# Patient Record
Sex: MALE | Race: BLACK OR AFRICAN AMERICAN | Employment: OTHER | URBAN - METROPOLITAN AREA
[De-identification: names, ages, dates, MRNs, and addresses within clinical notes are randomized per-mention and may not be internally consistent; named-entity substitution may affect disease eponyms.]

---

## 2018-04-02 ENCOUNTER — HOSPITAL ENCOUNTER (OUTPATIENT)
Age: 68
Discharge: HOME | End: 2018-04-02
Payer: MEDICAID

## 2018-04-02 LAB
ANION GAP SERPL CALCULATED.3IONS-SCNC: 18 MMOL/L (ref 7–16)
BUN BLDV-MCNC: 11 MG/DL (ref 8–23)
CALCIUM SERPL-MCNC: 8.9 MG/DL (ref 8.6–10.2)
CHLORIDE BLD-SCNC: 102 MMOL/L (ref 98–107)
CO2: 20 MMOL/L (ref 22–29)
CREAT SERPL-MCNC: 1.5 MG/DL (ref 0.7–1.2)
GFR AFRICAN AMERICAN: 56
GFR NON-AFRICAN AMERICAN: 56 ML/MIN/1.73
GLUCOSE BLD-MCNC: 132 MG/DL (ref 74–109)
POTASSIUM SERPL-SCNC: 3.3 MMOL/L (ref 3.5–5)
SODIUM BLD-SCNC: 140 MMOL/L (ref 132–146)

## 2018-04-02 PROCEDURE — 80048 BASIC METABOLIC PNL TOTAL CA: CPT

## 2018-04-02 PROCEDURE — 36415 COLL VENOUS BLD VENIPUNCTURE: CPT

## 2018-05-11 ENCOUNTER — HOSPITAL ENCOUNTER (OUTPATIENT)
Age: 68
Discharge: HOME | End: 2018-05-11
Payer: MEDICAID

## 2018-05-11 LAB
ALBUMIN SERPL-MCNC: 4.3 G/DL (ref 3.5–5.2)
ALP BLD-CCNC: 131 U/L (ref 40–129)
ALT SERPL-CCNC: 20 U/L (ref 0–40)
ANION GAP SERPL CALCULATED.3IONS-SCNC: 15 MMOL/L (ref 7–16)
AST SERPL-CCNC: 29 U/L (ref 0–39)
BILIRUB SERPL-MCNC: 0.8 MG/DL (ref 0–1.2)
BUN BLDV-MCNC: 13 MG/DL (ref 8–23)
CALCIUM SERPL-MCNC: 8.7 MG/DL (ref 8.6–10.2)
CHLORIDE BLD-SCNC: 103 MMOL/L (ref 98–107)
CHOLESTEROL, TOTAL: 181 MG/DL (ref 0–199)
CO2: 23 MMOL/L (ref 22–29)
CREAT SERPL-MCNC: 1.4 MG/DL (ref 0.7–1.2)
GFR AFRICAN AMERICAN: >60
GFR NON-AFRICAN AMERICAN: >60 ML/MIN/1.73
GLUCOSE BLD-MCNC: 102 MG/DL (ref 74–109)
HBA1C MFR BLD: 7.5 % (ref 4.8–5.9)
HDLC SERPL-MCNC: 48 MG/DL
LDL CHOLESTEROL CALCULATED: 113 MG/DL (ref 0–99)
POTASSIUM SERPL-SCNC: 3 MMOL/L (ref 3.5–5)
SODIUM BLD-SCNC: 141 MMOL/L (ref 132–146)
T4 FREE: 1.45 NG/DL (ref 0.93–1.7)
TOTAL PROTEIN: 7.9 G/DL (ref 6.4–8.3)
TRIGL SERPL-MCNC: 99 MG/DL (ref 0–149)
TSH SERPL DL<=0.05 MIU/L-ACNC: 1.24 UIU/ML (ref 0.27–4.2)
VITAMIN D 25-HYDROXY: 83 NG/ML (ref 30–100)
VLDLC SERPL CALC-MCNC: 20 MG/DL

## 2018-05-11 PROCEDURE — 84443 ASSAY THYROID STIM HORMONE: CPT

## 2018-05-11 PROCEDURE — 83036 HEMOGLOBIN GLYCOSYLATED A1C: CPT

## 2018-05-11 PROCEDURE — 36415 COLL VENOUS BLD VENIPUNCTURE: CPT

## 2018-05-11 PROCEDURE — 84439 ASSAY OF FREE THYROXINE: CPT

## 2018-05-11 PROCEDURE — 80053 COMPREHEN METABOLIC PANEL: CPT

## 2018-05-11 PROCEDURE — 82607 VITAMIN B-12: CPT

## 2018-05-11 PROCEDURE — 82306 VITAMIN D 25 HYDROXY: CPT

## 2018-05-11 PROCEDURE — 80061 LIPID PANEL: CPT

## 2018-05-12 LAB — VITAMIN B-12: 232 PG/ML (ref 211–946)

## 2018-05-21 ENCOUNTER — HOSPITAL ENCOUNTER (OUTPATIENT)
Dept: GENERAL RADIOLOGY | Age: 68
Discharge: HOME | End: 2018-05-23
Payer: MEDICAID

## 2018-05-21 DIAGNOSIS — R13.12 DYSPHAGIA, OROPHARYNGEAL: ICD-10-CM

## 2018-05-21 PROCEDURE — 74220 X-RAY XM ESOPHAGUS 1CNTRST: CPT

## 2018-07-09 ENCOUNTER — HOSPITAL ENCOUNTER (OUTPATIENT)
Age: 68
Discharge: HOME | End: 2018-07-09
Payer: MEDICAID

## 2018-07-09 LAB
ALBUMIN SERPL-MCNC: 3.9 G/DL (ref 3.5–5.2)
ALP BLD-CCNC: 125 U/L (ref 40–129)
ALT SERPL-CCNC: 30 U/L (ref 0–40)
ANION GAP SERPL CALCULATED.3IONS-SCNC: 13 MMOL/L (ref 7–16)
AST SERPL-CCNC: 36 U/L (ref 0–39)
BASOPHILS ABSOLUTE: 0.03 E9/L (ref 0–0.2)
BASOPHILS RELATIVE PERCENT: 0.6 % (ref 0–2)
BILIRUB SERPL-MCNC: 0.4 MG/DL (ref 0–1.2)
BUN BLDV-MCNC: 14 MG/DL (ref 8–23)
CALCIUM SERPL-MCNC: 8.9 MG/DL (ref 8.6–10.2)
CHLORIDE BLD-SCNC: 105 MMOL/L (ref 98–107)
CO2: 23 MMOL/L (ref 22–29)
CREAT SERPL-MCNC: 1.4 MG/DL (ref 0.7–1.2)
CREATININE URINE: 229 MG/DL (ref 40–278)
EOSINOPHILS ABSOLUTE: 0.05 E9/L (ref 0.05–0.5)
EOSINOPHILS RELATIVE PERCENT: 0.9 % (ref 0–6)
GFR AFRICAN AMERICAN: >60
GFR NON-AFRICAN AMERICAN: >60 ML/MIN/1.73
GLUCOSE BLD-MCNC: 138 MG/DL (ref 74–109)
HCT VFR BLD CALC: 42.1 % (ref 37–54)
HEMOGLOBIN: 14.1 G/DL (ref 12.5–16.5)
IMMATURE GRANULOCYTES #: 0.01 E9/L
IMMATURE GRANULOCYTES %: 0.2 % (ref 0–5)
LYMPHOCYTES ABSOLUTE: 2.1 E9/L (ref 1.5–4)
LYMPHOCYTES RELATIVE PERCENT: 39.8 % (ref 20–42)
MAGNESIUM: 2 MG/DL (ref 1.6–2.6)
MCH RBC QN AUTO: 28.4 PG (ref 26–35)
MCHC RBC AUTO-ENTMCNC: 33.5 % (ref 32–34.5)
MCV RBC AUTO: 84.7 FL (ref 80–99.9)
MONOCYTES ABSOLUTE: 0.57 E9/L (ref 0.1–0.95)
MONOCYTES RELATIVE PERCENT: 10.8 % (ref 2–12)
NEUTROPHILS ABSOLUTE: 2.52 E9/L (ref 1.8–7.3)
NEUTROPHILS RELATIVE PERCENT: 47.7 % (ref 43–80)
PDW BLD-RTO: 13.3 FL (ref 11.5–15)
PHOSPHORUS: 3.2 MG/DL (ref 2.5–4.5)
PLATELET # BLD: 207 E9/L (ref 130–450)
PMV BLD AUTO: 11.4 FL (ref 7–12)
POTASSIUM SERPL-SCNC: 3.8 MMOL/L (ref 3.5–5)
PROTEIN PROTEIN: 164 MG/DL (ref 0–12)
PROTEIN/CREAT RATIO: 0.7
PROTEIN/CREAT RATIO: 0.7 (ref 0–0.2)
RBC # BLD: 4.97 E12/L (ref 3.8–5.8)
SODIUM BLD-SCNC: 141 MMOL/L (ref 132–146)
TOTAL PROTEIN: 7.7 G/DL (ref 6.4–8.3)
URIC ACID, SERUM: 8.1 MG/DL (ref 3.4–7)
WBC # BLD: 5.3 E9/L (ref 4.5–11.5)

## 2018-07-09 PROCEDURE — 84550 ASSAY OF BLOOD/URIC ACID: CPT

## 2018-07-09 PROCEDURE — 36415 COLL VENOUS BLD VENIPUNCTURE: CPT

## 2018-07-09 PROCEDURE — 85025 COMPLETE CBC W/AUTO DIFF WBC: CPT

## 2018-07-09 PROCEDURE — 84156 ASSAY OF PROTEIN URINE: CPT

## 2018-07-09 PROCEDURE — 84100 ASSAY OF PHOSPHORUS: CPT

## 2018-07-09 PROCEDURE — 83735 ASSAY OF MAGNESIUM: CPT

## 2018-07-09 PROCEDURE — 80053 COMPREHEN METABOLIC PANEL: CPT

## 2018-07-09 PROCEDURE — 82570 ASSAY OF URINE CREATININE: CPT

## 2018-07-16 ENCOUNTER — HOSPITAL ENCOUNTER (OUTPATIENT)
Dept: ULTRASOUND IMAGING | Age: 68
Discharge: HOME | End: 2018-07-18
Payer: MEDICAID

## 2018-07-16 DIAGNOSIS — R13.11 DYSPHAGIA, ORAL PHASE: ICD-10-CM

## 2018-07-16 DIAGNOSIS — N13.39 OTHER HYDRONEPHROSIS: ICD-10-CM

## 2018-07-16 PROCEDURE — 76770 US EXAM ABDO BACK WALL COMP: CPT

## 2018-07-16 PROCEDURE — 76536 US EXAM OF HEAD AND NECK: CPT

## 2018-08-06 ENCOUNTER — HOSPITAL ENCOUNTER (OUTPATIENT)
Age: 68
Discharge: HOME | End: 2018-08-06
Payer: MEDICAID

## 2018-08-06 LAB
ALBUMIN SERPL-MCNC: 3.9 G/DL (ref 3.5–5.2)
ALP BLD-CCNC: 133 U/L (ref 40–129)
ALT SERPL-CCNC: 31 U/L (ref 0–40)
ANION GAP SERPL CALCULATED.3IONS-SCNC: 14 MMOL/L (ref 7–16)
AST SERPL-CCNC: 40 U/L (ref 0–39)
BASOPHILS ABSOLUTE: 0.04 E9/L (ref 0–0.2)
BASOPHILS RELATIVE PERCENT: 0.8 % (ref 0–2)
BILIRUB SERPL-MCNC: 0.4 MG/DL (ref 0–1.2)
BUN BLDV-MCNC: 12 MG/DL (ref 8–23)
CALCIUM SERPL-MCNC: 8.9 MG/DL (ref 8.6–10.2)
CHLORIDE BLD-SCNC: 103 MMOL/L (ref 98–107)
CO2: 24 MMOL/L (ref 22–29)
CREAT SERPL-MCNC: 1.5 MG/DL (ref 0.7–1.2)
CREATININE URINE: 218 MG/DL (ref 40–278)
EOSINOPHILS ABSOLUTE: 0.09 E9/L (ref 0.05–0.5)
EOSINOPHILS RELATIVE PERCENT: 1.8 % (ref 0–6)
GFR AFRICAN AMERICAN: 56
GFR NON-AFRICAN AMERICAN: 56 ML/MIN/1.73
GLUCOSE BLD-MCNC: 147 MG/DL (ref 74–109)
HCT VFR BLD CALC: 41.8 % (ref 37–54)
HEMOGLOBIN: 14 G/DL (ref 12.5–16.5)
IMMATURE GRANULOCYTES #: 0.01 E9/L
IMMATURE GRANULOCYTES %: 0.2 % (ref 0–5)
LYMPHOCYTES ABSOLUTE: 2.36 E9/L (ref 1.5–4)
LYMPHOCYTES RELATIVE PERCENT: 47 % (ref 20–42)
MAGNESIUM: 2 MG/DL (ref 1.6–2.6)
MCH RBC QN AUTO: 28.1 PG (ref 26–35)
MCHC RBC AUTO-ENTMCNC: 33.5 % (ref 32–34.5)
MCV RBC AUTO: 83.8 FL (ref 80–99.9)
MONOCYTES ABSOLUTE: 0.61 E9/L (ref 0.1–0.95)
MONOCYTES RELATIVE PERCENT: 12.2 % (ref 2–12)
NEUTROPHILS ABSOLUTE: 1.91 E9/L (ref 1.8–7.3)
NEUTROPHILS RELATIVE PERCENT: 38 % (ref 43–80)
PDW BLD-RTO: 13.1 FL (ref 11.5–15)
PHOSPHORUS: 3.3 MG/DL (ref 2.5–4.5)
PLATELET # BLD: 209 E9/L (ref 130–450)
PMV BLD AUTO: 10.9 FL (ref 7–12)
POTASSIUM SERPL-SCNC: 3.6 MMOL/L (ref 3.5–5)
PROTEIN PROTEIN: 552 MG/DL (ref 0–12)
PROTEIN/CREAT RATIO: 2.5
PROTEIN/CREAT RATIO: 2.5 (ref 0–0.2)
RBC # BLD: 4.99 E12/L (ref 3.8–5.8)
SODIUM BLD-SCNC: 141 MMOL/L (ref 132–146)
TOTAL PROTEIN: 8 G/DL (ref 6.4–8.3)
URIC ACID, SERUM: 7.6 MG/DL (ref 3.4–7)
WBC # BLD: 5 E9/L (ref 4.5–11.5)

## 2018-08-06 PROCEDURE — 84100 ASSAY OF PHOSPHORUS: CPT

## 2018-08-06 PROCEDURE — 80053 COMPREHEN METABOLIC PANEL: CPT

## 2018-08-06 PROCEDURE — 84550 ASSAY OF BLOOD/URIC ACID: CPT

## 2018-08-06 PROCEDURE — 82570 ASSAY OF URINE CREATININE: CPT

## 2018-08-06 PROCEDURE — 36415 COLL VENOUS BLD VENIPUNCTURE: CPT

## 2018-08-06 PROCEDURE — 84156 ASSAY OF PROTEIN URINE: CPT

## 2018-08-06 PROCEDURE — 83735 ASSAY OF MAGNESIUM: CPT

## 2018-08-06 PROCEDURE — 85025 COMPLETE CBC W/AUTO DIFF WBC: CPT

## 2018-10-09 ENCOUNTER — HOSPITAL ENCOUNTER (OUTPATIENT)
Age: 68
Discharge: HOME | End: 2018-10-09
Payer: MEDICAID

## 2018-10-09 LAB
ALBUMIN SERPL-MCNC: 4.1 G/DL (ref 3.5–5.2)
ALP BLD-CCNC: 128 U/L (ref 40–129)
ALT SERPL-CCNC: 26 U/L (ref 0–40)
ANION GAP SERPL CALCULATED.3IONS-SCNC: 15 MMOL/L (ref 7–16)
AST SERPL-CCNC: 31 U/L (ref 0–39)
BASOPHILS ABSOLUTE: 0.03 E9/L (ref 0–0.2)
BASOPHILS RELATIVE PERCENT: 0.5 % (ref 0–2)
BILIRUB SERPL-MCNC: 0.4 MG/DL (ref 0–1.2)
BUN BLDV-MCNC: 13 MG/DL (ref 8–23)
CALCIUM SERPL-MCNC: 9.1 MG/DL (ref 8.6–10.2)
CHLORIDE BLD-SCNC: 105 MMOL/L (ref 98–107)
CO2: 22 MMOL/L (ref 22–29)
CREAT SERPL-MCNC: 1.4 MG/DL (ref 0.7–1.2)
CREATININE URINE: 291 MG/DL (ref 40–278)
EOSINOPHILS ABSOLUTE: 0.1 E9/L (ref 0.05–0.5)
EOSINOPHILS RELATIVE PERCENT: 1.7 % (ref 0–6)
GFR AFRICAN AMERICAN: >60
GFR NON-AFRICAN AMERICAN: >60 ML/MIN/1.73
GLUCOSE BLD-MCNC: 139 MG/DL (ref 74–109)
HCT VFR BLD CALC: 44.4 % (ref 37–54)
HEMOGLOBIN: 14.5 G/DL (ref 12.5–16.5)
IMMATURE GRANULOCYTES #: 0.02 E9/L
IMMATURE GRANULOCYTES %: 0.3 % (ref 0–5)
LYMPHOCYTES ABSOLUTE: 2.44 E9/L (ref 1.5–4)
LYMPHOCYTES RELATIVE PERCENT: 41.4 % (ref 20–42)
MAGNESIUM: 1.8 MG/DL (ref 1.6–2.6)
MCH RBC QN AUTO: 27.7 PG (ref 26–35)
MCHC RBC AUTO-ENTMCNC: 32.7 % (ref 32–34.5)
MCV RBC AUTO: 84.9 FL (ref 80–99.9)
MONOCYTES ABSOLUTE: 0.7 E9/L (ref 0.1–0.95)
MONOCYTES RELATIVE PERCENT: 11.9 % (ref 2–12)
NEUTROPHILS ABSOLUTE: 2.61 E9/L (ref 1.8–7.3)
NEUTROPHILS RELATIVE PERCENT: 44.2 % (ref 43–80)
PDW BLD-RTO: 13.1 FL (ref 11.5–15)
PHOSPHORUS: 3.5 MG/DL (ref 2.5–4.5)
PLATELET # BLD: 205 E9/L (ref 130–450)
PMV BLD AUTO: 11.2 FL (ref 7–12)
POTASSIUM SERPL-SCNC: 3.7 MMOL/L (ref 3.5–5)
PROTEIN PROTEIN: 189 MG/DL (ref 0–12)
PROTEIN/CREAT RATIO: 0.6
PROTEIN/CREAT RATIO: 0.6 (ref 0–0.2)
RBC # BLD: 5.23 E12/L (ref 3.8–5.8)
SODIUM BLD-SCNC: 142 MMOL/L (ref 132–146)
TOTAL PROTEIN: 8.1 G/DL (ref 6.4–8.3)
URIC ACID, SERUM: 7.6 MG/DL (ref 3.4–7)
WBC # BLD: 5.9 E9/L (ref 4.5–11.5)

## 2018-10-09 PROCEDURE — 80053 COMPREHEN METABOLIC PANEL: CPT

## 2018-10-09 PROCEDURE — 84100 ASSAY OF PHOSPHORUS: CPT

## 2018-10-09 PROCEDURE — 82570 ASSAY OF URINE CREATININE: CPT

## 2018-10-09 PROCEDURE — 84156 ASSAY OF PROTEIN URINE: CPT

## 2018-10-09 PROCEDURE — 84550 ASSAY OF BLOOD/URIC ACID: CPT

## 2018-10-09 PROCEDURE — 83735 ASSAY OF MAGNESIUM: CPT

## 2018-10-09 PROCEDURE — 36415 COLL VENOUS BLD VENIPUNCTURE: CPT

## 2018-10-09 PROCEDURE — 85025 COMPLETE CBC W/AUTO DIFF WBC: CPT

## 2018-10-23 ENCOUNTER — HOSPITAL ENCOUNTER (EMERGENCY)
Age: 68
Discharge: HOME | End: 2018-10-23
Attending: EMERGENCY MEDICINE
Payer: MEDICAID

## 2018-10-23 ENCOUNTER — APPOINTMENT (OUTPATIENT)
Dept: CT IMAGING | Age: 68
End: 2018-10-23
Payer: MEDICAID

## 2018-10-23 ENCOUNTER — APPOINTMENT (OUTPATIENT)
Dept: GENERAL RADIOLOGY | Age: 68
End: 2018-10-23
Payer: MEDICAID

## 2018-10-23 VITALS
RESPIRATION RATE: 18 BRPM | DIASTOLIC BLOOD PRESSURE: 90 MMHG | BODY MASS INDEX: 34.01 KG/M2 | TEMPERATURE: 96.3 F | HEART RATE: 65 BPM | SYSTOLIC BLOOD PRESSURE: 165 MMHG | HEIGHT: 74 IN | OXYGEN SATURATION: 98 % | WEIGHT: 265 LBS

## 2018-10-23 DIAGNOSIS — N13.30 HYDRONEPHROSIS OF LEFT KIDNEY: ICD-10-CM

## 2018-10-23 DIAGNOSIS — N30.01 ACUTE CYSTITIS WITH HEMATURIA: Primary | ICD-10-CM

## 2018-10-23 DIAGNOSIS — I10 ESSENTIAL HYPERTENSION: ICD-10-CM

## 2018-10-23 LAB
ANION GAP SERPL CALCULATED.3IONS-SCNC: 13 MMOL/L (ref 7–16)
BACTERIA: ABNORMAL /HPF
BASOPHILS ABSOLUTE: 0.04 E9/L (ref 0–0.2)
BASOPHILS RELATIVE PERCENT: 0.8 % (ref 0–2)
BILIRUBIN URINE: NEGATIVE
BLOOD, URINE: ABNORMAL
BUN BLDV-MCNC: 16 MG/DL (ref 8–23)
CALCIUM SERPL-MCNC: 9 MG/DL (ref 8.6–10.2)
CHLORIDE BLD-SCNC: 105 MMOL/L (ref 98–107)
CLARITY: ABNORMAL
CO2: 22 MMOL/L (ref 22–29)
COLOR: YELLOW
CREAT SERPL-MCNC: 1.4 MG/DL (ref 0.7–1.2)
EOSINOPHILS ABSOLUTE: 0.14 E9/L (ref 0.05–0.5)
EOSINOPHILS RELATIVE PERCENT: 2.6 % (ref 0–6)
EPITHELIAL CELLS, UA: ABNORMAL /HPF
GFR AFRICAN AMERICAN: >60
GFR NON-AFRICAN AMERICAN: >60 ML/MIN/1.73
GLUCOSE BLD-MCNC: 125 MG/DL (ref 74–109)
GLUCOSE URINE: NEGATIVE MG/DL
HCT VFR BLD CALC: 42.8 % (ref 37–54)
HEMOGLOBIN: 13.9 G/DL (ref 12.5–16.5)
IMMATURE GRANULOCYTES #: 0.01 E9/L
IMMATURE GRANULOCYTES %: 0.2 % (ref 0–5)
KETONES, URINE: NEGATIVE MG/DL
LEUKOCYTE ESTERASE, URINE: ABNORMAL
LYMPHOCYTES ABSOLUTE: 2.16 E9/L (ref 1.5–4)
LYMPHOCYTES RELATIVE PERCENT: 40.8 % (ref 20–42)
MCH RBC QN AUTO: 27.7 PG (ref 26–35)
MCHC RBC AUTO-ENTMCNC: 32.5 % (ref 32–34.5)
MCV RBC AUTO: 85.4 FL (ref 80–99.9)
MONOCYTES ABSOLUTE: 0.7 E9/L (ref 0.1–0.95)
MONOCYTES RELATIVE PERCENT: 13.2 % (ref 2–12)
NEUTROPHILS ABSOLUTE: 2.24 E9/L (ref 1.8–7.3)
NEUTROPHILS RELATIVE PERCENT: 42.4 % (ref 43–80)
NITRITE, URINE: NEGATIVE
PDW BLD-RTO: 13.4 FL (ref 11.5–15)
PH UA: 6 (ref 5–9)
PLATELET # BLD: 208 E9/L (ref 130–450)
PMV BLD AUTO: 12 FL (ref 7–12)
POTASSIUM SERPL-SCNC: 4.9 MMOL/L (ref 3.5–5)
PROTEIN UA: 100 MG/DL
RBC # BLD: 5.01 E12/L (ref 3.8–5.8)
RBC UA: ABNORMAL /HPF (ref 0–2)
SODIUM BLD-SCNC: 140 MMOL/L (ref 132–146)
SPECIFIC GRAVITY UA: >=1.03 (ref 1–1.03)
UROBILINOGEN, URINE: 0.2 E.U./DL
WBC # BLD: 5.3 E9/L (ref 4.5–11.5)
WBC UA: >20 /HPF (ref 0–5)

## 2018-10-23 PROCEDURE — 99284 EMERGENCY DEPT VISIT MOD MDM: CPT

## 2018-10-23 PROCEDURE — 87186 SC STD MICRODIL/AGAR DIL: CPT

## 2018-10-23 PROCEDURE — 93005 ELECTROCARDIOGRAM TRACING: CPT | Performed by: EMERGENCY MEDICINE

## 2018-10-23 PROCEDURE — 36415 COLL VENOUS BLD VENIPUNCTURE: CPT

## 2018-10-23 PROCEDURE — 80048 BASIC METABOLIC PNL TOTAL CA: CPT

## 2018-10-23 PROCEDURE — 6370000000 HC RX 637 (ALT 250 FOR IP): Performed by: EMERGENCY MEDICINE

## 2018-10-23 PROCEDURE — 87088 URINE BACTERIA CULTURE: CPT

## 2018-10-23 PROCEDURE — 74176 CT ABD & PELVIS W/O CONTRAST: CPT

## 2018-10-23 PROCEDURE — 85025 COMPLETE CBC W/AUTO DIFF WBC: CPT

## 2018-10-23 PROCEDURE — 81001 URINALYSIS AUTO W/SCOPE: CPT

## 2018-10-23 PROCEDURE — 87077 CULTURE AEROBIC IDENTIFY: CPT

## 2018-10-23 RX ORDER — CEFDINIR 300 MG/1
300 CAPSULE ORAL 2 TIMES DAILY
Qty: 20 CAPSULE | Refills: 0 | Status: SHIPPED | OUTPATIENT
Start: 2018-10-23 | End: 2018-11-02

## 2018-10-23 RX ORDER — ASPIRIN 81 MG/1
243 TABLET, CHEWABLE ORAL ONCE
Status: DISCONTINUED | OUTPATIENT
Start: 2018-10-23 | End: 2018-10-23

## 2018-10-23 RX ORDER — REPAGLINIDE 2 MG/1
TABLET ORAL
COMMUNITY
End: 2021-06-08

## 2018-10-23 RX ORDER — LISINOPRIL 40 MG/1
TABLET ORAL
COMMUNITY
Start: 2018-06-14 | End: 2021-06-08 | Stop reason: SDUPTHER

## 2018-10-23 RX ORDER — SIMETHICONE 80 MG
TABLET,CHEWABLE ORAL
COMMUNITY
Start: 2017-06-05 | End: 2021-06-08

## 2018-10-23 RX ORDER — METOPROLOL TARTRATE 75 MG/1
TABLET, FILM COATED ORAL
COMMUNITY
Start: 2018-08-03 | End: 2021-06-08

## 2018-10-23 RX ORDER — CEFDINIR 300 MG/1
300 CAPSULE ORAL ONCE
Status: COMPLETED | OUTPATIENT
Start: 2018-10-23 | End: 2018-10-23

## 2018-10-23 RX ORDER — ASPIRIN 81 MG/1
TABLET, CHEWABLE ORAL
COMMUNITY
Start: 2018-06-14 | End: 2018-10-23 | Stop reason: SDUPTHER

## 2018-10-23 RX ADMIN — CEFDINIR 300 MG: 300 CAPSULE ORAL at 13:14

## 2018-10-23 NOTE — ED PROVIDER NOTES
Department of Emergency Medicine   ED  Provider Note  Admit Date/RoomTime: 10/23/2018 10:24 AM  ED Room: 09/09      History of Present Illness:  10/23/18, Time: 10:38 AM         Kameron Kerns is a 80 y.o. male presenting to the ED for elevated blood pressure, beginning today. The complaint has been constant, moderate in severity, and worsened by nothing. The pt was sent over from the clinic after it was noted that his blood pressure was elevated. He states his pressure was over 200. The pt also reports he has been experiencing intermittent sharp pain to his left flank region, since last night. Pt states he has been taking his prescribed Percocet at home, with some relief of his pain. He denies fever, chills, headache, dizziness, light-headedness, sob, cough, chest pain, nausea, vomiting, dysuria, hematuria, testicular pain, or recent trauma. Pt states he is on medication for his hypertension, which he took this morning. He also reports hx of kidney stones and states he is due to see [facility] next week. Review of Systems:   Pertinent positives and negatives are stated within HPI, all other systems reviewed and are negative.    --------------------------------------------- PAST HISTORY ---------------------------------------------  Past Medical History:  has a past medical history of Cervical vertebra fusion; Chronic kidney disease (CKD), stage III (moderate) ([facility] [address] Foot injury; Hypertension; Stroke [facility]); Type II or unspecified type diabetes mellitus without mention of complication, not stated as uncontrolled; and Ulcers, corneal.    Past Surgical History:  has a past surgical history that includes fracture surgery; Rotator cuff repair (1999); Spine surgery; Kidney stone surgery (01/23/10); Colonoscopy; and Upper gastrointestinal endoscopy. Social History:  reports that he has never smoked. He has never used smokeless tobacco. He reports that he does not drink alcohol or use drugs.     Family Basophils % 0.8 0.0 - 2.0 %    Neutrophils # 2.24 1.80 - 7.30 E9/L    Immature Granulocytes # 0.01 E9/L    Lymphocytes # 2.16 1.50 - 4.00 E9/L    Monocytes # 0.70 0.10 - 0.95 E9/L    Eosinophils # 0.14 0.05 - 0.50 E9/L    Basophils # 0.04 0.00 - 0.20 E9/L   Urinalysis   Result Value Ref Range    Color, UA Yellow Straw/Yellow    Clarity, UA CLOUDY (A) Clear    Glucose, Ur Negative Negative mg/dL    Bilirubin Urine Negative Negative    Ketones, Urine Negative Negative mg/dL    Specific Gravity, UA >=1.030 1.005 - 1.030    Blood, Urine SMALL (A) Negative    pH, UA 6.0 5.0 - 9.0    Protein,  (A) Negative mg/dL    Urobilinogen, Urine 0.2 <2.0 E.U./dL    Nitrite, Urine Negative Negative    Leukocyte Esterase, Urine MODERATE (A) Negative   Basic metabolic panel   Result Value Ref Range    Sodium 140 132 - 146 mmol/L    Potassium 4.9 3.5 - 5.0 mmol/L    Chloride 105 98 - 107 mmol/L    CO2 22 22 - 29 mmol/L    Anion Gap 13 7 - 16 mmol/L    Glucose 125 (H) 74 - 109 mg/dL    BUN 16 8 - 23 mg/dL    CREATININE 1.4 (H) 0.7 - 1.2 mg/dL    GFR Non-African American >60 >=60 mL/min/1.73    GFR African American >60     Calcium 9.0 8.6 - 10.2 mg/dL   Microscopic Urinalysis   Result Value Ref Range    WBC, UA >20 0 - 5 /HPF    RBC, UA 2-5 0 - 2 /HPF    Epi Cells FEW /HPF    Bacteria, UA MANY (A) /HPF       RADIOLOGY:  Interpreted by Radiologist.  CT ABDOMEN PELVIS WO CONTRAST   Final Result   No normal appearing left kidney with  severe hydronephrosis with    significantly distended collecting system with appearance of the   cystic mass with  cortical thinning without significant renal   parenchyma. There is also hydroureter with  small stones in  the   distal left ureter which may be causing obstruction. This finding is   unchanged. Large bladder stones are also noted.  Nonobstructing right   kidney stones are also present.              ------------------------- NURSING NOTES AND VITALS REVIEWED ---------------------------   The nursing notes within the ED encounter and vital signs as below have been reviewed by myself. BP (!) 165/90   Pulse 65   Temp 96.3 °F (35.7 °C) (Temporal)   Resp 18   Ht 6' 2\" (1.88 m)   Wt 265 lb (120.2 kg)   SpO2 98%   BMI 34.02 kg/m²   Oxygen Saturation Interpretation: Normal    The patients available past medical records and past encounters were reviewed. ------------------------------ ED COURSE/MEDICAL DECISION MAKING----------------------  Medications   cefdinir (OMNICEF) capsule 300 mg (300 mg Oral Given 10/23/18 1314)       Medical Decision Making:    Pt presents to the ED for elevated BP and left flank pain. He reports hx of hypertension and kidney stones. States he took his hypertension medication this am. Denies dizziness, headache, light-headedness, or chest pain. States his prescribed Percocet has been improving his pain. CT abdomen and labs obtained and reviewed. Omnicef given for UTI. Urine culture sent. ED Course as of Oct 23 2015   Tue Oct 23, 2018   1406 I made multiple attempts to contact the patient's urologist, Dr. Edith Mixon. We did not receive a call back. Patient has an appointment scheduled with Dr. Edith Mixon in one week. CT AP shows severe hydronephrosis and cystic kidney mass with possible kidney stones, but these findings are not acute and seen on prior imaging. Creatinine elevated but baseline. UA appears infected. Urine culture sent. Patient will be treated with omnicef for UTI. Advised to follow up with urology as scheduled in one week. Advised to have PCP or urologist follow up final urine culture results. BP improved with rest, and he does not have any associated symptoms. I do not believe aggressive BP control is indicated at this time. Advised to continue current BP meds and follow up with his PCP for repeat BP check.   [JA]      ED Course User Index  [JA] Konrad Wylie MD         This patient's ED course included: a personal history and physicial examination and re-evaluation prior to disposition    This patient has remained hemodynamically stable during their ED course. Re-Evaluations:           Re-evaluation. Patients symptoms are improving    Counseling: The emergency provider has spoken with the patient and discussed todays results, in addition to providing specific details for the plan of care and counseling regarding the diagnosis and prognosis. Questions are answered at this time and they are agreeable with the plan.     --------------------------------- IMPRESSION AND DISPOSITION ---------------------------------    IMPRESSION  1. Acute cystitis with hematuria    2. Hydronephrosis of left kidney    3. Essential hypertension        DISPOSITION  Disposition: Discharge to home  Patient condition is stable    10/23/18, 10:38 AM.    This note is prepared by Fanny Ontiveros, acting as Scribe for Katie Gracia MD.    Katie Gracia MD:  The scribe's documentation has been prepared under my direction and personally reviewed by me in its entirety. I confirm that the note above accurately reflects all work, treatment, procedures, and medical decision making performed by me.          Katie Gracia MD  10/23/18 2015

## 2018-10-25 LAB
ORGANISM: ABNORMAL
URINE CULTURE, ROUTINE: ABNORMAL
URINE CULTURE, ROUTINE: ABNORMAL

## 2018-10-26 LAB
EKG ATRIAL RATE: 64 BPM
EKG P AXIS: 51 DEGREES
EKG P-R INTERVAL: 228 MS
EKG Q-T INTERVAL: 412 MS
EKG QRS DURATION: 72 MS
EKG QTC CALCULATION (BAZETT): 425 MS
EKG R AXIS: 22 DEGREES
EKG T AXIS: -13 DEGREES
EKG VENTRICULAR RATE: 64 BPM

## 2018-11-26 ENCOUNTER — HOSPITAL ENCOUNTER (OUTPATIENT)
Age: 68
Discharge: HOME | End: 2018-11-26
Payer: MEDICAID

## 2018-11-26 LAB
ALBUMIN SERPL-MCNC: 4.1 G/DL (ref 3.5–5.2)
ALP BLD-CCNC: 126 U/L (ref 40–129)
ALT SERPL-CCNC: 19 U/L (ref 0–40)
ANION GAP SERPL CALCULATED.3IONS-SCNC: 15 MMOL/L (ref 7–16)
AST SERPL-CCNC: 22 U/L (ref 0–39)
BACTERIA: ABNORMAL /HPF
BASOPHILS ABSOLUTE: 0.03 E9/L (ref 0–0.2)
BASOPHILS RELATIVE PERCENT: 0.6 % (ref 0–2)
BILIRUB SERPL-MCNC: 0.3 MG/DL (ref 0–1.2)
BILIRUBIN URINE: NEGATIVE
BLOOD, URINE: ABNORMAL
BUN BLDV-MCNC: 11 MG/DL (ref 8–23)
CALCIUM SERPL-MCNC: 9.3 MG/DL (ref 8.6–10.2)
CHLORIDE BLD-SCNC: 104 MMOL/L (ref 98–107)
CLARITY: ABNORMAL
CO2: 22 MMOL/L (ref 22–29)
COLOR: YELLOW
CREAT SERPL-MCNC: 1.5 MG/DL (ref 0.7–1.2)
CREATININE URINE: 242 MG/DL (ref 40–278)
EOSINOPHILS ABSOLUTE: 0.08 E9/L (ref 0.05–0.5)
EOSINOPHILS RELATIVE PERCENT: 1.5 % (ref 0–6)
EPITHELIAL CELLS, UA: ABNORMAL /HPF
GFR AFRICAN AMERICAN: 56
GFR NON-AFRICAN AMERICAN: 56 ML/MIN/1.73
GLUCOSE BLD-MCNC: 184 MG/DL (ref 74–99)
GLUCOSE URINE: NEGATIVE MG/DL
HCT VFR BLD CALC: 44.3 % (ref 37–54)
HEMOGLOBIN: 15.1 G/DL (ref 12.5–16.5)
IMMATURE GRANULOCYTES #: 0.01 E9/L
IMMATURE GRANULOCYTES %: 0.2 % (ref 0–5)
KETONES, URINE: NEGATIVE MG/DL
LEUKOCYTE ESTERASE, URINE: ABNORMAL
LYMPHOCYTES ABSOLUTE: 2.06 E9/L (ref 1.5–4)
LYMPHOCYTES RELATIVE PERCENT: 38.1 % (ref 20–42)
MAGNESIUM: 1.8 MG/DL (ref 1.6–2.6)
MCH RBC QN AUTO: 28.6 PG (ref 26–35)
MCHC RBC AUTO-ENTMCNC: 34.1 % (ref 32–34.5)
MCV RBC AUTO: 83.9 FL (ref 80–99.9)
MONOCYTES ABSOLUTE: 0.53 E9/L (ref 0.1–0.95)
MONOCYTES RELATIVE PERCENT: 9.8 % (ref 2–12)
NEUTROPHILS ABSOLUTE: 2.69 E9/L (ref 1.8–7.3)
NEUTROPHILS RELATIVE PERCENT: 49.8 % (ref 43–80)
NITRITE, URINE: NEGATIVE
PDW BLD-RTO: 13.2 FL (ref 11.5–15)
PH UA: 6 (ref 5–9)
PHOSPHORUS: 2.7 MG/DL (ref 2.5–4.5)
PLATELET # BLD: 213 E9/L (ref 130–450)
PMV BLD AUTO: 11 FL (ref 7–12)
POTASSIUM SERPL-SCNC: 3.4 MMOL/L (ref 3.5–5)
PROTEIN PROTEIN: 191 MG/DL (ref 0–12)
PROTEIN UA: 100 MG/DL
PROTEIN/CREAT RATIO: 0.8
PROTEIN/CREAT RATIO: 0.8 (ref 0–0.2)
RBC # BLD: 5.28 E12/L (ref 3.8–5.8)
RBC UA: ABNORMAL /HPF (ref 0–2)
SODIUM BLD-SCNC: 141 MMOL/L (ref 132–146)
SPECIFIC GRAVITY UA: 1.02 (ref 1–1.03)
TOTAL PROTEIN: 7.9 G/DL (ref 6.4–8.3)
URIC ACID, SERUM: 7.7 MG/DL (ref 3.4–7)
UROBILINOGEN, URINE: 0.2 E.U./DL
WBC # BLD: 5.4 E9/L (ref 4.5–11.5)
WBC UA: >20 /HPF (ref 0–5)

## 2018-11-26 PROCEDURE — 85025 COMPLETE CBC W/AUTO DIFF WBC: CPT

## 2018-11-26 PROCEDURE — 87077 CULTURE AEROBIC IDENTIFY: CPT

## 2018-11-26 PROCEDURE — 87088 URINE BACTERIA CULTURE: CPT

## 2018-11-26 PROCEDURE — 87186 SC STD MICRODIL/AGAR DIL: CPT

## 2018-11-26 PROCEDURE — 80053 COMPREHEN METABOLIC PANEL: CPT

## 2018-11-26 PROCEDURE — 84156 ASSAY OF PROTEIN URINE: CPT

## 2018-11-26 PROCEDURE — 84550 ASSAY OF BLOOD/URIC ACID: CPT

## 2018-11-26 PROCEDURE — 36415 COLL VENOUS BLD VENIPUNCTURE: CPT

## 2018-11-26 PROCEDURE — 81001 URINALYSIS AUTO W/SCOPE: CPT

## 2018-11-26 PROCEDURE — 82570 ASSAY OF URINE CREATININE: CPT

## 2018-11-26 PROCEDURE — 83735 ASSAY OF MAGNESIUM: CPT

## 2018-11-26 PROCEDURE — 84100 ASSAY OF PHOSPHORUS: CPT

## 2018-12-02 LAB
ORGANISM: ABNORMAL
URINE CULTURE, ROUTINE: ABNORMAL
URINE CULTURE, ROUTINE: ABNORMAL

## 2018-12-19 ENCOUNTER — HOSPITAL ENCOUNTER (OUTPATIENT)
Age: 68
Discharge: HOME | End: 2018-12-19
Payer: MEDICAID

## 2018-12-19 LAB
BACTERIA: ABNORMAL /HPF
BILIRUBIN URINE: NEGATIVE
BLOOD, URINE: ABNORMAL
CLARITY: ABNORMAL
COLOR: YELLOW
GLUCOSE URINE: NEGATIVE MG/DL
KETONES, URINE: NEGATIVE MG/DL
LEUKOCYTE ESTERASE, URINE: ABNORMAL
NITRITE, URINE: NEGATIVE
PH UA: 6 (ref 5–9)
PROTEIN UA: 100 MG/DL
RBC UA: ABNORMAL /HPF (ref 0–2)
SPECIFIC GRAVITY UA: 1.02 (ref 1–1.03)
UROBILINOGEN, URINE: 0.2 E.U./DL
WBC UA: >20 /HPF (ref 0–5)

## 2018-12-19 PROCEDURE — 87077 CULTURE AEROBIC IDENTIFY: CPT

## 2018-12-19 PROCEDURE — 87088 URINE BACTERIA CULTURE: CPT

## 2018-12-19 PROCEDURE — 81001 URINALYSIS AUTO W/SCOPE: CPT

## 2018-12-19 PROCEDURE — 87186 SC STD MICRODIL/AGAR DIL: CPT

## 2018-12-21 LAB
ORGANISM: ABNORMAL
URINE CULTURE, ROUTINE: ABNORMAL
URINE CULTURE, ROUTINE: ABNORMAL

## 2019-01-22 ENCOUNTER — HOSPITAL ENCOUNTER (OUTPATIENT)
Age: 69
Discharge: HOME | End: 2019-01-22
Payer: MEDICAID

## 2019-01-22 LAB
BACTERIA: ABNORMAL /HPF
BILIRUBIN URINE: NEGATIVE
BLOOD, URINE: ABNORMAL
CLARITY: ABNORMAL
COLOR: YELLOW
GLUCOSE URINE: NEGATIVE MG/DL
KETONES, URINE: ABNORMAL MG/DL
LEUKOCYTE ESTERASE, URINE: ABNORMAL
NITRITE, URINE: NEGATIVE
PH UA: 6 (ref 5–9)
PROTEIN UA: 100 MG/DL
RBC UA: ABNORMAL /HPF (ref 0–2)
SPECIFIC GRAVITY UA: >=1.03 (ref 1–1.03)
UROBILINOGEN, URINE: 1 E.U./DL
WBC UA: ABNORMAL /HPF (ref 0–5)

## 2019-01-22 PROCEDURE — 87077 CULTURE AEROBIC IDENTIFY: CPT

## 2019-01-22 PROCEDURE — 87186 SC STD MICRODIL/AGAR DIL: CPT

## 2019-01-22 PROCEDURE — 87088 URINE BACTERIA CULTURE: CPT

## 2019-01-22 PROCEDURE — 81001 URINALYSIS AUTO W/SCOPE: CPT

## 2019-01-24 LAB
ORGANISM: ABNORMAL
URINE CULTURE, ROUTINE: ABNORMAL
URINE CULTURE, ROUTINE: ABNORMAL

## 2019-02-22 ENCOUNTER — HOSPITAL ENCOUNTER (OUTPATIENT)
Dept: NUCLEAR MEDICINE | Age: 69
Discharge: HOME | End: 2019-02-24
Payer: MEDICAID

## 2019-02-22 DIAGNOSIS — N13.1 HYDRONEPHROSIS WITH URETERAL STRICTURE: ICD-10-CM

## 2019-02-22 PROCEDURE — 6360000002 HC RX W HCPCS

## 2019-02-22 PROCEDURE — A9562 TC99M MERTIATIDE: HCPCS | Performed by: RADIOLOGY

## 2019-02-22 PROCEDURE — 78708 K FLOW/FUNCT IMAGE W/DRUG: CPT

## 2019-02-22 PROCEDURE — 3430000000 HC RX DIAGNOSTIC RADIOPHARMACEUTICAL: Performed by: RADIOLOGY

## 2019-02-22 RX ORDER — FUROSEMIDE 10 MG/ML
10 INJECTION INTRAMUSCULAR; INTRAVENOUS ONCE
Status: COMPLETED | OUTPATIENT
Start: 2019-02-22 | End: 2019-02-22

## 2019-02-22 RX ADMIN — FUROSEMIDE 10 MG: 10 INJECTION, SOLUTION INTRAMUSCULAR; INTRAVENOUS at 10:42

## 2019-02-22 RX ADMIN — Medication 9 MILLICURIE: at 10:22

## 2019-05-20 ENCOUNTER — HOSPITAL ENCOUNTER (OUTPATIENT)
Age: 69
Discharge: HOME | End: 2019-05-20
Payer: MEDICAID

## 2019-05-20 LAB
ALBUMIN SERPL-MCNC: 3.7 G/DL (ref 3.5–5.2)
ALP BLD-CCNC: 134 U/L (ref 40–129)
ALT SERPL-CCNC: 17 U/L (ref 0–40)
ANION GAP SERPL CALCULATED.3IONS-SCNC: 12 MMOL/L (ref 7–16)
AST SERPL-CCNC: 23 U/L (ref 0–39)
BASOPHILS ABSOLUTE: 0.04 E9/L (ref 0–0.2)
BASOPHILS RELATIVE PERCENT: 0.8 % (ref 0–2)
BILIRUB SERPL-MCNC: 0.5 MG/DL (ref 0–1.2)
BUN BLDV-MCNC: 12 MG/DL (ref 8–23)
CALCIUM SERPL-MCNC: 9.2 MG/DL (ref 8.6–10.2)
CHLORIDE BLD-SCNC: 106 MMOL/L (ref 98–107)
CO2: 24 MMOL/L (ref 22–29)
CREAT SERPL-MCNC: 1.8 MG/DL (ref 0.7–1.2)
CREATININE URINE: 314 MG/DL (ref 40–278)
EOSINOPHILS ABSOLUTE: 0.06 E9/L (ref 0.05–0.5)
EOSINOPHILS RELATIVE PERCENT: 1.2 % (ref 0–6)
GFR AFRICAN AMERICAN: 46
GFR NON-AFRICAN AMERICAN: 46 ML/MIN/1.73
GLUCOSE BLD-MCNC: 135 MG/DL (ref 74–99)
HCT VFR BLD CALC: 39.1 % (ref 37–54)
HEMOGLOBIN: 13.1 G/DL (ref 12.5–16.5)
IMMATURE GRANULOCYTES #: 0.01 E9/L
IMMATURE GRANULOCYTES %: 0.2 % (ref 0–5)
LYMPHOCYTES ABSOLUTE: 1.79 E9/L (ref 1.5–4)
LYMPHOCYTES RELATIVE PERCENT: 36.7 % (ref 20–42)
MAGNESIUM: 1.9 MG/DL (ref 1.6–2.6)
MCH RBC QN AUTO: 28.9 PG (ref 26–35)
MCHC RBC AUTO-ENTMCNC: 33.5 % (ref 32–34.5)
MCV RBC AUTO: 86.1 FL (ref 80–99.9)
MONOCYTES ABSOLUTE: 0.54 E9/L (ref 0.1–0.95)
MONOCYTES RELATIVE PERCENT: 11.1 % (ref 2–12)
NEUTROPHILS ABSOLUTE: 2.44 E9/L (ref 1.8–7.3)
NEUTROPHILS RELATIVE PERCENT: 50 % (ref 43–80)
PDW BLD-RTO: 13.5 FL (ref 11.5–15)
PHOSPHORUS: 2.7 MG/DL (ref 2.5–4.5)
PLATELET # BLD: 214 E9/L (ref 130–450)
PMV BLD AUTO: 11.7 FL (ref 7–12)
POTASSIUM SERPL-SCNC: 3.7 MMOL/L (ref 3.5–5)
PROTEIN PROTEIN: 89 MG/DL (ref 0–12)
PROTEIN/CREAT RATIO: 0.3
PROTEIN/CREAT RATIO: 0.3 (ref 0–0.2)
RBC # BLD: 4.54 E12/L (ref 3.8–5.8)
SODIUM BLD-SCNC: 142 MMOL/L (ref 132–146)
TOTAL PROTEIN: 8 G/DL (ref 6.4–8.3)
URIC ACID, SERUM: 7.2 MG/DL (ref 3.4–7)
WBC # BLD: 4.9 E9/L (ref 4.5–11.5)

## 2019-05-20 PROCEDURE — 36415 COLL VENOUS BLD VENIPUNCTURE: CPT

## 2019-05-20 PROCEDURE — 84100 ASSAY OF PHOSPHORUS: CPT

## 2019-05-20 PROCEDURE — 84550 ASSAY OF BLOOD/URIC ACID: CPT

## 2019-05-20 PROCEDURE — 80053 COMPREHEN METABOLIC PANEL: CPT

## 2019-05-20 PROCEDURE — 82570 ASSAY OF URINE CREATININE: CPT

## 2019-05-20 PROCEDURE — 85025 COMPLETE CBC W/AUTO DIFF WBC: CPT

## 2019-05-20 PROCEDURE — 83735 ASSAY OF MAGNESIUM: CPT

## 2019-05-20 PROCEDURE — 84156 ASSAY OF PROTEIN URINE: CPT

## 2019-06-12 ENCOUNTER — HOSPITAL ENCOUNTER (OUTPATIENT)
Age: 69
Discharge: HOME | End: 2019-06-12
Payer: MEDICAID

## 2019-06-12 LAB
ALBUMIN SERPL-MCNC: 3.9 G/DL (ref 3.5–5.2)
ALP BLD-CCNC: 123 U/L (ref 40–129)
ALT SERPL-CCNC: 20 U/L (ref 0–40)
ANION GAP SERPL CALCULATED.3IONS-SCNC: 14 MMOL/L (ref 7–16)
AST SERPL-CCNC: 25 U/L (ref 0–39)
BASOPHILS ABSOLUTE: 0.02 E9/L (ref 0–0.2)
BASOPHILS RELATIVE PERCENT: 0.4 % (ref 0–2)
BILIRUB SERPL-MCNC: 0.4 MG/DL (ref 0–1.2)
BUN BLDV-MCNC: 14 MG/DL (ref 8–23)
CALCIUM SERPL-MCNC: 9.3 MG/DL (ref 8.6–10.2)
CHLORIDE BLD-SCNC: 102 MMOL/L (ref 98–107)
CO2: 22 MMOL/L (ref 22–29)
CREAT SERPL-MCNC: 1.9 MG/DL (ref 0.7–1.2)
CREATININE URINE: 310 MG/DL (ref 40–278)
EOSINOPHILS ABSOLUTE: 0.04 E9/L (ref 0.05–0.5)
EOSINOPHILS RELATIVE PERCENT: 0.8 % (ref 0–6)
GFR AFRICAN AMERICAN: 43
GFR NON-AFRICAN AMERICAN: 43 ML/MIN/1.73
GLUCOSE BLD-MCNC: 278 MG/DL (ref 74–99)
HCT VFR BLD CALC: 41 % (ref 37–54)
HEMOGLOBIN: 13.8 G/DL (ref 12.5–16.5)
IMMATURE GRANULOCYTES #: 0.01 E9/L
IMMATURE GRANULOCYTES %: 0.2 % (ref 0–5)
LYMPHOCYTES ABSOLUTE: 1.5 E9/L (ref 1.5–4)
LYMPHOCYTES RELATIVE PERCENT: 31.1 % (ref 20–42)
MAGNESIUM: 2.1 MG/DL (ref 1.6–2.6)
MCH RBC QN AUTO: 29 PG (ref 26–35)
MCHC RBC AUTO-ENTMCNC: 33.7 % (ref 32–34.5)
MCV RBC AUTO: 86.1 FL (ref 80–99.9)
MONOCYTES ABSOLUTE: 0.51 E9/L (ref 0.1–0.95)
MONOCYTES RELATIVE PERCENT: 10.6 % (ref 2–12)
NEUTROPHILS ABSOLUTE: 2.74 E9/L (ref 1.8–7.3)
NEUTROPHILS RELATIVE PERCENT: 56.9 % (ref 43–80)
PARATHYROID HORMONE INTACT: 53 PG/ML (ref 15–65)
PDW BLD-RTO: 13.2 FL (ref 11.5–15)
PHOSPHORUS: 2.4 MG/DL (ref 2.5–4.5)
PLATELET # BLD: 203 E9/L (ref 130–450)
PMV BLD AUTO: 11.4 FL (ref 7–12)
POTASSIUM SERPL-SCNC: 3.6 MMOL/L (ref 3.5–5)
PROTEIN PROTEIN: 67 MG/DL (ref 0–12)
PROTEIN/CREAT RATIO: 0.2
PROTEIN/CREAT RATIO: 0.2 (ref 0–0.2)
RBC # BLD: 4.76 E12/L (ref 3.8–5.8)
SODIUM BLD-SCNC: 138 MMOL/L (ref 132–146)
TOTAL PROTEIN: 7.6 G/DL (ref 6.4–8.3)
URIC ACID, SERUM: 8.4 MG/DL (ref 3.4–7)
WBC # BLD: 4.8 E9/L (ref 4.5–11.5)

## 2019-06-12 PROCEDURE — 83735 ASSAY OF MAGNESIUM: CPT

## 2019-06-12 PROCEDURE — 36415 COLL VENOUS BLD VENIPUNCTURE: CPT

## 2019-06-12 PROCEDURE — 80053 COMPREHEN METABOLIC PANEL: CPT

## 2019-06-12 PROCEDURE — 83970 ASSAY OF PARATHORMONE: CPT

## 2019-06-12 PROCEDURE — 84100 ASSAY OF PHOSPHORUS: CPT

## 2019-06-12 PROCEDURE — 82570 ASSAY OF URINE CREATININE: CPT

## 2019-06-12 PROCEDURE — 84550 ASSAY OF BLOOD/URIC ACID: CPT

## 2019-06-12 PROCEDURE — 85025 COMPLETE CBC W/AUTO DIFF WBC: CPT

## 2019-06-12 PROCEDURE — 84156 ASSAY OF PROTEIN URINE: CPT

## 2019-06-25 ENCOUNTER — HOSPITAL ENCOUNTER (OUTPATIENT)
Age: 69
Discharge: HOME | End: 2019-06-25
Payer: MEDICAID

## 2019-06-25 LAB — PROSTATE SPECIFIC ANTIGEN: 5.83 NG/ML (ref 0–4)

## 2019-06-25 PROCEDURE — 36415 COLL VENOUS BLD VENIPUNCTURE: CPT

## 2019-06-25 PROCEDURE — 84153 ASSAY OF PSA TOTAL: CPT

## 2019-09-20 ENCOUNTER — HOSPITAL ENCOUNTER (OUTPATIENT)
Age: 69
Discharge: HOME | End: 2019-09-20
Payer: MEDICAID

## 2019-09-20 LAB
ALBUMIN SERPL-MCNC: 3.8 G/DL (ref 3.5–5.2)
ALP BLD-CCNC: 131 U/L (ref 40–129)
ALT SERPL-CCNC: 15 U/L (ref 0–40)
ANION GAP SERPL CALCULATED.3IONS-SCNC: 18 MMOL/L (ref 7–16)
AST SERPL-CCNC: 21 U/L (ref 0–39)
BASOPHILS ABSOLUTE: 0.02 E9/L (ref 0–0.2)
BASOPHILS RELATIVE PERCENT: 0.5 % (ref 0–2)
BILIRUB SERPL-MCNC: 0.5 MG/DL (ref 0–1.2)
BUN BLDV-MCNC: 17 MG/DL (ref 8–23)
CALCIUM SERPL-MCNC: 9 MG/DL (ref 8.6–10.2)
CHLORIDE BLD-SCNC: 101 MMOL/L (ref 98–107)
CHOLESTEROL, TOTAL: 170 MG/DL (ref 0–199)
CO2: 19 MMOL/L (ref 22–29)
CREAT SERPL-MCNC: 1.6 MG/DL (ref 0.7–1.2)
EOSINOPHILS ABSOLUTE: 0.04 E9/L (ref 0.05–0.5)
EOSINOPHILS RELATIVE PERCENT: 0.9 % (ref 0–6)
GFR AFRICAN AMERICAN: 52
GFR NON-AFRICAN AMERICAN: 52 ML/MIN/1.73
GLUCOSE BLD-MCNC: 275 MG/DL (ref 74–99)
HBA1C MFR BLD: 8.5 % (ref 4–5.6)
HCT VFR BLD CALC: 40.3 % (ref 37–54)
HDLC SERPL-MCNC: 45 MG/DL
HEMOGLOBIN: 13.4 G/DL (ref 12.5–16.5)
IMMATURE GRANULOCYTES #: 0.02 E9/L
IMMATURE GRANULOCYTES %: 0.5 % (ref 0–5)
LDL CHOLESTEROL CALCULATED: 94 MG/DL (ref 0–99)
LYMPHOCYTES ABSOLUTE: 1.44 E9/L (ref 1.5–4)
LYMPHOCYTES RELATIVE PERCENT: 32.9 % (ref 20–42)
MCH RBC QN AUTO: 28.3 PG (ref 26–35)
MCHC RBC AUTO-ENTMCNC: 33.3 % (ref 32–34.5)
MCV RBC AUTO: 85.2 FL (ref 80–99.9)
MICROALBUMIN UR-MCNC: 192.3 MG/L
MONOCYTES ABSOLUTE: 0.53 E9/L (ref 0.1–0.95)
MONOCYTES RELATIVE PERCENT: 12.1 % (ref 2–12)
NEUTROPHILS ABSOLUTE: 2.33 E9/L (ref 1.8–7.3)
NEUTROPHILS RELATIVE PERCENT: 53.1 % (ref 43–80)
PDW BLD-RTO: 13.2 FL (ref 11.5–15)
PLATELET # BLD: 193 E9/L (ref 130–450)
PMV BLD AUTO: 11.3 FL (ref 7–12)
POTASSIUM SERPL-SCNC: 3.8 MMOL/L (ref 3.5–5)
RBC # BLD: 4.73 E12/L (ref 3.8–5.8)
SODIUM BLD-SCNC: 138 MMOL/L (ref 132–146)
T4 FREE: 1.46 NG/DL (ref 0.93–1.7)
TOTAL PROTEIN: 7.8 G/DL (ref 6.4–8.3)
TRIGL SERPL-MCNC: 154 MG/DL (ref 0–149)
TSH SERPL DL<=0.05 MIU/L-ACNC: 1.4 UIU/ML (ref 0.27–4.2)
VLDLC SERPL CALC-MCNC: 31 MG/DL
WBC # BLD: 4.4 E9/L (ref 4.5–11.5)

## 2019-09-20 PROCEDURE — 85025 COMPLETE CBC W/AUTO DIFF WBC: CPT

## 2019-09-20 PROCEDURE — 80053 COMPREHEN METABOLIC PANEL: CPT

## 2019-09-20 PROCEDURE — 36415 COLL VENOUS BLD VENIPUNCTURE: CPT

## 2019-09-20 PROCEDURE — 84439 ASSAY OF FREE THYROXINE: CPT

## 2019-09-20 PROCEDURE — 80061 LIPID PANEL: CPT

## 2019-09-20 PROCEDURE — 83036 HEMOGLOBIN GLYCOSYLATED A1C: CPT

## 2019-09-20 PROCEDURE — 84443 ASSAY THYROID STIM HORMONE: CPT

## 2019-09-20 PROCEDURE — 82044 UR ALBUMIN SEMIQUANTITATIVE: CPT

## 2019-10-08 ENCOUNTER — HOSPITAL ENCOUNTER (OUTPATIENT)
Age: 69
Discharge: HOME | End: 2019-10-08
Payer: MEDICAID

## 2019-10-08 LAB
ALBUMIN SERPL-MCNC: 3.7 G/DL (ref 3.5–5.2)
ALP BLD-CCNC: 134 U/L (ref 40–129)
ALT SERPL-CCNC: 22 U/L (ref 0–40)
ANION GAP SERPL CALCULATED.3IONS-SCNC: 16 MMOL/L (ref 7–16)
AST SERPL-CCNC: 26 U/L (ref 0–39)
BASOPHILS ABSOLUTE: 0.04 E9/L (ref 0–0.2)
BASOPHILS RELATIVE PERCENT: 0.9 % (ref 0–2)
BILIRUB SERPL-MCNC: 0.3 MG/DL (ref 0–1.2)
BUN BLDV-MCNC: 20 MG/DL (ref 8–23)
CALCIUM SERPL-MCNC: 9.2 MG/DL (ref 8.6–10.2)
CHLORIDE BLD-SCNC: 103 MMOL/L (ref 98–107)
CO2: 19 MMOL/L (ref 22–29)
CREAT SERPL-MCNC: 2 MG/DL (ref 0.7–1.2)
CREATININE URINE: 324 MG/DL (ref 40–278)
EOSINOPHILS ABSOLUTE: 0.07 E9/L (ref 0.05–0.5)
EOSINOPHILS RELATIVE PERCENT: 1.5 % (ref 0–6)
GFR AFRICAN AMERICAN: 40
GFR NON-AFRICAN AMERICAN: 40 ML/MIN/1.73
GLUCOSE BLD-MCNC: 283 MG/DL (ref 74–99)
HCT VFR BLD CALC: 40.1 % (ref 37–54)
HEMOGLOBIN: 13.2 G/DL (ref 12.5–16.5)
IMMATURE GRANULOCYTES #: 0 E9/L
IMMATURE GRANULOCYTES %: 0 % (ref 0–5)
LYMPHOCYTES ABSOLUTE: 1.44 E9/L (ref 1.5–4)
LYMPHOCYTES RELATIVE PERCENT: 30.9 % (ref 20–42)
MAGNESIUM: 1.8 MG/DL (ref 1.6–2.6)
MCH RBC QN AUTO: 28.5 PG (ref 26–35)
MCHC RBC AUTO-ENTMCNC: 32.9 % (ref 32–34.5)
MCV RBC AUTO: 86.6 FL (ref 80–99.9)
MONOCYTES ABSOLUTE: 0.5 E9/L (ref 0.1–0.95)
MONOCYTES RELATIVE PERCENT: 10.7 % (ref 2–12)
NEUTROPHILS ABSOLUTE: 2.61 E9/L (ref 1.8–7.3)
NEUTROPHILS RELATIVE PERCENT: 56 % (ref 43–80)
PDW BLD-RTO: 13.2 FL (ref 11.5–15)
PHOSPHORUS: 3.1 MG/DL (ref 2.5–4.5)
PLATELET # BLD: 194 E9/L (ref 130–450)
PMV BLD AUTO: 11.2 FL (ref 7–12)
POTASSIUM SERPL-SCNC: 3.6 MMOL/L (ref 3.5–5)
PROLACTIN: 37.55 NG/ML
PROTEIN PROTEIN: 42 MG/DL (ref 0–12)
PROTEIN/CREAT RATIO: 0.1
PROTEIN/CREAT RATIO: 0.1 (ref 0–0.2)
RBC # BLD: 4.63 E12/L (ref 3.8–5.8)
SODIUM BLD-SCNC: 138 MMOL/L (ref 132–146)
TOTAL PROTEIN: 7.8 G/DL (ref 6.4–8.3)
URIC ACID, SERUM: 8.5 MG/DL (ref 3.4–7)
WBC # BLD: 4.7 E9/L (ref 4.5–11.5)

## 2019-10-08 PROCEDURE — 84550 ASSAY OF BLOOD/URIC ACID: CPT

## 2019-10-08 PROCEDURE — 84270 ASSAY OF SEX HORMONE GLOBUL: CPT

## 2019-10-08 PROCEDURE — 84146 ASSAY OF PROLACTIN: CPT

## 2019-10-08 PROCEDURE — 84403 ASSAY OF TOTAL TESTOSTERONE: CPT

## 2019-10-08 PROCEDURE — 83735 ASSAY OF MAGNESIUM: CPT

## 2019-10-08 PROCEDURE — 36415 COLL VENOUS BLD VENIPUNCTURE: CPT

## 2019-10-08 PROCEDURE — 85025 COMPLETE CBC W/AUTO DIFF WBC: CPT

## 2019-10-08 PROCEDURE — 82570 ASSAY OF URINE CREATININE: CPT

## 2019-10-08 PROCEDURE — 84156 ASSAY OF PROTEIN URINE: CPT

## 2019-10-08 PROCEDURE — 84100 ASSAY OF PHOSPHORUS: CPT

## 2019-10-08 PROCEDURE — 80053 COMPREHEN METABOLIC PANEL: CPT

## 2019-10-10 LAB
SEX HORMONE BINDING GLOBULIN: 99 NMOL/L (ref 11–80)
TESTOSTERONE FREE-NONMALE: 37.9 PG/ML (ref 47–244)
TESTOSTERONE TOTAL: 420 NG/DL (ref 220–1000)

## 2020-02-15 ENCOUNTER — HOSPITAL ENCOUNTER (OUTPATIENT)
Age: 70
Discharge: HOME | End: 2020-02-15
Payer: MEDICAID

## 2020-02-15 LAB
ALBUMIN SERPL-MCNC: 3.8 G/DL (ref 3.5–5.2)
ALP BLD-CCNC: 133 U/L (ref 40–129)
ALT SERPL-CCNC: 15 U/L (ref 0–40)
ANION GAP SERPL CALCULATED.3IONS-SCNC: 17 MMOL/L (ref 7–16)
AST SERPL-CCNC: 20 U/L (ref 0–39)
BASOPHILS ABSOLUTE: 0.03 E9/L (ref 0–0.2)
BASOPHILS RELATIVE PERCENT: 0.5 % (ref 0–2)
BILIRUB SERPL-MCNC: 0.4 MG/DL (ref 0–1.2)
BUN BLDV-MCNC: 10 MG/DL (ref 8–23)
CALCIUM SERPL-MCNC: 9.2 MG/DL (ref 8.6–10.2)
CHLORIDE BLD-SCNC: 105 MMOL/L (ref 98–107)
CO2: 20 MMOL/L (ref 22–29)
CREAT SERPL-MCNC: 1.5 MG/DL (ref 0.7–1.2)
CREATININE URINE: 127 MG/DL (ref 40–278)
EOSINOPHILS ABSOLUTE: 0.08 E9/L (ref 0.05–0.5)
EOSINOPHILS RELATIVE PERCENT: 1.4 % (ref 0–6)
GFR AFRICAN AMERICAN: 56
GFR NON-AFRICAN AMERICAN: 56 ML/MIN/1.73
GLUCOSE BLD-MCNC: 196 MG/DL (ref 74–99)
HCT VFR BLD CALC: 41.4 % (ref 37–54)
HEMOGLOBIN: 13.5 G/DL (ref 12.5–16.5)
IMMATURE GRANULOCYTES #: 0.02 E9/L
IMMATURE GRANULOCYTES %: 0.4 % (ref 0–5)
LYMPHOCYTES ABSOLUTE: 1.99 E9/L (ref 1.5–4)
LYMPHOCYTES RELATIVE PERCENT: 36.1 % (ref 20–42)
MAGNESIUM: 1.7 MG/DL (ref 1.6–2.6)
MCH RBC QN AUTO: 27.9 PG (ref 26–35)
MCHC RBC AUTO-ENTMCNC: 32.6 % (ref 32–34.5)
MCV RBC AUTO: 85.5 FL (ref 80–99.9)
MONOCYTES ABSOLUTE: 0.7 E9/L (ref 0.1–0.95)
MONOCYTES RELATIVE PERCENT: 12.7 % (ref 2–12)
NEUTROPHILS ABSOLUTE: 2.7 E9/L (ref 1.8–7.3)
NEUTROPHILS RELATIVE PERCENT: 48.9 % (ref 43–80)
PDW BLD-RTO: 13.2 FL (ref 11.5–15)
PHOSPHORUS: 3.6 MG/DL (ref 2.5–4.5)
PLATELET # BLD: 230 E9/L (ref 130–450)
PMV BLD AUTO: 10.8 FL (ref 7–12)
POTASSIUM SERPL-SCNC: 3.7 MMOL/L (ref 3.5–5)
PROTEIN PROTEIN: 25 MG/DL (ref 0–12)
PROTEIN/CREAT RATIO: 0.2
PROTEIN/CREAT RATIO: 0.2 (ref 0–0.2)
RBC # BLD: 4.84 E12/L (ref 3.8–5.8)
SODIUM BLD-SCNC: 142 MMOL/L (ref 132–146)
TOTAL PROTEIN: 7.9 G/DL (ref 6.4–8.3)
URIC ACID, SERUM: 6 MG/DL (ref 3.4–7)
WBC # BLD: 5.5 E9/L (ref 4.5–11.5)

## 2020-02-15 PROCEDURE — 82570 ASSAY OF URINE CREATININE: CPT

## 2020-02-15 PROCEDURE — 83735 ASSAY OF MAGNESIUM: CPT

## 2020-02-15 PROCEDURE — 84550 ASSAY OF BLOOD/URIC ACID: CPT

## 2020-02-15 PROCEDURE — 84100 ASSAY OF PHOSPHORUS: CPT

## 2020-02-15 PROCEDURE — 36415 COLL VENOUS BLD VENIPUNCTURE: CPT

## 2020-02-15 PROCEDURE — 85025 COMPLETE CBC W/AUTO DIFF WBC: CPT

## 2020-02-15 PROCEDURE — 84156 ASSAY OF PROTEIN URINE: CPT

## 2020-02-15 PROCEDURE — 80053 COMPREHEN METABOLIC PANEL: CPT

## 2020-06-17 ENCOUNTER — HOSPITAL ENCOUNTER (OUTPATIENT)
Age: 70
Discharge: HOME | End: 2020-06-17
Payer: MEDICAID

## 2020-06-17 LAB — PROSTATE SPECIFIC ANTIGEN: 6.49 NG/ML (ref 0–4)

## 2020-06-17 PROCEDURE — 84153 ASSAY OF PSA TOTAL: CPT

## 2020-06-17 PROCEDURE — 36415 COLL VENOUS BLD VENIPUNCTURE: CPT

## 2020-07-16 ENCOUNTER — HOSPITAL ENCOUNTER (OUTPATIENT)
Age: 70
Discharge: HOME | End: 2020-07-18
Payer: MEDICAID

## 2020-07-16 ENCOUNTER — HOSPITAL ENCOUNTER (OUTPATIENT)
Dept: GENERAL RADIOLOGY | Age: 70
Discharge: HOME | End: 2020-07-18
Payer: MEDICAID

## 2020-07-16 PROCEDURE — 72040 X-RAY EXAM NECK SPINE 2-3 VW: CPT

## 2020-07-28 ENCOUNTER — HOSPITAL ENCOUNTER (OUTPATIENT)
Age: 70
Discharge: HOME | End: 2020-07-28
Payer: MEDICAID

## 2020-07-28 LAB
ALBUMIN SERPL-MCNC: 4.1 G/DL (ref 3.5–5.2)
ALP BLD-CCNC: 117 U/L (ref 40–129)
ALT SERPL-CCNC: 13 U/L (ref 0–40)
ANION GAP SERPL CALCULATED.3IONS-SCNC: 16 MMOL/L (ref 7–16)
AST SERPL-CCNC: 18 U/L (ref 0–39)
BASOPHILS ABSOLUTE: 0.05 E9/L (ref 0–0.2)
BASOPHILS RELATIVE PERCENT: 1.3 % (ref 0–2)
BILIRUB SERPL-MCNC: 0.3 MG/DL (ref 0–1.2)
BUN BLDV-MCNC: 15 MG/DL (ref 8–23)
CALCIUM SERPL-MCNC: 9.2 MG/DL (ref 8.6–10.2)
CHLORIDE BLD-SCNC: 103 MMOL/L (ref 98–107)
CHOLESTEROL, TOTAL: 163 MG/DL (ref 0–199)
CO2: 20 MMOL/L (ref 22–29)
CREAT SERPL-MCNC: 1.6 MG/DL (ref 0.7–1.2)
CREATININE URINE: 187 MG/DL (ref 40–278)
EOSINOPHILS ABSOLUTE: 0.09 E9/L (ref 0.05–0.5)
EOSINOPHILS RELATIVE PERCENT: 2.3 % (ref 0–6)
GFR AFRICAN AMERICAN: 52
GFR NON-AFRICAN AMERICAN: 52 ML/MIN/1.73
GLUCOSE BLD-MCNC: 263 MG/DL (ref 74–99)
HBA1C MFR BLD: 12.1 % (ref 4–5.6)
HCT VFR BLD CALC: 39.8 % (ref 37–54)
HDLC SERPL-MCNC: 50 MG/DL
HEMOGLOBIN: 13.1 G/DL (ref 12.5–16.5)
IMMATURE GRANULOCYTES #: 0.01 E9/L
IMMATURE GRANULOCYTES %: 0.3 % (ref 0–5)
LDL CHOLESTEROL CALCULATED: 80 MG/DL (ref 0–99)
LYMPHOCYTES ABSOLUTE: 1.54 E9/L (ref 1.5–4)
LYMPHOCYTES RELATIVE PERCENT: 39 % (ref 20–42)
MAGNESIUM: 2 MG/DL (ref 1.6–2.6)
MCH RBC QN AUTO: 28.1 PG (ref 26–35)
MCHC RBC AUTO-ENTMCNC: 32.9 % (ref 32–34.5)
MCV RBC AUTO: 85.2 FL (ref 80–99.9)
MICROALBUMIN UR-MCNC: 106.2 MG/L
MICROALBUMIN/CREAT UR-RTO: 56.8 (ref 0–30)
MONOCYTES ABSOLUTE: 0.49 E9/L (ref 0.1–0.95)
MONOCYTES RELATIVE PERCENT: 12.4 % (ref 2–12)
NEUTROPHILS ABSOLUTE: 1.77 E9/L (ref 1.8–7.3)
NEUTROPHILS RELATIVE PERCENT: 44.7 % (ref 43–80)
PDW BLD-RTO: 13 FL (ref 11.5–15)
PHOSPHORUS: 2.6 MG/DL (ref 2.5–4.5)
PLATELET # BLD: 219 E9/L (ref 130–450)
PMV BLD AUTO: 10.7 FL (ref 7–12)
POTASSIUM SERPL-SCNC: 4.2 MMOL/L (ref 3.5–5)
PROTEIN PROTEIN: 35 MG/DL (ref 0–12)
PROTEIN/CREAT RATIO: 0.2
PROTEIN/CREAT RATIO: 0.2 (ref 0–0.2)
RBC # BLD: 4.67 E12/L (ref 3.8–5.8)
SODIUM BLD-SCNC: 139 MMOL/L (ref 132–146)
T4 FREE: 1.19 NG/DL (ref 0.93–1.7)
TOTAL PROTEIN: 7.7 G/DL (ref 6.4–8.3)
TRIGL SERPL-MCNC: 165 MG/DL (ref 0–149)
TSH SERPL DL<=0.05 MIU/L-ACNC: 1.6 UIU/ML (ref 0.27–4.2)
URIC ACID, SERUM: 7.3 MG/DL (ref 3.4–7)
VLDLC SERPL CALC-MCNC: 33 MG/DL
WBC # BLD: 4 E9/L (ref 4.5–11.5)

## 2020-07-28 PROCEDURE — 85025 COMPLETE CBC W/AUTO DIFF WBC: CPT

## 2020-07-28 PROCEDURE — 83735 ASSAY OF MAGNESIUM: CPT

## 2020-07-28 PROCEDURE — 80053 COMPREHEN METABOLIC PANEL: CPT

## 2020-07-28 PROCEDURE — 84439 ASSAY OF FREE THYROXINE: CPT

## 2020-07-28 PROCEDURE — 83036 HEMOGLOBIN GLYCOSYLATED A1C: CPT

## 2020-07-28 PROCEDURE — 84156 ASSAY OF PROTEIN URINE: CPT

## 2020-07-28 PROCEDURE — 82044 UR ALBUMIN SEMIQUANTITATIVE: CPT

## 2020-07-28 PROCEDURE — 82570 ASSAY OF URINE CREATININE: CPT

## 2020-07-28 PROCEDURE — 36415 COLL VENOUS BLD VENIPUNCTURE: CPT

## 2020-07-28 PROCEDURE — 84100 ASSAY OF PHOSPHORUS: CPT

## 2020-07-28 PROCEDURE — 80061 LIPID PANEL: CPT

## 2020-07-28 PROCEDURE — 84550 ASSAY OF BLOOD/URIC ACID: CPT

## 2020-07-28 PROCEDURE — 84443 ASSAY THYROID STIM HORMONE: CPT

## 2020-09-10 ENCOUNTER — HOSPITAL ENCOUNTER (OUTPATIENT)
Age: 70
Discharge: HOME | End: 2020-09-10
Payer: MEDICAID

## 2020-09-10 LAB — PROSTATE SPECIFIC ANTIGEN: 7.24 NG/ML (ref 0–4)

## 2020-09-10 PROCEDURE — 36415 COLL VENOUS BLD VENIPUNCTURE: CPT

## 2020-09-10 PROCEDURE — 84153 ASSAY OF PSA TOTAL: CPT

## 2020-09-17 ENCOUNTER — INITIAL CONSULT (OUTPATIENT)
Dept: NEUROSURGERY | Age: 70
End: 2020-09-17
Payer: MEDICAID

## 2020-09-17 VITALS
DIASTOLIC BLOOD PRESSURE: 93 MMHG | HEART RATE: 86 BPM | SYSTOLIC BLOOD PRESSURE: 166 MMHG | TEMPERATURE: 97.2 F | HEIGHT: 74 IN | BODY MASS INDEX: 32.08 KG/M2 | WEIGHT: 250 LBS

## 2020-09-17 PROCEDURE — 3017F COLORECTAL CA SCREEN DOC REV: CPT | Performed by: PHYSICIAN ASSISTANT

## 2020-09-17 PROCEDURE — 1123F ACP DISCUSS/DSCN MKR DOCD: CPT | Performed by: PHYSICIAN ASSISTANT

## 2020-09-17 PROCEDURE — 4040F PNEUMOC VAC/ADMIN/RCVD: CPT | Performed by: PHYSICIAN ASSISTANT

## 2020-09-17 PROCEDURE — G8419 CALC BMI OUT NRM PARAM NOF/U: HCPCS | Performed by: PHYSICIAN ASSISTANT

## 2020-09-17 PROCEDURE — 99203 OFFICE O/P NEW LOW 30 MIN: CPT | Performed by: PHYSICIAN ASSISTANT

## 2020-09-17 PROCEDURE — G8428 CUR MEDS NOT DOCUMENT: HCPCS | Performed by: PHYSICIAN ASSISTANT

## 2020-09-17 PROCEDURE — 1036F TOBACCO NON-USER: CPT | Performed by: PHYSICIAN ASSISTANT

## 2020-09-17 ASSESSMENT — ENCOUNTER SYMPTOMS
ALLERGIC/IMMUNOLOGIC NEGATIVE: 1
EYES NEGATIVE: 1
RESPIRATORY NEGATIVE: 1
GASTROINTESTINAL NEGATIVE: 1

## 2020-09-17 NOTE — PROGRESS NOTES
hands.  He did have a prior cervical fusion done in 2000. Cervical x-rays show C4-7 ACDF without hardware failure. [facility] DDD C3-4. Plan: We will order cervical CT/myelogram and PT. He has not had any relief with gabapentin, NSAIDS, injections.         JEWELL Cisneros

## 2020-11-23 ENCOUNTER — HOSPITAL ENCOUNTER (OUTPATIENT)
Age: 70
Discharge: HOME | End: 2020-11-23
Payer: MEDICAID

## 2020-11-23 LAB
ALBUMIN SERPL-MCNC: 4 G/DL (ref 3.5–5.2)
ALP BLD-CCNC: 103 U/L (ref 40–129)
ALT SERPL-CCNC: 21 U/L (ref 0–40)
ANION GAP SERPL CALCULATED.3IONS-SCNC: 11 MMOL/L (ref 7–16)
AST SERPL-CCNC: 29 U/L (ref 0–39)
BASOPHILS ABSOLUTE: 0.05 E9/L (ref 0–0.2)
BASOPHILS RELATIVE PERCENT: 1 % (ref 0–2)
BILIRUB SERPL-MCNC: 0.3 MG/DL (ref 0–1.2)
BUN BLDV-MCNC: 17 MG/DL (ref 8–23)
CALCIUM SERPL-MCNC: 9.3 MG/DL (ref 8.6–10.2)
CHLORIDE BLD-SCNC: 104 MMOL/L (ref 98–107)
CHOLESTEROL, TOTAL: 169 MG/DL (ref 0–199)
CO2: 25 MMOL/L (ref 22–29)
CREAT SERPL-MCNC: 2 MG/DL (ref 0.7–1.2)
CREATININE URINE: 323 MG/DL (ref 40–278)
EOSINOPHILS ABSOLUTE: 0.3 E9/L (ref 0.05–0.5)
EOSINOPHILS RELATIVE PERCENT: 5.7 % (ref 0–6)
GFR AFRICAN AMERICAN: 40
GFR NON-AFRICAN AMERICAN: 40 ML/MIN/1.73
GLUCOSE BLD-MCNC: 98 MG/DL (ref 74–99)
HBA1C MFR BLD: 7 % (ref 4–5.6)
HCT VFR BLD CALC: 43.1 % (ref 37–54)
HDLC SERPL-MCNC: 49 MG/DL
HEMOGLOBIN: 13.8 G/DL (ref 12.5–16.5)
IMMATURE GRANULOCYTES #: 0.01 E9/L
IMMATURE GRANULOCYTES %: 0.2 % (ref 0–5)
LDL CHOLESTEROL CALCULATED: 102 MG/DL (ref 0–99)
LYMPHOCYTES ABSOLUTE: 1.64 E9/L (ref 1.5–4)
LYMPHOCYTES RELATIVE PERCENT: 31.4 % (ref 20–42)
MCH RBC QN AUTO: 27.4 PG (ref 26–35)
MCHC RBC AUTO-ENTMCNC: 32 % (ref 32–34.5)
MCV RBC AUTO: 85.5 FL (ref 80–99.9)
MICROALBUMIN UR-MCNC: 113.4 MG/L
MICROALBUMIN/CREAT UR-RTO: 35.1 (ref 0–30)
MONOCYTES ABSOLUTE: 0.52 E9/L (ref 0.1–0.95)
MONOCYTES RELATIVE PERCENT: 9.9 % (ref 2–12)
NEUTROPHILS ABSOLUTE: 2.71 E9/L (ref 1.8–7.3)
NEUTROPHILS RELATIVE PERCENT: 51.8 % (ref 43–80)
PDW BLD-RTO: 13.3 FL (ref 11.5–15)
PLATELET # BLD: 265 E9/L (ref 130–450)
PMV BLD AUTO: 11.1 FL (ref 7–12)
POTASSIUM SERPL-SCNC: 3.7 MMOL/L (ref 3.5–5)
RBC # BLD: 5.04 E12/L (ref 3.8–5.8)
SODIUM BLD-SCNC: 140 MMOL/L (ref 132–146)
T4 FREE: 1.12 NG/DL (ref 0.93–1.7)
TOTAL PROTEIN: 8 G/DL (ref 6.4–8.3)
TRIGL SERPL-MCNC: 89 MG/DL (ref 0–149)
TSH SERPL DL<=0.05 MIU/L-ACNC: 1.3 UIU/ML (ref 0.27–4.2)
VLDLC SERPL CALC-MCNC: 18 MG/DL
WBC # BLD: 5.2 E9/L (ref 4.5–11.5)

## 2020-11-23 PROCEDURE — 82044 UR ALBUMIN SEMIQUANTITATIVE: CPT

## 2020-11-23 PROCEDURE — 85025 COMPLETE CBC W/AUTO DIFF WBC: CPT

## 2020-11-23 PROCEDURE — 80053 COMPREHEN METABOLIC PANEL: CPT

## 2020-11-23 PROCEDURE — 36415 COLL VENOUS BLD VENIPUNCTURE: CPT

## 2020-11-23 PROCEDURE — 80061 LIPID PANEL: CPT

## 2020-11-23 PROCEDURE — 82570 ASSAY OF URINE CREATININE: CPT

## 2020-11-23 PROCEDURE — 84443 ASSAY THYROID STIM HORMONE: CPT

## 2020-11-23 PROCEDURE — 84439 ASSAY OF FREE THYROXINE: CPT

## 2020-11-23 PROCEDURE — 83036 HEMOGLOBIN GLYCOSYLATED A1C: CPT

## 2021-02-22 ENCOUNTER — HOSPITAL ENCOUNTER (OUTPATIENT)
Age: 71
Discharge: HOME | End: 2021-02-22
Payer: MEDICAID

## 2021-02-22 LAB
ALBUMIN SERPL-MCNC: 3.9 G/DL (ref 3.5–5.2)
ALP BLD-CCNC: 101 U/L (ref 40–129)
ALT SERPL-CCNC: 19 U/L (ref 0–40)
ANION GAP SERPL CALCULATED.3IONS-SCNC: 10 MMOL/L (ref 7–16)
AST SERPL-CCNC: 27 U/L (ref 0–39)
BASOPHILS ABSOLUTE: 0.04 E9/L (ref 0–0.2)
BASOPHILS RELATIVE PERCENT: 0.9 % (ref 0–2)
BILIRUB SERPL-MCNC: 0.3 MG/DL (ref 0–1.2)
BUN BLDV-MCNC: 14 MG/DL (ref 8–23)
CALCIUM SERPL-MCNC: 9.1 MG/DL (ref 8.6–10.2)
CHLORIDE BLD-SCNC: 102 MMOL/L (ref 98–107)
CHOLESTEROL, TOTAL: 166 MG/DL (ref 0–199)
CO2: 25 MMOL/L (ref 22–29)
CREAT SERPL-MCNC: 1.8 MG/DL (ref 0.7–1.2)
CREATININE URINE: 197 MG/DL (ref 40–278)
EOSINOPHILS ABSOLUTE: 0.1 E9/L (ref 0.05–0.5)
EOSINOPHILS RELATIVE PERCENT: 2.1 % (ref 0–6)
GFR AFRICAN AMERICAN: 45
GFR NON-AFRICAN AMERICAN: 45 ML/MIN/1.73
GLUCOSE BLD-MCNC: 96 MG/DL (ref 74–99)
HBA1C MFR BLD: 6 % (ref 4–5.6)
HCT VFR BLD CALC: 44.9 % (ref 37–54)
HDLC SERPL-MCNC: 48 MG/DL
HEMOGLOBIN: 13.9 G/DL (ref 12.5–16.5)
IMMATURE GRANULOCYTES #: 0.01 E9/L
IMMATURE GRANULOCYTES %: 0.2 % (ref 0–5)
LDL CHOLESTEROL CALCULATED: 101 MG/DL (ref 0–99)
LYMPHOCYTES ABSOLUTE: 1.63 E9/L (ref 1.5–4)
LYMPHOCYTES RELATIVE PERCENT: 34.7 % (ref 20–42)
MCH RBC QN AUTO: 26.8 PG (ref 26–35)
MCHC RBC AUTO-ENTMCNC: 31 % (ref 32–34.5)
MCV RBC AUTO: 86.5 FL (ref 80–99.9)
MICROALBUMIN UR-MCNC: 126.4 MG/L
MICROALBUMIN/CREAT UR-RTO: 64.2 (ref 0–30)
MONOCYTES ABSOLUTE: 0.58 E9/L (ref 0.1–0.95)
MONOCYTES RELATIVE PERCENT: 12.3 % (ref 2–12)
NEUTROPHILS ABSOLUTE: 2.34 E9/L (ref 1.8–7.3)
NEUTROPHILS RELATIVE PERCENT: 49.8 % (ref 43–80)
PDW BLD-RTO: 13.7 FL (ref 11.5–15)
PLATELET # BLD: 182 E9/L (ref 130–450)
PMV BLD AUTO: 11.4 FL (ref 7–12)
POTASSIUM SERPL-SCNC: 3.7 MMOL/L (ref 3.5–5)
RBC # BLD: 5.19 E12/L (ref 3.8–5.8)
SODIUM BLD-SCNC: 137 MMOL/L (ref 132–146)
T4 FREE: 1.16 NG/DL (ref 0.93–1.7)
TOTAL PROTEIN: 7.8 G/DL (ref 6.4–8.3)
TRIGL SERPL-MCNC: 85 MG/DL (ref 0–149)
TSH SERPL DL<=0.05 MIU/L-ACNC: 1.4 UIU/ML (ref 0.27–4.2)
VLDLC SERPL CALC-MCNC: 17 MG/DL
WBC # BLD: 4.7 E9/L (ref 4.5–11.5)

## 2021-02-22 PROCEDURE — 82044 UR ALBUMIN SEMIQUANTITATIVE: CPT

## 2021-02-22 PROCEDURE — 84443 ASSAY THYROID STIM HORMONE: CPT

## 2021-02-22 PROCEDURE — 85025 COMPLETE CBC W/AUTO DIFF WBC: CPT

## 2021-02-22 PROCEDURE — 80053 COMPREHEN METABOLIC PANEL: CPT

## 2021-02-22 PROCEDURE — 36415 COLL VENOUS BLD VENIPUNCTURE: CPT

## 2021-02-22 PROCEDURE — 80061 LIPID PANEL: CPT

## 2021-02-22 PROCEDURE — 84439 ASSAY OF FREE THYROXINE: CPT

## 2021-02-22 PROCEDURE — 83036 HEMOGLOBIN GLYCOSYLATED A1C: CPT

## 2021-02-22 PROCEDURE — 82570 ASSAY OF URINE CREATININE: CPT

## 2021-06-08 ENCOUNTER — OFFICE VISIT (OUTPATIENT)
Dept: FAMILY MEDICINE CLINIC | Age: 71
End: 2021-06-08
Payer: MEDICAID

## 2021-06-08 VITALS
TEMPERATURE: 98 F | HEIGHT: 74 IN | DIASTOLIC BLOOD PRESSURE: 82 MMHG | BODY MASS INDEX: 34.52 KG/M2 | WEIGHT: 269 LBS | SYSTOLIC BLOOD PRESSURE: 152 MMHG | HEART RATE: 79 BPM | RESPIRATION RATE: 20 BRPM | OXYGEN SATURATION: 97 %

## 2021-06-08 DIAGNOSIS — K21.9 GASTROESOPHAGEAL REFLUX DISEASE, UNSPECIFIED WHETHER ESOPHAGITIS PRESENT: ICD-10-CM

## 2021-06-08 DIAGNOSIS — E11.9 TYPE 2 DIABETES MELLITUS WITHOUT COMPLICATION, WITH LONG-TERM CURRENT USE OF INSULIN (HCC): ICD-10-CM

## 2021-06-08 DIAGNOSIS — M54.12 CERVICAL RADICULOPATHY: ICD-10-CM

## 2021-06-08 DIAGNOSIS — I10 ESSENTIAL HYPERTENSION: Primary | ICD-10-CM

## 2021-06-08 DIAGNOSIS — Z12.12 SCREENING FOR COLORECTAL CANCER: ICD-10-CM

## 2021-06-08 DIAGNOSIS — E55.9 VITAMIN D DEFICIENCY: ICD-10-CM

## 2021-06-08 DIAGNOSIS — Z79.4 TYPE 2 DIABETES MELLITUS WITHOUT COMPLICATION, WITH LONG-TERM CURRENT USE OF INSULIN (HCC): ICD-10-CM

## 2021-06-08 DIAGNOSIS — Z12.11 SCREENING FOR COLORECTAL CANCER: ICD-10-CM

## 2021-06-08 PROCEDURE — 99202 OFFICE O/P NEW SF 15 MIN: CPT | Performed by: FAMILY MEDICINE

## 2021-06-08 PROCEDURE — 99203 OFFICE O/P NEW LOW 30 MIN: CPT | Performed by: FAMILY MEDICINE

## 2021-06-08 PROCEDURE — G8417 CALC BMI ABV UP PARAM F/U: HCPCS | Performed by: FAMILY MEDICINE

## 2021-06-08 PROCEDURE — 1123F ACP DISCUSS/DSCN MKR DOCD: CPT | Performed by: FAMILY MEDICINE

## 2021-06-08 PROCEDURE — 1036F TOBACCO NON-USER: CPT | Performed by: FAMILY MEDICINE

## 2021-06-08 PROCEDURE — G8427 DOCREV CUR MEDS BY ELIG CLIN: HCPCS | Performed by: FAMILY MEDICINE

## 2021-06-08 PROCEDURE — 4040F PNEUMOC VAC/ADMIN/RCVD: CPT | Performed by: FAMILY MEDICINE

## 2021-06-08 PROCEDURE — 3017F COLORECTAL CA SCREEN DOC REV: CPT | Performed by: FAMILY MEDICINE

## 2021-06-08 PROCEDURE — 2022F DILAT RTA XM EVC RTNOPTHY: CPT | Performed by: FAMILY MEDICINE

## 2021-06-08 PROCEDURE — 3044F HG A1C LEVEL LT 7.0%: CPT | Performed by: FAMILY MEDICINE

## 2021-06-08 RX ORDER — INSULIN LISPRO 100 [IU]/ML
6 INJECTION, SOLUTION INTRAVENOUS; SUBCUTANEOUS 3 TIMES DAILY
COMMUNITY

## 2021-06-08 RX ORDER — HYDROCHLOROTHIAZIDE 12.5 MG/1
12.5 CAPSULE, GELATIN COATED ORAL DAILY
Qty: 90 CAPSULE | Refills: 1 | Status: SHIPPED
Start: 2021-06-08 | End: 2021-08-27

## 2021-06-08 RX ORDER — HYDRALAZINE HYDROCHLORIDE 10 MG/1
10 TABLET, FILM COATED ORAL 3 TIMES DAILY
Qty: 90 TABLET | Refills: 3 | Status: SHIPPED
Start: 2021-06-08 | End: 2021-10-08

## 2021-06-08 RX ORDER — GABAPENTIN 100 MG/1
3 CAPSULE ORAL 4 TIMES DAILY
COMMUNITY
Start: 2021-05-21 | End: 2021-10-08

## 2021-06-08 RX ORDER — PEN NEEDLE, DIABETIC 31 GX5/16"
1 NEEDLE, DISPOSABLE MISCELLANEOUS 4 TIMES DAILY
COMMUNITY
Start: 2021-03-15

## 2021-06-08 RX ORDER — HYDROCHLOROTHIAZIDE 12.5 MG/1
12.5 CAPSULE, GELATIN COATED ORAL DAILY
COMMUNITY
End: 2021-06-08 | Stop reason: SDUPTHER

## 2021-06-08 RX ORDER — ASPIRIN 81 MG/1
81 TABLET ORAL DAILY
Qty: 90 TABLET | Refills: 1 | Status: SHIPPED
Start: 2021-06-08 | End: 2021-11-23

## 2021-06-08 RX ORDER — DILTIAZEM HYDROCHLORIDE 180 MG/1
360 CAPSULE, EXTENDED RELEASE ORAL DAILY
Qty: 90 CAPSULE | Refills: 3 | Status: SHIPPED
Start: 2021-06-08 | End: 2021-10-18 | Stop reason: DRUGHIGH

## 2021-06-08 RX ORDER — FLUOXETINE HYDROCHLORIDE 20 MG/1
1 CAPSULE ORAL NIGHTLY
COMMUNITY
Start: 2021-06-07 | End: 2022-04-22

## 2021-06-08 RX ORDER — ASPIRIN 81 MG/1
81 TABLET, CHEWABLE ORAL DAILY
Status: DISCONTINUED | OUTPATIENT
Start: 2021-06-08 | End: 2021-06-08

## 2021-06-08 RX ORDER — PANTOPRAZOLE SODIUM 40 MG/1
40 TABLET, DELAYED RELEASE ORAL DAILY
Qty: 90 TABLET | Refills: 1 | Status: SHIPPED
Start: 2021-06-08 | End: 2021-11-23

## 2021-06-08 RX ORDER — TAMSULOSIN HYDROCHLORIDE 0.4 MG/1
0.4 CAPSULE ORAL NIGHTLY
COMMUNITY

## 2021-06-08 RX ORDER — PANTOPRAZOLE SODIUM 40 MG/1
1 TABLET, DELAYED RELEASE ORAL EVERY MORNING
COMMUNITY
Start: 2021-03-12 | End: 2021-06-08 | Stop reason: SDUPTHER

## 2021-06-08 RX ORDER — LISINOPRIL 40 MG/1
40 TABLET ORAL DAILY
Qty: 90 TABLET | Refills: 3 | Status: SHIPPED
Start: 2021-06-08 | End: 2021-10-18 | Stop reason: DRUGHIGH

## 2021-06-08 ASSESSMENT — PATIENT HEALTH QUESTIONNAIRE - PHQ9
SUM OF ALL RESPONSES TO PHQ QUESTIONS 1-9: 2
SUM OF ALL RESPONSES TO PHQ9 QUESTIONS 1 & 2: 2
SUM OF ALL RESPONSES TO PHQ QUESTIONS 1-9: 2
1. LITTLE INTEREST OR PLEASURE IN DOING THINGS: 1
SUM OF ALL RESPONSES TO PHQ QUESTIONS 1-9: 2
2. FEELING DOWN, DEPRESSED OR HOPELESS: 1

## 2021-06-08 ASSESSMENT — ENCOUNTER SYMPTOMS
NAUSEA: 0
SHORTNESS OF BREATH: 0
COUGH: 0
DIARRHEA: 0
CONSTIPATION: 0
ABDOMINAL PAIN: 0
WHEEZING: 0
SORE THROAT: 0
BACK PAIN: 1
VOMITING: 0
RHINORRHEA: 0

## 2021-06-08 NOTE — PROGRESS NOTES
[facility]  FAMILY MEDICINE RESIDENCY PROGRAM  DATE OF VISIT : 2021    Patient : Karri Beard   Age : 80 y.o.  : 1950   MRN : [id number]   ______________________________________________________________________    Chief Complaint :   Chief Complaint   Patient presents with    New Patient       HPI : Karri Beard is 80 y.o. male who presented to the clinic today for new patient encounter. HTN: HCTZ 12.5m daily, Lisinopril 30mg daily, Diltiazem 360mg daily. Took meds this morning. IDDM: Lispro 6-12U after meals (scale), 62U Lantus nightly. BG at home usually in the 100s per patient. Checks 3 times daily. Follows with Dr. Rosa Kumar. CKD: [facility]. Hasn't seen in over a year. Baseline Cr 1.7-2.0. History of cervical fusion: , was told he was due for another one but it never happened. Had seen Dr. Osvaldo Weinstein within the last year, he was referred to PT but was unable to go due to location, CT myelogram was ordered but will not be approve until he undergoes 6 weeks PT per insurance. GERD: controlled on pantoprazole. Depression: follows with Frederic Reyes. I reviewed the patient's past medications, allergies and past medical history during this visit. Past Medical History :    Health Maintenance-   Colonoscopy- >10yrs ago.         Past Medical History:   Diagnosis Date    Cervical vertebra fusion     Chronic kidney disease (CKD), stage III (moderate) ([facility] [address] 13    Foot injury     Hypertension     Stroke [facility])     patient had a stroke in the late     Type II or unspecified type diabetes mellitus without mention of complication, not stated as uncontrolled     Ulcers, corneal     ulcers in Patients stomach     Past Surgical History:   Procedure Laterality Date    COLONOSCOPY      FRACTURE SURGERY      KIDNEY STONE SURGERY  2010    2019    ROTATOR CUFF REPAIR      SPINE SURGERY      UPPER GASTROINTESTINAL ENDOSCOPY Social History :  Social History     Tobacco History     Smoking Status  Never Smoker    Smokeless Tobacco Use  Never Used          Alcohol History     Alcohol Use Status  No          Drug Use     Drug Use Status  No          Sexual Activity     Sexually Active  Not Asked                 Allergies :   No Known Allergies    Medication List :    Current Outpatient Medications   Medication Sig Dispense Refill    insulin lispro, 1 Unit Dial, 100 UNIT/ML SOPN Inject 6 Units into the skin 3 times daily Plus  2 units for each 50 above 150 Blood sugar level result      gabapentin (NEURONTIN) 100 MG capsule Take 3 capsules by mouth 4 times daily.  B-D UF III MINI PEN NEEDLES 31G X 5 MM MISC 1 each by Does not apply route 4 times daily      FLUoxetine (PROZAC) 20 MG capsule Take 1 capsule by mouth nightly      tamsulosin (FLOMAX) 0.4 MG capsule Take 0.4 mg by mouth nightly      lisinopril (PRINIVIL;ZESTRIL) 40 MG tablet Take 1 tablet by mouth daily 90 tablet 3    pantoprazole (PROTONIX) 40 MG tablet Take 1 tablet by mouth daily 90 tablet 1    vitamin D-3 (CHOLECALCIFEROL) 125 MCG (5000 UT) TABS Take 1 tablet by mouth daily 90 tablet 3    hydroCHLOROthiazide (MICROZIDE) 12.5 MG capsule Take 1 capsule by mouth daily 90 capsule 1    dilTIAZem (DILACOR XR) 180 MG extended release capsule Take 2 capsules by mouth daily 90 capsule 3    hydrALAZINE (APRESOLINE) 10 MG tablet Take 1 tablet by mouth 3 times daily 90 tablet 3    aspirin EC 81 MG EC tablet Take 1 tablet by mouth daily 90 tablet 1    insulin glargine (LANTUS) 100 UNIT/ML injection vial Inject 62 Units into the skin nightly       cyclobenzaprine (FLEXERIL) 5 MG tablet Take 1 tablet by mouth nightly as needed for Muscle spasms (Patient not taking: Reported on 6/8/2021) 30 tablet 0     No current facility-administered medications for this visit. Review of Systems :  Review of Systems   Constitutional: Negative for chills, fatigue and fever.    HENT: Negative for congestion, rhinorrhea and sore throat. Eyes: Negative for visual disturbance. Respiratory: Negative for cough, shortness of breath and wheezing. Cardiovascular: Negative for chest pain, palpitations and leg swelling. Gastrointestinal: Negative for abdominal pain, constipation, diarrhea, nausea and vomiting. Endocrine: Negative for polydipsia and polyuria. Genitourinary: Negative for difficulty urinating and urgency. Musculoskeletal: Positive for arthralgias, back pain, neck pain and neck stiffness. Negative for gait problem. Neurological: Negative for dizziness, weakness, light-headedness, numbness and headaches.     ______________________________________________________________________    Physical Exam :    Vitals: BP (!) 152/82   Pulse 79   Temp 98 °F (36.7 °C) (Temporal)   Resp 20   Ht 6' 2\" (1.88 m)   Wt 269 lb (122 kg)   SpO2 97%   BMI 34.54 kg/m²   Physical Exam  Constitutional:       General: He is not in acute distress. Appearance: Normal appearance. Cardiovascular:      Rate and Rhythm: Normal rate and regular rhythm. Pulses: Normal pulses. Heart sounds: Normal heart sounds. No murmur heard. Pulmonary:      Effort: Pulmonary effort is normal. No respiratory distress. Breath sounds: Normal breath sounds. No wheezing. Abdominal:      General: Bowel sounds are normal. There is no distension. Palpations: Abdomen is soft. Tenderness: There is no abdominal tenderness. Musculoskeletal:      Right lower leg: No edema. Left lower leg: No edema. 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    DM Exam: skin and sensation grossly intact, no ulcers noted   Neurological:      Mental Status: He is alert.         ___________________    Assessment & Plan :    1. Essential hypertension  - start Hydralazine 10mg TID  - check Bps at home   - lisinopril (PRINIVIL;ZESTRIL) 40 MG tablet; Take 1 tablet by mouth daily  Dispense: 90 tablet; Refill: 3  - hydroCHLOROthiazide (MICROZIDE) 12.5 MG capsule; Take 1 capsule by mouth daily  Dispense: 90 capsule; Refill: 1  - dilTIAZem (DILACOR XR) 180 MG extended release capsule; Take 2 capsules by mouth daily  Dispense: 90 capsule; Refill: 3  - aspirin chewable tablet 81 mg  - hydrALAZINE (APRESOLINE) 10 MG tablet; Take 1 tablet by mouth 3 times daily  Dispense: 90 tablet; Refill: 3    2. Type 2 diabetes mellitus without complication, with long-term current use of insulin (HCC)  - controlled  -continue f/u with endo  - aspirin chewable tablet 81 mg  -  DIABETES FOOT EXAM    3. Cervical radiculopathy  - seeing NSGY  - ordered CT myelogram, insurance did not approve until 6 weeks of PT completed  - start PT  - [address] [facility], [facility]    4. Gastroesophageal reflux disease, unspecified whether esophagitis present  - stable  - continue present mgmt  - pantoprazole (PROTONIX) 40 MG tablet; Take 1 tablet by mouth daily  Dispense: 90 tablet; Refill: 1    5. Vitamin D deficiency  - stabel  - vitamin D-3 (CHOLECALCIFEROL) 125 MCG (5000 UT) TABS; Take 1 tablet by mouth daily  Dispense: 90 tablet; Refill: 3    6. Screening for colorectal cancer  - last done >10yrs ago  - patient requesting different physician than Jake Michel MD, General Surgery, [facility]        Additional plan and future considerations:   RTO in 2 weeks for BP check    Return to Office: Return in about 2 weeks (around 6/22/2021) for Nurse BP check. Jae Cui MD   Case discussed with Dr. Karol Allen    On the basis of positive falls risk screening, assessment and plan is as follows: home safety tips provided.

## 2021-06-08 NOTE — PROGRESS NOTES
Attending Physician Statement    S:   Chief Complaint   Patient presents with    New Patient      DM (sees endocrinology), HTN, depression/anxiety (folows with psych), GERD, s/p cervical fusion (saw NS, but failed to f/u). Needs PT prior to any procedure  O: Blood pressure (!) 152/82, pulse 79, temperature 98 °F (36.7 °C), temperature source Temporal, resp. rate 20, height 6' 2\" (1.88 m), weight 269 lb (122 kg), SpO2 97 %. Exam:   Heart - RRR   Lungs - clear   Ext- no edema   Foot - onychogryphosis, bony enlargement of dorsum right foot  A: As above  P:  Continue f/u with specialists   Refer for colonoscopy, PT   Add hydralazine 10 TID   Follow-up as ordered    I have discussed the case, including pertinent history and exam findings with the resident. I agree with the documented assessment and plan.

## 2021-06-14 ENCOUNTER — TELEPHONE (OUTPATIENT)
Dept: SURGERY | Age: 71
End: 2021-06-14

## 2021-06-14 ENCOUNTER — HOSPITAL ENCOUNTER (OUTPATIENT)
Age: 71
Discharge: HOME | End: 2021-06-14
Payer: MEDICAID

## 2021-06-14 LAB
ALBUMIN SERPL-MCNC: 3.9 G/DL (ref 3.5–5.2)
ALP BLD-CCNC: 103 U/L (ref 40–129)
ALT SERPL-CCNC: 27 U/L (ref 0–40)
ANION GAP SERPL CALCULATED.3IONS-SCNC: 13 MMOL/L (ref 7–16)
AST SERPL-CCNC: 35 U/L (ref 0–39)
BASOPHILS ABSOLUTE: 0.04 E9/L (ref 0–0.2)
BASOPHILS RELATIVE PERCENT: 0.8 % (ref 0–2)
BILIRUB SERPL-MCNC: 0.3 MG/DL (ref 0–1.2)
BUN BLDV-MCNC: 17 MG/DL (ref 6–23)
CALCIUM SERPL-MCNC: 9.2 MG/DL (ref 8.6–10.2)
CHLORIDE BLD-SCNC: 103 MMOL/L (ref 98–107)
CHOLESTEROL, TOTAL: 178 MG/DL (ref 0–199)
CO2: 25 MMOL/L (ref 22–29)
CREAT SERPL-MCNC: 2 MG/DL (ref 0.7–1.2)
CREATININE URINE: 242 MG/DL (ref 40–278)
EOSINOPHILS ABSOLUTE: 0.13 E9/L (ref 0.05–0.5)
EOSINOPHILS RELATIVE PERCENT: 2.5 % (ref 0–6)
GFR AFRICAN AMERICAN: 40
GFR NON-AFRICAN AMERICAN: 40 ML/MIN/1.73
GLUCOSE BLD-MCNC: 120 MG/DL (ref 74–99)
HBA1C MFR BLD: 6.3 % (ref 4–5.6)
HCT VFR BLD CALC: 41.3 % (ref 37–54)
HDLC SERPL-MCNC: 48 MG/DL
HEMOGLOBIN: 13.5 G/DL (ref 12.5–16.5)
IMMATURE GRANULOCYTES #: 0.01 E9/L
IMMATURE GRANULOCYTES %: 0.2 % (ref 0–5)
LDL CHOLESTEROL CALCULATED: 88 MG/DL (ref 0–99)
LYMPHOCYTES ABSOLUTE: 2.09 E9/L (ref 1.5–4)
LYMPHOCYTES RELATIVE PERCENT: 40.7 % (ref 20–42)
MCH RBC QN AUTO: 27.7 PG (ref 26–35)
MCHC RBC AUTO-ENTMCNC: 32.7 % (ref 32–34.5)
MCV RBC AUTO: 84.8 FL (ref 80–99.9)
MICROALBUMIN UR-MCNC: 107.6 MG/L
MICROALBUMIN/CREAT UR-RTO: 44.5 (ref 0–30)
MONOCYTES ABSOLUTE: 0.54 E9/L (ref 0.1–0.95)
MONOCYTES RELATIVE PERCENT: 10.5 % (ref 2–12)
NEUTROPHILS ABSOLUTE: 2.33 E9/L (ref 1.8–7.3)
NEUTROPHILS RELATIVE PERCENT: 45.3 % (ref 43–80)
PDW BLD-RTO: 13.7 FL (ref 11.5–15)
PLATELET # BLD: 229 E9/L (ref 130–450)
PMV BLD AUTO: 11.2 FL (ref 7–12)
POTASSIUM SERPL-SCNC: 3.7 MMOL/L (ref 3.5–5)
RBC # BLD: 4.87 E12/L (ref 3.8–5.8)
SODIUM BLD-SCNC: 141 MMOL/L (ref 132–146)
T4 FREE: 1.09 NG/DL (ref 0.93–1.7)
TOTAL PROTEIN: 8.1 G/DL (ref 6.4–8.3)
TRIGL SERPL-MCNC: 210 MG/DL (ref 0–149)
TSH SERPL DL<=0.05 MIU/L-ACNC: 1.42 UIU/ML (ref 0.27–4.2)
VITAMIN D 25-HYDROXY: 78 NG/ML (ref 30–100)
VLDLC SERPL CALC-MCNC: 42 MG/DL
WBC # BLD: 5.1 E9/L (ref 4.5–11.5)

## 2021-06-14 PROCEDURE — 82044 UR ALBUMIN SEMIQUANTITATIVE: CPT

## 2021-06-14 PROCEDURE — 85025 COMPLETE CBC W/AUTO DIFF WBC: CPT

## 2021-06-14 PROCEDURE — 82306 VITAMIN D 25 HYDROXY: CPT

## 2021-06-14 PROCEDURE — 83036 HEMOGLOBIN GLYCOSYLATED A1C: CPT

## 2021-06-14 PROCEDURE — 82570 ASSAY OF URINE CREATININE: CPT

## 2021-06-14 PROCEDURE — 84439 ASSAY OF FREE THYROXINE: CPT

## 2021-06-14 PROCEDURE — 36415 COLL VENOUS BLD VENIPUNCTURE: CPT

## 2021-06-14 PROCEDURE — 80053 COMPREHEN METABOLIC PANEL: CPT

## 2021-06-14 PROCEDURE — 84443 ASSAY THYROID STIM HORMONE: CPT

## 2021-06-14 PROCEDURE — 80061 LIPID PANEL: CPT

## 2021-06-14 NOTE — TELEPHONE ENCOUNTER
MA contacted patient to schedule appointment based on referral for colonoscopy consult. Patient scheduled for virtual visit with Dr Carlos Eduardo Liriano on 7/13/2021 @ 9:15am. MA confirmed phone number for visit and mailed appointment letter as reminder.     Electronically signed by Clover Starkey on 6/14/21 at 3:48 PM EDT

## 2021-06-28 ENCOUNTER — HOSPITAL ENCOUNTER (EMERGENCY)
Age: 71
Discharge: HOME | End: 2021-06-28
Payer: MEDICAID

## 2021-06-28 VITALS
HEIGHT: 74 IN | OXYGEN SATURATION: 98 % | RESPIRATION RATE: 18 BRPM | BODY MASS INDEX: 34.65 KG/M2 | HEART RATE: 89 BPM | SYSTOLIC BLOOD PRESSURE: 150 MMHG | TEMPERATURE: 96.8 F | DIASTOLIC BLOOD PRESSURE: 83 MMHG | WEIGHT: 270 LBS

## 2021-06-28 DIAGNOSIS — S16.1XXA STRAIN OF NECK MUSCLE, INITIAL ENCOUNTER: ICD-10-CM

## 2021-06-28 DIAGNOSIS — M54.50 ACUTE EXACERBATION OF CHRONIC LOW BACK PAIN: Primary | ICD-10-CM

## 2021-06-28 DIAGNOSIS — M54.12 CERVICAL RADICULOPATHY: ICD-10-CM

## 2021-06-28 DIAGNOSIS — G89.29 ACUTE EXACERBATION OF CHRONIC LOW BACK PAIN: Primary | ICD-10-CM

## 2021-06-28 PROCEDURE — 99282 EMERGENCY DEPT VISIT SF MDM: CPT

## 2021-06-28 PROCEDURE — 6370000000 HC RX 637 (ALT 250 FOR IP): Performed by: NURSE PRACTITIONER

## 2021-06-28 RX ORDER — HYDROCODONE BITARTRATE AND ACETAMINOPHEN 5; 325 MG/1; MG/1
1 TABLET ORAL ONCE
Status: COMPLETED | OUTPATIENT
Start: 2021-06-28 | End: 2021-06-28

## 2021-06-28 RX ORDER — HYDROCODONE BITARTRATE AND ACETAMINOPHEN 5; 325 MG/1; MG/1
1 TABLET ORAL EVERY 6 HOURS PRN
Qty: 12 TABLET | Refills: 0 | Status: SHIPPED | OUTPATIENT
Start: 2021-06-28 | End: 2021-07-01

## 2021-06-28 RX ORDER — METHOCARBAMOL 500 MG/1
500 TABLET, FILM COATED ORAL 4 TIMES DAILY
Qty: 20 TABLET | Refills: 0 | Status: SHIPPED | OUTPATIENT
Start: 2021-06-28 | End: 2021-07-03

## 2021-06-28 RX ADMIN — HYDROCODONE BITARTRATE AND ACETAMINOPHEN 1 TABLET: 5; 325 TABLET ORAL at 09:32

## 2021-06-28 ASSESSMENT — PAIN DESCRIPTION - PAIN TYPE: TYPE: ACUTE PAIN

## 2021-06-28 ASSESSMENT — PAIN SCALES - GENERAL
PAINLEVEL_OUTOF10: 4
PAINLEVEL_OUTOF10: 4

## 2021-06-28 ASSESSMENT — PAIN DESCRIPTION - LOCATION: LOCATION: HAND;BACK

## 2021-06-28 NOTE — ED PROVIDER NOTES
[facility]    HPI: Brayden Banks 80 y.o.male with   Past Medical History:   Diagnosis Date    Cervical vertebra fusion 2000    Chronic kidney disease (CKD), stage III (moderate) ([facility] [address] 11/25/13    Foot injury     Hypertension     Stroke [facility])     patient had a stroke in the late 90's    Type II or unspecified type diabetes mellitus without mention of complication, not stated as uncontrolled     Ulcers, corneal     ulcers in Patients stomach    who presents with  right sided neck pain. The patient states that the back pain began chronic in nature however worse the last 2 months.   The history is obtained from the patient. The mechanism of the injury is apparent. The patient states that the pain is moderate. It is worsened by movement including flexion and extension of the back as well as rotation. Patient denies any distal neurovascular complaints including numbness tingling or weakness. There are no bowel or bladder symptoms reported. The patient denies saddle or groin anesthesia. The patient also denies fevers, chills, weight loss, night sweats, IV drug use, or recent illness. Complaining of right-sided neck pain which she states is chronic however worse the last 2 months. He has been seen by his primary care physician and was recommended to have physical therapy however they have not set up physical therapy which she has not started. He denies any recent fall or injury. Denies any chest pain or shortness of breath. States he is having a paresthesia sensation to the right upper extremity.   He has had prior cervical fusion.        ROS:   Pertinent positives and negatives are stated within HPI, all other systems reviewed and are negative.    --------------------------------------------- PAST HISTORY ---------------------------------------------  Past Medical History:  has a past medical history of Cervical vertebra fusion, Chronic kidney disease (CKD), stage III (moderate) ([facility] [address] Foot injury, Hypertension, Stroke ([facility] [address] Type II or unspecified type diabetes mellitus without mention of complication, not stated as uncontrolled, and Ulcers, corneal.    Past Surgical History:  has a past surgical history that includes fracture surgery; Rotator cuff repair (1999); Spine surgery; Kidney stone surgery (01/23/2010); Colonoscopy; and Upper gastrointestinal endoscopy. Social History:  reports that he has never smoked. He has never used smokeless tobacco. He reports that he does not drink alcohol and does not use drugs. Family History: family history includes Diabetes in his sister; Heart Disease in his mother. The patients home medications have been reviewed. Allergies: Patient has no known allergies. ------------------------- NURSING NOTES AND VITALS REVIEWED ---------------------------   The nursing notes within the ED encounter and vital signs as below have been reviewed by myself. BP (!) 150/83   Pulse 89   Temp 96.8 °F (36 °C)   Resp 18   Ht 6' 2\" (1.88 m)   Wt 270 lb (122.5 kg)   SpO2 98%   BMI 34.67 kg/m²   Oxygen Saturation Interpretation: Normal    The patients available past medical records and past encounters were reviewed. Physical exam:  Constitutional:  The patient is comfortable, well appearing, non toxic in NAD. Patient is alert and oriented x3. Head: Head is atraumatic and normocephalic. Eyes: There is no discharge from the eyes. Sclerae are normal.  ENT: The oropharynx is normal. The mouth is normal to inspection. Neck: Normal range of motion of the neck is present there is no JVD present no meningeal signs are present. Respiratory/chest: The chest is nontender breath sounds are normal  Cardiovascular: Regular rate and rhythm is noted. No murmurs. No rubs or gallops. Skin: Skin is warm and dry, Skin exam normal  Neurologic exam: The patient's Alonso Coma Scale is 15. No focal motor deficits. There are no focal sensory deficits.  Deep tendon reflexes are intact and present bilaterally in the lower extremities. Babinski is absent. Gait is normal. Symmetric strength and sensation in the lower extremities bilaterally. Back exam: The patient has reproducible tenderness to palpation in the paravertebral cervical region on the right. There is no evidence of localized erythema nor is there any focal warmth to the back. The patient has no evidence of single vertebral tenderness or any single area of interspace tenderness. There are no palpable deformities, lacerations, abrasions, or stepoffs. No midline cervical, thoracic, or lumbar spine tenderness. Tenderness on palpation to the paraspinous along the right side the cervical spine into the right trapezius area. Hand grasp are equal bilaterally. Radial pulses palpable 2+ capillary refill less than 3 seconds. He has full range of motion with flexion and extension of the bilateral upper extremities. He does report some paresthesia sensation to the right second and third phalanx.          -------------------------------------------------- RESULTS -------------------------------------------------  I have personally reviewed all laboratory and imaging results for this patient. Results are listed below. LABS:  No results found for this visit on 06/28/21. RADIOLOGY:  Interpreted by Radiologist.  No orders to display           Medical decision making:   Mechanical cervical strain with radiculopathy without evidence of fracture or neurologic compromise.     Plan:  Review of past medical records for appropriate pain control and outpatient referral.        --------------------------------- IMPRESSION AND DISPOSITION ---------------------------------    IMPRESSION  1. Acute exacerbation of chronic low back pain    2. Strain of neck muscle, initial encounter    3.  Cervical radiculopathy        DISPOSITION  Disposition: Discharge to home  Patient condition is good         JACQUES Colbert - CNP  06/28/21 0940

## 2021-07-12 ENCOUNTER — OFFICE VISIT (OUTPATIENT)
Dept: FAMILY MEDICINE CLINIC | Age: 71
End: 2021-07-12
Payer: MEDICAID

## 2021-07-12 VITALS
HEIGHT: 74 IN | TEMPERATURE: 97.2 F | WEIGHT: 267 LBS | RESPIRATION RATE: 18 BRPM | DIASTOLIC BLOOD PRESSURE: 66 MMHG | HEART RATE: 76 BPM | SYSTOLIC BLOOD PRESSURE: 128 MMHG | OXYGEN SATURATION: 96 % | BODY MASS INDEX: 34.27 KG/M2

## 2021-07-12 DIAGNOSIS — M54.12 CERVICAL RADICULOPATHY: Primary | ICD-10-CM

## 2021-07-12 DIAGNOSIS — M79.641 RIGHT HAND PAIN: ICD-10-CM

## 2021-07-12 PROCEDURE — G8427 DOCREV CUR MEDS BY ELIG CLIN: HCPCS | Performed by: FAMILY MEDICINE

## 2021-07-12 PROCEDURE — 3017F COLORECTAL CA SCREEN DOC REV: CPT | Performed by: FAMILY MEDICINE

## 2021-07-12 PROCEDURE — 99213 OFFICE O/P EST LOW 20 MIN: CPT | Performed by: STUDENT IN AN ORGANIZED HEALTH CARE EDUCATION/TRAINING PROGRAM

## 2021-07-12 PROCEDURE — 1123F ACP DISCUSS/DSCN MKR DOCD: CPT | Performed by: FAMILY MEDICINE

## 2021-07-12 PROCEDURE — 4040F PNEUMOC VAC/ADMIN/RCVD: CPT | Performed by: FAMILY MEDICINE

## 2021-07-12 PROCEDURE — 1036F TOBACCO NON-USER: CPT | Performed by: FAMILY MEDICINE

## 2021-07-12 PROCEDURE — G8417 CALC BMI ABV UP PARAM F/U: HCPCS | Performed by: FAMILY MEDICINE

## 2021-07-12 PROCEDURE — 99212 OFFICE O/P EST SF 10 MIN: CPT | Performed by: STUDENT IN AN ORGANIZED HEALTH CARE EDUCATION/TRAINING PROGRAM

## 2021-07-12 SDOH — ECONOMIC STABILITY: FOOD INSECURITY: WITHIN THE PAST 12 MONTHS, THE FOOD YOU BOUGHT JUST DIDN'T LAST AND YOU DIDN'T HAVE MONEY TO GET MORE.: NEVER TRUE

## 2021-07-12 SDOH — ECONOMIC STABILITY: FOOD INSECURITY: WITHIN THE PAST 12 MONTHS, YOU WORRIED THAT YOUR FOOD WOULD RUN OUT BEFORE YOU GOT MONEY TO BUY MORE.: NEVER TRUE

## 2021-07-12 ASSESSMENT — SOCIAL DETERMINANTS OF HEALTH (SDOH): HOW HARD IS IT FOR YOU TO PAY FOR THE VERY BASICS LIKE FOOD, HOUSING, MEDICAL CARE, AND HEATING?: NOT VERY HARD

## 2021-07-12 NOTE — PROGRESS NOTES
S: 80 y.o. male presents today for ED Follow-up (numbness, pain and weakness right hand )    ED f/u low back pain: Hx of spinal fusion, cervical; given norco and robaxin from ED with some relief; today symptoms resolved  R handed weakness - chronic but persistent and worsening; has not dropped any objections; no trauma or inciting even or repetitive hand movements    O: VS: /66   Pulse 76   Temp 97.2 °F (36.2 °C) (Temporal)   Resp 18   Ht 6' 2\" (1.88 m)   Wt 267 lb (121.1 kg)   SpO2 96%   BMI 34.28 kg/m²   AAO/NAD, appropriate affect for mood  CV:  RRR, no murmur  Resp: CTAB  Abdomen: SNTND  Ext: no edema; R hand ttp base of 4,5 digit and pain with flexion of 4th digit; neg tinels and phalens; normal sensation; motor strength intact b/l UE    Assessment/Plan:   1) ED f/u / Cervical spine pain f/u: resolved  2) R hand pain - EMG  RTO: 3-4 weeks     Attending Physician Statement  I have discussed the case, including pertinent history and exam findings with the resident. I agree with the documented assessment and plan.       Electronically signed by Lyndon Sanches MD on 7/13/2021 at 12:30 PM

## 2021-07-12 NOTE — PROGRESS NOTES
CC:  ER follow-up 6/28    HPI:  80 y.o. man presents for ER follow-up seen on 6/28 for back pain sent home on a course of robaxin and norco.  Patient is s/p cervical fusion 2000 was done by Dr. Mickey Del Toro.   Last imaging Cervical X-Ray 2020- degenerative disc disease and degenerative facet disease, stable anterior disc fusion C4-C7. Seen today complaining RUE numbness/tingling also pain. Pain is worse on 4th right ring finger the palmar aspect. No restriction in ROM, no dropping objects, no trauma prior to event. Completed home course of norco/robaxin no other meds at home    Patient Active Problem List    Diagnosis Date Noted    HTN (hypertension) 11/16/2012    Diabetes mellitus ([facility] [address] 02/18/2012       Past Medical History:   Diagnosis Date    Cervical vertebra fusion 2000    Chronic kidney disease (CKD), stage III (moderate) ([facility] [address] 11/25/13    Foot injury     Hypertension     Stroke [facility])     patient had a stroke in the late 90's    Type II or unspecified type diabetes mellitus without mention of complication, not stated as uncontrolled     Ulcers, corneal     ulcers in Patients stomach       Current Outpatient Medications on File Prior to Visit   Medication Sig Dispense Refill    insulin lispro, 1 Unit Dial, 100 UNIT/ML SOPN Inject 6 Units into the skin 3 times daily Plus  2 units for each 50 above 150 Blood sugar level result      gabapentin (NEURONTIN) 100 MG capsule Take 3 capsules by mouth 4 times daily.       B-D UF III MINI PEN NEEDLES 31G X 5 MM MISC 1 each by Does not apply route 4 times daily      FLUoxetine (PROZAC) 20 MG capsule Take 1 capsule by mouth nightly      tamsulosin (FLOMAX) 0.4 MG capsule Take 0.4 mg by mouth nightly      pantoprazole (PROTONIX) 40 MG tablet Take 1 tablet by mouth daily 90 tablet 1    vitamin D-3 (CHOLECALCIFEROL) 125 MCG (5000 UT) TABS Take 1 tablet by mouth daily 90 tablet 3    hydroCHLOROthiazide (MICROZIDE) 12.5 MG capsule Take 1 capsule by mouth daily 90 capsule 1    dilTIAZem (DILACOR XR) 180 MG extended release capsule Take 2 capsules by mouth daily 90 capsule 3    hydrALAZINE (APRESOLINE) 10 MG tablet Take 1 tablet by mouth 3 times daily 90 tablet 3    aspirin EC 81 MG EC tablet Take 1 tablet by mouth daily 90 tablet 1    cyclobenzaprine (FLEXERIL) 5 MG tablet Take 1 tablet by mouth nightly as needed for Muscle spasms 30 tablet 0    insulin glargine (LANTUS) 100 UNIT/ML injection vial Inject 62 Units into the skin nightly       lisinopril (PRINIVIL;ZESTRIL) 40 MG tablet Take 1 tablet by mouth daily 90 tablet 3     No current facility-administered medications on file prior to visit. No Known Allergies    Family History   Problem Relation Age of Onset    Heart Disease Mother     Diabetes Sister        Past Surgical History:   Procedure Laterality Date    COLONOSCOPY      FRACTURE SURGERY      KIDNEY STONE SURGERY  01/23/2010 2019    ROTATOR CUFF REPAIR  1999    SPINE SURGERY      UPPER GASTROINTESTINAL ENDOSCOPY         Social History     Tobacco Use    Smoking status: Never Smoker    Smokeless tobacco: Never Used   Substance Use Topics    Alcohol use: No    Drug use: No       ROS:    Review of Systems   Constitutional: Negative for activity change, appetite change, chills and fatigue. HENT: Negative for congestion and sore throat. Respiratory: Negative for choking and chest tightness. Cardiovascular: Negative for chest pain, palpitations and leg swelling. Gastrointestinal: Negative for abdominal distention and abdominal pain. Genitourinary: Negative for difficulty urinating and dysuria. Musculoskeletal: +pain in right hand, +numbness/tingling in right hand  Skin: Negative for color change and pallor. Neurological: Negative for facial asymmetry and headaches. Psychiatric/Behavioral: Negative for behavioral problems. The patient is not nervous/anxious.         Objective:    VS:  Blood pressure 128/66, pulse 76, temperature 97.2 °F (36.2 °C), temperature source Temporal, resp. rate 18, height 6' 2\" (1.88 m), weight 267 lb (121.1 kg), SpO2 96 %. Physical Exam   Constitutional: He is oriented to person, place, and time. He appears well-developed and well-nourished. HENT:   Head: Normocephalic and atraumatic. Eyes: EOM are normal.   Neck: Neck supple. Cardiovascular: Normal rate, regular rhythm and intact distal pulses. Exam reveals no gallop and no friction rub. No murmur heard. Pulmonary/Chest: Effort normal and breath sounds normal. He has no wheezes. He has no rales. Abdominal: Soft. Bowel sounds are normal. He exhibits no distension. There is no abdominal tenderness. Musculoskeletal: +TTP right palmar fascia 3rd, 4th finger. +TTP base of 3rd right hand. No loss of sensation/strength.  Strength intact. Negative tinel and phalen sign. Neurological: He is alert and oriented to person, place, and time. Skin: Skin is warm and dry. No rash noted. No erythema. Psychiatric: He has a normal mood and affect. His behavior is normal.   Nursing note and vitals reviewed. Assessment:     Diagnosis Orders   1. Cervical radiculopathy  EMG   2. Right hand pain pain could be 2/2 cervical radiculopathy EMG         Plans:  As above. RTO 1 month for follow-up of symptoms. Please see Patient Instructions for further counseling and information given. Advised to please be adherent to the treatment plans discussed today, and please call with any questions or concerns, letting the office know of any reasons that the plans may not be followed. The risks of untreated conditions include worsening illness, injury, disability, and possibly, death. Please call if symptoms change in any way, worsen, or fail to completely resolve, as this could necessitate a change to treatment plans. Patient and/or caregiver expressed understanding. Indications and proper use of medication(s) reviewed.   Potential side-effects and risks of medication(s) also explained. Patient and/or caregiver was instructed to call if any new symptoms develop prior to next visit. Health risk factors discussed and addressed. On the basis of positive falls risk screening, assessment and plan is as follows: home safety tips provided.

## 2021-07-20 ENCOUNTER — HOSPITAL ENCOUNTER (OUTPATIENT)
Dept: NEUROLOGY | Age: 71
Discharge: HOME | End: 2021-07-20
Payer: MEDICAID

## 2021-07-20 VITALS — HEIGHT: 74 IN | WEIGHT: 270 LBS | BODY MASS INDEX: 34.65 KG/M2

## 2021-07-20 DIAGNOSIS — M79.641 RIGHT HAND PAIN: ICD-10-CM

## 2021-07-20 DIAGNOSIS — M54.12 CERVICAL RADICULOPATHY: ICD-10-CM

## 2021-07-20 PROCEDURE — 95913 NRV CNDJ TEST 13/> STUDIES: CPT | Performed by: PHYSICAL MEDICINE & REHABILITATION

## 2021-07-20 PROCEDURE — 95886 MUSC TEST DONE W/N TEST COMP: CPT

## 2021-07-20 PROCEDURE — 95911 NRV CNDJ TEST 9-10 STUDIES: CPT

## 2021-07-20 PROCEDURE — 95886 MUSC TEST DONE W/N TEST COMP: CPT | Performed by: PHYSICAL MEDICINE & REHABILITATION

## 2021-07-20 PROCEDURE — 95885 MUSC TST DONE W/NERV TST LIM: CPT

## 2021-07-20 PROCEDURE — 95885 MUSC TST DONE W/NERV TST LIM: CPT | Performed by: PHYSICAL MEDICINE & REHABILITATION

## 2021-07-20 NOTE — PROCEDURES
[address], [address] [place] [address]  Phone: [phone]  Fax: [phone]      Referring Provider: Yoon Chowdhury MD  Primary Care Physician: Janina Arriaga MD  Patient Name: Ángel Ceron  Patient YOB: 1950  Gender: male  BMI: Body mass index is 34.67 kg/m². Height 6' 2\" (1.88 m), weight 270 lb (122.5 kg). 7/20/2021    Description of clinical problem:   Chronic neck pain, history of C4-C7 fusion in 2000. Patient reports increased neck pain with radiation down the back of the right upper extremity to the hand for the last few months. He endorses right hand pain. +Numbness/tingling in both hands. He also reports numbness/tingling in both feet, which he relates to history of neuropathy. Pain: Yes   ; Numbness/tingling:  Yes; Weakness:  No       Brief physical exam:   Sensory deficit: Yes, right hand 4th digit; Weakness: No; Atrophy:  No    Study Limitations:  Difficulty relaxing all muscles, particularly paraspinals, for evaluation of spontaneous activity-- thus evaluation of spontaneous activity was limited. Motor NCS      Nerve / Sites Lat. Lat Diff Amplitude Amp. 1-2 Distance Velocity Temp.    ms ms mV % cm m/s °C   R Median - APB      Wrist 5.21  7.2 100 8  32.5      Elbow 10.00 4.79 6.9 95.6 24 50 32.5   L Median - APB      Wrist 5.78  7.5 100 8  32.6      Elbow 10.73 4.95 6.8 91 24 49 32.6   R Ulnar - ADM      Wrist 3.33  9.0 100 8  32.6      B. Elbow 7.60 4.27 8.4 93.3 23 54 32.6      A. Elbow 9.74 2.14 7.8 86.7 10 47 32.6   L Ulnar - ADM      Wrist 3.39  8.6 100 8  32.9      B. Elbow 7.60 4.22 7.7 89.5 23 55 32.9      A. Elbow 9.74 2.14 7.6 88.4 10 47 32.9       Sensory NCS      Nerve / Sites Onset Lat Peak Lat PP Amp Distance Velocity Temp.    ms ms µV cm m/s °C   R Median - Digit II (Antidromic)      Mid Palm 1.67 2.24 23.1 7 42 33      Wrist 3.75 4.58 22.8 14 37 33.1   L Median - Digit II (Antidromic)      Mid Palm 1.77 2.35 15.0 7 40 33      Wrist 4.06 5.42 14.7 14 34 33   R Ulnar - Digit V (Antidromic)      Wrist 3.13 3.65 6.1 14 45 32.6   L Ulnar - Digit V (Antidromic)      Wrist 2.81 3.65 5.7 14 50 33.1   R Radial - Anatomical snuff box (Forearm)      Forearm 1.93 2.78 11.3 10 52 32.6   L Radial - Anatomical snuff box (Forearm)      Forearm 1.72 2.55 12.9 10 58 33       F  Wave      Nerve F Lat M Lat F-M Lat    ms ms ms   R Median - APB 31.1 5.4 25.7   R Ulnar - ADM 31.9 2.6 29.3   L Median - APB 30.0 5.9 24.1   L Ulnar - ADM 31.5 3.5 28.0       EMG         EMG Summary Table     Spontaneous MUAP Recruitment   Muscle IA Fib PSW Fasc H.F. Amp Dur. PPP Pattern   R. Deltoid N None None None None 1+ 1+ N N   R. Biceps brachii N None None None None N N Few N   R. Triceps brachii N None None None None 1+ 1+ N N   R. Pronator teres N None None None None N N 1+ N   R. First dorsal interosseous N None None None None 1+ N 1+ N   R. Abductor pollicis brevis N None None None None 1+ 1+ N N   R. Cervical paraspinals (mid) N None None None None    Unable To Relax   R. Cervical paraspinals (low) N None None None None    Unable To Relax   L. Abductor pollicis brevis N None None None None 1+ 1+ 1+ N           Summary of Findings:   Nerve conduction studies:   Sensory nerve conduction studies of the bilateral median nerves revealed delayed distal latencies and normal SNAP amplitudes. Sensory nerve conduction studies of the bilateral ulnar and radial nerves showed normal distal latencies and normal SNAP amplitudes. Motor nerve conduction studies of the bilateral median nerves revealed delayed distal latencies, normal CMAP amplitudes, and normal conduction velocities. Motor nerve conduction studies of the bilateral ulnar nerves showed normal distal latencies, normal CMAP amplitudes, and normal conduction velocities. F-wave studies of the bilateral median and ulnar nerves revealed normal latencies.      Needle EMG:   · Needle EMG was performed using a monopolar needle. · The following abnormalities were seen on needle EMG: Chronic neuropathic changes were noted in all tested upper extremity muscles, as listed in the table above. There was no clear evidence of abnormal spontaneous activity in the right cervical paraspinals, however paraspinal evaluation was limited by patient inability to relax muscles. Diagnostic Interpretation: This study was Abnormal.     1. There is electrodiagnostic evidence suggestive of diffuse multilevel chronic right cervical motor radiculopathy, involving at least C5-C8 levels. Right cervical radiculopathy was not proven by abnormal cervical paraspinal testing. Paraspinal evaluation was limited by patient inability to relax muscles. Clinical correlation is advised. 2. There is electrodiagnostic evidence of focal median mononeuropathies at or about the wrists bilaterally, moderately severe to severe in degree, affecting sensory and motor fibers, with demyelinating and axonal features. This is consistent with a clinical diagnosis of bilateral carpal tunnel syndrome. 3. There is no electrodiagnostic evidence of any other peripheral nerve mononeuropathy or plexopathy in the bilateral upper extremities. Cannot evaluate for left cervical radiculopathy without more extensive needle examination of the left upper extremity; patient reported radicular type symptoms only on the right side. Complete peripheral neuropathy evaluation, which would include lower extremity studies, was not completed during today's evaluation. Cannot evaluate for pure sensory radiculopathy or small fiber neuropathy by electrodiagnostic technique. Clinical correlation advised. Previous Study: No prior study available      Follow up EMG can be completed in the future if clinically indicated.      Technologist: Bailee Okeefe    Physician:  Holly Patino MD  Physical Medicine and Rehabilitation    Nerve conduction studies and electromyography were performed according to our laboratory policies and procedures which can be provided upon request. All abnormal values are identified in the table.  Laboratory normal values can also be provided upon request.       Cc: MD Angeline Davis MD

## 2021-07-23 ENCOUNTER — HOSPITAL ENCOUNTER (OUTPATIENT)
Age: 71
Discharge: HOME | End: 2021-07-23
Payer: MEDICAID

## 2021-07-23 LAB
ANION GAP SERPL CALCULATED.3IONS-SCNC: 12 MMOL/L (ref 7–16)
BUN BLDV-MCNC: 22 MG/DL (ref 6–23)
CALCIUM SERPL-MCNC: 8.9 MG/DL (ref 8.6–10.2)
CHLORIDE BLD-SCNC: 101 MMOL/L (ref 98–107)
CO2: 22 MMOL/L (ref 22–29)
CREAT SERPL-MCNC: 2.1 MG/DL (ref 0.7–1.2)
GFR AFRICAN AMERICAN: 38
GFR NON-AFRICAN AMERICAN: 38 ML/MIN/1.73
GLUCOSE BLD-MCNC: 95 MG/DL (ref 74–99)
PHOSPHORUS: 2.9 MG/DL (ref 2.5–4.5)
POTASSIUM SERPL-SCNC: 3.4 MMOL/L (ref 3.5–5)
SODIUM BLD-SCNC: 135 MMOL/L (ref 132–146)
URIC ACID, SERUM: 9.8 MG/DL (ref 3.4–7)

## 2021-07-23 PROCEDURE — 80048 BASIC METABOLIC PNL TOTAL CA: CPT

## 2021-07-23 PROCEDURE — 84550 ASSAY OF BLOOD/URIC ACID: CPT

## 2021-07-23 PROCEDURE — 84100 ASSAY OF PHOSPHORUS: CPT

## 2021-07-23 PROCEDURE — 36415 COLL VENOUS BLD VENIPUNCTURE: CPT

## 2021-08-02 ENCOUNTER — HOSPITAL ENCOUNTER (OUTPATIENT)
Dept: PHYSICAL THERAPY | Age: 71
Setting detail: THERAPIES SERIES
Discharge: HOME | End: 2021-08-02
Payer: MEDICAID

## 2021-08-02 PROCEDURE — 97161 PT EVAL LOW COMPLEX 20 MIN: CPT

## 2021-08-02 NOTE — PROGRESS NOTES
[address] [facility]                Phone: [phone]   Fax: [phone]    Physical Therapy Initial Evaluation  Date:  2021    Patient Name:  Sterling Gilmore    :  1950  MRN: [id number]    Referring Physician:  Stephanie Moran Information:  [employer]     Evaluation date:  21  Diagnosis:  Cervical Radiculopathy   PMH:  History of ACDF in  per pt   ICD-10 Codes:  M54.12   Evaluating Physical Therapist:  Shannan Arenas DPT      [facility] [place] [address] Cervical Spine Assessment    Work:  Mechanical stresses: none   Leisure:  Mechanical stresses: none   Functional disability from present episode: Pt reports that his pain and weakness in RUE affects his daily activities   VAS Score (0-10): 2-3/10 Cervical and RUE pain     HISTORY  Present symptoms: Pt reports that he has been having neck pain since fusion in . He also reports new onset numbness and tingling in 4-5 fingers on R hand starting 2 months ago.     Present since:         [] improving  [] unchanging  [x] worsening  Commenced as a result of:   Cervical fusion in      Symptoms at onset: [x] neck  [] arm  [] forearm [] headache   Constant symptoms: [] neck  [] arm  [] forearm [] headache   Intermittent symptoms: [] neck [x] arm  [x] forearm [] headache     Worse: none      Better: frequent repositioning      Disturbed sleep: no    Pillows:  4 pillows under head per pt     Sleeping posture: Sitting up, lying        Previous episodes: yes     Pt is R hand dominant     SPECIFIC QUESTIONS   /tinnitus//swallowing (chronic) /  Gait: mild antalgic limp on L due to recent knee pain per pt   General health: fair (HTN, DM, Kidney disease)  Imaging: none recently        Recent or major surgery: denies    Night pain:[] yes  [x] no  Accidents: pt reports falls      Unexplained weight loss:  [] yes [x] no  Other: NA      EXAMINATION    POSTURE  Sitting: fair   Standing: fair Protruded head: yes       Wry neck: no    Relevant: no   Correction of posture: no effect   Coordination:  Impaired to RUE with finger opposition (increased time and decreased accuracy)    NEUROLOGICAL  Motor deficit: grossly 4/5 RUE (shooulder, elbow, wrist, ), 5/5 LUE   Sensory deficit: tingling and numbness to RUE and fingers 4-5      MOVEMENT LOSS   Bunny Mod Min Nil Pain   Protrusion    x    Flexion    x    Retraction  x      Extension x    Grossly 30 °    Lateral flexion R        Lateral flexion L        Rotation R  x   Grossly 45 °    Rotation L  x   Grossly 40 °        TEST MOVEMENTS  (describe effects on present pain; During - produces, abolishes, increases, decreases, no effect, centralizing, peripheralizing;  After - better, worse, no better, no worse, no effect, centralized, peripheralized)      Symptoms during testing Symptoms after testing Increased ROM Decreased ROM No effect   Pretest symptoms in sitting         PRO  NE NE      Rep PRO  NE NE      RET  I NW      Rep RET  I NW x     RET EXT         Rep RET EXT         Pretest symptoms in lying         RET         Rep RET         RET EXT         Rep RET EXT         If required pretest pain sitting         LF - R         Rep LF - R         LF - L         Rep LF - L         ROT - R         Rep ROT - R         ROT - L         Rep ROT - L         FLEX         Rep FLEX             Comments:  Pt with improved R and L cervical AROM on retest following 3x10 repeated retractions in sitting     HEP:  Repeated cervical retractions   3x10 every 3-4 hours unless red flag symptoms occur then discontinue HEP   Pt verbalized and demonstrated understanding         PROVISIONAL CLASSIFICATION    Derangement - yes   Derangement: Pain location - cervical spine and RUE   Dysfunction - no   Posture - no   Other - TBD       PRINCIPLE OF MANAGEMENT    Education - on HEP and red flag symptoms (pt verbalized understanding)  Equipment provided - handout of HEP   Mechanical therapy - yes  Extension principle - to be determined   Lateral principle - no   Flexion principle - no   Other - to be determined     Pt will be seen for 1-2 visits per week for 4 weeks for a total of 4-8 visits to accomplish goals set below:        Short Term/ Long Term Goals: (4 weeks)      1. Pt will report at least 75% improvement in neck and RUE pain with daily activities     2. Pt will increase RUE strength to 5/5 throughout     3. Pt will improve cervical AROM to min movement loss or better in all directions     4. Pt will increase sitting posture from FAIR to GOOD    5. Pt will improve coordination on RUE to [facility] and symmetrical to [facility]     6. Pt will be independent with HEP          Pt's potential for reaching Physical Therapy goals: fair. Time In:  1000  Time Out:  Janiya [address] [place] [address]  [id number]    Yaneli [place]  T: [phone]   F: [phone]     If you have any questions or concerns, please don't hesitate to call. Thank you for your referral.    Physician Signature:________________________________Date:__________________  By signing above, therapists plan is approved by physician. All patients under [employer]   must be signed by physician.

## 2021-08-02 NOTE — PROGRESS NOTES
[address] [facility]                Phone: [phone]   Fax: [phone]    Physical Therapy Daily Treatment Note  Date:  2021    Patient Name:  Sterling Gilmore    :  1950  MRN: [id number]        Referring Physician:  Stephanie Moran Information:  [employer]      Evaluation date:  21  Diagnosis:  Cervical Radiculopathy   PMH:  History of ACDF in 2000 per pt   ICD-10 Codes:  M54.12   Evaluating Physical Therapist:  Shannan Arenas DPT    Visit:       [facility] RE-ASSESSMENT FORM      Check of Management Strategies:     Compliance / Commitment  [] Excellent   [] Good   [] Fair   [] Poor    Posture Correction: []Yes   [] No    Performing Exercises:  []Yes   [] No    Frequency: [] Appropriate [] Not appropriate                        Symptom Response during  exercises:  [] Increase   [] Decrease   [] No  effect     Technique: [] Good  [] Needs correcting    Comments:        Symptomatic Presentation:    Pain Location: [] Centralised     [] Same    [] Peripheralized               Description:      Frequency: []Better    []Same   []Worse    Severity: /10         Functional Status:  % improvement since initial assessment:  %    Exercises:     Exercise  During After  Comments                                                                                               HEP  Seated cervical retractions         Mechanical Presentation:    Sitting Posture: []Good   []Fair  []Poor                  Standing Posture:  []Good   []Fair  []Poor      Deformity: [] Yes    [] No   [] Not applicable                  Neurological Testing:  [] Better    [] Same   [] Worse    [] NA     Movement Loss: [] Better    [] Same   [] Worse      Repeated Movements:   [] Better    [] Same   [] Worse          SUMMARY: [] Better    [] Same   [] Worse                    Classification Confirmed   []  Yes     [] No    Comments:      Revised Classification (if appropriate):    [] Derangement   [] Dysfunction   [] Posture           [] OTHER (subgroup)    Management Today:   [] Posture      [] HEP instruction     [] Exercise       Plan for next session:          Barriers to Recovery:          CPT codes 8/2/2021 Units  Minutes   Low Complexity PT evaluation  50618     Moderate Complexity PT evaluation  56644     High Complexity PT evaluation [id number]     PT Re-evaluation  [id number]     Gait training [phone]     Manual therapy  [address] [facility]     Therapeutic activities  22597     Therapeutic exercises  07471     Neuromuscular reeducation  [phone]                                                                                                                                                                       Time In:    Time Out:      Sydney Jalloh, PT, DPT  [id number]

## 2021-08-03 ENCOUNTER — OFFICE VISIT (OUTPATIENT)
Dept: FAMILY MEDICINE CLINIC | Age: 71
End: 2021-08-03
Payer: MEDICAID

## 2021-08-03 VITALS
BODY MASS INDEX: 34.01 KG/M2 | WEIGHT: 265 LBS | TEMPERATURE: 97.7 F | OXYGEN SATURATION: 97 % | SYSTOLIC BLOOD PRESSURE: 149 MMHG | HEART RATE: 73 BPM | HEIGHT: 74 IN | DIASTOLIC BLOOD PRESSURE: 60 MMHG

## 2021-08-03 DIAGNOSIS — H53.9 VISUAL DISTURBANCES: ICD-10-CM

## 2021-08-03 DIAGNOSIS — I10 ESSENTIAL HYPERTENSION: ICD-10-CM

## 2021-08-03 DIAGNOSIS — Z91.81 AT HIGH RISK FOR FALLS: Primary | ICD-10-CM

## 2021-08-03 PROCEDURE — 99212 OFFICE O/P EST SF 10 MIN: CPT | Performed by: FAMILY MEDICINE

## 2021-08-03 PROCEDURE — 3017F COLORECTAL CA SCREEN DOC REV: CPT | Performed by: FAMILY MEDICINE

## 2021-08-03 PROCEDURE — G8427 DOCREV CUR MEDS BY ELIG CLIN: HCPCS | Performed by: FAMILY MEDICINE

## 2021-08-03 PROCEDURE — 99213 OFFICE O/P EST LOW 20 MIN: CPT | Performed by: FAMILY MEDICINE

## 2021-08-03 PROCEDURE — 1036F TOBACCO NON-USER: CPT | Performed by: FAMILY MEDICINE

## 2021-08-03 PROCEDURE — 1123F ACP DISCUSS/DSCN MKR DOCD: CPT | Performed by: FAMILY MEDICINE

## 2021-08-03 PROCEDURE — 4040F PNEUMOC VAC/ADMIN/RCVD: CPT | Performed by: FAMILY MEDICINE

## 2021-08-03 PROCEDURE — G8417 CALC BMI ABV UP PARAM F/U: HCPCS | Performed by: FAMILY MEDICINE

## 2021-08-03 ASSESSMENT — ENCOUNTER SYMPTOMS
PHOTOPHOBIA: 1
WHEEZING: 0
EYE DISCHARGE: 1
EYE ITCHING: 1
COUGH: 0
SHORTNESS OF BREATH: 0
EYE PAIN: 1

## 2021-08-03 NOTE — PROGRESS NOTES
[facility] MEDICINE RESIDENCY PROGRAM  DATE OF VISIT : 8/3/2021    Patient : Forrest Horvath   Age : 80 y.o.  : 1950   MRN : [id number]   ______________________________________________________________________    Chief Complaint :   Chief Complaint   Patient presents with    1 Month Follow-Up    Eye Problem     pt feels like he has something inside his eyes       HPI : Forrest Horvath is 80 y.o. male who presented to the clinic today for 1mos follow-up and eye discomfort. HTN: HCTZ 12.5m daily, Lisinopril 30mg daily, Diltiazem 360mg daily. Patient was started on hydralazine TID on  for uncontrolled HTN. Has not been checking BP at home. Eye discomfort: feels like there is \"something in the eye\" started about 1 week ago. He noticed taht he rubbed his eyes and made the pain worse, he stopped messing with it but notes that yesterday he had severe pain behind the left eye, he took tylenol extra strength and applied warm compresses which helped the pain. He continues to have blurry vision in both eyes. Denies any floaters, eye drainage. He does endorse photophobia on the left eye and intermittent pain with movement. No fever, chills, nausea, vomiting. Does endorse light headache but tolerable. CKD: Upcoming appt with nephrology on . Colorectal cancer screening: appointment with Dr. Adria Brittle on  for colonoscopy discussion. I reviewed the patient's past medications, allergies and past medical history during this visit.     Past Medical History :      Past Medical History:   Diagnosis Date    Cervical vertebra fusion     Chronic kidney disease (CKD), stage III (moderate) ([address] 13    Foot injury     Hypertension     Stroke [facility])     patient had a stroke in the late     Type II or unspecified type diabetes mellitus without mention of complication, not stated as uncontrolled     Ulcers, corneal     ulcers in Patients stomach     Past Surgical History:   Procedure Laterality Date    COLONOSCOPY      FRACTURE SURGERY      KIDNEY STONE SURGERY  01/23/2010 2019    ROTATOR CUFF REPAIR  1999    SPINE SURGERY      UPPER GASTROINTESTINAL ENDOSCOPY         Social History :  Social History     Tobacco History     Smoking Status  Never Smoker    Smokeless Tobacco Use  Never Used          Alcohol History     Alcohol Use Status  No          Drug Use     Drug Use Status  No          Sexual Activity     Sexually Active  Not Asked                 Allergies :   No Known Allergies    Medication List :    Current Outpatient Medications   Medication Sig Dispense Refill    insulin lispro, 1 Unit Dial, 100 UNIT/ML SOPN Inject 6 Units into the skin 3 times daily Plus  2 units for each 50 above 150 Blood sugar level result      gabapentin (NEURONTIN) 100 MG capsule Take 3 capsules by mouth 4 times daily.       B-D UF III MINI PEN NEEDLES 31G X 5 MM MISC 1 each by Does not apply route 4 times daily      FLUoxetine (PROZAC) 20 MG capsule Take 1 capsule by mouth nightly      tamsulosin (FLOMAX) 0.4 MG capsule Take 0.4 mg by mouth nightly      lisinopril (PRINIVIL;ZESTRIL) 40 MG tablet Take 1 tablet by mouth daily 90 tablet 3    pantoprazole (PROTONIX) 40 MG tablet Take 1 tablet by mouth daily 90 tablet 1    vitamin D-3 (CHOLECALCIFEROL) 125 MCG (5000 UT) TABS Take 1 tablet by mouth daily 90 tablet 3    hydroCHLOROthiazide (MICROZIDE) 12.5 MG capsule Take 1 capsule by mouth daily 90 capsule 1    dilTIAZem (DILACOR XR) 180 MG extended release capsule Take 2 capsules by mouth daily 90 capsule 3    hydrALAZINE (APRESOLINE) 10 MG tablet Take 1 tablet by mouth 3 times daily 90 tablet 3    aspirin EC 81 MG EC tablet Take 1 tablet by mouth daily 90 tablet 1    cyclobenzaprine (FLEXERIL) 5 MG tablet Take 1 tablet by mouth nightly as needed for Muscle spasms 30 tablet 0    insulin glargine (LANTUS) 100 UNIT/ML injection vial Inject 62 Units into the skin nightly No current facility-administered medications for this visit. Review of Systems :  Review of Systems   Constitutional: Negative for chills, fatigue and fever. Eyes: Positive for photophobia, pain, discharge, itching and visual disturbance. Respiratory: Negative for cough, shortness of breath and wheezing. Cardiovascular: Negative for chest pain, palpitations and leg swelling. Neurological: Positive for headaches. Negative for light-headedness. ______________________________________________________________________    Physical Exam :    Vitals: BP (!) 149/60 (Site: Left Upper Arm)   Pulse 73   Temp 97.7 °F (36.5 °C) (Temporal)   Ht 6' 2\" (1.88 m)   Wt 265 lb (120.2 kg)   SpO2 97%   BMI 34.02 kg/m²   Physical Exam  Constitutional:       General: He is not in acute distress. Appearance: Normal appearance. Eyes:      General: Lids are normal.         Right eye: No discharge. Left eye: No discharge. Extraocular Movements: Extraocular movements intact. Conjunctiva/sclera: Conjunctivae normal.      Pupils: Pupils are equal, round, and reactive to light. Comments: Unable to perform fundoscopy exam due to photophobia   Cardiovascular:      Rate and Rhythm: Normal rate and regular rhythm. Pulses: Normal pulses. Heart sounds: Normal heart sounds. No murmur heard. Pulmonary:      Effort: Pulmonary effort is normal. No respiratory distress. Breath sounds: Normal breath sounds. No wheezing. Abdominal:      General: Bowel sounds are normal. There is no distension. Palpations: Abdomen is soft. Tenderness: There is no abdominal tenderness. Musculoskeletal:      Right lower leg: No edema. Left lower leg: No edema. Neurological:      Mental Status: He is alert.         ___________________    Assessment & Plan :    1. Essential hypertension  -Continue present management    2.  Visual disturbances  -Discussed with [facility] patient's current symptoms  -Patient will be seen by their office this afternoon    3. At high risk for falls  On the basis of positive falls risk screening, assessment and plan is as follows: home safety tips provided, Currently undergoing PT. [facility] Additional plan and future considerations:   RTO in 1 month    Return to Office: Return in about 4 weeks (around 8/31/2021). Angeline Hernandez MD   Case discussed with Dr. Donavan Holbrook.

## 2021-08-03 NOTE — PROGRESS NOTES
Attending Physician Statement    S:   Chief Complaint   Patient presents with    1 Month Follow-Up    Eye Problem     pt feels like he has something inside his eyes      HTN - compliant. Does not do home BP   One week history of FB sensation in eyes. Has headache behind left eye and some photophobia. States that his eyes feel gritty and he has blurry vision  O: Blood pressure (!) 149/60, pulse 73, temperature 97.7 °F (36.5 °C), temperature source Temporal, height 6' 2\" (1.88 m), weight 265 lb (120.2 kg), SpO2 97 %. Exam:   Heart - RRR   Lungs - clear   Eyes - СВЕТЛАНА,  Unable to visualize fundus because he can't tolerate light    EOMI and painless  A: Eye pain  P:  Home BP's   Eye clinic will see him today   Follow-up as ordered    I have discussed the case, including pertinent history and exam findings with the resident. I agree with the documented assessment and plan.

## 2021-08-05 ENCOUNTER — VIRTUAL VISIT (OUTPATIENT)
Dept: SURGERY | Age: 71
End: 2021-08-05
Payer: MEDICAID

## 2021-08-05 ENCOUNTER — OFFICE VISIT (OUTPATIENT)
Dept: SURGERY | Age: 71
End: 2021-08-05
Payer: MEDICAID

## 2021-08-05 VITALS
HEIGHT: 74 IN | DIASTOLIC BLOOD PRESSURE: 72 MMHG | OXYGEN SATURATION: 98 % | SYSTOLIC BLOOD PRESSURE: 154 MMHG | BODY MASS INDEX: 33.88 KG/M2 | WEIGHT: 264 LBS | HEART RATE: 104 BPM | TEMPERATURE: 97.2 F

## 2021-08-05 DIAGNOSIS — R13.19 ESOPHAGEAL DYSPHAGIA: Primary | ICD-10-CM

## 2021-08-05 DIAGNOSIS — Z12.11 ENCOUNTER FOR SCREENING COLONOSCOPY: ICD-10-CM

## 2021-08-05 DIAGNOSIS — R13.10 DYSPHAGIA, UNSPECIFIED TYPE: ICD-10-CM

## 2021-08-05 PROCEDURE — 99202 OFFICE O/P NEW SF 15 MIN: CPT | Performed by: SURGERY

## 2021-08-05 PROCEDURE — G8417 CALC BMI ABV UP PARAM F/U: HCPCS | Performed by: SURGERY

## 2021-08-05 PROCEDURE — G8428 CUR MEDS NOT DOCUMENT: HCPCS | Performed by: SURGERY

## 2021-08-05 PROCEDURE — 99244 OFF/OP CNSLTJ NEW/EST MOD 40: CPT | Performed by: SURGERY

## 2021-08-05 RX ORDER — ISOPROPYL ALCOHOL 0.75 G/1
SWAB TOPICAL
COMMUNITY
Start: 2021-07-31

## 2021-08-05 RX ORDER — HYDROXYZINE HYDROCHLORIDE 10 MG/1
10 TABLET, FILM COATED ORAL DAILY
COMMUNITY
Start: 2021-08-02 | End: 2021-10-18 | Stop reason: ALTCHOICE

## 2021-08-05 ASSESSMENT — ENCOUNTER SYMPTOMS
GASTROINTESTINAL NEGATIVE: 1
RESPIRATORY NEGATIVE: 1

## 2021-08-05 NOTE — PATIENT INSTRUCTIONS
Patient Information and Instructions for  Upper GI Endoscopy or Esophagogastroduodenoscopy [EGD])         Definition Upper GI Endoscopy or Esophagogastroduodenoscopy [EGD])  This is a test that uses a fiberoptic scope to examine the esophagus (throat), stomach, and upper part of the small intestines. Upper GI endoscopy may be recommended if you have:   Abdominal pain   Severe heartburn   Persistent nausea and vomiting   Difficulty swallowing   Blood in stool or vomit   Abnormal x-ray or other examinations of the gastrointestinal tract     Conditions that can be diagnosed with upper GI endoscopy include:   Ulcers   Tumors   Polyps   Abnormal narrowing   Inflammation     Possible Complications   Complications are rare, but no procedure is completely free of risk. If you are planning to have upper GI endoscopy, your doctor will review a list of possible complications, which may include:   Bleeding   Damage to the esophagus, stomach, or intestine   Infection   Respiratory depression (reduced breathing rate and/or depth)   Reaction to sedatives or anesthesia causing your blood pressure to drop    Some factors that may increase the risk of complications include:   Age: 61 or older   Pregnancy   Obesity   Smoking , alcoholism , or drug use   Malnutrition   Recent illness   Diabetes   Heart or lung problems   Bleeding disorders   Use of certain medicines     Be sure to discuss these risks with your doctor before the test.     What to Expect   Prior to test   Leading up to the test:   Arrange for a ride home after the test. Also, arrange for help at home. The night before, eat a light meal.  Do not eat or drink anything after midnight the night before the test.   Talk to your doctor about your medicines.  You may be asked to stop taking some medicines up to one week before the procedure, like:   Anti-inflammatory drugs (e.g., aspirin )   Blood thinners, like clopidogrel (Plavix) or warfarin (Coumadin)     Description of the Test   You will be asked to lie on your left side. You will have monitors tracking your breathing, heart rate, and blood oxygen levels. You will be given supplemental oxygen to breathe through your nose. A mouthpiece will be positioned to help keep your mouth open. Your throat may be sprayed with a numbing medicine. You will be given a sedative through an IV to help you relax during the test.  During the test, a small suction tube will be used to clear saliva and fluids from your mouth. The endoscope will be lubricated and placed in your mouth. You will be asked to try to swallow it. Then, it will be carefully and slowly advanced down your throat. It will be passed through your esophagus and into your stomach and intestine. While the endoscope is being advanced, your doctor will view the images on the screen. Air will be passed through the endoscope into your digestive tract. This will be done to smooth the normal folds in the tissues, allowing your doctor to view the tissue more easily. Tiny tools may be passed through the endoscope in order to take biopsies or do other tests. After Test   After the test, you will be observed for an hour. Then, you will be allowed to go home. When you return home after the test, do the following to help ensure a smooth recovery:   Rest when you get home. Ask your doctor if you can resume your normal diet. In most cases, you will be able to. Sedatives can slow your reaction time. Do not drive or use machinery for the rest of the day. Avoid alcohol for the rest of the day. Be sure to follow your doctor's instructions . How Long Will It Take? Usually about 10-15 minutes     Will It Hurt? Most people do not feel anything during the test and will not remember the test.  After the test, your throat may be sore and you may feel bloated. Results   This test gives your doctor information about the health of your digestive system.  The results can help to explain your symptoms. You and your doctor will talk about the results and your treatment plan. Call Your Doctor   After the test, call your doctor if any of the following occurs:   Signs of infection, including fever and chills   Severe abdominal pain   Hard, swollen abdomen   Difficulty swallowing or breathing   Any change or increase in your original symptoms   Bloody or black tarry colored stools   Nausea and/or vomiting   Cough, shortness of breath, or chest pain   Bleeding     In case of emergency, call 911. Patient Information and Instructions for Colonoscopy         Definition of Colonoscopy   A colonoscopy is the visual exam of the rectum and colon (large intestine). The exam is done with a tool called a colonoscope. The colonoscope is a flexible tube with a tiny camera on the end. This instrument allows the doctor to view the inside of your rectum and colon. Sigmoidoscopy is a shorter scope that views only the last one third of the colon. Reasons for Colonoscopy   It is used to examine, diagnose, and treat problems in your large intestine. The procedure is most often done for the following reasons: To determine the cause of abdominal pain, rectal bleeding, or a change in bowel habits   To detect and treat colon cancer or colon polyps   To obtain tissue samples for testing   To stop intestinal bleeding   Monitor response to treatment if you have inflammatory bowel disease     Possible Complications   Complications are rare, but no procedure is completely free of risk.  If you are planning to have a colonoscopy, your doctor will review a list of possible complications, which may include:   Bleeding   Reaction to the sedation causing drop in your blood pressure or problems breathing  Perforation or puncture of the bowel     Factors that may increase the risk of complications include:   Pre-existing heart or kidney condition   Treatment with certain medicines, including aspirin and other drugs with anticoagulant or blood-thinning properties   Prior abdominal surgery or radiation treatments   Active colitis , diverticulitis , or other acute bowel disease   Previous treatment with radiation therapy     Be sure to discuss these risks with your doctor before the procedure. What to Expect   Prior to Procedure   Your doctor will likely do the following:   Physical exam   Health history   Review of medicines   Test your stool for hidden blood (called \"occult blood\")     Your colon must be completely clean before the procedure. Any stool left in the intestine will block the view. This preparation may start several days before the procedure. Follow your doctor's instructions. Leading up to your procedure:   Talk to your doctor about your medicines. You may be asked to stop taking some medicines up to one week before the procedure, like:   Anti-inflammatory drugs (e.g., aspirin )   Blood thinners like clopidogrel (Plavix) or warfarin (Coumadin)   Iron supplements or vitamins containing iron   The day or days before your procedure, go on a clear liquid diet (clear broth, clear juice, clear jello) with no red coloring  Do not eat or drink anything after midnight. Wear comfortable clothing. If you have diabetes, ask your doctor if you need to adjust your diabetes medicine on the day prior to your procedure and the day of your procedure. Arrange for a ride home after the procedure. Anesthesia   You will receive intravenous sedation medicine for the procedure so you will not feel anything during the procedure. Description of the Procedure   You will lie on your left side with knees bent and drawn up toward your chest. The colonoscope will be slowly inserted through the rectum and into the bowel. The colonoscope will inject air into the colon. A small attached video camera will allow the doctor to view the colon's lining on a screen.  The doctor will continue guiding the tool through the bowel and assess the lining. A tissue sample or polyps may be removed during the procedure. How Long Will It Take? Usually it takes about 30 to 45 minutes     Will It Hurt? Most people do not feel anything during the procedure and will not remember the procedure. After the procedure, gas pains and cramping are common. These pains should go away with the passing of gas. Post-procedure Care   If any tissue was removed: It will be sent to a lab to be examined. It may take 1-2 weeks for results. The doctor will usually give an initial report after the scope is removed. Other tests may be recommended. A small amount of bleeding may occur during the first few days after the procedure. When you return home after the procedure, be sure to follow your doctor's instructions, which may include:   Resume medicines as instructed by your doctor. Resume normal diet, unless directed otherwise by your doctor. The sedative will make you drowsy. Avoid driving, operating machinery, or making important decisions for the rest of the day. Rest for the remainder of the day. After arriving home, contact your doctor if any of the following occurs:   Bleeding from your rectum, notify your doctor if you pass a teaspoonful of blood or more. Black, tarry stools   Severe abdominal pain   Hard, swollen abdomen   Signs of infection, including fever or chills   Inability to pass gas or stool   Coughing, shortness of breath, chest pain, severe nausea or vomiting     In case of an emergency, CALL 911 . GATORADE COLONOSCOPY PREPARATION     **No aspirin, aspirin by-products or plavix for 1 week prior to your colonscopy. No coumadin for 5 days or check with your physician who orders the coumadin. Please contact the physican that prescribed the asprin, plavix, and coumadin to see if this is acceptable. **    Purchase these over the counter laxatives:  1. GATORADE (64 ounces) of lemonade or other clear Gatorade (two 32 Oz. bottles)  2. DULCOLAX 5 mg tablets (four tablets)  3. MIRALAX BOTTLE 238 grams (over the counter only)    The DAY BEFORE your colonoscopy:   Drink only clear liquids. (Absolutely no solid food)    Examples of clear liquids: Water, clear fruit juices such as apple or white grape, chicken or beef bouillion, jello (no RED or PURPLE), clear Gatorade, popsicles (no RED or PURPLE), clear soft drinks, coffee without cream or sugar. NO MILK OR MILK PRODUCTS. NO ORANGE JUICE. NO RED OR PURPLE JELLO OR JUICES. 3 PM: Take 2 DULCOLAX tablets    5 PM: Mix the entire bottle of MIRALAX into the 64 ounces of GATORADE. (Put half the bottle in each 32 ounce bottle). Shake the solution until fully dissolved. Drink an 8 ounce glass every 30 minutes until the solution is gone. 7 PM: Take the last 2 DULCOLAX tablets. DO NOT EAT OR DRINK ANYTHING AFTER MIDNIGHT     The DAY OF your colonoscopy: You may take any necessary medications with a sip of water. Bring along someone to take you home. REMEMBER: The preparation is very important. An adequate clean out allows for the best evaluation of your entire colon. During the prep, using baby wipes may ease some of your discomfort. You should NOT plan on working or driving the rest of the day due to sedation given at the procedure. Please contact Alyssa Avelar MA at [phone] with any questions or concerns.

## 2021-08-05 NOTE — PROGRESS NOTES
[address] [facility] [place]  8/5/2021  [address] [facility]              History and Physical      Chief Complaint   Patient presents with    Consultation     New - Colonoscopy Consult - Ref by Dr Delroy Brown Colonoscopy     states last colonoscopy age 49 - denies nausea, vomiting, diarrhea, constipation         HISTORY OF PRESENT ILLNESS: Avtar Juarez is a  80 y.o.  male,  who was sent to my office for a consult for evaluation for a  screening colonoscopy. reports a History of a Previous colonoscopy. Colonoscopy showed normal 10 years ago. EGD on [address] [facility] for dilation was having dysphagia . He states food sometimes stuck still but not as bad. No family Hx colon cancer . Past Medical History:   Diagnosis Date    Cervical vertebra fusion 2000    Chronic kidney disease (CKD), stage III (moderate) ([facility] [address] 11/25/13    Foot injury     Hypertension     Stroke [facility])     patient had a stroke in the late 90's    Type II or unspecified type diabetes mellitus without mention of complication, not stated as uncontrolled     Ulcers, corneal     ulcers in Patients stomach      Past Surgical History:   Procedure Laterality Date    COLONOSCOPY      FRACTURE SURGERY      KIDNEY STONE SURGERY  01/23/2010    2019    ROTATOR CUFF REPAIR  1999    SPINE SURGERY      UPPER GASTROINTESTINAL ENDOSCOPY        Patient has no known allergies. Current Outpatient Medications   Medication Sig Dispense Refill    Alcohol Swabs (B-D SINGLE USE SWABS REGULAR) PADS USE 3 - 4 TIMES DAILY AS DIRECTED      hydrOXYzine (ATARAX) 10 MG tablet       insulin lispro, 1 Unit Dial, 100 UNIT/ML SOPN Inject 6 Units into the skin 3 times daily Plus  2 units for each 50 above 150 Blood sugar level result      gabapentin (NEURONTIN) 100 MG capsule Take 3 capsules by mouth 4 times daily.       B-D UF III MINI PEN NEEDLES 31G X 5 MM MISC 1 each by Does not apply route 4 times daily      FLUoxetine (PROZAC) 20 MG capsule Take 1 capsule by mouth nightly      tamsulosin (FLOMAX) 0.4 MG capsule Take 0.4 mg by mouth nightly      lisinopril (PRINIVIL;ZESTRIL) 40 MG tablet Take 1 tablet by mouth daily 90 tablet 3    pantoprazole (PROTONIX) 40 MG tablet Take 1 tablet by mouth daily 90 tablet 1    vitamin D-3 (CHOLECALCIFEROL) 125 MCG (5000 UT) TABS Take 1 tablet by mouth daily 90 tablet 3    hydroCHLOROthiazide (MICROZIDE) 12.5 MG capsule Take 1 capsule by mouth daily 90 capsule 1    dilTIAZem (DILACOR XR) 180 MG extended release capsule Take 2 capsules by mouth daily 90 capsule 3    hydrALAZINE (APRESOLINE) 10 MG tablet Take 1 tablet by mouth 3 times daily 90 tablet 3    aspirin EC 81 MG EC tablet Take 1 tablet by mouth daily 90 tablet 1    cyclobenzaprine (FLEXERIL) 5 MG tablet Take 1 tablet by mouth nightly as needed for Muscle spasms (Patient not taking: Reported on 8/5/2021) 30 tablet 0    insulin glargine (LANTUS) 100 UNIT/ML injection vial Inject 62 Units into the skin nightly        No current facility-administered medications for this visit. Social History     Tobacco Use    Smoking status: Never Smoker    Smokeless tobacco: Never Used   Substance Use Topics    Alcohol use: No        Review of Systems   Constitutional: Negative. HENT: Negative. Dysphagia    Respiratory: Negative. Cardiovascular: Negative. Gastrointestinal: Negative. Genitourinary: Negative. Neurological: Negative. All other systems reviewed and are negative. Physical Exam  HENT:      Head: Normocephalic. Eyes:      Extraocular Movements: Extraocular movements intact. Pupils: Pupils are equal, round, and reactive to light. Cardiovascular:      Rate and Rhythm: Normal rate. Pulmonary:      Effort: Pulmonary effort is normal. No respiratory distress. Abdominal:      General: Abdomen is flat. There is no distension. Tenderness: There is no abdominal tenderness.    Musculoskeletal:         General: Normal range of motion. Cervical back: Neck supple. Skin:     General: Skin is warm. Neurological:      General: No focal deficit present. Mental Status: He is alert and oriented to person, place, and time. Psychiatric:         Mood and Affect: Mood normal.         Assessment:   Dysphagia and  screening colonoscopy     Plan:   Diagnostic EGD with dilation     I have discussed the risks, benefits, and alternatives to esophagogastroduodenoscopy with possible biopsy with deep sedation with the patient. I have detailed the risks of deep sedation (hypotension, hypoxia) as well as complications of bleeding and perforation. The patient understands the above and agrees to proceed    Screening Colonoscopy    I discussed the options for colorectal cancer screening with the patient including options of fecal occult blood testing with flexible sigmoidoscopy or air contrast barium enema. Ialso discussed the risks and benefits of colonoscopy with possible biopsy/polypectomy/cauterization. I recommended colonoscopy with deep sedation. The patient understands the risks of bleeding and perforation (<1%). Theyunderstand that a perforation may not be recognized immediately and that it could entail a trip to the operating room to repair the injury. The patient understands the above and agrees to proceed.     Physician Signature: Bere Nation MD      Send copy of H&P to Keiko Kumar MD

## 2021-08-06 ENCOUNTER — TELEPHONE (OUTPATIENT)
Dept: SURGERY | Age: 71
End: 2021-08-06

## 2021-08-06 NOTE — TELEPHONE ENCOUNTER
Prior Authorization Form:      DEMOGRAPHICS:                     Patient Name:  Forrest Horvath  Patient :  1950            Insurance:  Payor: [employer] [facility] / Plan: [facility] / Product Type: *No Product type* /   Insurance ID Number:    Payor/Plan Subscr  Sex Relation Sub.  Ins. ID Effective Group Num   1. [facility]* Mariza Fitzgerald 1950 Male Self [id number]                                   [address]         DIAGNOSIS & PROCEDURE:                       Procedure/Operation: EGD + Colonoscopy           CPT Code: 62553, 77306    Diagnosis:  Dysphagia, Screening Colonoscopy    ICD10 Code: R13.10, Z12.11    [facility]  [facility]    Surgeon:  Dr Katarzyna Cordero INFORMATION:                          Date: 2021     Time: 1015              Anesthesia:  [facility]                                                       Status:  Outpatient        Special Comments:  NA       Electronically signed by Lori Belle on 2021 at 10:09 AM

## 2021-08-09 NOTE — PROGRESS NOTES
Duplicate note     Wilner Banks  8/5/2021  [address]              History and Physical             Chief Complaint   Patient presents with    Consultation       New - Colonoscopy Consult - Ref by Dr Helene Hardy Colonoscopy       states last colonoscopy age 49 - denies nausea, vomiting, diarrhea, constipation            HISTORY OF PRESENT ILLNESS: Jarrett Fine is a  80 y.o.  male,  who was sent to my office for a consult for evaluation for a  screening colonoscopy. reports a History of a Previous colonoscopy. Colonoscopy showed normal 10 years ago. EGD on [address] for dilation was having dysphagia . He states food sometimes stuck still but not as bad. No family Hx colon cancer .      Past Medical History        Past Medical History:   Diagnosis Date    Cervical vertebra fusion 2000    Chronic kidney disease (CKD), stage III (moderate) ([address] 11/25/13    Foot injury      Hypertension      Stroke [facility])       patient had a stroke in the late 90's    Type II or unspecified type diabetes mellitus without mention of complication, not stated as uncontrolled      Ulcers, corneal       ulcers in Patients stomach         Past Surgical History         Past Surgical History:   Procedure Laterality Date    COLONOSCOPY        FRACTURE SURGERY        KIDNEY STONE SURGERY   01/23/2010     2019    ROTATOR CUFF REPAIR   1999    SPINE SURGERY        UPPER GASTROINTESTINAL ENDOSCOPY             Patient has no known allergies.    Current Facility-Administered Medications          Current Outpatient Medications   Medication Sig Dispense Refill    Alcohol Swabs (B-D SINGLE USE SWABS REGULAR) PADS USE 3 - 4 TIMES DAILY AS DIRECTED        hydrOXYzine (ATARAX) 10 MG tablet          insulin lispro, 1 Unit Dial, 100 UNIT/ML SOPN Inject 6 Units into the skin 3 times daily Plus  2 units for each 50 above 150 Blood sugar level result        gabapentin (NEURONTIN) 100 MG capsule Take 3 capsules by mouth 4 times daily.        B-D UF III MINI PEN NEEDLES 31G X 5 MM MISC 1 each by Does not apply route 4 times daily        FLUoxetine (PROZAC) 20 MG capsule Take 1 capsule by mouth nightly        tamsulosin (FLOMAX) 0.4 MG capsule Take 0.4 mg by mouth nightly        lisinopril (PRINIVIL;ZESTRIL) 40 MG tablet Take 1 tablet by mouth daily 90 tablet 3    pantoprazole (PROTONIX) 40 MG tablet Take 1 tablet by mouth daily 90 tablet 1    vitamin D-3 (CHOLECALCIFEROL) 125 MCG (5000 UT) TABS Take 1 tablet by mouth daily 90 tablet 3    hydroCHLOROthiazide (MICROZIDE) 12.5 MG capsule Take 1 capsule by mouth daily 90 capsule 1    dilTIAZem (DILACOR XR) 180 MG extended release capsule Take 2 capsules by mouth daily 90 capsule 3    hydrALAZINE (APRESOLINE) 10 MG tablet Take 1 tablet by mouth 3 times daily 90 tablet 3    aspirin EC 81 MG EC tablet Take 1 tablet by mouth daily 90 tablet 1    cyclobenzaprine (FLEXERIL) 5 MG tablet Take 1 tablet by mouth nightly as needed for Muscle spasms (Patient not taking: Reported on 8/5/2021) 30 tablet 0    insulin glargine (LANTUS) 100 UNIT/ML injection vial Inject 62 Units into the skin nightly           No current facility-administered medications for this visit. Social History           Tobacco Use    Smoking status: Never Smoker    Smokeless tobacco: Never Used   Substance Use Topics    Alcohol use: No         Review of Systems   Constitutional: Negative. HENT: Negative. Dysphagia    Respiratory: Negative. Cardiovascular: Negative. Gastrointestinal: Negative. Genitourinary: Negative. Neurological: Negative. All other systems reviewed and are negative.           Physical Exam  HENT:      Head: Normocephalic. Eyes:      Extraocular Movements: Extraocular movements intact. Pupils: Pupils are equal, round, and reactive to light. Cardiovascular:      Rate and Rhythm: Normal rate.    Pulmonary:      Effort: Pulmonary

## 2021-08-10 ENCOUNTER — HOSPITAL ENCOUNTER (OUTPATIENT)
Dept: PHYSICAL THERAPY | Age: 71
Setting detail: THERAPIES SERIES
Discharge: HOME | End: 2021-08-10
Payer: MEDICAID

## 2021-08-10 PROCEDURE — 97110 THERAPEUTIC EXERCISES: CPT

## 2021-08-10 NOTE — PROGRESS NOTES
[address] [facility]                Phone: [phone]   Fax: [phone]    Physical Therapy Daily Treatment Note  Date:  8/10/2021    Patient Name:  Avery Boswell    :  1950  MRN: [id number]        Referring Physician:  Stephanie Stone Information:  Renata August VINH      Evaluation date:  21  Diagnosis:  Cervical Radiculopathy   PMH:  History of ACDF in  per pt   ICD-10 Codes:  M54.12   Evaluating Physical Therapist:  Mervin Tabor DPT    Visit:       [address] [facility] RE-ASSESSMENT FORM      Check of Management Strategies:     Compliance / Commitment  [] Excellent   [] Good   [x] Fair   [] Poor    Posture Correction: []Yes   [x] No    Performing Exercises:  [x]Yes   [] No    Frequency: [] Appropriate [x] Not appropriate                    2-3x a day per pt        Symptom Response during  exercises:  [] Increase   [x] Decrease   [] No  effect     Technique: [] Good  [x] Needs correcting    Comments:  Pt reports that his neck is feeling \"much better\" since last visit  He reports a significant improvement in his cervical rotation both to the L and R. Min movement loss today prior to session with R and L rotation and mod loss with extension.    Pt reports no change numbness/ tingling to R hand   He also reports that he was told that he has carpal tunnel syndrome in the R wrist/ hand in the past   Pt rates neck pain as 2/10 prior to session   Symptomatic Presentation:    Pain Location: [] Centralised     [x] Same    [] Peripheralized               Description:      Frequency: [x]Better    []Same   []Worse    Severity: 2/10         Functional Status:  % improvement since initial assessment: 75 %    Exercises:     Exercise   During/ After  Comments    Repeated cervical retractions  2x10  I, B  Improved ROM with retest of R and L cervical rotation as well as cervical ext     Posture correction    With towel roll for lumbar support HEP  Seated cervical retractions         Mechanical Presentation:    Sitting Posture: []Good   [x]Fair  []Poor                  Standing Posture:  []Good   [x]Fair  []Poor      Deformity: [] Yes    [x] No   [] Not applicable                  Neurological Testing:  [] Better    [x] Same   [] Worse    [] NA     Movement Loss: [x] Better    [] Same   [] Worse      Repeated Movements:   [x] Better    [] Same   [] Worse          SUMMARY: [x] Better    [] Same   [] Worse                    Classification Confirmed   [x]  Yes     [] No    Comments:  Pt educated on increasing frequency of HEP to every 2-3 hours for 3x10 daily  Pt also educated on proper sitting posture   He verbalized understanding both aspects of HEP     Continue to monitor cervical ROM and also numbness and tingling to R hand to see if it is centralizing with cervical retraction progression     Revised Classification (if appropriate):    [x] Derangement   [] Dysfunction   [] Posture           [] OTHER (subgroup)    Management Today:   [x] Posture      [x] HEP instruction     [x] Exercise       Plan for next session:    Progress forces as tolerated.   Add extension as tolerated next session     Barriers to Recovery:          CPT codes 8/10/2021 Units  Minutes   Low Complexity PT evaluation  25133     Moderate Complexity PT evaluation  14040     High Complexity PT evaluation [id number]     PT Re-evaluation  [id number]     Gait training [id number]     Manual therapy  31287     Therapeutic activities  35560     Therapeutic exercises  61670  2   Neuromuscular reeducation  [phone]                                                                                                                                                                       Time In:  4300  Time Out:  [address], PT, DPT  [id number]

## 2021-08-18 ENCOUNTER — HOSPITAL ENCOUNTER (OUTPATIENT)
Dept: PHYSICAL THERAPY | Age: 71
Setting detail: THERAPIES SERIES
Discharge: HOME | End: 2021-08-18
Payer: MEDICAID

## 2021-08-18 PROCEDURE — 97110 THERAPEUTIC EXERCISES: CPT

## 2021-08-18 PROCEDURE — 97140 MANUAL THERAPY 1/> REGIONS: CPT

## 2021-08-18 NOTE — PROGRESS NOTES
[address] [facility]                Phone: [phone]   Fax: [phone]    Physical Therapy Daily Treatment Note  Date:  2021    Patient Name:  Marian Landin    :  1950  MRN: [id number]        Referring Physician:  Stephanie Liu Information:  [facility] [facility]      Evaluation date:  21  Diagnosis:  Cervical Radiculopathy   PMH:  History of ACDF in  per pt   ICD-10 Codes:  M54.12   Evaluating Physical Therapist:  Liss Giang DPT    Visit: 3/4-8      [address] RE-ASSESSMENT FORM      Check of Management Strategies:     Compliance / Commitment  [] Excellent   [] Good   [x] Fair   [] Poor    Posture Correction: []Yes   [x] No    Performing Exercises:  [x]Yes   [] No    Frequency: [] Appropriate [x] Not appropriate                    2x a day per pt        Symptom Response during  exercises:  [] Increase   [x] Decrease   [] No  effect     Technique: [x] Good  [] Needs correcting    Comments:  Pt reports that his neck is feeling \"much better\" again since last visit  He states that her is \"80-85% better\" since beginning PT  Still having some RUE pain at times  Numbness to RUE remains the same per pt   Pt reports that he is down to sleeping with only 2 pillows under his head now      Symptomatic Presentation:    Pain Location: [] Centralised     [x] Same    [] Peripheralized               Description:      Frequency: [x]Better    []Same   []Worse    Severity: 2/10         Functional Status:  % improvement since initial assessment: 80-85 %    Exercises:     Exercise   During/ After  Comments    Repeated cervical retractions  2x10  I, B  Improved ROM with retest of R and L cervical rotation as well as cervical ext        - with PT overpressure  2x10  I, B         - with extension  2x10  I, NB  No change in ROM/ pain with addition of extension today     Posture correction                             HEP  Seated cervical retractions         Mechanical Presentation:    Sitting Posture: []Good   [x]Fair  []Poor   Needs verbal cues to get his buttock fully back in the seat and to sit tall vs. Slouch at times                     Standing Posture:  []Good   [x]Fair  []Poor      Deformity: [] Yes    [x] No   [] Not applicable                  Neurological Testing:  [] Better    [x] Same   [] Worse    [] NA     Movement Loss: [x] Better    [] Same   [] Worse      Repeated Movements:   [x] Better    [] Same   [] Worse          SUMMARY: [x] Better    [] Same   [] Worse                    Classification Confirmed   [x]  Yes     [] No    Comments:  Pt educated on increasing frequency of HEP to every 2-3 hours for 3x10 daily again during todays visit. He verbalized understanding on multiple occasions   Pt also educated on proper sitting posture   He verbalized understanding both aspects of HEP     Continue to monitor cervical ROM and also numbness and tingling to R hand to see if it is centralizing with cervical retraction progression     Revised Classification (if appropriate):    [x] Derangement   [] Dysfunction   [] Posture           [] OTHER (subgroup)    Management Today:   [x] Posture      [x] HEP instruction     [x] Exercise       Plan for next session:    Progress forces as tolerated.   Add extension as tolerated next session     Barriers to Recovery:      Compliance with frequency of HEP   Chronic nature of symptoms since cervical fusion back in 2000    CPT codes 8/18/2021 Units  Minutes   Low Complexity PT evaluation  30855     Moderate Complexity PT evaluation  97902     High Complexity PT evaluation 84879     PT Re-evaluation  91562     Gait training 13927     Manual therapy  63633 1    Therapeutic activities  34493     Therapeutic exercises  92179 1    Neuromuscular reeducation  86275 Time In:  0730  Time Out:  [address], PT, DPT  [id number]

## 2021-08-24 ENCOUNTER — OFFICE VISIT (OUTPATIENT)
Dept: FAMILY MEDICINE CLINIC | Age: 71
End: 2021-08-24
Payer: MEDICAID

## 2021-08-24 VITALS
WEIGHT: 270 LBS | DIASTOLIC BLOOD PRESSURE: 83 MMHG | BODY MASS INDEX: 34.65 KG/M2 | OXYGEN SATURATION: 99 % | HEIGHT: 74 IN | RESPIRATION RATE: 20 BRPM | SYSTOLIC BLOOD PRESSURE: 129 MMHG | TEMPERATURE: 97.2 F | HEART RATE: 81 BPM

## 2021-08-24 DIAGNOSIS — Z79.4 TYPE 2 DIABETES MELLITUS WITH DIABETIC CATARACT, WITH LONG-TERM CURRENT USE OF INSULIN (HCC): ICD-10-CM

## 2021-08-24 DIAGNOSIS — M54.12 CERVICAL RADICULOPATHY: Primary | ICD-10-CM

## 2021-08-24 DIAGNOSIS — E11.36 TYPE 2 DIABETES MELLITUS WITH DIABETIC CATARACT, WITH LONG-TERM CURRENT USE OF INSULIN (HCC): ICD-10-CM

## 2021-08-24 DIAGNOSIS — E55.9 VITAMIN D DEFICIENCY: ICD-10-CM

## 2021-08-24 DIAGNOSIS — I10 ESSENTIAL HYPERTENSION: ICD-10-CM

## 2021-08-24 PROCEDURE — 3044F HG A1C LEVEL LT 7.0%: CPT | Performed by: FAMILY MEDICINE

## 2021-08-24 PROCEDURE — G8417 CALC BMI ABV UP PARAM F/U: HCPCS | Performed by: FAMILY MEDICINE

## 2021-08-24 PROCEDURE — 1123F ACP DISCUSS/DSCN MKR DOCD: CPT | Performed by: FAMILY MEDICINE

## 2021-08-24 PROCEDURE — G8427 DOCREV CUR MEDS BY ELIG CLIN: HCPCS | Performed by: FAMILY MEDICINE

## 2021-08-24 PROCEDURE — 4040F PNEUMOC VAC/ADMIN/RCVD: CPT | Performed by: FAMILY MEDICINE

## 2021-08-24 PROCEDURE — 3017F COLORECTAL CA SCREEN DOC REV: CPT | Performed by: FAMILY MEDICINE

## 2021-08-24 PROCEDURE — 99212 OFFICE O/P EST SF 10 MIN: CPT | Performed by: FAMILY MEDICINE

## 2021-08-24 PROCEDURE — 36415 COLL VENOUS BLD VENIPUNCTURE: CPT | Performed by: FAMILY MEDICINE

## 2021-08-24 PROCEDURE — 99213 OFFICE O/P EST LOW 20 MIN: CPT | Performed by: FAMILY MEDICINE

## 2021-08-24 PROCEDURE — 2022F DILAT RTA XM EVC RTNOPTHY: CPT | Performed by: FAMILY MEDICINE

## 2021-08-24 PROCEDURE — 1036F TOBACCO NON-USER: CPT | Performed by: FAMILY MEDICINE

## 2021-08-24 RX ORDER — NEOMYCIN SULFATE, POLYMYXIN B SULFATE AND DEXAMETHASONE 3.5; 10000; 1 MG/ML; [USP'U]/ML; MG/ML
1 SUSPENSION/ DROPS OPHTHALMIC 3 TIMES DAILY
COMMUNITY
Start: 2021-08-03 | End: 2021-10-18 | Stop reason: ALTCHOICE

## 2021-08-24 RX ORDER — BLOOD PRESSURE TEST KIT
1 KIT MISCELLANEOUS DAILY
Qty: 1 KIT | Refills: 0 | Status: SHIPPED | OUTPATIENT
Start: 2021-08-24

## 2021-08-24 ASSESSMENT — ENCOUNTER SYMPTOMS
COUGH: 0
WHEEZING: 0
SHORTNESS OF BREATH: 0
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
## 2021-08-24 NOTE — PATIENT INSTRUCTIONS
Patient Education        Preventing Falls: Care Instructions  Your Care Instructions     Getting around your home safely can be a challenge if you have injuries or health problems that make it easy for you to fall. Loose rugs and furniture in walkways are among the dangers for many older people who have problems walking or who have poor eyesight. People who have conditions such as arthritis, osteoporosis, or dementia also have to be careful not to fall. You can make your home safer with a few simple measures. Follow-up care is a key part of your treatment and safety. Be sure to make and go to all appointments, and call your doctor if you are having problems. It's also a good idea to know your test results and keep a list of the medicines you take. How can you care for yourself at home? Taking care of yourself  · You may get dizzy if you do not drink enough water. To prevent dehydration, drink plenty of fluids. Choose water and other caffeine-free clear liquids. If you have kidney, heart, or liver disease and have to limit fluids, talk with your doctor before you increase the amount of fluids you drink. · Exercise regularly to improve your strength, muscle tone, and balance. Walk if you can. Swimming may be a good choice if you cannot walk easily. · Have your vision and hearing checked each year or any time you notice a change. If you have trouble seeing and hearing, you might not be able to avoid objects and could lose your balance. · Know the side effects of the medicines you take. Ask your doctor or pharmacist whether the medicines you take can affect your balance. Sleeping pills or sedatives can affect your balance. · Limit the amount of alcohol you drink. Alcohol can impair your balance and other senses. · Ask your doctor whether calluses or corns on your feet need to be removed. If you wear loose-fitting shoes because of calluses or corns, you can lose your balance and fall.   · Talk to your doctor if you have numbness in your feet. Preventing falls at home  · Remove raised doorway thresholds, throw rugs, and clutter. Repair loose carpet or raised areas in the floor. · Move furniture and electrical cords to keep them out of walking paths. · Use nonskid floor wax, and wipe up spills right away, especially on ceramic tile floors. · If you use a walker or cane, put rubber tips on it. If you use crutches, clean the bottoms of them regularly with an abrasive pad, such as steel wool. · Keep your house well lit, especially [facility], and outside walkways. Use night-lights in areas such as hallways and bathrooms. Add extra light switches or use remote switches (such as switches that go on or off when you clap your hands) to make it easier to turn lights on if you have to get up during the night. · Install sturdy handrails on stairways. · Move items in your cabinets so that the things you use a lot are on the lower shelves (about waist level). · Keep a cordless phone and a flashlight with new batteries by your bed. If possible, put a phone in each of the main rooms of your house, or carry a cell phone in case you fall and cannot reach a phone. Or, you can wear a device around your neck or wrist. You push a button that sends a signal for help. · Wear low-heeled shoes that fit well and give your feet good support. Use footwear with nonskid soles. Check the heels and soles of your shoes for wear. Repair or replace worn heels or soles. · Do not wear socks without shoes on wood floors. · Walk on the grass when the sidewalks are slippery. If you live in an area that gets snow and ice in the winter, sprinkle salt on slippery steps and sidewalks. Preventing falls in the bath  · Install grab bars and nonskid mats inside and outside your shower or tub and near the toilet and sinks. · Use shower chairs and bath benches. · Use a hand-held shower head that will allow you to sit while showering.   · Get into a tub or shower by putting the weaker leg in first. Get out of a tub or shower with your strong side first.  · Repair loose toilet seats and consider installing a raised toilet seat to make getting on and off the toilet easier. · Keep your bathroom door unlocked while you are in the shower. Where can you learn more? Go to https://[email or web address]."[email or web address]". org and sign in to your [employer] account. Enter [phone] in the [facility] box to learn more about \"Preventing Falls: Care Instructions. \"     If you do not have an account, please click on the \"Sign Up Now\" link. Current as of: December 7, 2020               Content Version: 12.9  © 3058-0872 [employer]. Care instructions adapted under license by [facility] ([facility]). If you have questions about a medical condition or this instruction, always ask your healthcare professional. [place] [address] any warranty or liability for your use of this information.

## 2021-08-25 ENCOUNTER — HOSPITAL ENCOUNTER (OUTPATIENT)
Dept: PHYSICAL THERAPY | Age: 71
Setting detail: THERAPIES SERIES
Discharge: HOME | End: 2021-08-25
Payer: MEDICAID

## 2021-08-25 PROCEDURE — 97110 THERAPEUTIC EXERCISES: CPT

## 2021-08-25 NOTE — PROGRESS NOTES
[facility]                Phone: [phone]   Fax: [phone]    Physical Therapy Daily Treatment Note  Date:  2021    Patient Name:  Michelle Valdes    :  1950  MRN: [id number]        Referring Physician:  Stephanie Roland Information:  Julien JUSTICE      Evaluation date:  21  Diagnosis:  Cervical Radiculopathy   PMH:  History of ACDF in  per pt   ICD-10 Codes:  M54.12   Evaluating Physical Therapist:  Cristo Little DPT    Visit:       [facility] RE-ASSESSMENT FORM      Check of Management Strategies:     Compliance / Commitment  [] Excellent   [] Good   [x] Fair   [] Poor    Posture Correction: []Yes   [x] No    Performing Exercises:  [x]Yes   [] No    Frequency: [] Appropriate [x] Not appropriate                    Pt reports that he hasn't performed HEP since         Symptom Response during  exercises:  [] Increase   [x] Decrease   [] No  effect     Technique: [x] Good  [] Needs correcting    Comments:  Pt reports that he woke up with increased low back and neck pain on   No know cause per pt   He states that he has not done his HEP cervical retractions since the pain started on    Pt rates pain as 5/10 today in cervical spine and RUE       Symptomatic Presentation:    Pain Location: [] Centralised     [x] Same    [] Peripheralized               Description:      Frequency: []Better    []Same   [x]Worse    Severity: 5/10         Functional Status:  % improvement since last visit: 0%  Pt reports that pain is worse over the last 3 days     Exercises:     Exercise   During/ After  Comments    Repeated cervical retractions  3x10  I, NW  Improved ROM with retest of R and L cervical rotation    NT    - with PT overpressure  2x10  I, B     NT    - with extension  2x10  I, NB  No change in ROM/ pain with addition of extension today     Posture correction                             HEP Seated cervical retractions         Mechanical Presentation:    Sitting Posture: []Good   [x]Fair  []Poor   Needs verbal cues to get his buttock fully back in the seat and to sit tall vs. Slouch at times                     Standing Posture:  []Good   [x]Fair  []Poor      Deformity: [] Yes    [x] No   [] Not applicable                  Neurological Testing:  [] Better    [x] Same   [] Worse    [] NA     Movement Loss: [x] Better    [] Same   [] Worse      Repeated Movements:   [x] Better    [] Same   [] Worse          SUMMARY: [x] Better    [] Same   [] Worse                    Classification Confirmed   [x]  Yes     [] No    Comments:  Pt educated on increasing frequency of HEP to every 2-3 hours for 3x10 daily again during todays visit.   He verbalized understanding   He has been non-compliant with HEP since Sunday   He reports that he has to call today to schedule CT scan and he will call me back and let me know when that gets scheduled   Pt instructed to continue HEP as instructed previously because he had no increase in cervical and RUE pain when performing today and he was getting good relief of symptoms with HEP as of last visit  Pt instructed to discontinue HEP is RUE pain and/ or numbness increase and he verbalized understanding   Will place pt on hold from PT until cervical CT scan completed to see if there have been any changes in his neck causing increased pain over the last 3 days     Of note, pt reports that he got his Flu shot yesterday on 8/24/21 in L shoulder     Revised Classification (if appropriate):    [x] Derangement   [] Dysfunction   [] Posture           [] OTHER (subgroup)    Management Today:   [x] Posture      [x] HEP instruction     [x] Exercise       Plan for next session:    Hold PT till pt gets CT scan of cervical spine     Barriers to Recovery:      Compliance with frequency of HEP   Chronic nature of symptoms since cervical fusion back in 2000    CPT codes 8/25/2021 Units  Minutes Low Complexity PT evaluation  98197     Moderate Complexity PT evaluation  [id number]     High Complexity PT evaluation [id number]     PT Re-evaluation  [id number]     Gait training [id number]     Manual therapy  38055     Therapeutic activities  06592     Therapeutic exercises  81848 2    Neuromuscular reeducation  [id number]                                                                                                                                                                       Time In:  0730  Time Out:  [address], PT, DPT  [id number]

## 2021-08-26 ENCOUNTER — TELEPHONE (OUTPATIENT)
Dept: FAMILY MEDICINE CLINIC | Age: 71
End: 2021-08-26

## 2021-08-27 ENCOUNTER — HOSPITAL ENCOUNTER (OUTPATIENT)
Dept: CT IMAGING | Age: 71
Discharge: HOME | End: 2021-08-29
Payer: MEDICAID

## 2021-08-27 DIAGNOSIS — M54.12 CERVICAL RADICULOPATHY: ICD-10-CM

## 2021-08-27 PROCEDURE — 72125 CT NECK SPINE W/O DYE: CPT

## 2021-09-08 ENCOUNTER — TELEPHONE (OUTPATIENT)
Dept: SURGERY | Age: 71
End: 2021-09-08

## 2021-09-08 NOTE — PROGRESS NOTES
PATIENT STATES HE WILL TRY TO FIND A PERSON TO RIDE HOME WITH HIM AFTER HIS PROCEDURE. I TOLD HIM THAT THIS IS A NECESSITY. UBALDO WAS INSTRUCTED TO NOTIFY DR Morenita Bolaños OFFICE REGARDING THIS, AND I TRANSFERRED HIM TO OFFICE.

## 2021-09-08 NOTE — TELEPHONE ENCOUNTER
MA received a call from pt asking if Dr. Miguelito Beaulieu could call in the Miralax and Dulcolax for his colonoscopy prep, he said he could not afford it. His colonoscopy is scheduled for 9/13/21. Pt can be reached at [phone]. Routing message to Dr. Miguelito Beaulieu for further advisement and Bri Ruiz for informational purposes.

## 2021-09-08 NOTE — PROGRESS NOTES
[facility] PRE-ADMISSION TESTING ENDOSCOPY INSTRUCTIONS- [facility]-phone number:[phone]    ENDOSCOPY INSTRUCTIONS:   [x] Bowel prep instructions reviewed. [x] Nothing by mouth after midnight, including gum, candy, mints, or water. Please follow your surgeons instructions if you are required to complete a bowel prep. Colonoscopy- no solid food-only clear liquids the day prior). [x] You may brush your teeth, gargle, but do NOT swallow water. [x] Do not wear makeup, lotions, powders, deodorant. Nail polish as directed by the nurse. [x] Arrange transportation with a responsible adult  to and from the hospital. If you do not have a responsible adult  to transport you, you will need to make arrangements with a medical transportation company (i.e. [employer]. A Uber/taxi/bus is not appropriate unless you are accompanied by a responsible adult ). Arrange for someone to be with you for the remainder of the day and for 24 hours after your procedure due to having had anesthesia. Who will be your  for transportation?____WILL FIND______________   Who will be staying with you for 24 hrs after your procedure?__________________    PARKING INSTRUCTIONS:   [x] Arrival Time:_____0915___________________  · [x] Parking lot  \"I\" OR 1 is located on [facility] (the corner of [facility]). To enter, press the button and the gate will lift. A free token will be provided to exit the lot. One car per patient is allowed to park in this lot. All other cars are to park on [address] [place] [address] either in the parking garage or the handicap lot. [] To reach the [facility] lobby from [address] [place] [address], upon entering the hospital, take elevator B to the 3rd floor. EDUCATION INSTRUCTIONS:  [x] Bring a complete list of your medications, please write the last time you took the medicine, give this list to the nurse.   [x] Take the following medications the morning of surgery with 1-2 ounces of water: SEE LIST  [x] Stop herbal supplements and vitamins 5 days before your surgery. [x] DO NOT take any diabetic medicine the morning of surgery. Follow instructions for insulin the day before surgery. [x] If you are diabetic and your blood sugar is low or you feel symptomatic, you may drink 1-2 ounces of apple juice or take a glucose tablet. The morning of your procedure, you may call the pre-op area if you have concerns about your blood sugar [phone]. [] Use your inhalers the morning of surgery. Bring your emergency inhaler with you day of surgery. [x] Follow physician instructions regarding any blood thinners you may be taking. WHAT TO EXPECT:  [x] The day of your procedure you will be greeted and checked in by the [employer].  In addition, you will be registered in the [place] by a Patient Access Representative. Please bring your photo ID and insurance card. A nurse will greet you in accordance to the time you are needed in the pre-op area to prepare you for surgery. Please do not be discouraged if you are not greeted in the order you arrive as there are many variables that are involved in patient preparation. Your patience is greatly appreciated as you wait for your nurse. Please bring in items such as: books, magazines, newspapers, electronics, or any other items  to occupy your time in the waiting area. [x]  Delays may occur. Staff will make a sincere effort to keep you informed of delays. If any delays occur with your procedure, we apologize ahead of time for your inconvenience as we recognize the value of your time.

## 2021-09-10 NOTE — TELEPHONE ENCOUNTER
MA received phone call from Dr Philip Purdy giving okay to call pharmacy and place verbal order for colon prep. MA contacted pharmacy to place order. MA contacted patient to inform order has been placed for prescriptions.     Electronically signed by Carl Zambrano on 9/10/21 at 2:47 PM EDT

## 2021-09-11 ENCOUNTER — ANESTHESIA EVENT (OUTPATIENT)
Dept: ENDOSCOPY | Age: 71
End: 2021-09-11
Payer: MEDICAID

## 2021-09-13 ENCOUNTER — HOSPITAL ENCOUNTER (OUTPATIENT)
Age: 71
Setting detail: OUTPATIENT SURGERY
Discharge: HOME | End: 2021-09-13
Attending: SURGERY | Admitting: SURGERY
Payer: MEDICAID

## 2021-09-13 ENCOUNTER — ANESTHESIA (OUTPATIENT)
Dept: ENDOSCOPY | Age: 71
End: 2021-09-13
Payer: MEDICAID

## 2021-09-13 VITALS — DIASTOLIC BLOOD PRESSURE: 62 MMHG | OXYGEN SATURATION: 92 % | SYSTOLIC BLOOD PRESSURE: 109 MMHG

## 2021-09-13 VITALS
BODY MASS INDEX: 34.65 KG/M2 | SYSTOLIC BLOOD PRESSURE: 136 MMHG | HEART RATE: 66 BPM | RESPIRATION RATE: 20 BRPM | DIASTOLIC BLOOD PRESSURE: 74 MMHG | TEMPERATURE: 97 F | HEIGHT: 74 IN | OXYGEN SATURATION: 98 % | WEIGHT: 270 LBS

## 2021-09-13 DIAGNOSIS — Z01.812 PRE-OPERATIVE LABORATORY EXAMINATION: Primary | ICD-10-CM

## 2021-09-13 LAB — METER GLUCOSE: 82 MG/DL (ref 74–99)

## 2021-09-13 PROCEDURE — 88342 IMHCHEM/IMCYTCHM 1ST ANTB: CPT

## 2021-09-13 PROCEDURE — 43251 EGD REMOVE LESION SNARE: CPT | Performed by: SURGERY

## 2021-09-13 PROCEDURE — 3609010600 HC COLONOSCOPY POLYPECTOMY SNARE/COLD BIOPSY: Performed by: SURGERY

## 2021-09-13 PROCEDURE — 45385 COLONOSCOPY W/LESION REMOVAL: CPT | Performed by: SURGERY

## 2021-09-13 PROCEDURE — 3609013500 HC EGD REMOVAL TUMOR POLYP/OTHER LESION SNARE TECH: Performed by: SURGERY

## 2021-09-13 PROCEDURE — 2709999900 HC NON-CHARGEABLE SUPPLY: Performed by: SURGERY

## 2021-09-13 PROCEDURE — 3609012400 HC EGD TRANSORAL BIOPSY SINGLE/MULTIPLE: Performed by: SURGERY

## 2021-09-13 PROCEDURE — 43239 EGD BIOPSY SINGLE/MULTIPLE: CPT | Performed by: SURGERY

## 2021-09-13 PROCEDURE — 88305 TISSUE EXAM BY PATHOLOGIST: CPT

## 2021-09-13 PROCEDURE — 3700000001 HC ADD 15 MINUTES (ANESTHESIA): Performed by: SURGERY

## 2021-09-13 PROCEDURE — 2580000003 HC RX 258: Performed by: NURSE ANESTHETIST, CERTIFIED REGISTERED

## 2021-09-13 PROCEDURE — 3700000000 HC ANESTHESIA ATTENDED CARE: Performed by: SURGERY

## 2021-09-13 PROCEDURE — 7100000011 HC PHASE II RECOVERY - ADDTL 15 MIN: Performed by: SURGERY

## 2021-09-13 PROCEDURE — 7100000010 HC PHASE II RECOVERY - FIRST 15 MIN: Performed by: SURGERY

## 2021-09-13 PROCEDURE — 82962 GLUCOSE BLOOD TEST: CPT

## 2021-09-13 PROCEDURE — 6360000002 HC RX W HCPCS: Performed by: NURSE ANESTHETIST, CERTIFIED REGISTERED

## 2021-09-13 RX ORDER — PROMETHAZINE HYDROCHLORIDE 25 MG/ML
6.25 INJECTION, SOLUTION INTRAMUSCULAR; INTRAVENOUS
Status: DISCONTINUED | OUTPATIENT
Start: 2021-09-13 | End: 2021-09-13 | Stop reason: HOSPADM

## 2021-09-13 RX ORDER — HYDRALAZINE HYDROCHLORIDE 20 MG/ML
5 INJECTION INTRAMUSCULAR; INTRAVENOUS EVERY 10 MIN PRN
Status: DISCONTINUED | OUTPATIENT
Start: 2021-09-13 | End: 2021-09-13 | Stop reason: HOSPADM

## 2021-09-13 RX ORDER — SODIUM CHLORIDE 0.9 % (FLUSH) 0.9 %
10 SYRINGE (ML) INJECTION EVERY 12 HOURS SCHEDULED
Status: DISCONTINUED | OUTPATIENT
Start: 2021-09-13 | End: 2021-09-13 | Stop reason: HOSPADM

## 2021-09-13 RX ORDER — SODIUM CHLORIDE 9 MG/ML
25 INJECTION, SOLUTION INTRAVENOUS PRN
Status: DISCONTINUED | OUTPATIENT
Start: 2021-09-13 | End: 2021-09-13 | Stop reason: HOSPADM

## 2021-09-13 RX ORDER — LABETALOL HYDROCHLORIDE 5 MG/ML
5 INJECTION, SOLUTION INTRAVENOUS EVERY 10 MIN PRN
Status: DISCONTINUED | OUTPATIENT
Start: 2021-09-13 | End: 2021-09-13 | Stop reason: HOSPADM

## 2021-09-13 RX ORDER — PROPOFOL 10 MG/ML
INJECTION, EMULSION INTRAVENOUS PRN
Status: DISCONTINUED | OUTPATIENT
Start: 2021-09-13 | End: 2021-09-13 | Stop reason: SDUPTHER

## 2021-09-13 RX ORDER — OXYCODONE HYDROCHLORIDE AND ACETAMINOPHEN 5; 325 MG/1; MG/1
1 TABLET ORAL
Status: DISCONTINUED | OUTPATIENT
Start: 2021-09-13 | End: 2021-09-13 | Stop reason: HOSPADM

## 2021-09-13 RX ORDER — SODIUM CHLORIDE 9 MG/ML
INJECTION, SOLUTION INTRAVENOUS CONTINUOUS PRN
Status: DISCONTINUED | OUTPATIENT
Start: 2021-09-13 | End: 2021-09-13 | Stop reason: SDUPTHER

## 2021-09-13 RX ORDER — SODIUM CHLORIDE 0.9 % (FLUSH) 0.9 %
10 SYRINGE (ML) INJECTION PRN
Status: DISCONTINUED | OUTPATIENT
Start: 2021-09-13 | End: 2021-09-13 | Stop reason: HOSPADM

## 2021-09-13 RX ORDER — SODIUM CHLORIDE 9 MG/ML
INJECTION, SOLUTION INTRAVENOUS CONTINUOUS
Status: DISCONTINUED | OUTPATIENT
Start: 2021-09-13 | End: 2021-09-13 | Stop reason: HOSPADM

## 2021-09-13 RX ORDER — MEPERIDINE HYDROCHLORIDE 25 MG/ML
12.5 INJECTION INTRAMUSCULAR; INTRAVENOUS; SUBCUTANEOUS EVERY 5 MIN PRN
Status: DISCONTINUED | OUTPATIENT
Start: 2021-09-13 | End: 2021-09-13 | Stop reason: HOSPADM

## 2021-09-13 RX ADMIN — PROPOFOL 750 MG: 10 INJECTION, EMULSION INTRAVENOUS at 10:07

## 2021-09-13 RX ADMIN — SODIUM CHLORIDE: 9 INJECTION, SOLUTION INTRAVENOUS at 10:06

## 2021-09-13 ASSESSMENT — PAIN SCALES - GENERAL
PAINLEVEL_OUTOF10: 0
PAINLEVEL_OUTOF10: 0

## 2021-09-13 NOTE — H&P
[facility]  9/13/2021    [address] [facility]              History and Physical             Chief Complaint   Patient presents with    Consultation       New - Colonoscopy Consult - Ref by Dr Alcantara Siemens states last colonoscopy age 49 - denies nausea, vomiting, diarrhea, constipation            HISTORY OF PRESENT ILLNESS: Avtar Strickland [place] Larry is a  80 y.o.  male,  who was sent to my office for a consult for evaluation for a  screening colonoscopy. reports a History of a Previous colonoscopy. Colonoscopy showed normal 10 years ago. EGD on [address] for dilation was having dysphagia . He states food sometimes stuck still but not as bad. No family Hx colon cancer .      No new issues. All questions answered     Past Medical History        Past Medical History:   Diagnosis Date    Cervical vertebra fusion 2000    Chronic kidney disease (CKD), stage III (moderate) ([facility] [address] 11/25/13    Foot injury      Hypertension      Stroke [facility])       patient had a stroke in the late 90's    Type II or unspecified type diabetes mellitus without mention of complication, not stated as uncontrolled      Ulcers, corneal       ulcers in Patients stomach         Past Surgical History         Past Surgical History:   Procedure Laterality Date    COLONOSCOPY        FRACTURE SURGERY        KIDNEY STONE SURGERY   01/23/2010     2019    ROTATOR CUFF REPAIR   1999    SPINE SURGERY        UPPER GASTROINTESTINAL ENDOSCOPY             Patient has no known allergies.    Current Facility-Administered Medications          Current Outpatient Medications   Medication Sig Dispense Refill    Alcohol Swabs (B-D SINGLE USE SWABS REGULAR) PADS USE 3 - 4 TIMES DAILY AS DIRECTED        hydrOXYzine (ATARAX) 10 MG tablet          insulin lispro, 1 Unit Dial, 100 UNIT/ML SOPN Inject 6 Units into the skin 3 times daily Plus  2 units for each 50 above 150 Blood sugar level result        gabapentin (NEURONTIN) 100 MG capsule Take 3 capsules by mouth 4 times daily.        B-D UF III MINI PEN NEEDLES 31G X 5 MM MISC 1 each by Does not apply route 4 times daily        FLUoxetine (PROZAC) 20 MG capsule Take 1 capsule by mouth nightly        tamsulosin (FLOMAX) 0.4 MG capsule Take 0.4 mg by mouth nightly        lisinopril (PRINIVIL;ZESTRIL) 40 MG tablet Take 1 tablet by mouth daily 90 tablet 3    pantoprazole (PROTONIX) 40 MG tablet Take 1 tablet by mouth daily 90 tablet 1    vitamin D-3 (CHOLECALCIFEROL) 125 MCG (5000 UT) TABS Take 1 tablet by mouth daily 90 tablet 3    hydroCHLOROthiazide (MICROZIDE) 12.5 MG capsule Take 1 capsule by mouth daily 90 capsule 1    dilTIAZem (DILACOR XR) 180 MG extended release capsule Take 2 capsules by mouth daily 90 capsule 3    hydrALAZINE (APRESOLINE) 10 MG tablet Take 1 tablet by mouth 3 times daily 90 tablet 3    aspirin EC 81 MG EC tablet Take 1 tablet by mouth daily 90 tablet 1    cyclobenzaprine (FLEXERIL) 5 MG tablet Take 1 tablet by mouth nightly as needed for Muscle spasms (Patient not taking: Reported on 8/5/2021) 30 tablet 0    insulin glargine (LANTUS) 100 UNIT/ML injection vial Inject 62 Units into the skin nightly           No current facility-administered medications for this visit. Social History           Tobacco Use    Smoking status: Never Smoker    Smokeless tobacco: Never Used   Substance Use Topics    Alcohol use: No         Review of Systems   Constitutional: Negative. HENT: Negative. Dysphagia    Respiratory: Negative. Cardiovascular: Negative. Gastrointestinal: Negative. Genitourinary: Negative. Neurological: Negative. All other systems reviewed and are negative.           Physical Exam  HENT:      Head: Normocephalic. Eyes:      Extraocular Movements: Extraocular movements intact. Pupils: Pupils are equal, round, and reactive to light. Cardiovascular:      Rate and Rhythm: Normal rate. Pulmonary:      Effort: Pulmonary effort is normal. No respiratory distress. Abdominal:      General: Abdomen is flat. There is no distension. Tenderness: There is no abdominal tenderness. Musculoskeletal:         General: Normal range of motion. Cervical back: Neck supple. Skin:     General: Skin is warm. Neurological:      General: No focal deficit present. Mental Status: He is alert and oriented to person, place, and time. Psychiatric:         Mood and Affect: Mood normal.            Assessment:   Dysphagia and  screening colonoscopy      Plan:   Diagnostic EGD with dilation      I have discussed the risks, benefits, and alternatives to esophagogastroduodenoscopy with possible biopsy with deep sedation with the patient. I have detailed the risks of deep sedation (hypotension, hypoxia) as well as complications of bleeding and perforation. The patient understands the above and agrees to proceed     Screening Colonoscopy     I discussed the options for colorectal cancer screening with the patient including options of fecal occult blood testing with flexible sigmoidoscopy or air contrast barium enema. Ialso discussed the risks and benefits of colonoscopy with possible biopsy/polypectomy/cauterization. I recommended colonoscopy with deep sedation. The patient understands the risks of bleeding and perforation (<1%). Theyunderstand that a perforation may not be recognized immediately and that it could entail a trip to the operating room to repair the injury.  The patient understands the above and agrees to proceed.     Physician Signature: Dave Brennan MD        Send copy of H&P to Soraida Alexander MD

## 2021-09-13 NOTE — PROGRESS NOTES
Patient kept eyeglasses and case with him  Patient bag of belongings with shoes placed in pre op clean utility.  Lui Lopez RN

## 2021-09-13 NOTE — PROGRESS NOTES
Patient not willing to stay any longer waiting for his ride to come. He has been here over 3 hours waiting for a ride through [employer]. He requested papers to sign himself out. AMA papers signed and patient released.

## 2021-09-13 NOTE — ANESTHESIA POSTPROCEDURE EVALUATION
Department of Anesthesiology  Postprocedure Note    Patient: Paula Gann  MRN: [id number]  YOB: 1950  Date of evaluation: 9/13/2021  Time:  11:24 AM     Procedure Summary     Date: 09/13/21 Room / Location: [address] [place] [address] / [facility]    Anesthesia Start: 1006 Anesthesia Stop: 1100    Procedures:       EGD BIOPSY (N/A )      COLONOSCOPY POLYPECTOMY SNARE/COLD BIOPSY (N/A )      EGD POLYP SNARE (N/A ) Diagnosis: (Christy Thakkar)    Surgeons: Luis Chao MD Responsible Provider: Jose Welch MD    Anesthesia Type: general, MAC ASA Status: 3          Anesthesia Type: general, MAC    Nael Phase I: Nael Score: 10    Nael Phase II:      Last vitals: Reviewed and per EMR flowsheets.        Anesthesia Post Evaluation    Patient location during evaluation: PACU  Patient participation: complete - patient participated  Level of consciousness: awake and alert  Airway patency: patent  Nausea & Vomiting: no nausea and no vomiting  Complications: no  Cardiovascular status: hemodynamically stable  Respiratory status: acceptable  Hydration status: euvolemic

## 2021-09-13 NOTE — ANESTHESIA PRE PROCEDURE
Department of Anesthesiology  Preprocedure Note       Name:  Flavia Sales   Age:  80 y.o.  :  1950                                          MRN:  [id number]         Date:  2021      Surgeon: Lesli Chiu):  Fred Quintero MD    Procedure: Procedure(s):  EGD ESOPHAGOGASTRODUODENOSCOPY  COLONOSCOPY DIAGNOSTIC    Medications prior to admission:   Prior to Admission medications    Medication Sig Start Date End Date Taking? Authorizing Provider   neomycin-polymyxin-dexameth (MAXITROL) 3.5-57100-4.1 ophthalmic suspension Place 1 drop into both eyes 3 times daily 8/3/21   Historical Provider, MD   vitamin D-3 (CHOLECALCIFEROL) 125 MCG (5000 UT) TABS Take 1 tablet by mouth daily 21   Keith Ricci MD   Blood Pressure KIT 1 kit by Does not apply route daily 21   Keith Ricci MD   Alcohol Swabs (B-EDUARDO SINGLE USE SWABS REGULAR) PADS USE 3 - 4 TIMES DAILY AS DIRECTED 21   Historical Provider, MD   hydrOXYzine (ATARAX) 10 MG tablet Take 10 mg by mouth daily  21   Historical Provider, MD   insulin lispro, 1 Unit Dial, 100 UNIT/ML SOPN Inject 6 Units into the skin 3 times daily Plus  2 units for each 50 above 150 Blood sugar level result    Historical Provider, MD   gabapentin (NEURONTIN) 100 MG capsule Take 3 capsules by mouth 4 times daily.  21   Historical Provider, MD DEL VALLE UF III MINI PEN NEEDLES 31G X 5 MM MISC 1 each by Does not apply route 4 times daily 3/15/21   Historical Provider, MD   FLUoxetine (PROZAC) 20 MG capsule Take 1 capsule by mouth nightly 21   Historical Provider, MD   tamsulosin (FLOMAX) 0.4 MG capsule Take 0.4 mg by mouth nightly    Historical Provider, MD   lisinopril (PRINIVIL;ZESTRIL) 40 MG tablet Take 1 tablet by mouth daily  Patient taking differently: Take 30 mg by mouth daily  21   Keith Ricci MD   pantoprazole (PROTONIX) 40 MG tablet Take 1 tablet by mouth daily 21   Keith Ricci MD   dilTIAZem (DILACOR XR) 180 MG extended release capsule Take 2 capsules by mouth daily 6/8/21   Gerber Khan MD   hydrALAZINE (APRESOLINE) 10 MG tablet Take 1 tablet by mouth 3 times daily 6/8/21   Gerber Khan MD   aspirin EC 81 MG EC tablet Take 1 tablet by mouth daily 6/8/21   Gerber Khan MD   insulin glargine (LANTUS) 100 UNIT/ML injection vial Inject 62 Units into the skin nightly     Historical Provider, MD       Current medications:    Current Facility-Administered Medications   Medication Dose Route Frequency Provider Last Rate Last Admin    0.9 % sodium chloride infusion   IntraVENous Continuous Bere Nation MD        sodium chloride flush 0.9 % injection 10 mL  10 mL IntraVENous 2 times per day Bere Nation MD        sodium chloride flush 0.9 % injection 10 mL  10 mL IntraVENous PRN Bere Nation MD        0.9 % sodium chloride infusion  25 mL IntraVENous PRN Bere Nation MD           Allergies:  No Known Allergies    Problem List:    Patient Active Problem List   Diagnosis Code    Diabetes mellitus ([facility] [address] E11.9    HTN (hypertension) I10       Past Medical History:        Diagnosis Date    Cervical vertebra fusion 2000    Chronic kidney disease (CKD), stage III (moderate) ([facility] [address] 11/25/13    Foot injury     Hypertension     Stroke [facility])     patient had a stroke in the late 90's    Type II or unspecified type diabetes mellitus without mention of complication, not stated as uncontrolled     Ulcers, corneal     ulcers in Patients stomach       Past Surgical History:        Procedure Laterality Date    COLONOSCOPY      FRACTURE SURGERY      KIDNEY STONE SURGERY  01/23/2010 2019    ROTATOR CUFF REPAIR  1999    SPINE SURGERY      UPPER GASTROINTESTINAL ENDOSCOPY         Social History:    Social History     Tobacco Use    Smoking status: Never Smoker    Smokeless tobacco: Never Used   Substance Use Topics    Alcohol use:  No Counseling given: Not Answered      Vital Signs (Current):   Vitals:    09/08/21 1022 09/13/21 0930   BP:  139/75   Pulse:  86   Resp:  18   Temp:  36.7 °C (98 °F)   SpO2:  95%   Weight: 270 lb (122.5 kg) 270 lb (122.5 kg)   Height: 6' 2\" (1.88 m) 6' 2\" (1.88 m)                                              BP Readings from Last 3 Encounters:   09/13/21 139/75   08/24/21 129/83   08/05/21 (!) 154/72       NPO Status:                                                                                 BMI:   Wt Readings from Last 3 Encounters:   09/13/21 270 lb (122.5 kg)   08/24/21 270 lb (122.5 kg)   08/05/21 264 lb (119.7 kg)     Body mass index is 34.67 kg/m². CBC:   Lab Results   Component Value Date    WBC 5.1 06/14/2021    RBC 4.87 06/14/2021    HGB 13.5 06/14/2021    HCT 41.3 06/14/2021    MCV 84.8 06/14/2021    RDW 13.7 06/14/2021     06/14/2021       CMP:   Lab Results   Component Value Date     08/24/2021    K 3.9 08/24/2021     08/24/2021    CO2 24 08/24/2021    BUN 22 08/24/2021    CREATININE 2.2 08/24/2021    GFRAA 36 08/24/2021    LABGLOM 36 08/24/2021    GLUCOSE 86 08/24/2021    GLUCOSE 132 02/19/2012    PROT 8.1 06/14/2021    CALCIUM 9.2 08/24/2021    BILITOT 0.3 06/14/2021    ALKPHOS 103 06/14/2021    AST 35 06/14/2021    ALT 27 06/14/2021       POC Tests: No results for input(s): POCGLU, POCNA, POCK, POCCL, POCBUN, POCHEMO, POCHCT in the last 72 hours.     Coags:   Lab Results   Component Value Date    PROTIME 11.7 09/30/2017    INR 1.1 09/30/2017    APTT 30.3 09/30/2017       HCG (If Applicable): No results found for: PREGTESTUR, PREGSERUM, HCG, HCGQUANT     ABGs: No results found for: PHART, PO2ART, TNM6QNM, RIM8UXH, BEART, Q7QFXPAN     Type & Screen (If Applicable):  No results found for: LABABO, LABRH    Drug/Infectious Status (If Applicable):  No results found for: HIV, HEPCAB    COVID-19 Screening (If Applicable): No results found for: COVID19        Anesthesia Evaluation  Patient summary reviewed and Nursing notes reviewed no history of anesthetic complications:   Airway: Mallampati: III  TM distance: >3 FB   Neck ROM: full  Mouth opening: > = 3 FB Dental:          Pulmonary:Negative Pulmonary ROS breath sounds clear to auscultation                             Cardiovascular:    (+) hypertension: no interval change,         Rhythm: regular  Rate: normal                    Neuro/Psych:   (+) CVA (no residuals, 20 years ago):,             GI/Hepatic/Renal:   (+) GERD: well controlled,           Endo/Other:    (+) Diabetesno interval change, , .                 Abdominal:             Vascular: negative vascular ROS. Other Findings:           Anesthesia Plan      general and MAC     ASA 3       Induction: intravenous. Anesthetic plan and risks discussed with patient. Use of blood products discussed with patient whom consented to blood products.                    JACQUES Bautista - CRNA   9/13/2021

## 2021-09-20 ENCOUNTER — HOSPITAL ENCOUNTER (OUTPATIENT)
Age: 71
Discharge: HOME | End: 2021-09-20
Payer: MEDICAID

## 2021-09-20 ENCOUNTER — TELEPHONE (OUTPATIENT)
Dept: SURGERY | Age: 71
End: 2021-09-20

## 2021-09-20 LAB
ALBUMIN SERPL-MCNC: 3.9 G/DL (ref 3.5–5.2)
ALP BLD-CCNC: 104 U/L (ref 40–129)
ALT SERPL-CCNC: 17 U/L (ref 0–40)
ANION GAP SERPL CALCULATED.3IONS-SCNC: 11 MMOL/L (ref 7–16)
AST SERPL-CCNC: 25 U/L (ref 0–39)
BASOPHILS ABSOLUTE: 0.04 E9/L (ref 0–0.2)
BASOPHILS RELATIVE PERCENT: 0.8 % (ref 0–2)
BILIRUB SERPL-MCNC: 0.4 MG/DL (ref 0–1.2)
BUN BLDV-MCNC: 18 MG/DL (ref 6–23)
CALCIUM SERPL-MCNC: 9.1 MG/DL (ref 8.6–10.2)
CHLORIDE BLD-SCNC: 105 MMOL/L (ref 98–107)
CHOLESTEROL, TOTAL: 148 MG/DL (ref 0–199)
CO2: 24 MMOL/L (ref 22–29)
CREAT SERPL-MCNC: 1.9 MG/DL (ref 0.7–1.2)
CREATININE URINE: 241 MG/DL (ref 40–278)
CREATININE URINE: 243 MG/DL (ref 40–278)
EOSINOPHILS ABSOLUTE: 0.11 E9/L (ref 0.05–0.5)
EOSINOPHILS RELATIVE PERCENT: 2.1 % (ref 0–6)
GFR AFRICAN AMERICAN: 43
GFR NON-AFRICAN AMERICAN: 43 ML/MIN/1.73
GLUCOSE BLD-MCNC: 86 MG/DL (ref 74–99)
HBA1C MFR BLD: 6.1 % (ref 4–5.6)
HCT VFR BLD CALC: 38 % (ref 37–54)
HDLC SERPL-MCNC: 43 MG/DL
HEMOGLOBIN: 12.7 G/DL (ref 12.5–16.5)
IMMATURE GRANULOCYTES #: 0.02 E9/L
IMMATURE GRANULOCYTES %: 0.4 % (ref 0–5)
LDL CHOLESTEROL CALCULATED: 87 MG/DL (ref 0–99)
LYMPHOCYTES ABSOLUTE: 1.74 E9/L (ref 1.5–4)
LYMPHOCYTES RELATIVE PERCENT: 33.3 % (ref 20–42)
MAGNESIUM: 1.7 MG/DL (ref 1.6–2.6)
MCH RBC QN AUTO: 27.9 PG (ref 26–35)
MCHC RBC AUTO-ENTMCNC: 33.4 % (ref 32–34.5)
MCV RBC AUTO: 83.5 FL (ref 80–99.9)
MICROALBUMIN UR-MCNC: 49.1 MG/L
MICROALBUMIN/CREAT UR-RTO: 20.4 (ref 0–30)
MONOCYTES ABSOLUTE: 0.57 E9/L (ref 0.1–0.95)
MONOCYTES RELATIVE PERCENT: 10.9 % (ref 2–12)
NEUTROPHILS ABSOLUTE: 2.75 E9/L (ref 1.8–7.3)
NEUTROPHILS RELATIVE PERCENT: 52.5 % (ref 43–80)
PDW BLD-RTO: 14 FL (ref 11.5–15)
PHOSPHORUS: 2.2 MG/DL (ref 2.5–4.5)
PLATELET # BLD: 215 E9/L (ref 130–450)
PMV BLD AUTO: 10.8 FL (ref 7–12)
POTASSIUM SERPL-SCNC: 3.5 MMOL/L (ref 3.5–5)
PROTEIN PROTEIN: 21 MG/DL (ref 0–12)
PROTEIN/CREAT RATIO: 0.1
PROTEIN/CREAT RATIO: 0.1 (ref 0–0.2)
RBC # BLD: 4.55 E12/L (ref 3.8–5.8)
SODIUM BLD-SCNC: 140 MMOL/L (ref 132–146)
T4 FREE: 1.23 NG/DL (ref 0.93–1.7)
TOTAL PROTEIN: 7.3 G/DL (ref 6.4–8.3)
TRIGL SERPL-MCNC: 90 MG/DL (ref 0–149)
TSH SERPL DL<=0.05 MIU/L-ACNC: 1.66 UIU/ML (ref 0.27–4.2)
URIC ACID, SERUM: 9.7 MG/DL (ref 3.4–7)
VLDLC SERPL CALC-MCNC: 18 MG/DL
WBC # BLD: 5.2 E9/L (ref 4.5–11.5)

## 2021-09-20 PROCEDURE — 84443 ASSAY THYROID STIM HORMONE: CPT

## 2021-09-20 PROCEDURE — 82570 ASSAY OF URINE CREATININE: CPT

## 2021-09-20 PROCEDURE — 84439 ASSAY OF FREE THYROXINE: CPT

## 2021-09-20 PROCEDURE — 83036 HEMOGLOBIN GLYCOSYLATED A1C: CPT

## 2021-09-20 PROCEDURE — 83735 ASSAY OF MAGNESIUM: CPT

## 2021-09-20 PROCEDURE — 84156 ASSAY OF PROTEIN URINE: CPT

## 2021-09-20 PROCEDURE — 80053 COMPREHEN METABOLIC PANEL: CPT

## 2021-09-20 PROCEDURE — 84100 ASSAY OF PHOSPHORUS: CPT

## 2021-09-20 PROCEDURE — 80061 LIPID PANEL: CPT

## 2021-09-20 PROCEDURE — 84550 ASSAY OF BLOOD/URIC ACID: CPT

## 2021-09-20 PROCEDURE — 85025 COMPLETE CBC W/AUTO DIFF WBC: CPT

## 2021-09-20 PROCEDURE — 36415 COLL VENOUS BLD VENIPUNCTURE: CPT

## 2021-09-20 PROCEDURE — 82044 UR ALBUMIN SEMIQUANTITATIVE: CPT

## 2021-09-20 NOTE — TELEPHONE ENCOUNTER
MA received call from Dr Quan Concepcion stating that if the patient was not experiencing abdominal pain this is most likely normal, that it can sometimes take 1-2 weeks post colonoscopy to resume normal bowel movements. MA contacted patient to inform of advisement. Patient verbalized understanding.      Electronically signed by Awais Ortiz on 9/20/21 at 4:21 PM EDT

## 2021-09-20 NOTE — TELEPHONE ENCOUNTER
MA received a call from pt stating that since his colonoscopy and egd last week he has only had 2 bowel movements, he denies any abdominal pain or constipation but wants to know if that is normal? MA routing to Dr. Philip Purdy for advisement.     Ph- [phone]    Electronically signed by Darrick Patel on 9/20/21 at 9:41 AM EDT

## 2021-10-04 ENCOUNTER — TELEPHONE (OUTPATIENT)
Dept: SURGERY | Age: 71
End: 2021-10-04

## 2021-10-04 NOTE — TELEPHONE ENCOUNTER
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 His EGD showed no   signs of H pylori. Continue Protonix daily.      Jocelyn Castellon MD

## 2021-10-04 NOTE — TELEPHONE ENCOUNTER
MA contacted patient to inform of results. Patient verbalized understanding. Reminder set for 3 year repeat. Patient informed to call in sooner with concerns or complaints.     Electronically signed by Sandy Gomez on 10/4/21 at 4:23 PM EDT

## 2021-10-08 ENCOUNTER — OFFICE VISIT (OUTPATIENT)
Dept: FAMILY MEDICINE CLINIC | Age: 71
End: 2021-10-08
Payer: MEDICAID

## 2021-10-08 VITALS
HEART RATE: 74 BPM | BODY MASS INDEX: 34.78 KG/M2 | TEMPERATURE: 97.5 F | OXYGEN SATURATION: 96 % | SYSTOLIC BLOOD PRESSURE: 176 MMHG | HEIGHT: 74 IN | WEIGHT: 271 LBS | DIASTOLIC BLOOD PRESSURE: 82 MMHG

## 2021-10-08 DIAGNOSIS — M48.02 CERVICAL STENOSIS OF SPINAL CANAL: ICD-10-CM

## 2021-10-08 DIAGNOSIS — M54.2 NECK PAIN: Primary | ICD-10-CM

## 2021-10-08 PROCEDURE — 4040F PNEUMOC VAC/ADMIN/RCVD: CPT | Performed by: FAMILY MEDICINE

## 2021-10-08 PROCEDURE — G8427 DOCREV CUR MEDS BY ELIG CLIN: HCPCS | Performed by: FAMILY MEDICINE

## 2021-10-08 PROCEDURE — G8484 FLU IMMUNIZE NO ADMIN: HCPCS | Performed by: FAMILY MEDICINE

## 2021-10-08 PROCEDURE — 1036F TOBACCO NON-USER: CPT | Performed by: FAMILY MEDICINE

## 2021-10-08 PROCEDURE — 99212 OFFICE O/P EST SF 10 MIN: CPT | Performed by: FAMILY MEDICINE

## 2021-10-08 PROCEDURE — 1123F ACP DISCUSS/DSCN MKR DOCD: CPT | Performed by: FAMILY MEDICINE

## 2021-10-08 PROCEDURE — 3017F COLORECTAL CA SCREEN DOC REV: CPT | Performed by: FAMILY MEDICINE

## 2021-10-08 PROCEDURE — G8417 CALC BMI ABV UP PARAM F/U: HCPCS | Performed by: FAMILY MEDICINE

## 2021-10-08 PROCEDURE — 99213 OFFICE O/P EST LOW 20 MIN: CPT | Performed by: FAMILY MEDICINE

## 2021-10-08 RX ORDER — HYDRALAZINE HYDROCHLORIDE 25 MG/1
25 TABLET, FILM COATED ORAL 3 TIMES DAILY
COMMUNITY
Start: 2021-09-05 | End: 2021-10-18 | Stop reason: SDUPTHER

## 2021-10-08 RX ORDER — POLYETHYLENE GLYCOL 3350 17 G/17G
POWDER, FOR SOLUTION ORAL
COMMUNITY
Start: 2021-09-10 | End: 2021-10-18 | Stop reason: ALTCHOICE

## 2021-10-08 RX ORDER — HYDROCHLOROTHIAZIDE 12.5 MG/1
12.5 CAPSULE, GELATIN COATED ORAL DAILY
COMMUNITY
Start: 2021-10-05 | End: 2021-11-22

## 2021-10-08 RX ORDER — BLOOD SUGAR DIAGNOSTIC
STRIP MISCELLANEOUS
COMMUNITY
Start: 2021-09-09 | End: 2021-10-18 | Stop reason: ALTCHOICE

## 2021-10-08 RX ORDER — DILTIAZEM HYDROCHLORIDE 360 MG/1
CAPSULE, EXTENDED RELEASE ORAL
Status: ON HOLD | COMMUNITY
Start: 2021-09-05 | End: 2021-12-09 | Stop reason: HOSPADM

## 2021-10-08 RX ORDER — CHOLECALCIFEROL (VITAMIN D3) 125 MCG
CAPSULE ORAL
COMMUNITY
Start: 2021-10-05 | End: 2021-10-08

## 2021-10-08 RX ORDER — GABAPENTIN 300 MG/1
300 CAPSULE ORAL PRN
COMMUNITY
Start: 2021-09-09

## 2021-10-08 ASSESSMENT — ENCOUNTER SYMPTOMS
WHEEZING: 0
COUGH: 0
ABDOMINAL PAIN: 0
DIARRHEA: 0
CONSTIPATION: 0
VOMITING: 0
NAUSEA: 0
SHORTNESS OF BREATH: 0

## 2021-10-08 NOTE — PROGRESS NOTES
[facility] MEDICINE RESIDENCY PROGRAM  DATE OF VISIT : 10/8/2021    Patient : Jennifer Campbell   Age : 80 y.o.  : 1950   MRN : [id number]   ______________________________________________________________________    Chief Complaint :   Chief Complaint   Patient presents with    Results    Back Pain    Numbness     Right hand       HPI : Jennifer Campbell is 80 y.o. male who presented to the clinic today for neck pain and arm numbness. Right UE numbness and tingling: started a few weeks ago, h/o cervical fusion 20yrs ago following a MCV. Has been stable until recently noticed the above mentioned symptoms. Continues to have pain in his midline that has been chronic since his accident and surgery. Numbness and weakness continue to worsen since last visit. CT Cervical:   Impression   Postoperative and degenerative changes.  MRI would be useful if symptoms persist.   There is diffuse congenital canal stenosis with the AP diameter of the canal at C3    measuring 10 mm.  The hypertrophic changes contribute to at least mild-moderate    multilevel canaland foraminal narrowing. Central canal stenosis is most evident at C3-4 and   at C6-7.  No acute fracture. HTN: Lisinopril 40mg daily, diltiazem 360mg daily, hydralazine 25mg daily, and HCTZ 12.5mg daily. Previously controlled. I reviewed the patient's past medications, allergies and past medical history during this visit.     Past Medical History :        Past Medical History:   Diagnosis Date    Cervical vertebra fusion     Chronic kidney disease (CKD), stage III (moderate) ([facility] [address] 13    Foot injury     Hypertension     Stroke [facility])     patient had a stroke in the late     Type II or unspecified type diabetes mellitus without mention of complication, not stated as uncontrolled     Ulcers, corneal     ulcers in Patients stomach     Past Surgical History:   Procedure Laterality Date    COLONOSCOPY      COLONOSCOPY N/A 9/13/2021    COLONOSCOPY POLYPECTOMY SNARE/COLD BIOPSY performed by Susannah Peguero MD at [address] [place] [address]  01/23/2010    2019    [address]      UPPER GASTROINTESTINAL ENDOSCOPY      UPPER GASTROINTESTINAL ENDOSCOPY N/A 9/13/2021    EGD BIOPSY performed by Susannah Peguero MD at [facility] N/A 9/13/2021    EGD POLYP SNARE performed by Susannah Peguero MD at [facility] History :  Social History     Tobacco History     Smoking Status  Never Smoker    Smokeless Tobacco Use  Never Used          Alcohol History     Alcohol Use Status  No          Drug Use     Drug Use Status  No          Sexual Activity     Sexually Active  Not Asked                 Allergies :   No Known Allergies    Medication List :    Current Outpatient Medications   Medication Sig Dispense Refill    vitamin D-3 (CHOLECALCIFEROL) 125 MCG (5000 UT) TABS Take 1 tablet by mouth daily 90 tablet 3    Blood Pressure KIT 1 kit by Does not apply route daily 1 kit 0    Alcohol Swabs (B-D SINGLE USE SWABS REGULAR) PADS USE 3 - 4 TIMES DAILY AS DIRECTED      hydrOXYzine (ATARAX) 10 MG tablet Take 10 mg by mouth daily       insulin lispro, 1 Unit Dial, 100 UNIT/ML SOPN Inject 6 Units into the skin 3 times daily Plus  2 units for each 50 above 150 Blood sugar level result      B-D UF III MINI PEN NEEDLES 31G X 5 MM MISC 1 each by Does not apply route 4 times daily      FLUoxetine (PROZAC) 20 MG capsule Take 1 capsule by mouth nightly      tamsulosin (FLOMAX) 0.4 MG capsule Take 0.4 mg by mouth nightly      lisinopril (PRINIVIL;ZESTRIL) 40 MG tablet Take 1 tablet by mouth daily (Patient taking differently: Take 30 mg by mouth daily ) 90 tablet 3    pantoprazole (PROTONIX) 40 MG tablet Take 1 tablet by mouth daily 90 tablet 1    dilTIAZem (DILACOR XR) 180 MG extended release capsule Take 2 capsules by mouth daily 90 capsule 3    aspirin EC 81 MG EC tablet Take 1 tablet by mouth daily 90 tablet 1    insulin glargine (LANTUS) 100 UNIT/ML injection vial Inject 62 Units into the skin nightly       dilTIAZem (TIAZAC) 360 MG extended release capsule TAKE ONE CAPSULE BY MOUTH EVERY DAY      gabapentin (NEURONTIN) 300 MG capsule TAKE 1 CAPSULE BY MOUTH TWICE DAILY      ONETOUCH ULTRA strip TEST BLOOD SUGAR FOUR TIMES DAILY AS DIRECTED      hydrALAZINE (APRESOLINE) 25 MG tablet       hydroCHLOROthiazide (MICROZIDE) 12.5 MG capsule       polyethylene glycol (GLYCOLAX) 17 GM/SCOOP powder USE AS DIRECTED      neomycin-polymyxin-dexameth (MAXITROL) 3.5-79309-9.1 ophthalmic suspension Place 1 drop into both eyes 3 times daily (Patient not taking: Reported on 10/8/2021)       No current facility-administered medications for this visit. Review of Systems :  Review of Systems   Constitutional: Negative for chills, fatigue and fever. Respiratory: Negative for cough, shortness of breath and wheezing. Cardiovascular: Negative for chest pain, palpitations and leg swelling. Gastrointestinal: Negative for abdominal pain, constipation, diarrhea, nausea and vomiting. Musculoskeletal: Positive for arthralgias, neck pain and neck stiffness. Neurological: Positive for weakness and numbness. Negative for dizziness, light-headedness and headaches.     ______________________________________________________________________    Physical Exam :    Vitals: BP (!) 176/82 (Site: Left Upper Arm, Position: Sitting, Cuff Size: Large Adult)   Pulse 74   Temp 97.5 °F (36.4 °C) (Temporal)   Ht 6' 2\" (1.88 m)   Wt 271 lb (122.9 kg)   SpO2 96%   BMI 34.79 kg/m²   Physical Exam  Vitals reviewed. Constitutional:       General: He is not in acute distress. Appearance: Normal appearance. Cardiovascular:      Rate and Rhythm: Normal rate and regular rhythm. Pulses: Normal pulses.       Heart sounds: Normal heart sounds. No murmur heard. Pulmonary:      Effort: Pulmonary effort is normal. No respiratory distress. Breath sounds: Normal breath sounds. No wheezing. Abdominal:      General: Bowel sounds are normal. There is no distension. Palpations: Abdomen is soft. Tenderness: There is no abdominal tenderness. Musculoskeletal:      Right lower leg: No edema. Left lower leg: No edema. Comments: Neck pain and midline TTP   Right UE Strength 4/5 with numbness   Neurological:      Mental Status: He is alert.         ___________________    Assessment & Plan :    1. Neck pain  2. Cervical stenosis of spinal canal  - Lawanda Gan MD, Physical Medicine and Rehabilitation, [facility]; Future    3. HTN  - previously controlled  - continue present mgmt  - blood pressure kit ordered  - patient to return in two weeks with all meds and nephrology paperwork    Additional plan and future considerations:   RTO for HTN    Return to Office: Return in about 2 weeks (around 10/22/2021) for HTN (BRING ALL MEDS WITH HIM).     Robby Wynne MD   Case discussed with Dr. Caitlin Caballero

## 2021-10-08 NOTE — PROGRESS NOTES
Attending Physician Statement    S:   Chief Complaint   Patient presents with    Results    Back Pain    Numbness     Right hand      Right upper extremity numbness, weakness, tingling. History of fusion. No issues until a month ago. intermittent weakness   O: Blood pressure (!) 176/82, pulse 74, temperature 97.5 °F (36.4 °C), temperature source Temporal, height 6' 2\" (1.88 m), weight 271 lb (122.9 kg), SpO2 96 %. Exam:   Heart - RRR   Lungs - clear     A: neck pain, htn   P:  MRI of cervical spine    PM&R referral    Blood pressure monitoring at home. Follow-up as ordered    Attending Attestation   I have discussed the case, including pertinent history and exam findings with the resident. I agree with the documented assessment and plan.

## 2021-10-18 ENCOUNTER — HOSPITAL ENCOUNTER (OUTPATIENT)
Dept: MRI IMAGING | Age: 71
Discharge: HOME | End: 2021-10-20
Payer: MEDICAID

## 2021-10-18 ENCOUNTER — OFFICE VISIT (OUTPATIENT)
Dept: FAMILY MEDICINE CLINIC | Age: 71
End: 2021-10-18
Payer: MEDICAID

## 2021-10-18 VITALS
HEIGHT: 74 IN | HEART RATE: 95 BPM | OXYGEN SATURATION: 97 % | SYSTOLIC BLOOD PRESSURE: 164 MMHG | BODY MASS INDEX: 35.16 KG/M2 | RESPIRATION RATE: 16 BRPM | DIASTOLIC BLOOD PRESSURE: 83 MMHG | TEMPERATURE: 98.1 F | WEIGHT: 274 LBS

## 2021-10-18 DIAGNOSIS — I10 PRIMARY HYPERTENSION: Primary | ICD-10-CM

## 2021-10-18 DIAGNOSIS — M48.02 CERVICAL STENOSIS OF SPINAL CANAL: ICD-10-CM

## 2021-10-18 DIAGNOSIS — M54.2 NECK PAIN: ICD-10-CM

## 2021-10-18 PROCEDURE — 99213 OFFICE O/P EST LOW 20 MIN: CPT | Performed by: FAMILY MEDICINE

## 2021-10-18 PROCEDURE — 1123F ACP DISCUSS/DSCN MKR DOCD: CPT | Performed by: FAMILY MEDICINE

## 2021-10-18 PROCEDURE — 1036F TOBACCO NON-USER: CPT | Performed by: FAMILY MEDICINE

## 2021-10-18 PROCEDURE — 3017F COLORECTAL CA SCREEN DOC REV: CPT | Performed by: FAMILY MEDICINE

## 2021-10-18 PROCEDURE — G8484 FLU IMMUNIZE NO ADMIN: HCPCS | Performed by: FAMILY MEDICINE

## 2021-10-18 PROCEDURE — G8417 CALC BMI ABV UP PARAM F/U: HCPCS | Performed by: FAMILY MEDICINE

## 2021-10-18 PROCEDURE — G8427 DOCREV CUR MEDS BY ELIG CLIN: HCPCS | Performed by: FAMILY MEDICINE

## 2021-10-18 PROCEDURE — 4040F PNEUMOC VAC/ADMIN/RCVD: CPT | Performed by: FAMILY MEDICINE

## 2021-10-18 PROCEDURE — 99212 OFFICE O/P EST SF 10 MIN: CPT | Performed by: FAMILY MEDICINE

## 2021-10-18 RX ORDER — BISACODYL 5 MG
TABLET, DELAYED RELEASE (ENTERIC COATED) ORAL
COMMUNITY
Start: 2021-09-10 | End: 2022-01-21 | Stop reason: ALTCHOICE

## 2021-10-18 RX ORDER — HYDRALAZINE HYDROCHLORIDE 50 MG/1
50 TABLET, FILM COATED ORAL 3 TIMES DAILY
Qty: 270 TABLET | Refills: 1 | Status: SHIPPED
Start: 2021-10-18 | End: 2022-03-10

## 2021-10-18 RX ORDER — LISINOPRIL 30 MG/1
30 TABLET ORAL DAILY
COMMUNITY
End: 2022-04-22 | Stop reason: DRUGHIGH

## 2021-10-18 ASSESSMENT — ENCOUNTER SYMPTOMS
DIARRHEA: 0
NAUSEA: 0
WHEEZING: 0
COUGH: 0
VOMITING: 0
ABDOMINAL PAIN: 0
SHORTNESS OF BREATH: 0
BACK PAIN: 1
CONSTIPATION: 0

## 2021-10-18 NOTE — PROGRESS NOTES
S: 80 y.o. male presents today for Hypertension (elevated  bp x 2 )    HTN: 2 week BP check and did bring in his medications for verification; see resident note  Recently change to lisinopril 30mg, decreased from 40mg per nepho  Cervical neck pain, hx of cervical surgery: plans for MRI; follows with PMR    O: VS: BP (!) 164/83   Pulse 95   Temp 98.1 °F (36.7 °C) (Temporal)   Resp 16   Ht 6' 2\" (1.88 m)   Wt 274 lb (124.3 kg)   SpO2 97%   BMI 35.18 kg/m²   AAO/NAD, appropriate affect for mood  CV:  RRR, no murmur  Resp: CTAB  Abdomen: SNTND  Ext: no edema    Assessment/Plan:   1) HTN - increase the hydralazine to 50mg TID  2) NEck pain - plans for MRI later today; continue to f/u with PMR  RTO: Return in about 2 weeks (around 11/1/2021) for BP Check (NURSE VISIT) . Attending Physician Statement  I have discussed the case, including pertinent history and exam findings with the resident. I agree with the documented assessment and plan.       Electronically signed by Demetrius Sanders MD on 10/18/2021 at 11:54 AM

## 2021-10-18 NOTE — PROGRESS NOTES
[facility]  FAMILY MEDICINE RESIDENCY PROGRAM  DATE OF VISIT : 10/18/2021    Patient : Gatito Avendano   Age : 80 y.o.  : 1950   MRN : [id number]   ______________________________________________________________________    Chief Complaint :   Chief Complaint   Patient presents with    Hypertension     elevated  bp x 2        HPI : Gatito Avendano is 80 y.o. male who presented to the clinic today for HTN follow-up. HTN: Lisinopril 40mg daily, diltiazem 360mg daily, hydralazine 25mg daily, and HCTZ 12.5mg daily. Last noted to be uncontrolled, patient was unsure how he was taking his meds during last visit (10/8). Patient was seen by nephrologist and his Lisinopril was switched to 30mg daily. Patient notes he has been taking his hydralazine mostly 2 times a day. Neck pain with radiculopathy: recently referred to PM&R- has upcoming appt . Also sent for MRI that is scheduled for today at 12:30. I reviewed the patient's past medications, allergies and past medical history during this visit.     Past Medical History :        Past Medical History:   Diagnosis Date    Cervical vertebra fusion     Chronic kidney disease (CKD), stage III (moderate) ([facility] [address] 13    Foot injury     Hypertension     Stroke [facility])     patient had a stroke in the late     Type II or unspecified type diabetes mellitus without mention of complication, not stated as uncontrolled     Ulcers, corneal     ulcers in Patients stomach     Past Surgical History:   Procedure Laterality Date    COLONOSCOPY      COLONOSCOPY N/A 2021    COLONOSCOPY POLYPECTOMY SNARE/COLD BIOPSY performed by Huang Randall MD at [address] [place] [address]  2010    [address] [place] [address] [address] [facility]      UPPER GASTROINTESTINAL ENDOSCOPY      UPPER GASTROINTESTINAL ENDOSCOPY N/A 2021    EGD BIOPSY performed by Huang Randall MD at [facility] ENDOSCOPY    UPPER GASTROINTESTINAL ENDOSCOPY N/A 9/13/2021    EGD POLYP SNARE performed by Sweetie Ho MD at [address] History :  Social History     Tobacco History     Smoking Status  Never Smoker    Smokeless Tobacco Use  Never Used          Alcohol History     Alcohol Use Status  No          Drug Use     Drug Use Status  No          Sexual Activity     Sexually Active  Not Asked                 Allergies :   No Known Allergies    Medication List :    Current Outpatient Medications   Medication Sig Dispense Refill    lisinopril (PRINIVIL;ZESTRIL) 30 MG tablet Take 30 mg by mouth daily      hydrALAZINE (APRESOLINE) 50 MG tablet Take 1 tablet by mouth 3 times daily 270 tablet 1    dilTIAZem (TIAZAC) 360 MG extended release capsule TAKE ONE CAPSULE BY MOUTH EVERY DAY      gabapentin (NEURONTIN) 300 MG capsule TAKE 1 CAPSULE BY MOUTH TWICE DAILY      hydroCHLOROthiazide (MICROZIDE) 12.5 MG capsule Take 12.5 mg by mouth daily       vitamin D-3 (CHOLECALCIFEROL) 125 MCG (5000 UT) TABS Take 1 tablet by mouth daily 90 tablet 3    Blood Pressure KIT 1 kit by Does not apply route daily 1 kit 0    Alcohol Swabs (B-D SINGLE USE SWABS REGULAR) PADS USE 3 - 4 TIMES DAILY AS DIRECTED      insulin lispro, 1 Unit Dial, 100 UNIT/ML SOPN Inject 6 Units into the skin 3 times daily Plus  2 units for each 50 above 150 Blood sugar level result      B-D UF III MINI PEN NEEDLES 31G X 5 MM MISC 1 each by Does not apply route 4 times daily      FLUoxetine (PROZAC) 20 MG capsule Take 1 capsule by mouth nightly      tamsulosin (FLOMAX) 0.4 MG capsule Take 0.4 mg by mouth nightly      pantoprazole (PROTONIX) 40 MG tablet Take 1 tablet by mouth daily 90 tablet 1    aspirin EC 81 MG EC tablet Take 1 tablet by mouth daily 90 tablet 1    insulin glargine (LANTUS) 100 UNIT/ML injection vial Inject 55 Units into the skin nightly       BISACODYL 5 MG EC tablet TAKE AS DIRECTED       No current facility-administered medications for this visit. Review of Systems :  Review of Systems   Constitutional: Negative for chills, fatigue and fever. Respiratory: Negative for cough, shortness of breath and wheezing. Cardiovascular: Negative for chest pain, palpitations and leg swelling. Gastrointestinal: Negative for abdominal pain, constipation, diarrhea, nausea and vomiting. Musculoskeletal: Positive for back pain and neck pain. Neurological: Positive for numbness. Negative for dizziness, weakness, light-headedness and headaches.     ______________________________________________________________________    Physical Exam :    Vitals: BP (!) 164/83   Pulse 95   Temp 98.1 °F (36.7 °C) (Temporal)   Resp 16   Ht 6' 2\" (1.88 m)   Wt 274 lb (124.3 kg)   SpO2 97%   BMI 35.18 kg/m²   Physical Exam  Vitals reviewed. Constitutional:       General: He is not in acute distress. Appearance: Normal appearance. Cardiovascular:      Rate and Rhythm: Normal rate and regular rhythm. Pulses: Normal pulses. Heart sounds: Normal heart sounds. No murmur heard. Pulmonary:      Effort: Pulmonary effort is normal. No respiratory distress. Breath sounds: Normal breath sounds. No wheezing. Abdominal:      General: Bowel sounds are normal. There is no distension. Palpations: Abdomen is soft. Tenderness: There is no abdominal tenderness. Musculoskeletal:      Right lower leg: No edema. Left lower leg: No edema. Comments: TTP of cervical spine   Neurological:      Mental Status: He is alert.     ___________________    Assessment & Plan :    1. Primary hypertension  - increase hydralazine to 50mg TID  - patient to return in 2 weeks for BP check   - hydrALAZINE (APRESOLINE) 50 MG tablet; Take 1 tablet by mouth 3 times daily  Dispense: 270 tablet; Refill: 1    2. Neck pain  - concern for cervical spine pathology  - continue plan for MRI today and PM&R appt.      Educational

## 2021-11-11 ENCOUNTER — HOSPITAL ENCOUNTER (OUTPATIENT)
Dept: PHYSICAL THERAPY | Age: 71
Setting detail: THERAPIES SERIES
Discharge: HOME | End: 2021-11-11
Payer: MEDICAID

## 2021-11-11 NOTE — PROGRESS NOTES
[facility]                Phone: [phone]   Fax: [phone]      Physical Therapy  Non compliance/Attendance Issue    DATE:   11/11/2021            MRN: [id number]     PATIENT NAME:  Zulma Banks              Diagnosis:  Cervical Radiculopathy     Total Visits to Date: 4  Cancels/No shows to Date: 1    Dear Dr. Carlos Tello    This is to notify you that per our physical therapy department policy your patient, noted above, is being discharged from Physical Therapy due to the following reason(s):     · If a Physical Therapy out patient is absent from three consecutive scheduled appointments without notification    · If a Physical Therapy out patient is absent for 50% of the scheduled visits     · Pt's last contact with PT department was on 8/25/21. If you have any questions, please feel free to contact me at [phone].     Thank You,    Electronically Signed by:   Alberto Enamorado PT, DPT        [id number]  11/11/2021

## 2021-11-17 ENCOUNTER — TELEPHONE (OUTPATIENT)
Dept: ADMINISTRATIVE | Age: 71
End: 2021-11-17

## 2021-11-17 NOTE — TELEPHONE ENCOUNTER
Please advise for scheduling. Patient needs seen for neck pain. Previously seen Dr. Eunice Miller on 7/20 for EMG.

## 2021-11-18 ENCOUNTER — TELEPHONE (OUTPATIENT)
Dept: FAMILY MEDICINE CLINIC | Age: 71
End: 2021-11-18

## 2021-11-22 RX ORDER — HYDROCHLOROTHIAZIDE 12.5 MG/1
CAPSULE, GELATIN COATED ORAL
Qty: 90 CAPSULE | Refills: 1 | Status: SHIPPED
Start: 2021-11-22 | End: 2022-05-05 | Stop reason: SDUPTHER

## 2021-11-23 ENCOUNTER — OFFICE VISIT (OUTPATIENT)
Dept: PHYSICAL MEDICINE AND REHAB | Age: 71
End: 2021-11-23
Payer: MEDICAID

## 2021-11-23 VITALS
OXYGEN SATURATION: 98 % | TEMPERATURE: 97.9 F | SYSTOLIC BLOOD PRESSURE: 142 MMHG | DIASTOLIC BLOOD PRESSURE: 64 MMHG | HEIGHT: 74 IN | HEART RATE: 82 BPM | BODY MASS INDEX: 35.45 KG/M2 | WEIGHT: 276.2 LBS

## 2021-11-23 DIAGNOSIS — G56.03 BILATERAL CARPAL TUNNEL SYNDROME: ICD-10-CM

## 2021-11-23 DIAGNOSIS — Z79.4 TYPE 2 DIABETES MELLITUS WITHOUT COMPLICATION, WITH LONG-TERM CURRENT USE OF INSULIN (HCC): ICD-10-CM

## 2021-11-23 DIAGNOSIS — M54.50 CHRONIC MIDLINE LOW BACK PAIN WITHOUT SCIATICA: ICD-10-CM

## 2021-11-23 DIAGNOSIS — M54.12 CERVICAL RADICULOPATHY: ICD-10-CM

## 2021-11-23 DIAGNOSIS — I10 ESSENTIAL HYPERTENSION: ICD-10-CM

## 2021-11-23 DIAGNOSIS — E11.9 TYPE 2 DIABETES MELLITUS WITHOUT COMPLICATION, WITH LONG-TERM CURRENT USE OF INSULIN (HCC): ICD-10-CM

## 2021-11-23 DIAGNOSIS — K21.9 GASTROESOPHAGEAL REFLUX DISEASE, UNSPECIFIED WHETHER ESOPHAGITIS PRESENT: ICD-10-CM

## 2021-11-23 DIAGNOSIS — M48.02 CERVICAL STENOSIS OF SPINAL CANAL: Primary | ICD-10-CM

## 2021-11-23 DIAGNOSIS — G89.29 CHRONIC MIDLINE LOW BACK PAIN WITHOUT SCIATICA: ICD-10-CM

## 2021-11-23 PROCEDURE — 99215 OFFICE O/P EST HI 40 MIN: CPT | Performed by: PHYSICAL MEDICINE & REHABILITATION

## 2021-11-23 PROCEDURE — 3017F COLORECTAL CA SCREEN DOC REV: CPT | Performed by: PHYSICAL MEDICINE & REHABILITATION

## 2021-11-23 PROCEDURE — G8484 FLU IMMUNIZE NO ADMIN: HCPCS | Performed by: PHYSICAL MEDICINE & REHABILITATION

## 2021-11-23 PROCEDURE — G8417 CALC BMI ABV UP PARAM F/U: HCPCS | Performed by: PHYSICAL MEDICINE & REHABILITATION

## 2021-11-23 PROCEDURE — 4040F PNEUMOC VAC/ADMIN/RCVD: CPT | Performed by: PHYSICAL MEDICINE & REHABILITATION

## 2021-11-23 PROCEDURE — G8427 DOCREV CUR MEDS BY ELIG CLIN: HCPCS | Performed by: PHYSICAL MEDICINE & REHABILITATION

## 2021-11-23 PROCEDURE — 1123F ACP DISCUSS/DSCN MKR DOCD: CPT | Performed by: PHYSICAL MEDICINE & REHABILITATION

## 2021-11-23 PROCEDURE — 1036F TOBACCO NON-USER: CPT | Performed by: PHYSICAL MEDICINE & REHABILITATION

## 2021-11-23 RX ORDER — PANTOPRAZOLE SODIUM 40 MG/1
40 TABLET, DELAYED RELEASE ORAL DAILY
Qty: 90 TABLET | Refills: 1 | Status: SHIPPED
Start: 2021-11-23 | End: 2022-05-23

## 2021-11-23 RX ORDER — ASPIRIN 81 MG/1
TABLET, COATED ORAL
Qty: 90 TABLET | Refills: 1 | Status: SHIPPED
Start: 2021-11-23 | End: 2022-04-22 | Stop reason: SDUPTHER

## 2021-11-23 RX ORDER — LIDOCAINE 50 MG/G
1 PATCH TOPICAL DAILY
Qty: 30 PATCH | Refills: 3 | Status: SHIPPED
Start: 2021-11-23 | End: 2022-08-19

## 2021-11-23 NOTE — PROGRESS NOTES
Kj Lance M.D. [address] [facility] PHYSICAL MEDICINE AND REHABILITAION  [facility]  [facility] [address]  Dept: [phone]  Dept Fax: [phone]    PCP: Julia Montgomery MD  Date of visit: 11/23/21    Chief Complaint   Patient presents with    Neck Pain     had prior cervical fusion,  had steroid shots in neck and back previously but it had affected his blood sugars but now is on insulin so her can adjust it if needed. right hand numbness going on for 6-7 months,  EMG 7- CT of Cervical Spine done 8-. Pain is getting worse. Aby Claudio is a 80 y.o.  man with gradual onset of neck pain after no known injury over 20 years ago. He had a prior cervical fusion completed in 2000. He reports neck pain has now been increasing again for at least the last year. Now, the pain is constant. The pain is rated Pain Score:   5, but can get up to 10 at times. Neck pain is described as sharp, and is located at the midline neck with radiation to both upper extremities to the hands, worse on the right side. He endorses intermittent numbness/tingling in the right > left hands. He endorses weakness in the right hand. No incontinence. The symptoms have been worse since onset. The pain is better with nothing. The pain is worse with nothing. Patient was evaluated by the [address] [place] [address] in September 2020 and CT myelogram of the cervical spine was ordered, but never completed. MRI of the cervical spine was recently ordered by [facility], however, insurance denied the MRI. Patient also reports chronic low back pain, which started over 10 years ago after no known injury. He reports his low back pain has gotten worse over the last year, no new injury. Pain is located in the midline low back, without radiation. He states that he used to have pain that radiated to both leg, but has not had this recently.  He reports occasional tingling in both feet, which he relates to diabetic neuropathy. No focal weakness. Low back pain is worse with standing and walking. Back pain is better when sitting. The prior workup has included:   -Upper extremity EMG 7/20/2021  1. There is electrodiagnostic evidence suggestive of diffuse multilevel chronic right cervical motor radiculopathy, involving at least C5-C8 levels. Right cervical radiculopathy was not proven by abnormal cervical paraspinal testing. Paraspinal evaluation was limited by patient inability to relax muscles. Clinical correlation is advised.      2. There is electrodiagnostic evidence of focal median mononeuropathies at or about the wrists bilaterally, moderately severe to severe in degree, affecting sensory and motor fibers, with demyelinating and axonal features. This is consistent with a clinical diagnosis of bilateral carpal tunnel syndrome.      3. There is no electrodiagnostic evidence of any other peripheral nerve mononeuropathy or plexopathy in the bilateral upper extremities. Cannot evaluate for left cervical radiculopathy without more extensive needle examination of the left upper extremity; patient reported radicular type symptoms only on the right side. Complete peripheral neuropathy evaluation, which would include lower extremity studies, was not completed during today's evaluation. Cannot evaluate for pure sensory radiculopathy or small fiber neuropathy by electrodiagnostic technique. Clinical correlation advised. -CT cervical spine 8/27/2021  FINDINGS:   There is streak artifact due to anterior fusion hardware extending from C4   through C7.  Severe degenerative disc disease changes at C3-4 and moderately   advanced degenerative disc disease changes at C7-T1.  Loss of lordosis.    Moderate diffuse bilateral facet arthritis. Santiago Neither is diffuse congenital   canal stenosis with the AP diameter of the canal at C3 measuring 10 mm.  The   hypertrophic changes contribute to at least mild-moderate multilevel canal   and foraminal narrowing.  Central canal stenosis is most evident at C3-4 and   at C6-7.  No acute fracture.       Scattered vascular calcifications.  No focal fluid collection.           Impression   Postoperative and degenerative changes.  MRI would be useful if symptoms   persist.         -Cervical spine xray 7/16/2020  Redemonstration of anterior cervical fusion C4-C7. Alignment of the vertebral bodies appears to be near-anatomic. No fracture or foreign body is identified. The disc spaces demonstrate severe C2-C3 degenerative narrowing. Anterior cervical spurring is present at C3. There is no significant loss of the vertebral body height. There are degenerative changes involving the facets.           Impression       Findings consistent with degenerative disc disease and degenerative   facets disease. Stable anterior cervical fusion C4-C7.             The prior treatment has included:  PT: Tried PT for the cervical spine, most recently August 2021, with some partial relief. No recent PT for the low back. Chiropractic: None  Modalities: Tried TENs with minimal relief   Topicals: Salon paus, unsure if relief, they did not stick well   OTC Tylenol: Tried without relief   NSAIDS: Tried ibuprofen without relief   Opioids: States he was on Oxycodone for a few years with relief, no longer takes. Membrane stabilizers: Takes gabapentin without relief of neck pain, it does help with diabetic neuropathy pain in his feet. Muscle relaxers: Tried muscle relaxers (cannot recall names) without relief   Previous injections: Prior cervical epidural injections over 10 years ago with relief - he believes they were done at [facility]. He reports they stopped doing epidural injections due to his diabetes having been poorly controlled at the time. He also had prior lumbar epidurals over 10 years ago with benefit.    Previous surgery at this site: Prior cervical fusion in 2000 (xrays show C4-7 ACDF).          Past Medical History:   Diagnosis Date    Cervical vertebra fusion 2000    Chronic kidney disease (CKD), stage III (moderate) ([address] 11/25/13    Foot injury     Hypertension     Stroke [facility])     patient had a stroke in the late 90's    Type II or unspecified type diabetes mellitus without mention of complication, not stated as uncontrolled     Ulcers, corneal     ulcers in Patients stomach       Past Surgical History:   Procedure Laterality Date    COLONOSCOPY      COLONOSCOPY N/A 9/13/2021    COLONOSCOPY POLYPECTOMY SNARE/COLD BIOPSY performed by Jana Landry MD at [address] [place] [address]  01/23/2010 2019    [address]      UPPER GASTROINTESTINAL ENDOSCOPY      UPPER GASTROINTESTINAL ENDOSCOPY N/A 9/13/2021    EGD BIOPSY performed by Jana Landry MD at [address] [place] [address] N/A 9/13/2021    EGD POLYP SNARE performed by Jana Landry MD at [facility] ENDOSCOPY       Social History     Socioeconomic History    Marital status: Single     Spouse name: Not on file    Number of children: Not on file    Years of education: Not on file    Highest education level: Not on file   Occupational History    Not on file   Tobacco Use    Smoking status: Never Smoker    Smokeless tobacco: Never Used   Vaping Use    Vaping Use: Never used   Substance and Sexual Activity    Alcohol use: No    Drug use: No    Sexual activity: Not on file   Other Topics Concern    Not on file   Social History Narrative    Not on file     Social Determinants of Health     Financial Resource Strain: Low Risk     Difficulty of Paying Living Expenses: Not very hard   Food Insecurity: No Food Insecurity    Worried About Running Out of Food in the Last Year: Never true    Gianna of Food in the Last Year: Never true   Transportation Needs:     Lack of Transportation (Medical): Not on file    Lack of Transportation (Non-Medical):  Not on file   Physical Activity:     Days of Exercise per Week: Not on file    Minutes of Exercise per Session: Not on file   Stress:     Feeling of Stress : Not on file   Social Connections:     Frequency of Communication with Friends and Family: Not on file    Frequency of Social Gatherings with Friends and Family: Not on file    Attends [employer] Services: Not on file    Active Member of [address] [place] [address] [employer] or Organizations: Not on file    Attends Club or Organization Meetings: Not on file    Marital Status: Not on file   Intimate Partner Violence:     Fear of Current or Ex-Partner: Not on file    Emotionally Abused: Not on file    Physically Abused: Not on file    Sexually Abused: Not on file   Housing Stability:     Unable to Pay for Housing in the Last Year: Not on file    Number of Places Lived in the Last Year: Not on file    Unstable Housing in the Last Year: Not on file         Family History   Problem Relation Age of Onset    Heart Disease Mother     Diabetes Sister        No Known Allergies    Current Outpatient Medications   Medication Sig Dispense Refill    ASPIRIN LOW DOSE 81 MG EC tablet TAKE 1 TABLET BY MOUTH DAILY 90 tablet 1    pantoprazole (PROTONIX) 40 MG tablet TAKE 1 TABLET BY MOUTH DAILY 90 tablet 1    hydroCHLOROthiazide (MICROZIDE) 12.5 MG capsule TAKE 1 CAPSULE BY MOUTH DAILY 90 capsule 1    lisinopril (PRINIVIL;ZESTRIL) 30 MG tablet Take 30 mg by mouth daily      hydrALAZINE (APRESOLINE) 50 MG tablet Take 1 tablet by mouth 3 times daily (Patient taking differently: Take 25 mg by mouth 3 times daily ) 270 tablet 1    dilTIAZem (TIAZAC) 360 MG extended release capsule TAKE ONE CAPSULE BY MOUTH EVERY DAY      gabapentin (NEURONTIN) 300 MG capsule TAKE 1 CAPSULE BY MOUTH TWICE DAILY      vitamin D-3 (CHOLECALCIFEROL) 125 MCG (5000 UT) TABS Take 1 tablet by mouth daily 90 tablet 3    Blood Pressure KIT 1 kit by Does not apply route daily 1 kit 0    Alcohol Swabs (B-D SINGLE USE SWABS REGULAR) PADS USE 3 - 4 TIMES DAILY AS DIRECTED      insulin lispro, 1 Unit Dial, 100 UNIT/ML SOPN Inject 6 Units into the skin 3 times daily Plus  2 units for each 50 above 150 Blood sugar level result      B-D UF III MINI PEN NEEDLES 31G X 5 MM MISC 1 each by Does not apply route 4 times daily      FLUoxetine (PROZAC) 20 MG capsule Take 1 capsule by mouth nightly      tamsulosin (FLOMAX) 0.4 MG capsule Take 0.4 mg by mouth nightly      insulin glargine (LANTUS) 100 UNIT/ML injection vial Inject 55 Units into the skin nightly       BISACODYL 5 MG EC tablet TAKE AS DIRECTED (Patient not taking: Reported on 11/23/2021)       No current facility-administered medications for this visit. Review of Systems  General: No chills, fatigue, fever, malaise, night sweats, weight gain,  weight loss. Psychological: No anxiety, depression, suicidal ideation   Ophthalmic: No blurry vision, decreased vision, double vision, loss of vision  Ear Nose Throat: No hearing loss, tinnitus, phonophobia, sensitivity to smells, vertigo, or vocal changes. Allergy/Immunology: No watery eyes, itchy eyes, frequent infections. Hematological and Lymphatic: No bleeding problems, blood clots, bruising  Endocrine:  No polydypsia, polyuria, temperature intolerance. Respiratory: No cough, shortness of breath, wheezing. Cardiovascular: No syncope, chest pain, dyspnea on exertion,palpitations. Gastrointestinal: No abdominal pain, hematemesis, melena, nausea, vomiting, stool incontinence  Genito-Urinary: No dysuria, hematuria, incontinence   Musculoskeletal: Per HPI  Neurological: Per HPI  Dermatological:  No rash      Physical Exam:   Blood pressure (!) 142/64, pulse 82, temperature 97.9 °F (36.6 °C), temperature source Infrared, height 6' 2\" (1.88 m), weight 276 lb 3.2 oz (125.3 kg), SpO2 98 %. General: The patient is in no apparent distress.    HEENT: No rhinorrhea, sneezing, yawning, or lacrimation. No scleral icterus or conjunctival injection. SKIN: No piloerection. No track marks. No rash. Normal turgor. No erythema or ecchymosis. Psychological: Mood and affect are appropriate. Hygiene is appropriate. Cardiovascular:  Peripheral pulses are 2+ and symmetric. There is no edema. Respiratory: Respirations are regular and unlabored. There is no cyanosis. Gastrointestinal: No abdominal distention   Genitourinary: No costovertebral angle tenderness. MSK: Cervical: Cervical lordosis is decreased. Thoracic kyphosis normal. No superficial or bony tenderness. No tenderness to palpation at bilateral cervical paraspinals, upper trapezius, occipital notch. No edema, erythema, ecchymosis, effusion, instability, mass or deformity. No midline bony tenderness. No trigger points. There is bilateral cervical paraspinal spasm. Spurling is negative bilateral.  Cervical AROM is minimally decreased with flexion/extension and moderately decreased with right and left rotation. There is no crepitus. Shoulder: No edema, erythema, ecchymosis, effusion, instability, mass or deformity. Full painless ROM bilateral shoulders. No painful arc. No crepitus. No tenderness to palpation at the acromioclavicular joint, subacromial space or biceps tendon insertion. Lumbar:  Lumbar lordosis decreased. No hairy patch, cafe au lait, nevi, hemangioma or dimpling over lumbar area. Pelvis level. Seated and standing flexion tests negative. There is no step off deformity. No superficial or bony tenderness. Lumbar AROM is full with flexion, moderately decreased with extension. He reports pain with lumbar extension and facet provocation. There is tenderness to palpation at the bilateral lumbar paraspinals and lumbosacral junction. No tenderness to palpation at bilateral SIJ, gluteal muscles, PSIS, ischial tuberosity, greater trochanter, ASIS, iliac crest.  No trigger points.  There is bilateral lumbar paraspinal spasm. No edema, erythema, ecchymosis,  mass or deformity. Straight leg raise is negative bilaterally. NELLY is negative bilaterally. ASIS compression test is negative bilaterally. Hip: Full painless AROM bilateral hip. Neurologic: Awake, alert and oriented in three planes. Speech is fluent. No facial weakness. Hearing is intact for conversation. Pupils are equal and round. Extraocular muscles are intact. Strength:   R  L  Deltoid   5  5  Biceps   5  5  Triceps  5  5  Wrist Ext  5  5  Finger Abd  5  5  Hip Flex  5  5  Knee Ext  5  5  Ankle dorsi  5  5  EHL   5 5  Ankle Plantar  5  5    Sensory:  Altered LT and PP sensation in the right hand 4th digit. Otherwise sensation intact to LT and PP in remainder of BUE. Sensation intact to LT and PP BLE. Reflexes:   R  L  Biceps   1 1  Triceps  1 1  BR   1 1   Patellar  1 1   Ankle Jerk  1 1    No Holt's  No clonus or spasticity. Gait is normal      Impression:   1. Cervical stenosis of spinal canal    2. Cervical radiculopathy    3. Chronic midline low back pain without sciatica    4. Bilateral carpal tunnel syndrome          Plan:   · Cervical spine MRI - will re-order. Patient is having increasing cervical radicular symptoms. He has failed PT, oral medications, and prior epidurals. He had a recent cervical CT scan which shows significant degenerative changes. He also had a recent EMG completed which showed evidence of diffuse right upper extremity motor radiculopathy. Will obtain cervical spine MRI to further evaluate and assist in determining next steps in care plan.    · Lumbar spine xrays  · PT for lumbar stabilization, strengthening, ROM, core strengthening, postural education, pain reduction techniques, education on home exercise plan   · Will add Lidoderm  · Continue gabapentin   · Will refer to Ortho [facility] for further evaluation of bilateral carpal tunnel syndrome   The patient was educated about the diagnosis, prognosis, indications, risks and benefits of treatment. An opportunity to ask questions was given to the patient and questions were answered. The patient agreed to proceed with the recommended treatment as described above. Follow up after imaging     Thank you for the consultation and for allowing me to participate in the care of this patient. Ruben Queen M.D.   Physical Medicine and Rehabilitation      I spent 52 minutes in examination, chart and imaging review, education and counseling,  and treatment of the patient's disorder

## 2021-11-23 NOTE — TELEPHONE ENCOUNTER
Last Appointment:  10/18/2021  Future Appointments   Date Time Provider Vikram Chelsi   11/23/2021  2:00 PM Roberto Carlos St MD [facility] PMR [facility]

## 2021-12-06 ENCOUNTER — APPOINTMENT (OUTPATIENT)
Dept: GENERAL RADIOLOGY | Age: 71
DRG: 201 | End: 2021-12-06
Payer: MEDICAID

## 2021-12-06 ENCOUNTER — HOSPITAL ENCOUNTER (INPATIENT)
Age: 71
LOS: 3 days | Discharge: HOME HEALTH | DRG: 201 | End: 2021-12-09
Attending: EMERGENCY MEDICINE | Admitting: FAMILY MEDICINE
Payer: MEDICAID

## 2021-12-06 DIAGNOSIS — I48.91 ATRIAL FIBRILLATION WITH RAPID VENTRICULAR RESPONSE (HCC): Primary | ICD-10-CM

## 2021-12-06 DIAGNOSIS — S86.811A PATELLAR TENDON RUPTURE, RIGHT, INITIAL ENCOUNTER: ICD-10-CM

## 2021-12-06 DIAGNOSIS — E87.6 HYPOKALEMIA: ICD-10-CM

## 2021-12-06 DIAGNOSIS — M25.561 ACUTE PAIN OF RIGHT KNEE: ICD-10-CM

## 2021-12-06 LAB
ALBUMIN SERPL-MCNC: 3.9 G/DL (ref 3.5–5.2)
ALP BLD-CCNC: 116 U/L (ref 40–129)
ALT SERPL-CCNC: 17 U/L (ref 0–40)
ANION GAP SERPL CALCULATED.3IONS-SCNC: 15 MMOL/L (ref 7–16)
AST SERPL-CCNC: 25 U/L (ref 0–39)
BASOPHILS ABSOLUTE: 0.03 E9/L (ref 0–0.2)
BASOPHILS RELATIVE PERCENT: 0.3 % (ref 0–2)
BILIRUB SERPL-MCNC: 0.5 MG/DL (ref 0–1.2)
BUN BLDV-MCNC: 17 MG/DL (ref 6–23)
C-REACTIVE PROTEIN: 3.5 MG/DL (ref 0–0.4)
CALCIUM SERPL-MCNC: 9.1 MG/DL (ref 8.6–10.2)
CHLORIDE BLD-SCNC: 105 MMOL/L (ref 98–107)
CO2: 21 MMOL/L (ref 22–29)
CREAT SERPL-MCNC: 2 MG/DL (ref 0.7–1.2)
EKG ATRIAL RATE: 117 BPM
EKG Q-T INTERVAL: 322 MS
EKG QRS DURATION: 78 MS
EKG QTC CALCULATION (BAZETT): 467 MS
EKG R AXIS: 17 DEGREES
EKG T AXIS: 179 DEGREES
EKG VENTRICULAR RATE: 127 BPM
EOSINOPHILS ABSOLUTE: 0.01 E9/L (ref 0.05–0.5)
EOSINOPHILS RELATIVE PERCENT: 0.1 % (ref 0–6)
GFR AFRICAN AMERICAN: 40
GFR NON-AFRICAN AMERICAN: 40 ML/MIN/1.73
GLUCOSE BLD-MCNC: 121 MG/DL (ref 74–99)
HCT VFR BLD CALC: 42 % (ref 37–54)
HEMOGLOBIN: 13.8 G/DL (ref 12.5–16.5)
IMMATURE GRANULOCYTES #: 0.04 E9/L
IMMATURE GRANULOCYTES %: 0.4 % (ref 0–5)
LYMPHOCYTES ABSOLUTE: 1.94 E9/L (ref 1.5–4)
LYMPHOCYTES RELATIVE PERCENT: 21.4 % (ref 20–42)
MAGNESIUM: 1.9 MG/DL (ref 1.6–2.6)
MCH RBC QN AUTO: 28 PG (ref 26–35)
MCHC RBC AUTO-ENTMCNC: 32.9 % (ref 32–34.5)
MCV RBC AUTO: 85.4 FL (ref 80–99.9)
METER GLUCOSE: 126 MG/DL (ref 74–99)
METER GLUCOSE: 97 MG/DL (ref 74–99)
MONOCYTES ABSOLUTE: 0.93 E9/L (ref 0.1–0.95)
MONOCYTES RELATIVE PERCENT: 10.3 % (ref 2–12)
NEUTROPHILS ABSOLUTE: 6.1 E9/L (ref 1.8–7.3)
NEUTROPHILS RELATIVE PERCENT: 67.5 % (ref 43–80)
PDW BLD-RTO: 13.5 FL (ref 11.5–15)
PLATELET # BLD: 229 E9/L (ref 130–450)
PMV BLD AUTO: 11.3 FL (ref 7–12)
POTASSIUM SERPL-SCNC: 3.4 MMOL/L (ref 3.5–5)
POTASSIUM SERPL-SCNC: 3.7 MMOL/L (ref 3.5–5)
PRO-BNP: 1875 PG/ML (ref 0–125)
RBC # BLD: 4.92 E12/L (ref 3.8–5.8)
SEDIMENTATION RATE, ERYTHROCYTE: 21 MM/HR (ref 0–15)
SODIUM BLD-SCNC: 141 MMOL/L (ref 132–146)
TOTAL PROTEIN: 8.1 G/DL (ref 6.4–8.3)
TROPONIN, HIGH SENSITIVITY: 21 NG/L (ref 0–11)
TROPONIN, HIGH SENSITIVITY: 23 NG/L (ref 0–11)
TSH SERPL DL<=0.05 MIU/L-ACNC: 0.74 UIU/ML (ref 0.27–4.2)
WBC # BLD: 9.1 E9/L (ref 4.5–11.5)

## 2021-12-06 PROCEDURE — 2500000003 HC RX 250 WO HCPCS: Performed by: STUDENT IN AN ORGANIZED HEALTH CARE EDUCATION/TRAINING PROGRAM

## 2021-12-06 PROCEDURE — 99285 EMERGENCY DEPT VISIT HI MDM: CPT

## 2021-12-06 PROCEDURE — 85651 RBC SED RATE NONAUTOMATED: CPT

## 2021-12-06 PROCEDURE — 20610 DRAIN/INJ JOINT/BURSA W/O US: CPT

## 2021-12-06 PROCEDURE — 96376 TX/PRO/DX INJ SAME DRUG ADON: CPT

## 2021-12-06 PROCEDURE — 83880 ASSAY OF NATRIURETIC PEPTIDE: CPT

## 2021-12-06 PROCEDURE — 71045 X-RAY EXAM CHEST 1 VIEW: CPT

## 2021-12-06 PROCEDURE — 36415 COLL VENOUS BLD VENIPUNCTURE: CPT

## 2021-12-06 PROCEDURE — 6360000002 HC RX W HCPCS

## 2021-12-06 PROCEDURE — 2580000003 HC RX 258

## 2021-12-06 PROCEDURE — 73562 X-RAY EXAM OF KNEE 3: CPT

## 2021-12-06 PROCEDURE — 84132 ASSAY OF SERUM POTASSIUM: CPT

## 2021-12-06 PROCEDURE — 83735 ASSAY OF MAGNESIUM: CPT

## 2021-12-06 PROCEDURE — 84443 ASSAY THYROID STIM HORMONE: CPT

## 2021-12-06 PROCEDURE — 93010 ELECTROCARDIOGRAM REPORT: CPT | Performed by: INTERNAL MEDICINE

## 2021-12-06 PROCEDURE — 96365 THER/PROPH/DIAG IV INF INIT: CPT

## 2021-12-06 PROCEDURE — 6370000000 HC RX 637 (ALT 250 FOR IP): Performed by: STUDENT IN AN ORGANIZED HEALTH CARE EDUCATION/TRAINING PROGRAM

## 2021-12-06 PROCEDURE — 80053 COMPREHEN METABOLIC PANEL: CPT

## 2021-12-06 PROCEDURE — 84484 ASSAY OF TROPONIN QUANT: CPT

## 2021-12-06 PROCEDURE — 2500000003 HC RX 250 WO HCPCS

## 2021-12-06 PROCEDURE — 86140 C-REACTIVE PROTEIN: CPT

## 2021-12-06 PROCEDURE — 85025 COMPLETE CBC W/AUTO DIFF WBC: CPT

## 2021-12-06 PROCEDURE — 2140000000 HC CCU INTERMEDIATE R&B

## 2021-12-06 PROCEDURE — 6370000000 HC RX 637 (ALT 250 FOR IP)

## 2021-12-06 PROCEDURE — 82962 GLUCOSE BLOOD TEST: CPT

## 2021-12-06 PROCEDURE — 93005 ELECTROCARDIOGRAM TRACING: CPT | Performed by: STUDENT IN AN ORGANIZED HEALTH CARE EDUCATION/TRAINING PROGRAM

## 2021-12-06 PROCEDURE — 99221 1ST HOSP IP/OBS SF/LOW 40: CPT | Performed by: ORTHOPAEDIC SURGERY

## 2021-12-06 PROCEDURE — 6360000002 HC RX W HCPCS: Performed by: STUDENT IN AN ORGANIZED HEALTH CARE EDUCATION/TRAINING PROGRAM

## 2021-12-06 RX ORDER — BUPIVACAINE HYDROCHLORIDE 2.5 MG/ML
30 INJECTION, SOLUTION EPIDURAL; INFILTRATION; INTRACAUDAL ONCE
Status: COMPLETED | OUTPATIENT
Start: 2021-12-06 | End: 2021-12-06

## 2021-12-06 RX ORDER — SODIUM CHLORIDE 0.9 % (FLUSH) 0.9 %
5-40 SYRINGE (ML) INJECTION EVERY 12 HOURS SCHEDULED
Status: DISCONTINUED | OUTPATIENT
Start: 2021-12-06 | End: 2021-12-10 | Stop reason: HOSPADM

## 2021-12-06 RX ORDER — SODIUM CHLORIDE 0.9 % (FLUSH) 0.9 %
5-40 SYRINGE (ML) INJECTION PRN
Status: DISCONTINUED | OUTPATIENT
Start: 2021-12-06 | End: 2021-12-10 | Stop reason: HOSPADM

## 2021-12-06 RX ORDER — MORPHINE SULFATE 2 MG/ML
4 INJECTION, SOLUTION INTRAMUSCULAR; INTRAVENOUS ONCE
Status: COMPLETED | OUTPATIENT
Start: 2021-12-06 | End: 2021-12-06

## 2021-12-06 RX ORDER — DEXTROSE MONOHYDRATE 25 G/50ML
12.5 INJECTION, SOLUTION INTRAVENOUS PRN
Status: DISCONTINUED | OUTPATIENT
Start: 2021-12-06 | End: 2021-12-10 | Stop reason: HOSPADM

## 2021-12-06 RX ORDER — POLYETHYLENE GLYCOL 3350 17 G/17G
17 POWDER, FOR SOLUTION ORAL DAILY PRN
Status: DISCONTINUED | OUTPATIENT
Start: 2021-12-06 | End: 2021-12-10 | Stop reason: HOSPADM

## 2021-12-06 RX ORDER — POTASSIUM CHLORIDE 20 MEQ/1
40 TABLET, EXTENDED RELEASE ORAL ONCE
Status: COMPLETED | OUTPATIENT
Start: 2021-12-06 | End: 2021-12-06

## 2021-12-06 RX ORDER — LIDOCAINE HYDROCHLORIDE 10 MG/ML
10 INJECTION, SOLUTION INFILTRATION; PERINEURAL ONCE
Status: DISCONTINUED | OUTPATIENT
Start: 2021-12-06 | End: 2021-12-10 | Stop reason: HOSPADM

## 2021-12-06 RX ORDER — ONDANSETRON 4 MG/1
4 TABLET, ORALLY DISINTEGRATING ORAL EVERY 8 HOURS PRN
Status: DISCONTINUED | OUTPATIENT
Start: 2021-12-06 | End: 2021-12-10 | Stop reason: HOSPADM

## 2021-12-06 RX ORDER — HYDROCODONE BITARTRATE AND ACETAMINOPHEN 5; 325 MG/1; MG/1
1 TABLET ORAL ONCE
Status: COMPLETED | OUTPATIENT
Start: 2021-12-06 | End: 2021-12-06

## 2021-12-06 RX ORDER — LIDOCAINE 4 G/G
1 PATCH TOPICAL DAILY
Status: DISCONTINUED | OUTPATIENT
Start: 2021-12-07 | End: 2021-12-10 | Stop reason: HOSPADM

## 2021-12-06 RX ORDER — ONDANSETRON 2 MG/ML
4 INJECTION INTRAMUSCULAR; INTRAVENOUS EVERY 6 HOURS PRN
Status: DISCONTINUED | OUTPATIENT
Start: 2021-12-06 | End: 2021-12-10 | Stop reason: HOSPADM

## 2021-12-06 RX ORDER — DILTIAZEM HYDROCHLORIDE 5 MG/ML
10 INJECTION INTRAVENOUS ONCE
Status: COMPLETED | OUTPATIENT
Start: 2021-12-06 | End: 2021-12-06

## 2021-12-06 RX ORDER — FLUOXETINE HYDROCHLORIDE 20 MG/1
20 CAPSULE ORAL NIGHTLY
Status: DISCONTINUED | OUTPATIENT
Start: 2021-12-06 | End: 2021-12-10 | Stop reason: HOSPADM

## 2021-12-06 RX ORDER — ACETAMINOPHEN 650 MG/1
650 SUPPOSITORY RECTAL EVERY 6 HOURS PRN
Status: DISCONTINUED | OUTPATIENT
Start: 2021-12-06 | End: 2021-12-10 | Stop reason: HOSPADM

## 2021-12-06 RX ORDER — HYDROCHLOROTHIAZIDE 12.5 MG/1
12.5 TABLET ORAL DAILY
Status: DISCONTINUED | OUTPATIENT
Start: 2021-12-07 | End: 2021-12-10 | Stop reason: HOSPADM

## 2021-12-06 RX ORDER — LISINOPRIL 10 MG/1
30 TABLET ORAL DAILY
Status: DISCONTINUED | OUTPATIENT
Start: 2021-12-07 | End: 2021-12-10 | Stop reason: HOSPADM

## 2021-12-06 RX ORDER — NICOTINE POLACRILEX 4 MG
15 LOZENGE BUCCAL PRN
Status: DISCONTINUED | OUTPATIENT
Start: 2021-12-06 | End: 2021-12-10 | Stop reason: HOSPADM

## 2021-12-06 RX ORDER — INSULIN GLARGINE 100 [IU]/ML
55 INJECTION, SOLUTION SUBCUTANEOUS NIGHTLY
Status: DISCONTINUED | OUTPATIENT
Start: 2021-12-06 | End: 2021-12-10 | Stop reason: HOSPADM

## 2021-12-06 RX ORDER — PANTOPRAZOLE SODIUM 40 MG/1
40 TABLET, DELAYED RELEASE ORAL DAILY
Status: DISCONTINUED | OUTPATIENT
Start: 2021-12-07 | End: 2021-12-10 | Stop reason: HOSPADM

## 2021-12-06 RX ORDER — DEXTROSE MONOHYDRATE 50 MG/ML
100 INJECTION, SOLUTION INTRAVENOUS PRN
Status: DISCONTINUED | OUTPATIENT
Start: 2021-12-06 | End: 2021-12-10 | Stop reason: HOSPADM

## 2021-12-06 RX ORDER — SODIUM CHLORIDE 9 MG/ML
25 INJECTION, SOLUTION INTRAVENOUS PRN
Status: DISCONTINUED | OUTPATIENT
Start: 2021-12-06 | End: 2021-12-10 | Stop reason: HOSPADM

## 2021-12-06 RX ORDER — ACETAMINOPHEN 325 MG/1
650 TABLET ORAL EVERY 6 HOURS PRN
Status: DISCONTINUED | OUTPATIENT
Start: 2021-12-06 | End: 2021-12-10 | Stop reason: HOSPADM

## 2021-12-06 RX ORDER — HYDRALAZINE HYDROCHLORIDE 25 MG/1
25 TABLET, FILM COATED ORAL 3 TIMES DAILY
Status: DISCONTINUED | OUTPATIENT
Start: 2021-12-06 | End: 2021-12-09

## 2021-12-06 RX ORDER — ASPIRIN 81 MG/1
81 TABLET ORAL DAILY
Status: DISCONTINUED | OUTPATIENT
Start: 2021-12-07 | End: 2021-12-10 | Stop reason: HOSPADM

## 2021-12-06 RX ORDER — GABAPENTIN 300 MG/1
300 CAPSULE ORAL 2 TIMES DAILY
Status: DISCONTINUED | OUTPATIENT
Start: 2021-12-06 | End: 2021-12-10 | Stop reason: HOSPADM

## 2021-12-06 RX ADMIN — DILTIAZEM HYDROCHLORIDE 10 MG: 5 INJECTION INTRAVENOUS at 09:55

## 2021-12-06 RX ADMIN — HYDRALAZINE HYDROCHLORIDE 25 MG: 25 TABLET, FILM COATED ORAL at 21:49

## 2021-12-06 RX ADMIN — DEXTROSE MONOHYDRATE 5 MG/HR: 50 INJECTION, SOLUTION INTRAVENOUS at 11:58

## 2021-12-06 RX ADMIN — MORPHINE SULFATE 4 MG: 2 INJECTION, SOLUTION INTRAMUSCULAR; INTRAVENOUS at 17:07

## 2021-12-06 RX ADMIN — Medication 10 ML: at 21:56

## 2021-12-06 RX ADMIN — POTASSIUM CHLORIDE 40 MEQ: 1500 TABLET, EXTENDED RELEASE ORAL at 09:55

## 2021-12-06 RX ADMIN — ACETAMINOPHEN 650 MG: 325 TABLET ORAL at 21:53

## 2021-12-06 RX ADMIN — ENOXAPARIN SODIUM 40 MG: 100 INJECTION SUBCUTANEOUS at 21:49

## 2021-12-06 RX ADMIN — BUPIVACAINE HYDROCHLORIDE 75 MG: 2.5 INJECTION, SOLUTION EPIDURAL; INFILTRATION; INTRACAUDAL at 12:15

## 2021-12-06 RX ADMIN — DEXTROSE MONOHYDRATE 15 MG/HR: 50 INJECTION, SOLUTION INTRAVENOUS at 19:18

## 2021-12-06 RX ADMIN — HYDROCODONE BITARTRATE AND ACETAMINOPHEN 1 TABLET: 5; 325 TABLET ORAL at 09:55

## 2021-12-06 RX ADMIN — MORPHINE SULFATE 4 MG: 2 INJECTION, SOLUTION INTRAMUSCULAR; INTRAVENOUS at 13:36

## 2021-12-06 ASSESSMENT — PAIN DESCRIPTION - ORIENTATION
ORIENTATION: LEFT
ORIENTATION: RIGHT
ORIENTATION: RIGHT

## 2021-12-06 ASSESSMENT — PAIN DESCRIPTION - DESCRIPTORS
DESCRIPTORS: ACHING;CRUSHING;DISCOMFORT
DESCRIPTORS: SHOOTING;SORE
DESCRIPTORS: ACHING;DISCOMFORT

## 2021-12-06 ASSESSMENT — PAIN SCALES - GENERAL
PAINLEVEL_OUTOF10: 7
PAINLEVEL_OUTOF10: 8
PAINLEVEL_OUTOF10: 4
PAINLEVEL_OUTOF10: 8
PAINLEVEL_OUTOF10: 8
PAINLEVEL_OUTOF10: 3
PAINLEVEL_OUTOF10: 6

## 2021-12-06 ASSESSMENT — ENCOUNTER SYMPTOMS
BLOOD IN STOOL: 0
CONSTIPATION: 0
SHORTNESS OF BREATH: 0
VOMITING: 0
NAUSEA: 0
ABDOMINAL PAIN: 0
SORE THROAT: 0
RHINORRHEA: 0
COUGH: 0
BACK PAIN: 0
DIARRHEA: 0

## 2021-12-06 ASSESSMENT — PAIN DESCRIPTION - LOCATION
LOCATION: KNEE

## 2021-12-06 ASSESSMENT — PAIN DESCRIPTION - PAIN TYPE
TYPE: ACUTE PAIN

## 2021-12-06 ASSESSMENT — PAIN DESCRIPTION - FREQUENCY
FREQUENCY: CONTINUOUS

## 2021-12-06 NOTE — H&P
[facility]  Family Medicine Residency Program  History and Physical    Patient:  Monica Lazaro 80 y.o. male MRN: [id number]     Date of Service: 12/6/2021    Hospital Day: 1      Chief complaint: Right knee pain    History of Present Illness   The patient is a 80 y.o. male presenting to the ED for R knee pain. The knee pain has been present since last Friday 12/03. He states that he slipped and hit his leg when getting out of the bus in the snow about one week ago. Patient's pain is constant, graded as 3-4/10, mildly relieved by topical lidocaine patches and Tylenol. The pain is in the anterior right knee, non-radiating and worse with bearing weight/walking. Denies sick contacts, fever, chills and skin changes. Patient HR and BP were elevated. EKG ordered and showing new onset Afib with RVR. Reports palpitations. Denies chest pain, headaches, dizziness, blurry vision, GI symptoms and urinary symptoms. He takes ASA 81 mg at home for hx of stroke and Lisinopril/HCTZ/Hydralazine for HTN. Denies tobacco, alcohol and drugs. No previous Echo in the chart.     ED:  Patient hemodynamically stable  VS: BP 180s/110s, HR 130s  Labs: K 3.4, Cr 2, Glucose 121, CRP 3.5, ProBNP 1875, Troponin 21  CXR: No acute process  R knee XR: Mild degenerative changed, negative for fracture/dislocation  EKG: AFib with RVR, possible lateral ischemia  BCx ordered  Medicines: Diltiazem 10 mg injection, Diltiazem 100 mg in D5%, Norco 5-325 mg, Morphine 4 mg, K replacement 40 mEq,  mg, Lidocaine, Bupivacaine    Patient admitted due to new onset Afib with RVR and R knee pain with possible patellar tendon injury      Meds in ED  Medications   dilTIAZem 100 mg in dextrose 5 % 100 mL infusion ([facility]) (15 mg/hr IntraVENous Rate/Dose Change 12/6/21 1316)   lidocaine 1 % injection 10 mL (has no administration in time range)   bupivacaine (PF) (MARCAINE) 0.25 % injection 75 mg (has no administration in time range) HYDROcodone-acetaminophen (NORCO) 5-325 MG per tablet 1 tablet (1 tablet Oral Given 12/6/21 0955)   potassium chloride (KLOR-CON M) extended release tablet 40 mEq (40 mEq Oral Given 12/6/21 0955)   dilTIAZem injection 10 mg (10 mg IntraVENous Given 12/6/21 0955)   morphine (PF) injection 4 mg (4 mg IntraVENous Given 12/6/21 1336)       Past Medical History:      Diagnosis Date    Cervical vertebra fusion 01/01/2000    Chronic kidney disease (CKD), stage III (moderate) ([facility] [address] 11/25/2013    Depression     Foot injury     History of gastric ulcer     Hypertension     Stroke [facility])     patient had a stroke in the late 90's    Type 2 diabetes mellitus with diabetic polyneuropathy, with long-term current use of insulin ([facility] [address]        Past Surgical History:        Procedure Laterality Date    COLONOSCOPY      COLONOSCOPY N/A 9/13/2021    COLONOSCOPY POLYPECTOMY SNARE/COLD BIOPSY performed by Gwyneth Dance, MD at [address] [place] [address]  01/23/2010 2019    [address]      UPPER GASTROINTESTINAL ENDOSCOPY      UPPER GASTROINTESTINAL ENDOSCOPY N/A 9/13/2021    EGD BIOPSY performed by Gwyneth Dance, MD at [facility] N/A 9/13/2021    EGD POLYP SNARE performed by Gwyneth Dance, MD at [address] [place] [address]       Medications Prior to Admission:    Prior to Admission medications    Medication Sig Start Date End Date Taking?  Authorizing Provider   ASPIRIN LOW DOSE 81 MG EC tablet TAKE 1 TABLET BY MOUTH DAILY 11/23/21   Felipe Lehman MD   pantoprazole (PROTONIX) 40 MG tablet TAKE 1 TABLET BY MOUTH DAILY 11/23/21   Felipe Lehman MD   lidocaine (LIDODERM) 5 % Place 1 patch onto the skin daily 12 hours on, 12 hours off. 11/23/21   Quirino Arambula MD   hydroCHLOROthiazide (MICROZIDE) 12.5 MG capsule TAKE 1 CAPSULE BY MOUTH DAILY 11/22/21   Felipe Lehman MD   lisinopril (PRINIVIL;ZESTRIL) 30 MG tablet Take 30 mg by mouth daily    Historical Provider, MD   BISACODYL 5 MG EC tablet TAKE AS DIRECTED  Patient not taking: Reported on 11/23/2021 9/10/21   Historical Provider, MD   hydrALAZINE (APRESOLINE) 50 MG tablet Take 1 tablet by mouth 3 times daily  Patient taking differently: Take 25 mg by mouth 3 times daily  10/18/21   Mohinder Cox MD   dilTIAZem WATTS [facility]) 360 MG extended release capsule TAKE ONE CAPSULE BY MOUTH EVERY DAY 9/5/21   Historical Provider, MD   gabapentin (NEURONTIN) 300 MG capsule TAKE 1 CAPSULE BY MOUTH TWICE DAILY 9/9/21   Historical Provider, MD   vitamin D-3 (CHOLECALCIFEROL) 125 MCG (5000 UT) TABS Take 1 tablet by mouth daily 8/24/21   Mohinder Cox MD   Blood Pressure KIT 1 kit by Does not apply route daily 8/24/21   Mohinder Cox MD   Alcohol Swabs (B-D SINGLE USE SWABS REGULAR) PADS USE 3 - 4 TIMES DAILY AS DIRECTED 7/31/21   Historical Provider, MD   insulin lispro, 1 Unit Dial, 100 UNIT/ML SOPN Inject 6 Units into the skin 3 times daily Plus  2 units for each 50 above 150 Blood sugar level result    Historical Provider, MD DEL VALLE UF III MINI PEN NEEDLES 31G X 5 MM MISC 1 each by Does not apply route 4 times daily 3/15/21   Historical Provider, MD   FLUoxetine (PROZAC) 20 MG capsule Take 1 capsule by mouth nightly 6/7/21   Historical Provider, MD   tamsulosin (FLOMAX) 0.4 MG capsule Take 0.4 mg by mouth nightly    Historical Provider, MD   insulin glargine (LANTUS) 100 UNIT/ML injection vial Inject 55 Units into the skin nightly     Historical Provider, MD       Allergies:  Patient has no known allergies. Social History:   TOBACCO:   reports that he has never smoked. He has never used smokeless tobacco.  ETOH:   reports no history of alcohol use.    Drugs: never    Family History:       Problem Relation Age of Onset    Heart Disease Mother     Diabetes Sister        REVIEW OF SYSTEMS:    Review of Systems   Constitutional: Negative for chills and fever. HENT: Negative for congestion, rhinorrhea, sneezing and sore throat. Eyes: Negative for visual disturbance. Respiratory: Negative for cough and shortness of breath. Cardiovascular: Positive for palpitations and leg swelling. Negative for chest pain. Gastrointestinal: Negative for abdominal pain, blood in stool, constipation, diarrhea, nausea and vomiting. Genitourinary: Negative for difficulty urinating, frequency and hematuria. Musculoskeletal: Positive for gait problem and joint swelling. Negative for back pain and neck pain. Skin: Negative for rash. Neurological: Negative for dizziness, light-headedness and headaches. Psychiatric/Behavioral: The patient is not nervous/anxious. Physical Exam   Vitals: BP (!) 154/92   Pulse 109   Temp 98 °F (36.7 °C)   Resp 18   Ht 6' 2\" (1.88 m)   Wt 275 lb (124.7 kg)   SpO2 97%   BMI 35.31 kg/m²     Physical Exam  Vitals reviewed. Constitutional:       General: He is not in acute distress. Appearance: He is obese. HENT:      Head: Normocephalic and atraumatic. Mouth/Throat:      Pharynx: Oropharynx is clear. Neck:      Vascular: No carotid bruit. Cardiovascular:      Rate and Rhythm: Tachycardia present. Rhythm irregular. Pulses: Normal pulses. Heart sounds: Normal heart sounds. Pulmonary:      Effort: Pulmonary effort is normal. No respiratory distress. Breath sounds: Normal breath sounds. Abdominal:      General: Bowel sounds are normal.      Palpations: Abdomen is soft. Tenderness: There is no abdominal tenderness. There is no guarding or rebound. Musculoskeletal:         General: Swelling present. Cervical back: Neck supple. No tenderness. Right lower leg: Edema present. Left lower leg: No edema. Comments: Right LE edema/swelling  No obvious bony tenderness, negative anterior drawer test. Significant tenderness to palpation over patella.  No warmth. Right knee immobilizer in place   Skin:     General: Skin is warm and dry. Capillary Refill: Capillary refill takes less than 2 seconds. Findings: No rash. Comments: No feet ulcers   Neurological:      Mental Status: He is alert and oriented to person, place, and time. Labs and Imaging Studies   Basic Labs  CBC:   Recent Labs     12/06/21  0746   WBC 9.1   RBC 4.92   HGB 13.8   HCT 42.0   MCV 85.4   MCH 28.0   MCHC 32.9   RDW 13.5      MPV 11.3       BMP:    Recent Labs     12/06/21  0746      K 3.4*      CO2 21*   BUN 17   CREATININE 2.0*   GLUCOSE 121*   CALCIUM 9.1   PROT 8.1   LABALBU 3.9   BILITOT 0.5   ALKPHOS 116   AST 25   ALT 17       LIVER PROFILE:   Recent Labs     12/06/21  0746   AST 25   ALT 17   BILITOT 0.5   ALKPHOS 116       PT/INR:   No results for input(s): PROTIME, INR in the last 72 hours. APTT:   No results for input(s): APTT in the last 72 hours. Fasting Lipid Panel:    Lab Results   Component Value Date    CHOL 148 09/20/2021    TRIG 90 09/20/2021    HDL 43 09/20/2021       Cardiac Enzymes:    Lab Results   Component Value Date    CKTOTAL 101 09/20/2013    TROPONINI <0.01 09/30/2017    TROPONINI <0.01 09/19/2013       Imaging Studies:     XR KNEE RIGHT (3 VIEWS)  Result Date: 12/6/2021  FINDINGS: There is no fracture dislocation of the right knee. There are mild degenerative changes of the right knee. There is no osseous lesion. There is no joint effusion. 1. Mild degenerative changes of the right knee. 2. There is no fracture dislocation. XR CHEST PORTABLE  Result Date: 12/6/2021  FINDINGS: The lungs are without acute focal process. There is no effusion or pneumothorax. The cardiomediastinal silhouette is without acute process. The osseous structures are without acute process. No acute process. EKG: New onset Afib with RVR, possible lateral ischemia    Resident's Assessment and Plan     Sarah Dudley is a 80 y.o. male    Active Problems:    Atrial fibrillation with rapid ventricular response (HCC)  Resolved Problems:    * No resolved hospital problems.  *    New onset Atrial fibrillation with RVR  · EKG 12/06: Afib with RVR  · Given Diltiazem injection in ED  · Currently on Diltiazem drip  · On ASA 81 mg daily  · K 3.4 in ED, replaced  · Monitor electrolytes  · Goal K > 4, Mg > 2  · On telemetry  · No Echo in the chart, consider ordering Echo  · Cardiology consulted    Right knee pain - suspect patellar tendon injury  · Right knee pain since Friday 12/03  · He states that he slipped and hit his leg when getting out of the bus in the snow about one week ago  · Pain is constant, graded as 3-4/10, mildly relieved by topical lidocaine patches and Tylenol, located in the anterior right knee, non-radiating and worse with bearing weight/walking  · R knee XR 12/06: Mild degenerative changed, negative for fracture/dislocation  · On knee immobilizer, up as tolerated  · Pain control with Lidocaine path and Tylenol PRN, consider adding Norco  · PT/OT consulted  · Ortho consulted    HTN  · BP 180s/110s in the ED, Diltiazem given, BP imrpoved  · Continue home meds: Lisinopril, HCTZ and Hydralazine  · Monitor BP    DM2 with polyneuropathy  · Glucose in   · Home meds: Lantus 55 U, Humalog 6 U TID, Gabapentin 300 mg BID  · Continue home meds  · Hypoglycemia protocol in place  · Monitor glucose    CKD  · Cr in ED 2 (baseline 1.8-2)  · GFR in ED 40  · Monitor renal function    Hx of gastric ulcers  · Continue protonix    PT/OT consulted  DVT prophylaxis: Lovenox and PCDs  GI prophylaxis: Protonix   Diet: Carb controlled and low sodium    Hero Webb MD, PGY-1  Attending physician: Dr. Raymundo Galindo

## 2021-12-06 NOTE — ED NOTES
Bed: [place] [address].  Expected date: 12/6/21  Expected time:   Means of arrival:   Comments:  ROOM [facility]  12/06/21 2373

## 2021-12-06 NOTE — ED PROVIDER NOTES
ATTENDING PROVIDER ATTESTATION:     Thomas Grigsby presented to the emergency department for evaluation of Knee Pain (called EMS for 7/10 R knee pain since Friday (pt had a fall onto the knee). Per EMS patients -150s in route. pt denies CP/SOB. EMS gave 324 ASA in route. )   and was initially evaluated by the Medical Resident. See Original ED Note for H&P and ED course above. I have reviewed and discussed the case, including pertinent history (medical, surgical, family and social) and exam findings with the Medical Resident assigned to Thomas Grigsby. I have personally performed and/or participated in the history, exam, medical decision making, and procedures and agree with all pertinent clinical information and any additional changes or corrections are noted below in my assessment and plan. I have discussed this patient in detail with the resident, and provided the instruction and education,       I have reviewed my findings and recommendations with the assigned Medical Resident, Thomas Grigsby and members of family present at the time of disposition. I have performed a history and physical examination of this patient and directly supervised the resident caring for this patient      History of Present Illness:    Presents to the ED for right knee pain, beginning a few days ago. The complaint has been constant, moderate in severity, and worsened by movement of the knee. Patient reports nonsyncopal fall on Friday. He reports that he hurt his knee when he fell. He reports he had pain since then. He reports that the pain was worse so he called EMS today. He reports that when he fell he hit his shin. He denies other complaints. EMS was called and he was found to be in atrial fibrillation with rapid ventricular response. He denies chest pain or shortness of breath.   He cannot feel his heart beating fast. He denies any history of afib        Review of Systems:   A complete review of systems was performed and pertinent positives and negatives are stated within HPI, all other systems reviewed and are negative.    --------------------------------------------- PAST HISTORY ---------------------------------------------  Past Medical History:  has a past medical history of Cervical vertebra fusion, Chronic kidney disease (CKD), stage III (moderate) ([facility] [address] Foot injury, Hypertension, Stroke ([facility] [address] Type II or unspecified type diabetes mellitus without mention of complication, not stated as uncontrolled, and Ulcers, corneal.    Past Surgical History:  has a past surgical history that includes fracture surgery; Rotator cuff repair (1999); Spine surgery; Kidney stone surgery (01/23/2010); Colonoscopy; Upper gastrointestinal endoscopy; Upper gastrointestinal endoscopy (N/A, 9/13/2021); Colonoscopy (N/A, 9/13/2021); and Upper gastrointestinal endoscopy (N/A, 9/13/2021). Social History:  reports that he has never smoked. He has never used smokeless tobacco. He reports that he does not drink alcohol and does not use drugs. Family History: family history includes Diabetes in his sister; Heart Disease in his mother. Unless otherwise noted, family history is non contributory    The patients home medications have been reviewed. Allergies: Patient has no known allergies.     EKG Interpretation  Interpreted by emergency department physician, Dr. López Solis     12/6/21  Time: 0929    Rhythm: atrial fibrillation - rapid  Rate: tachycardia  Axis: normal  Conduction: normal  ST Segments: no acute change  T Waves: no acute change    Clinical Impression: Atrial fibrillation with rapid ventricular response  Comparison to Old EKG  Changes from prior      Physical Exam:  Constitutional/General: Alert and oriented x3  Head: Normocephalic and atraumatic  Eyes: PERRL, EOMI, sclera non icteric  ENT: Oropharynx clear, handling secretions  Neck: Supple, full ROM, no stridor, no meningeal signs  Respiratory: Lungs clear to auscultation bilaterally, no wheezes, rales, or rhonchi. Not in respiratory distress  Cardiovascular: Irregularly irregular, tachycardic . No murmurs, no gallops, no rubs. 2+ distal pulses. Equal extremity pulses. GI:  Abdomen Soft, Non tender, Non distended. No rebound, guarding, or rigidity. No pulsatile masses. Musculoskeletal: Moves all extremities x 4. Warm and well perfused,  no clubbing, no cyanosis, no edema. Palpable peripheral pulses  Right lower extremity: Tenderness to palpation along the patella and proximal tibia/lower knee. He is unable to straight leg raise. Range of motion the knee causes significant pain along this area. There is very slight prepatellar effusion but no overlying erythema and no warmth. No crepitus. The remainder of the extremity is neurovascularly intact. He has palpable pulses. No signs of acute limb ischemia. No calf tenderness or palpable cords. No leg swelling. No cyanosis or discoloration. Compartments soft and compressible   Integument: skin warm and dry. No rashes. Neurologic: GCS 15, no focal deficits  Psychiatric: Normal Affect      I directly supervised any procedures performed by the resident and was present for the procedure including all critical portions of the procedure      The cardiac monitor revealed atrial fibrillation with a heart rate in the 120s as interpreted by me. The cardiac monitor was ordered secondary to the patient's palpitations and to monitor the patient for dysrhythmia. CPT [id number]      I, Dr. Jonathan No, am the primary provider of record    My Medical Decision Making:         Pain and swelling of the right knee, initially concerns of possible hemarthrosis or septic joint based on the initial lack of report of trauma as well as swelling that he reported and significant pain. No fluid was able to be obtained on arthrocentesis.  Additional exam testing concerning for possible patellar tendon injury as he cannot straight leg raise and has pain along

## 2021-12-06 NOTE — ED NOTES
Bed: 31  Expected date:   Expected time:   Means of arrival:   Comments:  [facility] in this room temporarily     Nat Rubinstein, RN  12/06/21 1300

## 2021-12-06 NOTE — ED PROVIDER NOTES
This is a 78-year-old male presenting to the emergency department for right knee pain. The knee pain has been ongoing for the last 3 days. He denies any recent trauma, but does state that he slipped and hit his leg when getting out of the bus in the snow about a week ago. Patient's pain is constant, mildly relieved by topical lidocaine patches and Tylenol. The pain is in the anterior right knee, nonradiating, aching character, worse with bearing weight/walking. Patient states that he has had difficulty getting around the past few days, but has been able to bear weight. Denies any fevers or chills. EMS report that patient with significant tachycardia, concern for atrial fibrillation, he was given 324 mg of aspirin by EMS. He denies any chest pain or shortness of breath or palpitations. Denies any lightheadedness or syncope. He denies any known history of atrial fibrillation, is not on any anticoagulation. The history is provided by the patient and medical records. Review of Systems   Constitutional: Negative for chills and fever. HENT: Negative for congestion and sore throat. Eyes: Negative for visual disturbance. Respiratory: Negative for cough and shortness of breath. Cardiovascular: Negative for chest pain. Gastrointestinal: Negative for abdominal pain, nausea and vomiting. Genitourinary: Negative for dysuria and flank pain. Musculoskeletal: Positive for gait problem and joint swelling. Negative for back pain, neck pain and neck stiffness. Skin: Negative for rash and wound. Neurological: Negative for syncope and light-headedness. Physical Exam  Vitals and nursing note reviewed. Constitutional:       Appearance: He is not ill-appearing, toxic-appearing or diaphoretic. HENT:      Head: Normocephalic and atraumatic. Right Ear: External ear normal.      Left Ear: External ear normal.   Eyes:      Extraocular Movements: Extraocular movements intact. Conjunctiva/sclera: Conjunctivae normal.   Cardiovascular:      Rate and Rhythm: Tachycardia present. Rhythm irregular. Pulses: Normal pulses. Heart sounds: Normal heart sounds. Pulmonary:      Effort: Pulmonary effort is normal.      Breath sounds: Normal breath sounds. Abdominal:      Comments: Obese, soft, nontender   Musculoskeletal:         General: Swelling and tenderness present. No deformity. Cervical back: Normal range of motion and neck supple. Right lower leg: No edema. Left lower leg: No edema. Comments: Mild swelling behind the right patella, no overlying erythema or warmth. No obvious bony tenderness, negative anterior drawer test.  Significant tenderness to  palpation over anterior knee and patella. Skin:     General: Skin is warm and dry. Capillary Refill: Capillary refill takes less than 2 seconds. Neurological:      General: No focal deficit present. Mental Status: He is alert. Psychiatric:         Mood and Affect: Mood normal.         Behavior: Behavior normal.         Thought Content: Thought content normal.         Judgment: Judgment normal.          Procedures   Arthrocentesis Procedure Note    Indication: Joint pain, joint swelling and to obtain joint fluid for diagnostic testing    Consent: The patient was counseled regarding the procedure, it's indications, risks, potential complications and alternatives and any questions were answered. Consent was obtained. Procedure: The right knee was positioned appropriately and the landmarks were identified. Local anesthesia was obtained by infiltration using 2cc 1% Lidocaine without epinephrine. The area was then prepped and draped in the usual sterile fashion. A needle was then introduced into the joint space at which point No fluid was able to be aspirated. A joint injection was not performed. No labs were ordered as no fluid was obtained. .  A sterile dressing was then applied to the site.    The patient tolerated the procedure well. Complications: None    MDM  Number of Diagnoses or Management Options  Acute pain of right knee  Atrial fibrillation with rapid ventricular response (HCC)  Hypokalemia  Patellar tendon rupture, right, initial encounter  Diagnosis management comments: This is a 67-year-old male presenting to the emergency department for right knee pain. Patient with extreme tenderness to palpation over the right knee, difficulty walking. Patient found to incidentally be in atrial fibrillation with rapid ventricular response. Patient with no history of atrial fibrillation per patient and old records, not on any anticoagulation. Patient given Cardizem bolus, fluids with no significant improvement. Patient was then started on Cardizem drip, this was maxed out with improvement in patient's A. fib RVR. Patient's knee with no acute fracture identified on x-ray. Attempted arthrocentesis, but no significant fluid aspirated, suspect swelling is suprapatellar. Patient with inability to extend the leg, consistent with patellar tendon rupture, orthopedics was consulted and recommended knee immobilizer. Patient's cardiac work-up overall unremarkable, mildly elevated troponin, stable on repeat. TSH normal.  Mild hypokalemia, orally repleted. Patient will be admitted to hospital for atrial fibrillation with RVR, he is comfortable with this plan all questions were answered. ED Course as of 12/06/21 1651   Mon Dec 06, 2021   3516 EKG: This EKG is signed and interpreted by me. Rate: 127  Rhythm: Atrial fibrillation  Interpretation: atrial fibrillation (new onset) with rapid ventricular response  Comparison: changes compared to previous EKG , new onset A. fib. [RH]   1331 Discussed case with Dr. Myrtle Gonzales, he agreed to accept patient. [RH]      ED Course User Index  [RH] [address], DO     ED Course as of 12/06/21 1651   Mon Dec 06, 2021   4746 EKG:   This EKG is signed and interpreted by me. Rate: 127  Rhythm: Atrial fibrillation  Interpretation: atrial fibrillation (new onset) with rapid ventricular response  Comparison: changes compared to previous EKG , new onset A. fib. [RH]   1331 Discussed case with Dr. Shabnam Fowler, he agreed to accept patient. [RH]      ED Course User Index  [RH] [address], DO       --------------------------------------------- PAST HISTORY ---------------------------------------------  Past Medical History:  has a past medical history of Cervical vertebra fusion, Chronic kidney disease (CKD), stage III (moderate) ([facility] [address] Foot injury, Hypertension, Stroke ([facility] [address] Type II or unspecified type diabetes mellitus without mention of complication, not stated as uncontrolled, and Ulcers, corneal.    Past Surgical History:  has a past surgical history that includes fracture surgery; Rotator cuff repair (1999); Spine surgery; Kidney stone surgery (01/23/2010); Colonoscopy; Upper gastrointestinal endoscopy; Upper gastrointestinal endoscopy (N/A, 9/13/2021); Colonoscopy (N/A, 9/13/2021); and Upper gastrointestinal endoscopy (N/A, 9/13/2021). Social History:  reports that he has never smoked. He has never used smokeless tobacco. He reports that he does not drink alcohol and does not use drugs. Family History: family history includes Diabetes in his sister; Heart Disease in his mother. The patients home medications have been reviewed. Allergies: Patient has no known allergies.     -------------------------------------------------- RESULTS -------------------------------------------------    LABS:  Results for orders placed or performed during the hospital encounter of 12/06/21   CBC Auto Differential   Result Value Ref Range    WBC 9.1 4.5 - 11.5 E9/L    RBC 4.92 3.80 - 5.80 E12/L    Hemoglobin 13.8 12.5 - 16.5 g/dL    Hematocrit 42.0 37.0 - 54.0 %    MCV 85.4 80.0 - 99.9 fL    MCH 28.0 26.0 - 35.0 pg    MCHC 32.9 32.0 - 34.5 %    RDW 13.5 11.5 - 15.0 fL    Platelets 358 069 - 662 E9/L    MPV 11.3 7.0 - 12.0 fL    Neutrophils % 67.5 43.0 - 80.0 %    Immature Granulocytes % 0.4 0.0 - 5.0 %    Lymphocytes % 21.4 20.0 - 42.0 %    Monocytes % 10.3 2.0 - 12.0 %    Eosinophils % 0.1 0.0 - 6.0 %    Basophils % 0.3 0.0 - 2.0 %    Neutrophils Absolute 6.10 1.80 - 7.30 E9/L    Immature Granulocytes # 0.04 E9/L    Lymphocytes Absolute 1.94 1.50 - 4.00 E9/L    Monocytes Absolute 0.93 0.10 - 0.95 E9/L    Eosinophils Absolute 0.01 (L) 0.05 - 0.50 E9/L    Basophils Absolute 0.03 0.00 - 0.20 E9/L   Comprehensive Metabolic Panel   Result Value Ref Range    Sodium 141 132 - 146 mmol/L    Potassium 3.4 (L) 3.5 - 5.0 mmol/L    Chloride 105 98 - 107 mmol/L    CO2 21 (L) 22 - 29 mmol/L    Anion Gap 15 7 - 16 mmol/L    Glucose 121 (H) 74 - 99 mg/dL    BUN 17 6 - 23 mg/dL    CREATININE 2.0 (H) 0.7 - 1.2 mg/dL    GFR Non-African American 40 >=60 mL/min/1.73    GFR African American 40     Calcium 9.1 8.6 - 10.2 mg/dL    Total Protein 8.1 6.4 - 8.3 g/dL    Albumin 3.9 3.5 - 5.2 g/dL    Total Bilirubin 0.5 0.0 - 1.2 mg/dL    Alkaline Phosphatase 116 40 - 129 U/L    ALT 17 0 - 40 U/L    AST 25 0 - 39 U/L   Magnesium   Result Value Ref Range    Magnesium 1.9 1.6 - 2.6 mg/dL   TSH WITHOUT REFLEX   Result Value Ref Range    TSH 0.744 0.270 - 4.200 uIU/mL   Troponin   Result Value Ref Range    Troponin, High Sensitivity 23 (H) 0 - 11 ng/L   Brain Natriuretic Peptide   Result Value Ref Range    Pro-BNP 1,875 (H) 0 - 125 pg/mL   SEDIMENTATION RATE   Result Value Ref Range    Sed Rate 21 (H) 0 - 15 mm/Hr   C-REACTIVE PROTEIN   Result Value Ref Range    CRP 3.5 (H) 0.0 - 0.4 mg/dL   Troponin   Result Value Ref Range    Troponin, High Sensitivity 21 (H) 0 - 11 ng/L   EKG 12 Lead   Result Value Ref Range    Ventricular Rate 127 BPM    Atrial Rate 117 BPM    QRS Duration 78 ms    Q-T Interval 322 ms    QTc Calculation (Bazett) 467 ms    R Axis 17 degrees    T Axis 179 degrees       RADIOLOGY:  XR CHEST PORTABLE   Final Result   No acute process. XR KNEE RIGHT (3 VIEWS)   Final Result   1. Mild degenerative changes of the right knee. 2. There is no fracture dislocation.               ------------------------- NURSING NOTES AND VITALS REVIEWED ---------------------------  Date / Time Roomed:  12/6/2021  6:59 AM  ED Bed Assignment:  [facility]    The nursing notes within the ED encounter and vital signs as below have been reviewed. Patient Vitals for the past 24 hrs:   BP Temp Pulse Resp SpO2 Height Weight   12/06/21 1251 (!) 184/118  137 24 99 %     12/06/21 1220 (!) 157/72  113 18      12/06/21 1002 135/79  113 18 99 %     12/06/21 0656 (!) 140/117 98 °F (36.7 °C) 142 20 98 % 6' 2\" (1.88 m) 275 lb (124.7 kg)       Oxygen Saturation Interpretation: Normal    ------------------------------------------ PROGRESS NOTES ------------------------------------------  Re-evaluation(s):  See ED COURSE    Counseling:  I have spoken with the patient and discussed todays results, in addition to providing specific details for the plan of care and counseling regarding the diagnosis and prognosis. Their questions are answered at this time and they are agreeable with the plan of admission.    --------------------------------- ADDITIONAL PROVIDER NOTES ---------------------------------  Consultations:  See ED COURSE  This patient's ED course included: a personal history and physicial examination, re-evaluation prior to disposition, multiple bedside re-evaluations, IV medications, cardiac monitoring, continuous pulse oximetry and complex medical decision making and emergency management    This patient has remained hemodynamically stable during their ED course. Diagnosis:  1. Atrial fibrillation with rapid ventricular response ([address]    2. Acute pain of right knee    3. Hypokalemia    4.  Patellar tendon rupture, right, initial encounter        Disposition:  Patient's disposition: Admit to telemetry  Patient's condition is stable.          [address], DO  Resident  12/06/21 9604

## 2021-12-07 LAB
ANION GAP SERPL CALCULATED.3IONS-SCNC: 14 MMOL/L (ref 7–16)
BASOPHILS ABSOLUTE: 0.03 E9/L (ref 0–0.2)
BASOPHILS RELATIVE PERCENT: 0.4 % (ref 0–2)
BUN BLDV-MCNC: 24 MG/DL (ref 6–23)
CALCIUM SERPL-MCNC: 8.1 MG/DL (ref 8.6–10.2)
CHLORIDE BLD-SCNC: 103 MMOL/L (ref 98–107)
CO2: 20 MMOL/L (ref 22–29)
CREAT SERPL-MCNC: 2.1 MG/DL (ref 0.7–1.2)
EOSINOPHILS ABSOLUTE: 0.03 E9/L (ref 0.05–0.5)
EOSINOPHILS RELATIVE PERCENT: 0.4 % (ref 0–6)
GFR AFRICAN AMERICAN: 38
GFR NON-AFRICAN AMERICAN: 38 ML/MIN/1.73
GLUCOSE BLD-MCNC: 104 MG/DL (ref 74–99)
HCT VFR BLD CALC: 40.9 % (ref 37–54)
HEMOGLOBIN: 13.2 G/DL (ref 12.5–16.5)
IMMATURE GRANULOCYTES #: 0.02 E9/L
IMMATURE GRANULOCYTES %: 0.3 % (ref 0–5)
LYMPHOCYTES ABSOLUTE: 1.82 E9/L (ref 1.5–4)
LYMPHOCYTES RELATIVE PERCENT: 23.6 % (ref 20–42)
MAGNESIUM: 1.9 MG/DL (ref 1.6–2.6)
MCH RBC QN AUTO: 27.8 PG (ref 26–35)
MCHC RBC AUTO-ENTMCNC: 32.3 % (ref 32–34.5)
MCV RBC AUTO: 86.3 FL (ref 80–99.9)
METER GLUCOSE: 109 MG/DL (ref 74–99)
METER GLUCOSE: 114 MG/DL (ref 74–99)
METER GLUCOSE: 114 MG/DL (ref 74–99)
METER GLUCOSE: 124 MG/DL (ref 74–99)
MONOCYTES ABSOLUTE: 0.85 E9/L (ref 0.1–0.95)
MONOCYTES RELATIVE PERCENT: 11 % (ref 2–12)
NEUTROPHILS ABSOLUTE: 4.95 E9/L (ref 1.8–7.3)
NEUTROPHILS RELATIVE PERCENT: 64.3 % (ref 43–80)
PDW BLD-RTO: 13.6 FL (ref 11.5–15)
PLATELET # BLD: 201 E9/L (ref 130–450)
PMV BLD AUTO: 11.8 FL (ref 7–12)
POTASSIUM REFLEX MAGNESIUM: 3.7 MMOL/L (ref 3.5–5)
POTASSIUM SERPL-SCNC: 3.4 MMOL/L (ref 3.5–5)
RBC # BLD: 4.74 E12/L (ref 3.8–5.8)
SODIUM BLD-SCNC: 137 MMOL/L (ref 132–146)
WBC # BLD: 7.7 E9/L (ref 4.5–11.5)

## 2021-12-07 PROCEDURE — 99222 1ST HOSP IP/OBS MODERATE 55: CPT | Performed by: FAMILY MEDICINE

## 2021-12-07 PROCEDURE — 93005 ELECTROCARDIOGRAM TRACING: CPT

## 2021-12-07 PROCEDURE — 2140000000 HC CCU INTERMEDIATE R&B

## 2021-12-07 PROCEDURE — 97161 PT EVAL LOW COMPLEX 20 MIN: CPT

## 2021-12-07 PROCEDURE — 83735 ASSAY OF MAGNESIUM: CPT

## 2021-12-07 PROCEDURE — 6370000000 HC RX 637 (ALT 250 FOR IP): Performed by: INTERNAL MEDICINE

## 2021-12-07 PROCEDURE — 85025 COMPLETE CBC W/AUTO DIFF WBC: CPT

## 2021-12-07 PROCEDURE — 97165 OT EVAL LOW COMPLEX 30 MIN: CPT

## 2021-12-07 PROCEDURE — 6370000000 HC RX 637 (ALT 250 FOR IP)

## 2021-12-07 PROCEDURE — 2580000003 HC RX 258

## 2021-12-07 PROCEDURE — 84132 ASSAY OF SERUM POTASSIUM: CPT

## 2021-12-07 PROCEDURE — 97116 GAIT TRAINING THERAPY: CPT

## 2021-12-07 PROCEDURE — 82962 GLUCOSE BLOOD TEST: CPT

## 2021-12-07 PROCEDURE — 99223 1ST HOSP IP/OBS HIGH 75: CPT | Performed by: INTERNAL MEDICINE

## 2021-12-07 PROCEDURE — 36415 COLL VENOUS BLD VENIPUNCTURE: CPT

## 2021-12-07 PROCEDURE — 97535 SELF CARE MNGMENT TRAINING: CPT

## 2021-12-07 PROCEDURE — 2500000003 HC RX 250 WO HCPCS

## 2021-12-07 PROCEDURE — 6360000002 HC RX W HCPCS

## 2021-12-07 PROCEDURE — 80048 BASIC METABOLIC PNL TOTAL CA: CPT

## 2021-12-07 RX ORDER — METOPROLOL SUCCINATE 50 MG/1
50 TABLET, EXTENDED RELEASE ORAL 2 TIMES DAILY
Status: DISCONTINUED | OUTPATIENT
Start: 2021-12-07 | End: 2021-12-10 | Stop reason: HOSPADM

## 2021-12-07 RX ORDER — POTASSIUM CHLORIDE 20 MEQ/1
20 TABLET, EXTENDED RELEASE ORAL
Status: COMPLETED | OUTPATIENT
Start: 2021-12-07 | End: 2021-12-07

## 2021-12-07 RX ADMIN — DEXTROSE MONOHYDRATE 15 MG/HR: 50 INJECTION, SOLUTION INTRAVENOUS at 08:50

## 2021-12-07 RX ADMIN — POTASSIUM CHLORIDE 20 MEQ: 20 TABLET, EXTENDED RELEASE ORAL at 16:50

## 2021-12-07 RX ADMIN — Medication 10 ML: at 08:38

## 2021-12-07 RX ADMIN — POTASSIUM BICARBONATE 20 MEQ: 782 TABLET, EFFERVESCENT ORAL at 09:54

## 2021-12-07 RX ADMIN — PANTOPRAZOLE SODIUM 40 MG: 40 TABLET, DELAYED RELEASE ORAL at 07:14

## 2021-12-07 RX ADMIN — ASPIRIN 81 MG: 81 TABLET, COATED ORAL at 08:38

## 2021-12-07 RX ADMIN — HYDROCHLOROTHIAZIDE 12.5 MG: 12.5 TABLET ORAL at 08:37

## 2021-12-07 RX ADMIN — POTASSIUM CHLORIDE 20 MEQ: 20 TABLET, EXTENDED RELEASE ORAL at 18:43

## 2021-12-07 RX ADMIN — ACETAMINOPHEN 650 MG: 325 TABLET ORAL at 15:56

## 2021-12-07 RX ADMIN — METOPROLOL SUCCINATE 50 MG: 50 TABLET, EXTENDED RELEASE ORAL at 20:46

## 2021-12-07 RX ADMIN — DEXTROSE MONOHYDRATE 15 MG/HR: 50 INJECTION, SOLUTION INTRAVENOUS at 21:24

## 2021-12-07 RX ADMIN — HYDRALAZINE HYDROCHLORIDE 25 MG: 25 TABLET, FILM COATED ORAL at 08:37

## 2021-12-07 RX ADMIN — Medication 10 ML: at 20:47

## 2021-12-07 RX ADMIN — HYDRALAZINE HYDROCHLORIDE 25 MG: 25 TABLET, FILM COATED ORAL at 15:56

## 2021-12-07 RX ADMIN — LISINOPRIL 30 MG: 10 TABLET ORAL at 08:37

## 2021-12-07 RX ADMIN — INSULIN LISPRO 6 UNITS: 100 INJECTION, SOLUTION INTRAVENOUS; SUBCUTANEOUS at 11:57

## 2021-12-07 RX ADMIN — ENOXAPARIN SODIUM 40 MG: 100 INJECTION SUBCUTANEOUS at 08:38

## 2021-12-07 RX ADMIN — INSULIN LISPRO 6 UNITS: 100 INJECTION, SOLUTION INTRAVENOUS; SUBCUTANEOUS at 08:38

## 2021-12-07 RX ADMIN — INSULIN LISPRO 6 UNITS: 100 INJECTION, SOLUTION INTRAVENOUS; SUBCUTANEOUS at 16:50

## 2021-12-07 RX ADMIN — HYDRALAZINE HYDROCHLORIDE 25 MG: 25 TABLET, FILM COATED ORAL at 20:46

## 2021-12-07 RX ADMIN — POTASSIUM CHLORIDE 20 MEQ: 20 TABLET, EXTENDED RELEASE ORAL at 19:24

## 2021-12-07 ASSESSMENT — PAIN DESCRIPTION - ORIENTATION
ORIENTATION: RIGHT
ORIENTATION: RIGHT

## 2021-12-07 ASSESSMENT — PAIN DESCRIPTION - LOCATION
LOCATION: KNEE
LOCATION: KNEE

## 2021-12-07 ASSESSMENT — PAIN DESCRIPTION - FREQUENCY
FREQUENCY: CONTINUOUS
FREQUENCY: CONTINUOUS

## 2021-12-07 ASSESSMENT — PAIN DESCRIPTION - DESCRIPTORS
DESCRIPTORS: ACHING;DISCOMFORT
DESCRIPTORS: ACHING

## 2021-12-07 ASSESSMENT — PAIN DESCRIPTION - PROGRESSION: CLINICAL_PROGRESSION: NOT CHANGED

## 2021-12-07 ASSESSMENT — PAIN DESCRIPTION - ONSET: ONSET: AWAKENED FROM SLEEP

## 2021-12-07 ASSESSMENT — PAIN - FUNCTIONAL ASSESSMENT: PAIN_FUNCTIONAL_ASSESSMENT: ACTIVITIES ARE NOT PREVENTED

## 2021-12-07 ASSESSMENT — PAIN SCALES - GENERAL
PAINLEVEL_OUTOF10: 2
PAINLEVEL_OUTOF10: 0
PAINLEVEL_OUTOF10: 0

## 2021-12-07 ASSESSMENT — PAIN DESCRIPTION - PAIN TYPE
TYPE: ACUTE PAIN
TYPE: ACUTE PAIN

## 2021-12-07 NOTE — PROGRESS NOTES
Physical Therapy  Physical Therapy Initial Assessment     Name: Gavino Barr  : 1950  MRN: [id number]      Date of Service: 2021    Evaluating PT:  Richelle Trejo PT, DPT    Room #:  1944/7148-O  Diagnosis:  Hypokalemia [E87.6]  Atrial fibrillation with rapid ventricular response ([address] [I48.91]  Patellar tendon rupture, right, initial encounter [S86.811A]  Acute pain of right knee [M25.561]  PMHx/PSHx:  Afib, DM, HTN  Procedure/Surgery:  N/A  Precautions:  RLE WBAT in knee immobilizer  Equipment Needs:  TBD    SUBJECTIVE:    Pt lives alone in a 4th floor apt with elevator access to enter. Pt ambulated with no AD PTA. OBJECTIVE:   Initial Evaluation  Date: 21 Treatment Short Term/ Long Term   Goals   AM-PAC 6 Clicks 32/49     Was pt agreeable to Eval/treatment? yes     Does pt have pain?  /10 R knee     Bed Mobility  Rolling: SBA  Supine to sit: SBA  Sit to supine: NT  Scooting: SBA  Rolling: IND  Supine to sit: IND  Sit to supine: IND  Scooting: IND   Transfers Sit to stand: min A  Stand to sit: CGA  Stand pivot: CGA with ww  Sit to stand: IND  Stand to sit: IND  Stand pivot: mod I with ww   Ambulation    15'x2, 10'x1 with ww min A  75'x4 with AAD mod I   Stair negotiation: ascended and descended NT       Strength/ROM:   R knee in knee immobilizer  LLE AROM WFL  R hip 2+/5, R ankle DF/PF 4/5  LLE grossly 4+/5    Balance:   Static Sitting: SBA  Dynamic Sitting: CGA  Static Standing: SBA with ww  Dynamic Standing: min A with ww    Pt is A & O x 3  Sensation:  Pt denies numbness and tingling to extremities  Edema:  unremarkable    Therapeutic Exercises:    Bed mobility: supine<>sit, cued for EOB positioning  Transfers: STSx2, cued for hand placement and postural correction  Ambulation: 15'x2, 10'x2 with ww  BLE AROM    Patient education  Pt educated on role of PT, importance of functional mobility during hospital stay, d/c planning    Patient response to education:   Pt verbalized understanding Pt demonstrated skill Pt requires further education in this area   yes sometimes yes     ASSESSMENT:    Conditions Requiring Skilled Therapeutic Intervention:    [x]Decreased strength     [x]Decreased ROM  [x]Decreased functional mobility  [x]Decreased balance   [x]Decreased endurance   []Decreased posture  []Decreased sensation  []Decreased coordination   []Decreased vision  [x]Decreased safety awareness   [x]Increased pain       Comments:    Pt supine in bed upon entering, agreeable to participate. Pt's knee immobilizer donned prior to functional mobility assessment. Pt instructed to transfer to EOB, completing transfer well with no c/o dizziness with position change. Pt reminded of WB status prior to attempting STS transfer. Pt cued for hand placement and postural correction upon standing. Pt demonstrating good static standing balance with ww. Pt cued for weight shifting to determine pt's tolerance for increased WB through RLE. Pt then instructed to ambulate to tolerance with ww. Pt demonstrating antalgic pattern, slow markel and poor spacing at times with ww. Pt cued for sequencing and assisted with ww spacing to assure pt safety during gait training. Pt requested to use bathroom after initial 15' and he was assisted into the bathroom, cued for postioning and hand placement for safe transfer onto toilet. Pt then assisted to bedside chair, continued cueing for safe sequencing and use of ww. Pt remained in bedside chair at the end of session, RLE elevated on stool for comfort. Call bell in reach and all needs met prior to exiting. Pt's nurse aware of pt's performance.     Treatment:  Patient practiced and was instructed in the following treatment:     Bed mobility training - pt given verbal and tactile cues to facilitate proper sequencing and safety during rolling and supine>sit as well as provided with physical assistance to complete task    STS and pivot transfer training - pt educated on proper hand and foot placement, safety and sequencing, and use of verbal and tactile cues to safely complete sit<>stand and pivot transfers with hands on assistance to complete task safely    Gait training- pt was given verbal and tactile cues to facilitate sequencing and ww positioning during ambulation as well as provided with physical assistance. Pt's/ family goals   1. Return home    Prognosis is good for reaching above PT goals. Patient and or family understand(s) diagnosis, prognosis, and plan of care. yes    PHYSICAL THERAPY PLAN OF CARE:    PT POC is established based on physician order and patient diagnosis     Referring provider/PT Order:  Ludy Mcrae MD  Diagnosis:  Hypokalemia [E87.6]  Atrial fibrillation with rapid ventricular response ([facility] [address] [I48.91]  Patellar tendon rupture, right, initial encounter [C08.549Y]  Acute pain of right knee [M25.561]  Specific instructions for next treatment:  Progress as tolerated, transfer and gait training    Current Treatment Recommendations:     [x] Strengthening to improve independence with functional mobility   [] ROM to improve independence with functional mobility   [x] Balance Training to improve static/dynamic balance and to reduce fall risk  [x] Endurance Training to improve activity tolerance during functional mobility   [x] Transfer Training to improve safety and independence with all functional transfers   [x] Gait Training to improve gait mechanics, endurance and assess need for appropriate assistive device  [] Stair Training in preparation for safe discharge home and/or into the community   [] Positioning to prevent skin breakdown and contractures  [x] Safety and Education Training   [x] Patient/Caregiver Education   [] HEP  [] Other     PT long term treatment goals are located in above grid    Frequency of treatments: 2-5x/week x 1-2 weeks.     Time in  1345  Time out  1405    Total Treatment Time  10 minutes     Evaluation Time includes thorough review of current medical information, gathering information on past medical history/social history and prior level of function, completion of standardized testing/informal observation of tasks, assessment of data and education on plan of care and goals.     CPT codes:  [x] Low Complexity PT evaluation 97149  [] Moderate Complexity PT evaluation 60866  [] High Complexity PT evaluation 55910  [] PT Re-evaluation 61093  [x] Gait training 50246 10 minutes  [] Manual therapy [id number] -- minutes  [] Therapeutic activities 49607 -- minutes  [] Therapeutic exercises 08061 -- minutes  [] Neuromuscular reeducation 67009 -- minutes     Eugene Arenas, PT, DPT  [id number]

## 2021-12-07 NOTE — CONSULTS
Inpatient Cardiology Consultation      Reason for Consult:  New Onset Atrial Fibrillation with RVR    Consulting Physician: Dr. Sergio Wright    Requesting Physician:  Dr. Ramón Meyer    Date of Consultation: 12/7/2021    HISTORY OF PRESENT ILLNESS:   Mr. Gerhard Jeffrey is a 81-year-old -American male who is previously not known to [facility]) Cardiology Physicians in [facility]. His medical history includes obesity, HTN, CVA, T2DM with polyneuropathy with longstanding insulin use, CKD stage III, depression, and gastric ulcer. Mr. Gerhard Jeffrey presented to [facility] ED on 12/06/2021 with complaints of right knee pain. He states that prior to presentation he \" slipped on the ice getting on the bus\" and subsequently fell on his right knee on 12/03/2021. He states that since that time he has been having worsening right knee pain with difficulty ambulating. He denies accompanying chest pain. He states over the course of the past 6 months he has been having ADDISON. He denies orthopnea, PND, edema. Denies palpitations or feelings of his heart racing. He summoned EMS on 12/06/2021 due to worsening right knee pain. Per ED documentation upon their arrival his HR was noted to be 120-150s and he did receive  mg in route. Upon arrival to the ED his VS were oral temperature -/117-98% on RA. EKG atrial fibrillation with RVR. WBC 9.1. H&H 13.8/42. . K3.4.  BUN/SCR 17/2. TSH unremarkable. Troponin of 23. Sed rate 21. CRP 3.5. CXR unremarkable. Right knee x-ray without acute changes. He received Norco, K-Dur 40 mEq, Cardizem 10 mg IV and was placed on IV Cardizem continuous infusion, Marcaine and morphine. Orthopedic surgery was also consulted. Cardiology was consulted for management of new onset of Atrial Fibrillation with RVR. Please note: past medical records were reviewed per electronic medical record (EMR) - see detailed reports under Past Medical/ Surgical History.    Past Medical and Surgical History:    1. HTN  2. Obesity  3. CVA  4. T2 DM with polyneuropathy with longstanding insulin use  5. CKD Stage III  6. Hx Gastric Ulcer  7. Depression  8. S/p cervical fusion with residual right neck pain, lithotripsy, rotator cuff repair on the left  9. 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  Authorizing Provider   ASPIRIN LOW DOSE 81 MG EC tablet TAKE 1 TABLET BY MOUTH DAILY 11/23/21   Rojelio Cisse MD   pantoprazole (PROTONIX) 40 MG tablet TAKE 1 TABLET BY MOUTH DAILY 11/23/21   Rojelio Cisse MD   lidocaine (LIDODERM) 5 % Place 1 patch onto the skin daily 12 hours on, 12 hours off. 11/23/21   Eduardo Granger MD   hydroCHLOROthiazide (MICROZIDE) 12.5 MG capsule TAKE 1 CAPSULE BY MOUTH DAILY 11/22/21   Rojelio Cisse MD   lisinopril (PRINIVIL;ZESTRIL) 30 MG tablet Take 30 mg by mouth daily    Historical Provider, MD   BISACODYL 5 MG EC tablet TAKE AS DIRECTED  Patient not taking: Reported on 11/23/2021 9/10/21   Historical Provider, MD   hydrALAZINE (APRESOLINE) 50 MG tablet Take 1 tablet by mouth 3 times daily  Patient taking differently: Take 25 mg by mouth 3 times daily  10/18/21   Rojelio Cisse MD   dilTIAZem WATTS [facility]) 360 MG extended release capsule TAKE ONE CAPSULE BY MOUTH EVERY DAY 9/5/21   Historical Provider, MD   gabapentin (NEURONTIN) 300 MG capsule TAKE 1 CAPSULE BY MOUTH TWICE DAILY 9/9/21   Historical Provider, MD   vitamin D-3 (CHOLECALCIFEROL) 125 MCG (5000 UT) TABS Take 1 tablet by mouth daily 8/24/21   Rojelio Cisse MD   Blood Pressure KIT 1 kit by Does not apply route daily 8/24/21   Rojelio Cisse MD   Alcohol Swabs (B-D SINGLE USE SWABS REGULAR) PADS USE 3 - 4 TIMES DAILY AS DIRECTED 7/31/21   Historical Provider, MD   insulin lispro, 1 Unit Dial, 100 UNIT/ML SOPN Inject 6 Units into the skin 3 times daily Plus  2 units for each 50 above 150 Blood sugar level result    Historical Provider, MD DEL VALLE UF III MINI PEN NEEDLES 31G X 5 MM MISC 1 each by Does not apply route 4 times daily 3/15/21   Historical Provider, MD   FLUoxetine (PROZAC) 20 MG capsule Take 1 capsule by mouth nightly 6/7/21   Historical Provider, MD   tamsulosin (FLOMAX) 0.4 MG capsule Take 0.4 mg by mouth nightly    Historical Provider, MD   insulin glargine (LANTUS) 100 UNIT/ML injection vial Inject 55 Units into the skin nightly     Historical Provider, MD       Current Medications:    Current Facility-Administered Medications: dilTIAZem 100 mg in dextrose 5 % 100 mL infusion ([place]), 5-15 mg/hr, IntraVENous, Continuous  lidocaine 1 % injection 10 mL, 10 mL, IntraDERmal, Once  aspirin EC tablet 81 mg, 81 mg, Oral, Daily  FLUoxetine (PROZAC) capsule 20 mg, 20 mg, Oral, Nightly  gabapentin (NEURONTIN) capsule 300 mg, 300 mg, Oral, BID  hydrALAZINE (APRESOLINE) tablet 25 mg, 25 mg, Oral, TID  hydroCHLOROthiazide (HYDRODIURIL) tablet 12.5 mg, 12.5 mg, Oral, Daily  insulin glargine (LANTUS) injection vial 55 Units, 55 Units, SubCUTAneous, Nightly  insulin lispro (HUMALOG) injection vial 6 Units, 6 Units, SubCUTAneous, TID WC  lidocaine 4 % external patch 1 patch, 1 patch, TransDERmal, Daily  lisinopril (PRINIVIL;ZESTRIL) tablet 30 mg, 30 mg, Oral, Daily  pantoprazole (PROTONIX) tablet 40 mg, 40 mg, Oral, Daily  sodium chloride flush 0.9 % injection 5-40 mL, 5-40 mL, IntraVENous, 2 times per day  sodium chloride flush 0.9 % injection 5-40 mL, 5-40 mL, IntraVENous, PRN  0.9 % sodium chloride infusion, 25 mL, IntraVENous, PRN  enoxaparin (LOVENOX) injection 40 mg, 40 mg, SubCUTAneous, Daily  ondansetron (ZOFRAN-ODT) disintegrating tablet 4 mg, 4 mg, Oral, Q8H PRN **OR** ondansetron (ZOFRAN) injection 4 mg, 4 mg, IntraVENous, Q6H PRN  polyethylene glycol (GLYCOLAX) packet 17 g, 17 g, Oral, Daily PRN  acetaminophen (TYLENOL) tablet 650 mg, 650 mg, Oral, Q6H PRN **OR** acetaminophen (TYLENOL) suppository 650 mg, 650 mg, Rectal, Q6H PRN  glucose (GLUTOSE) 40 % oral gel 15 g, 15 g, Oral, PRN  dextrose 50 % IV solution, 12.5 g, IntraVENous, PRN  glucagon (rDNA) injection 1 mg, 1 mg, IntraMUSCular, PRN  dextrose 5 % solution, 100 mL/hr, IntraVENous, PRN    Allergies:  Patient has no known allergies. Social History:    Denies tobacco, alcohol, illicit drug use. Caffeine intake includes an occasional soda pop. Family History:   Please note this information was not obtained at this time, as it is limited in nature due to the patient's advanced age. REVIEW OF SYSTEMS:     · Constitutional: Denies fevers, chills, night sweats, and fatigue  · HEENT: Denies headaches, nose bleeds, and blurred vision,oral pain, abscess or lesion. · Musculoskeletal: Denies falls, pain to BLE with ambulation and edema to BLE. Complains of recent fall-see HPI  · Neurological: Denies dizziness and lightheadedness, numbness and tingling  · Cardiovascular: Denies chest pain, palpitations, and feelings of heart racing. · Respiratory: Complains of recent ADDISON-see HPI. Complains of intermittent orthopnea and PND  · Gastrointestinal: Denies heartburn, nausea/vomiting, diarrhea and constipation, black/bloody, and tarry stools. · Genitourinary: Denies dysuria and hematuria  · Hematologic: Denies excessive bruising or bleeding  · Lymphatic: Denies lumps and bumps to neck, axilla, breast, and groin  · Endocrine: Denies excessive thirst. Denies intolerance to hot and cold  · Psychiatric: Denies anxiety and complains of depression for which she follows with counseling. PHYSICAL EXAM:   BP (!) 157/81   Pulse 87   Temp 98.6 °F (37 °C) (Temporal)   Resp 18   Ht 6' 2\" (1.88 m)   Wt 275 lb (124.7 kg)   SpO2 97%   BMI 35.31 kg/m²   CONST:  Well developed, morbidly obese -American elderly male who appears stated age.  Awake, alert, cooperative, no apparent TSH 0.744     HgA1c:   Lab Results   Component Value Date    LABA1C 6.1 (H) 09/20/2021   FASTING LIPID PANEL:  Lab Results   Component Value Date    CHOL 148 09/20/2021    HDL 43 09/20/2021    LDLCALC 87 09/20/2021    TRIG 90 09/20/2021     LIVER PROFILE:  Recent Labs     12/06/21  0746   AST 25   ALT 17   LABALBU 3.9     Results for Denisha Mcallister (MRN [id number]) as of 12/7/2021 08:33   Ref. Range 12/6/2021 07:46 12/6/2021 11:35   Troponin, High Sensitivity Latest Ref Range: 0 - 11 ng/L 23 (H) 21 (H)     12/06/2021 CXR:  No acute process. 12/06/2021 Right Knee XRay:  Mild degenerative changes of the right knee. There is no fracture or dislocation. IMPRESSION and PLAN to follow per Dr. Chilango Arambula     Electronically signed by JACQUES Cohen CNP on 12/7/2021 at 8:28 AM     ___________________________________________________________________________  I independently interviewed and examined the patient. I have reviewed the above documentation completed by the BETSY. Please see my additional contributions to the HPI, physical exam, and assessment / medical decision making. HPI, ROS, PMH, PSH, [address], [place], and medications independently reviewed (agree; see above documentation)    History of Present Illness:  Currently with no chest pain, respiratory distress, or palpitations. AF with episodes of RVR on telemetry.     Review of Systems:   Cardiac: As per HPI  General: No fever, chills  Pulmonary: As per HPI  HEENT: No visual disturbances, difficult swallowing  GI: No nausea, vomiting  : No dysuria, hematuria  Endocrine: No thyroid disease, +DM  Musculoskeletal: PABON x 4, no focal motor deficits  Skin: Intact, no rashes  Neuro: No headache, seizures  Psych: Currently with no depression, anxiety    Physical Exam:  BP (!) 167/80   Pulse 101   Temp 98.4 °F (36.9 °C) (Temporal)   Resp 20   Ht 6' 2\" (1.88 m)   Wt 275 lb (124.7 kg)   SpO2 97%   BMI 35.31 kg/m²   Wt Readings from Last 3 Encounters:   12/06/21 275 TRIG 85 02/22/2021     Lab Results   Component Value Date    HDL 43 09/20/2021    HDL 48 06/14/2021    HDL 48 02/22/2021     Lab Results   Component Value Date    LDLCALC 87 09/20/2021    LDLCALC 88 06/14/2021    LDLCALC 101 (H) 02/22/2021     Lab Results   Component Value Date    LABVLDL 18 09/20/2021    LABVLDL 42 06/14/2021    LABVLDL 17 02/22/2021     No results found for: [address]     12/06/21  0746   PROBNP 1,875*     Cardiac Tests:  Telemetry reviewed (date: 12/7/2021): atrial fibrillation, rate 115    Impression/Plan:  1. Atrial fibrillation with episodes of RVR -- duration of AF unknown, elevated [id number] score  2. Presentation for right knee pain (+mechanical fall) -- orthopedic surgery following for suspected injury to the patellar tendon  3. HTN -- BP elevated, on multiple anti-HTN agents  4. DM -- Hgb A1c 6.1  5. CKD -- most recent Cr 2.1  6. Hypokalemia -- most recent K 3.4  7. History of CVA  8. BMI 35  9. Mildly elevated hs-troponin  10. History of cervical fusion with residual neck pain  11. History of left-sided rotator cuff repair  12. History of PUD    - Will add Toprol XL  - Wean off cardizem drip  - Add OAC if no procedures planned (eliquis 5 mg BID)  - Continue current medications otherwise  - Keep K > 4 and Mg > 2  - Outpatient sleep study if no prior study  - Aggressive risk factor modifications  - Monitor telemetry    Thank you for allowing me to participate in your patient's care. Please feel free to contact me if you have any questions or concerns.     Jovanny Quiñones MD  [facility]) Cardiology

## 2021-12-07 NOTE — PROGRESS NOTES
[address]:                                                  Patient Name: Robin Osuna  MRN: [id number]  : 1950  Room: [address] [place] [address]    Evaluating OT: SVETA Johnston, OTR/L  # [id number]    Referring Provider:  Geovanna Tolentino MD  Specific Provider Orders:  Lyle Pick and Treat\"21    Diagnosis: Hypokalemia [E87.6]  Atrial fibrillation with rapid ventricular response ([address] [I48.91]  Patellar tendon rupture, right, initial encounter [S86.811A]  Acute pain of right knee [M25.561]    Pt was admitted w/ c/o Pain R Knee - Found in A Fib w/ RVR, admitted to Cardiac Unit. Knee evaluated by Ortho    Pertinent Medical History:  Pt  has a past medical history of Cervical vertebra fusion, Chronic kidney disease (CKD), stage III (moderate) ([address] Depression, Foot injury, History of gastric ulcer, Hypertension, Stroke ([address] and Type 2 diabetes mellitus with diabetic polyneuropathy, with long-term current use of insulin ([address] ,  has a past surgical history that includes fracture surgery; Rotator cuff repair (); Spine surgery; Kidney stone surgery (2010); Colonoscopy; Upper gastrointestinal endoscopy; Upper gastrointestinal endoscopy (N/A, 2021); Colonoscopy (N/A, 2021); and Upper gastrointestinal endoscopy (N/A, 2021).   Cervical Fusion , Bradly Carpal Tunnel    Surgeries this admission: None     Precautions:  Fall Risk  WBAT R LE w/ Knee Immobilizer    Assessment of current deficits   [x] Functional mobility   [x]ADLs  [x] Strength               [x]Cognition   [x] Functional transfers   [x] IADLs         [x] Safety Awareness   [x]Endurance   [x] Fine Coordination              [x] Balance      [] Vision/perception   []Sensation    []Gross Motor Coordination  [] ROM  [] Delirium                   [] Motor Control       OT PLAN OF CARE   OT POC based on physician orders, patient diagnosis and results of clinical assessment    Frequency/Duration 1-3 days/wk for 2 weeks PRN   Specific OT Treatment to include:   * Instruction/training on adapted ADL techniques and AE recommendations to increase functional independence within precautions       * Training on energy conservation strategies, correct breathing pattern and techniques to improve independence/tolerance for self-care routine  * Functional transfer/mobility training/DME recommendations for increased independence, safety, and fall prevention  * Patient/Family education to increase follow through with safety techniques and functional independence  * Recommendation of environmental modifications for increased safety with functional transfers/mobility and ADLs  * Splinting/positioning for increased function, prevention of contractures, and improve skin integrity  * Therapeutic exercise to improve motor endurance, ROM, and functional strength for ADLs/functional transfers  * Therapeutic activities to facilitate/challenge dynamic balance, stand tolerance for increased safety and independence with ADLs  * Therapeutic activities to facilitate gross/fine motor skills for increased independence with ADLs  * Neuro-muscular re-education: facilitation of righting/equilibrium reactions, midline orientation, scapular stability/mobility, normalization of muscle tone, and facilitation of volitional active controled movement  * Positioning to improve skin integrity, interaction with environment and functional independence  * Manual techniques for edema management  Other:    Recommended Adaptive Equipment: TBD as pt progresses - Shower Bench, BSC vs Raised Commode Seat, AD, Adaptive equipment      Home Living:  Pt lives alone in a single-level apartment. Level entry, elevator access.      Bathroom setup:  Walk-in-Shower, [facility]   Equipment owned:  None    Available Family Assist: Unknown    Prior Level of Function:  Pt reported being IND with all ADLs, IADLs, Transfers and Mobility using No AD for ambulation. Much difficulty since slipping in snow/ice when transferring off [employer] a few weeks ago - Increased Pain limiting mobility. Pt recently participated in outpatient PT for Symptoms r/t Cervical Spine and in actively being treated by Dr. Hawa Dean for all symptoms  Driving:  No - uses public transportation for all outings, errands  Occupation:  None reported    Pain Level:  4/10 R Knee at rest in supine, increased to 7-8/10 R Knee w/ Light touch, movement and WB;  Relief w/ Rest and Repositioning, Nsg Notified   Additional Complaints:  Mild-Moderate Dizziness w/ Upright ax, Mild SOB    Cognition: A & O x 4   Able to Follow Multi-Step Commands w/ Min-Mod VCs to focus on task d/t being easily distracted   Memory:  good (-)   Sequencing:  good (-)   Problem solving:  good (-)   Judgement/safety:  fair   Additional Comments:  Pt was pleasant and cooperative. VERY Focused on His cont'd symptoms from his Cervical Fusion in 2000 as well as Low Back pain, Bradly Carpal Tunnel and Right Knee Pain.   Inquiring Multiple times about outpatient therapy for aforementioned issues - requesting this therapist to attend to chronic symptoms - Fair Insight requiring Much education and VCs to attend to Cardiac Issues w/ upright ax including SOB and Dizziness for safety purposes    Vitals/Lab Values:  Difficult to consistently obtain reading for O2 sats/HR - Room air, unable to obtain BP  O2 sats in semi-supine prior to initiation of ax = 94%, HR 95  Unable to obtain O2 sats seated EOB,   Unable to obtain O2 sats in standing,   O2 sats in semi-supine at end of session = 93%,      Pt c/o Mild Dizziness seated EOB, Mild-Moderate Dizziness in standing - not forthcoming w/ report of symptoms despite VCs/Pt ed re: importance of reporting symptoms during ax for safety     Functional Assessment: AM-PAC Daily Activity Raw Score: 15/24     Initial Eval Status  Date: 12/7/21   Treatment Status  Date: STGs = LTGs  Time frame: 10-14 days   Feeding SUP/Set up    Occasional assist to open containers on tray per pt - able to feed self, drink from cup    NA   Grooming SUP/Set up    Able to complete simple tasks after set up seated EOB  Unable to tolerate ambulating to or standing at sink    Mod I  Standing at the [employer] A/Set up    Simulated - EOB  Unable to tolerate item retrieval for activities    Mod I     LB Dressing Max A/Set up    Max A to don shoes seated EOB, Knee immobilizer in supine  Mod A for safety w/ standing balance + max A for simulated clothing adjustment over hips  Pt ed for safe/adaptive techs, use of adaptive equip    Mod I     Bathing NT    Pt ed re: Benefits of use of Shower Bench, resources for obtaining equip    Mod I      Toileting NT      Mod I     Bed Mobility  Supine to sit: Min A   Sit to supine:  Min A     Min A to support L LE, Mod VCs for safety, hand placement    Supine to sit: Mod I  Sit to supine:  Mod I     Functional Transfers Mod A    Standing from Raised EOB  Mod VCs after Pt ed/demo for safety/hand placement    Mod I     Functional Mobility Mod A w/ [employer]    Side-stepping 1 step along EOB  Pt ed for safety/improved safety awareness, walker safety    Mod I     Balance Sitting:     Static:  SUP EOB    Dynamic:  Close SUP w/ functional ax EOB     Standing:     Static:  Min A w/ [employer]    Dynamic:  Mod A w/ functional ax/mobility w/ [employer]    Sitting:     Static:  IND    Dynamic:  IND w/ functional ax      Standing:     Static:  Mod I    Dynamic:  Mod I w/ functional ax/mobility w/ AD   Activity Tolerance Fair    Limited by moderately severe R Knee pain, moderate fatigue, Mild SOB, Mild-Moderate dizziness w/ upright ax  Able to tolerate sitting EOB > 15 mins  Able to tolerate standing ~ 1 min  Good(-)   Visual/  Perceptual    Hearing: WFL   Glasses: Yes    WFL   Hearing Aids: No               Hand Dominance: Right   AROM Strength Additional Info:    RUE  WFL 4-/5 proximally  4+/5 distally Fair(+) ;   Fair(+) FMC/dexterity noted during ADL tasks     LUE WFL 4/5 proximally  4+/5 distally Fair(+) ;    Fair(+) FMC/dexterity noted during ADL tasks       Sensation:  Denies numbness or tingling Bradly UEs - intermittent per pt report  Tone: WFL Bradly UEs   Edema: Mild Edema Noted Bradly Hands    Comments: Upon arrival, patient was found in supine. He was agreeable to participate in therapeutic ax. No Family present during session. Received permission from RN prior to engaging pt in OT services. At the end of the session, patient was properly positioned in Semi-Supine. R LE elevated for pain mgmt purposes, edema control, to improve skin integrity. Knee Immobilizer remained Donned for comfort/support. Call light and phone within reach, all lines and tubes intact. Oriented pt to call bell. Made all appropriate Environmental Modifications to facilitate pt's level of IND and safety. All needs met. Bed Alarm activated. Overall patient demonstrated decreased independence and safety during completion of ADL/functional transfer/mobility tasks. Pt would benefit from continued skilled OT to increase safety and independence with completion of ADL/IADL tasks for functional independence and quality of life.     Treatment: OT treatment provided this date includes:    Instruction/training on safety and adapted techniques for completion of ADLs, use of DME/AD/Adaptive equip:     Instruction/training on safe functional mobility/transfer techniques, use of DME/AD:     Instruction/training on energy conservation techs (EC)/Pursed-Lip Breathing (PLB)/work simplification for completion of ADLs:      Neuromuscular Reeducation to facilitate balance/righting reactions for increased function with ADLs:     Skilled positioning/alignment for Pain Mgmt, Skin Integrity, Edema Control, to maximize Pt's safety and ability to [facility] interact w/ his/her environment, maximize respiratory status   Activity tolerance - Sitting/Standing to improve endurance w/ functional ax    Cognitive retraining -  Cues for safety/safety awareness, sequencing, problem solving     Skilled monitoring of Vitals during session and pt's response to tx ax        Consulted RN     Made all appropriate Environmental Modifications to facilitate pt's level of IND and safety.  Recommendations for Continued Participation in OT services during Hospitalization and at D/C     Pt and/or Family verbalized/demonstrated a Fair(+) understanding of education provided. Will Review PRN. Rehab Potential: Good(-) for established goals     Patient / Family Goal: \"Take care of all these problems with my knee, my back and my neck. \"      Patient and/or family were instructed on functional diagnosis, prognosis/goals and OT plan of care. Demonstrated Fair(+) understanding. Eval Complexity: Low    Time In: 1035  Time Out: 1128  Total Treatment Time: 38 minutes    Min Units   OT Eval Low 97165  X  1   OT Eval Medium 08726      OT Eval High 26009      OT Re-Eval [id number]       Therapeutic Ex 46391       Therapeutic Activities 17352       ADL/Self Care 93433  38  3   Orthotic Management 01224       Manual [id number]     Neuro Re-Ed 92024       Non-Billable Time              Evaluation Time additionally includes thorough review of current medical information, gathering information on past medical history/social history and prior level of function, completion of standardized testing/informal observation of tasks, assessment of data and education on plan of care and goals.             Flower May, MOT, OTR/L  # [id number]

## 2021-12-07 NOTE — CARE COORDINATION
Care Coordination:  Met with patient at bedside to assess for discharge planning. Patient admitted thru ER after fall while entering [address] bus. Ortho following for patella tendon rupture. Patient also found to be in A fib and on monitored unit. Echo pending. Knee immobilizer . PT OT ordered. Depending on results ortho may plan further imaging. Patient lives alone in the [facility] at [facility]. There is an elevator. He is a client at the [facility], [place], operated by [facility]. Devendra is his counselor. [phone]  Per patient she was in process of referring him for community based case management services. Patient has limited support. His x girldriend Marianne oMrris is listed as contact but per patient she is unreliable. Patient has lost contact with his children who live in [facility]. He relies on bus transporation. Was independent in self care and homemaking. Has no DME. He receives primary care at the [facility] here [address] is [facility] on [facility]. Patient had an out patient RX to start PT but had not called for appointment. SW discussed most likely need for MANNY ar discharge. He is agreeable to consider options. List provided.   Will follow

## 2021-12-07 NOTE — PROGRESS NOTES
[address] [place] [address] Attending    S: 80 y.o. male presented with right knee pain, and was found incidentally to have newly observed atrial fibrillation with rapid ventricular response. On rounds this morning is feeling better. Still has right knee pain and swelling. O: VS- Blood pressure (!) 148/85, pulse 64, temperature 100 °F (37.8 °C), temperature source Temporal, resp. rate 18, height 6' 2\" (1.88 m), weight 275 lb (124.7 kg), SpO2 96 %. Exam is as noted by resident. Alert and oriented  In no acute distress  Neck supple, no palpable thyroid abnormalities, no carotid bruit  Heart irregularly irregular, rate now controlled  Lungs clear to auscultation  Abdomen supple, no masses or tenderness  Right knee is tender to light palpation. Troponin 21    Impressions:    Active Problems:    Atrial fibrillation with rapid ventricular response (HCC)    Right knee DJD, possible patellar tendon injury    Diabetes mellitus  Hypertension  CKD 3  History of active tuberculosis, currently dormant  History of cervical fusion, lithotripsy, and rotator cuff repair  History of CVA      Plan:     Cardiology following  Telemetry  Replete potassium  Current to rate control  Lantus and Humalog  Immobilize right knee if tolerated  Ice pack  Physical therapy  Gabapentin  Current Facility-Administered Medications   Medication Dose Route Frequency Provider Last Rate Last Admin    perflutren lipid microspheres (DEFINITY) injection 1.65 mg  1.5 mL IntraVENous ONCE PRN JACQUES Lewis - CNP        dilTIAZem 100 mg in dextrose 5 % 100 mL infusion (ADD-[place])  5-15 mg/hr IntraVENous Continuous Fatuma Michelle MD 15 mL/hr at 12/07/21 0850 15 mg/hr at 12/07/21 0850    lidocaine 1 % injection 10 mL  10 mL IntraDERmal Once Fatuma Michelle MD        aspirin EC tablet 81 mg  81 mg Oral Daily Fatuma Michelle MD   81 mg at 12/07/21 0838    FLUoxetine (PROZAC) capsule 20 mg  20 mg Oral Nightly Malu Fernandez MD        gabapentin (NEURONTIN) capsule 300 mg  300 mg Oral BID Malu Fernandez MD        hydrALAZINE (APRESOLINE) tablet 25 mg  25 mg Oral TID Malu Fernandez MD   25 mg at 12/07/21 0837    hydroCHLOROthiazide (HYDRODIURIL) tablet 12.5 mg  12.5 mg Oral Daily Malu Fernandez MD   12.5 mg at 12/07/21 0837    insulin glargine (LANTUS) injection vial 55 Units  55 Units SubCUTAneous Nightly Malu Fernandez MD        insulin lispro (HUMALOG) injection vial 6 Units  6 Units SubCUTAneous TID WC Malu Fernandez MD   6 Units at 12/07/21 1157    lidocaine 4 % external patch 1 patch  1 patch TransDERmal Daily Malu Fernandez MD   1 patch at 12/07/21 0836    lisinopril (PRINIVIL;ZESTRIL) tablet 30 mg  30 mg Oral Daily Malu Fernandez MD   30 mg at 12/07/21 0837    pantoprazole (PROTONIX) tablet 40 mg  40 mg Oral Daily Malu Fernandez MD   40 mg at 12/07/21 0714    sodium chloride flush 0.9 % injection 5-40 mL  5-40 mL IntraVENous 2 times per day Malu Fernandez MD   10 mL at 12/07/21 0838    sodium chloride flush 0.9 % injection 5-40 mL  5-40 mL IntraVENous PRN Malu Fernandez MD        0.9 % sodium chloride infusion  25 mL IntraVENous PRN Malu Fernandez MD        enoxaparin (LOVENOX) injection 40 mg  40 mg SubCUTAneous Daily Malu Fernandez MD   40 mg at 12/07/21 0838    ondansetron (ZOFRAN-ODT) disintegrating tablet 4 mg  4 mg Oral Q8H PRN Malu Fernandez MD        Or    ondansetron [facility]) injection 4 mg  4 mg IntraVENous Q6H PRN Malu Fernandez MD        polyethylene glycol [facility]) packet 17 g  17 g Oral Daily PRN Malu Fernandez MD        acetaminophen (TYLENOL) tablet 650 mg  650 mg Oral Q6H PRN Malu Fernandez MD   650 mg at 12/06/21 2153    Or    acetaminophen (TYLENOL) suppository 650 mg  650 mg Rectal Q6H PRN Johnathan Virk MD        glucose (GLUTOSE) 40 % oral gel 15 g  15 g Oral PRN Johnathan Virk MD        dextrose 50 % IV solution  12.5 g IntraVENous PRN Johnathan Virk MD        glucagon (rDNA) injection 1 mg  1 mg IntraMUSCular TOBY Virk MD        dextrose 5 % solution  100 mL/hr IntraVENous PRJET Virk, MD                Attending Physician Statement  I have reviewed the chart, including any radiology or labs, and seen the patient with the resident(s). I personally reviewed and performed key elements of the history and exam.  I agree with the assessment, plan and orders as documented by the resident. Please refer to the resident note for additional information.       Juan Floyd MD

## 2021-12-07 NOTE — CONSULTS
Department of Orthopedic Surgery  Resident Consult Note          Reason for Consult: Right knee pain    HISTORY OF PRESENT ILLNESS:       Patient is a 80 y.o. male who presents with complaint of right knee pain. He sustained a fall while entering a bus over the weekend. Fall was mechanical in nature and he struck his right knee while ascending the steps into the vehicle. He was unable to ambulate immediately after the injury. He has not been ambulatory over the past several days. He relates no history of injuries to the knee. He denies any numbness or tingling in the extremity. He denies any pain to any other extremities. He is a community ambulator at baseline. No additional orthopedic concerns at this time.       Past Medical History:        Diagnosis Date    Cervical vertebra fusion 01/01/2000    Chronic kidney disease (CKD), stage III (moderate) ([facility] 11/25/2013    Depression     Foot injury     History of gastric ulcer     Hypertension     Stroke [facility])     patient had a stroke in the late 90's    Type 2 diabetes mellitus with diabetic polyneuropathy, with long-term current use of insulin ([facility]      Past Surgical History:        Procedure Laterality Date    COLONOSCOPY      COLONOSCOPY N/A 9/13/2021    COLONOSCOPY POLYPECTOMY SNARE/COLD BIOPSY performed by Melissa Blum MD at [facility] [address]  01/23/2010    2019    [address] [facility]    SPINE SURGERY      UPPER GASTROINTESTINAL ENDOSCOPY      UPPER GASTROINTESTINAL ENDOSCOPY N/A 9/13/2021    EGD BIOPSY performed by Melissa Blum MD at [facility] N/A 9/13/2021    EGD POLYP SNARE performed by Melissa Blum MD at [facility] ENDOSCOPY     Current Medications:   Current Facility-Administered Medications: dilTIAZem 100 mg in dextrose 5 % 100 mL infusion (ADD-[place]), 5-15 mg/hr, IntraVENous, Continuous  lidocaine 1 % injection 10 mL, 10 mL, Heart Disease Mother     Diabetes Sister        REVIEW OF SYSTEMS:  CONSTITUTIONAL:  negative for  fevers, chills  EYES:  negative for blurred vision, visual disturbance  HEENT:  negative for  hearing loss, voice change  RESPIRATORY:  negative for  dyspnea, wheezing  CARDIOVASCULAR:  negative for  chest pain, palpitations  GASTROINTESTINAL:  negative for nausea, vomiting  GENITOURINARY:  negative for frequency, urinary incontinence  HEMATOLOGIC/LYMPHATIC:  negative for bleeding and petechiae  MUSCULOSKELETAL: See HPI  NEUROLOGICAL:  negative for headaches, dizziness  BEHAVIOR/PSYCH:  negative for increased agitation and anxiety    PHYSICAL EXAM:    VITALS:  BP (!) 142/91   Pulse 83   Temp 96.8 °F (36 °C) (Temporal)   Resp 20   Ht 6' 2\" (1.88 m)   Wt 275 lb (124.7 kg)   SpO2 99%   BMI 35.31 kg/m²   CONSTITUTIONAL: Alert, oriented, cooperative in no acute distress  MUSCULOSKELETAL:  Right lower extremity:  Skin intact circumferentially. Ecchymosis and swelling over the anterior tibia, proximal to the tibial tubercle. Tenderness to palpation is exquisite in this region. No readily appreciable defect in the patellar tendon, however, patient does not tolerate any deeper palpation of this region with significant pain.   Bandage in place from apparent attempt for arthrocentesis done by the emergency department  Tenderness to palpation over the anteromedial and anterolateral joint lines  Small effusion present to the knee  No tenderness to palpation about the remainder of the proximal tibia or distal femur  No tenderness to palpation about the ankle, foot, or pain with logroll of the hip  No straight leg raise at the knee, patient tolerates passive straight leg raise  Patient unable to straight leg raise in a seated position, patient unable to hold his leg in a raised position  Dorsalis pedis and posterior tibial pulses are palpable  Sensation grossly intact to the foot in all distributions  Motor function physical exam.  We will trial weightbearing as tolerated in knee immobilizer with physical therapy. If patient is unable to weight-bear, additional imaging may be required for evaluation of occult fracture. No acute orthopedic interventions anticipated at this time. Any further interventions or testing pending PT/OT  Discuss with attending    Orthopaedic Attending    I have seen and evaluated the patient with the resident and agree with the above assessments on today's visit. I have performed the key components of the history and physical examination and concur completely with the findings and plans as documented above.     We will obtain MRI of the right knee to evaluate his patellar tendon integrity, suspect partial tear    Electronically signed by   Benitez Hammond DO  12/8/2021

## 2021-12-07 NOTE — PROGRESS NOTES
[facility] Inpatient   Resident Progress Note    S:  Hospital day: 1    Brief Synopsis: Monica Lazaro is a 80 y.o. male with a PMH of HTN, DM2, Stroke, Depression, CKD and gastric ulcers who presents to the hospital due to right knee pain. Patient HR and BP were elevated. EKG ordered and showing new onset Afib with RVR. Reports palpitations. Denies chest pain, headaches, dizziness, blurry vision, GI symptoms and urinary symptoms. He takes ASA 81 mg at home for hx of stroke and Lisinopril/HCTZ/Hydralazine for HTN. Denies tobacco, alcohol and drugs. No previous Echo in the chart. No acute events overnight. Patient was seen and examined this morning. His BP is elevated, he remains asymptomatic. Refuses wearing the knee immobilizer due to tightness and decreased sensation in the right LE. EKG yesterday showing normal sinus rhythm. Pain is controlled. Eating well, denies N/V. Cont meds:    dilTIAZem (CARDIZEM) 100 mg in dextrose 5% 100 mL (ADD-[place]) 15 mg/hr (12/07/21 0850)    sodium chloride      dextrose       Scheduled meds:    lidocaine  10 mL IntraDERmal Once    aspirin  81 mg Oral Daily    FLUoxetine  20 mg Oral Nightly    gabapentin  300 mg Oral BID    hydrALAZINE  25 mg Oral TID    hydroCHLOROthiazide  12.5 mg Oral Daily    insulin glargine  55 Units SubCUTAneous Nightly    insulin lispro  6 Units SubCUTAneous TID WC    lidocaine  1 patch TransDERmal Daily    lisinopril  30 mg Oral Daily    pantoprazole  40 mg Oral Daily    sodium chloride flush  5-40 mL IntraVENous 2 times per day    enoxaparin  40 mg SubCUTAneous Daily     PRN meds: perflutren lipid microspheres, sodium chloride flush, sodium chloride, ondansetron **OR** ondansetron, polyethylene glycol, acetaminophen **OR** acetaminophen, glucose, dextrose, glucagon (rDNA), dextrose     I reviewed the patient's Past Medical and Surgical History, Medications and Allergies.     O:  VS: BP (!) 157/77   Pulse 114   Temp 98.6 °F (37 °C) (Temporal)   Resp 18   Ht 6' 2\" (1.88 m)   Wt 275 lb (124.7 kg)   SpO2 97%   BMI 35.31 kg/m²     Physical Exam:  Physical Exam  Vitals reviewed. Constitutional:       General: He is not in acute distress. Appearance: Normal appearance. He is obese. HENT:      Head: Normocephalic and atraumatic. Mouth/Throat:      Pharynx: Oropharynx is clear. Neck:      Vascular: No carotid bruit. Cardiovascular:      Rate and Rhythm: Tachycardia present. Rhythm irregular. Pulses: Normal pulses. Heart sounds: Normal heart sounds. Pulmonary:      Effort: Pulmonary effort is normal. No respiratory distress. Breath sounds: Normal breath sounds. Abdominal:      General: Bowel sounds are normal.      Palpations: Abdomen is soft. Tenderness: There is no abdominal tenderness. There is no guarding or rebound. Musculoskeletal:         General: Swelling present. Cervical back: Neck supple. No tenderness. Right lower leg: Edema present. Left lower leg: No edema. Comments: Right LE edema/swelling  Right knee anterior tenderness  Not wearing right knee immobilizer   Skin:     General: Skin is warm and dry. Capillary Refill: Capillary refill takes less than 2 seconds. Findings: No rash. Comments: No feet ulcers   Neurological:      Mental Status: He is alert and oriented to person, place, and time. Sensory: No sensory deficit. Psychiatric:         Mood and Affect: Mood normal.         Thought Content: Thought content normal.         Judgment: Judgment normal.         Labs:  Na/K/Cl/CO2:  137/3.7/103/20 (12/07 0557)  BUN/Cr/glu/ALT/AST/amyl/lip:  24/2.1/--/--/--/--/-- (12/07 0557)  WBC/Hgb/Hct/Plts:  7.7/13.2/40.9/201 (12/07 0557)  estimated creatinine clearance is 46 mL/min (A) (based on SCr of 2.1 mg/dL (H)). Other pertinent labs as noted below    New Imaging:  XR CHEST PORTABLE   Final Result   No acute process.          XR KNEE RIGHT (3 VIEWS)   Final Result   1. Mild degenerative changes of the right knee. 2. There is no fracture dislocation. A/P:  Active Problems:    Atrial fibrillation with rapid ventricular response (HCC)  Resolved Problems:    * No resolved hospital problems.  *      New onset Atrial fibrillation with RVR  · EKG 12/06: Afib with RVR  · EKG 12/07: normal sinus rhythm  · Given Diltiazem injection in ED  · Currently on Diltiazem drip  · On ASA 81 mg daily  · K 3.7, replaced  · Monitor electrolytes  · Goal K > 4, Mg > 2  · On telemetry  · No Echo in the chart, consider ordering Echo  · Cardiology consulted     Right knee pain - suspect patellar tendon injury  · Right knee pain since Friday 12/03  · He states that he slipped and hit his leg when getting out of the bus in the snow about one week ago  · Pain is constant, graded as 3-4/10, mildly relieved by topical lidocaine patches and Tylenol, located in the anterior right knee, non-radiating and worse with bearing weight/walking  · R knee XR 12/06: Mild degenerative changes, negative for fracture/dislocation  · Refuses to wear the knee immobilizer due to tightness and decreased sensation in the right LE  · Up as tolerated --> If patient is unable to weight-bear, additional imaging may be required   · Pain control with Lidocaine path and Tylenol PRN, consider adding Norco  · Ice to the affected area  · PT/OT consulted  · Ortho following     HTN  · BP 180s/110s in the ED, Diltiazem given, BP improved  · BP remains elevated today  · Continue home meds: Lisinopril, HCTZ and Hydralazine  · Monitor BP  · Cardiology consulted     DM2 with polyneuropathy  · Glucose in   · Home meds: Lantus 55 U, Humalog 6 U TID, Gabapentin 300 mg BID  · Continue home meds  · Hypoglycemia protocol in place  · Monitor glucose     CKD  · Cr 2--> 2.1 (baseline 1.8-2)  · GFR 40 --> 38  · Monitor renal function     Hx of gastric ulcers   · Continue protonix      PT/OT consulted  DVT Prophylaxis: Lovenox and PCDs  GI Prophylaxis: Protonix  Diet: Carb controlled and low sodium      Electronically signed by Gill Zavala MD on 12/7/2021 at 10:51 AM  This case was discussed with attending physician Dr. Liliana Treviño

## 2021-12-08 ENCOUNTER — APPOINTMENT (OUTPATIENT)
Dept: MRI IMAGING | Age: 71
DRG: 201 | End: 2021-12-08
Payer: MEDICAID

## 2021-12-08 PROBLEM — I48.91 ATRIAL FIBRILLATION WITH RAPID VENTRICULAR RESPONSE (HCC): Status: RESOLVED | Noted: 2021-12-06 | Resolved: 2021-12-08

## 2021-12-08 LAB
ANION GAP SERPL CALCULATED.3IONS-SCNC: 13 MMOL/L (ref 7–16)
BASOPHILS ABSOLUTE: 0.04 E9/L (ref 0–0.2)
BASOPHILS RELATIVE PERCENT: 0.5 % (ref 0–2)
BUN BLDV-MCNC: 31 MG/DL (ref 6–23)
CALCIUM SERPL-MCNC: 8.8 MG/DL (ref 8.6–10.2)
CHLORIDE BLD-SCNC: 104 MMOL/L (ref 98–107)
CO2: 19 MMOL/L (ref 22–29)
CREAT SERPL-MCNC: 2.1 MG/DL (ref 0.7–1.2)
EOSINOPHILS ABSOLUTE: 0.07 E9/L (ref 0.05–0.5)
EOSINOPHILS RELATIVE PERCENT: 0.9 % (ref 0–6)
GFR AFRICAN AMERICAN: 38
GFR NON-AFRICAN AMERICAN: 38 ML/MIN/1.73
GLUCOSE BLD-MCNC: 103 MG/DL (ref 74–99)
HCT VFR BLD CALC: 40.5 % (ref 37–54)
HEMOGLOBIN: 12.7 G/DL (ref 12.5–16.5)
IMMATURE GRANULOCYTES #: 0.03 E9/L
IMMATURE GRANULOCYTES %: 0.4 % (ref 0–5)
LV EF: 60 %
LVEF MODALITY: NORMAL
LYMPHOCYTES ABSOLUTE: 2.09 E9/L (ref 1.5–4)
LYMPHOCYTES RELATIVE PERCENT: 27.6 % (ref 20–42)
MCH RBC QN AUTO: 27.3 PG (ref 26–35)
MCHC RBC AUTO-ENTMCNC: 31.4 % (ref 32–34.5)
MCV RBC AUTO: 86.9 FL (ref 80–99.9)
METER GLUCOSE: 105 MG/DL (ref 74–99)
METER GLUCOSE: 114 MG/DL (ref 74–99)
METER GLUCOSE: 126 MG/DL (ref 74–99)
METER GLUCOSE: 91 MG/DL (ref 74–99)
MONOCYTES ABSOLUTE: 1.01 E9/L (ref 0.1–0.95)
MONOCYTES RELATIVE PERCENT: 13.3 % (ref 2–12)
NEUTROPHILS ABSOLUTE: 4.33 E9/L (ref 1.8–7.3)
NEUTROPHILS RELATIVE PERCENT: 57.3 % (ref 43–80)
PDW BLD-RTO: 13.9 FL (ref 11.5–15)
PLATELET # BLD: 215 E9/L (ref 130–450)
PMV BLD AUTO: 11.8 FL (ref 7–12)
POTASSIUM SERPL-SCNC: 4 MMOL/L (ref 3.5–5)
RBC # BLD: 4.66 E12/L (ref 3.8–5.8)
SODIUM BLD-SCNC: 136 MMOL/L (ref 132–146)
URIC ACID, SERUM: 11 MG/DL (ref 3.4–7)
WBC # BLD: 7.6 E9/L (ref 4.5–11.5)

## 2021-12-08 PROCEDURE — 2580000003 HC RX 258

## 2021-12-08 PROCEDURE — 2140000000 HC CCU INTERMEDIATE R&B

## 2021-12-08 PROCEDURE — 80048 BASIC METABOLIC PNL TOTAL CA: CPT

## 2021-12-08 PROCEDURE — 6370000000 HC RX 637 (ALT 250 FOR IP)

## 2021-12-08 PROCEDURE — 82962 GLUCOSE BLOOD TEST: CPT

## 2021-12-08 PROCEDURE — 99233 SBSQ HOSP IP/OBS HIGH 50: CPT | Performed by: INTERNAL MEDICINE

## 2021-12-08 PROCEDURE — 85025 COMPLETE CBC W/AUTO DIFF WBC: CPT

## 2021-12-08 PROCEDURE — 6360000002 HC RX W HCPCS

## 2021-12-08 PROCEDURE — 84550 ASSAY OF BLOOD/URIC ACID: CPT

## 2021-12-08 PROCEDURE — 73721 MRI JNT OF LWR EXTRE W/O DYE: CPT

## 2021-12-08 PROCEDURE — 99232 SBSQ HOSP IP/OBS MODERATE 35: CPT | Performed by: FAMILY MEDICINE

## 2021-12-08 PROCEDURE — 6370000000 HC RX 637 (ALT 250 FOR IP): Performed by: INTERNAL MEDICINE

## 2021-12-08 PROCEDURE — 36415 COLL VENOUS BLD VENIPUNCTURE: CPT

## 2021-12-08 PROCEDURE — 93306 TTE W/DOPPLER COMPLETE: CPT

## 2021-12-08 RX ORDER — HYDRALAZINE HYDROCHLORIDE 25 MG/1
25 TABLET, FILM COATED ORAL 3 TIMES DAILY
Qty: 90 TABLET | Refills: 3 | Status: CANCELLED | OUTPATIENT
Start: 2021-12-08

## 2021-12-08 RX ADMIN — HYDROCHLOROTHIAZIDE 12.5 MG: 12.5 TABLET ORAL at 08:23

## 2021-12-08 RX ADMIN — HYDRALAZINE HYDROCHLORIDE 25 MG: 25 TABLET, FILM COATED ORAL at 08:24

## 2021-12-08 RX ADMIN — METOPROLOL SUCCINATE 50 MG: 50 TABLET, EXTENDED RELEASE ORAL at 22:34

## 2021-12-08 RX ADMIN — LISINOPRIL 30 MG: 10 TABLET ORAL at 08:23

## 2021-12-08 RX ADMIN — PANTOPRAZOLE SODIUM 40 MG: 40 TABLET, DELAYED RELEASE ORAL at 06:30

## 2021-12-08 RX ADMIN — HYDRALAZINE HYDROCHLORIDE 25 MG: 25 TABLET, FILM COATED ORAL at 14:33

## 2021-12-08 RX ADMIN — ENOXAPARIN SODIUM 40 MG: 100 INJECTION SUBCUTANEOUS at 08:25

## 2021-12-08 RX ADMIN — HYDRALAZINE HYDROCHLORIDE 25 MG: 25 TABLET, FILM COATED ORAL at 22:35

## 2021-12-08 RX ADMIN — INSULIN LISPRO 6 UNITS: 100 INJECTION, SOLUTION INTRAVENOUS; SUBCUTANEOUS at 18:16

## 2021-12-08 RX ADMIN — INSULIN LISPRO 6 UNITS: 100 INJECTION, SOLUTION INTRAVENOUS; SUBCUTANEOUS at 12:28

## 2021-12-08 RX ADMIN — ASPIRIN 81 MG: 81 TABLET, COATED ORAL at 08:24

## 2021-12-08 RX ADMIN — METOPROLOL SUCCINATE 50 MG: 50 TABLET, EXTENDED RELEASE ORAL at 08:24

## 2021-12-08 RX ADMIN — Medication 10 ML: at 22:34

## 2021-12-08 RX ADMIN — INSULIN LISPRO 6 UNITS: 100 INJECTION, SOLUTION INTRAVENOUS; SUBCUTANEOUS at 08:25

## 2021-12-08 NOTE — PROGRESS NOTES
[facility] Inpatient   Resident Progress Note    S:  Hospital day: 2    Brief Synopsis: Gomez Retana is a 80 y.o. male with a PMH of HTN, DM2, Stroke, Depression, CKD and gastric ulcers who presents to the hospital due to right knee pain. Patient HR and BP were elevated. EKG ordered and showing new onset Afib with RVR. Reports palpitations. Denies chest pain, headaches, dizziness, blurry vision, GI symptoms and urinary symptoms. He takes ASA 81 mg at home for hx of stroke and Lisinopril/HCTZ/Hydralazine for HTN. Denies tobacco, alcohol and drugs. No previous Echo in the chart. No acute events overnight. Patient was seen and examined this morning. His BP has improved, he remains asymptomatic. EKG 12/07 showing normal sinus rhythm. Pain is controlled. Eating well, denies N/V. Echo was done this morning. Cont meds:    dilTIAZem (CARDIZEM) 100 mg in dextrose 5% 100 mL (ADD-[place]) Stopped (12/08/21 5273)    sodium chloride      dextrose       Scheduled meds:    metoprolol succinate  50 mg Oral BID    lidocaine  10 mL IntraDERmal Once    aspirin  81 mg Oral Daily    FLUoxetine  20 mg Oral Nightly    gabapentin  300 mg Oral BID    hydrALAZINE  25 mg Oral TID    hydroCHLOROthiazide  12.5 mg Oral Daily    insulin glargine  55 Units SubCUTAneous Nightly    insulin lispro  6 Units SubCUTAneous TID WC    lidocaine  1 patch TransDERmal Daily    lisinopril  30 mg Oral Daily    pantoprazole  40 mg Oral Daily    sodium chloride flush  5-40 mL IntraVENous 2 times per day    enoxaparin  40 mg SubCUTAneous Daily     PRN meds: perflutren lipid microspheres, sodium chloride flush, sodium chloride, ondansetron **OR** ondansetron, polyethylene glycol, acetaminophen **OR** acetaminophen, glucose, dextrose, glucagon (rDNA), dextrose     I reviewed the patient's Past Medical and Surgical History, Medications and Allergies.     O:  VS: BP (!) 140/67   Pulse 78   Temp 97.8 °F (36.6 °C) (Temporal) Resp 18   Ht 6' 2\" (1.88 m)   Wt 275 lb (124.7 kg)   SpO2 97%   BMI 35.31 kg/m²     Physical Exam:  Physical Exam  Vitals reviewed. Constitutional:       General: He is not in acute distress. Appearance: Normal appearance. He is obese. HENT:      Head: Normocephalic and atraumatic. Mouth/Throat:      Pharynx: Oropharynx is clear. Neck:      Vascular: No carotid bruit. Cardiovascular:      Rate and Rhythm: Normal rate and regular rhythm. Pulses: Normal pulses. Heart sounds: Normal heart sounds. Pulmonary:      Effort: Pulmonary effort is normal. No respiratory distress. Breath sounds: Normal breath sounds. Abdominal:      General: Bowel sounds are normal.      Palpations: Abdomen is soft. Tenderness: There is no abdominal tenderness. There is no guarding or rebound. Musculoskeletal:         General: Swelling present. Cervical back: Neck supple. No tenderness. Right lower leg: Edema present. Left lower leg: No edema. Comments: Right knee swelling  Right knee anterior tenderness  Right knee immobilizer   Skin:     General: Skin is warm and dry. Capillary Refill: Capillary refill takes less than 2 seconds. Findings: No rash. Comments: No feet ulcers   Neurological:      Mental Status: He is alert and oriented to person, place, and time. Sensory: No sensory deficit. Psychiatric:         Mood and Affect: Mood normal.         Thought Content: Thought content normal.         Judgment: Judgment normal.         Labs:  Na/K/Cl/CO2:  --/3.4/--/-- (12/07 1144)  BUN/Cr/glu/ALT/AST/amyl/lip:  24/2.1/--/--/--/--/-- (12/07 0557)  WBC/Hgb/Hct/Plts:  7.6/12.7/40.5/215 (12/08 1131)  estimated creatinine clearance is 46 mL/min (A) (based on SCr of 2.1 mg/dL (H)). Other pertinent labs as noted below    New Imaging:  MRI KNEE RIGHT WO CONTRAST   Preliminary Result   1.  Moderate grade partial thickness tear of the patellar tendon insertion   with approximately 50% tendon involvement. 2. Moderate lateral compartment osteoarthritis. 3. Degenerative tear of the body-posterior horn lateral meniscus. XR CHEST PORTABLE   Final Result   No acute process. XR KNEE RIGHT (3 VIEWS)   Final Result   1. Mild degenerative changes of the right knee. 2. There is no fracture dislocation. A/P:  Active Problems:    Atrial fibrillation with rapid ventricular response (HCC)  Resolved Problems:    * No resolved hospital problems.  *      New onset Atrial fibrillation with RVR  · EKG 12/06: Afib with RVR  · EKG 12/07: normal sinus rhythm  · Given Diltiazem injection in ED  · Currently on Diltiazem drip and ASA 81 mg daily  · Plan to wean Cardizem drip and switch to Eliquis  · Metoprolol 50 mg BID added  · Monitor electrolytes  · Goal K > 4, Mg > 2  · On telemetry  · Echo 12/08: EF 60%, Normal right ventricular size and function (TAPSE 2.3 cm), There is doppler evidence of stage I diastolic dysfunction, Mildly dilated LA, Mild AR  · Cardiology following     Right knee pain - suspect patellar tendon injury  · Right knee pain since Friday 12/03  · He states that he slipped and hit his leg when getting out of the bus in the snow about one week ago  · Pain is constant, graded as 3-4/10, mildly relieved by topical lidocaine patches and Tylenol, located in the anterior right knee, non-radiating and worse with bearing weight/walking  · R knee XR 12/06: Mild degenerative changes, negative for fracture/dislocation  · Pain control with Lidocaine path and Tylenol PRN, consider adding Norco  · Ice to the affected area  · PT AMPAC score 16/24  · MRI R knee 12/08: Moderate grade partial thickness tear of the patellar tendon insertion with approximately 50% tendon involvement, Moderate lateral compartment osteoarthritis, Degenerative tear of the body-posterior horn lateral meniscus  · Ortho following     HTN  · BP 180s/110s in the ED, Diltiazem given, BP improved  · Continue home meds: Lisinopril, HCTZ and Hydralazine  · Metoprolol 50 mg BID added  · Monitor BP  · Cardiology following     DM2 with polyneuropathy  · Glucose in   · Home meds: Lantus 55 U, Humalog 6 U TID, Gabapentin 300 mg BID  · Continue home meds  · Hypoglycemia protocol in place  · Monitor glucose     CKD  · Cr at baseline  · Monitor renal function     Hx of gastric ulcers   · Continue protonix      PT AMPA score 16/24  DVT Prophylaxis: Lovenox and PCDs  GI Prophylaxis: Protonix  Diet: Carb controlled and low sodium  Discharge planning, home with [facility]  Consider sleep study as outpatient      Electronically signed by Rodolfo Kirk MD on 12/8/2021 at 1:03 PM  This case was discussed with attending physician Dr. Olivia Dawson

## 2021-12-08 NOTE — CARE COORDINATION
Care coordination:  Patient for further imaging today . Echo pending. PT/ OT following OT 15/24  PT 16/24 . Met with patient . He feel better today . He is requesting a walker for room. Discussed with PT and RN. Today he thinks he may be able to return home and follow with home therapy rather than inpatient [facility]. No choice for [facility]. SW asked [facility] to follow . Will need appropriate orders for [facility] .

## 2021-12-08 NOTE — PROGRESS NOTES
[address] [place] [address] Attending    S: 80 y.o. male presented with right knee pain, and was found incidentally to have newly observed atrial fibrillation with rapid ventricular response. Says he feels OK except for R knee pain    O: VS- Blood pressure 137/80, pulse 76, temperature 97.5 °F (36.4 °C), temperature source Oral, resp. rate 18, height 6' 2\" (1.88 m), weight 275 lb (124.7 kg), SpO2 99 %. Exam is as noted by resident. Alert and oriented  In no acute distress  Neck supple, no palpable thyroid abnormalities, no carotid bruit  Heart irregularly irregular, rate now controlled  Lungs clear to auscultation  Abdomen supple, no masses or tenderness  Right knee is tender to light palpation. Impressions:    Active Problems:    Atrial fibrillation with rapid ventricular response ([address] now controlled rate    Right knee DJD, possible patellar tendon injury    Diabetes mellitus  Hypertension  CKD 3  History of active tuberculosis, currently dormant  History of cervical fusion, lithotripsy, and rotator cuff repair  History of CVA      Plan:     Cardiology following  Telemetry  Replete potassium  Current to rate control  Lantus and Humalog  Immobilize right knee if tolerated  Ice pack  Physical therapy  Gabapentin  Check labs and MRI and Echo    Current Facility-Administered Medications   Medication Dose Route Frequency Provider Last Rate Last Admin    perflutren lipid microspheres (DEFINITY) injection 1.65 mg  1.5 mL IntraVENous ONCE PRN Idelle Model, APRN - CNP        metoprolol succinate (TOPROL XL) extended release tablet 50 mg  50 mg Oral BID Natalya Castañeda MD   50 mg at 12/08/21 0824    dilTIAZem 100 mg in dextrose 5 % 100 mL infusion ([facility])  5-15 mg/hr IntraVENous Continuous Kate Clifford MD   Stopped at 12/08/21 0953    lidocaine 1 % injection 10 mL  10 mL IntraDERmal Once Kate Clifford MD        aspirin EC tablet 81 mg  81 mg Oral Daily Alan Parker MD   81 mg at 12/08/21 0824    FLUoxetine (PROZAC) capsule 20 mg  20 mg Oral Nightly Alan Parker MD        gabapentin (NEURONTIN) capsule 300 mg  300 mg Oral BID Alan Parker MD        hydrALAZINE (APRESOLINE) tablet 25 mg  25 mg Oral TID Alan Parker MD   25 mg at 12/08/21 0824    hydroCHLOROthiazide (HYDRODIURIL) tablet 12.5 mg  12.5 mg Oral Daily Alan Parker MD   12.5 mg at 12/08/21 0823    insulin glargine (LANTUS) injection vial 55 Units  55 Units SubCUTAneous Nightly Alan Parker MD        insulin lispro (HUMALOG) injection vial 6 Units  6 Units SubCUTAneous TID WC Alan Parker MD   6 Units at 12/08/21 0825    lidocaine 4 % external patch 1 patch  1 patch TransDERmal Daily Alan Parker MD   1 patch at 12/08/21 0825    lisinopril (PRINIVIL;ZESTRIL) tablet 30 mg  30 mg Oral Daily Alan Parker MD   30 mg at 12/08/21 8256    pantoprazole (PROTONIX) tablet 40 mg  40 mg Oral Daily Alan Parker MD   40 mg at 12/08/21 0630    sodium chloride flush 0.9 % injection 5-40 mL  5-40 mL IntraVENous 2 times per day Alan Parker MD   10 mL at 12/07/21 2047    sodium chloride flush 0.9 % injection 5-40 mL  5-40 mL IntraVENous PRN Alan Parker MD        0.9 % sodium chloride infusion  25 mL IntraVENous PRN Alan Parker MD        enoxaparin (LOVENOX) injection 40 mg  40 mg SubCUTAneous Daily Alan Parker MD   40 mg at 12/08/21 0825    ondansetron (ZOFRAN-ODT) disintegrating tablet 4 mg  4 mg Oral Q8H PRN Alan Parker MD        Or    ondansetron [facility]) injection 4 mg  4 mg IntraVENous Q6H PRN Alan Parker MD        polyethylene glycol [facility]) packet 17 g  17 g Oral Daily PRN Alan Parker MD        acetaminophen (TYLENOL) tablet 650 mg  650 mg Oral Q6H PRN Belinda Hough MD   650 mg at 12/07/21 1556    Or    acetaminophen (TYLENOL) suppository 650 mg  650 mg Rectal Q6H PRN Belinda Hough MD        glucose (GLUTOSE) 40 % oral gel 15 g  15 g Oral PRN Belinda Hough MD        dextrose 50 % IV solution  12.5 g IntraVENous PRN Belinda Hough MD        glucagon (rDNA) injection 1 mg  1 mg IntraMUSCular PRN Belinda Hough MD        dextrose 5 % solution  100 mL/hr IntraVENous PRN Belinda Hough MD                Attending Physician Statement  I have reviewed the chart, including any radiology or labs, and seen the patient with the resident(s). I personally reviewed and performed key elements of the history and exam.  I agree with the assessment, plan and orders as documented by the resident. Please refer to the resident note for additional information.       Nima Gaston MD

## 2021-12-08 NOTE — DISCHARGE SUMMARY
.  Discharge Summary    Robin Osuna  :  1950  MRN:  [id number]    Admit date:  2021  Discharge date:  2021    Admitting Physician:  Jose Garcia MD    Discharge Diagnoses:    Patient Active Problem List   Diagnosis    Diabetes mellitus ([facility]    HTN (hypertension)       Admission Condition:  fair    Discharged Condition:  good    Hospital Course:     Robin Osuna is a 80 y.o. male with a PMH of HTN, DM2, Stroke, Depression, CKD and gastric ulcers who presents to the hospital due to right knee pain. Right knee pain since . He states that he slipped and hit his leg when getting out of the bus in the snow about one week ago. Pain is constant, graded as 3-4/10, mildly relieved by topical lidocaine patches and Tylenol, located in the anterior right knee, non-radiating and worse with bearing weight/walking. R knee XR : Mild degenerative changes, negative for fracture/dislocation. Pain control with Lidocaine path and Tylenol PRN. Ice to the affected area. PT AMPAC score 17/24. MRI R knee  showing partial tear or the patellar tendon. Ortho was following, no plan for surgery at this time, conservative Rx.     Patient HR and BP were elevated. EKG ordered and showing new onset Afib with RVR. Reports palpitations. Denies chest pain, headaches, dizziness, blurry vision, GI symptoms and urinary symptoms. He takes ASA 81 mg at home for hx of stroke and Lisinopril/HCTZ/Hydralazine for HTN. Denies tobacco, alcohol and drugs. No previous Echo in the chart. elevated [id number] score. Given Diltiazem injection in ED. Taking Diltiazem drip and ASA 81 mg daily. Metoprolol 50 mg BID added. Repeated EKG  showing sinus rhythm. Echo : EF 60%. We continued his BP and DM2 medications. Plan to go home with [facility] for nursing and PT, walker was ordered. Cardizem switched to Eliquis at discharge. Outpatient follow up with , Cardiology and Orthopedics.       To do at follow up:  · Take Eliquis 5 mg twice a day until further notice for Afib  · HTN on home meds Lisinopril, HCTZ and Hydralazine.  Metoprolol 50 mg daily added (he was on Metoprolol BID but his insurance does not cover BID, only covers daily)  · Consider sleep study as outpatient  · Follow up with Cardiology and Orthopedics in 2 weeks  · Going home with home health care for nursing and physical therapy      Discharge Medications:         Medication List      START taking these medications    apixaban 5 MG Tabs tablet  Commonly known as: Eliquis  Take 1 tablet by mouth 2 times daily     metoprolol succinate 50 MG extended release tablet  Commonly known as: TOPROL XL  Take 1 tablet by mouth 2 times daily        CHANGE how you take these medications    hydrALAZINE 50 MG tablet  Commonly known as: APRESOLINE  Take 1 tablet by mouth 3 times daily  What changed: how much to take        CONTINUE taking these medications    Aspirin Low Dose 81 MG EC tablet  Generic drug: aspirin  TAKE 1 TABLET BY MOUTH DAILY     B-D SINGLE USE SWABS REGULAR Pads     B-D UF III MINI PEN NEEDLES 31G X 5 MM Misc  Generic drug: Insulin Pen Needle     bisacodyl 5 MG EC tablet  Generic drug: bisacodyl     Blood Pressure Kit  1 kit by Does not apply route daily     FLUoxetine 20 MG capsule  Commonly known as: PROZAC     gabapentin 300 MG capsule  Commonly known as: NEURONTIN     hydroCHLOROthiazide 12.5 MG capsule  Commonly known as: MICROZIDE  TAKE 1 CAPSULE BY MOUTH DAILY     insulin lispro (1 Unit Dial) 100 UNIT/ML Sopn     Lantus 100 UNIT/ML injection vial  Generic drug: insulin glargine     lidocaine 5 %  Commonly known as: Lidoderm  Place 1 patch onto the skin daily 12 hours on, 12 hours off.     lisinopril 30 MG tablet  Commonly known as: PRINIVIL;ZESTRIL     pantoprazole 40 MG tablet  Commonly known as: PROTONIX  TAKE 1 TABLET BY MOUTH DAILY     tamsulosin 0.4 MG capsule  Commonly known as: FLOMAX     vitamin D-3 125 MCG (5000 UT) Tabs  Commonly known as: cholecalciferol  Take 1 tablet by mouth daily        STOP taking these medications    dilTIAZem 360 MG extended release capsule  Commonly known as: TIAZAC           Where to Get Your Medications      These medications were sent to [facility] [address] [address]    Phone: [phone]   · apixaban 5 MG Tabs tablet  · metoprolol succinate 50 MG extended release tablet         Consults:  cardiology and orthopedic surgery    Significant Diagnostic Studies:    Labs:  Na/K/Cl/CO2:  138/4.1/104/20 (12/09 0509)  BUN/Cr/glu/ALT/AST/amyl/lip:  32/2.1/--/--/--/--/-- (12/09 4934)  WBC/Hgb/Hct/Plts:  6.4/13.0/40.7/233 (12/09 0509)  [unfilled]  estimated creatinine clearance is 46 mL/min (A) (based on SCr of 2.1 mg/dL (H)). New Imaging:    Echo Complete  Result Date: 12/8/2021  Findings   Left Ventricle  Left ventricle size is normal.  Proximal septal thickening. Normal left ventricular systolic function. Ejection fraction is visually estimated at 60%. There is doppler evidence of stage I diastolic dysfunction. Right Ventricle  Normal right ventricular size and function (TAPSE 2.3 cm). Left Atrium  Mildly dilated left atrium by volume index. Right Atrium  Normal sized right atrium. Mitral Valve  Physiologic and/or trace mitral regurgitation. No evidence of hemodynamically significant mitral stenosis. Tricuspid Valve  Physiologic and/or trace tricuspid regurgitation. Unable to estimate PASP due to incomplete tricuspid regurgitation  envelope. Aortic Valve  The aortic valve is trileaflet. No evidence of hemodynamically significant aortic stenosis. Mild aortic regurgitation. Pulmonic Valve  The pulmonic valve was not well visualized. Pericardial Effusion  No evidence of a hemodynamically significant pericardial effusion. Aorta  Aortic root dimension within normal limits. Ascending aorta 4.0 cm. and iliotibial band are intact. MEDIAL COLLATERAL LIGAMENT COMPLEX: Medial collateral ligament is intact. KNEE JOINT: Areas of full-thickness cartilage loss are present in the posterolateral tibial plateau. The medial compartment cartilage is maintained. Cartilage thinning in the patellofemoral compartment with areas of fissuring. No joint effusion. No intra-articular body or popliteal cyst. BONE MARROW: Minimal subchondral cysts in the lateral compartment. No fracture or marrow replacing lesion. 1. Moderate grade partial thickness tear of the patellar tendon insertion with approximately 50% tendon involvement. 2. Moderate lateral compartment osteoarthritis. 3. Degenerative tear of the body-posterior horn lateral meniscus. Treatments:   Cardizem, Metoprolol, R knee immobilizer, pain control    Discharge Exam:  Constitutional:       General: He is not in acute distress. Appearance: Normal appearance. He is obese. HENT:      Head: Normocephalic and atraumatic. Mouth/Throat:      Pharynx: Oropharynx is clear. Neck:      Vascular: No carotid bruit. Cardiovascular:      Rate and Rhythm: Normal rate and regular rhythm. Pulses: Normal pulses. Heart sounds: Normal heart sounds. Pulmonary:      Effort: Pulmonary effort is normal. No respiratory distress. Breath sounds: Normal breath sounds. Abdominal:      General: Bowel sounds are normal.      Palpations: Abdomen is soft. Tenderness: There is no abdominal tenderness. There is no guarding or rebound. Musculoskeletal:      Cervical back: Neck supple. No tenderness. Left lower leg: No edema. Comments: Right knee swelling  Right knee anterior tenderness  Right knee immobilizer   Skin:     General: Skin is warm and dry. Capillary Refill: Capillary refill takes less than 2 seconds. Findings: No rash.       Comments: No feet ulcers   Neurological:      Mental Status: He is alert and oriented to person, place, and time. Sensory: No sensory deficit. Psychiatric:         Mood and Affect: Mood normal.         Thought Content: Thought content normal.         Judgment: Judgment normal.     Disposition:   Home with [facility]    Future Appointments   Date Time Provider Vikram Gagnon   12/15/2021  2:40 PM [facility]   12/23/2021 10:00 AM Polly Motley DO [facility]   1/24/2022  9:20 AM Gilbert Peters MD [facility]   2/3/2022  8:00 AM Snehal Mackenzie MD [facility]       More than 30 minutes was spent in preparation of this patient's discharge including, but not limited to, examination, preparation of documents, prescription preparation, counseling and coordination. Signed:   Fatuma Michelle MD  12/10/2021, 8:43 AM

## 2021-12-08 NOTE — PROGRESS NOTES
INPATIENT CARDIOLOGY FOLLOW-UP    Name: Gatito Avendano    Age: 80 y.o. Date of Admission: 12/6/2021  6:59 AM    Date of Service: 12/8/2021    Chief Complaint: Follow-up for atrial fibrillation    Interim History:  Currently with no chest pain, respiratory distress, or palpitations. AF with episodes of RVR yesterday --> spontaneously converted to SR overnight. No new overnight cardiac complaints.     Review of Systems:   Cardiac: As per HPI  General: No fever, chills  Pulmonary: As per HPI  HEENT: No visual disturbances, difficult swallowing  GI: No nausea, vomiting  : No dysuria, hematuria  Endocrine: No thyroid disease, +DM  Musculoskeletal: PABON x 4, no focal motor deficits  Skin: Intact, no rashes  Neuro: No headache, seizures  Psych: Currently with no depression, anxiety    Problem List:  Patient Active Problem List   Diagnosis    Diabetes mellitus ([facility] [address]    HTN (hypertension)       Allergies:  No Known Allergies    Current Medications:  Current Facility-Administered Medications   Medication Dose Route Frequency Provider Last Rate Last Admin    perflutren lipid microspheres (DEFINITY) injection 1.65 mg  1.5 mL IntraVENous ONCE PRN Idelle Model, APRN - CNP        metoprolol succinate (TOPROL XL) extended release tablet 50 mg  50 mg Oral BID Natalya Castañeda MD   50 mg at 12/08/21 0824    dilTIAZem 100 mg in dextrose 5 % 100 mL infusion ([facility])  5-15 mg/hr IntraVENous Continuous Kate Darrin, MD   Stopped at 12/08/21 0953    lidocaine 1 % injection 10 mL  10 mL IntraDERmal Once Kate Meals, MD        aspirin EC tablet 81 mg  81 mg Oral Daily Kate Meals, MD   81 mg at 12/08/21 0824    FLUoxetine (PROZAC) capsule 20 mg  20 mg Oral Nightly Kate Meals, MD        gabapentin (NEURONTIN) capsule 300 mg  300 mg Oral BID Kate Meals, MD        hydrALAZINE (APRESOLINE) tablet 25 mg  25 mg Oral TID Kate Meals, MD   93 mg at 12/08/21 1433    hydroCHLOROthiazide (HYDRODIURIL) tablet 12.5 mg  12.5 mg Oral Daily Alexandr De La Cruz MD   12.5 mg at 12/08/21 0823    insulin glargine (LANTUS) injection vial 55 Units  55 Units SubCUTAneous Nightly Alexandr De La Cruz MD        insulin lispro (HUMALOG) injection vial 6 Units  6 Units SubCUTAneous TID WC Alexandr De La Cruz MD   6 Units at 12/08/21 1816    lidocaine 4 % external patch 1 patch  1 patch TransDERmal Daily Alexandr De La Cruz MD   1 patch at 12/08/21 0825    lisinopril (PRINIVIL;ZESTRIL) tablet 30 mg  30 mg Oral Daily Alexandr De La Cruz MD   30 mg at 12/08/21 3887    pantoprazole (PROTONIX) tablet 40 mg  40 mg Oral Daily Alexandr De La Cruz MD   40 mg at 12/08/21 0630    sodium chloride flush 0.9 % injection 5-40 mL  5-40 mL IntraVENous 2 times per day Alexandr De La Cruz MD   10 mL at 12/07/21 2047    sodium chloride flush 0.9 % injection 5-40 mL  5-40 mL IntraVENous PRN Alexandr De La Cruz MD        0.9 % sodium chloride infusion  25 mL IntraVENous PRN Alexandr De La Cruz MD        enoxaparin (LOVENOX) injection 40 mg  40 mg SubCUTAneous Daily Alexandr De La Cruz MD   40 mg at 12/08/21 0825    ondansetron (ZOFRAN-ODT) disintegrating tablet 4 mg  4 mg Oral Q8H PRN Alexandr De La Cruz MD        Or    ondansetron [facility]) injection 4 mg  4 mg IntraVENous Q6H PRN Alexandr De La Cruz MD        polyethylene glycol [facility]) packet 17 g  17 g Oral Daily PRN Alexandr De La Cruz MD        acetaminophen (TYLENOL) tablet 650 mg  650 mg Oral Q6H PRN Alexandr De La Cruz MD   650 mg at 12/07/21 1556    Or    acetaminophen (TYLENOL) suppository 650 mg  650 mg Rectal Q6H PRN Alexandr De La Cruz MD        glucose (GLUTOSE) 40 % oral gel 15 g  15 g Oral PRN Alexandr De La Cruz MD        dextrose 50 % IV solution  12.5 g IntraVENous PRN Shade Basset, MD        glucagon (rDNA) injection 1 mg  1 mg IntraMUSCular PRN Robert Haas MD        dextrose 5 % solution  100 mL/hr IntraVENous PRN Robert Haas MD          dilTIAZem (CARDIZEM) 100 mg in dextrose 5% 100 mL ([facility]) Stopped (12/08/21 3566)    sodium chloride      dextrose         Physical Exam:  BP (!) 165/80   Pulse 78   Temp 97.8 °F (36.6 °C) (Temporal)   Resp 18   Ht 6' 2\" (1.88 m)   Wt 275 lb (124.7 kg)   SpO2 97%   BMI 35.31 kg/m²   Wt Readings from Last 3 Encounters:   12/06/21 275 lb (124.7 kg)   11/23/21 276 lb 3.2 oz (125.3 kg)   10/18/21 274 lb (124.3 kg)     Appearance: Awake, alert, no acute respiratory distress  Skin: Intact, no rash  Head: Normocephalic, atraumatic  Eyes: EOMI, no conjunctival erythema  ENMT: No pharyngeal erythema, MMM, no rhinorrhea  Neck: Supple, no elevated JVP, no carotid bruits  Lungs: Clear to auscultation bilaterally. No wheezes, rales, or rhonchi. Cardiac: Regular rate and rhythm, +S1S2, no murmurs apparent  Abdomen: Soft, nontender, +bowel sounds  Extremities: Moves all extremities x 4, no lower extremity edema  Neurologic: No focal motor deficits apparent, normal mood and affect    Intake/Output:    Intake/Output Summary (Last 24 hours) at 12/8/2021 1829  Last data filed at 12/8/2021 1314  Gross per 24 hour   Intake 1290 ml   Output 650 ml   Net 640 ml     No intake/output data recorded. Laboratory Tests:  Recent Labs     12/06/21  0746 12/06/21  1135 12/07/21  0557 12/07/21  1144 12/08/21  1131     --  137  --  136   K 3.4*   < > 3.7 3.4* 4.0     --  103  --  104   CO2 21*  --  20*  --  19*   BUN 17  --  24*  --  31*   CREATININE 2.0*  --  2.1*  --  2.1*   GLUCOSE 121*  --  104*  --  103*   CALCIUM 9.1  --  8.1*  --  8.8    < > = values in this interval not displayed.      Lab Results   Component Value Date    MG 1.9 12/07/2021     Recent Labs     12/06/21  0746   ALKPHOS 116   ALT 17   AST 25   PROT 8.1 BILITOT 0.5   LABALBU 3.9     Recent Labs     12/06/21  0746 12/07/21  0557 12/08/21  1131   WBC 9.1 7.7 7.6   RBC 4.92 4.74 4.66   HGB 13.8 13.2 12.7   HCT 42.0 40.9 40.5   MCV 85.4 86.3 86.9   MCH 28.0 27.8 27.3   MCHC 32.9 32.3 31.4*   RDW 13.5 13.6 13.9    201 215   MPV 11.3 11.8 11.8     Lab Results   Component Value Date    CKTOTAL 101 09/20/2013    TROPONINI <0.01 09/30/2017    TROPONINI <0.01 09/19/2013     Recent Labs     12/06/21  0746 12/06/21  1135   TROPHS 23* 21*     Lab Results   Component Value Date    INR 1.1 09/30/2017    INR 1.2 09/19/2013    PROTIME 11.7 09/30/2017    PROTIME 12.7 09/19/2013     Lab Results   Component Value Date    TSH 0.744 12/06/2021     Lab Results   Component Value Date    LABA1C 6.1 (H) 09/20/2021     No results found for: EAG  Lab Results   Component Value Date    CHOL 148 09/20/2021    CHOL 178 06/14/2021    CHOL 166 02/22/2021     Lab Results   Component Value Date    TRIG 90 09/20/2021    TRIG 210 (H) 06/14/2021    TRIG 85 02/22/2021     Lab Results   Component Value Date    HDL 43 09/20/2021    HDL 48 06/14/2021    HDL 48 02/22/2021     Lab Results   Component Value Date    LDLCALC 87 09/20/2021    LDLCALC 88 06/14/2021    LDLCALC 101 (H) 02/22/2021     Lab Results   Component Value Date    LABVLDL 18 09/20/2021    LABVLDL 42 06/14/2021    LABVLDL 17 02/22/2021     No results found for: CHOLHDLRATIO  Recent Labs     12/06/21  0746   PROBNP 1,875*     Telemetry reviewed (date: 12/7/2021): atrial fibrillation, rate 115 --> SR, rate 80's    Echocardiogram: 12/8/21   Normal left ventricular systolic function. Ejection fraction is visually estimated at 60%. Normal right ventricular size and function (TAPSE 2.3 cm). There is doppler evidence of stage I diastolic dysfunction. Mildly dilated left atrium by volume index. Mild aortic regurgitation. MRI right knee: 12/8/21  1.  Moderate grade partial thickness tear of the patellar tendon insertion   with approximately 50% tendon involvement. 2. Moderate lateral compartment osteoarthritis. 3. Degenerative tear of the body-posterior horn lateral meniscus. Impression/Plan:  1. Atrial fibrillation with episodes of RVR -- duration of AF unknown, elevated [id number] score --> spontaneously converted to SR  2. Presentation for right knee pain (+mechanical fall) -- orthopedic surgery following for injury to the patellar tendon --> partial tear on MRI (nonoperative management planned for now)  3. HTN -- BP elevated, on multiple anti-HTN agents  4. DM -- Hgb A1c 6.1  5. CKD -- most recent Cr 2.1  6. Hypokalemia -- most recent K 3.4 --> 4.0  7. History of CVA  8. BMI 35  9. Mild aortic regurgitation / diastolic dysfunction  10. Mildly elevated hs-troponin  11. History of cervical fusion with residual neck pain  12. History of left-sided rotator cuff repair  13.  History of PUD     - Toprol XL added this admission -- continue  - Stop cardizem drip  - Add eliquis 5 mg BID  - Continue current medications otherwise  - Keep K > 4 and Mg > 2  - Outpatient sleep study if no prior study  - Aggressive risk factor modifications  - Outpatient cardiology follow-up    Arron Schulte MD  [facility]) Cardiology

## 2021-12-09 VITALS
DIASTOLIC BLOOD PRESSURE: 73 MMHG | HEIGHT: 74 IN | RESPIRATION RATE: 16 BRPM | TEMPERATURE: 97.7 F | WEIGHT: 275 LBS | HEART RATE: 82 BPM | OXYGEN SATURATION: 98 % | SYSTOLIC BLOOD PRESSURE: 135 MMHG | BODY MASS INDEX: 35.29 KG/M2

## 2021-12-09 LAB
ANION GAP SERPL CALCULATED.3IONS-SCNC: 14 MMOL/L (ref 7–16)
BASOPHILS ABSOLUTE: 0.03 E9/L (ref 0–0.2)
BASOPHILS RELATIVE PERCENT: 0.5 % (ref 0–2)
BUN BLDV-MCNC: 32 MG/DL (ref 6–23)
CALCIUM SERPL-MCNC: 9.3 MG/DL (ref 8.6–10.2)
CHLORIDE BLD-SCNC: 104 MMOL/L (ref 98–107)
CO2: 20 MMOL/L (ref 22–29)
CREAT SERPL-MCNC: 2.1 MG/DL (ref 0.7–1.2)
EOSINOPHILS ABSOLUTE: 0.07 E9/L (ref 0.05–0.5)
EOSINOPHILS RELATIVE PERCENT: 1.1 % (ref 0–6)
GFR AFRICAN AMERICAN: 38
GFR NON-AFRICAN AMERICAN: 38 ML/MIN/1.73
GLUCOSE BLD-MCNC: 121 MG/DL (ref 74–99)
HCT VFR BLD CALC: 40.7 % (ref 37–54)
HEMOGLOBIN: 13 G/DL (ref 12.5–16.5)
IMMATURE GRANULOCYTES #: 0.02 E9/L
IMMATURE GRANULOCYTES %: 0.3 % (ref 0–5)
LYMPHOCYTES ABSOLUTE: 1.62 E9/L (ref 1.5–4)
LYMPHOCYTES RELATIVE PERCENT: 25.5 % (ref 20–42)
MAGNESIUM: 2.2 MG/DL (ref 1.6–2.6)
MCH RBC QN AUTO: 27.7 PG (ref 26–35)
MCHC RBC AUTO-ENTMCNC: 31.9 % (ref 32–34.5)
MCV RBC AUTO: 86.8 FL (ref 80–99.9)
METER GLUCOSE: 107 MG/DL (ref 74–99)
METER GLUCOSE: 119 MG/DL (ref 74–99)
METER GLUCOSE: 98 MG/DL (ref 74–99)
MONOCYTES ABSOLUTE: 0.74 E9/L (ref 0.1–0.95)
MONOCYTES RELATIVE PERCENT: 11.7 % (ref 2–12)
NEUTROPHILS ABSOLUTE: 3.87 E9/L (ref 1.8–7.3)
NEUTROPHILS RELATIVE PERCENT: 60.9 % (ref 43–80)
PDW BLD-RTO: 13.7 FL (ref 11.5–15)
PLATELET # BLD: 233 E9/L (ref 130–450)
PMV BLD AUTO: 11.3 FL (ref 7–12)
POTASSIUM SERPL-SCNC: 4.1 MMOL/L (ref 3.5–5)
RBC # BLD: 4.69 E12/L (ref 3.8–5.8)
SODIUM BLD-SCNC: 138 MMOL/L (ref 132–146)
WBC # BLD: 6.4 E9/L (ref 4.5–11.5)

## 2021-12-09 PROCEDURE — 97530 THERAPEUTIC ACTIVITIES: CPT

## 2021-12-09 PROCEDURE — 36415 COLL VENOUS BLD VENIPUNCTURE: CPT

## 2021-12-09 PROCEDURE — 6370000000 HC RX 637 (ALT 250 FOR IP)

## 2021-12-09 PROCEDURE — 83735 ASSAY OF MAGNESIUM: CPT

## 2021-12-09 PROCEDURE — 2580000003 HC RX 258

## 2021-12-09 PROCEDURE — 6360000002 HC RX W HCPCS

## 2021-12-09 PROCEDURE — 97116 GAIT TRAINING THERAPY: CPT

## 2021-12-09 PROCEDURE — 85025 COMPLETE CBC W/AUTO DIFF WBC: CPT

## 2021-12-09 PROCEDURE — 82962 GLUCOSE BLOOD TEST: CPT

## 2021-12-09 PROCEDURE — 6370000000 HC RX 637 (ALT 250 FOR IP): Performed by: INTERNAL MEDICINE

## 2021-12-09 PROCEDURE — 99238 HOSP IP/OBS DSCHRG MGMT 30/<: CPT | Performed by: FAMILY MEDICINE

## 2021-12-09 PROCEDURE — 80048 BASIC METABOLIC PNL TOTAL CA: CPT

## 2021-12-09 RX ORDER — HYDRALAZINE HYDROCHLORIDE 50 MG/1
50 TABLET, FILM COATED ORAL 3 TIMES DAILY
Status: DISCONTINUED | OUTPATIENT
Start: 2021-12-09 | End: 2021-12-10 | Stop reason: HOSPADM

## 2021-12-09 RX ORDER — METOPROLOL SUCCINATE 50 MG/1
50 TABLET, EXTENDED RELEASE ORAL 2 TIMES DAILY
Qty: 30 TABLET | Refills: 3 | Status: SHIPPED | OUTPATIENT
Start: 2021-12-09 | End: 2022-04-19 | Stop reason: SDUPTHER

## 2021-12-09 RX ADMIN — HYDROCHLOROTHIAZIDE 12.5 MG: 12.5 TABLET ORAL at 08:21

## 2021-12-09 RX ADMIN — INSULIN LISPRO 6 UNITS: 100 INJECTION, SOLUTION INTRAVENOUS; SUBCUTANEOUS at 18:03

## 2021-12-09 RX ADMIN — INSULIN LISPRO 6 UNITS: 100 INJECTION, SOLUTION INTRAVENOUS; SUBCUTANEOUS at 12:15

## 2021-12-09 RX ADMIN — INSULIN LISPRO 6 UNITS: 100 INJECTION, SOLUTION INTRAVENOUS; SUBCUTANEOUS at 08:22

## 2021-12-09 RX ADMIN — PANTOPRAZOLE SODIUM 40 MG: 40 TABLET, DELAYED RELEASE ORAL at 05:39

## 2021-12-09 RX ADMIN — ENOXAPARIN SODIUM 40 MG: 100 INJECTION SUBCUTANEOUS at 08:22

## 2021-12-09 RX ADMIN — METOPROLOL SUCCINATE 50 MG: 50 TABLET, EXTENDED RELEASE ORAL at 08:20

## 2021-12-09 RX ADMIN — Medication 10 ML: at 08:28

## 2021-12-09 RX ADMIN — HYDRALAZINE HYDROCHLORIDE 50 MG: 50 TABLET, FILM COATED ORAL at 14:14

## 2021-12-09 RX ADMIN — ASPIRIN 81 MG: 81 TABLET, COATED ORAL at 08:21

## 2021-12-09 RX ADMIN — LISINOPRIL 30 MG: 10 TABLET ORAL at 08:20

## 2021-12-09 RX ADMIN — HYDRALAZINE HYDROCHLORIDE 50 MG: 50 TABLET, FILM COATED ORAL at 08:21

## 2021-12-09 NOTE — PROGRESS NOTES
[facility] Inpatient   Resident Progress Note    S:  Hospital day: 3    Brief Synopsis: Thomas Grigsby is a 80 y.o. male with a PMH of HTN, DM2, Stroke, Depression, CKD and gastric ulcers who presents to the hospital due to right knee pain. Patient HR and BP were elevated. EKG ordered and showing new onset Afib with RVR. Reports palpitations. Denies chest pain, headaches, dizziness, blurry vision, GI symptoms and urinary symptoms. He takes ASA 81 mg at home for hx of stroke and Lisinopril/HCTZ/Hydralazine for HTN. Denies tobacco, alcohol and drugs. No previous Echo in the chart. No acute events overnight. Patient was seen and examined this morning. His BP remains elevated, asymptomatic. EKG 12/07 showing normal sinus rhythm. Pain is controlled. Eating well, denies N/V. Knee immobilizer and walker at bedside. Cont meds:    dilTIAZem (CARDIZEM) 100 mg in dextrose 5% 100 mL (ADD-[place]) Stopped (12/08/21 5034)    sodium chloride      dextrose       Scheduled meds:    hydrALAZINE  50 mg Oral TID    metoprolol succinate  50 mg Oral BID    lidocaine  10 mL IntraDERmal Once    aspirin  81 mg Oral Daily    FLUoxetine  20 mg Oral Nightly    gabapentin  300 mg Oral BID    hydroCHLOROthiazide  12.5 mg Oral Daily    insulin glargine  55 Units SubCUTAneous Nightly    insulin lispro  6 Units SubCUTAneous TID WC    lidocaine  1 patch TransDERmal Daily    lisinopril  30 mg Oral Daily    pantoprazole  40 mg Oral Daily    sodium chloride flush  5-40 mL IntraVENous 2 times per day    enoxaparin  40 mg SubCUTAneous Daily     PRN meds: perflutren lipid microspheres, sodium chloride flush, sodium chloride, ondansetron **OR** ondansetron, polyethylene glycol, acetaminophen **OR** acetaminophen, glucose, dextrose, glucagon (rDNA), dextrose     I reviewed the patient's Past Medical and Surgical History, Medications and Allergies.     O:  VS: BP (!) 161/81   Pulse 77   Temp 98 °F (36.7 °C) (Oral) Resp 16   Ht 6' 2\" (1.88 m)   Wt 275 lb (124.7 kg)   SpO2 98%   BMI 35.31 kg/m²     Physical Exam:  Physical Exam  Vitals reviewed. Constitutional:       General: He is not in acute distress. Appearance: Normal appearance. He is obese. HENT:      Head: Normocephalic and atraumatic. Mouth/Throat:      Pharynx: Oropharynx is clear. Neck:      Vascular: No carotid bruit. Cardiovascular:      Rate and Rhythm: Normal rate and regular rhythm. Pulses: Normal pulses. Heart sounds: Normal heart sounds. Pulmonary:      Effort: Pulmonary effort is normal. No respiratory distress. Breath sounds: Normal breath sounds. Abdominal:      General: Bowel sounds are normal.      Palpations: Abdomen is soft. Tenderness: There is no abdominal tenderness. There is no guarding or rebound. Musculoskeletal:      Cervical back: Neck supple. No tenderness. Left lower leg: No edema. Comments: Right knee swelling  Right knee anterior tenderness  Right knee immobilizer   Skin:     General: Skin is warm and dry. Capillary Refill: Capillary refill takes less than 2 seconds. Findings: No rash. Comments: No feet ulcers   Neurological:      Mental Status: He is alert and oriented to person, place, and time. Sensory: No sensory deficit. Psychiatric:         Mood and Affect: Mood normal.         Thought Content: Thought content normal.         Judgment: Judgment normal.         Labs:  Na/K/Cl/CO2:  138/4.1/104/20 (12/09 0509)  BUN/Cr/glu/ALT/AST/amyl/lip:  32/2.1/--/--/--/--/-- (12/09 0509)  WBC/Hgb/Hct/Plts:  6.4/13.0/40.7/233 (12/09 0509)  estimated creatinine clearance is 46 mL/min (A) (based on SCr of 2.1 mg/dL (H)). Other pertinent labs as noted below    New Imaging:  MRI KNEE RIGHT WO CONTRAST   Preliminary Result   1. Moderate grade partial thickness tear of the patellar tendon insertion   with approximately 50% tendon involvement.    2. Moderate lateral compartment osteoarthritis. 3. Degenerative tear of the body-posterior horn lateral meniscus. XR CHEST PORTABLE   Final Result   No acute process. XR KNEE RIGHT (3 VIEWS)   Final Result   1. Mild degenerative changes of the right knee. 2. There is no fracture dislocation. A/P:  Active Problems:    * No active hospital problems.  *  Resolved Problems:    Atrial fibrillation with rapid ventricular response (HCC)      New onset Atrial fibrillation with RVR  · EKG 12/06: Afib with RVR  · EKG 12/07: normal sinus rhythm  · Given Diltiazem injection in ED  · Currently on Diltiazem drip and ASA 81 mg daily  · On Metoprolol 50 mg BID  · Monitor electrolytes  · Goal K > 4, Mg > 2  · On telemetry  · Echo 12/08: EF 60%, Normal right ventricular size and function (TAPSE 2.3 cm), There is doppler evidence of stage I diastolic dysfunction, Mildly dilated LA, Mild AR  · Cardiology recs: continue Metoprolol, switch Cardizem into Eliquis, outpatient follow-up     Right knee pain - suspect patellar tendon injury  · Right knee pain since Friday 12/03  · He states that he slipped and hit his leg when getting out of the bus in the snow about one week ago  · Pain is constant, graded as 3-4/10, mildly relieved by topical lidocaine patches and Tylenol, located in the anterior right knee, non-radiating and worse with bearing weight/walking  · R knee XR 12/06: Mild degenerative changes, negative for fracture/dislocation  · Pain control with Lidocaine path and Tylenol PRN, consider adding Norco  · Ice to the affected area  · PT AMPAC score 16/24  · MRI R knee 12/08: Moderate grade partial thickness tear of the patellar tendon insertion with approximately 50% tendon involvement, Moderate lateral compartment osteoarthritis, Degenerative tear of the body-posterior horn lateral meniscus  · Ortho recs: no plan for surgery at this time, conservative Rx and follow up outpatient in 2 weeks     HTN  · BP 180s/110s in the ED, Diltiazem given, BP improved  · Continue home meds: Lisinopril, HCTZ and Hydralazine  · Metoprolol 50 mg BID added  · Monitor BP     DM2 with polyneuropathy  · Glucose in   · Home meds: Lantus 55 U, Humalog 6 U TID, Gabapentin 300 mg BID  · Continue home meds  · Hypoglycemia protocol in place  · Monitor glucose     CKD  · Cr at baseline  · Monitor renal function     Hx of gastric ulcers   · Continue protonix      PT AMPAC score 16/24  DVT Prophylaxis: Lovenox and PCDs  GI Prophylaxis: Protonix  Diet: Carb controlled and low sodium  Discharge planning, home with [facility] today  Consider sleep study as outpatient      Electronically signed by Belinda Hough MD on 12/9/2021 at 9:51 AM  This case was discussed with attending physician Dr. Swapna Encarnacion

## 2021-12-09 NOTE — PROGRESS NOTES
[facility] Attending    S: 80 y.o. male presented with right knee pain, and was found incidentally to have newly observed atrial fibrillation with rapid ventricular response. On rounds this morning is feeling better. Still has right knee pain and swelling, improved with brace. O: VS- Blood pressure (!) 161/81, pulse 77, temperature 98 °F (36.7 °C), temperature source Oral, resp. rate 16, height 6' 2\" (1.88 m), weight 275 lb (124.7 kg), SpO2 98 %. Exam is as noted by resident. Alert and oriented  In no acute distress  Neck supple, no palpable thyroid abnormalities, no carotid bruit  Heart reular to auscultation, rate controlled  Lungs no rales or rhonchi  Abdomen supple, no masses or tenderness  Right knee is tender to light palpation. EF 60%    Impressions: Active Problems:    Atrial fibrillation with rapid ventricular response ([address] now NSR    Right knee DJD, possible patellar tendon injury    Diabetes mellitus  Hypertension  CKD 3  History of active tuberculosis, currently dormant  History of cervical fusion, lithotripsy, and rotator cuff repair  History of CVA      Plan:     Lantus and Humalog  Immobilize right knee   Ice pack  Physical therapy  Gabapentin  HHC  Walker    Stable for discharge  To FU with Ortho and Cardiology within 2 weeks.     Current Facility-Administered Medications   Medication Dose Route Frequency Provider Last Rate Last Admin    hydrALAZINE (APRESOLINE) tablet 50 mg  50 mg Oral TID Ana Maria Pablo MD   50 mg at 21 1371    perflutren lipid microspheres (DEFINITY) injection 1.65 mg  1.5 mL IntraVENous ONCE PRN JACQUES Kelly - CNP        metoprolol succinate (TOPROL XL) extended release tablet 50 mg  50 mg Oral BID Holly Vazquez MD   50 mg at 21 0820    dilTIAZem 100 mg in dextrose 5 % 100 mL infusion (ADD-[place])  5-15 mg/hr IntraVENous Continuous Ana Maria Pablo MD   Stopped at 21 5547    lidocaine 1 % injection 10 mL  10 mL IntraDERmal Once Fatuma Michelle MD        aspirin EC tablet 81 mg  81 mg Oral Daily Fatuma Michelle MD   81 mg at 12/09/21 1057    FLUoxetine (PROZAC) capsule 20 mg  20 mg Oral Nightly Fatuma Michelle MD        gabapentin (NEURONTIN) capsule 300 mg  300 mg Oral BID Fatuma Michelle MD        hydroCHLOROthiazide (HYDRODIURIL) tablet 12.5 mg  12.5 mg Oral Daily Fatuma Michelle MD   12.5 mg at 12/09/21 6866    insulin glargine (LANTUS) injection vial 55 Units  55 Units SubCUTAneous Nightly Fatuma Michelle MD        insulin lispro (HUMALOG) injection vial 6 Units  6 Units SubCUTAneous TID WC Fatuma Michelle MD   6 Units at 12/09/21 8014    lidocaine 4 % external patch 1 patch  1 patch TransDERmal Daily Fatuma Michelle MD   1 patch at 12/09/21 0823    lisinopril (PRINIVIL;ZESTRIL) tablet 30 mg  30 mg Oral Daily Fatuma Michelle MD   30 mg at 12/09/21 0820    pantoprazole (PROTONIX) tablet 40 mg  40 mg Oral Daily Fatuma Michelle MD   40 mg at 12/09/21 0539    sodium chloride flush 0.9 % injection 5-40 mL  5-40 mL IntraVENous 2 times per day Fatuma Michelle MD   10 mL at 12/09/21 0828    sodium chloride flush 0.9 % injection 5-40 mL  5-40 mL IntraVENous PRN Fatuma Michelle MD        0.9 % sodium chloride infusion  25 mL IntraVENous PRN Fatuma Michelle MD        enoxaparin (LOVENOX) injection 40 mg  40 mg SubCUTAneous Daily Fatuma Michelle MD   40 mg at 12/09/21 2322    ondansetron (ZOFRAN-ODT) disintegrating tablet 4 mg  4 mg Oral Q8H PRN Fatuma Michelle MD        Or    ondansetron [facility] injection 4 mg  4 mg IntraVENous Q6H PRN Fatuma Michelle MD        polyethylene glycol [facility]) packet 17 g  17 g Oral Daily PRN Fatuma Michelle MD        acetaminophen (TYLENOL) tablet 650 mg  650 mg Oral Q6H PRN Alexadnr De La Cruz MD   650 mg at 12/07/21 1556    Or    acetaminophen (TYLENOL) suppository 650 mg  650 mg Rectal Q6H PRN Alexandr De La Cruz MD        glucose (GLUTOSE) 40 % oral gel 15 g  15 g Oral PRN Alexandr De La Cruz MD        dextrose 50 % IV solution  12.5 g IntraVENous PRN Alexandr De La Cruz MD        glucagon (rDNA) injection 1 mg  1 mg IntraMUSCular PRN Alexandr De La Cruz MD        dextrose 5 % solution  100 mL/hr IntraVENous PRN Alexandr De La Cruz MD                Attending Physician Statement  I have reviewed the chart, including any radiology or labs, and seen the patient with the resident(s). I personally reviewed and performed key elements of the history and exam.  I agree with the assessment, plan and orders as documented by the resident. Please refer to the resident note for additional information.       Damaso Allen MD

## 2021-12-09 NOTE — CARE COORDINATION
Met with pt in room. He has no preference for dme supplier and was agreeable to [facility] dme. Referral was made to [facility] at [address] and she will deliver wheeled walker to pt's room. Bridget at [facility] notified pt is for discharge today with [facility] orders on chart. 1500  Pt has no transportation home. Unable to take bus due to hinged knee brace and ww. Spoke with Theron De Jesus rn and will provide pt taxi voucher.

## 2021-12-09 NOTE — PATIENT CARE CONFERENCE
[facility] Quality Flow/Interdisciplinary Rounds Progress Note        Quality Flow Rounds held on December 9, 2021    Disciplines Attending:  Bedside Nurse, ,  and Nursing Unit Leadership    Erasmo Chua was admitted on 12/6/2021  6:59 AM    Anticipated Discharge Date:  Expected Discharge Date: 12/09/21    Disposition:    Jacques Score:  Jacques Scale Score: 18    Readmission Risk              Risk of Unplanned Readmission:  17           Discussed patient goal for the day, patient clinical progression, and barriers to discharge.   The following Goal(s) of the Day/Commitment(s) have been identified:  Discharge planning with cardiology follow up      Jill Arenas RN  December 9, 2021

## 2021-12-09 NOTE — PROGRESS NOTES
Physical Therapy  Physical Therapy Initial Assessment     Name: Shelbie Peraza  : 1950  MRN: [id number]      Date of Service: 2021    Evaluating PT:  Marcio Jefferson PT, DPT    Room #:  0148/0152-E  Diagnosis:  Hypokalemia [E87.6]  Atrial fibrillation with rapid ventricular response ([address] [I48.91]  Patellar tendon rupture, right, initial encounter [S86.811A]  Acute pain of right knee [M25.561]  PMHx/PSHx:  Afib, DM, HTN  Procedure/Surgery:  N/A  Precautions:  RLE WBAT in knee immobilizer  Equipment Needs:  TBD    SUBJECTIVE:    Pt lives alone in a 4th floor apt with elevator access to enter. Pt ambulated with no AD PTA. OBJECTIVE:   Initial Evaluation  Date: 21 Treatment  Date: 21 Short Term/ Long Term   Goals   AM-PAC 6 Clicks  49/62    Was pt agreeable to Eval/treatment? yes yes    Does pt have pain?  4/10 R knee 3/10 R knee    Bed Mobility  Rolling: SBA  Supine to sit: SBA  Sit to supine: NT  Scooting: SBA Rolling: NT  Supine to sit: SBA  Sit to supine: NT  Scooting: SBA Rolling: IND  Supine to sit: IND  Sit to supine: IND  Scooting: IND   Transfers Sit to stand: min A  Stand to sit: CGA  Stand pivot: CGA with ww Sit to stand: CGA  Stand to sit: CGA  Stand pivot: CGA with ww Sit to stand: IND  Stand to sit: IND  Stand pivot: mod I with ww   Ambulation    15'x2, 10'x1 with ww min A 40'x2, 10'x1 with ww CGA 75'x4 with AAD mod I   Stair negotiation: ascended and descended NT       Strength/ROM:   R knee in knee immobilizer  LLE AROM WFL  R hip 2+/5, R ankle DF/PF 4/5  LLE grossly 4+/5    Balance:   Static Sitting: IND  Dynamic Sitting: SBA  Static Standing: SBA with ww  Dynamic Standing: CGA with ww    Pt is A & O x 3  Sensation:  Pt denies numbness and tingling to extremities  Edema:  unremarkable    Therapeutic Exercises:    Bed mobility: supine>sit, cued for EOB positioning  Transfers: STSx2, cued for hand placement and postural correction  Ambulation: 40'x2, 10'x1 with shruthi  BLE AROM    Patient education  Pt educated on role of PT, importance of functional mobility during hospital stay, d/c planning    Patient response to education:   Pt verbalized understanding Pt demonstrated skill Pt requires further education in this area   yes sometimes yes     ASSESSMENT:    Comments:    Pt supine in bed upon entering, agreeable to participate. Pt assisted with donning knee immobilizer, prior to progressing with functional mobility. Pt instructed to transfer to EOB, completing without physical assistance, requiring increased time to complete 2/2 R knee discomfort. Pt provided ww at bedside, instructed to complete transfer from EOB, cues provided for hand placement and postural correction in standing. Pt demonstrating good static standing balance with ww. Pt instructed to ambulate to tolerance, pt demonstrating slow markel, cued for sequencing, pt performing better than initial eval. Pt also cued for ww spacing at times, as pt has a tendency to step past ww YOKO. Pt assisted into bathroom after bout, cued for positioning and hand placement. After bathroom use, pt assisted to bedside chair, again cued for hand placement and positioning to assure safe stand to sit transfer. Pt positioned for comfort with RLE supported and knee immobilizer properly positioned. Pt's call bell and all needs met prior to exiting. Treatment:  Patient practiced and was instructed in the following treatment:     STS and pivot transfer training - pt educated on proper hand and foot placement, safety and sequencing, and use of verbal cues with ww to safely complete sit<>stand and pivot transfers with hands on assistance to complete task safely    Gait training- pt was given verbal and tactile cues to facilitate good markel and safe positioning with ww during ambulation as well as provided with hands on assistance. PLAN:    Patient is making good progress towards established goals. Will continue with current POC. Time in  0830  Time out  0855    Total Treatment Time  25 minutes     CPT codes:  [x] Gait training 21287 15 minutes  [] Manual therapy [id number] -- minutes  [x] Therapeutic activities 84094 10 minutes  [] Therapeutic exercises 67164 -- minutes  [] Neuromuscular reeducation 05246 -- minutes    Dora Schroeder, PT, DPT  [id number]

## 2021-12-10 ENCOUNTER — TELEPHONE (OUTPATIENT)
Dept: FAMILY MEDICINE CLINIC | Age: 71
End: 2021-12-10

## 2021-12-10 ENCOUNTER — TELEPHONE (OUTPATIENT)
Dept: CARDIOLOGY CLINIC | Age: 71
End: 2021-12-10

## 2021-12-10 LAB
EKG ATRIAL RATE: 90 BPM
EKG P AXIS: 59 DEGREES
EKG P-R INTERVAL: 208 MS
EKG Q-T INTERVAL: 360 MS
EKG QRS DURATION: 80 MS
EKG QTC CALCULATION (BAZETT): 428 MS
EKG R AXIS: 4 DEGREES
EKG T AXIS: -138 DEGREES
EKG VENTRICULAR RATE: 85 BPM

## 2021-12-10 PROCEDURE — 93010 ELECTROCARDIOGRAM REPORT: CPT | Performed by: FAMILY MEDICINE

## 2021-12-10 NOTE — TELEPHONE ENCOUNTER
[facility] Transitions Initial Follow Up Call    Outreach made within 2 business days of discharge: Yes    Patient: Eric Girard Patient : 1950   MRN: [id number]  Reason for Admission: Atrial Fibrillation   Discharge Date: 21       Spoke with:  Left a message for patient to return the call.      Discharge department/facility: [facility]       Scheduled appointment with PCP within 7-14 days    Follow Up  Future Appointments   Date Time Provider Vikram Gagnon   12/15/2021  2:40 PM [facility] Beat[facility]   2021 10:00 AM DO JAMAAL Calix [facility]   2022  9:20 AM René Fitzgerald MD BDM PMR 2 [facility]   2/3/2022  8:00 AM Lissette Cruz MD [facility]       Ondina Cedeno

## 2021-12-13 ENCOUNTER — TELEPHONE (OUTPATIENT)
Dept: FAMILY MEDICINE CLINIC | Age: 71
End: 2021-12-13

## 2021-12-13 NOTE — TELEPHONE ENCOUNTER
[facility] Transitions Initial Follow Up Call    Outreach made within 2 business days of discharge: Yes    Patient: Jennifer Campbell Patient : 1950   MRN: [id number]  Reason for Admission: Hypokalemia, Atrial fibrillation with rapid ventricular response(HCC),Patellar tendon rupture,right,initital encounter,Acute pain of right knee  Discharge Date: 21       Spoke with: Shama Hill    Discharge department/facility: [address] [place] [address]    TCM Interactive Patient Contact:  Was patient able to fill all prescriptions: Yes  Was patient instructed to bring all medications to the follow-up visit: Yes  Is patient taking all medications as directed in the discharge summary?  Yes  Does patient understand their discharge instructions: Yes  Does patient have questions or concerns that need addressed prior to 7-14 day follow up office visit: no    Scheduled appointment with PCP within 7-14 days    Follow Up  Future Appointments   Date Time Provider Vikram Gagnon   12/15/2021  2:40 PM [facility]   2021 10:00 AM Lillie Man DO [facility]   2022  9:20 AM Rose Wolf MD BD PMR 2 [facility]   2/3/2022  8:00 AM Yareli Romero MD [facility] Charmayne H[place], [place]

## 2021-12-15 NOTE — PROGRESS NOTES
80 y.o. male who presented to the clinic today for hospital FU.     68yo M with a PMH of HTN, DM2, Stroke, Depression, CKD and gastric ulcers who presented to the hospital on 12/6 for complain right knee pain that started after slipping and falling a couple days prior. On arrival to the ED patient was noted to be on A. Fib RVR with a HR of 130s.      A. Fib RVR: Patient was started on Cardizem drip and became rate controlled. Cardiology was consulted. During admission patient was also started on Toprol XL and Eliquis. Patient maintained NSR with rate control with updated medications and was discharged home in stable condition. Continues to take Toprol 50mg BID, Eliquis 5mg BID in addition to previous HTN medications HCTZ 12.5mg, Lisinopril 30mg and Hydralazine 50mg TID.     Right knee pain: MRI right knee was done and showed a partial patellar tendon tear. Orthopedic surgery was consulted and recommended conservative management. Patient was provided with appropriate brace and instructuions per Ortho. Patient is to follow-up with them outpatient. Appt is scheduled for 12/23 with Dr. Noemi Norton, 2/3 with Dr. Anju Steel and PMR 1/24.

## 2021-12-20 ENCOUNTER — HOSPITAL ENCOUNTER (OUTPATIENT)
Dept: CT IMAGING | Age: 71
Discharge: HOME | End: 2021-12-22
Payer: MEDICAID

## 2021-12-20 ENCOUNTER — OFFICE VISIT (OUTPATIENT)
Dept: FAMILY MEDICINE CLINIC | Age: 71
End: 2021-12-20
Payer: MEDICAID

## 2021-12-20 VITALS
RESPIRATION RATE: 20 BRPM | DIASTOLIC BLOOD PRESSURE: 88 MMHG | BODY MASS INDEX: 35.04 KG/M2 | TEMPERATURE: 97.3 F | OXYGEN SATURATION: 99 % | WEIGHT: 273 LBS | HEART RATE: 70 BPM | HEIGHT: 74 IN | SYSTOLIC BLOOD PRESSURE: 132 MMHG

## 2021-12-20 DIAGNOSIS — I48.91 ATRIAL FIBRILLATION, UNSPECIFIED TYPE (HCC): Primary | ICD-10-CM

## 2021-12-20 DIAGNOSIS — R31.29 MICROSCOPIC HEMATURIA: ICD-10-CM

## 2021-12-20 PROCEDURE — 99214 OFFICE O/P EST MOD 30 MIN: CPT | Performed by: FAMILY MEDICINE

## 2021-12-20 PROCEDURE — 1111F DSCHRG MED/CURRENT MED MERGE: CPT | Performed by: FAMILY MEDICINE

## 2021-12-20 PROCEDURE — 74176 CT ABD & PELVIS W/O CONTRAST: CPT

## 2021-12-20 PROCEDURE — 99212 OFFICE O/P EST SF 10 MIN: CPT | Performed by: FAMILY MEDICINE

## 2021-12-20 ASSESSMENT — ENCOUNTER SYMPTOMS
NAUSEA: 0
VOMITING: 0
CONSTIPATION: 0
WHEEZING: 0
DIARRHEA: 0
COUGH: 0
ABDOMINAL PAIN: 0
SHORTNESS OF BREATH: 0

## 2021-12-20 ASSESSMENT — LIFESTYLE VARIABLES: HOW OFTEN DO YOU HAVE A DRINK CONTAINING ALCOHOL: NEVER

## 2021-12-20 NOTE — PATIENT INSTRUCTIONS
Patient Education                        Preventing Falls: Care Instructions  Your Care Instructions     Getting around your home safely can be a challenge if you have injuries or health problems that make it easy for you to fall. Loose rugs and furniture in walkways are among the dangers for many older people who have problems walking or who have poor eyesight. People who have conditions such as arthritis, osteoporosis, or dementia also have to be careful not to fall. You can make your home safer with a few simple measures. Follow-up care is a key part of your treatment and safety. Be sure to make and go to all appointments, and call your doctor if you are having problems. It's also a good idea to know your test results and keep a list of the medicines you take. How can you care for yourself at home? Taking care of yourself  · You may get dizzy if you do not drink enough water. To prevent dehydration, drink plenty of fluids. Choose water and other clear liquids. If you have kidney, heart, or liver disease and have to limit fluids, talk with your doctor before you increase the amount of fluids you drink. · Exercise regularly to improve your strength, muscle tone, and balance. Walk if you can. Swimming may be a good choice if you cannot walk easily. · Have your vision and hearing checked each year or any time you notice a change. If you have trouble seeing and hearing, you might not be able to avoid objects and could lose your balance. · Know the side effects of the medicines you take. Ask your doctor or pharmacist whether the medicines you take can affect your balance. Sleeping pills or sedatives can affect your balance. · Limit the amount of alcohol you drink. Alcohol can impair your balance and other senses. · Ask your doctor whether calluses or corns on your feet need to be removed. If you wear loose-fitting shoes because of calluses or corns, you can lose your balance and fall.   · Talk to your doctor if you have numbness in your feet. Preventing falls at home  · Remove raised doorway thresholds, throw rugs, and clutter. Repair loose carpet or raised areas in the floor. · Move furniture and electrical cords to keep them out of walking paths. · Use nonskid floor wax, and wipe up spills right away, especially on ceramic tile floors. · If you use a walker or cane, put rubber tips on it. If you use crutches, clean the bottoms of them regularly with an abrasive pad, such as steel wool. · Keep your house well lit, especially [place], and outside walkways. Use night-lights in areas such as hallways and bathrooms. Add extra light switches or use remote switches (such as switches that go on or off when you clap your hands) to make it easier to turn lights on if you have to get up during the night. · Install sturdy handrails on stairways. · Move items in your cabinets so that the things you use a lot are on the lower shelves (about waist level). · Keep a cordless phone and a flashlight with new batteries by your bed. If possible, put a phone in each of the main rooms of your house, or carry a cell phone in case you fall and cannot reach a phone. Or, you can wear a device around your neck or wrist. You push a button that sends a signal for help. · Wear low-heeled shoes that fit well and give your feet good support. Use footwear with nonskid soles. Check the heels and soles of your shoes for wear. Repair or replace worn heels or soles. · Do not wear socks without shoes on wood floors. · Walk on the grass when the sidewalks are slippery. If you live in an area that gets snow and ice in the winter, sprinkle salt on slippery steps and sidewalks. Preventing falls in the bath  · Install grab bars and nonskid mats inside and outside your shower or tub and near the toilet and sinks. · Use shower chairs and bath benches. · Use a hand-held shower head that will allow you to sit while showering.   · Get into a tub or shower by putting the weaker leg in first. Get out of a tub or shower with your strong side first.  · Repair loose toilet seats and consider installing a raised toilet seat to make getting on and off the toilet easier. · Keep your bathroom door unlocked while you are in the shower. Where can you learn more? Go to https://[employer]. org and sign in to your [facility] account. Enter [phone] in the [facility] box to learn more about \"Preventing Falls: Care Instructions. \"     If you do not have an account, please click on the \"Sign Up Now\" link. Current as of: December 7, 2020               Content Version: 13.0  © 2006-2021 [employer]. Care instructions adapted under license by [facility] ([facility]). If you have questions about a medical condition or this instruction, always ask your healthcare professional. [place] [address] any warranty or liability for your use of this information.

## 2021-12-20 NOTE — PROGRESS NOTES
Post-Discharge Transitional Care Management Services or Hospital Follow Up      Srini Banks   YOB: 1950    Date of Office Visit:  12/20/2021  Date of Hospital Admission: 12/6/21  Date of Hospital Discharge: 12/9/21  Readmission Risk Score(high >=14%. Medium >=10%):Readmission Risk Score: 12.3 ( )      Care management risk score Rising risk (score 2-5) and Complex Care (Scores >=6): 2     Non face to face  following discharge, date last encounter closed (first attempt may have been earlier): 12/13/2021  4:24 PM 12/13/2021  4:24 PM    Call initiated 2 business days of discharge: Yes     Patient Active Problem List   Diagnosis    Diabetes mellitus ([address]    HTN (hypertension)       No Known Allergies    Medications listed as ordered at the time of discharge from hospital     Medication List          Accurate as of December 20, 2021  4:15 PM. If you have any questions, ask your nurse or doctor.             CHANGE how you take these medications    hydrALAZINE 50 MG tablet  Commonly known as: APRESOLINE  Take 1 tablet by mouth 3 times daily  What changed: how much to take        CONTINUE taking these medications    apixaban 5 MG Tabs tablet  Commonly known as: Eliquis  Take 1 tablet by mouth 2 times daily     Aspirin Low Dose 81 MG EC tablet  Generic drug: aspirin  TAKE 1 TABLET BY MOUTH DAILY     B-D SINGLE USE SWABS REGULAR Pads     B-D UF III MINI PEN NEEDLES 31G X 5 MM Misc  Generic drug: Insulin Pen Needle     bisacodyl 5 MG EC tablet  Generic drug: bisacodyl     Blood Pressure Kit  1 kit by Does not apply route daily     FLUoxetine 20 MG capsule  Commonly known as: PROZAC     gabapentin 300 MG capsule  Commonly known as: NEURONTIN     hydroCHLOROthiazide 12.5 MG capsule  Commonly known as: MICROZIDE  TAKE 1 CAPSULE BY MOUTH DAILY     insulin lispro (1 Unit Dial) 100 UNIT/ML Sopn     Lantus 100 UNIT/ML injection vial  Generic drug: insulin glargine     lidocaine 5 %  Commonly known as: Lidoderm  Place 1 patch onto the skin daily 12 hours on, 12 hours off.     lisinopril 30 MG tablet  Commonly known as: PRINIVIL;ZESTRIL     metoprolol succinate 50 MG extended release tablet  Commonly known as: TOPROL XL  Take 1 tablet by mouth 2 times daily     pantoprazole 40 MG tablet  Commonly known as: PROTONIX  TAKE 1 TABLET BY MOUTH DAILY     tamsulosin 0.4 MG capsule  Commonly known as: FLOMAX     vitamin D-3 125 MCG (5000 UT) Tabs  Commonly known as: cholecalciferol  Take 1 tablet by mouth daily              Medications marked \"taking\" at this time  Outpatient Medications Marked as Taking for the 12/20/21 encounter (Office Visit) with Kenyon Causey MD   Medication Sig Dispense Refill    metoprolol succinate (TOPROL XL) 50 MG extended release tablet Take 1 tablet by mouth 2 times daily 30 tablet 3    apixaban (ELIQUIS) 5 MG TABS tablet Take 1 tablet by mouth 2 times daily 180 tablet 0    ASPIRIN LOW DOSE 81 MG EC tablet TAKE 1 TABLET BY MOUTH DAILY 90 tablet 1    pantoprazole (PROTONIX) 40 MG tablet TAKE 1 TABLET BY MOUTH DAILY 90 tablet 1    lidocaine (LIDODERM) 5 % Place 1 patch onto the skin daily 12 hours on, 12 hours off.  30 patch 3    hydroCHLOROthiazide (MICROZIDE) 12.5 MG capsule TAKE 1 CAPSULE BY MOUTH DAILY 90 capsule 1    lisinopril (PRINIVIL;ZESTRIL) 30 MG tablet Take 30 mg by mouth daily      hydrALAZINE (APRESOLINE) 50 MG tablet Take 1 tablet by mouth 3 times daily (Patient taking differently: Take 25 mg by mouth 3 times daily ) 270 tablet 1    gabapentin (NEURONTIN) 300 MG capsule TAKE 1 CAPSULE BY MOUTH TWICE DAILY      vitamin D-3 (CHOLECALCIFEROL) 125 MCG (5000 UT) TABS Take 1 tablet by mouth daily 90 tablet 3    Alcohol Swabs (B-D SINGLE USE SWABS REGULAR) PADS USE 3 - 4 TIMES DAILY AS DIRECTED      insulin lispro, 1 Unit Dial, 100 UNIT/ML SOPN Inject 6 Units into the skin 3 times daily Plus  2 units for each 50 above 150 Blood sugar level result      B-D UF III MINI PEN NEEDLES 31G X 5 MM MISC 1 each by Does not apply route 4 times daily      FLUoxetine (PROZAC) 20 MG capsule Take 1 capsule by mouth nightly      tamsulosin (FLOMAX) 0.4 MG capsule Take 0.4 mg by mouth nightly      insulin glargine (LANTUS) 100 UNIT/ML injection vial Inject 55 Units into the skin nightly           Medications patient taking as of now reconciled against medications ordered at time of hospital discharge: Yes    Chief Complaint   Patient presents with    Follow[facility] from [facility]       80 y.o. male who presented to the clinic today for hospital FU.     70yo M with a PMH of HTN, DM2, Stroke, Depression, CKD and gastric ulcers who presented to the hospital on 12/6 for complain right knee pain that started after slipping and falling a couple days prior. On arrival to the ED patient was noted to be on A. Fib RVR with a HR of 130s.      A. Fib RVR: Patient was started on Cardizem drip and became rate controlled. Cardiology was consulted. During admission patient was also started on Toprol XL and Eliquis. Patient maintained NSR with rate control with updated medications and was discharged home in stable condition. Continues to take Toprol 50mg BID, Eliquis 5mg BID in addition to previous HTN medications HCTZ 12.5mg, Lisinopril 30mg and Hydralazine 50mg TID. Patient denies chest pain, SOB, palpitations, ADDISON or lower extremity swelling.      Right knee pain: MRI right knee was done and showed a partial patellar tendon tear. Orthopedic surgery was consulted and recommended conservative management. Patient was provided with appropriate brace and instructuions per Ortho. Patient is to follow-up with them outpatient. Appt is scheduled for 12/23 with Dr. Rosy Goldmann, 2/3 with Dr. Lorin Torres and PMR 1/24 . Patient has been doing PT at home. Inpatient course: Discharge summary reviewed- see chart. Interval history/Current status: stable    Review of Systems   Constitutional: Negative for chills, fatigue and fever. Respiratory: Negative for cough, shortness of breath and wheezing. Cardiovascular: Negative for chest pain, palpitations and leg swelling. Gastrointestinal: Negative for abdominal pain, constipation, diarrhea, nausea and vomiting. Musculoskeletal: Positive for gait problem. Negative for joint swelling. Neurological: Negative for dizziness, weakness, light-headedness, numbness and headaches. Vitals:    12/20/21 1442 12/20/21 1452   BP: (!) 171/89 132/88   Site: Left Upper Arm    Position: Sitting    Cuff Size: Large Adult    Pulse: 70    Resp: 20    Temp: 97.3 °F (36.3 °C)    TempSrc: Temporal    SpO2: 99%    Weight: 273 lb (123.8 kg)    Height: 6' 2\" (1.88 m)      Body mass index is 35.05 kg/m². Wt Readings from Last 3 Encounters:   12/20/21 273 lb (123.8 kg)   12/06/21 275 lb (124.7 kg)   11/23/21 276 lb 3.2 oz (125.3 kg)     BP Readings from Last 3 Encounters:   12/20/21 132/88   12/09/21 135/73   11/23/21 (!) 142/64       Physical Exam  Vitals reviewed. Constitutional:       General: He is not in acute distress. Appearance: Normal appearance. Cardiovascular:      Rate and Rhythm: Normal rate and regular rhythm. Pulses: Normal pulses. Heart sounds: Murmur heard. Pulmonary:      Effort: Pulmonary effort is normal. No respiratory distress. Breath sounds: Normal breath sounds. No wheezing. Abdominal:      General: Bowel sounds are normal. There is no distension. Palpations: Abdomen is soft. Tenderness: There is no abdominal tenderness. Musculoskeletal:      Right lower leg: No edema. Left lower leg: No edema. Comments: Right knee swelling resolved   Neurological:      Mental Status: He is alert. Assessment/Plan:  1.  Atrial fibrillation, unspecified type ([address]  - stable  - continue present mgmt  provided patient with Dr. Price Rounds office information to schedule an appointment with cardiology  - DE DISCHARGE MEDS RECONCILED W/ CURRENT OUTPATIENT MED LIST      2.  Right patellar tendon partial tear  - continue mgmt per ortho    Medical Decision Making: moderate complexity

## 2021-12-23 ENCOUNTER — OFFICE VISIT (OUTPATIENT)
Dept: ORTHOPEDIC SURGERY | Age: 71
End: 2021-12-23
Payer: MEDICAID

## 2021-12-23 VITALS — TEMPERATURE: 97.6 F

## 2021-12-23 DIAGNOSIS — S86.891A PATELLAR TENDON AVULSION, RIGHT, INITIAL ENCOUNTER: ICD-10-CM

## 2021-12-23 DIAGNOSIS — M66.261 NON-TRAUMATIC RUPTURE OF RIGHT PATELLAR TENDON: Primary | ICD-10-CM

## 2021-12-23 PROCEDURE — 1123F ACP DISCUSS/DSCN MKR DOCD: CPT | Performed by: ORTHOPAEDIC SURGERY

## 2021-12-23 PROCEDURE — 1111F DSCHRG MED/CURRENT MED MERGE: CPT | Performed by: ORTHOPAEDIC SURGERY

## 2021-12-23 PROCEDURE — G8427 DOCREV CUR MEDS BY ELIG CLIN: HCPCS | Performed by: ORTHOPAEDIC SURGERY

## 2021-12-23 PROCEDURE — 4040F PNEUMOC VAC/ADMIN/RCVD: CPT | Performed by: ORTHOPAEDIC SURGERY

## 2021-12-23 PROCEDURE — 3017F COLORECTAL CA SCREEN DOC REV: CPT | Performed by: ORTHOPAEDIC SURGERY

## 2021-12-23 PROCEDURE — 99214 OFFICE O/P EST MOD 30 MIN: CPT | Performed by: ORTHOPAEDIC SURGERY

## 2021-12-23 PROCEDURE — G8417 CALC BMI ABV UP PARAM F/U: HCPCS | Performed by: ORTHOPAEDIC SURGERY

## 2021-12-23 PROCEDURE — 1036F TOBACCO NON-USER: CPT | Performed by: ORTHOPAEDIC SURGERY

## 2021-12-23 PROCEDURE — G8484 FLU IMMUNIZE NO ADMIN: HCPCS | Performed by: ORTHOPAEDIC SURGERY

## 2021-12-23 PROCEDURE — 99212 OFFICE O/P EST SF 10 MIN: CPT | Performed by: ORTHOPAEDIC SURGERY

## 2021-12-23 NOTE — PROGRESS NOTES
Patient presents today for a 2 wk f/u R patellar tendon partial tear. Patient states roughly 2 weeks ago he slipped walking onto the bus and hit his right knee on the steps. At the time he had pain in the knees, however he waited a few days before the swelling occurred to go to the ED. Patient states since then the swelling has gone down and he can now move his leg up, down, in and out. Pain in that right knee is a constant thing, and when he moves his leg he feels the pain more.

## 2021-12-27 PROBLEM — S86.891A: Status: ACTIVE | Noted: 2021-12-27

## 2021-12-27 NOTE — PROGRESS NOTES
Orthopaedic Clinic Note        Nicole Marcano is a 80 y.o. male, his YOB: 1950 with the following history as recorded in [facility]:    Patient Active Problem List    Diagnosis Date Noted    Patellar tendon avulsion, right, initial encounter 12/27/2021    HTN (hypertension) 11/16/2012    Diabetes mellitus ([facility] 02/18/2012     Current Outpatient Medications   Medication Sig Dispense Refill    metoprolol succinate (TOPROL XL) 50 MG extended release tablet Take 1 tablet by mouth 2 times daily 30 tablet 3    apixaban (ELIQUIS) 5 MG TABS tablet Take 1 tablet by mouth 2 times daily 180 tablet 0    ASPIRIN LOW DOSE 81 MG EC tablet TAKE 1 TABLET BY MOUTH DAILY 90 tablet 1    pantoprazole (PROTONIX) 40 MG tablet TAKE 1 TABLET BY MOUTH DAILY 90 tablet 1    lidocaine (LIDODERM) 5 % Place 1 patch onto the skin daily 12 hours on, 12 hours off.  30 patch 3    hydroCHLOROthiazide (MICROZIDE) 12.5 MG capsule TAKE 1 CAPSULE BY MOUTH DAILY 90 capsule 1    lisinopril (PRINIVIL;ZESTRIL) 30 MG tablet Take 30 mg by mouth daily      BISACODYL 5 MG EC tablet TAKE AS DIRECTED      hydrALAZINE (APRESOLINE) 50 MG tablet Take 1 tablet by mouth 3 times daily (Patient taking differently: Take 25 mg by mouth 3 times daily ) 270 tablet 1    gabapentin (NEURONTIN) 300 MG capsule TAKE 1 CAPSULE BY MOUTH TWICE DAILY      vitamin D-3 (CHOLECALCIFEROL) 125 MCG (5000 UT) TABS Take 1 tablet by mouth daily 90 tablet 3    Blood Pressure KIT 1 kit by Does not apply route daily 1 kit 0    Alcohol Swabs (B-D SINGLE USE SWABS REGULAR) PADS USE 3 - 4 TIMES DAILY AS DIRECTED      insulin lispro, 1 Unit Dial, 100 UNIT/ML SOPN Inject 6 Units into the skin 3 times daily Plus  2 units for each 50 above 150 Blood sugar level result      B-D UF III MINI PEN NEEDLES 31G X 5 MM MISC 1 each by Does not apply route 4 times daily      FLUoxetine (PROZAC) 20 MG capsule Take 1 capsule by mouth nightly      tamsulosin (FLOMAX) 0.4 MG capsule Take 0.4 mg by mouth nightly      insulin glargine (LANTUS) 100 UNIT/ML injection vial Inject 55 Units into the skin nightly        No current facility-administered medications for this visit. Allergies: Patient has no known allergies. Past Medical History:   Diagnosis Date    Cervical vertebra fusion 01/01/2000    Chronic kidney disease (CKD), stage III (moderate) ([facility] 11/25/2013    Depression     Foot injury     History of gastric ulcer     Hypertension     Stroke [facility])     patient had a stroke in the late 90's    Type 2 diabetes mellitus with diabetic polyneuropathy, with long-term current use of insulin ([facility]      Past Surgical History:   Procedure Laterality Date    COLONOSCOPY      COLONOSCOPY N/A 9/13/2021    COLONOSCOPY POLYPECTOMY SNARE/COLD BIOPSY performed by Claudean Patella, MD at [address] [place] [address]  01/23/2010 2019    [address]      UPPER GASTROINTESTINAL ENDOSCOPY      UPPER GASTROINTESTINAL ENDOSCOPY N/A 9/13/2021    EGD BIOPSY performed by Claudean Patella, MD at [address] [place] [address] N/A 9/13/2021    EGD POLYP SNARE performed by Claudean Patella, MD at [facility] ENDOSCOPY     Family History   Problem Relation Age of Onset    Heart Disease Mother     Diabetes Sister      Social History     Tobacco Use    Smoking status: Never Smoker    Smokeless tobacco: Never Used   Substance Use Topics    Alcohol use: No                           Review of Systems   Constitutional: Negative for fever and chills. HENT: Negative for ear pain, sore throat and sinus pressure. Eyes: Negative for pain, discharge and redness. Respiratory: Negative for cough, shortness of breath and wheezing. Cardiovascular: Negative for chest pain. Gastrointestinal: Negative for nausea, vomiting, diarrhea and abdominal distention.    Genitourinary: Negative for dysuria and frequency. Musculoskeletal: Negative for back pain and arthralgias. All other systems reviewed and negative. CC: Right knee injury    S: Shelbie Peraza is a 80 y.o. who is here today for follow-up for right knee injury. He is approximately 2 weeks out at this point. States he was getting on the bus when he slipped and fell striking the front of the right knee on the steps. Was seen in the ER couple days thereafter due to increased pain with the knee. Patient had a fall in the right knee had advanced imaging during his hospitalization demonstrated partial avulsion of the right patellar tendon. Does have some pre-existing Osgood-Schlatter type ossification of the tibial tubercle. Patient has been using a walker for assistive device. States she been doing extremely well, states is not having any pain can fully bend and flex knee without issue and get up and walk on it without any pain. Denies any instability or giving out. Physical Exam  Temp 97.6 °F (36.4 °C) (Oral)   O:   CONSTITUTIONAL: awake, alert, cooperative, no apparent distress, and appears stated age  EYES: Lids and lashes normal, pupils equal, round and reactive to light, extra ocular muscles intact, sclera clear, conjunctiva normal  ENT: Normocephalic, without obvious abnormality, atraumatic, external ears without lesions, oral pharynx with moist mucus membranes  NECK: Trachea midline, skin normal  LUNGS: No increased work of breathing, good air exchange  CARDIOVASCULAR: 2+ pulses throughout and capillary Refill less than 2 seconds  ABDOMEN: soft, non-distended, non-tender and no rebound tenderness or guarding  NEUROLOGIC: Awake, alert, oriented to name, place and time. Cranial nerves II-XII are grossly intact. Motor is 5 out of 5 bilaterally. Sensory is intact.    Right Lower Extremity Exam:  Skin intact, resolved swelling, no obvious ecchymosis, no fluctuance or erythema  There is no real tenderness palpation over the extensor mechanism or tibial tubercle  Patient able to squat independently rise from a chair without issue  Demonstrates full extension actively against resistance without pain or discomfort, good active flexion  Demonstrates active knee flexion/extension, ankle plantar/dorsiflexion/great toe extension. States sensation intact to gross touch in sural/deep peroneal/superficial peroneal/saphenous/posterior tibial nerve distributions to foot/ankle. Palpable dorsalis pedis and posterior tibialis pulses, cap refill brisk in toes, foot warm/perfused. Compartments supple throughout thigh and leg, calves supple/NT    Xrays Reviewed  No new imaging was obtained today. ASSESSMENT:    ICD-10-CM    1. Non-traumatic rupture of right patellar tendon  M66.261    2. Patellar tendon avulsion, right, initial encounter  S92.079H        PLAN:   Discussed with patient he can transition back into activities as tolerated, he can continue with assistive device until he is stable with his ambulation. Discussed PT which patient declined. We will see him back in office in 6 weeks for repeat evaluation or sooner if needed. Electronically Signed By  Lyndon Marley DO  12/23/21    NOTE: This report was transcribed using voice recognition software.  Every effort was made to ensure accuracy; however, inadvertent computerized transcription errors may be present

## 2022-01-05 ENCOUNTER — HOSPITAL ENCOUNTER (OUTPATIENT)
Age: 72
Discharge: HOME | End: 2022-01-05
Payer: MEDICAID

## 2022-01-05 LAB
ALBUMIN SERPL-MCNC: 3.8 G/DL (ref 3.5–5.2)
ALP BLD-CCNC: 112 U/L (ref 40–129)
ALT SERPL-CCNC: 18 U/L (ref 0–40)
ANION GAP SERPL CALCULATED.3IONS-SCNC: 14 MMOL/L (ref 7–16)
AST SERPL-CCNC: 30 U/L (ref 0–39)
BASOPHILS ABSOLUTE: 0.03 E9/L (ref 0–0.2)
BASOPHILS RELATIVE PERCENT: 0.6 % (ref 0–2)
BILIRUB SERPL-MCNC: 0.3 MG/DL (ref 0–1.2)
BUN BLDV-MCNC: 20 MG/DL (ref 6–23)
CALCIUM SERPL-MCNC: 9.1 MG/DL (ref 8.6–10.2)
CHLORIDE BLD-SCNC: 103 MMOL/L (ref 98–107)
CHOLESTEROL, TOTAL: 154 MG/DL (ref 0–199)
CO2: 23 MMOL/L (ref 22–29)
CREAT SERPL-MCNC: 2 MG/DL (ref 0.7–1.2)
CREATININE URINE: 182 MG/DL (ref 40–278)
EOSINOPHILS ABSOLUTE: 0.08 E9/L (ref 0.05–0.5)
EOSINOPHILS RELATIVE PERCENT: 1.7 % (ref 0–6)
GFR AFRICAN AMERICAN: 40
GFR NON-AFRICAN AMERICAN: 40 ML/MIN/1.73
GLUCOSE BLD-MCNC: 80 MG/DL (ref 74–99)
HBA1C MFR BLD: 5.8 % (ref 4–5.6)
HCT VFR BLD CALC: 41.6 % (ref 37–54)
HDLC SERPL-MCNC: 45 MG/DL
HEMOGLOBIN: 13 G/DL (ref 12.5–16.5)
IMMATURE GRANULOCYTES #: 0.01 E9/L
IMMATURE GRANULOCYTES %: 0.2 % (ref 0–5)
LDL CHOLESTEROL CALCULATED: 93 MG/DL (ref 0–99)
LYMPHOCYTES ABSOLUTE: 1.98 E9/L (ref 1.5–4)
LYMPHOCYTES RELATIVE PERCENT: 42.1 % (ref 20–42)
MCH RBC QN AUTO: 27.1 PG (ref 26–35)
MCHC RBC AUTO-ENTMCNC: 31.3 % (ref 32–34.5)
MCV RBC AUTO: 86.8 FL (ref 80–99.9)
MICROALBUMIN UR-MCNC: 57.1 MG/L
MICROALBUMIN/CREAT UR-RTO: 31.4 (ref 0–30)
MONOCYTES ABSOLUTE: 0.62 E9/L (ref 0.1–0.95)
MONOCYTES RELATIVE PERCENT: 13.2 % (ref 2–12)
NEUTROPHILS ABSOLUTE: 1.98 E9/L (ref 1.8–7.3)
NEUTROPHILS RELATIVE PERCENT: 42.2 % (ref 43–80)
PDW BLD-RTO: 13.8 FL (ref 11.5–15)
PLATELET # BLD: 188 E9/L (ref 130–450)
PMV BLD AUTO: 11.7 FL (ref 7–12)
POTASSIUM SERPL-SCNC: 3.8 MMOL/L (ref 3.5–5)
RBC # BLD: 4.79 E12/L (ref 3.8–5.8)
SODIUM BLD-SCNC: 140 MMOL/L (ref 132–146)
T4 FREE: 1.4 NG/DL (ref 0.93–1.7)
TOTAL PROTEIN: 8.2 G/DL (ref 6.4–8.3)
TRIGL SERPL-MCNC: 81 MG/DL (ref 0–149)
TSH SERPL DL<=0.05 MIU/L-ACNC: 1.49 UIU/ML (ref 0.27–4.2)
VITAMIN B-12: 220 PG/ML (ref 211–946)
VITAMIN D 25-HYDROXY: 76 NG/ML (ref 30–100)
VLDLC SERPL CALC-MCNC: 16 MG/DL
WBC # BLD: 4.7 E9/L (ref 4.5–11.5)

## 2022-01-05 PROCEDURE — 82044 UR ALBUMIN SEMIQUANTITATIVE: CPT

## 2022-01-05 PROCEDURE — 80053 COMPREHEN METABOLIC PANEL: CPT

## 2022-01-05 PROCEDURE — 83036 HEMOGLOBIN GLYCOSYLATED A1C: CPT

## 2022-01-05 PROCEDURE — 80061 LIPID PANEL: CPT

## 2022-01-05 PROCEDURE — 85025 COMPLETE CBC W/AUTO DIFF WBC: CPT

## 2022-01-05 PROCEDURE — 84443 ASSAY THYROID STIM HORMONE: CPT

## 2022-01-05 PROCEDURE — 82570 ASSAY OF URINE CREATININE: CPT

## 2022-01-05 PROCEDURE — 84439 ASSAY OF FREE THYROXINE: CPT

## 2022-01-05 PROCEDURE — 82607 VITAMIN B-12: CPT

## 2022-01-05 PROCEDURE — 82306 VITAMIN D 25 HYDROXY: CPT

## 2022-01-05 PROCEDURE — 36415 COLL VENOUS BLD VENIPUNCTURE: CPT

## 2022-01-14 ENCOUNTER — TELEPHONE (OUTPATIENT)
Dept: FAMILY MEDICINE CLINIC | Age: 72
End: 2022-01-14

## 2022-01-14 NOTE — TELEPHONE ENCOUNTER
[facility] called to report the patient had a 3 pound weight gain overnight   He is experiencing no symptoms

## 2022-01-21 ENCOUNTER — OFFICE VISIT (OUTPATIENT)
Dept: FAMILY MEDICINE CLINIC | Age: 72
End: 2022-01-21
Payer: MEDICAID

## 2022-01-21 VITALS
OXYGEN SATURATION: 100 % | HEIGHT: 74 IN | SYSTOLIC BLOOD PRESSURE: 181 MMHG | TEMPERATURE: 97.2 F | RESPIRATION RATE: 18 BRPM | WEIGHT: 274 LBS | HEART RATE: 70 BPM | BODY MASS INDEX: 35.16 KG/M2 | DIASTOLIC BLOOD PRESSURE: 79 MMHG

## 2022-01-21 DIAGNOSIS — I48.91 ATRIAL FIBRILLATION, UNSPECIFIED TYPE (HCC): ICD-10-CM

## 2022-01-21 DIAGNOSIS — I10 PRIMARY HYPERTENSION: Primary | ICD-10-CM

## 2022-01-21 PROBLEM — N18.32 STAGE 3B CHRONIC KIDNEY DISEASE (HCC): Status: ACTIVE | Noted: 2021-06-21

## 2022-01-21 PROBLEM — I12.9 BENIGN HYPERTENSIVE KIDNEY DISEASE WITH CHRONIC KIDNEY DISEASE: Status: ACTIVE | Noted: 2021-10-06

## 2022-01-21 PROCEDURE — 1036F TOBACCO NON-USER: CPT | Performed by: FAMILY MEDICINE

## 2022-01-21 PROCEDURE — G8427 DOCREV CUR MEDS BY ELIG CLIN: HCPCS | Performed by: FAMILY MEDICINE

## 2022-01-21 PROCEDURE — 4040F PNEUMOC VAC/ADMIN/RCVD: CPT | Performed by: FAMILY MEDICINE

## 2022-01-21 PROCEDURE — 3017F COLORECTAL CA SCREEN DOC REV: CPT | Performed by: FAMILY MEDICINE

## 2022-01-21 PROCEDURE — 99212 OFFICE O/P EST SF 10 MIN: CPT | Performed by: FAMILY MEDICINE

## 2022-01-21 PROCEDURE — 99213 OFFICE O/P EST LOW 20 MIN: CPT | Performed by: FAMILY MEDICINE

## 2022-01-21 PROCEDURE — G8484 FLU IMMUNIZE NO ADMIN: HCPCS | Performed by: FAMILY MEDICINE

## 2022-01-21 PROCEDURE — 1123F ACP DISCUSS/DSCN MKR DOCD: CPT | Performed by: FAMILY MEDICINE

## 2022-01-21 PROCEDURE — G8417 CALC BMI ABV UP PARAM F/U: HCPCS | Performed by: FAMILY MEDICINE

## 2022-01-21 RX ORDER — AMLODIPINE BESYLATE 5 MG/1
5 TABLET ORAL DAILY
Qty: 90 TABLET | Refills: 1 | Status: SHIPPED
Start: 2022-01-21 | End: 2022-04-22 | Stop reason: SDUPTHER

## 2022-01-21 RX ORDER — AMMONIUM LACTATE 12 G/100G
LOTION TOPICAL
COMMUNITY
Start: 2022-01-10 | End: 2022-05-20

## 2022-01-21 RX ORDER — CHOLECALCIFEROL (VITAMIN D3) 125 MCG
CAPSULE ORAL
COMMUNITY
Start: 2021-12-13 | End: 2022-01-21

## 2022-01-21 RX ORDER — BLOOD SUGAR DIAGNOSTIC
STRIP MISCELLANEOUS
COMMUNITY
Start: 2021-10-19

## 2022-01-21 RX ORDER — LISINOPRIL 40 MG/1
TABLET ORAL
COMMUNITY
Start: 2021-11-26 | End: 2022-01-21

## 2022-01-21 RX ORDER — PSEUDOEPHED/ACETAMINOPH/DIPHEN 30MG-500MG
TABLET ORAL
COMMUNITY
Start: 2021-11-05

## 2022-01-21 ASSESSMENT — ENCOUNTER SYMPTOMS
NAUSEA: 0
CONSTIPATION: 0
VOMITING: 0
DIARRHEA: 0
ABDOMINAL PAIN: 0
SHORTNESS OF BREATH: 0
COUGH: 0
WHEEZING: 0

## 2022-01-21 NOTE — PROGRESS NOTES
Attending Physician Statement    S:   Chief Complaint   Patient presents with    Atrial Fibrillation    Hypertension     elevated BP x 2 see VS       Patient is a 80year old female  Here for follow up of afib and htn , diabetes. afib  - recently diagnosed with . On metoprolol 50mg BID. Sees cardiology. Has an appointment scheduled for 3 weeks. Htn- on hctz, lisinopril , hydralazine. He has been in the 150-160/90s. O: Blood pressure (!) 181/179, pulse 70, temperature 97.2 °F (36.2 °C), temperature source Temporal, resp. rate 18, height 6' 2\" (1.88 m), weight 274 lb (124.3 kg), SpO2 100 %. Exam:   Heart - RRR   Lungs - clear     A: diabetes, afib, htn   P:  Decrease 50 units of lantus    afib - continue metoprolol and eliquis. Start amlodipine 5mg    Discuss bp meds with nephro   Follow-up as ordered    Attending Attestation   I have discussed the case, including pertinent history and exam findings with the resident. I agree with the documented assessment and plan.

## 2022-01-21 NOTE — PROGRESS NOTES
[address] [place] [address]  FAMILY MEDICINE RESIDENCY PROGRAM  DATE OF VISIT : 2022    Patient : Dima Mccoy   Age : 80 y.o.  : 1950   MRN : [id number]   ______________________________________________________________________    Chief Complaint :   Chief Complaint   Patient presents with    Atrial Fibrillation    Hypertension     elevated BP x 2 see VS        HPI : Dima Mccoy is 80 y.o. male who presented to the clinic today for a.fib and HTN. A.Fib: Toprol 50mg BID, Eliquis 5mg BID. Tolerating well. HTN: HCTZ 12.5mg daily, Lisinopril 30mg daily and Hydralazine 50mg TID. Uncontrolled at the time. Denies chest pain, SOB, palpitations. BP very high today, attributes it to the fact that he had to walk from the bus stop and was \"huffing and puffing\" when he arrived. He has been checking it at home and reporting to the home health aid (spoke to aid on the phone during visit) she notes they have been running around 150-160s/>80. Follows with nephro, has appt with Dr. Opal Samuel, has upcoming appt on . IDDM: following with endo. Last A1C 5.8 (). Patient notes his sugars have been running <110 consistently and he has to nap after he takes his insulin cause he gets really tired. Notes he feels better on the couple occasions his sugars are between 110-120. I reviewed the patient's past medications, allergies and past medical history during this visit.     Past Medical History :        Past Medical History:   Diagnosis Date    Benign hypertensive kidney disease with chronic kidney disease 10/6/2021    Cervical vertebra fusion 2000    Chronic kidney disease (CKD), stage III (moderate) ([address] 2013    Depression     Foot injury     History of gastric ulcer     Hypertension     Stroke [facility])     patient had a stroke in the late     Type 2 diabetes mellitus with diabetic polyneuropathy, with long-term current use of insulin ([address]      Past Surgical History:   Procedure Laterality Date    COLONOSCOPY      COLONOSCOPY N/A 9/13/2021    COLONOSCOPY POLYPECTOMY SNARE/COLD BIOPSY performed by Lemuel Mcburney, MD at [address] [place] [address]  01/23/2010    2019    [address]      UPPER GASTROINTESTINAL ENDOSCOPY      UPPER GASTROINTESTINAL ENDOSCOPY N/A 9/13/2021    EGD BIOPSY performed by Lemuel Mcburney, MD at [facility] N/A 9/13/2021    EGD POLYP SNARE performed by Lemuel Mcburney, MD at [facility] History :  Social History     Tobacco History     Smoking Status  Never Smoker    Smokeless Tobacco Use  Never Used          Alcohol History     Alcohol Use Status  No          Drug Use     Drug Use Status  No          Sexual Activity     Sexually Active  Not Asked                 Allergies :   No Known Allergies    Medication List :    Current Outpatient Medications   Medication Sig Dispense Refill    amLODIPine (NORVASC) 5 MG tablet Take 1 tablet by mouth daily 90 tablet 1    metoprolol succinate (TOPROL XL) 50 MG extended release tablet Take 1 tablet by mouth 2 times daily 30 tablet 3    apixaban (ELIQUIS) 5 MG TABS tablet Take 1 tablet by mouth 2 times daily 180 tablet 0    ASPIRIN LOW DOSE 81 MG EC tablet TAKE 1 TABLET BY MOUTH DAILY 90 tablet 1    pantoprazole (PROTONIX) 40 MG tablet TAKE 1 TABLET BY MOUTH DAILY 90 tablet 1    lidocaine (LIDODERM) 5 % Place 1 patch onto the skin daily 12 hours on, 12 hours off.  30 patch 3    hydroCHLOROthiazide (MICROZIDE) 12.5 MG capsule TAKE 1 CAPSULE BY MOUTH DAILY 90 capsule 1    lisinopril (PRINIVIL;ZESTRIL) 30 MG tablet Take 30 mg by mouth daily      hydrALAZINE (APRESOLINE) 50 MG tablet Take 1 tablet by mouth 3 times daily (Patient taking differently: Take 25 mg by mouth 3 times daily ) 270 tablet 1    gabapentin (NEURONTIN) 300 MG capsule TAKE 1 CAPSULE BY MOUTH TWICE DAILY      Alcohol Swabs (B-D SINGLE USE SWABS REGULAR) PADS USE 3 - 4 TIMES DAILY AS DIRECTED      insulin lispro, 1 Unit Dial, 100 UNIT/ML SOPN Inject 6 Units into the skin 3 times daily Plus  2 units for each 50 above 150 Blood sugar level result      B-D UF III MINI PEN NEEDLES 31G X 5 MM MISC 1 each by Does not apply route 4 times daily      FLUoxetine (PROZAC) 20 MG capsule Take 1 capsule by mouth nightly      tamsulosin (FLOMAX) 0.4 MG capsule Take 0.4 mg by mouth nightly      insulin glargine (LANTUS) 100 UNIT/ML injection vial Inject 55 Units into the skin nightly       ACETAMINOPHEN EXTRA STRENGTH 500 MG tablet TAKE 2 TABLETS BY MOUTH EVERY 6 HOURS FOR PAIN      Cholecalciferol (VITAMIN D3) 125 MCG (5000 UT) CAPS TAKE ONE CAPSULE BY MOUTH EVERY DAY      ONETOUCH ULTRA strip       ammonium lactate (LAC-HYDRIN) 12 % lotion       Blood Pressure KIT 1 kit by Does not apply route daily (Patient not taking: Reported on 1/21/2022) 1 kit 0     No current facility-administered medications for this visit. Review of Systems :  Review of Systems   Constitutional: Negative for chills, fatigue and fever. Respiratory: Negative for cough, shortness of breath and wheezing. Cardiovascular: Negative for chest pain, palpitations and leg swelling. Gastrointestinal: Negative for abdominal pain, constipation, diarrhea, nausea and vomiting. Musculoskeletal: Positive for arthralgias and gait problem. Neurological: Negative for dizziness, weakness, light-headedness, numbness and headaches.     ______________________________________________________________________    Physical Exam :    Vitals: BP (!) 181/79   Pulse 70   Temp 97.2 °F (36.2 °C) (Temporal)   Resp 18   Ht 6' 2\" (1.88 m)   Wt 274 lb (124.3 kg)   SpO2 100%   BMI 35.18 kg/m²   Physical Exam  Vitals reviewed. Constitutional:       Appearance: Normal appearance. Cardiovascular:      Rate and Rhythm: Normal rate and regular rhythm.       Pulses: Normal pulses. Heart sounds: Normal heart sounds. No murmur heard. Pulmonary:      Effort: Pulmonary effort is normal. No respiratory distress. Breath sounds: Normal breath sounds. No wheezing. Abdominal:      General: Bowel sounds are normal. There is no distension. Palpations: Abdomen is soft. Tenderness: There is no abdominal tenderness. Musculoskeletal:      Right lower leg: No edema. Left lower leg: No edema. Neurological:      Mental Status: He is alert.     ___________________    Assessment & Plan :    1. Primary hypertension  - uncontrolled  - Nurse aid gave me 2 week report over the phone during visit- BP consistently >150/>90  - start amlodipine 5mg daily   - discussed with Dr. Mariano Gaston changes in BP meds made today, he is agreeable to plan and will follow-up on BP when he sees him on 2/11  - continue lisinopril, HCTZ and hydralazine  - amLODIPine (NORVASC) 5 MG tablet; Take 1 tablet by mouth daily  Dispense: 90 tablet; Refill: 1    2. Atrial fibrillation, unspecified type ([address]  - stable   - continue present mgmt    3. IDDM  - Last A1C 5.8 (1/5)  - Lantus recently decreased to 55U nightly  - Decrease Lantus to 50U nightly  - Has been consistently <110 at home  - expresses fatigue and somnolence particularly shortly after insulin administration   - Discussed with Dr. Ganesh Jamison about changing Lantus to 50U nightly, she is agreeable to plan  - Patient is to not taking any sliding scale if BG if <150. Educational materials and/or home exercises printed for patient's review and were included in patient instructions on his/her After Visit Summary and given to patient at the end of visit. Counseled regarding above diagnosis, including possible risks and complications,  especially if left uncontrolled.      Counseled regarding the possible side effects, risks, benefits and alternatives to treatment; patient and/or guardian verbalizes understanding, agrees, feels comfortable with and wishes to proceed with above treatment plan. Advised patient to call with any new medication issues, and read all Rx info from pharmacy to assure aware of all possible risks and side effects of medication before taking. Reviewed age and gender appropriate health screening exams and vaccinations. Advised patient regarding importance of keeping up with recommended health maintenance and to schedule as soon as possible if overdue, as this is important in assessing for undiagnosed pathology, especially cancer, as well as protecting against potentially harmful/life threatening disease. Patient and/or guardian verbalizes understanding and agrees with above counseling, assessment and plan. All questions answered    Additional plan and future considerations:   RTO in 3mos for office visit. Call in 10-14 days with BP log. Return to Office: Return in about 2 weeks (around 2/4/2022) for Nurse visit for BP check.  Neal Gifford MD   Case discussed with Dr. Sabino Goodson

## 2022-01-21 NOTE — PATIENT INSTRUCTIONS
Patient Education        Preventing Falls: Care Instructions  Your Care Instructions     Getting around your home safely can be a challenge if you have injuries or health problems that make it easy for you to fall. Loose rugs and furniture in walkways are among the dangers for many older people who have problems walking or who have poor eyesight. People who have conditions such as arthritis, osteoporosis, or dementia also have to be careful not to fall. You can make your home safer with a few simple measures. Follow-up care is a key part of your treatment and safety. Be sure to make and go to all appointments, and call your doctor if you are having problems. It's also a good idea to know your test results and keep a list of the medicines you take. How can you care for yourself at home? Taking care of yourself  · You may get dizzy if you do not drink enough water. To prevent dehydration, drink plenty of fluids. Choose water and other clear liquids. If you have kidney, heart, or liver disease and have to limit fluids, talk with your doctor before you increase the amount of fluids you drink. · Exercise regularly to improve your strength, muscle tone, and balance. Walk if you can. Swimming may be a good choice if you cannot walk easily. · Have your vision and hearing checked each year or any time you notice a change. If you have trouble seeing and hearing, you might not be able to avoid objects and could lose your balance. · Know the side effects of the medicines you take. Ask your doctor or pharmacist whether the medicines you take can affect your balance. Sleeping pills or sedatives can affect your balance. · Limit the amount of alcohol you drink. Alcohol can impair your balance and other senses. · Ask your doctor whether calluses or corns on your feet need to be removed. If you wear loose-fitting shoes because of calluses or corns, you can lose your balance and fall.   · Talk to your doctor if you have shower by putting the weaker leg in first. Get out of a tub or shower with your strong side first.  · Repair loose toilet seats and consider installing a raised toilet seat to make getting on and off the toilet easier. · Keep your bathroom door unlocked while you are in the shower. Where can you learn more? Go to [email or web address] org and sign in to your [employer] account. Enter [phone] in the [facility] box to learn more about \"Preventing Falls: Care Instructions. \"     If you do not have an account, please click on the \"Sign Up Now\" link. Current as of: September 8, 2021               Content Version: 13.1  © 4866-4880 [employer]wise, Incorporated. Care instructions adapted under license by [facility] ([facility]). If you have questions about a medical condition or this instruction, always ask your healthcare professional. [place] [address] any warranty or liability for your use of this information.

## 2022-01-24 ENCOUNTER — TELEPHONE (OUTPATIENT)
Dept: PHYSICAL MEDICINE AND REHAB | Age: 72
End: 2022-01-24

## 2022-01-24 NOTE — TELEPHONE ENCOUNTER
Pt called to r/s his appt with Dr Thomas Odonnell for today 01-24-22 d/t lack of transportation. His ride from insurance did not show. This was an 8 week f/u.   Please call him to r/s [phone]

## 2022-01-27 ENCOUNTER — OFFICE VISIT (OUTPATIENT)
Dept: ORTHOPEDIC SURGERY | Age: 72
End: 2022-01-27
Payer: MEDICAID

## 2022-01-27 ENCOUNTER — HOSPITAL ENCOUNTER (OUTPATIENT)
Age: 72
Discharge: HOME | End: 2022-01-27
Payer: MEDICAID

## 2022-01-27 VITALS — TEMPERATURE: 97.9 F

## 2022-01-27 DIAGNOSIS — S86.891D AVULSION OF RIGHT PATELLAR TENDON, SUBSEQUENT ENCOUNTER: Primary | ICD-10-CM

## 2022-01-27 LAB
ANION GAP SERPL CALCULATED.3IONS-SCNC: 12 MMOL/L (ref 7–16)
BASOPHILS ABSOLUTE: 0.03 E9/L (ref 0–0.2)
BASOPHILS RELATIVE PERCENT: 0.6 % (ref 0–2)
BUN BLDV-MCNC: 20 MG/DL (ref 6–23)
CALCIUM SERPL-MCNC: 9.2 MG/DL (ref 8.6–10.2)
CHLORIDE BLD-SCNC: 106 MMOL/L (ref 98–107)
CO2: 24 MMOL/L (ref 22–29)
CREAT SERPL-MCNC: 2 MG/DL (ref 0.7–1.2)
EOSINOPHILS ABSOLUTE: 0.07 E9/L (ref 0.05–0.5)
EOSINOPHILS RELATIVE PERCENT: 1.5 % (ref 0–6)
GFR AFRICAN AMERICAN: 40
GFR NON-AFRICAN AMERICAN: 40 ML/MIN/1.73
GLUCOSE BLD-MCNC: 105 MG/DL (ref 74–99)
HCT VFR BLD CALC: 40.5 % (ref 37–54)
HEMOGLOBIN: 12.5 G/DL (ref 12.5–16.5)
IMMATURE GRANULOCYTES #: 0.01 E9/L
IMMATURE GRANULOCYTES %: 0.2 % (ref 0–5)
LYMPHOCYTES ABSOLUTE: 2.11 E9/L (ref 1.5–4)
LYMPHOCYTES RELATIVE PERCENT: 44 % (ref 20–42)
MCH RBC QN AUTO: 26.9 PG (ref 26–35)
MCHC RBC AUTO-ENTMCNC: 30.9 % (ref 32–34.5)
MCV RBC AUTO: 87.3 FL (ref 80–99.9)
MONOCYTES ABSOLUTE: 0.59 E9/L (ref 0.1–0.95)
MONOCYTES RELATIVE PERCENT: 12.3 % (ref 2–12)
NEUTROPHILS ABSOLUTE: 1.99 E9/L (ref 1.8–7.3)
NEUTROPHILS RELATIVE PERCENT: 41.4 % (ref 43–80)
PDW BLD-RTO: 14.2 FL (ref 11.5–15)
PHOSPHORUS: 3.1 MG/DL (ref 2.5–4.5)
PLATELET # BLD: 211 E9/L (ref 130–450)
PMV BLD AUTO: 11.8 FL (ref 7–12)
POTASSIUM SERPL-SCNC: 3.8 MMOL/L (ref 3.5–5)
RBC # BLD: 4.64 E12/L (ref 3.8–5.8)
SODIUM BLD-SCNC: 142 MMOL/L (ref 132–146)
URIC ACID, SERUM: 9.5 MG/DL (ref 3.4–7)
WBC # BLD: 4.8 E9/L (ref 4.5–11.5)

## 2022-01-27 PROCEDURE — 80048 BASIC METABOLIC PNL TOTAL CA: CPT

## 2022-01-27 PROCEDURE — 3017F COLORECTAL CA SCREEN DOC REV: CPT | Performed by: PHYSICIAN ASSISTANT

## 2022-01-27 PROCEDURE — G8417 CALC BMI ABV UP PARAM F/U: HCPCS | Performed by: PHYSICIAN ASSISTANT

## 2022-01-27 PROCEDURE — 84550 ASSAY OF BLOOD/URIC ACID: CPT

## 2022-01-27 PROCEDURE — 1036F TOBACCO NON-USER: CPT | Performed by: PHYSICIAN ASSISTANT

## 2022-01-27 PROCEDURE — 99212 OFFICE O/P EST SF 10 MIN: CPT

## 2022-01-27 PROCEDURE — 85025 COMPLETE CBC W/AUTO DIFF WBC: CPT

## 2022-01-27 PROCEDURE — 36415 COLL VENOUS BLD VENIPUNCTURE: CPT

## 2022-01-27 PROCEDURE — 84100 ASSAY OF PHOSPHORUS: CPT

## 2022-01-27 PROCEDURE — G8484 FLU IMMUNIZE NO ADMIN: HCPCS | Performed by: PHYSICIAN ASSISTANT

## 2022-01-27 PROCEDURE — G8427 DOCREV CUR MEDS BY ELIG CLIN: HCPCS | Performed by: PHYSICIAN ASSISTANT

## 2022-01-27 PROCEDURE — 99213 OFFICE O/P EST LOW 20 MIN: CPT | Performed by: PHYSICIAN ASSISTANT

## 2022-01-27 PROCEDURE — 1123F ACP DISCUSS/DSCN MKR DOCD: CPT | Performed by: PHYSICIAN ASSISTANT

## 2022-01-27 PROCEDURE — 4040F PNEUMOC VAC/ADMIN/RCVD: CPT | Performed by: PHYSICIAN ASSISTANT

## 2022-01-27 NOTE — PROGRESS NOTES
Chief Complaint   Patient presents with    Knee Pain     Pt has minor crepitus within Right Knee. Pt has nothing to complain at this time. Subjective:  Milvia Rocha is approximately 7.5 weeks follow-up from non-op management of 50% partial tear of R patella tendon. Presents today full WB R LE without AD and without any bracing and states he is now back to his chronic R knee pain due to existing OA. States home nursing still coming to his house, but has finished home PT and say she was given HEP. Not completely compliant with HEP, but states he is very active. States closer to [facility] he had a \"limp\", which has completely resolved at this time. No interval injuries or new complaints. Review of Systems -  all pertinent positives and negatives in HPI. Objective:    General: Alert and oriented X 3, normocephalic atraumatic, external ears and eye normal, sclera clear, no acute distress, respirations easy and unlabored with no audible wheezes, skin warm and dry, speech and dress appropriate for noted age, affect euthymic. Extremity:  Right Lower Extremity  Skin clean dry and intact, without signs of infection  nontender to distal quad/tendon, patella, or patellar tendon  AROM 0-100, which he states is nearly baseline secondary to chronic OA, extensor mechanism intact, no lag  Ambulates full WB without AD and without bracing without any discomfort, normal gait pattern   No effusion or edema noted  Compartments supple throughout thigh and leg  Calf supple and nontender  Demonstrates active knee flexion/extension, ankle plantar/dorsiflexion/great toe extension. States sensation intact to touch in sural/deep peroneal/superficial peroneal/saphenous/posterior tibial nerve distributions to foot/ankle. Palpable dorsalis pedis and posterior tibialis pulses, cap refill brisk in toes, foot warm/perfused. Temp 97.9 °F (36.6 °C) (Oral)       Assessment:   Diagnosis Orders   1.  Avulsion of right patellar tendon, subsequent encounter         Plan:   Reviewed x-rays with patient today in office    WBAT R LE    Continue HEP daily    otc analgesics PRN    Follow up PRN    Electronically signed by Usha Holt PA-C on 1/27/2022 at 9:51 AM  Note: This report was completed using [employer] voiced recognition software. Every effort has been made to ensure accuracy; however, inadvertent computerized transcription errors may be present.

## 2022-01-27 NOTE — PROGRESS NOTES
Hudson Dee is a 80 y.o. male who presents for follow up of 7 wk Hospital f/u R Patellar Tendon Repair     SURGEON: Dr. Janet Villa DO  Date of Injury/Surgery: 12-3-2021  Date last seen in office: 12-    Symptoms: better  New complaints: Pt has minor crepitus within Right Knee. Pt has nothing to complain at this time. Weightbearing: right lower Partial weight bearing and Full weight bearing      Assistive device No Device  Participating in therapy (location if yes)?  no    Refills Needed: None  Order/Referral Needed: N/A

## 2022-02-02 ENCOUNTER — TELEPHONE (OUTPATIENT)
Dept: ORTHOPEDIC SURGERY | Age: 72
End: 2022-02-02

## 2022-02-02 DIAGNOSIS — G56.03 BILATERAL CARPAL TUNNEL SYNDROME: Primary | ICD-10-CM

## 2022-02-15 ENCOUNTER — HOSPITAL ENCOUNTER (OUTPATIENT)
Dept: PHYSICAL THERAPY | Age: 72
Setting detail: THERAPIES SERIES
Discharge: HOME | End: 2022-02-15
Payer: MEDICAID

## 2022-02-15 PROCEDURE — 97161 PT EVAL LOW COMPLEX 20 MIN: CPT | Performed by: PHYSICAL THERAPIST

## 2022-02-15 ASSESSMENT — PAIN SCALES - GENERAL: PAINLEVEL_OUTOF10: 4

## 2022-02-15 ASSESSMENT — PAIN DESCRIPTION - DESCRIPTORS: DESCRIPTORS: ACHING;CONSTANT

## 2022-02-15 ASSESSMENT — PAIN DESCRIPTION - ORIENTATION: ORIENTATION: LEFT;RIGHT

## 2022-02-15 ASSESSMENT — PAIN - FUNCTIONAL ASSESSMENT: PAIN_FUNCTIONAL_ASSESSMENT: PREVENTS OR INTERFERES WITH MANY ACTIVE NOT PASSIVE ACTIVITIES

## 2022-02-15 ASSESSMENT — PAIN DESCRIPTION - PAIN TYPE: TYPE: CHRONIC PAIN

## 2022-02-15 ASSESSMENT — PAIN DESCRIPTION - LOCATION: LOCATION: BACK

## 2022-02-15 NOTE — PROGRESS NOTES
[facility]                Phone: [phone]   Fax: [phone]    Physical Therapy Daily Treatment Note  Date:  2/15/2022    Patient Name:  Sofiya Bradley    :  1950  MRN: [id number]    Evaluating therapist:  LISSY Elizabeth                    (2/15/22)  Restrictions/Precautions:    Diagnosis:  chronic LBP  Treatment Diagnosis:    Insurance/Certification information:  [address]  Referring Physician:  Praful Brown of care signed (Y/N):    Visit# / total visits:    Pain level: 410   Time In:  Time Out:    Subjective:      Exercises:  Exercise/Equipment Resistance/Repetitions Other comments   StepOne              tball flex/rot            rot st     SKTC     piriformis st            pelvic tilts               bridges                                                                        Other Therapeutic Activities:      Home Exercise Program:  provided 2/15/22       Manual Treatments:      Modalities: IFC/MH to LB      Timed Code Treatment Minutes: Total Treatment Minutes:      Treatment/Activity Tolerance:  [] Patient tolerated treatment well [] Patient limited by fatique  [] Patient limited by pain  [] Patient limited by other medical complications  [] Other:     Prognosis: [] Good [] Fair  [] Poor    Patient Requires Follow-up: [] Yes  [] No    Plan:   [] Continue per plan of care [] Alter current plan (see comments)  [] Plan of care initiated [] Hold pending MD visit [] Discharge  Plan for Next Session:      See Weekly Progress Note: []  Yes  []  No  Next due:        Electronically signed by:   Sandra Ontiveros PT

## 2022-02-15 NOTE — PROGRESS NOTES
Physical Therapy  Initial Assessment  Date: 2/15/2022  Patient Name: Tripp Mcgraw  MRN: [id number]  : 1950        Subjective   General  Chart Reviewed: Yes  Referring Practitioner: Robert Reyes   Referral Date : 21  Diagnosis: chronic LBP  PT Visit Information  Onset Date: 21  PT Insurance Information: Silvana Harvey  Total # of Visits Approved: 6-8  Total # of Visits to Date: 1  Subjective  Subjective: pt presents to therapy with c/o LBP for several yrs of insidious onset; no PMH for LB /LE injury/sx per pt;  no testing noted on LB over last six months; no MEDS per pt; no c/o N/T or buckling B LE's; tells PT that prolonged walking/postures bother him; sleep is hamepred as well; no neuro/pain management consults at this time; RTD for follow-up 21  Pain Screening  Patient Currently in Pain: Yes  Pain Assessment  Pain Assessment: 0-10  Pain Level: 4  Pain Type: Chronic pain  Pain Location: Back  Pain Orientation: Left;Right  Pain Descriptors: Aching;Constant  Functional Pain Assessment: Prevents or interferes with many active not passive activities  Vital Signs  Patient Currently in Pain: Yes    Objective     Observation/Palpation  Palpation: discomfort noted across B sides LB paraspinals L1-5 into B SI lines  Observation: posture:  level ASIS/PSIS/iliacs; ant pelvic tilt noted    AROM RLE (degrees)  RLE AROM: WNL  AROM LLE (degrees)  LLE AROM : WNL  AROM RUE (degrees)  RUE AROM : WNL  AROM LUE (degrees)  LUE AROM : WNL  Spine  Lumbar: grossly limited ~ 50% WNL for all ranges with no c/o radiculopathy noted  Special Tests: hyperflex/rot   +/+; slump  -    Strength Other  Other: grossly 5/5 for all ranges B LE's     Additional Measures  Other: endurance for all prolonged activities is FAIR+  Sensation  Overall Sensation Status: WNL     Ambulation  Ambulation?: Yes  Ambulation 1  Surface: level tile  Device: No Device  Assistance: Independent  Gait Deviations: None  Balance  Comments: static/dynamic balance is GOOD     Assessment   Conditions Requiring Skilled Therapeutic Intervention  Body structures, Functions, Activity limitations: Decreased ROM; Decreased endurance  Assessment: pain noted across B sides LB with all prolonged activities, 4/10  Prognosis: Fair  Decision Making: Low Complexity  Activity Tolerance  Activity Tolerance: Patient Tolerated treatment well       Plan   Plan  Times per week: pt to be seen 2x/week/3-4 weeks  Current Treatment Recommendations: ROM,Strengthening,Endurance Training,Modalities,Home Exercise Program    Goals  Time Frame for goals: 3-4 weeks  goal 1: decrease pain across B sides LB with all prolonged activities, 0-2/10  goal 2: restore LB AROM to WNL for all ranges  goal 3: improve endurance for all prolonged activities to GOOD/GOOD+  goal 4: assure I with HEP for home management of condition    Patient goals : to get rid of the pain across his back          Abdi Garcia, PT

## 2022-02-17 ENCOUNTER — HOSPITAL ENCOUNTER (OUTPATIENT)
Dept: PHYSICAL THERAPY | Age: 72
Setting detail: THERAPIES SERIES
Discharge: HOME | End: 2022-02-17
Payer: MEDICAID

## 2022-02-17 PROCEDURE — G0283 ELEC STIM OTHER THAN WOUND: HCPCS | Performed by: PHYSICAL THERAPIST

## 2022-02-17 PROCEDURE — 97110 THERAPEUTIC EXERCISES: CPT | Performed by: PHYSICAL THERAPIST

## 2022-02-22 ENCOUNTER — HOSPITAL ENCOUNTER (OUTPATIENT)
Dept: PHYSICAL THERAPY | Age: 72
Setting detail: THERAPIES SERIES
Discharge: HOME | End: 2022-02-22
Payer: MEDICAID

## 2022-02-22 PROCEDURE — 97110 THERAPEUTIC EXERCISES: CPT | Performed by: PHYSICAL THERAPIST

## 2022-02-22 PROCEDURE — G0283 ELEC STIM OTHER THAN WOUND: HCPCS | Performed by: PHYSICAL THERAPIST

## 2022-02-22 NOTE — PROGRESS NOTES
[address] [facility]                Phone: [phone]   Fax: [phone]    Physical Therapy Daily Treatment Note  Date:  2022    Patient Name:  Art Mares    :  1950  MRN: [id number]    Evaluating therapist:  LISSY Wesley                    (2/15/22)  Restrictions/Precautions:    Diagnosis:  chronic LBP  Treatment Diagnosis:    Insurance/Certification information:  [address]  Referring Physician:  Mely Ceballosant of care signed (Y/N):    Visit# / total visits:  3/6  Pain level: 4/10   Time In:  833  Time Out:  925    Subjective:      Exercises:  Exercise/Equipment Resistance/Repetitions Other comments   StepOne   10 min            tball flex/rot 10x10s           rot st 3x20s    SKTC 3x20s    piriformis st 3x20s           pelvic tilts 15x3s              bridges  15x3s                                                                      Other Therapeutic Activities:      Home Exercise Program:  provided 2/15/22       Manual Treatments:      Modalities: IFC/MH to LB  x 15 min     Timed Code Treatment Minutes: Total Treatment Minutes:      Treatment/Activity Tolerance:  [] Patient tolerated treatment well [] Patient limited by fatique  [] Patient limited by pain  [] Patient limited by other medical complications  [] Other:     Prognosis: [] Good [] Fair  [] Poor    Patient Requires Follow-up: [] Yes  [] No    Plan:   [] Continue per plan of care [] Alter current plan (see comments)  [] Plan of care initiated [] Hold pending MD visit [] Discharge  Plan for Next Session:      See Weekly Progress Note: []  Yes  []  No  Next due:        Electronically signed by:   Abdi Garcia PT

## 2022-02-24 ENCOUNTER — HOSPITAL ENCOUNTER (OUTPATIENT)
Dept: PHYSICAL THERAPY | Age: 72
Setting detail: THERAPIES SERIES
Discharge: HOME | End: 2022-02-24
Payer: MEDICAID

## 2022-02-24 PROCEDURE — G0283 ELEC STIM OTHER THAN WOUND: HCPCS | Performed by: PHYSICAL THERAPIST

## 2022-02-24 PROCEDURE — 97110 THERAPEUTIC EXERCISES: CPT | Performed by: PHYSICAL THERAPIST

## 2022-02-24 NOTE — PROGRESS NOTES
S:  pt presents to therapy for two of two scheduled visits this week; at week's end he reports that his back continues to ache with most prolonged activities/postures; pain level given as 4/10 and does hamper his movement/activity; tells PT he thinks he is getting another kidney stone as well; no c/o buckling or LOB over last week's time; HEP going well per pt    O:  performed the exercises/treatments as written in the flowsheet for the week ending 2/25/22; initiated HEP for home management of condition; LB AROM grossly 75%WNL for all ranges, B LE strength grossly 5/5 for all planes    A:  catalina tx well; pt able to perform all requested tasks with good form and pacing noted; LB AROM shows improvement while B LE strength remains stable since eval;  movement remains slow/guarded across trunk due to aching/spasms; symptoms may be aggravated by possible kidney stone as well; gait slow/guarded but stable; endurance for all prolonged activities is FAIR+/GOOD-    P:  cont with POC of stretching/strengthening for LB/LE's with modalities as needed

## 2022-02-24 NOTE — PROGRESS NOTES
[address] [facility]                Phone: [phone]   Fax: [phone]    Physical Therapy Daily Treatment Note  Date:  2022    Patient Name:  Radha Coy    :  1950  MRN: [id number]    Evaluating therapist:  LISSY Porter                    (2/15/22)  Restrictions/Precautions:    Diagnosis:  chronic LBP  Treatment Diagnosis:    Insurance/Certification information:  [address]  Referring Physician:  Shailesh Zuñiga of care signed (Y/N):    Visit# / total visits:    Pain level: 10   Time In:  829  Time Out:  916    Subjective:      Exercises:  Exercise/Equipment Resistance/Repetitions Other comments   StepOne   10 min            tball flex/rot 10x10s           rot st 3x20s    SKTC 3x20s    piriformis st 3x20s           pelvic tilts 15x3s              bridges  15x3s                                                                      Other Therapeutic Activities:      Home Exercise Program:  provided 2/15/22       Manual Treatments:      Modalities: IFC/MH to LB  x 15 min     Timed Code Treatment Minutes: Total Treatment Minutes:      Treatment/Activity Tolerance:  [] Patient tolerated treatment well [] Patient limited by fatique  [] Patient limited by pain  [] Patient limited by other medical complications  [] Other:     Prognosis: [] Good [] Fair  [] Poor    Patient Requires Follow-up: [] Yes  [] No    Plan:   [] Continue per plan of care [] Alter current plan (see comments)  [] Plan of care initiated [] Hold pending MD visit [] Discharge  Plan for Next Session:      See Weekly Progress Note: []  Yes  []  No  Next due:        Electronically signed by:   Rose Lagos PT

## 2022-02-28 ENCOUNTER — OFFICE VISIT (OUTPATIENT)
Dept: PHYSICAL MEDICINE AND REHAB | Age: 72
End: 2022-02-28
Payer: MEDICAID

## 2022-02-28 ENCOUNTER — TELEPHONE (OUTPATIENT)
Dept: PHYSICAL MEDICINE AND REHAB | Age: 72
End: 2022-02-28

## 2022-02-28 VITALS
TEMPERATURE: 97.7 F | WEIGHT: 277.9 LBS | HEART RATE: 68 BPM | DIASTOLIC BLOOD PRESSURE: 68 MMHG | BODY MASS INDEX: 35.66 KG/M2 | HEIGHT: 74 IN | SYSTOLIC BLOOD PRESSURE: 138 MMHG | OXYGEN SATURATION: 97 %

## 2022-02-28 DIAGNOSIS — M48.02 CERVICAL STENOSIS OF SPINAL CANAL: Primary | ICD-10-CM

## 2022-02-28 DIAGNOSIS — G56.03 BILATERAL CARPAL TUNNEL SYNDROME: ICD-10-CM

## 2022-02-28 DIAGNOSIS — M54.12 CERVICAL RADICULOPATHY: ICD-10-CM

## 2022-02-28 DIAGNOSIS — M54.50 CHRONIC MIDLINE LOW BACK PAIN WITHOUT SCIATICA: ICD-10-CM

## 2022-02-28 DIAGNOSIS — G89.29 CHRONIC MIDLINE LOW BACK PAIN WITHOUT SCIATICA: ICD-10-CM

## 2022-02-28 PROCEDURE — 99214 OFFICE O/P EST MOD 30 MIN: CPT | Performed by: PHYSICAL MEDICINE & REHABILITATION

## 2022-02-28 PROCEDURE — 3017F COLORECTAL CA SCREEN DOC REV: CPT | Performed by: PHYSICAL MEDICINE & REHABILITATION

## 2022-02-28 PROCEDURE — 1036F TOBACCO NON-USER: CPT | Performed by: PHYSICAL MEDICINE & REHABILITATION

## 2022-02-28 PROCEDURE — 1123F ACP DISCUSS/DSCN MKR DOCD: CPT | Performed by: PHYSICAL MEDICINE & REHABILITATION

## 2022-02-28 PROCEDURE — G8484 FLU IMMUNIZE NO ADMIN: HCPCS | Performed by: PHYSICAL MEDICINE & REHABILITATION

## 2022-02-28 PROCEDURE — G8427 DOCREV CUR MEDS BY ELIG CLIN: HCPCS | Performed by: PHYSICAL MEDICINE & REHABILITATION

## 2022-02-28 PROCEDURE — G8417 CALC BMI ABV UP PARAM F/U: HCPCS | Performed by: PHYSICAL MEDICINE & REHABILITATION

## 2022-02-28 PROCEDURE — 4040F PNEUMOC VAC/ADMIN/RCVD: CPT | Performed by: PHYSICAL MEDICINE & REHABILITATION

## 2022-02-28 NOTE — PROGRESS NOTES
Cleve Belle M.D. [address] [facility] PHYSICAL MEDICINE AND REHABILITAION  [facility] [address]  [address] [facility]  Dept: [phone]  Dept Fax: [phone]    PCP: Silvana Mane MD  Date of visit: 2/28/22    Chief Complaint   Patient presents with    Follow-up     f/u neck pain, MRI and x-rays ordered at last appointment were not yet done,  Patient has been in PT for two weeks which is helping. no new c/o         Natasha Gaytan is a 80 y.o.  man who presents for follow up of neck pain. He had gradual onset of neck pain after no known injury over 20 years ago. He had a prior cervical fusion completed in 2000. He reports neck pain has now been increasing again for at least the last year. Since last visit patient did not have the cervical MRI completed, they are waiting on the insurance auth which has to be re-submitted. He also did not have the lumbar xrays completed. Lidoderm patches are not helping. He started PT for the low back and is noticing some improvement in low back pain. He is scheduled to see Dr. Shay Zambrano for evaluation of his carpal tunnel syndrome. Otherwise, he reports no changes. Now, the pain is constant. The pain is rated Pain Score:   5, but can get up to 10 at times. Neck pain is described as sharp, and is located at the midline neck with radiation to both upper extremities to the hands, worse on the right side. He endorses intermittent numbness/tingling in the right > left hands. He endorses weakness in the right hand. No incontinence. The symptoms have been worse since onset. The pain is better with nothing. The pain is worse with nothing. Patient was evaluated by the [address] [place] [address] in September 2020 and CT myelogram of the cervical spine was ordered, but never completed. Updated MRI of the cervical spine is pending. Patient also reports chronic low back pain, which started over 10 years ago after no known injury.  He reports his low back pain has gotten worse over the last year, no new injury. Pain is located in the midline low back, without radiation. He states that he used to have pain that radiated to both leg, but has not had this recently. He reports occasional tingling in both feet, which he relates to diabetic neuropathy. No focal weakness. Low back pain is worse with standing and walking. Back pain is better when sitting. The prior workup has included:   -Upper extremity EMG 7/20/2021  1. There is electrodiagnostic evidence suggestive of diffuse multilevel chronic right cervical motor radiculopathy, involving at least C5-C8 levels. Right cervical radiculopathy was not proven by abnormal cervical paraspinal testing. Paraspinal evaluation was limited by patient inability to relax muscles. Clinical correlation is advised.      2. There is electrodiagnostic evidence of focal median mononeuropathies at or about the wrists bilaterally, moderately severe to severe in degree, affecting sensory and motor fibers, with demyelinating and axonal features. This is consistent with a clinical diagnosis of bilateral carpal tunnel syndrome.      3. There is no electrodiagnostic evidence of any other peripheral nerve mononeuropathy or plexopathy in the bilateral upper extremities. Cannot evaluate for left cervical radiculopathy without more extensive needle examination of the left upper extremity; patient reported radicular type symptoms only on the right side. Complete peripheral neuropathy evaluation, which would include lower extremity studies, was not completed during today's evaluation. Cannot evaluate for pure sensory radiculopathy or small fiber neuropathy by electrodiagnostic technique. Clinical correlation advised.         -CT cervical spine 8/27/2021  FINDINGS:   There is streak artifact due to anterior fusion hardware extending from C4   through C7.  Severe degenerative disc disease changes at C3-4 and moderately   advanced degenerative disc disease changes at C7-T1.  Loss of lordosis. Moderate diffuse bilateral facet arthritis. Alondra Laird is diffuse congenital   canal stenosis with the AP diameter of the canal at C3 measuring 10 mm.  The   hypertrophic changes contribute to at least mild-moderate multilevel canal   and foraminal narrowing.  Central canal stenosis is most evident at C3-4 and   at C6-7.  No acute fracture.       Scattered vascular calcifications.  No focal fluid collection.           Impression   Postoperative and degenerative changes.  MRI would be useful if symptoms   persist.         -Cervical spine xray 7/16/2020  Redemonstration of anterior cervical fusion C4-C7. Alignment of the vertebral bodies appears to be near-anatomic. No fracture or foreign body is identified. The disc spaces demonstrate severe C2-C3 degenerative narrowing. Anterior cervical spurring is present at C3. There is no significant loss of the vertebral body height. There are degenerative changes involving the facets.           Impression       Findings consistent with degenerative disc disease and degenerative   facets disease. Stable anterior cervical fusion C4-C7.             The prior treatment has included:  PT: Tried PT for the cervical spine, most recently August 2021, with minimal relief. Started PT for lumbar pain with improvement in low back pain. Chiropractic: None  Modalities: Tried TENs with minimal relief   Topicals: Salon paus, unsure if relief, they did not stick well. Lidoderm without relief. OTC Tylenol: Tried without relief   NSAIDS: Tried ibuprofen without relief   Opioids: States he was on Oxycodone for a few years with relief, no longer takes. Membrane stabilizers: Takes gabapentin without relief of neck pain, it does help with diabetic neuropathy pain in his feet.    Muscle relaxers: Tried muscle relaxers (cannot recall names) without relief   Previous injections: Prior cervical epidural injections over 10 years ago with relief - he believes they were done at [facility]. He reports they stopped doing epidural injections due to his diabetes having been poorly controlled at the time. He also had prior lumbar epidurals over 10 years ago with benefit. Previous surgery at this site: Prior cervical fusion in 2000 (xrays show C4-7 ACDF).          Past Medical History:   Diagnosis Date    Benign hypertensive kidney disease with chronic kidney disease 10/6/2021    Cervical vertebra fusion 01/01/2000    Chronic kidney disease (CKD), stage III (moderate) ([address] 11/25/2013    Depression     Foot injury     History of gastric ulcer     Hypertension     Stroke [facility])     patient had a stroke in the late 90's    Type 2 diabetes mellitus with diabetic polyneuropathy, with long-term current use of insulin ([facility] [address]        Past Surgical History:   Procedure Laterality Date    COLONOSCOPY      COLONOSCOPY N/A 9/13/2021    COLONOSCOPY POLYPECTOMY SNARE/COLD BIOPSY performed by Caroline Hedrick MD at [address] [place] [address]  01/23/2010    [facility]    ROTATOR [id number] [facility]      UPPER GASTROINTESTINAL ENDOSCOPY      UPPER GASTROINTESTINAL ENDOSCOPY N/A 9/13/2021    EGD BIOPSY performed by [facility]rick MD at [facility] ENDOSCOPY N/A 9/13/2021    EGD POLYP SNARE performed by Caroline Hedrick MD at [facility]     Socioeconomic History    Marital status: Single     Spouse name: Not on file    Number of children: Not on file    Years of education: Not on file    Highest education level: Not on file   Occupational History    Not on file   Tobacco Use    Smoking status: Never Smoker    Smokeless tobacco: Never Used   Vaping Use    Vaping Use: Never used   Substance and Sexual Activity    Alcohol use: No    Drug use: No    Sexual activity: Not on file   Other Topics Concern    Not on file Social History Narrative    Not on file     Social Determinants of Health     Financial Resource Strain: Low Risk     Difficulty of Paying Living Expenses: Not very hard   Food Insecurity: No Food Insecurity    Worried About Running Out of Food in the Last Year: Never true    Gianna of Food in the Last Year: Never true   Transportation Needs:     Lack of Transportation (Medical): Not on file    Lack of Transportation (Non-Medical):  Not on file   Physical Activity:     Days of Exercise per Week: Not on file    Minutes of Exercise per Session: Not on file   Stress:     Feeling of Stress : Not on file   Social Connections:     Frequency of Communication with Friends and Family: Not on file    Frequency of Social Gatherings with Friends and Family: Not on file    Attends Christian Services: Not on file    Active Member of [address] [place] [address] Induction Manager or Organizations: Not on file    Attends Club or Organization Meetings: Not on file    Marital Status: Not on file   Intimate Partner Violence:     Fear of Current or Ex-Partner: Not on file    Emotionally Abused: Not on file    Physically Abused: Not on file    Sexually Abused: Not on file   Housing Stability:     Unable to Pay for Housing in the Last Year: Not on file    Number of Places Lived in the Last Year: Not on file    Unstable Housing in the Last Year: Not on file         Family History   Problem Relation Age of Onset    Heart Disease Mother     Diabetes Sister        No Known Allergies    Current Outpatient Medications   Medication Sig Dispense Refill    ACETAMINOPHEN EXTRA STRENGTH 500 MG tablet TAKE 2 TABLETS BY MOUTH EVERY 6 HOURS FOR PAIN      Cholecalciferol (VITAMIN D3) 125 MCG (5000 UT) CAPS TAKE ONE CAPSULE BY MOUTH EVERY DAY      ONETOUCH ULTRA strip       ammonium lactate (LAC-HYDRIN) 12 % lotion       amLODIPine (NORVASC) 5 MG tablet Take 1 tablet by mouth daily 90 tablet 1    metoprolol succinate (TOPROL XL) 50 MG extended release tablet Take 1 tablet by mouth 2 times daily 30 tablet 3    apixaban (ELIQUIS) 5 MG TABS tablet Take 1 tablet by mouth 2 times daily 180 tablet 0    ASPIRIN LOW DOSE 81 MG EC tablet TAKE 1 TABLET BY MOUTH DAILY 90 tablet 1    pantoprazole (PROTONIX) 40 MG tablet TAKE 1 TABLET BY MOUTH DAILY 90 tablet 1    lidocaine (LIDODERM) 5 % Place 1 patch onto the skin daily 12 hours on, 12 hours off. 30 patch 3    hydroCHLOROthiazide (MICROZIDE) 12.5 MG capsule TAKE 1 CAPSULE BY MOUTH DAILY 90 capsule 1    lisinopril (PRINIVIL;ZESTRIL) 30 MG tablet Take 30 mg by mouth daily      hydrALAZINE (APRESOLINE) 50 MG tablet Take 1 tablet by mouth 3 times daily (Patient taking differently: Take 25 mg by mouth 3 times daily ) 270 tablet 1    gabapentin (NEURONTIN) 300 MG capsule TAKE 1 CAPSULE BY MOUTH TWICE DAILY      Blood Pressure KIT 1 kit by Does not apply route daily 1 kit 0    Alcohol Swabs (B-D SINGLE USE SWABS REGULAR) PADS USE 3 - 4 TIMES DAILY AS DIRECTED      insulin lispro, 1 Unit Dial, 100 UNIT/ML SOPN Inject 6 Units into the skin 3 times daily Plus  2 units for each 50 above 150 Blood sugar level result      B-D UF III MINI PEN NEEDLES 31G X 5 MM MISC 1 each by Does not apply route 4 times daily      FLUoxetine (PROZAC) 20 MG capsule Take 1 capsule by mouth nightly      tamsulosin (FLOMAX) 0.4 MG capsule Take 0.4 mg by mouth nightly      insulin glargine (LANTUS) 100 UNIT/ML injection vial Inject 55 Units into the skin nightly        No current facility-administered medications for this visit. Review of Systems  General: No chills, fatigue, fever, malaise, night sweats, weight gain,  weight loss. Psychological: No anxiety, depression, suicidal ideation   Ophthalmic: No blurry vision, decreased vision, double vision, loss of vision  Ear Nose Throat: No hearing loss, tinnitus, phonophobia, sensitivity to smells, vertigo, or vocal changes. Allergy/Immunology: No watery eyes, itchy eyes, frequent infections. Hematological and Lymphatic: No bleeding problems, blood clots, bruising  Endocrine:  No polydypsia, polyuria, temperature intolerance. Respiratory: No cough, shortness of breath, wheezing. Cardiovascular: No syncope, chest pain, dyspnea on exertion,palpitations. Gastrointestinal: No abdominal pain, hematemesis, melena, nausea, vomiting, stool incontinence  Genito-Urinary: No dysuria, hematuria, incontinence   Musculoskeletal: Per HPI  Neurological: Per HPI  Dermatological:  No rash      Physical Exam:   Blood pressure 138/68, pulse 68, temperature 97.7 °F (36.5 °C), temperature source Oral, height 6' 2\" (1.88 m), weight 277 lb 14.4 oz (126.1 kg), SpO2 97 %. General: The patient is in no apparent distress. HEENT: No rhinorrhea, sneezing, yawning, or lacrimation. No scleral icterus or conjunctival injection. SKIN: No piloerection. No track marks. No rash. Normal turgor. No erythema or ecchymosis. Psychological: Mood and affect are appropriate. Hygiene is appropriate. Cardiovascular:  Peripheral pulses are 2+ and symmetric. There is no edema. Respiratory: Respirations are regular and unlabored. There is no cyanosis. Gastrointestinal: No abdominal distention   Genitourinary: No costovertebral angle tenderness. MSK: Cervical: Cervical lordosis is decreased. Thoracic kyphosis normal. No superficial or bony tenderness. No tenderness to palpation at bilateral cervical paraspinals, upper trapezius, occipital notch. No edema, erythema, ecchymosis, effusion, instability, mass or deformity. No midline bony tenderness. No trigger points. There is bilateral cervical paraspinal spasm. Spurling is negative bilateral.  Cervical AROM is minimally decreased with flexion/extension and moderately decreased with right and left rotation. There is no crepitus. Shoulder: No edema, erythema, ecchymosis, effusion, instability, mass or deformity. Full painless ROM bilateral shoulders. No painful arc.   No crepitus. No tenderness to palpation at the acromioclavicular joint, subacromial space or biceps tendon insertion. Lumbar:  Lumbar lordosis decreased. No hairy patch, cafe au lait, nevi, hemangioma or dimpling over lumbar area. Pelvis level. Seated and standing flexion tests negative. There is no step off deformity. No superficial or bony tenderness. Lumbar AROM is full with flexion, moderately decreased with extension. He reports pain with lumbar extension and facet provocation. There is tenderness to palpation at the bilateral lumbar paraspinals and lumbosacral junction. No tenderness to palpation at bilateral SIJ, gluteal muscles, PSIS, ischial tuberosity, greater trochanter, ASIS, iliac crest.  No trigger points. There is bilateral lumbar paraspinal spasm. No edema, erythema, ecchymosis,  mass or deformity. Straight leg raise is negative bilaterally. NELLY is negative bilaterally. ASIS compression test is negative bilaterally. Hip: Full painless AROM bilateral hip. Neurologic: Awake, alert and oriented in three planes. Speech is fluent. No facial weakness. Hearing is intact for conversation. Pupils are equal and round. Extraocular muscles are intact. Strength:   R  L  Deltoid   5  5  Biceps   5  5  Triceps  5  5  Wrist Ext  5  5  Finger Abd  5  5  Hip Flex  5  5  Knee Ext  5  5  Ankle dorsi  5  5  EHL   5 5  Ankle Plantar  5  5    Sensory:  Altered LT and PP sensation in the right hand 4th digit. Otherwise sensation intact to LT and PP in remainder of BUE. Sensation intact to LT and PP BLE. Reflexes:   R  L  Biceps   1 1  Triceps  1 1  BR   1 1   Patellar  1 1   Ankle Jerk  1 1    No Holt's  No clonus or spasticity. Gait is normal      Impression:   1. Cervical stenosis of spinal canal    2. Cervical radiculopathy    3. Chronic midline low back pain without sciatica    4. Bilateral carpal tunnel syndrome        Plan:   · Cervical spine MRI is pending.  Contacted radiology and the insurance auth needs to be re-submitted. Patient is having increasing cervical radicular symptoms. He has failed PT, oral medications, and prior epidurals. He had a recent EMG completed which showed evidence of diffuse right upper extremity motor radiculopathy. Will need updated cervical MRI for further evaluation of radicular disease and treatment planning. · Lumbar spine xrays pending, patient will have completed  · Continue PT for low back pain  · Continue gabapentin   · Patient is scheduled to see Ortho Hand regarding bilateral carpal tunnel syndrome   The patient was educated about the diagnosis, prognosis, indications, risks and benefits of treatment. An opportunity to ask questions was given to the patient and questions were answered. The patient agreed to proceed with the recommended treatment as described above. Follow up after cervical MRI       Rai Gilmore M.D.   Physical Medicine and Rehabilitation

## 2022-02-28 NOTE — TELEPHONE ENCOUNTER
Patient scheduled for MRI March 17, 2022 at 3 pm with the [facility] on [address] at  [facility]. Spoke with patient notified him off all appointment information patient verbalized and understood. Patient was also given phone number for MRI scheduling as well in case if he needs to reschedule or change appointment  [phone]. Also scheduled appointment with patient for  Dr. Patrice Bolivar on March 28, 2022 to review MRI results.

## 2022-03-02 ENCOUNTER — HOSPITAL ENCOUNTER (OUTPATIENT)
Dept: PHYSICAL THERAPY | Age: 72
Setting detail: THERAPIES SERIES
Discharge: HOME | End: 2022-03-02

## 2022-03-02 NOTE — PROGRESS NOTES
[address] [facility]                Phone: [phone]  Fax: [phone]    Physical Therapy  Cancellation/No-show Note  Patient Name:  Arelis Jose  :  1950   Date:  3/2/2022    For today's appointment patient:  []  Cancelled  []  Rescheduled appointment  [x]  No-show     Reason given by patient:  []  Patient ill  []  Conflicting appointment  []  No transportation    []  Conflict with work  [x]  No reason given  []  Other:     Comments:      Electronically signed by:   Alexander Wisdom PT

## 2022-03-04 ENCOUNTER — OFFICE VISIT (OUTPATIENT)
Dept: CARDIOLOGY CLINIC | Age: 72
End: 2022-03-04
Payer: MEDICAID

## 2022-03-04 ENCOUNTER — TELEPHONE (OUTPATIENT)
Dept: FAMILY MEDICINE CLINIC | Age: 72
End: 2022-03-04

## 2022-03-04 ENCOUNTER — HOSPITAL ENCOUNTER (OUTPATIENT)
Dept: PHYSICAL THERAPY | Age: 72
Setting detail: THERAPIES SERIES
Discharge: HOME | End: 2022-03-04

## 2022-03-04 VITALS
WEIGHT: 272.4 LBS | BODY MASS INDEX: 34.96 KG/M2 | RESPIRATION RATE: 18 BRPM | SYSTOLIC BLOOD PRESSURE: 122 MMHG | HEIGHT: 74 IN | HEART RATE: 70 BPM | DIASTOLIC BLOOD PRESSURE: 60 MMHG

## 2022-03-04 DIAGNOSIS — I35.1 NONRHEUMATIC AORTIC VALVE INSUFFICIENCY: ICD-10-CM

## 2022-03-04 DIAGNOSIS — Z79.01 CHRONIC ANTICOAGULATION: ICD-10-CM

## 2022-03-04 DIAGNOSIS — I10 PRIMARY HYPERTENSION: ICD-10-CM

## 2022-03-04 DIAGNOSIS — I48.0 PAROXYSMAL ATRIAL FIBRILLATION (HCC): Primary | ICD-10-CM

## 2022-03-04 PROCEDURE — G8484 FLU IMMUNIZE NO ADMIN: HCPCS | Performed by: INTERNAL MEDICINE

## 2022-03-04 PROCEDURE — 1123F ACP DISCUSS/DSCN MKR DOCD: CPT | Performed by: INTERNAL MEDICINE

## 2022-03-04 PROCEDURE — 4040F PNEUMOC VAC/ADMIN/RCVD: CPT | Performed by: INTERNAL MEDICINE

## 2022-03-04 PROCEDURE — 99214 OFFICE O/P EST MOD 30 MIN: CPT | Performed by: INTERNAL MEDICINE

## 2022-03-04 PROCEDURE — 1036F TOBACCO NON-USER: CPT | Performed by: INTERNAL MEDICINE

## 2022-03-04 PROCEDURE — 3017F COLORECTAL CA SCREEN DOC REV: CPT | Performed by: INTERNAL MEDICINE

## 2022-03-04 PROCEDURE — G8417 CALC BMI ABV UP PARAM F/U: HCPCS | Performed by: INTERNAL MEDICINE

## 2022-03-04 PROCEDURE — 93000 ELECTROCARDIOGRAM COMPLETE: CPT | Performed by: INTERNAL MEDICINE

## 2022-03-04 PROCEDURE — G8427 DOCREV CUR MEDS BY ELIG CLIN: HCPCS | Performed by: INTERNAL MEDICINE

## 2022-03-04 RX ORDER — HYDRALAZINE HYDROCHLORIDE 25 MG/1
25 TABLET, FILM COATED ORAL 3 TIMES DAILY
COMMUNITY
End: 2022-04-22

## 2022-03-04 NOTE — TELEPHONE ENCOUNTER
[facility] called to report patient had a 5 pound weight gain in 3 days  He reports no shortness of breath  And feels ok

## 2022-03-04 NOTE — PROGRESS NOTES
OUTPATIENT CARDIOLOGY FOLLOW-UP    Name: Misa Kuhn    Age: 80 y.o. Date of Service: 3/4/2022    Chief Complaint: Follow-up for atrial fibrillation    Interim History:  He denies recent chest pain, palpitations, orthopnea, or syncope. +chronic ADDISON with moderate levels of exertion (exacerbated by chronic back pain). Maintaining SR. Review of Systems:   Cardiac: As per HPI  General: No fever, chills  Pulmonary: As per HPI  HEENT: No visual disturbances, difficult swallowing  GI: No nausea, vomiting  : No dysuria, hematuria  Endocrine: No thyroid disease, +DM  Musculoskeletal: PABON x 4, no focal motor deficits  Skin: Intact, no rashes  Neuro: No headache, seizures  Psych: Currently with no depression, anxiety    Problem List:  Patient Active Problem List   Diagnosis    Diabetes mellitus ([facility]    HTN (hypertension)    A-fib ([facility]    Avulsion of right patellar tendon    Benign hypertensive kidney disease with chronic kidney disease    Stage 3b chronic kidney disease ([facility]       Allergies:   Allergies   Allergen Reactions    Motrin [Ibuprofen]        Current Medications:  Current Outpatient Medications   Medication Sig Dispense Refill    hydrALAZINE (APRESOLINE) 25 MG tablet Take 25 mg by mouth 3 times daily      ACETAMINOPHEN EXTRA STRENGTH 500 MG tablet TAKE 2 TABLETS BY MOUTH EVERY 6 HOURS FOR PAIN      Cholecalciferol (VITAMIN D3) 125 MCG (5000 UT) CAPS TAKE ONE CAPSULE BY MOUTH EVERY DAY      ONETOUCH ULTRA strip       ammonium lactate (LAC-HYDRIN) 12 % lotion       amLODIPine (NORVASC) 5 MG tablet Take 1 tablet by mouth daily 90 tablet 1    metoprolol succinate (TOPROL XL) 50 MG extended release tablet Take 1 tablet by mouth 2 times daily 30 tablet 3    apixaban (ELIQUIS) 5 MG TABS tablet Take 1 tablet by mouth 2 times daily 180 tablet 0    ASPIRIN LOW DOSE 81 MG EC tablet TAKE 1 TABLET BY MOUTH DAILY 90 tablet 1    pantoprazole (PROTONIX) 40 MG tablet TAKE 1 TABLET BY MOUTH DAILY 90 tablet 1    lidocaine (LIDODERM) 5 % Place 1 patch onto the skin daily 12 hours on, 12 hours off. 30 patch 3    hydroCHLOROthiazide (MICROZIDE) 12.5 MG capsule TAKE 1 CAPSULE BY MOUTH DAILY 90 capsule 1    lisinopril (PRINIVIL;ZESTRIL) 30 MG tablet Take 30 mg by mouth daily      gabapentin (NEURONTIN) 300 MG capsule TAKE 1 CAPSULE BY MOUTH TWICE DAILY      Blood Pressure KIT 1 kit by Does not apply route daily 1 kit 0    Alcohol Swabs (B-D SINGLE USE SWABS REGULAR) PADS USE 3 - 4 TIMES DAILY AS DIRECTED      insulin lispro, 1 Unit Dial, 100 UNIT/ML SOPN Inject 6 Units into the skin 3 times daily Plus  2 units for each 50 above 150 Blood sugar level result      B-D UF III MINI PEN NEEDLES 31G X 5 MM MISC 1 each by Does not apply route 4 times daily      FLUoxetine (PROZAC) 20 MG capsule Take 1 capsule by mouth nightly      tamsulosin (FLOMAX) 0.4 MG capsule Take 0.4 mg by mouth nightly      insulin glargine (LANTUS) 100 UNIT/ML injection vial Inject 55 Units into the skin nightly       hydrALAZINE (APRESOLINE) 50 MG tablet Take 1 tablet by mouth 3 times daily (Patient not taking: Reported on 3/4/2022) 270 tablet 1     No current facility-administered medications for this visit. Physical Exam:  /60   Pulse 70   Resp 18   Ht 6' 2\" (1.88 m)   Wt 272 lb 6.4 oz (123.6 kg)   BMI 34.97 kg/m²   Wt Readings from Last 3 Encounters:   03/04/22 272 lb 6.4 oz (123.6 kg)   02/28/22 277 lb 14.4 oz (126.1 kg)   01/21/22 274 lb (124.3 kg)     Appearance: Awake, alert, no acute respiratory distress  Skin: Intact, no rash  Head: Normocephalic, atraumatic  Eyes: EOMI, no conjunctival erythema  ENMT: No pharyngeal erythema, MMM, no rhinorrhea  Neck: Supple, no elevated JVP, no carotid bruits  Lungs: Clear to auscultation bilaterally. No wheezes, rales, or rhonchi.   Cardiac: Regular rate and rhythm, +S1S2, no murmurs apparent  Abdomen: Soft, nontender, +bowel sounds  Extremities: Moves all extremities x 4, no lower extremity edema  Neurologic: No focal motor deficits apparent, normal mood and affect    Intake/Output:  No intake or output data in the 24 hours ending 03/04/22 1407  No intake/output data recorded. Laboratory Tests:  Lab Results   Component Value Date    CREATININE 2.0 (H) 01/27/2022    BUN 20 01/27/2022     01/27/2022    K 3.8 01/27/2022     01/27/2022    CO2 24 01/27/2022     Lab Results   Component Value Date    MG 2.2 12/09/2021     Lab Results   Component Value Date    ALT 18 01/05/2022    AST 30 01/05/2022    ALKPHOS 112 01/05/2022    BILITOT 0.3 01/05/2022     Lab Results   Component Value Date    WBC 4.8 01/27/2022    HGB 12.5 01/27/2022    HCT 40.5 01/27/2022    MCV 87.3 01/27/2022     01/27/2022     Lab Results   Component Value Date    CKTOTAL 101 09/20/2013    TROPONINI <0.01 09/30/2017    TROPONINI <0.01 09/19/2013     No results for input(s): CKTOTAL, CKMB, CKMBINDEX, TROPHS in the last 72 hours. Lab Results   Component Value Date    INR 1.1 09/30/2017    INR 1.2 09/19/2013    PROTIME 11.7 09/30/2017    PROTIME 12.7 09/19/2013     Lab Results   Component Value Date    TSH 1.490 01/05/2022     Lab Results   Component Value Date    LABA1C 5.8 (H) 01/05/2022     No results found for: EAG  Lab Results   Component Value Date    CHOL 154 01/05/2022    CHOL 148 09/20/2021    CHOL 178 06/14/2021     Lab Results   Component Value Date    TRIG 81 01/05/2022    TRIG 90 09/20/2021    TRIG 210 (H) 06/14/2021     Lab Results   Component Value Date    HDL 45 01/05/2022    HDL 43 09/20/2021    HDL 48 06/14/2021     Lab Results   Component Value Date    LDLCALC 93 01/05/2022    LDLCALC 87 09/20/2021    LDLCALC 88 06/14/2021     Lab Results   Component Value Date    LABVLDL 16 01/05/2022    LABVLDL 18 09/20/2021    LABVLDL 42 06/14/2021     No results found for: CHOLHDLRATIO  No results for input(s): PROBNP in the last 72 hours.   Telemetry reviewed (date: 12/7/2021): atrial fibrillation, rate 115 --> SR, rate 80's    Echocardiogram: 12/8/21   Normal left ventricular systolic function. Ejection fraction is visually estimated at 60%. Normal right ventricular size and function (TAPSE 2.3 cm). There is doppler evidence of stage I diastolic dysfunction. Mildly dilated left atrium by volume index. Mild aortic regurgitation. MRI right knee: 12/8/21  1. Moderate grade partial thickness tear of the patellar tendon insertion   with approximately 50% tendon involvement. 2. Moderate lateral compartment osteoarthritis. 3. Degenerative tear of the body-posterior horn lateral meniscus. Impression/Plan:  1. Atrial fibrillation with episodes of RVR (12/2021) -- duration of AF unknown at the time of 12/2021 hospitaliozation, elevated [id number] score --> spontaneously converted to SR while hospitalized in 12/2021  2. Presentation in 12/2021 for right knee pain (+mechanical fall) -- orthopedic surgery following for injury to the patellar tendon --> partial tear on MRI (nonoperative management planned for now)  3. HTN -- BP control improved since 12/2021 (BP today 122/60)  4. DM -- Hgb A1c 6.1 --> 5.8  5. CKD -- most recent Cr 2.1 --> 2.0  6. Hypokalemia -- most recent K 3.4 --> 4.0 --> 3.8  7. History of CVA  8. BMI 35  9. Mild aortic regurgitation / diastolic dysfunction  10. Mildly elevated hs-troponin  11. History of cervical fusion with residual neck pain  12. History of left-sided rotator cuff repair  13.  History of PUD     - Toprol XL added during 12/2021 admission -- continue  - Eliquis 5 mg BID during 12/2021 admission -- continue  - Continue current medications otherwise  - Keep K > 4 and Mg > 2  - Outpatient sleep study if no prior study  - Aggressive risk factor modifications  - Serial echocardiograms    Greater than 30 minutes was spent counseling the patient, reviewing the rationale for the above recommendations and reviewing the patient's current medication list, problem list and results of all previously ordered testing.     Gillian Reynoso MD  [facility]) Cardiology

## 2022-03-08 ENCOUNTER — TELEPHONE (OUTPATIENT)
Dept: ORTHOPEDIC SURGERY | Age: 72
End: 2022-03-08

## 2022-03-08 DIAGNOSIS — G56.03 BILATERAL CARPAL TUNNEL SYNDROME: Primary | ICD-10-CM

## 2022-03-10 ENCOUNTER — OFFICE VISIT (OUTPATIENT)
Dept: ORTHOPEDIC SURGERY | Age: 72
End: 2022-03-10
Payer: MEDICAID

## 2022-03-10 VITALS — BODY MASS INDEX: 34.14 KG/M2 | WEIGHT: 266 LBS | HEIGHT: 74 IN | RESPIRATION RATE: 22 BRPM

## 2022-03-10 DIAGNOSIS — M65.341 TRIGGER FINGER, RIGHT RING FINGER: Primary | ICD-10-CM

## 2022-03-10 PROCEDURE — G8427 DOCREV CUR MEDS BY ELIG CLIN: HCPCS | Performed by: ORTHOPAEDIC SURGERY

## 2022-03-10 PROCEDURE — G8484 FLU IMMUNIZE NO ADMIN: HCPCS | Performed by: ORTHOPAEDIC SURGERY

## 2022-03-10 PROCEDURE — 1036F TOBACCO NON-USER: CPT | Performed by: ORTHOPAEDIC SURGERY

## 2022-03-10 PROCEDURE — 1123F ACP DISCUSS/DSCN MKR DOCD: CPT | Performed by: ORTHOPAEDIC SURGERY

## 2022-03-10 PROCEDURE — 3017F COLORECTAL CA SCREEN DOC REV: CPT | Performed by: ORTHOPAEDIC SURGERY

## 2022-03-10 PROCEDURE — G8417 CALC BMI ABV UP PARAM F/U: HCPCS | Performed by: ORTHOPAEDIC SURGERY

## 2022-03-10 PROCEDURE — 20550 NJX 1 TENDON SHEATH/LIGAMENT: CPT | Performed by: ORTHOPAEDIC SURGERY

## 2022-03-10 PROCEDURE — 99204 OFFICE O/P NEW MOD 45 MIN: CPT | Performed by: ORTHOPAEDIC SURGERY

## 2022-03-10 PROCEDURE — 4040F PNEUMOC VAC/ADMIN/RCVD: CPT | Performed by: ORTHOPAEDIC SURGERY

## 2022-03-10 RX ORDER — BETAMETHASONE SODIUM PHOSPHATE AND BETAMETHASONE ACETATE 3; 3 MG/ML; MG/ML
6 INJECTION, SUSPENSION INTRA-ARTICULAR; INTRALESIONAL; INTRAMUSCULAR; SOFT TISSUE ONCE
Status: COMPLETED | OUTPATIENT
Start: 2022-03-10 | End: 2022-03-10

## 2022-03-10 RX ADMIN — BETAMETHASONE SODIUM PHOSPHATE AND BETAMETHASONE ACETATE 6 MG: 3; 3 INJECTION, SUSPENSION INTRA-ARTICULAR; INTRALESIONAL; INTRAMUSCULAR; SOFT TISSUE at 09:30

## 2022-03-10 NOTE — PROGRESS NOTES
Department of Orthopedic Surgery  History and Physical      CHIEF COMPLAINT:  right greater than left hand pain    HISTORY OF PRESENT ILLNESS:                The patient is a RHD 80 y.o. male who presents with right greater than left hand pain. Patient reports that he has had pain to his right ring finger for the last 7 to 8 months. He also reports numbness to his right ring finger for the last 7 to 8 months. He has occasional tingling to the fingers to bilateral hands about once a week. He states this lasts a couple minutes and then resolves. He did have a cervical neck fusion in 2000. He states this did not significantly help his symptoms. He is following up with Dr. Marilee Caputo in regards to his cervical spine. He supposed to have an MRI next week. He denies any recent injuries or treatments. He is a retired vega and . Patient had an EMG/NCS dated 7/20/21    Right median nerve motor latency: 5.21  Left median nerve motor latency: 5.78  Right ulnar nerve velocity: 54 below the elbow and 47 above the elbow  Left ulnar nerve velocity: 55 below the elbow and 47 above the elbow  Right median nerve sensory latency: 4.58   Left median nerve sensory latency: 5.42    EMG portion of the exam demonstrates 1+ amp and duration to the right deltoid, 1+ amplitude and duration of the right triceps, 1+ PPP to the right pronator teres, 1+ amp and PPP of the right first dorsal interosseous, 1+  Amp and duration of the right APB, 1+ amp and duration and PPP to the left abductor pollicis brevis. This is consistent diffuse multilevel chronic right cervical motor radiculopathy involving at least C5-C6 levels. There is evidence of median mononeuropathies at about the wrist bilaterally which is moderate to severe to severe in degree consistent with bilateral carpal tunnel syndrome.       Past Medical History:        Diagnosis Date    Benign hypertensive kidney disease with chronic kidney disease 10/6/2021    Cervical vertebra fusion 01/01/2000    Chronic kidney disease (CKD), stage III (moderate) ([facility] 11/25/2013    Depression     Foot injury     History of gastric ulcer     Hypertension     Stroke [facility])     patient had a stroke in the late 90's    Type 2 diabetes mellitus with diabetic polyneuropathy, with long-term current use of insulin ([facility]      Past Surgical History:        Procedure Laterality Date    COLONOSCOPY      COLONOSCOPY N/A 9/13/2021    COLONOSCOPY POLYPECTOMY SNARE/COLD BIOPSY performed by Amber Sorensen MD at [address] [place] [address]  01/23/2010 2019    [address]      UPPER GASTROINTESTINAL ENDOSCOPY      UPPER GASTROINTESTINAL ENDOSCOPY N/A 9/13/2021    EGD BIOPSY performed by Amber Sorensen MD at [place] [address] N/A 9/13/2021    EGD POLYP SNARE performed by Amber S[facility]nsen MD at [facility]     Current Medications:   No current facility-administered medications for this visit. Allergies:  Motrin [ibuprofen]    Social History:   TOBACCO:   reports that he has never smoked. He has never used smokeless tobacco.  ETOH:   reports no history of alcohol use. DRUGS:   reports no history of drug use.   ACTIVITIES OF DAILY LIVING:    OCCUPATION:    Family History:       Problem Relation Age of Onset    Heart Disease Mother     Diabetes Sister        REVIEW OF SYSTEMS:  CONSTITUTIONAL:  negative  EYES:  negative  HEENT:  negative  RESPIRATORY:  negative  CARDIOVASCULAR:  HTN  GASTROINTESTINAL:  negative  GENITOURINARY:  negative  INTEGUMENT/BREAST:  negative  HEMATOLOGIC/LYMPHATIC:  negative  ALLERGIC/IMMUNOLOGIC:  negative  ENDOCRINE:  DM  MUSCULOSKELETAL: right greater than left hand pain  NEUROLOGICAL:  N/T  BEHAVIOR/PSYCH:  negative    PHYSICAL EXAM:    VITALS:  Resp 22   Ht 6' 2\" (1.88 m)   Wt 266 lb (120.7 kg)   BMI 34.15 kg/m²   CONSTITUTIONAL: awake, alert, cooperative, no apparent distress, and appears stated age  EYES:  Lids and lashes normal, pupils equal, round and reactive to light, extra ocular muscles intact, sclera clear, conjunctiva normal  ENT:  Normocephalic, without obvious abnormality, atraumatic, sinuses nontender on palpation, external ears without lesions, oral pharynx with moist mucus membranes, tonsils without erythema or exudates, gums normal and good dentition. NECK:  Supple, symmetrical, trachea midline, no adenopathy, thyroid symmetric, not enlarged and no tenderness, skin normal  NEUROLOGIC:  Awake, alert, oriented to name, place and time. Cranial nerves II-XII are grossly intact. Motor is 5 out of 5 bilaterally. Sensory is intact.  gait is normal.  MUSCULOSKELETAL:    Right upper extremity: Non-tender about the shoulder and elbow with good ROM. - tinels of the cubital tunnel, - tinels of the carpal tunnel, - durkans, - finkelsteins, - CMC grind, + tenderness over the A1 pulley of the ring finger with active triggering. Full flexion and extension of the fingers. + wartenbergs and - cross finger testing, APB strength 5/5 with no atrophy. Median, ulnar, radial n intact to light touch. Brisk capillary refill. Gross motor 5/5. Left upper extremity: Non-tender about the shoulder and elbow with good ROM. - tinels of the cubital tunnel, + tinels of the carpal tunnel, - durkans, - finkelsteins, - CMC grind, - tenderness over the A1 pulleys with no active triggering. Full flexion and extension of the fingers. - wartenbergs and cross finger testing, APB strength 5/5 with no atrophy. Median, ulnar, radial n intact to light touch. Brisk capillary refill. Gross motor 5/5.        DATA:    CBC:   Lab Results   Component Value Date    WBC 4.8 01/27/2022    RBC 4.64 01/27/2022    HGB 12.5 01/27/2022    HCT 40.5 01/27/2022    MCV 87.3 01/27/2022    MCH 26.9 01/27/2022    MCHC 30.9 01/27/2022    RDW 14.2 01/27/2022     01/27/2022    MPV 11.8 01/27/2022     PT/INR:    Lab Results   Component Value Date    PROTIME 11.7 09/30/2017    INR 1.1 09/30/2017       Radiology Review:  Xray: x-rays of the bilateral wrists were obtained today in the office and reviewed with the patient. 4 views: AP, lateral, oblique, carpal tunnel view: demonstrate no acute fractures or dislocations. Harry Dasilva stage II thumb CMC joint arthritis on the left  Impression: Left thumb CMC joint arthritis      IMPRESSION:  · Bilateral carpal tunnel syndrome with double crush    · Right cervical radiculopathy C5-C8  · Right ring finger trigger  · HTN  · Hx of stroke  · DM    PLAN:  Discussed findings with patient. Discussed conservative and surgical management with the patient. Discussed his carpal tunnel syndrome does not seem symptomatic at this time. His biggest complaint is a right ring finger trigger. Recommended a right ring finger trigger cortisone injection at todays visit. Patient consented and this was tolerated well. Patient will continue to monitor his blood sugars over the next week. He is on a sliding scale. Patient follow-up in 6 weeks if no relief with the injection. He may repeat injections every 3 months. All questions answered. Procedure Note Trigger Finger Injection    The right Ring finger A1 pulley was identified as the injection site. The risk and benefits of a cortisone injection were explained and the patient consented to the injection. Under sterile conditions, the digit was injected with a mixture of 1 mL of 1% Lidocaine and 1 mL of 6 mg/mL Betamethasone without complication. A sterile bandage was applied. I have seen and evaluated the patient and agree with the above assessment and plan on today's visit. I have performed the key components of the history and physical examination with significant findings of right ring trigger. Bilateral CTS minimally symptomatic. I concur with the findings and plan as documented.     Sandeep Wang, MD  3/10/2022

## 2022-03-10 NOTE — PATIENT INSTRUCTIONS
Patient Education        Trigger Finger: Care Instructions  Overview  A trigger finger is a finger stuck in a bent position. It happens when the tendon that bends and straightens the thumb or finger can't slide smoothly under the ligaments that hold the tendon against the bones. In most cases, it's caused by a bump (nodule) that forms on the tendon. The bent finger usually straightens out on its own. A trigger finger can be painful. But it normally isn't a serious problem. Trigger fingers seem to occur more in some groups of people. These groups include:  · People who have diabetes or arthritis. · People who have injured their hands in the past.  · Musicians. · People who  tools often. Rest and exercises may help your finger relax so that it can bend. You may get a corticosteroid shot. This can reduce swelling and pain. Your doctor may put a splint on your finger. It will give your finger some rest. You may need surgery if the finger keeps locking in a bent position. Follow-up care is a key part of your treatment and safety. Be sure to make and go to all appointments, and call your doctor if you are having problems. It's also a good idea to know your test results and keep a list of the medicines you take. How can you care for yourself at home? · If your doctor put a splint on your finger, wear it as directed. Don't take it off until your doctor says you can. · You may need to change your activities to avoid movements that irritate the finger. · Take your medicines exactly as prescribed. Call your doctor if you think you are having a problem with your medicine. · Ask your doctor if you can take an over-the-counter pain medicine, such as acetaminophen (Tylenol), ibuprofen (Advil, Motrin), or naproxen (Aleve). Be safe with medicines. Read and follow all instructions on the label. · If your doctor recommends exercises, do them as directed. When should you call for help?    Call your doctor now or seek immediate medical care if:    · Your finger locks in a bent position and will not straighten. Watch closely for changes in your health, and be sure to contact your doctor if:    · You do not get better as expected. Where can you learn more? Go to https://chpesuzie.Ustream. org and sign in to your [email or web address] account. Enter M826 in the LearnBIG box to learn more about \"Trigger Finger: Care Instructions. \"     If you do not have an account, please click on the \"Sign Up Now\" link. Current as of: July 1, 2021               Content Version: 13.1  © 2006-2021 [employer]. Care instructions adapted under license by [facility] ([facility]). If you have questions about a medical condition or this instruction, always ask your healthcare professional. [address] any warranty or liability for your use of this information. Patient Education        Carpal Tunnel Syndrome: Care Instructions  Overview     Carpal tunnel syndrome is numbness, tingling, weakness, and pain in your hand, wrist, and sometimes forearm. It is caused by pressure on the median nerve. This nerve and several tough tissues called tendons run through a space in the wrist. This space is called the carpal tunnel. The repeated hand motions used in work and some hobbies and sports can put pressure on the median nerve. Pregnancy can cause carpal tunnel syndrome. Several conditions, such as diabetes, arthritis, and an underactive thyroid, can also cause it. You may be able to limit an activity or change the way you do it to reduce your symptoms. You also can take other steps to feel better. If your symptoms are mild, 1 to 2 weeks of home treatment are likely to ease your pain. Surgery is needed only if other treatments do not work. Follow-up care is a key part of your treatment and safety. Be sure to make and go to all appointments, and call your doctor if you are having problems.  It's also a good idea to know your test results and keep a list of the medicines you take. How can you care for yourself at home? · If possible, stop or reduce the activity that causes your symptoms. If you cannot stop the activity, take frequent breaks to rest and stretch or change hand positions to do a task. Try switching hands, such as when using a computer mouse. · Try to avoid bending or twisting your wrists. · Ask your doctor if you can take an over-the-counter pain medicine, such as acetaminophen (Tylenol), ibuprofen (Advil, Motrin), or naproxen (Aleve). Be safe with medicines. Read and follow all instructions on the label. · If your doctor prescribes corticosteroid medicine to help reduce pain and swelling, take it exactly as prescribed. Call your doctor if you think you are having a problem with your medicine. · Put ice or a cold pack on your wrist for 10 to 20 minutes at a time to ease pain. Put a thin cloth between the ice and your skin. · If your doctor or your physical or occupational therapist tells you to wear a wrist splint, wear it as directed to keep your wrist in a neutral position. This also eases pressure on your median nerve. · Ask your doctor whether you should have physical or occupational therapy to learn how to do tasks differently. · Try a yoga class to stretch your muscles and build strength in your hands and wrists. Yoga has been shown to ease carpal tunnel symptoms. To prevent carpal tunnel  · When working at a computer, keep your hands and wrists in line with your forearms. Hold your elbows close to your sides. Take a break every 10 to 15 minutes. · Try these exercises:  ? Warm up: Rotate your wrist up, down, and from side to side. Repeat this 4 times. Stretch your fingers far apart, relax them, then stretch them again. Repeat 4 times. Stretch your thumb by pulling it back gently, holding it, and then releasing it. Repeat 4 times.   ? Prayer stretch: Start with your palms together in front of your chest just below your chin. Slowly lower your hands toward your waistline while keeping your hands close to your stomach and your palms together until you feel a mild to moderate stretch under your forearms. Hold for 10 to 20 seconds. Repeat 4 times. ? Wrist flexor stretch: Hold your arm in front of you with your palm up. Bend your wrist, pointing your hand toward the floor. With your other hand, gently bend your wrist further until you feel a mild to moderate stretch in your forearm. Hold for 10 to 20 seconds. Repeat 4 times. ? Wrist extensor stretch: Repeat the steps for the wrist flexor stretch, but begin with your extended hand palm down. · Squeeze a rubber exercise ball several times a day to keep your hands and fingers strong. · Avoid holding objects (such as a book) in one position for a long time. When possible, use your whole hand to grasp an object. Using just the thumb and index finger can put stress on the wrist.  · Do not smoke. It can make this condition worse by reducing blood flow to the median nerve. If you need help quitting, talk to your doctor about stop-smoking programs and medicines. These can increase your chances of quitting for good. When should you call for help? Watch closely for changes in your health, and be sure to contact your doctor if:    · Your pain or other problems do not get better with home care.     · You want more information about physical or occupational therapy.     · You have side effects of your corticosteroid medicine, such as:  ? Weight gain. ? Mood changes. ? Trouble sleeping. ? Bruising easily.     · You have any other problems with your medicine. Where can you learn more? Go to https://Black Card Mediamitesh.Emida. org and sign in to your Roller account. Enter R432 in the "[employer] box to learn more about \"Carpal Tunnel Syndrome: Care Instructions. \"     If you do not have an account, please click on the \"Sign Up Now\" link.   Current as of: July 1, 2021               Content Version: 13.1  © 2006-2021 Healthwise, [email or web address]. Care instructions adapted under license by [facility] ([facility]). If you have questions about a medical condition or this instruction, always ask your healthcare professional. [address] any warranty or liability for your use of this information. Patient Education        Carpal Tunnel Syndrome: Exercises  Introduction  Here are some examples of exercises for you to try. The exercises may be suggested for a condition or for rehabilitation. Start each exercise slowly. Ease off the exercises if you start to have pain. You will be told when to start these exercises and which ones will work best for you. Warm-up stretches  When you no longer have pain or numbness, you can do exercises to help prevent carpal tunnel syndrome from coming back. Do not do any stretch or movement that is uncomfortable or painful. 1. Rotate your wrist up, down, and from side to side. Repeat 4 times. 2. Stretch your fingers far apart. Relax them, and then stretch them again. Repeat 4 times. 3. Stretch your thumb by pulling it back gently, holding it, and then releasing it. Repeat 4 times. How to do the exercises  Prayer stretch    1. Start with your palms together in front of your chest just below your chin. 2. Slowly lower your hands toward your waistline, keeping your hands close to your stomach and your palms together until you feel a mild to moderate stretch under your forearms. 3. Hold for at least 15 to 30 seconds. Repeat 2 to 4 times. Wrist flexor stretch    1. Extend your arm in front of you with your palm up. 2. Bend your wrist, pointing your hand toward the floor. 3. With your other hand, gently bend your wrist farther until you feel a mild to moderate stretch in your forearm. 4. Hold for at least 15 to 30 seconds. Repeat 2 to 4 times. Wrist extensor stretch    1.  Repeat steps 1 through 4 of the stretch above, but begin with your extended hand palm down. Follow-up care is a key part of your treatment and safety. Be sure to make and go to all appointments, and call your doctor if you are having problems. It's also a good idea to know your test results and keep a list of the medicines you take. Where can you learn more? Go to https://chpepiceweb.Attune Live. org and sign in to your Indiegogo account. Enter U260 in the Socialeyes App box to learn more about \"Carpal Tunnel Syndrome: Exercises. \"     If you do not have an account, please click on the \"Sign Up Now\" link. Current as of: July 1, 2021               Content Version: 13.1  © 2006-2021 Healthwise, Incorporated. Care instructions adapted under license by [facility] ([facility]). If you have questions about a medical condition or this instruction, always ask your healthcare professional. [address] any warranty or liability for your use of this information.

## 2022-03-17 ENCOUNTER — HOSPITAL ENCOUNTER (OUTPATIENT)
Dept: GENERAL RADIOLOGY | Age: 72
Discharge: HOME | End: 2022-03-19
Payer: MEDICAID

## 2022-03-17 ENCOUNTER — HOSPITAL ENCOUNTER (OUTPATIENT)
Dept: MRI IMAGING | Age: 72
Discharge: HOME | End: 2022-03-19
Payer: MEDICAID

## 2022-03-17 ENCOUNTER — HOSPITAL ENCOUNTER (OUTPATIENT)
Age: 72
Discharge: HOME | End: 2022-03-19
Payer: MEDICAID

## 2022-03-17 DIAGNOSIS — M54.50 CHRONIC MIDLINE LOW BACK PAIN WITHOUT SCIATICA: ICD-10-CM

## 2022-03-17 DIAGNOSIS — G89.29 CHRONIC MIDLINE LOW BACK PAIN WITHOUT SCIATICA: ICD-10-CM

## 2022-03-17 PROCEDURE — 72141 MRI NECK SPINE W/O DYE: CPT

## 2022-03-17 PROCEDURE — 72110 X-RAY EXAM L-2 SPINE 4/>VWS: CPT

## 2022-03-24 NOTE — PROGRESS NOTES
[facility]                Phone: [phone]   Fax: [phone]    To:  Dr. Humberto Mueller       From: Jose M Murray PT,LISSY      Patient: Marla Wiseman       : 1950  Diagnosis:       Date: 3/24/2022  Treatment Diagnosis:  chronic LBP    Physical Therapy Discharge Note    Total Visits to date:   4 Cancels/No-shows to date:  2 no-shows     Plan of Care/Treatment to date:  [x] Therapeutic Exercise    [x] Modalities:  [x] Therapeutic Activity     [] Ultrasound  [x] Electrical Stimulation  [] Gait Training      [] Cervical Traction   [] Lumbar Traction  [] Neuromuscular Re-education  [x] Cold/hotpack [] Iontophoresis  [x] Instruction in [facility]      Other:  [] Manual Therapy       []    [] Aquatic Therapy       []                            Progress towards goals:  pt did not attend last two visits for therapy so formal reassessment not possible         Goal Status:  [] Achieved [x] Partially Achieved  [] Not Achieved     Patient Status: [] Continue per initial plan of Care     [x] Patient now discharged to [facility] for home management of condition     [] Additional visits requested, Please re-certify for additional visits:          Electronically signed by: Jose M Murray PT ,LISSY     If you have any questions or concerns, please don't hesitate to call.   Thank you for your referral.

## 2022-03-28 ENCOUNTER — TELEPHONE (OUTPATIENT)
Dept: ADMINISTRATIVE | Age: 72
End: 2022-03-28

## 2022-04-05 ENCOUNTER — CLINICAL DOCUMENTATION (OUTPATIENT)
Dept: PHYSICAL MEDICINE AND REHAB | Age: 72
End: 2022-04-05

## 2022-04-05 DIAGNOSIS — M48.02 CERVICAL STENOSIS OF SPINAL CANAL: Primary | ICD-10-CM

## 2022-04-05 NOTE — PROGRESS NOTES
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 Recommend Neurosurgery evaluation. Referral placed. Please update patient.

## 2022-04-05 NOTE — PROGRESS NOTES
Spoke with patient notified him of results per Dr. Rachel Koch note, patient verbalized and understood. Patient is scheduled to see Neurosurgery 4/8/2022.

## 2022-04-08 ENCOUNTER — OFFICE VISIT (OUTPATIENT)
Dept: NEUROSURGERY | Age: 72
End: 2022-04-08
Payer: MEDICAID

## 2022-04-08 ENCOUNTER — HOSPITAL ENCOUNTER (OUTPATIENT)
Age: 72
Discharge: HOME | End: 2022-04-10
Payer: MEDICAID

## 2022-04-08 ENCOUNTER — HOSPITAL ENCOUNTER (OUTPATIENT)
Dept: GENERAL RADIOLOGY | Age: 72
Discharge: HOME | End: 2022-04-10
Payer: MEDICAID

## 2022-04-08 ENCOUNTER — HOSPITAL ENCOUNTER (OUTPATIENT)
Age: 72
Discharge: HOME | End: 2022-04-08
Payer: MEDICAID

## 2022-04-08 VITALS
BODY MASS INDEX: 34.14 KG/M2 | WEIGHT: 266 LBS | HEART RATE: 68 BPM | DIASTOLIC BLOOD PRESSURE: 74 MMHG | HEIGHT: 74 IN | RESPIRATION RATE: 24 BRPM | SYSTOLIC BLOOD PRESSURE: 153 MMHG | TEMPERATURE: 98.2 F | OXYGEN SATURATION: 99 %

## 2022-04-08 DIAGNOSIS — M54.10 RADICULOPATHY OF ARM: ICD-10-CM

## 2022-04-08 DIAGNOSIS — M54.2 NECK PAIN OF OVER 3 MONTHS DURATION: Primary | ICD-10-CM

## 2022-04-08 DIAGNOSIS — M54.2 NECK PAIN OF OVER 3 MONTHS DURATION: ICD-10-CM

## 2022-04-08 LAB
ALBUMIN SERPL-MCNC: 3.9 G/DL (ref 3.5–5.2)
ALP BLD-CCNC: 111 U/L (ref 40–129)
ALT SERPL-CCNC: 16 U/L (ref 0–40)
ANION GAP SERPL CALCULATED.3IONS-SCNC: 12 MMOL/L (ref 7–16)
AST SERPL-CCNC: 25 U/L (ref 0–39)
BILIRUB SERPL-MCNC: 0.3 MG/DL (ref 0–1.2)
BUN BLDV-MCNC: 24 MG/DL (ref 6–23)
CALCIUM SERPL-MCNC: 8.8 MG/DL (ref 8.6–10.2)
CHLORIDE BLD-SCNC: 106 MMOL/L (ref 98–107)
CO2: 23 MMOL/L (ref 22–29)
CREAT SERPL-MCNC: 2.2 MG/DL (ref 0.7–1.2)
CREATININE URINE: 179 MG/DL (ref 40–278)
GFR AFRICAN AMERICAN: 36
GFR NON-AFRICAN AMERICAN: 36 ML/MIN/1.73
GLUCOSE BLD-MCNC: 103 MG/DL (ref 74–99)
HBA1C MFR BLD: 5.8 % (ref 4–5.6)
MICROALBUMIN UR-MCNC: 20.7 MG/L
MICROALBUMIN/CREAT UR-RTO: 11.6 (ref 0–30)
POTASSIUM SERPL-SCNC: 3.5 MMOL/L (ref 3.5–5)
SODIUM BLD-SCNC: 141 MMOL/L (ref 132–146)
TOTAL PROTEIN: 7.8 G/DL (ref 6.4–8.3)

## 2022-04-08 PROCEDURE — 4040F PNEUMOC VAC/ADMIN/RCVD: CPT | Performed by: NEUROLOGICAL SURGERY

## 2022-04-08 PROCEDURE — 36415 COLL VENOUS BLD VENIPUNCTURE: CPT

## 2022-04-08 PROCEDURE — 1123F ACP DISCUSS/DSCN MKR DOCD: CPT | Performed by: NEUROLOGICAL SURGERY

## 2022-04-08 PROCEDURE — G8417 CALC BMI ABV UP PARAM F/U: HCPCS | Performed by: NEUROLOGICAL SURGERY

## 2022-04-08 PROCEDURE — 82570 ASSAY OF URINE CREATININE: CPT

## 2022-04-08 PROCEDURE — 83036 HEMOGLOBIN GLYCOSYLATED A1C: CPT

## 2022-04-08 PROCEDURE — 72040 X-RAY EXAM NECK SPINE 2-3 VW: CPT

## 2022-04-08 PROCEDURE — 80053 COMPREHEN METABOLIC PANEL: CPT

## 2022-04-08 PROCEDURE — 3017F COLORECTAL CA SCREEN DOC REV: CPT | Performed by: NEUROLOGICAL SURGERY

## 2022-04-08 PROCEDURE — 1036F TOBACCO NON-USER: CPT | Performed by: NEUROLOGICAL SURGERY

## 2022-04-08 PROCEDURE — 82044 UR ALBUMIN SEMIQUANTITATIVE: CPT

## 2022-04-08 PROCEDURE — G8427 DOCREV CUR MEDS BY ELIG CLIN: HCPCS | Performed by: NEUROLOGICAL SURGERY

## 2022-04-08 PROCEDURE — 99203 OFFICE O/P NEW LOW 30 MIN: CPT | Performed by: NEUROLOGICAL SURGERY

## 2022-04-08 NOTE — PROGRESS NOTES
[facility] NEUROSURGERY  [address]  [address] [facility] [address] [phone]       Chief Complaint:   Chief Complaint   Patient presents with    Neck Pain     mri cervical, neck pain comes and goes, pt has had injections in the past, PT @ [facility]       HPI:     I had the pleasure of seeing Agus Sheth today in neurosurgical clinic. As you know this delightful right-handed single father of 4 non-smoker former drinker and disabled  with multiple medical problems including A. fib with RVR on aspirin and Eliquis presents with persistent cervical pain. He underwent a C4-C7 anterior cervical discectomy and fusion in 2000 that was performed by Dr. Tracey Duque. At that time his neck pain really did not improve nor did his radicular symptoms. He is undergone multiple epidurals in the past which were of benefit however at the time his diabetes was out of control and so this was discontinued. Against this background now his diabetes is under better control and with a sliding scale insulin at home. He is interested in determining whether he would benefit from more epidural injections. Upon specific questioning he does admit to right more than left radiculopathy into the anterior aspect of his arms down to his fingers. He most recently was seen and evaluated by Dr. Zan Bloom and underwent a cortisone injection into the hand and he states that this is improved his numbness in his right hand by 80 to 90%. He denies any falls or loss of bowel or bladder function. His pain is not exacerbated with cough strain or sneeze. Has not been dropping any objects.     Past Medical History:   Diagnosis Date    Benign hypertensive kidney disease with chronic kidney disease 10/6/2021    Cervical vertebra fusion 01/01/2000    Chronic kidney disease (CKD), stage III (moderate) ([facility] [address] [address] 11/25/2013    Depression     Foot injury     History of gastric ulcer     Hypertension     Stroke [facility])     patient had a stroke in the late 90's    Type 2 diabetes mellitus with diabetic polyneuropathy, with long-term current use of insulin (HCC)      Past Surgical History:   Procedure Laterality Date    COLONOSCOPY      COLONOSCOPY N/A 9/13/2021    COLONOSCOPY POLYPECTOMY SNARE/COLD BIOPSY performed by Tre Aparicio MD at [address]  01/23/2010 2019    [address]      UPPER GASTROINTESTINAL ENDOSCOPY      UPPER GASTROINTESTINAL ENDOSCOPY N/A 9/13/2021    EGD BIOPSY performed by Tre Aparicio MD at [address] N/A 9/13/2021    EGD POLYP SNARE performed by [facility] at [facility]      Family History   Problem Relation Age of Onset    Heart Disease Mother     Diabetes Sister       Social History     Socioeconomic History    Marital status: Single     Spouse name: Not on file    Number of children: Not on file    Years of education: Not on file    Highest education level: Not on file   Occupational History    Not on file   Tobacco Use    Smoking status: Never Smoker    Smokeless tobacco: Never Used   Vaping Use    Vaping Use: Never used   Substance and Sexual Activity    Alcohol use: No    Drug use: No    Sexual activity: Not on file   Other Topics Concern    Not on file   Social History Narrative    Not on file     Social Determinants of Health     Financial Resource Strain: Low Risk     Difficulty of Paying Living Expenses: Not very hard   Food Insecurity: No Food Insecurity    Worried About Running Out of Food in the Last Year: Never true    Gianna of Food in the Last Year: Never true   Transportation Needs:     Lack of Transportation (Medical): Not on file    Lack of Transportation (Non-Medical):  Not on file   Physical Activity:     Days of Exercise per Week: Not on file    Minutes of Exercise per Session: Not on file   Stress:     Feeling of Stress : Not on file   Social Connections:     Frequency of Communication with Friends and Family: Not on file    Frequency of Social Gatherings with Friends and Family: Not on file    Attends [employer]ish Services: Not on file    Active Member of [address] [place] [address] or Organizations: Not on file    Attends Club or Organization Meetings: Not on file    Marital Status: Not on file   Intimate Partner Violence:     Fear of Current or Ex-Partner: Not on file    Emotionally Abused: Not on file    Physically Abused: Not on file    Sexually Abused: Not on file   Housing Stability:     Unable to Pay for Housing in the Last Year: Not on file    Number of [place] in the Last Year: Not on file    Unstable Housing in the Last Year: Not on file       Medications:   Current Outpatient Medications   Medication Sig Dispense Refill    apixaban (ELIQUIS) 5 MG TABS tablet Take 5 mg by mouth 2 times daily      hydrALAZINE (APRESOLINE) 25 MG tablet Take 25 mg by mouth 3 times daily      ACETAMINOPHEN EXTRA STRENGTH 500 MG tablet TAKE 2 TABLETS BY MOUTH EVERY 6 HOURS FOR PAIN      Cholecalciferol (VITAMIN D3) 125 MCG (5000 UT) CAPS TAKE ONE CAPSULE BY MOUTH EVERY DAY      ONETOUCH ULTRA strip       amLODIPine (NORVASC) 5 MG tablet Take 1 tablet by mouth daily 90 tablet 1    metoprolol succinate (TOPROL XL) 50 MG extended release tablet Take 1 tablet by mouth 2 times daily 30 tablet 3    ASPIRIN LOW DOSE 81 MG EC tablet TAKE 1 TABLET BY MOUTH DAILY 90 tablet 1    pantoprazole (PROTONIX) 40 MG tablet TAKE 1 TABLET BY MOUTH DAILY 90 tablet 1    lisinopril (PRINIVIL;ZESTRIL) 30 MG tablet Take 30 mg by mouth daily      gabapentin (NEURONTIN) 300 MG capsule TAKE 1 CAPSULE BY MOUTH TWICE DAILY      Blood Pressure KIT 1 kit by Does not apply route daily 1 kit 0    insulin lispro, 1 Unit Dial, 100 UNIT/ML SOPN Inject 6 Units into the skin 3 times daily Plus  2 units for reveals preserved power in the upper and lower extremities at 5 out of 5 throughout. Reflexes are symmetric and diminished. Plantar responses are downgoing. There is no clonus or Bonilla sign. Patient is intact to fine touch in all dermatomes throughout. Straight leg raise is negative. Palpation of the spine reveals no kyphotic or scoliotic gross deformities. He  can forward flex to well below the knee. There is no pain to deep percussion nor palpation. 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 This was present in 2005 but it has advanced. 1. Neck pain of over 3 months duration  -     Vik Kelly MD, Pain Medicine, [address]; Future  -     EMG; Future  2. Radiculopathy of arm      MEDICAL DECISION MAKING & PLAN:    I showed Mr. Lina Quan his images and explained the findings. Explained the natural history of adjacent level disease and the patient articulated his understanding. Certainly would like to obtain C-spine flexion-extension films as well as a new EMG prior to surgical consideration. I did warn him that should he fall or sustain trauma that this disc herniation could lead to injuring of the cord with possibly disastrous consequences. He articulated his understanding with respect to that as well. He will return to clinic with the above results in hand. Be noted that the patient stated he had significant complications with swallowing necessitating balloon dilatation of his esophagus after his anterior cervical discectomy and fusion. Thank you so much for allowing us to participate in the care of this patient. No follow-ups on file.       Electronically signed by Cynthia Taylor MD on 4/8/2022 at 1:26 PM       NOTE: This report was transcribed using voice recognition software.  Every effort was made to ensure accuracy; however, inadvertent computerized transcription errors may be present

## 2022-04-19 ENCOUNTER — TELEPHONE (OUTPATIENT)
Dept: CARDIOLOGY CLINIC | Age: 72
End: 2022-04-19

## 2022-04-19 RX ORDER — METOPROLOL SUCCINATE 50 MG/1
50 TABLET, EXTENDED RELEASE ORAL 2 TIMES DAILY
Qty: 180 TABLET | Refills: 3 | Status: SHIPPED
Start: 2022-04-19 | End: 2022-04-22

## 2022-04-19 NOTE — TELEPHONE ENCOUNTER
Patient notified of Dr. Rodney Sapp recommendation. Eliquis and Toprol e-scribed.   Patient will call if he has any problems or if HR drops below 55 bpm.

## 2022-04-19 NOTE — TELEPHONE ENCOUNTER
Patient needs refills of Eliquis 5 mg BID and Toprol 50 mg. Hosp D/C and last JS visit lists dose as 50 mg BID. However, patient states he has only been taking 50 mg daily since discharge. BP/P today 147/77 (71). Please advise regarding Toprol dosing.

## 2022-04-22 ENCOUNTER — OFFICE VISIT (OUTPATIENT)
Dept: FAMILY MEDICINE CLINIC | Age: 72
End: 2022-04-22
Payer: MEDICAID

## 2022-04-22 VITALS
OXYGEN SATURATION: 97 % | BODY MASS INDEX: 34.65 KG/M2 | HEIGHT: 74 IN | HEART RATE: 62 BPM | TEMPERATURE: 97.8 F | RESPIRATION RATE: 20 BRPM | SYSTOLIC BLOOD PRESSURE: 122 MMHG | WEIGHT: 270 LBS | DIASTOLIC BLOOD PRESSURE: 66 MMHG

## 2022-04-22 DIAGNOSIS — N18.30 TYPE 2 DIABETES MELLITUS WITH STAGE 3 CHRONIC KIDNEY DISEASE, WITH LONG-TERM CURRENT USE OF INSULIN, UNSPECIFIED WHETHER STAGE 3A OR 3B CKD (HCC): ICD-10-CM

## 2022-04-22 DIAGNOSIS — N40.1 BENIGN PROSTATIC HYPERPLASIA WITH URINARY HESITANCY: Primary | ICD-10-CM

## 2022-04-22 DIAGNOSIS — Z79.4 TYPE 2 DIABETES MELLITUS WITH STAGE 3 CHRONIC KIDNEY DISEASE, WITH LONG-TERM CURRENT USE OF INSULIN, UNSPECIFIED WHETHER STAGE 3A OR 3B CKD (HCC): ICD-10-CM

## 2022-04-22 DIAGNOSIS — R39.11 BENIGN PROSTATIC HYPERPLASIA WITH URINARY HESITANCY: Primary | ICD-10-CM

## 2022-04-22 DIAGNOSIS — E11.22 TYPE 2 DIABETES MELLITUS WITH STAGE 3 CHRONIC KIDNEY DISEASE, WITH LONG-TERM CURRENT USE OF INSULIN, UNSPECIFIED WHETHER STAGE 3A OR 3B CKD (HCC): ICD-10-CM

## 2022-04-22 DIAGNOSIS — I10 PRIMARY HYPERTENSION: ICD-10-CM

## 2022-04-22 DIAGNOSIS — I10 ESSENTIAL HYPERTENSION: ICD-10-CM

## 2022-04-22 DIAGNOSIS — Z79.4 TYPE 2 DIABETES MELLITUS WITHOUT COMPLICATION, WITH LONG-TERM CURRENT USE OF INSULIN (HCC): ICD-10-CM

## 2022-04-22 DIAGNOSIS — E11.9 TYPE 2 DIABETES MELLITUS WITHOUT COMPLICATION, WITH LONG-TERM CURRENT USE OF INSULIN (HCC): ICD-10-CM

## 2022-04-22 PROCEDURE — 3044F HG A1C LEVEL LT 7.0%: CPT | Performed by: FAMILY MEDICINE

## 2022-04-22 PROCEDURE — 99214 OFFICE O/P EST MOD 30 MIN: CPT | Performed by: FAMILY MEDICINE

## 2022-04-22 PROCEDURE — 99212 OFFICE O/P EST SF 10 MIN: CPT | Performed by: FAMILY MEDICINE

## 2022-04-22 RX ORDER — AMLODIPINE BESYLATE 5 MG/1
5 TABLET ORAL DAILY
Qty: 90 TABLET | Refills: 1 | Status: SHIPPED | OUTPATIENT
Start: 2022-04-22

## 2022-04-22 RX ORDER — LISINOPRIL 40 MG/1
40 TABLET ORAL DAILY
COMMUNITY
End: 2022-05-20 | Stop reason: SDUPTHER

## 2022-04-22 RX ORDER — METOPROLOL SUCCINATE 100 MG/1
0.5 TABLET, EXTENDED RELEASE ORAL 2 TIMES DAILY
COMMUNITY
Start: 2022-04-19

## 2022-04-22 RX ORDER — ASPIRIN 81 MG/1
TABLET ORAL
Qty: 90 TABLET | Refills: 1 | Status: SHIPPED
Start: 2022-04-22 | End: 2022-05-20 | Stop reason: SDUPTHER

## 2022-04-22 ASSESSMENT — ENCOUNTER SYMPTOMS
ABDOMINAL PAIN: 0
SHORTNESS OF BREATH: 0
DIARRHEA: 0
CONSTIPATION: 0
COUGH: 0
NAUSEA: 0
VOMITING: 0
WHEEZING: 0

## 2022-04-22 ASSESSMENT — LIFESTYLE VARIABLES: HOW OFTEN DO YOU HAVE A DRINK CONTAINING ALCOHOL: NEVER

## 2022-04-22 NOTE — PROGRESS NOTES
S: 71 y.o. male presents today for: AFIB, HTN, CKD, IDDM f/u. 1) Afib - takes metoprolol, eliquis  2) HTN - started norvasc last visit, HCTZ, metoprolol  3) CKD- f/us with DR Dm Escalera. Baseline Cr 2-2.2  4) BPH - flomaxHx BPH, PSA 7 ~2 years ago. 5) Does appear to have some hypoglycemic episodes c/w low A1c.   6) C/o L flank pain, hx kidney stones. O: VS: /66   Pulse 62   Temp 97.8 °F (36.6 °C) (Temporal)   Resp 20   Ht 6' 2\" (1.88 m)   Wt 270 lb (122.5 kg)   SpO2 97%   BMI 34.67 kg/m²      AAO/NAD, appropriate affect for mood  CV:  RRR, no murmur  Resp: CTAB  Abd: No flank TTP. Impression/Plan:   1) HTN - cont HCTZ 12.5mg po qday from 25mg. 2) AFIB - WCTF  3) BPH - cont flomax, check PSA  4) DM - appears to have increased hypoglycemia episodes with A1c below goal range. Would think its OK to reduce basal insulin - it does appear in the endo note patient did not report hypoglycemic episodes. Would again recommend patient reduce to Lantus 50units SC qHS and notify endocrine of our change over the phone. Attending Physician Statement  I have discussed the case, including pertinent history and exam findings with the resident. I also have seen the patient and performed key portions of the examination. I agree with the documented assessment and plan.       Simona Alvarenga MD

## 2022-04-22 NOTE — PROGRESS NOTES
[address] [place] [address]  FAMILY MEDICINE RESIDENCY PROGRAM  DATE OF VISIT : 2022    Patient : Juan Pablo Jesus   Age : 71 y.o.  : 1950   MRN : [id number]   ______________________________________________________________________    Chief Complaint :   Chief Complaint   Patient presents with    Hypertension    Atrial Fibrillation       HPI : Juan Pablo Jesus is 71 y.o. male who presented to the clinic today for office visit. A.Fib: Toprol 50mg BID, Eliquis 5mg BID. Tolerating well.      HTN: HCTZ 12.5mg daily, Lisinopril 30mg daily and Hydralazine 50mg TID. CKD: following with Dr. Montana Patrick. Complaining of left flank pain that has been intermittent over the last 2-3mos but denies any other urinary symptoms. Last saw Dr. Montana Patrick in February, next appt not till Daria 10. IDDM: following with endo. Last A1C 5.8 (). Per Endocrinology patient continues to be on Lantus 55 U nightly and high dose sliding scale despite A1C being below goal of his age (6.5-7.5 per Endo's note). Patient also notes to have some episodes of hypoglycemia at home. During office visit on  hd discussed with endo decreasing to 50U nightly which at the time she was agreeable to. BPH: follows with urology. Last PSA 9/10/2020 7.24, has not seen urology since his last kidney stone over 2yrs ago. I reviewed the patient's past medications, allergies and past medical history during this visit.     Past Medical History :        Past Medical History:   Diagnosis Date    Benign hypertensive kidney disease with chronic kidney disease 10/6/2021    Cervical vertebra fusion 2000    Chronic kidney disease (CKD), stage III (moderate) ([address] 2013    Depression     Foot injury     History of gastric ulcer     Hypertension     Stroke [facility])     patient had a stroke in the late     Type 2 diabetes mellitus with diabetic polyneuropathy, with long-term current use of insulin ([facility]      Past Surgical History: Procedure Laterality Date    COLONOSCOPY      COLONOSCOPY N/A 9/13/2021    COLONOSCOPY POLYPECTOMY SNARE/COLD BIOPSY performed by Krystal Kendrick MD at [address] [place] [address]  01/23/2010 2019    ROTATOR CUFF REPAIR  1999    SPINE SURGERY      UPPER GASTROINTESTINAL ENDOSCOPY      UPPER GASTROINTESTINAL ENDOSCOPY N/A 9/13/2021    EGD BIOPSY performed by Krystal Kendrick MD at [address] [facility] N/A 9/13/2021    EGD POLYP SNARE performed by Krystal Kendrick MD at [facility] History :  Social History     Tobacco History     Smoking Status  Never Smoker    Smokeless Tobacco Use  Never Used          Alcohol History     Alcohol Use Status  No          Drug Use     Drug Use Status  No          Sexual Activity     Sexually Active  Not Asked                 Allergies : Allergies   Allergen Reactions    Motrin [Ibuprofen]        Medication List :    Current Outpatient Medications   Medication Sig Dispense Refill    metoprolol succinate (TOPROL XL) 100 MG extended release tablet Take 0.5 tablets by mouth in the morning and at bedtime      lisinopril (PRINIVIL;ZESTRIL) 40 MG tablet Take 40 mg by mouth daily      vitamin D (CHOLECALCIFEROL) 25 MCG (1000 UT) TABS tablet Take 1,000 Units by mouth daily      aspirin (ASPIRIN LOW DOSE) 81 MG EC tablet TAKE 1 TABLET BY MOUTH DAILY 90 tablet 1    amLODIPine (NORVASC) 5 MG tablet Take 1 tablet by mouth daily 90 tablet 1    apixaban (ELIQUIS) 5 MG TABS tablet Take 1 tablet by mouth 2 times daily 180 tablet 3    ACETAMINOPHEN EXTRA STRENGTH 500 MG tablet TAKE 2 TABLETS BY MOUTH EVERY 6 HOURS FOR PAIN      ONETOUCH ULTRA strip       ammonium lactate (LAC-HYDRIN) 12 % lotion       pantoprazole (PROTONIX) 40 MG tablet TAKE 1 TABLET BY MOUTH DAILY 90 tablet 1    lidocaine (LIDODERM) 5 % Place 1 patch onto the skin daily 12 hours on, 12 hours off.  30 patch 3  hydroCHLOROthiazide (MICROZIDE) 12.5 MG capsule TAKE 1 CAPSULE BY MOUTH DAILY 90 capsule 1    gabapentin (NEURONTIN) 300 MG capsule TAKE 1 CAPSULE BY MOUTH TWICE DAILY      Blood Pressure KIT 1 kit by Does not apply route daily 1 kit 0    Alcohol Swabs (B-D SINGLE USE SWABS REGULAR) PADS USE 3 - 4 TIMES DAILY AS DIRECTED      insulin lispro, 1 Unit Dial, 100 UNIT/ML SOPN Inject 6 Units into the skin 3 times daily Plus  2 units for each 50 above 150 Blood sugar level result      B-D UF III MINI PEN NEEDLES 31G X 5 MM MISC 1 each by Does not apply route 4 times daily      tamsulosin (FLOMAX) 0.4 MG capsule Take 0.4 mg by mouth nightly      insulin glargine (LANTUS) 100 UNIT/ML injection vial Inject 50 Units into the skin nightly        No current facility-administered medications for this visit. Review of Systems :  Review of Systems   Constitutional: Negative for chills, fatigue and fever. Respiratory: Negative for cough, shortness of breath and wheezing. Cardiovascular: Negative for chest pain, palpitations and leg swelling. Gastrointestinal: Negative for abdominal pain, constipation, diarrhea, nausea and vomiting. Neurological: Positive for light-headedness. Negative for dizziness, weakness, numbness and headaches.     ______________________________________________________________________    Physical Exam :    Vitals: /66   Pulse 62   Temp 97.8 °F (36.6 °C) (Temporal)   Resp 20   Ht 6' 2\" (1.88 m)   Wt 270 lb (122.5 kg)   SpO2 97%   BMI 34.67 kg/m²   Physical Exam  Vitals reviewed. Constitutional:       General: He is not in acute distress. Appearance: Normal appearance. Cardiovascular:      Rate and Rhythm: Normal rate and regular rhythm. Pulses: Normal pulses. Heart sounds: Normal heart sounds. No murmur heard. Pulmonary:      Effort: Pulmonary effort is normal. No respiratory distress. Breath sounds: Normal breath sounds. No wheezing. Abdominal:      General: Bowel sounds are normal. There is no distension. Palpations: Abdomen is soft. Tenderness: There is no abdominal tenderness. Musculoskeletal:      Right lower leg: No edema. Left lower leg: No edema. Neurological:      Mental Status: He is alert.         ___________________    Assessment & Plan :    1. Type 2 diabetes mellitus without complication, with long-term current use of insulin ([facility])  -appears to have increased hypoglycemia episodes with A1c below goal range.  -likely OK to reduce basal insulin   - it does appear  patient did not report hypoglycemic episodes to Endo. - Would again recommend patient reduce to Lantus 50units SC qHS   -patient to notify endocrine of our change over the phone.   - aspirin (ASPIRIN LOW DOSE) 81 MG EC tablet; TAKE 1 TABLET BY MOUTH DAILY  Dispense: 90 tablet; Refill: 1    2. Essential hypertension  - stable  - HCTZ 12.5mg- dose clarification  - aspirin (ASPIRIN LOW DOSE) 81 MG EC tablet; TAKE 1 TABLET BY MOUTH DAILY  Dispense: 90 tablet; Refill: 1  - amLODIPine (NORVASC) 5 MG tablet; Take 1 tablet by mouth daily  Dispense: 90 tablet; Refill: 1    3. Benign prostatic hyperplasia with urinary hesitancy  - PSA, DIAGNOSTIC; Future      Educational materials and/or home exercises printed for patient's review and were included in patient instructions on his/her After Visit Summary and given to patient at the end of visit. Counseled regarding above diagnosis, including possible risks and complications,  especially if left uncontrolled. Counseled regarding the possible side effects, risks, benefits and alternatives to treatment; patient and/or guardian verbalizes understanding, agrees, feels comfortable with and wishes to proceed with above treatment plan.      Advised patient to call with any new medication issues, and read all Rx info from pharmacy to assure aware of all possible risks and side effects of medication before taking. Reviewed age and gender appropriate health screening exams and vaccinations. Advised patient regarding importance of keeping up with recommended health maintenance and to schedule as soon as possible if overdue, as this is important in assessing for undiagnosed pathology, especially cancer, as well as protecting against potentially harmful/life threatening disease. Patient and/or guardian verbalizes understanding and agrees with above counseling, assessment and plan. All questions answered    Additional plan and future considerations:   RTO in 1mos for DM    Return to Office: Return in about 4 weeks (around 5/20/2022) for  and repeat CMP.     Carol Ervin MD   Case discussed with Dr. Chiquis Blue

## 2022-04-26 ENCOUNTER — HOSPITAL ENCOUNTER (OUTPATIENT)
Dept: NEUROLOGY | Age: 72
Discharge: HOME | End: 2022-04-26
Payer: MEDICAID

## 2022-04-26 VITALS — WEIGHT: 270 LBS | BODY MASS INDEX: 34.65 KG/M2 | HEIGHT: 74 IN

## 2022-04-26 DIAGNOSIS — M54.2 NECK PAIN OF OVER 3 MONTHS DURATION: ICD-10-CM

## 2022-04-26 PROCEDURE — 95911 NRV CNDJ TEST 9-10 STUDIES: CPT

## 2022-04-26 PROCEDURE — 95886 MUSC TEST DONE W/N TEST COMP: CPT

## 2022-04-26 NOTE — PROCEDURES
[address]  [address] [facility], [address] [place] [address]  Phone: [phone]  Fax: [phone]      Referring Provider: Lin Willett MD  Primary Care Physician: Carlus Dandy, MD  Patient Name: Anoop Lancaster  Patient YOB: 1950  Gender: male  BMI: Body mass index is 34.67 kg/m². Height 6' 2\" (1.88 m), weight 270 lb (122.5 kg). 4/26/2022    Reason for referral: EMG    Description of clinical problem:   No chief complaint on file. Pain Yes   ; Numbness/tingling  No; Weakness  No       Brief physical exam:   Sensory deficit No; Weakness No; Atrophy  No; Reflex abnormality No      Study Limitations:  none    Summary of Findings:   Nerve conduction studies:   · The following nerve conduction studies were abnormal:   · Bilateral prolongation of median distal motor latencies. · All other nerve conduction studies listed in the table above were normal in latency, amplitude and conduction velocity. [address]  Electrodiagnostic Laboratory   [place]         Full Name: Maryam Hahn Gender: Male  MRN: [id number] YOB: 1950  Location[de-identified] Outpt. Visit Date: 4/26/2022 09:54  Age: 71 Years 1 Months Old  Examining Physician: Dr. Ayanna James  Referring Physician: Dr. Luis F Reynoso  Technician: Justice May   Height: 6 feet 2 inch  Weight: 270 lbs  Notes: Neck pain M54.2      Motor NCS      Nerve / Sites Lat. Lat Diff Amplitude Amp. 1-2 Distance Velocity Temp.    ms ms mV % cm m/s °C   R Median - APB      Wrist 5.31  8.1 100 8  33.3      Elbow 9.74 4.43 8.0 98.3 24 54 33.4   L Median - APB      Wrist 5.94  6.9 100 8  33.4      Elbow 10.52 4.58 6.4 92.6 24 52 33.4   R Ulnar - ADM      Wrist 3.13  11.6 100 8  33.4      B. Elbow 7.60 4.48 10.1 86.9 23 51 33.4      A. Elbow 10.10 2.50 9.8 84.8 10 40 33.4   L Ulnar - ADM      Wrist 3.39  7.4 100 8  33.3      B. Elbow 7.55 4.17 5.6 74.8 23 55 33.3      A. Elbow 9.84 2.29 5. 0 67.2 10 44 33.3                           Sensory NCS      Nerve / Sites Onset Lat Peak Lat PP Amp Distance Velocity Temp.    ms ms µV cm m/s °C   R Median - Digit II (Antidromic)      Mid Palm 1.56 2.29 20.5 7 45 33.3      Wrist 3.75 4.43 20.5 14 37 33.4   L Median - Digit II (Antidromic)      Mid Palm 2.19 2.81 14.4 7 32 33.1      Wrist 4.38 5.42 14.0 14 32 33.1   R Ulnar - Digit V (Antidromic)      Wrist 2.97 3.54 11.8 14 47 33.4   L Ulnar - Digit V (Antidromic)      Wrist 2.66 3.59 8.2 14 53 33.3   R Radial - Anatomical snuff box (Forearm)      Forearm 1.98 2.71 10.2 10 51 33.4   L Radial - Anatomical snuff box (Forearm)      Forearm 1.72 2.34 9.2 10 58 33.1       F  Wave      Nerve F Lat M Lat F-M Lat    ms ms ms   R Median - APB 34.0 5.2 28.8   R Ulnar - ADM 35.2 1.7 33.4   L Median - APB 33.5 6.0 27.5   L Ulnar - ADM 35.7 3.1 32.6       EMG         EMG Summary Table     Spontaneous MUAP Recruitment   Muscle IA Fib PSW Fasc H.F. Amp Dur. PPP Pattern   R. Abductor pollicis brevis N None None None None N N N N   R. First dorsal interosseous N None None None None N N N N   R. Brachioradialis N None None None None N N N N   R. Biceps brachii N None None None None N N N N   R. Deltoid N None None None None N N N N   R. Cervical paraspinals N None None None None N N +1 N                     Needle EMG:   · Needle EMG was performed using a monopolar needle. · The following abnormalities were seen on needle EMG: cervical polyphasic potentials   All other muscles tested, as listed in the table above demonstrated normal amplitude, duration, phases and recruitment and no active denervation signs were seen. Diagnostic Interpretation:   Moderately severe bilateral carpal tunnel syndrome. Paraphasic potentials of cervical paraspinal muscles may relate to prior cervical spine surgery vs significant mri findings of high cervical canal stenosis. Clinical correlation advised.       Technologist: Hmoe Edward Rajwinder Staples MD    Nerve conduction studies and electromyography were performed according to our laboratory policies and procedures which can be provided upon request. All abnormal values are identified in the table.  Laboratory normal values can also be provided upon request.       Cc: MD Prasad Pisano MD

## 2022-05-05 ENCOUNTER — TELEPHONE (OUTPATIENT)
Dept: FAMILY MEDICINE CLINIC | Age: 72
End: 2022-05-05

## 2022-05-05 NOTE — TELEPHONE ENCOUNTER
----- Message from Skip Jurado sent at 5/4/2022 12:40 PM EDT -----  Subject: Message to Provider    QUESTIONS  Information for Provider? Veena Sarkar called in ad said pt is having increase   pain in his neck and back and a little in shoulder, he is taking   acetaminophen 500mg 4 of them a day and it is not working well causing him   not to sleep as well please follow up   ---------------------------------------------------------------------------  --------------  [address]  What is the best way for the office to contact you? OK to leave message on   voicemail  Preferred Call Back Phone Number? [phone]  ---------------------------------------------------------------------------  --------------  SCRIPT ANSWERS  Relationship to Patient? Third Party  Third Party Type? Home Health Care? Representative Name?  Marianne Colunga

## 2022-05-05 NOTE — TELEPHONE ENCOUNTER
Last Appointment:  4/22/2022  Future Appointments   Date Time Provider Vikram Alasi   5/20/2022  9:00 AM MD Danika Billings [facility]   6/3/2022  9:30 AM JEWELL Gan [facility]

## 2022-05-06 RX ORDER — HYDROCHLOROTHIAZIDE 12.5 MG/1
CAPSULE, GELATIN COATED ORAL
Qty: 90 CAPSULE | Refills: 1 | Status: SHIPPED | OUTPATIENT
Start: 2022-05-06

## 2022-05-13 ENCOUNTER — HOSPITAL ENCOUNTER (OUTPATIENT)
Age: 72
Discharge: HOME | End: 2022-05-13
Payer: MEDICAID

## 2022-05-13 DIAGNOSIS — N40.1 BENIGN PROSTATIC HYPERPLASIA WITH URINARY HESITANCY: ICD-10-CM

## 2022-05-13 DIAGNOSIS — R39.11 BENIGN PROSTATIC HYPERPLASIA WITH URINARY HESITANCY: ICD-10-CM

## 2022-05-13 LAB — PROSTATE SPECIFIC ANTIGEN: 7.33 NG/ML (ref 0–4)

## 2022-05-13 PROCEDURE — 36415 COLL VENOUS BLD VENIPUNCTURE: CPT

## 2022-05-13 PROCEDURE — 84153 ASSAY OF PSA TOTAL: CPT

## 2022-05-19 NOTE — PROGRESS NOTES
[facility] RESIDENCY PROGRAM  DATE OF VISIT : 2022    Patient : Jarrett Fine   Age : 71 y.o.  : 1950   MRN : [id number]   ______________________________________________________________________    Chief Complaint :   Chief Complaint   Patient presents with    Hypertension    Benign Prostatic Hypertrophy       HPI : Jarrett Fine is 71 y.o. male who presented to the clinic today for HTN and DM. HTN: HCTZ 12.5mg daily, Lisinopril 30mg daily and Hydralazine 50mg TID, patient had been taking HCTZ 25mg by mistake, dosing was clarified last appt. IDDM: following with endo. Last A1C 5.8 (). Lantus decreased to 50U nightly during last appt for episodes of hypoglycemia at home and A1C well below goal. Patient was asked to keep a BG log at home. BG at home highest 154, lowest 90. Elevated PSA: 2/2 to BPH, drawn last visit. Stable at 7.33. Follicular bumps in arms, back and chest: they have been present on and off for many years. Aggravated by high temperatures and prolonged pressure to the area (mostly the back). Previously had to see dermatology for it. I reviewed the patient's past medications, allergies and past medical history during this visit.     Past Medical History :        Past Medical History:   Diagnosis Date    Benign hypertensive kidney disease with chronic kidney disease 10/6/2021    Cervical vertebra fusion 2000    Chronic kidney disease (CKD), stage III (moderate) ([address] 2013    Depression     Foot injury     History of gastric ulcer     Hypertension     Stroke [facility])     patient had a stroke in the late     Type 2 diabetes mellitus with diabetic polyneuropathy, with long-term current use of insulin ([address]      Past Surgical History:   Procedure Laterality Date    COLONOSCOPY      COLONOSCOPY N/A 2021    COLONOSCOPY POLYPECTOMY SNARE/COLD BIOPSY performed by Konrad Alvarenga MD at [address] [place] [address]  FRACTURE SURGERY      KIDNEY STONE SURGERY  01/23/2010 2019    ROTATOR CUFF REPAIR  1999    SPINE SURGERY      UPPER GASTROINTESTINAL ENDOSCOPY      UPPER GASTROINTESTINAL ENDOSCOPY N/A 9/13/2021    EGD BIOPSY performed by Nellie Snellen, MD at [facility] N/A 9/13/2021    EGD POLYP SNARE performed by Nellie Snellen, MD at [facility] History :  Social History     Tobacco History     Smoking Status  Never Smoker    Smokeless Tobacco Use  Never Used          Alcohol History     Alcohol Use Status  No          Drug Use     Drug Use Status  No          Sexual Activity     Sexually Active  Not Asked                 Allergies : Allergies   Allergen Reactions    Motrin [Ibuprofen]        Medication List :    Current Outpatient Medications   Medication Sig Dispense Refill    lisinopril (PRINIVIL;ZESTRIL) 40 MG tablet Take 1 tablet by mouth daily 30 tablet 3    aspirin (ASPIRIN LOW DOSE) 81 MG EC tablet TAKE 1 TABLET BY MOUTH DAILY 90 tablet 1    hydrOXYzine (ATARAX) 25 MG tablet Take 1 tablet by mouth nightly 30 tablet 0    hydroCHLOROthiazide (MICROZIDE) 12.5 MG capsule TAKE 1 CAPSULE BY MOUTH DAILY 90 capsule 1    metoprolol succinate (TOPROL XL) 100 MG extended release tablet Take 0.5 tablets by mouth in the morning and at bedtime      vitamin D (CHOLECALCIFEROL) 25 MCG (1000 UT) TABS tablet Take 1,000 Units by mouth daily      amLODIPine (NORVASC) 5 MG tablet Take 1 tablet by mouth daily 90 tablet 1    apixaban (ELIQUIS) 5 MG TABS tablet Take 1 tablet by mouth 2 times daily 180 tablet 3    ACETAMINOPHEN EXTRA STRENGTH 500 MG tablet TAKE 2 TABLETS BY MOUTH EVERY 6 HOURS FOR PAIN      ONETOUCH ULTRA strip       pantoprazole (PROTONIX) 40 MG tablet TAKE 1 TABLET BY MOUTH DAILY 90 tablet 1    lidocaine (LIDODERM) 5 % Place 1 patch onto the skin daily 12 hours on, 12 hours off.  30 patch 3    gabapentin (NEURONTIN) 300 MG capsule TAKE 1 CAPSULE BY MOUTH TWICE DAILY      Blood Pressure KIT 1 kit by Does not apply route daily 1 kit 0    Alcohol Swabs (B-D SINGLE USE SWABS REGULAR) PADS USE 3 - 4 TIMES DAILY AS DIRECTED      insulin lispro, 1 Unit Dial, 100 UNIT/ML SOPN Inject 6 Units into the skin 3 times daily Plus  2 units for each 50 above 150 Blood sugar level result      B-D UF III MINI PEN NEEDLES 31G X 5 MM MISC 1 each by Does not apply route 4 times daily      tamsulosin (FLOMAX) 0.4 MG capsule Take 0.4 mg by mouth nightly      insulin glargine (LANTUS) 100 UNIT/ML injection vial Inject 50 Units into the skin nightly        No current facility-administered medications for this visit. Review of Systems :  Review of Systems   Constitutional: Negative for chills, fatigue and fever. Respiratory: Negative for cough, shortness of breath and wheezing. Cardiovascular: Negative for chest pain, palpitations and leg swelling. Gastrointestinal: Negative for abdominal pain, constipation, diarrhea, nausea and vomiting. Neurological: Negative for dizziness, weakness, light-headedness, numbness and headaches.     ______________________________________________________________________    Physical Exam :    Vitals: /66   Pulse 69   Temp 97.5 °F (36.4 °C) (Temporal)   Resp 18   Ht 6' 2\" (1.88 m)   Wt 273 lb (123.8 kg)   SpO2 98%   BMI 35.05 kg/m²   Physical Exam  Vitals reviewed. Constitutional:       General: He is not in acute distress. Appearance: Normal appearance. Cardiovascular:      Rate and Rhythm: Normal rate and regular rhythm. Pulses: Normal pulses. Heart sounds: Normal heart sounds. No murmur heard. Pulmonary:      Effort: Pulmonary effort is normal. No respiratory distress. Breath sounds: Normal breath sounds. No wheezing. Abdominal:      General: Bowel sounds are normal. There is no distension. Palpations: Abdomen is soft. Tenderness:  There is no abdominal tenderness. Musculoskeletal:      Right lower leg: No edema. Left lower leg: No edema. Skin:     Comments: Diffuse inflammation of the hair follicles on the back, chest and arms consistent with follicular eczema. Neurological:      Mental Status: He is alert.     ___________________    Assessment & Plan:    1. Type 2 diabetes mellitus without complication, with long-term current use of insulin (HCC)  - Lantus 50 U, doig well on this dose  - Will continue present mgmt  - has not had any further BG <55 since decreasing Lantus dose  - aspirin (ASPIRIN LOW DOSE) 81 MG EC tablet; TAKE 1 TABLET BY MOUTH DAILY  Dispense: 90 tablet; Refill: 1    2. Essential hypertension  - stable  - lisinopril (PRINIVIL;ZESTRIL) 40 MG tablet; Take 1 tablet by mouth daily  Dispense: 30 tablet; Refill: 3  - aspirin (ASPIRIN LOW DOSE) 81 MG EC tablet; TAKE 1 TABLET BY MOUTH DAILY  Dispense: 90 tablet; Refill: 1  - Basic Metabolic Panel; Future    3. Follicular eczema  - Discussed conservative mgmt as well as appropriate hygiene   - avoid skin irritants  - increase use of topical emollients  - hydroxyzine for itching as needed (discussed potential side effects with patient)  - hydrOXYzine (ATARAX) 25 MG tablet; Take 1 tablet by mouth nightly  Dispense: 30 tablet; Refill: 0       Educational materials and/or home exercises printed for patient's review and were included in patient instructions on his/her After Visit Summary and given to patient at the end of visit. Counseled regarding above diagnosis, including possible risks and complications,  especially if left uncontrolled. Counseled regarding the possible side effects, risks, benefits and alternatives to treatment; patient and/or guardian verbalizes understanding, agrees, feels comfortable with and wishes to proceed with above treatment plan.      Advised patient to call with any new medication issues, and read all Rx info from pharmacy to assure aware of all possible risks and side effects of medication before taking. Reviewed age and gender appropriate health screening exams and vaccinations. Advised patient regarding importance of keeping up with recommended health maintenance and to schedule as soon as possible if overdue, as this is important in assessing for undiagnosed pathology, especially cancer, as well as protecting against potentially harmful/life threatening disease. Patient and/or guardian verbalizes understanding and agrees with above counseling, assessment and plan. All questions answered    Additional plan and future considerations:   RTO in 3mos    Return to Office: Return in about 3 months (around 8/20/2022) for with ME.     Daniel Mckee MD   Case discussed with Dr. Christi Benitez

## 2022-05-20 ENCOUNTER — OFFICE VISIT (OUTPATIENT)
Dept: FAMILY MEDICINE CLINIC | Age: 72
End: 2022-05-20
Payer: MEDICAID

## 2022-05-20 VITALS
SYSTOLIC BLOOD PRESSURE: 114 MMHG | DIASTOLIC BLOOD PRESSURE: 66 MMHG | HEART RATE: 69 BPM | TEMPERATURE: 97.5 F | BODY MASS INDEX: 35.04 KG/M2 | RESPIRATION RATE: 18 BRPM | OXYGEN SATURATION: 98 % | WEIGHT: 273 LBS | HEIGHT: 74 IN

## 2022-05-20 DIAGNOSIS — Z79.4 TYPE 2 DIABETES MELLITUS WITHOUT COMPLICATION, WITH LONG-TERM CURRENT USE OF INSULIN (HCC): ICD-10-CM

## 2022-05-20 DIAGNOSIS — L73.8 FOLLICULAR ECZEMA: Primary | ICD-10-CM

## 2022-05-20 DIAGNOSIS — E11.9 TYPE 2 DIABETES MELLITUS WITHOUT COMPLICATION, WITH LONG-TERM CURRENT USE OF INSULIN (HCC): ICD-10-CM

## 2022-05-20 DIAGNOSIS — I10 ESSENTIAL HYPERTENSION: ICD-10-CM

## 2022-05-20 PROBLEM — E66.9 OBESITY WITH BODY MASS INDEX 30 OR GREATER: Status: ACTIVE | Noted: 2022-05-20

## 2022-05-20 PROCEDURE — G8427 DOCREV CUR MEDS BY ELIG CLIN: HCPCS | Performed by: FAMILY MEDICINE

## 2022-05-20 PROCEDURE — 99212 OFFICE O/P EST SF 10 MIN: CPT | Performed by: FAMILY MEDICINE

## 2022-05-20 PROCEDURE — 3017F COLORECTAL CA SCREEN DOC REV: CPT | Performed by: FAMILY MEDICINE

## 2022-05-20 PROCEDURE — 2022F DILAT RTA XM EVC RTNOPTHY: CPT | Performed by: FAMILY MEDICINE

## 2022-05-20 PROCEDURE — 4040F PNEUMOC VAC/ADMIN/RCVD: CPT | Performed by: FAMILY MEDICINE

## 2022-05-20 PROCEDURE — G8417 CALC BMI ABV UP PARAM F/U: HCPCS | Performed by: FAMILY MEDICINE

## 2022-05-20 PROCEDURE — 99213 OFFICE O/P EST LOW 20 MIN: CPT | Performed by: FAMILY MEDICINE

## 2022-05-20 PROCEDURE — 3044F HG A1C LEVEL LT 7.0%: CPT | Performed by: FAMILY MEDICINE

## 2022-05-20 PROCEDURE — 1036F TOBACCO NON-USER: CPT | Performed by: FAMILY MEDICINE

## 2022-05-20 PROCEDURE — 1123F ACP DISCUSS/DSCN MKR DOCD: CPT | Performed by: FAMILY MEDICINE

## 2022-05-20 RX ORDER — HYDROXYZINE HYDROCHLORIDE 25 MG/1
25 TABLET, FILM COATED ORAL NIGHTLY
Qty: 30 TABLET | Refills: 0 | Status: SHIPPED | OUTPATIENT
Start: 2022-05-20 | End: 2022-06-19

## 2022-05-20 RX ORDER — ASPIRIN 81 MG/1
TABLET ORAL
Qty: 90 TABLET | Refills: 1 | Status: SHIPPED
Start: 2022-05-20 | End: 2022-08-30 | Stop reason: SDUPTHER

## 2022-05-20 RX ORDER — LISINOPRIL 40 MG/1
40 TABLET ORAL DAILY
Qty: 30 TABLET | Refills: 3 | Status: SHIPPED
Start: 2022-05-20 | End: 2022-05-23

## 2022-05-20 ASSESSMENT — ENCOUNTER SYMPTOMS
VOMITING: 0
COUGH: 0
NAUSEA: 0
DIARRHEA: 0
SHORTNESS OF BREATH: 0
ABDOMINAL PAIN: 0
WHEEZING: 0
CONSTIPATION: 0

## 2022-05-20 NOTE — PROGRESS NOTES
S: 71 y.o. male here for follow up of IDDM on lantus, insulin lispro and follows with endocrinology. Recently decreased from 55 units of lantus to 50 due to hypoglycemia. This was discussed with Dr. Asa Maravilla who initially agreed (a1c 5.8), however at his appointment with endo, she increased her the lantus back to 55 units. Since then, he was changed back to 50 units nightly. This correlates with less episodes of hypoglycemia. His BP has been well controlled overall. He takes his medications without AE. It appears that endo had increased the HCTZ to 25 mg and this likely lead to an increase in his creatinine. Dr Latha Isabel then reduced the HCTZ back to the 12.5 mg daily, and BP is still very controlled. Next, discussion of BPH. PSA was >7 about 2 years ago. Now at 7.33. No large increases. No significant symptoms at present. He also has had follicular rash on the torso. This is worse with sweating, especially on his back. It is pruritic. O: VS: /66   Pulse 69   Temp 97.5 °F (36.4 °C) (Temporal)   Resp 18   Ht 6' 2\" (1.88 m)   Wt 273 lb (123.8 kg)   SpO2 98%   BMI 35.05 kg/m²    General: NAD, appropriate affect and grooming   CV:  RRR, no gallops, rubs, or murmurs   Resp: CTAB   Abd:  Soft, nontender   Ext:  No edema  Impression/Plan:  1. Dm2, insulin dependent, controlled; continue the 50 units of lantus nightly and mealtime insulin; most recent A1C 5.8; monitor for hypoglycemic episodes  2. Essential hypertension-controlled, recently reduced HCTZ due to ISABELLE; monitor BP  3. Follicular eczema- avoid hot water and prolonged showers/baths; moisturizing soap and lotion when possible; hydroxyzine prn for severe itching  4. BPH-continue flomax; plan to repeat PSA as per guidelines. 5.  ISABELLE on CKD-repeat BMP, follow creatinine      Attending Physician Statement  I have discussed the case, including pertinent history and exam findings with the resident.   I agree with the documented assessment and plan.

## 2022-05-20 NOTE — PATIENT INSTRUCTIONS
Patient Education     Dr. Kanu Saini  [address] [facility] [address] [phone]         Preventing Falls: Care Instructions  Your Care Instructions     Getting around your home safely can be a challenge if you have injuries or health problems that make it easy for you to fall. Loose rugs and furniture in walkways are among the dangers for many older people who have problems walking or who have poor eyesight. People who have conditions such as arthritis,osteoporosis, or dementia also have to be careful not to fall. You can make your home safer with a few simple measures. Follow-up care is a key part of your treatment and safety. Be sure to make and go to all appointments, and call your doctor if you are having problems. It's also a good idea to know your test results and keep alist of the medicines you take. How can you care for yourself at home? Taking care of yourself   Exercise regularly to improve your strength, muscle tone, and balance. Walk if you can. Swimming may be a good choice if you cannot walk easily.  Have your vision and hearing checked each year or any time you notice a change. If you have trouble seeing and hearing, you might not be able to avoid objects and could lose your balance.  Know the side effects of the medicines you take. Ask your doctor or pharmacist whether the medicines you take can affect your balance. Sleeping pills or sedatives can affect your balance.  Limit the amount of alcohol you drink. Alcohol can impair your balance and other senses.  Ask your doctor whether calluses or corns on your feet need to be removed. If you wear loose-fitting shoes because of calluses or corns, you can lose your balance and fall.  Talk to your doctor if you have numbness in your feet.  You may get dizzy if you do not drink enough water. To prevent dehydration, drink plenty of fluids. Choose water and other clear liquids.  If you have kidney, heart, or liver disease and have to limit fluids, talk with your doctor before you increase the amount of fluids you drink. Preventing falls at home   Remove raised doorway thresholds, throw rugs, and clutter. Repair loose carpet or raised areas in the floor. [facility] furniture and electrical cords to keep them out of walking paths.  Use nonskid floor wax, and wipe up spills right away, especially on ceramic tile floors.  If you use a walker or cane, put rubber tips on it. If you use crutches, clean the bottoms of them regularly with an abrasive pad, such as steel wool.  Keep your house well lit, especially [place]ar, and outside walkways. Use night-lights in areas such as hallways and bathrooms. Add extra light switches or use remote switches (such as switches that go on or off when you clap your hands) to make it easier to turn lights on if you have to get up during the night.  Install sturdy handrails on stairways.  Move items in your cabinets so that the things you use a lot are on the lower shelves (about waist level).  Keep a cordless phone and a flashlight with new batteries by your bed. If possible, put a phone in each of the main rooms of your house, or carry a cell phone in case you fall and cannot reach a phone. Or, you can wear a device around your neck or wrist. You push a button that sends a signal for help.  Wear low-heeled shoes that fit well and give your feet good support. Use footwear with nonskid soles. Check the heels and soles of your shoes for wear. Repair or replace worn heels or soles.  Do not wear socks without shoes on wood floors.  Walk on the grass when the sidewalks are slippery. If you live in an area that gets snow and ice in the winter, sprinkle salt on slippery steps and sidewalks. Or ask a family member or friend to do this for you. Preventing falls in the bath   Install grab bars and nonskid mats inside and outside your shower or tub and near the toilet and sinks.    Use shower chairs and bath benches.  Use a hand-held shower head that will allow you to sit while showering.  Get into a tub or shower by putting the weaker leg in first. Get out of a tub or shower with your strong side first.   Repair loose toilet seats and consider installing a raised toilet seat to make getting on and off the toilet easier.  Keep your bathroom door unlocked while you are in the shower. Where can you learn more? Go to [email or web address]. org and sign in to your [employer] account. Enter [phone] [id number] [phone] in the DDN box to learn more about \"Preventing Falls: Care Instructions. \"     If you do not have an account, please click on the \"Sign Up Now\" link. Current as of: September 8, 2021               Content Version: 13.2  © 5263-3674 [employer]wise, Incorporated. Care instructions adapted under license by [facility] ([facility]). If you have questions about a medical condition or this instruction, always ask your healthcare professional. [address] any warranty or liability for your use of this information.

## 2022-05-23 ENCOUNTER — TELEPHONE (OUTPATIENT)
Dept: NEUROSURGERY | Age: 72
End: 2022-05-23

## 2022-05-23 DIAGNOSIS — I10 ESSENTIAL HYPERTENSION: ICD-10-CM

## 2022-05-23 DIAGNOSIS — K21.9 GASTROESOPHAGEAL REFLUX DISEASE, UNSPECIFIED WHETHER ESOPHAGITIS PRESENT: ICD-10-CM

## 2022-05-23 RX ORDER — DILTIAZEM HYDROCHLORIDE 360 MG/1
CAPSULE, EXTENDED RELEASE ORAL
Qty: 90 CAPSULE | OUTPATIENT
Start: 2022-05-23

## 2022-05-23 RX ORDER — LISINOPRIL 40 MG/1
40 TABLET ORAL DAILY
Qty: 90 TABLET | Refills: 1 | Status: SHIPPED | OUTPATIENT
Start: 2022-05-23

## 2022-05-23 RX ORDER — PANTOPRAZOLE SODIUM 40 MG/1
40 TABLET, DELAYED RELEASE ORAL DAILY
Qty: 90 TABLET | Refills: 1 | Status: SHIPPED
Start: 2022-05-23 | End: 2022-10-04

## 2022-05-23 NOTE — TELEPHONE ENCOUNTER
Last Appointment:  5/20/2022  Future Appointments   Date Time Provider Vikram Gagnon   6/3/2022  9:30 AM JEWELL Deal [facility]

## 2022-05-23 NOTE — TELEPHONE ENCOUNTER
Per Dr. Ashley Gallegos last note: \"He will return to clinic with the above results in hand\" Please make an appointment to discuss these results and further treatment plan.

## 2022-05-25 ENCOUNTER — OFFICE VISIT (OUTPATIENT)
Dept: NEUROSURGERY | Age: 72
End: 2022-05-25
Payer: MEDICAID

## 2022-05-25 VITALS
WEIGHT: 273 LBS | SYSTOLIC BLOOD PRESSURE: 148 MMHG | HEART RATE: 62 BPM | HEIGHT: 74 IN | DIASTOLIC BLOOD PRESSURE: 91 MMHG | BODY MASS INDEX: 35.04 KG/M2

## 2022-05-25 DIAGNOSIS — M54.40 BILATERAL LOW BACK PAIN WITH SCIATICA, SCIATICA LATERALITY UNSPECIFIED, UNSPECIFIED CHRONICITY: Primary | ICD-10-CM

## 2022-05-25 PROCEDURE — 1123F ACP DISCUSS/DSCN MKR DOCD: CPT | Performed by: NEUROLOGICAL SURGERY

## 2022-05-25 PROCEDURE — 99214 OFFICE O/P EST MOD 30 MIN: CPT | Performed by: NEUROLOGICAL SURGERY

## 2022-05-25 PROCEDURE — G8417 CALC BMI ABV UP PARAM F/U: HCPCS | Performed by: NEUROLOGICAL SURGERY

## 2022-05-25 PROCEDURE — 3017F COLORECTAL CA SCREEN DOC REV: CPT | Performed by: NEUROLOGICAL SURGERY

## 2022-05-25 PROCEDURE — 1036F TOBACCO NON-USER: CPT | Performed by: NEUROLOGICAL SURGERY

## 2022-05-25 PROCEDURE — G8427 DOCREV CUR MEDS BY ELIG CLIN: HCPCS | Performed by: NEUROLOGICAL SURGERY

## 2022-05-25 RX ORDER — HYDRALAZINE HYDROCHLORIDE 25 MG/1
25 TABLET, FILM COATED ORAL DAILY
COMMUNITY
Start: 2022-05-23

## 2022-05-25 NOTE — PROGRESS NOTES
[facility] NEUROSURGERY  [address]  [place] [id number] [phone]       Chief Complaint:   Chief Complaint   Patient presents with    Neck Pain     radiating down into shoulders, mid back and bilateral arms, hands with grabbing, burning pain. HPI:     I had the pleasure of seeing Flavia Sales today in neurosurgical clinic. As you know, this delightful right handed gentleman presented to us with chronic complaints of neck and LBP. 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 He had an EMG performed and flexion extension films of the cervical spine were also completed. Since we last saw him, he also complains of LBP without radiculopathy. He has chronic near diabetic neuropathy. Now that his diabetes is under better control he is inquiring about possible epidurals in the future. Denies any loss of bowel or bladder function.     Past Medical History:   Diagnosis Date    Benign hypertensive kidney disease with chronic kidney disease 10/6/2021    Cervical vertebra fusion 01/01/2000    Chronic kidney disease (CKD), stage III (moderate) ([address] 11/25/2013    Depression     Foot injury     History of gastric ulcer     Hypertension     Stroke [facility])     patient had a stroke in the late 90's    Type 2 diabetes mellitus with diabetic polyneuropathy, with long-term current use of insulin ([address]      Past Surgical History:   Procedure Laterality Date    COLONOSCOPY      COLONOSCOPY N/A 9/13/2021    COLONOSCOPY POLYPECTOMY SNARE/COLD BIOPSY performed by Fred Quintero MD at [address] [place] [address]  01/23/2010    [facility]    [facility] [id number] [facility]      UPPER GASTROINTESTINAL ENDOSCOPY      UPPER GASTROINTESTINAL ENDOSCOPY N/A 9/13/2021    EGD [facility]OPSY performed [facility] at [facility] ENDOSCOPY    UPPER GASTROINTESTINAL ENDOSCOPY N/A 9/13/2021    EGD POLYP SNARE performed by Luis Chao MD at [facility] ENDOSCOPY      Family History   Problem Relation Age of Onset    Heart Disease Mother     Diabetes Sister       Social History     Socioeconomic History    Marital status: Single     Spouse name: Not on file    Number of children: Not on file    Years of education: Not on file    Highest education level: Not on file   Occupational History    Not on file   Tobacco Use    Smoking status: Never Smoker    Smokeless tobacco: Never Used   Vaping Use    Vaping Use: Never used   Substance and Sexual Activity    Alcohol use: No    Drug use: No    Sexual activity: Not on file   Other Topics Concern    Not on file   Social History Narrative    Not on file     Social Determinants of Health     Financial Resource Strain: Low Risk     Difficulty of Paying Living Expenses: Not very hard   Food Insecurity: No Food Insecurity    Worried About Running Out of Food in the Last Year: Never true    Gianna of Food in the Last Year: Never true   Transportation Needs:     Lack of Transportation (Medical): Not on file    Lack of Transportation (Non-Medical):  Not on file   Physical Activity:     Days of Exercise per Week: Not on file    Minutes of Exercise per Session: Not on file   Stress:     Feeling of Stress : Not on file   Social Connections:     Frequency of Communication with Friends and Family: Not on file    Frequency of Social Gatherings with Friends and Family: Not on file    Attends Alevism Services: Not on file    Active Member of Clubs or Organizations: Not on file    Attends Club or Organization Meetings: Not on file    Marital Status: Not on file   Intimate Partner Violence:     Fear of Current or Ex-Partner: Not on file    Emotionally Abused: Not on file    Physically Abused: Not on file    Sexually Abused: Not on file   Housing Stability:     Unable to Pay for Housing in the Last Year: Not on file    Number of Places Lived in the Last Year: Not on file    Unstable Housing in the Last Year: Not on file       Medications:   Current Outpatient Medications   Medication Sig Dispense Refill    hydrALAZINE (APRESOLINE) 25 MG tablet       lisinopril (PRINIVIL;ZESTRIL) 40 MG tablet TAKE 1 TABLET BY MOUTH DAILY 90 tablet 1    pantoprazole (PROTONIX) 40 MG tablet TAKE 1 TABLET BY MOUTH DAILY 90 tablet 1    aspirin (ASPIRIN LOW DOSE) 81 MG EC tablet TAKE 1 TABLET BY MOUTH DAILY 90 tablet 1    hydrOXYzine (ATARAX) 25 MG tablet Take 1 tablet by mouth nightly 30 tablet 0    hydroCHLOROthiazide (MICROZIDE) 12.5 MG capsule TAKE 1 CAPSULE BY MOUTH DAILY 90 capsule 1    metoprolol succinate (TOPROL XL) 100 MG extended release tablet Take 0.5 tablets by mouth in the morning and at bedtime      vitamin D (CHOLECALCIFEROL) 25 MCG (1000 UT) TABS tablet Take 1,000 Units by mouth daily      amLODIPine (NORVASC) 5 MG tablet Take 1 tablet by mouth daily 90 tablet 1    apixaban (ELIQUIS) 5 MG TABS tablet Take 1 tablet by mouth 2 times daily 180 tablet 3    ACETAMINOPHEN EXTRA STRENGTH 500 MG tablet TAKE 2 TABLETS BY MOUTH EVERY 6 HOURS FOR PAIN      ONETOUCH ULTRA strip       lidocaine (LIDODERM) 5 % Place 1 patch onto the skin daily 12 hours on, 12 hours off.  30 patch 3    gabapentin (NEURONTIN) 300 MG capsule TAKE 1 CAPSULE BY MOUTH TWICE DAILY      Blood Pressure KIT 1 kit by Does not apply route daily 1 kit 0    Alcohol Swabs (B-D SINGLE USE SWABS REGULAR) PADS USE 3 - 4 TIMES DAILY AS DIRECTED      insulin lispro, 1 Unit Dial, 100 UNIT/ML SOPN Inject 6 Units into the skin 3 times daily Plus  2 units for each 50 above 150 Blood sugar level result      B-D UF III MINI PEN NEEDLES 31G X 5 MM MISC 1 each by Does not apply route 4 times daily      tamsulosin (FLOMAX) 0.4 MG capsule Take 0.4 mg by mouth nightly      insulin glargine (LANTUS) 100 UNIT/ML injection vial Inject 50 Units into the skin nightly        No current facility-administered medications for this visit. Allergies:    Motrin [ibuprofen]       Review of Systems:    Denies any chest pain, shortness of breath, headache, dyspnea, recent weight loss, fevers, chills or night sweats. Physical Examination:    BP (!) 148/91   Pulse 62   Ht 6' 2\" (1.88 m)   Wt 273 lb (123.8 kg)   BMI 35.05 kg/m²      On focused neurological examination, he  is awake alert oriented and rationally conversant. Speech is clear and fluent. Pupils are equal and reactive to light bilaterally, extraocular movements are intact, visual fields are full to confrontation. His  face is symmetric and grossly intact to fine touch. Uvula and tongue are both midline. Shoulder shrug is symmetric and strong. Cervical spine is well aligned and nontender to direct palpation. He  can forward flex, extend , laterally rotate and laterally extend without limitation or pain. Motor examination reveals preserved power in the upper and lower extremities at 5 out of 5 throughout. Reflexes are symmetric and brisk. Plantar responses are downgoing. There is no clonus. Patient is intact to fine touch in all dermatomes throughout. ASSESSMENT:    I personally reviewed Herminio Banks's radiographic images, particularly his flexion-extension x-rays dated 8 April 2022 which fails to show any mobile listhesis. We did review his MRI as well      1. Bilateral low back pain with sciatica, sciatica laterality unspecified, unspecified chronicity  -     MRI LUMBAR SPINE WO CONTRAST; Future      MEDICAL DECISION MAKING & PLAN:    I reiterated to Mr. Agapito Narvaez that the purpose of performing an operation is not necessarily to eliminate his chronic anterior arm radiculopathy which was present preceding his initial ACDF but instead to ensure that his myelopathy does not progress.   Patient stated that he would not want to have any surgery anteriorly but would consider a posterior approach. He wishes to consider his options. In the interim he is asking for an MRI to evaluate his low back pain and he will return to clinic with the above results in hand or should he choose for further surgical discussion. Thank you so much for allowing us to participate in the care of this patient. Return in about 4 weeks (around 6/22/2022) for needs tests completed prior to appt, discuss results, discuss possible surgical options. Electronically signed by Choco Aguilar MD on 5/25/2022 at 2:37 PM       NOTE: This report was transcribed using voice recognition software.  Every effort was made to ensure accuracy; however, inadvertent computerized transcription errors may be present

## 2022-05-27 ENCOUNTER — HOSPITAL ENCOUNTER (OUTPATIENT)
Age: 72
Discharge: HOME | End: 2022-05-27
Payer: MEDICAID

## 2022-05-27 LAB
ALBUMIN SERPL-MCNC: 4.1 G/DL (ref 3.5–5.2)
ALP BLD-CCNC: 106 U/L (ref 40–129)
ALT SERPL-CCNC: 14 U/L (ref 0–40)
ANION GAP SERPL CALCULATED.3IONS-SCNC: 12 MMOL/L (ref 7–16)
AST SERPL-CCNC: 23 U/L (ref 0–39)
BASOPHILS ABSOLUTE: 0.04 E9/L (ref 0–0.2)
BASOPHILS RELATIVE PERCENT: 0.8 % (ref 0–2)
BILIRUB SERPL-MCNC: 0.3 MG/DL (ref 0–1.2)
BUN BLDV-MCNC: 19 MG/DL (ref 6–23)
CALCIUM SERPL-MCNC: 9.1 MG/DL (ref 8.6–10.2)
CHLORIDE BLD-SCNC: 103 MMOL/L (ref 98–107)
CO2: 23 MMOL/L (ref 22–29)
CREAT SERPL-MCNC: 1.9 MG/DL (ref 0.7–1.2)
CREATININE URINE: 241 MG/DL (ref 40–278)
EOSINOPHILS ABSOLUTE: 0.13 E9/L (ref 0.05–0.5)
EOSINOPHILS RELATIVE PERCENT: 2.5 % (ref 0–6)
GFR AFRICAN AMERICAN: 42
GFR NON-AFRICAN AMERICAN: 42 ML/MIN/1.73
GLUCOSE BLD-MCNC: 96 MG/DL (ref 74–99)
HCT VFR BLD CALC: 40 % (ref 37–54)
HEMOGLOBIN: 12.7 G/DL (ref 12.5–16.5)
IMMATURE GRANULOCYTES #: 0.01 E9/L
IMMATURE GRANULOCYTES %: 0.2 % (ref 0–5)
LYMPHOCYTES ABSOLUTE: 2.07 E9/L (ref 1.5–4)
LYMPHOCYTES RELATIVE PERCENT: 39.7 % (ref 20–42)
MAGNESIUM: 2 MG/DL (ref 1.6–2.6)
MCH RBC QN AUTO: 27.2 PG (ref 26–35)
MCHC RBC AUTO-ENTMCNC: 31.8 % (ref 32–34.5)
MCV RBC AUTO: 85.7 FL (ref 80–99.9)
MONOCYTES ABSOLUTE: 0.56 E9/L (ref 0.1–0.95)
MONOCYTES RELATIVE PERCENT: 10.7 % (ref 2–12)
NEUTROPHILS ABSOLUTE: 2.4 E9/L (ref 1.8–7.3)
NEUTROPHILS RELATIVE PERCENT: 46.1 % (ref 43–80)
PDW BLD-RTO: 14.3 FL (ref 11.5–15)
PHOSPHORUS: 2.6 MG/DL (ref 2.5–4.5)
PLATELET # BLD: 214 E9/L (ref 130–450)
PMV BLD AUTO: 11.1 FL (ref 7–12)
POTASSIUM SERPL-SCNC: 3.7 MMOL/L (ref 3.5–5)
PROTEIN PROTEIN: 17 MG/DL (ref 0–12)
PROTEIN/CREAT RATIO: 0.1
PROTEIN/CREAT RATIO: 0.1 (ref 0–0.2)
RBC # BLD: 4.67 E12/L (ref 3.8–5.8)
SODIUM BLD-SCNC: 138 MMOL/L (ref 132–146)
TOTAL PROTEIN: 7.9 G/DL (ref 6.4–8.3)
URIC ACID, SERUM: 10.2 MG/DL (ref 3.4–7)
VITAMIN D 25-HYDROXY: 78 NG/ML (ref 30–100)
WBC # BLD: 5.2 E9/L (ref 4.5–11.5)

## 2022-05-27 PROCEDURE — 80053 COMPREHEN METABOLIC PANEL: CPT

## 2022-05-27 PROCEDURE — 84156 ASSAY OF PROTEIN URINE: CPT

## 2022-05-27 PROCEDURE — 84550 ASSAY OF BLOOD/URIC ACID: CPT

## 2022-05-27 PROCEDURE — 84100 ASSAY OF PHOSPHORUS: CPT

## 2022-05-27 PROCEDURE — 83735 ASSAY OF MAGNESIUM: CPT

## 2022-05-27 PROCEDURE — 36415 COLL VENOUS BLD VENIPUNCTURE: CPT

## 2022-05-27 PROCEDURE — 82570 ASSAY OF URINE CREATININE: CPT

## 2022-05-27 PROCEDURE — 82306 VITAMIN D 25 HYDROXY: CPT

## 2022-05-27 PROCEDURE — 85025 COMPLETE CBC W/AUTO DIFF WBC: CPT

## 2022-06-16 ENCOUNTER — HOSPITAL ENCOUNTER (OUTPATIENT)
Dept: MRI IMAGING | Age: 72
Discharge: HOME | End: 2022-06-18
Payer: MEDICAID

## 2022-06-16 DIAGNOSIS — M54.40 BILATERAL LOW BACK PAIN WITH SCIATICA, SCIATICA LATERALITY UNSPECIFIED, UNSPECIFIED CHRONICITY: ICD-10-CM

## 2022-06-16 PROCEDURE — 72148 MRI LUMBAR SPINE W/O DYE: CPT

## 2022-06-21 ENCOUNTER — OFFICE VISIT (OUTPATIENT)
Dept: ORTHOPEDIC SURGERY | Age: 72
End: 2022-06-21
Payer: MEDICAID

## 2022-06-21 VITALS — WEIGHT: 265 LBS | BODY MASS INDEX: 34.01 KG/M2 | HEIGHT: 74 IN

## 2022-06-21 DIAGNOSIS — M65.341 TRIGGER FINGER, RIGHT RING FINGER: Primary | ICD-10-CM

## 2022-06-21 PROCEDURE — G8427 DOCREV CUR MEDS BY ELIG CLIN: HCPCS | Performed by: PHYSICIAN ASSISTANT

## 2022-06-21 PROCEDURE — 1036F TOBACCO NON-USER: CPT | Performed by: PHYSICIAN ASSISTANT

## 2022-06-21 PROCEDURE — 99213 OFFICE O/P EST LOW 20 MIN: CPT | Performed by: PHYSICIAN ASSISTANT

## 2022-06-21 PROCEDURE — 3017F COLORECTAL CA SCREEN DOC REV: CPT | Performed by: PHYSICIAN ASSISTANT

## 2022-06-21 PROCEDURE — G8417 CALC BMI ABV UP PARAM F/U: HCPCS | Performed by: PHYSICIAN ASSISTANT

## 2022-06-21 PROCEDURE — 20550 NJX 1 TENDON SHEATH/LIGAMENT: CPT | Performed by: PHYSICIAN ASSISTANT

## 2022-06-21 PROCEDURE — 1123F ACP DISCUSS/DSCN MKR DOCD: CPT | Performed by: PHYSICIAN ASSISTANT

## 2022-06-21 RX ORDER — BETAMETHASONE SODIUM PHOSPHATE AND BETAMETHASONE ACETATE 3; 3 MG/ML; MG/ML
6 INJECTION, SUSPENSION INTRA-ARTICULAR; INTRALESIONAL; INTRAMUSCULAR; SOFT TISSUE ONCE
Status: COMPLETED | OUTPATIENT
Start: 2022-06-21 | End: 2022-06-21

## 2022-06-21 RX ORDER — BUPROPION HYDROCHLORIDE 100 MG/1
100 TABLET, EXTENDED RELEASE ORAL DAILY
COMMUNITY

## 2022-06-21 RX ADMIN — BETAMETHASONE SODIUM PHOSPHATE AND BETAMETHASONE ACETATE 6 MG: 3; 3 INJECTION, SUSPENSION INTRA-ARTICULAR; INTRALESIONAL; INTRAMUSCULAR; SOFT TISSUE at 10:39

## 2022-06-21 NOTE — PROGRESS NOTES
symptoms until about a week ago. His symptoms slowly started to return. He is requesting repeat injection at today's visit. He denies any new injury or complaints. Past Medical History:        Diagnosis Date    Benign hypertensive kidney disease with chronic kidney disease 10/6/2021    Cervical vertebra fusion 01/01/2000    Chronic kidney disease (CKD), stage III (moderate) ([facility] 11/25/2013    Depression     Foot injury     History of gastric ulcer     Hypertension     Stroke [facility])     patient had a stroke in the late 90's    Type 2 diabetes mellitus with diabetic polyneuropathy, with long-term current use of insulin ([facility]      Past Surgical History:        Procedure Laterality Date    COLONOSCOPY      COLONOSCOPY N/A 9/13/2021    COLONOSCOPY POLYPECTOMY SNARE/COLD BIOPSY performed by Luis Chao MD at [address] [place] [address]  01/23/2010 2019    [address]      UPPER GASTROINTESTINAL ENDOSCOPY      UPPER GASTROINTESTINAL ENDOSCOPY N/A 9/13/2021    EGD BIOPSY performed by Luis Chao MD at [address] [place] [address] N/A 9/13/2021    EGD POLYP SNARE performed by Luis Chao MD at [facility]     Current Medications:   No current facility-administered medications for this visit. Allergies:  Motrin [ibuprofen]    Social History:   TOBACCO:   reports that he has never smoked. He has never used smokeless tobacco.  ETOH:   reports no history of alcohol use. DRUGS:   reports no history of drug use.   ACTIVITIES OF DAILY LIVING:    OCCUPATION:    Family History:       Problem Relation Age of Onset    Heart Disease Mother     Diabetes Sister        REVIEW OF SYSTEMS:  CONSTITUTIONAL:  negative  EYES:  negative  HEENT:  negative  RESPIRATORY:  negative  CARDIOVASCULAR:  HTN  GASTROINTESTINAL:  negative  GENITOURINARY:  negative  INTEGUMENT/BREAST: negative  HEMATOLOGIC/LYMPHATIC:  negative  ALLERGIC/IMMUNOLOGIC:  negative  ENDOCRINE:  DM  MUSCULOSKELETAL: right greater than left hand pain  NEUROLOGICAL:  N/T  BEHAVIOR/PSYCH:  negative    PHYSICAL EXAM:    VITALS:  There were no vitals taken for this visit. CONSTITUTIONAL:  awake, alert, cooperative, no apparent distress, and appears stated age  EYES:  Lids and lashes normal, pupils equal, round and reactive to light, extra ocular muscles intact, sclera clear, conjunctiva normal  ENT:  Normocephalic, without obvious abnormality, atraumatic, sinuses nontender on palpation, external ears without lesions, oral pharynx with moist mucus membranes, tonsils without erythema or exudates, gums normal and good dentition. NECK:  Supple, symmetrical, trachea midline, no adenopathy, thyroid symmetric, not enlarged and no tenderness, skin normal  NEUROLOGIC:  Awake, alert, oriented to name, place and time. Cranial nerves II-XII are grossly intact. Motor is 5 out of 5 bilaterally. Sensory is intact.  gait is normal.  MUSCULOSKELETAL:    Right upper extremity: Non-tender about the shoulder and elbow with good ROM. - tinels of the cubital tunnel, - tinels of the carpal tunnel, - durkans, - finkelsteins, - CMC grind, + tenderness over the A1 pulley of the ring finger with no active triggering. Full flexion and extension of the fingers. + wartenbergs and - cross finger testing, APB strength 5/5 with no atrophy. Median, ulnar, radial n intact to light touch. Brisk capillary refill. Gross motor 5/5.          DATA:    CBC:   Lab Results   Component Value Date    WBC 5.2 05/27/2022    RBC 4.67 05/27/2022    HGB 12.7 05/27/2022    HCT 40.0 05/27/2022    MCV 85.7 05/27/2022    MCH 27.2 05/27/2022    MCHC 31.8 05/27/2022    RDW 14.3 05/27/2022     05/27/2022    MPV 11.1 05/27/2022     PT/INR:    Lab Results   Component Value Date    PROTIME 11.7 09/30/2017    INR 1.1 09/30/2017         IMPRESSION:  · Bilateral carpal tunnel syndrome with double crush    · Right cervical radiculopathy C5-C8  · Right ring finger trigger  · HTN  · Hx of stroke  · DM    PLAN:  Discussed findings with patient. Discussed conservative and surgical management with the patient. Patient states he is very happy with his response to his cortisone injection at his last visit. He is requesting repeat injection at today's visit. Patient consented and this was tolerated well. He may repeat injections every 3 months as needed. All questions answered. Procedure Note Trigger Finger Injection    The right Ring finger A1 pulley was identified as the injection site. The risk and benefits of a cortisone injection were explained and the patient consented to the injection. Under sterile conditions, the digit was injected with a mixture of 1 mL of 1% Lidocaine and 1 mL of 6 mg/mL Betamethasone without complication. A sterile bandage was applied.

## 2022-06-23 ENCOUNTER — OFFICE VISIT (OUTPATIENT)
Dept: NEUROSURGERY | Age: 72
End: 2022-06-23
Payer: MEDICAID

## 2022-06-23 VITALS
OXYGEN SATURATION: 98 % | HEIGHT: 74 IN | DIASTOLIC BLOOD PRESSURE: 72 MMHG | WEIGHT: 265 LBS | HEART RATE: 53 BPM | RESPIRATION RATE: 24 BRPM | BODY MASS INDEX: 34.01 KG/M2 | SYSTOLIC BLOOD PRESSURE: 143 MMHG | TEMPERATURE: 97.8 F

## 2022-06-23 DIAGNOSIS — M54.40 BILATERAL LOW BACK PAIN WITH SCIATICA, SCIATICA LATERALITY UNSPECIFIED, UNSPECIFIED CHRONICITY: Primary | ICD-10-CM

## 2022-06-23 PROCEDURE — G8417 CALC BMI ABV UP PARAM F/U: HCPCS | Performed by: NEUROLOGICAL SURGERY

## 2022-06-23 PROCEDURE — 99214 OFFICE O/P EST MOD 30 MIN: CPT | Performed by: NEUROLOGICAL SURGERY

## 2022-06-23 PROCEDURE — G8427 DOCREV CUR MEDS BY ELIG CLIN: HCPCS | Performed by: NEUROLOGICAL SURGERY

## 2022-06-23 PROCEDURE — 1036F TOBACCO NON-USER: CPT | Performed by: NEUROLOGICAL SURGERY

## 2022-06-23 PROCEDURE — 3017F COLORECTAL CA SCREEN DOC REV: CPT | Performed by: NEUROLOGICAL SURGERY

## 2022-06-23 PROCEDURE — 1123F ACP DISCUSS/DSCN MKR DOCD: CPT | Performed by: NEUROLOGICAL SURGERY

## 2022-06-23 NOTE — PROGRESS NOTES
[facility] NEUROSURGERY  [facility] [address]  [phone]       Chief Complaint:   Chief Complaint   Patient presents with    Follow-up     1 month f/u with mri, back pain comes and goes, pt has had PT @ [facility] and years ago injections, he would like to get set up for injections again       HPI:     I had the pleasure of seeing Marian Landin today in neurosurgical clinic. As you know delightful 29-year-old gentleman presented to us with chronic complaints of neck pain but now states that his low back pain is exacerbated in the larger of the 2 problems. Upon specific questioning he states that standing exacerbates his pain. There is been no loss of bowel or bladder function. There is no numbness weakness or tingling. This is been going on for approximately 4 weeks.   States that epidural injections has helped in the past when he would have a flareup would like to proceed with all nonsurgical options first.    Past Medical History:   Diagnosis Date    Benign hypertensive kidney disease with chronic kidney disease 10/6/2021    Cervical vertebra fusion 01/01/2000    Chronic kidney disease (CKD), stage III (moderate) ([address] 11/25/2013    Depression     Foot injury     History of gastric ulcer     Hypertension     Stroke [facility])     patient had a stroke in the late 90's    Type 2 diabetes mellitus with diabetic polyneuropathy, with long-term current use of insulin ([address]      Past Surgical History:   Procedure Laterality Date    COLONOSCOPY      COLONOSCOPY N/A 9/13/2021    COLONOSCOPY POLYPECTOMY SNARE/COLD BIOPSY performed by Jerald Christianson MD at [address] [place] [address] Street  01/23/2010    2019    ROTATOR CUFF [id number] [facility]      UPPER GASTROINTESTINAL ENDOSCOPY      UPPER GASTROINTESTINAL ENDOSCOPY N/A 9/13/2021    EGD BIOPSY performed by Emerson Gonzales Jordan Rodas MD at [address] [place] [address] N/A 9/13/2021    EGD POLYP SNARE performed by Elyssa Nicholson MD at [facility] ENDOSCOPY      Family History   Problem Relation Age of Onset    Heart Disease Mother     Diabetes Sister       Social History     Socioeconomic History    Marital status: Single     Spouse name: Not on file    Number of children: Not on file    Years of education: Not on file    Highest education level: Not on file   Occupational History    Not on file   Tobacco Use    Smoking status: Never Smoker    Smokeless tobacco: Never Used   Vaping Use    Vaping Use: Never used   Substance and Sexual Activity    Alcohol use: No    Drug use: No    Sexual activity: Not on file   Other Topics Concern    Not on file   Social History Narrative    Not on file     Social Determinants of Health     Financial Resource Strain: Low Risk     Difficulty of Paying Living Expenses: Not very hard   Food Insecurity: No Food Insecurity    Worried About Running Out of Food in the Last Year: Never true    Gianna of Food in the Last Year: Never true   Transportation Needs:     Lack of Transportation (Medical): Not on file    Lack of Transportation (Non-Medical):  Not on file   Physical Activity:     Days of Exercise per Week: Not on file    Minutes of Exercise per Session: Not on file   Stress:     Feeling of Stress : Not on file   Social Connections:     Frequency of Communication with Friends and Family: Not on file    Frequency of Social Gatherings with Friends and Family: Not on file    Attends Roman [employer] Services: Not on file    Active Member of Clubs or Organizations: Not on file    Attends Club or Organization Meetings: Not on file    Marital Status: Not on file   Intimate Partner Violence:     Fear of Current or Ex-Partner: Not on file    Emotionally Abused: Not on file    Physically Abused: Not on file    Sexually Abused: Not on file   Housing Stability:     Unable to Pay for Housing in the Last Year: Not on file    Number of Places Lived in the Last Year: Not on file    Unstable Housing in the Last Year: Not on file       Medications:   Current Outpatient Medications   Medication Sig Dispense Refill    buPROPion (WELLBUTRIN SR) 100 MG extended release tablet Take 100 mg by mouth daily      hydrALAZINE (APRESOLINE) 25 MG tablet       lisinopril (PRINIVIL;ZESTRIL) 40 MG tablet TAKE 1 TABLET BY MOUTH DAILY 90 tablet 1    pantoprazole (PROTONIX) 40 MG tablet TAKE 1 TABLET BY MOUTH DAILY 90 tablet 1    aspirin (ASPIRIN LOW DOSE) 81 MG EC tablet TAKE 1 TABLET BY MOUTH DAILY 90 tablet 1    hydroCHLOROthiazide (MICROZIDE) 12.5 MG capsule TAKE 1 CAPSULE BY MOUTH DAILY 90 capsule 1    metoprolol succinate (TOPROL XL) 100 MG extended release tablet Take 0.5 tablets by mouth in the morning and at bedtime      vitamin D (CHOLECALCIFEROL) 25 MCG (1000 UT) TABS tablet Take 1,000 Units by mouth daily      amLODIPine (NORVASC) 5 MG tablet Take 1 tablet by mouth daily 90 tablet 1    apixaban (ELIQUIS) 5 MG TABS tablet Take 1 tablet by mouth 2 times daily 180 tablet 3    ACETAMINOPHEN EXTRA STRENGTH 500 MG tablet TAKE 2 TABLETS BY MOUTH EVERY 6 HOURS FOR PAIN      ONETOUCH ULTRA strip       lidocaine (LIDODERM) 5 % Place 1 patch onto the skin daily 12 hours on, 12 hours off.  30 patch 3    gabapentin (NEURONTIN) 300 MG capsule TAKE 1 CAPSULE BY MOUTH TWICE DAILY      Blood Pressure KIT 1 kit by Does not apply route daily 1 kit 0    Alcohol Swabs (B-D SINGLE USE SWABS REGULAR) PADS USE 3 - 4 TIMES DAILY AS DIRECTED      insulin lispro, 1 Unit Dial, 100 UNIT/ML SOPN Inject 6 Units into the skin 3 times daily Plus  2 units for each 50 above 150 Blood sugar level result      B-D UF III MINI PEN NEEDLES 31G X 5 MM MISC 1 each by Does not apply route 4 times daily      tamsulosin (FLOMAX) 0.4 MG capsule Take 0.4 mg by mouth nightly      insulin glargine (LANTUS) 100 UNIT/ML injection vial Inject 50 Units into the skin nightly       HYDROcodone-acetaminophen (NORCO) 5-325 MG per tablet Take 1 tablet by mouth every 8 hours as needed for Pain for up to 3 days. Intended supply: 3 days. Take lowest dose possible to manage pain 9 tablet 0     No current facility-administered medications for this visit. Allergies:    Motrin [ibuprofen]       Review of Systems:    Denies any chest pain, headache, dyspnea, recent weight loss, fevers, chills or night sweats. Physical Examination:    BP (!) 143/72   Pulse 53   Temp 97.8 °F (36.6 °C)   Resp 24   Ht 6' 2\" (1.88 m)   Wt 265 lb (120.2 kg)   SpO2 98%   BMI 34.02 kg/m²      On focused neurological examination, he  is awake alert oriented and rationally conversant. Speech is clear and fluent. Pupils are equal and reactive to light bilaterally, extraocular movements are intact, visual fields are full to confrontation. His  face is symmetric and grossly intact to fine touch. Uvula and tongue are both midline. Shoulder shrug is symmetric and strong. Cervical spine is well aligned and nontender to direct palpation. He  can forward flex, extend , laterally rotate and laterally extend without limitation or pain. Motor examination reveals preserved power in the upper and lower extremities at 5 out of 5 throughout. Reflexes are symmetric and brisk. Plantar responses are downgoing. There is no clonus. Patient is intact to fine touch in all dermatomes throughout. Straight leg raise is negative. Palpation of the spine reveals no kyphotic or scoliotic gross deformities. He  can forward flex to well below the knee. There is no pain to deep percussion nor palpation. ASSESSMENT:    I personally reviewed Hugo Banks's radiographic images, particularly MRI of the lumbar spine dated 16 June 2022 which demonstrates mild listhesis at L4 over 5 and with significant spinal stenosis.       1. Bilateral low back pain with sciatica, sciatica laterality unspecified, unspecified chronicity  -     XR LUMBAR SPINE FLEXION AND EXTENSION ONLY; Future  -     Thierno Guaman MD, Pain Medicine, [facility], [address]:    I showed the patient and his family present with him today the images and explained the findings. Certainly a trial of injections and physical therapy can be tried at this juncture. He may ultimately need surgery as he has pretty significant spinal stenosis. At the request of the patient I provided a referral for pain management to see for trial but would also like to obtain flexion-extension x-rays to ensure that his listhesis is not mobile. He will return to clinic with the above results in hand or should he develop any new symptoms prior to this point be happy to see him and evaluate him again earlier. Thank you so much for allowing us to participate in the care of this patient. No follow-ups on file. Electronically signed by Dmitriy Lance MD on 6/28/2022 at 3:10 PM       NOTE: This report was transcribed using voice recognition software.  Every effort was made to ensure accuracy; however, inadvertent computerized transcription errors may be present

## 2022-06-26 ENCOUNTER — APPOINTMENT (OUTPATIENT)
Dept: GENERAL RADIOLOGY | Age: 72
End: 2022-06-26
Payer: MEDICAID

## 2022-06-26 ENCOUNTER — HOSPITAL ENCOUNTER (EMERGENCY)
Age: 72
Discharge: HOME | End: 2022-06-26
Attending: EMERGENCY MEDICINE
Payer: MEDICAID

## 2022-06-26 VITALS
OXYGEN SATURATION: 98 % | BODY MASS INDEX: 34.02 KG/M2 | RESPIRATION RATE: 16 BRPM | SYSTOLIC BLOOD PRESSURE: 170 MMHG | TEMPERATURE: 98 F | HEART RATE: 60 BPM | WEIGHT: 265 LBS | DIASTOLIC BLOOD PRESSURE: 84 MMHG

## 2022-06-26 DIAGNOSIS — K02.9 PAIN DUE TO DENTAL CARIES: Primary | ICD-10-CM

## 2022-06-26 DIAGNOSIS — R13.10 DYSPHAGIA, UNSPECIFIED TYPE: ICD-10-CM

## 2022-06-26 LAB
ANION GAP SERPL CALCULATED.3IONS-SCNC: 9 MMOL/L (ref 7–16)
BASOPHILS ABSOLUTE: 0.02 E9/L (ref 0–0.2)
BASOPHILS RELATIVE PERCENT: 0.3 % (ref 0–2)
BUN BLDV-MCNC: 19 MG/DL (ref 6–23)
CALCIUM SERPL-MCNC: 8.9 MG/DL (ref 8.6–10.2)
CHLORIDE BLD-SCNC: 102 MMOL/L (ref 98–107)
CO2: 21 MMOL/L (ref 22–29)
CREAT SERPL-MCNC: 1.5 MG/DL (ref 0.7–1.2)
EOSINOPHILS ABSOLUTE: 0.01 E9/L (ref 0.05–0.5)
EOSINOPHILS RELATIVE PERCENT: 0.1 % (ref 0–6)
GFR AFRICAN AMERICAN: 56
GFR NON-AFRICAN AMERICAN: 56 ML/MIN/1.73
GLUCOSE BLD-MCNC: 103 MG/DL (ref 74–99)
HCT VFR BLD CALC: 39.2 % (ref 37–54)
HEMOGLOBIN: 12.9 G/DL (ref 12.5–16.5)
IMMATURE GRANULOCYTES #: 0.02 E9/L
IMMATURE GRANULOCYTES %: 0.3 % (ref 0–5)
LYMPHOCYTES ABSOLUTE: 1.45 E9/L (ref 1.5–4)
LYMPHOCYTES RELATIVE PERCENT: 21.1 % (ref 20–42)
MAGNESIUM: 2 MG/DL (ref 1.6–2.6)
MCH RBC QN AUTO: 27.7 PG (ref 26–35)
MCHC RBC AUTO-ENTMCNC: 32.9 % (ref 32–34.5)
MCV RBC AUTO: 84.3 FL (ref 80–99.9)
MONOCYTES ABSOLUTE: 0.78 E9/L (ref 0.1–0.95)
MONOCYTES RELATIVE PERCENT: 11.3 % (ref 2–12)
NEUTROPHILS ABSOLUTE: 4.6 E9/L (ref 1.8–7.3)
NEUTROPHILS RELATIVE PERCENT: 66.9 % (ref 43–80)
PDW BLD-RTO: 14.5 FL (ref 11.5–15)
PLATELET # BLD: 227 E9/L (ref 130–450)
PMV BLD AUTO: 10.7 FL (ref 7–12)
POTASSIUM REFLEX MAGNESIUM: 3.4 MMOL/L (ref 3.5–5)
RBC # BLD: 4.65 E12/L (ref 3.8–5.8)
SODIUM BLD-SCNC: 132 MMOL/L (ref 132–146)
WBC # BLD: 6.9 E9/L (ref 4.5–11.5)

## 2022-06-26 PROCEDURE — 85025 COMPLETE CBC W/AUTO DIFF WBC: CPT

## 2022-06-26 PROCEDURE — 99284 EMERGENCY DEPT VISIT MOD MDM: CPT

## 2022-06-26 PROCEDURE — 83735 ASSAY OF MAGNESIUM: CPT

## 2022-06-26 PROCEDURE — 6360000002 HC RX W HCPCS: Performed by: STUDENT IN AN ORGANIZED HEALTH CARE EDUCATION/TRAINING PROGRAM

## 2022-06-26 PROCEDURE — 80048 BASIC METABOLIC PNL TOTAL CA: CPT

## 2022-06-26 PROCEDURE — 71045 X-RAY EXAM CHEST 1 VIEW: CPT

## 2022-06-26 PROCEDURE — 96372 THER/PROPH/DIAG INJ SC/IM: CPT

## 2022-06-26 PROCEDURE — 6370000000 HC RX 637 (ALT 250 FOR IP): Performed by: STUDENT IN AN ORGANIZED HEALTH CARE EDUCATION/TRAINING PROGRAM

## 2022-06-26 RX ORDER — HYDROCODONE BITARTRATE AND ACETAMINOPHEN 5; 325 MG/1; MG/1
1 TABLET ORAL ONCE
Status: COMPLETED | OUTPATIENT
Start: 2022-06-26 | End: 2022-06-26

## 2022-06-26 RX ORDER — PROMETHAZINE HYDROCHLORIDE 25 MG/ML
12.5 INJECTION, SOLUTION INTRAMUSCULAR; INTRAVENOUS ONCE
Status: COMPLETED | OUTPATIENT
Start: 2022-06-26 | End: 2022-06-26

## 2022-06-26 RX ORDER — HYDROCODONE BITARTRATE AND ACETAMINOPHEN 5; 325 MG/1; MG/1
1 TABLET ORAL EVERY 8 HOURS PRN
Qty: 9 TABLET | Refills: 0 | Status: SHIPPED | OUTPATIENT
Start: 2022-06-26 | End: 2022-06-29

## 2022-06-26 RX ADMIN — HYDROCODONE BITARTRATE AND ACETAMINOPHEN 1 TABLET: 5; 325 TABLET ORAL at 17:26

## 2022-06-26 RX ADMIN — PROMETHAZINE HYDROCHLORIDE 12.5 MG: 25 INJECTION INTRAMUSCULAR; INTRAVENOUS at 16:44

## 2022-06-26 ASSESSMENT — PAIN DESCRIPTION - ORIENTATION: ORIENTATION: UPPER

## 2022-06-26 ASSESSMENT — PAIN - FUNCTIONAL ASSESSMENT: PAIN_FUNCTIONAL_ASSESSMENT: 0-10

## 2022-06-26 ASSESSMENT — ENCOUNTER SYMPTOMS
EYE REDNESS: 0
ABDOMINAL PAIN: 0
WHEEZING: 0
NAUSEA: 0
SINUS PRESSURE: 0
SORE THROAT: 0
COUGH: 0
DIARRHEA: 0
SHORTNESS OF BREATH: 0
EYE PAIN: 0
TROUBLE SWALLOWING: 1
BACK PAIN: 0
EYE DISCHARGE: 0
VOMITING: 0
FACIAL SWELLING: 0

## 2022-06-26 ASSESSMENT — PAIN DESCRIPTION - DESCRIPTORS: DESCRIPTORS: ACHING

## 2022-06-26 ASSESSMENT — PAIN SCALES - GENERAL: PAINLEVEL_OUTOF10: 10

## 2022-06-26 ASSESSMENT — PAIN DESCRIPTION - PAIN TYPE: TYPE: ACUTE PAIN

## 2022-06-26 ASSESSMENT — PAIN DESCRIPTION - LOCATION
LOCATION: FACE;TEETH
LOCATION: TEETH

## 2022-06-26 ASSESSMENT — PAIN DESCRIPTION - FREQUENCY: FREQUENCY: CONTINUOUS

## 2022-06-26 NOTE — ED NOTES
JACEY camargo with carbonated beverage, the 1st drink pt immediately began to belch and acted like he was going to spit it back out, after a couple seconds he was able to swallow. With every drink he had constant belching.       Jen Lopez RN  06/26/22 0427

## 2022-06-26 NOTE — CONSULTS
GENERAL SURGERY  CONSULT NOTE  6/26/2022    Physician Consulted: Dr. Xander Sheehan  Reason for Consult: Dysphagia  Referring Physician: Dr. Marisela FELDER  Rina Moreno is a 71 y.o. male with PMH CKD, cervical fusion (2000) c/b persistent dysphagia who presented to the ED with complaints of dental pain and continued dysphagia. Patient is scheduled for dental extractions on the 29th. Patient also states his dysphagia has been worsening ever since he got \"dilated\" in September with Dr. Jason Cutler. He did have EGD with some biopsies but no dilation as there was no stricture noted. He has had barium swallows and esophagrams with no motility or structural disorders noted. Patient currently tolerating liquids but states it is worse with food. Eventually able to pass and denies any regurgitation.        Past Medical History:   Diagnosis Date    Benign hypertensive kidney disease with chronic kidney disease 10/6/2021    Cervical vertebra fusion 01/01/2000    Chronic kidney disease (CKD), stage III (moderate) ([address] 11/25/2013    Depression     Foot injury     History of gastric ulcer     Hypertension     Stroke [facility])     patient had a stroke in the late 90's    Type 2 diabetes mellitus with diabetic polyneuropathy, with long-term current use of insulin ([facility]        Past Surgical History:   Procedure Laterality Date    COLONOSCOPY      COLONOSCOPY N/A 9/13/2021    COLONOSCOPY POLYPECTOMY SNARE/COLD BIOPSY performed by Tre Aparicio MD at [address] [place] [address]  01/23/2010 2019    [address]      UPPER GASTROINTESTINAL ENDOSCOPY      UPPER GASTROINTESTINAL ENDOSCOPY N/A 9/13/2021    EGD BIOPSY performed by Tre Aparicio MD at [facility] N/A 9/13/2021    EGD POLYP SNARE [place] [facility] [facility] [place]       Medications Prior to Admission:    Prior to Admission medications    Medication Sig Start Date End Date Taking?  Authorizing Provider   buPROPion [facility] SR) 100 MG extended release tablet Take 100 mg by mouth daily    Historical Provider, MD   hydrALAZINE (APRESOLINE) 25 MG tablet  5/23/22   Historical Provider, MD   lisinopril (PRINIVIL;ZESTRIL) 40 MG tablet TAKE 1 TABLET BY MOUTH DAILY 5/23/22   Keith Ricci MD   pantoprazole (PROTONIX) 40 MG tablet TAKE 1 TABLET BY MOUTH DAILY 5/23/22   Keith Ricci MD   aspirin (ASPIRIN LOW DOSE) 81 MG EC tablet TAKE 1 TABLET BY MOUTH DAILY 5/20/22   Keith Ricci MD   hydroCHLOROthiazide (MICROZIDE) 12.5 MG capsule TAKE 1 CAPSULE BY MOUTH DAILY 5/6/22   Keith Ricci MD   metoprolol succinate (TOPROL XL) 100 MG extended release tablet Take 0.5 tablets by mouth in the morning and at bedtime 4/19/22   Historical Provider, MD   vitamin D (CHOLECALCIFEROL) 25 MCG (1000 UT) TABS tablet Take 1,000 Units by mouth daily    Historical Provider, MD   amLODIPine (NORVASC) 5 MG tablet Take 1 tablet by mouth daily 4/22/22   Keith Ricci MD   apixaban (ELIQUIS) 5 MG TABS tablet Take 1 tablet by mouth 2 times daily 4/19/22   Ni Valencia MD   ACETAMINOPHEN EXTRA STRENGTH 500 MG tablet TAKE 2 TABLETS BY MOUTH EVERY 6 HOURS FOR PAIN 11/5/21   Historical Provider, MD   ONETOUCH ULTRA strip  10/19/21   Historical Provider, MD   lidocaine (LIDODERM) 5 % Place 1 patch onto the skin daily 12 hours on, 12 hours off. 11/23/21   Yasmeen Skinner MD   gabapentin (NEURONTIN) 300 MG capsule TAKE 1 CAPSULE BY MOUTH TWICE DAILY 9/9/21   Historical Provider, MD   Blood Pressure KIT 1 kit by Does not apply route daily 8/24/21   Keith Ricci MD   Alcohol Swabs (B-D SINGLE USE SWABS REGULAR) PADS USE 3 - 4 TIMES DAILY AS DIRECTED 7/31/21   Historical Provider, MD   insulin lispro, 1 Unit Dial, 100 UNIT/ML SOPN Inject 6 Units into the skin 3 times daily Plus  2 units for each 50 above 150 Blood sugar level result    Historical Provider, MD [place] UF III MINI PEN NEEDLES 31G X 5 MM MISC 1 each by Does not apply route 4 times daily 3/15/21   Historical Provider, MD   tamsulosin (FLOMAX) 0.4 MG capsule Take 0.4 mg by mouth nightly    Historical Provider, MD   insulin glargine (LANTUS) 100 UNIT/ML injection vial Inject 50 Units into the skin nightly     Historical Provider, MD       Allergies   Allergen Reactions    Motrin [Ibuprofen]        Family History   Problem Relation Age of Onset    Heart Disease Mother     Diabetes Sister        Social History     Tobacco Use    Smoking status: Never Smoker    Smokeless tobacco: Never Used   Vaping Use    Vaping Use: Never used   Substance Use Topics    Alcohol use: No    Drug use: No     Review of Systems   General ROS: negative  Hematological and Lymphatic ROS: negative  Respiratory ROS: negative  Cardiovascular ROS: negative  Gastrointestinal ROS: positive for - swallowing difficulty/pain  Genito-Urinary ROS: negative  Musculoskeletal ROS: positive for - pain in generalized jaw      PHYSICAL EXAM:    Vitals:    06/26/22 1545   BP: (!) 180/85   Pulse: 66   Resp: 18   Temp: 98 °F (36.7 °C)   SpO2: 98%       General Appearance:  awake, alert, oriented, in no acute distress  Skin:  Skin color, texture, turgor normal. No rashes or lesions. Head/face:  NCAT, poor dentition, swollen gums  Eyes:  No gross abnormalities. Lungs:  Normal respiratory effort on room air  Heart:  Heart regular rate and rhythm  Abdomen:  Soft, non-tender, non-distended, no rebound or guarding  Extremities: Extremities warm to touch, pink, with no edema. Male Rectal:  Rectal exam deferred. LABS:  CBC  Recent Labs     06/26/22  1640   WBC 6.9   HGB 12.9   HCT 39.2        BMP  Recent Labs     06/26/22  1640      K 3.4*      CO2 21*   BUN 19   CREATININE 1.5*   CALCIUM 8.9     Liver Function  No results for input(s):  AMYLASE, LIPASE, BILITOT, BILIDIR, AST, ALT, ALKPHOS, PROT, LABALBU in the last 72 hours. No results for input(s): LACTATE in the last 72 hours. No results for input(s): INR, PTT in the last 72 hours. Invalid input(s): PT    RADIOLOGY  XR CHEST PORTABLE    Result Date: 6/26/2022  EXAMINATION: ONE XRAY VIEW OF THE CHEST 6/26/2022 4:26 pm COMPARISON: 12/06/2021 HISTORY: ORDERING SYSTEM PROVIDED HISTORY: Dysphagia, States food is stuck TECHNOLOGIST PROVIDED HISTORY: Reason for exam:->Dysphagia, States food is stuck What reading provider will be dictating this exam?->CRC FINDINGS: Heart is prominent. There is a diminished inspiratory effort with linear subsegmental basilar atelectasis. There are findings of previous cervical fusion. No pleural fluid or pneumothorax. No focal pneumonia. Mildly diminished inspiratory effort. No acute process identified in the chest.     ASSESSMENT:  71 y.o. male with persistent dysphagia, likely related to cervical fusion. Patient currently tolerating secretions and liquids. Can follow up as outpatient for further workup. PLAN:  - f/u as outpatient to discuss further EGD/testing  - f/u with dentist for dental extractions  - prn pain control    Discussed with Dr. Medina Officer.      Electronically signed by Mariano Pepper MD on 6/26/22 at 6:35 PM EDT

## 2022-07-13 ENCOUNTER — HOSPITAL ENCOUNTER (OUTPATIENT)
Age: 72
Discharge: HOME | End: 2022-07-13
Payer: MEDICAID

## 2022-07-13 LAB
ALBUMIN SERPL-MCNC: 3.9 G/DL (ref 3.5–5.2)
ALP BLD-CCNC: 96 U/L (ref 40–129)
ALT SERPL-CCNC: 14 U/L (ref 0–40)
ANION GAP SERPL CALCULATED.3IONS-SCNC: 10 MMOL/L (ref 7–16)
AST SERPL-CCNC: 20 U/L (ref 0–39)
BILIRUB SERPL-MCNC: 0.3 MG/DL (ref 0–1.2)
BUN BLDV-MCNC: 17 MG/DL (ref 6–23)
CALCIUM SERPL-MCNC: 8.8 MG/DL (ref 8.6–10.2)
CHLORIDE BLD-SCNC: 105 MMOL/L (ref 98–107)
CO2: 24 MMOL/L (ref 22–29)
CREAT SERPL-MCNC: 2 MG/DL (ref 0.7–1.2)
CREATININE URINE: 315 MG/DL (ref 40–278)
GFR AFRICAN AMERICAN: 40
GFR NON-AFRICAN AMERICAN: 40 ML/MIN/1.73
GLUCOSE BLD-MCNC: 75 MG/DL (ref 74–99)
HBA1C MFR BLD: 5.7 % (ref 4–5.6)
MICROALBUMIN UR-MCNC: 43.4 MG/L
MICROALBUMIN/CREAT UR-RTO: 13.8 (ref 0–30)
POTASSIUM SERPL-SCNC: 3.7 MMOL/L (ref 3.5–5)
SODIUM BLD-SCNC: 139 MMOL/L (ref 132–146)
TOTAL PROTEIN: 7.7 G/DL (ref 6.4–8.3)

## 2022-07-13 PROCEDURE — 36415 COLL VENOUS BLD VENIPUNCTURE: CPT

## 2022-07-13 PROCEDURE — 80053 COMPREHEN METABOLIC PANEL: CPT

## 2022-07-13 PROCEDURE — 82044 UR ALBUMIN SEMIQUANTITATIVE: CPT

## 2022-07-13 PROCEDURE — 83036 HEMOGLOBIN GLYCOSYLATED A1C: CPT

## 2022-07-13 PROCEDURE — 82570 ASSAY OF URINE CREATININE: CPT

## 2022-07-18 ENCOUNTER — OFFICE VISIT (OUTPATIENT)
Dept: SURGERY | Age: 72
End: 2022-07-18
Payer: MEDICAID

## 2022-07-18 VITALS
SYSTOLIC BLOOD PRESSURE: 124 MMHG | TEMPERATURE: 96.7 F | WEIGHT: 260 LBS | HEART RATE: 70 BPM | BODY MASS INDEX: 33.38 KG/M2 | DIASTOLIC BLOOD PRESSURE: 63 MMHG

## 2022-07-18 DIAGNOSIS — R13.10 DYSPHAGIA, UNSPECIFIED TYPE: Primary | ICD-10-CM

## 2022-07-18 PROCEDURE — 1036F TOBACCO NON-USER: CPT | Performed by: SURGERY

## 2022-07-18 PROCEDURE — 99214 OFFICE O/P EST MOD 30 MIN: CPT | Performed by: SURGERY

## 2022-07-18 PROCEDURE — 99212 OFFICE O/P EST SF 10 MIN: CPT | Performed by: SURGERY

## 2022-07-18 PROCEDURE — 3017F COLORECTAL CA SCREEN DOC REV: CPT | Performed by: SURGERY

## 2022-07-18 PROCEDURE — 1123F ACP DISCUSS/DSCN MKR DOCD: CPT | Performed by: SURGERY

## 2022-07-18 PROCEDURE — G8427 DOCREV CUR MEDS BY ELIG CLIN: HCPCS | Performed by: SURGERY

## 2022-07-18 PROCEDURE — G8417 CALC BMI ABV UP PARAM F/U: HCPCS | Performed by: SURGERY

## 2022-07-18 NOTE — PATIENT INSTRUCTIONS
Patient Information and Instructions for  Upper GI Endoscopy or Esophagogastroduodenoscopy [EGD])         Definition Upper GI Endoscopy or Esophagogastroduodenoscopy [EGD])  This is a test that uses a fiberoptic scope to examine the esophagus (throat), stomach, and upper part of the small intestines. Upper GI endoscopy may be recommended if you have:   Abdominal pain   Severe heartburn   Persistent nausea and vomiting   Difficulty swallowing   Blood in stool or vomit   Abnormal x-ray or other examinations of the gastrointestinal tract     Conditions that can be diagnosed with upper GI endoscopy include:   Ulcers   Tumors   Polyps   Abnormal narrowing   Inflammation     Possible Complications   Complications are rare, but no procedure is completely free of risk. If you are planning to have upper GI endoscopy, your doctor will review a list of possible complications, which may include:   Bleeding   Damage to the esophagus, stomach, or intestine   Infection   Respiratory depression (reduced breathing rate and/or depth)   Reaction to sedatives or anesthesia causing your blood pressure to drop    Some factors that may increase the risk of complications include:   Age: 61 or older   Pregnancy   Obesity   Smoking , alcoholism , or drug use   Malnutrition   Recent illness   Diabetes   Heart or lung problems   Bleeding disorders   Use of certain medicines     Be sure to discuss these risks with your doctor before the test.     What to Expect   Prior to test   Leading up to the test:   Arrange for a ride home after the test. Also, arrange for help at home. The night before, eat a light meal.  Do not eat or drink anything after midnight the night before the test.   Talk to your doctor about your medicines.  You may be asked to stop taking some medicines up to one week before the procedure, like:   Anti-inflammatory drugs (e.g., aspirin )   Blood thinners, like clopidogrel (Plavix) or warfarin (Coumadin)     Description of the Test   You will be asked to lie on your left side. You will have monitors tracking your breathing, heart rate, and blood oxygen levels. You will be given supplemental oxygen to breathe through your nose. A mouthpiece will be positioned to help keep your mouth open. Your throat may be sprayed with a numbing medicine. You will be given a sedative through an IV to help you relax during the test.  During the test, a small suction tube will be used to clear saliva and fluids from your mouth. The endoscope will be lubricated and placed in your mouth. You will be asked to try to swallow it. Then, it will be carefully and slowly advanced down your throat. It will be passed through your esophagus and into your stomach and intestine. While the endoscope is being advanced, your doctor will view the images on the screen. Air will be passed through the endoscope into your digestive tract. This will be done to smooth the normal folds in the tissues, allowing your doctor to view the tissue more easily. Tiny tools may be passed through the endoscope in order to take biopsies or do other tests. After Test   After the test, you will be observed for an hour. Then, you will be allowed to go home. When you return home after the test, do the following to help ensure a smooth recovery:   Rest when you get home. Ask your doctor if you can resume your normal diet. In most cases, you will be able to. Sedatives can slow your reaction time. Do not drive or use machinery for the rest of the day. Avoid alcohol for the rest of the day. Be sure to follow your doctor's instructions . How Long Will It Take? Usually about 10-15 minutes     Will It Hurt? Most people do not feel anything during the test and will not remember the test.  After the test, your throat may be sore and you may feel bloated. Results   This test gives your doctor information about the health of your digestive system.  The results can help to explain your symptoms. You and your doctor will talk about the results and your treatment plan. Call Your Doctor   After the test, call your doctor if any of the following occurs:   Signs of infection, including fever and chills   Severe abdominal pain   Hard, swollen abdomen   Difficulty swallowing or breathing   Any change or increase in your original symptoms   Bloody or black tarry colored stools   Nausea and/or vomiting   Cough, shortness of breath, or chest pain   Bleeding     In case of emergency, call 911. Please contact Clemente Story MA at [phone] with any questions or concerns.

## 2022-07-19 ENCOUNTER — HOSPITAL ENCOUNTER (OUTPATIENT)
Age: 72
Discharge: HOME | End: 2022-07-21
Payer: MEDICAID

## 2022-07-19 ENCOUNTER — HOSPITAL ENCOUNTER (OUTPATIENT)
Dept: GENERAL RADIOLOGY | Age: 72
Discharge: HOME | End: 2022-07-21
Payer: MEDICAID

## 2022-07-19 DIAGNOSIS — M54.40 BILATERAL LOW BACK PAIN WITH SCIATICA, SCIATICA LATERALITY UNSPECIFIED, UNSPECIFIED CHRONICITY: ICD-10-CM

## 2022-07-19 PROCEDURE — 72120 X-RAY BEND ONLY L-S SPINE: CPT

## 2022-07-21 ENCOUNTER — OFFICE VISIT (OUTPATIENT)
Dept: NEUROSURGERY | Age: 72
End: 2022-07-21
Payer: MEDICAID

## 2022-07-21 ENCOUNTER — TELEPHONE (OUTPATIENT)
Dept: SURGERY | Age: 72
End: 2022-07-21

## 2022-07-21 VITALS
TEMPERATURE: 98.1 F | HEART RATE: 69 BPM | DIASTOLIC BLOOD PRESSURE: 68 MMHG | OXYGEN SATURATION: 95 % | HEIGHT: 74 IN | RESPIRATION RATE: 20 BRPM | BODY MASS INDEX: 33.37 KG/M2 | SYSTOLIC BLOOD PRESSURE: 128 MMHG | WEIGHT: 260 LBS

## 2022-07-21 DIAGNOSIS — M54.40 BILATERAL LOW BACK PAIN WITH SCIATICA, SCIATICA LATERALITY UNSPECIFIED, UNSPECIFIED CHRONICITY: Primary | ICD-10-CM

## 2022-07-21 PROCEDURE — 1123F ACP DISCUSS/DSCN MKR DOCD: CPT | Performed by: NEUROLOGICAL SURGERY

## 2022-07-21 PROCEDURE — 1036F TOBACCO NON-USER: CPT | Performed by: NEUROLOGICAL SURGERY

## 2022-07-21 PROCEDURE — 99212 OFFICE O/P EST SF 10 MIN: CPT

## 2022-07-21 PROCEDURE — G8427 DOCREV CUR MEDS BY ELIG CLIN: HCPCS | Performed by: NEUROLOGICAL SURGERY

## 2022-07-21 PROCEDURE — 99214 OFFICE O/P EST MOD 30 MIN: CPT | Performed by: NEUROLOGICAL SURGERY

## 2022-07-21 PROCEDURE — G8417 CALC BMI ABV UP PARAM F/U: HCPCS | Performed by: NEUROLOGICAL SURGERY

## 2022-07-21 PROCEDURE — 3017F COLORECTAL CA SCREEN DOC REV: CPT | Performed by: NEUROLOGICAL SURGERY

## 2022-07-21 NOTE — PROGRESS NOTES
40 [facility] NEUROSURGERY  65 [facility]  [address]  [phone]       Chief Complaint:   Chief Complaint   Patient presents with    Follow-up     4 week follow up with xrays done this week, back pain comes and goes       HPI:     I had the pleasure of seeing Gomez Retana today in neurosurgical clinic. Since we last saw him there have been no interval changes. He is scheduled to meet with pain management on 4 August.  He has no new complaints today. He does endorse that he is scheduled for an EGD with dilation of his esophagus sometime in August as well.     Past Medical History:   Diagnosis Date    Benign hypertensive kidney disease with chronic kidney disease 10/6/2021    Cervical vertebra fusion 01/01/2000    Chronic kidney disease (CKD), stage III (moderate) ([address] 11/25/2013    Depression     Foot injury     History of gastric ulcer     Hypertension     Stroke [facility])     patient had a stroke in the late 90's    Type 2 diabetes mellitus with diabetic polyneuropathy, with long-term current use of insulin ([address]      Past Surgical History:   Procedure Laterality Date    COLONOSCOPY      COLONOSCOPY N/A 9/13/2021    COLONOSCOPY POLYPECTOMY SNARE/COLD BIOPSY performed by Emily Perry MD at [facility]  01/23/2010    2019    [address] [facility]    SPINE SURGERY      UPPER GASTROINTESTINAL ENDOSCOPY      UPPER GASTROINTESTINAL ENDOSCOPY N/A 9/13/2021    EGD BIOPSY performed by Emily Perry MD at [address] [place] [address] ENDOSCOPY N/A 9/13/2021    EGD POLYP SNARE performed by Emily Perry MD at [facility]COPY      Family History   Problem Relation Age of Onset    Heart Disease Mother     Diabetes Sister       Social History     Socioeconomic History    Marital status: Single     Spouse name: Not on file    Number of children: Not on file    Years of education: Not on file    Highest education level: Not on file   Occupational History    Not on file   Tobacco Use    Smoking status: Never    Smokeless tobacco: Never   Vaping Use    Vaping Use: Never used   Substance and Sexual Activity    Alcohol use: No    Drug use: No    Sexual activity: Not on file   Other Topics Concern    Not on file   Social History Narrative    Not on file     Social Determinants of Health     Financial Resource Strain: Not on file   Food Insecurity: Not on file   Transportation Needs: Not on file   Physical Activity: Not on file   Stress: Not on file   Social Connections: Not on file   Intimate Partner Violence: Not on file   Housing Stability: Not on file       Medications:   Current Outpatient Medications   Medication Sig Dispense Refill    buPROPion (WELLBUTRIN SR) 100 MG extended release tablet Take 100 mg by mouth daily      hydrALAZINE (APRESOLINE) 25 MG tablet       lisinopril (PRINIVIL;ZESTRIL) 40 MG tablet TAKE 1 TABLET BY MOUTH DAILY 90 tablet 1    pantoprazole (PROTONIX) 40 MG tablet TAKE 1 TABLET BY MOUTH DAILY 90 tablet 1    aspirin (ASPIRIN LOW DOSE) 81 MG EC tablet TAKE 1 TABLET BY MOUTH DAILY 90 tablet 1    hydroCHLOROthiazide (MICROZIDE) 12.5 MG capsule TAKE 1 CAPSULE BY MOUTH DAILY 90 capsule 1    metoprolol succinate (TOPROL XL) 100 MG extended release tablet Take 0.5 tablets by mouth in the morning and at bedtime      vitamin D (CHOLECALCIFEROL) 25 MCG (1000 UT) TABS tablet Take 1,000 Units by mouth daily      amLODIPine (NORVASC) 5 MG tablet Take 1 tablet by mouth daily 90 tablet 1    apixaban (ELIQUIS) 5 MG TABS tablet Take 1 tablet by mouth 2 times daily 180 tablet 3    ACETAMINOPHEN EXTRA STRENGTH 500 MG tablet TAKE 2 TABLETS BY MOUTH EVERY 6 HOURS FOR PAIN      ONETOUCH ULTRA strip       lidocaine (LIDODERM) 5 % Place 1 patch onto the skin daily 12 hours on, 12 hours off.  (Patient not taking: Reported on 7/18/2022) 30 patch 3    gabapentin (NEURONTIN) 300 MG capsule TAKE 1 CAPSULE BY MOUTH TWICE DAILY      Blood Pressure KIT 1 kit by Does not apply route daily 1 kit 0    Alcohol Swabs (B-D SINGLE USE SWABS REGULAR) PADS USE 3 - 4 TIMES DAILY AS DIRECTED      insulin lispro, 1 Unit Dial, 100 UNIT/ML SOPN Inject 6 Units into the skin 3 times daily Plus  2 units for each 50 above 150 Blood sugar level result      B-D UF III MINI PEN NEEDLES 31G X 5 MM MISC 1 each by Does not apply route 4 times daily      tamsulosin (FLOMAX) 0.4 MG capsule Take 0.4 mg by mouth nightly      insulin glargine (LANTUS) 100 UNIT/ML injection vial Inject 50 Units into the skin nightly        No current facility-administered medications for this visit. Allergies:    Motrin [ibuprofen]       Review of Systems:    Denies any chest pain, shortness of breath, headache, dyspnea, recent weight loss, fevers, chills or night sweats. Physical Examination:    /68   Pulse 69   Temp 98.1 °F (36.7 °C)   Resp 20   Ht 6' 2\" (1.88 m)   Wt 260 lb (117.9 kg)   SpO2 95%   BMI 33.38 kg/m²      On focused neurological examination, he  is awake alert oriented and rationally conversant. Speech is clear and fluent. Pupils are equal and reactive to light bilaterally, extraocular movements are intact, visual fields are full to confrontation. His  face is symmetric and grossly intact to fine touch. Uvula and tongue are both midline. Shoulder shrug is symmetric and strong. Cervical spine is well aligned and nontender to direct palpation. He  can forward flex, extend , laterally rotate and laterally extend without limitation or pain. Motor examination reveals preserved power in the upper and lower extremities at 5 out of 5 throughout. Reflexes are symmetric and brisk. Plantar responses are downgoing. There is no clonus. Patient is intact to fine touch in all dermatomes throughout. Straight leg raise is negative.      Palpation of the spine reveals no kyphotic or scoliotic gross deformities. He  can forward flex to well below the knee. There is no pain to deep percussion nor palpation. ASSESSMENT:    I personally reviewed Juan Banks's radiographic images, particularly flexion-extension lumbar films which failed to demonstrate a mobile listhesis contributing to this gentlemen's complaints. 1. Bilateral low back pain with sciatica, sciatica laterality unspecified, unspecified chronicity  -     [facility] Physical Therapy, [facility] MAKING & PLAN:    I explained to Mr. Isaac Solorio that certainly a trial of pain management is indicated at this time especially since this has worked for him in the past.  Should he fail to achieve benefit with PT and pain management be happy to see him again in clinic. Thank you so much for allowing us to participate in the care of this patient. No follow-ups on file. Electronically signed by Jayden Ruiz MD on 7/21/2022 at 5:06 PM       NOTE: This report was transcribed using voice recognition software.  Every effort was made to ensure accuracy; however, inadvertent computerized transcription errors may be present DISPLAY PLAN FREE TEXT DISPLAY PLAN FREE TEXT

## 2022-07-21 NOTE — TELEPHONE ENCOUNTER
Prior Authorization Form:      DEMOGRAPHICS:                     Patient Name:  Cristel Medina  Patient :  1950            Insurance:  Payor: [employer] [facility] PL / Plan: Jorge Pan / Product Type: *No Product type* /   Insurance ID Number:    Payer/Plan Subscr  Sex Relation Sub.  Ins. ID Effective Group Num   1. [facility]* UBALDO YODER 1950 Male Self [id number]                                   [address]         DIAGNOSIS & PROCEDURE:                       Procedure/Operation: EGD w/ dilation           CPT Code: 15522    Diagnosis:  Dysphagia    ICD10 Code: R13.10    Location:  [facility]    Surgeon:  Dr Vincent Dietz INFORMATION:                          Date: 2022    Time: 1045              Anesthesia:  MAC/TIVA                                                       Status:  Outpatient        Special Comments:  NA       Electronically signed by Breann Snyder on 2022 at 3:05 PM

## 2022-08-18 ENCOUNTER — TELEPHONE (OUTPATIENT)
Dept: CARDIOLOGY CLINIC | Age: 72
End: 2022-08-18

## 2022-08-18 ENCOUNTER — TELEPHONE (OUTPATIENT)
Dept: SURGERY | Age: 72
End: 2022-08-18

## 2022-08-18 NOTE — TELEPHONE ENCOUNTER
Patient is scheduled for EGD with dilation on 8/23/22 by   Dr. Qasim Whitney. Recommended to hold Eliquis. Please advise.

## 2022-08-18 NOTE — TELEPHONE ENCOUNTER
MA contacted Dr Degroot [facility] w/ [facility] seeking advisement on patient's Eliquis prior to EGD w/ Dr Evelyn Mcclain on 8/23/22. MA spoke w/ Geneva Stearns whom stated she will send a message and return call as soon as possible.      Electronically signed by Amadeo Pierre on 8/18/22 at 2:49 PM EDT

## 2022-08-19 ENCOUNTER — TELEPHONE (OUTPATIENT)
Dept: SURGERY | Age: 72
End: 2022-08-19

## 2022-08-19 NOTE — PROGRESS NOTES
[address] PRE-ADMISSION TESTING   ENDOSCOPY/ COLONSCOPY INSTRUCTIONS  PAT- Phone Number: [phone]    ENDOSCOPY/ COLONSCOPY INSTRUCTIONS:     [] Bowel Prep instructions reviewed. [] Colonoscopy- The day prior: No solid foods. Clear liquids only. [x] Nothing by mouth after midnight. Including no gum, candy, mints, or water. [x] You may brush your teeth, gargle, but do NOT swallow water. [x] Do not wear lotions, powders, deodorant. [] Urine Pregnancy test will be preformed the day of surgery. A specimen sample may be brought from home. [x] Arrange transportation with a responsible adult  to and from the hospital. If you do not have a responsible adult  to transport you, you will need to make arrangements with a medical transportation company. Arrange for someone to be with you for the remainder of the day and for 24 hours after your procedure due to having had anesthesia when discharged  -Who will be your  for transportation? Katie-girlfriend  -Who will be staying with you for 24 hrs after your procedure? Katie-girlfriend    PARKING INSTRUCTIONS:     [x] ARRIVAL DATE & TIME:  8/23 at 9:45 am   [x] Enter into the [employer]. Two people may accompany you. Masks are required. [x] Parking Lot \"I\" is where you will park. It is located on the corner of [address] and [address]. The entrance is on [address]. Upon entering the parking lot, a voucher ticket will print    EDUCATION INSTRUCTIONS:    [x] Bring a complete list of your medications, please write the last time you took the medicine, give this list to the nurse in Pre-Op. [x] Take only the following medications the morning of surgery with 1-2 ounces of water:  Apresoline; Wellbutrin; Toprol XL; Norvasc; Protonix   [x] Stop all herbal supplements and vitamins 5 days before surgery. Stop NSAIDS 7 days before surgery.   [x] DO NOT take any diabetic medicine the morning of surgery. Follow instructions for insulin the day before surgery. Take 1/2 dose (20 units) of Lantus evening prior to procedure  [x] If you are diabetic and your blood sugar is low or you feel symptomatic, you may drink 1-2 ounces of apple juice or take a glucose tablet.            -The morning of your procedure, you may call the pre-op area if you have concerns about your blood sugar [phone]. [] Use your inhalers the morning of surgery. Bring your emergency inhaler with you day of surgery. [x] Follow physician instructions regarding any blood thinners you may be taking. WHAT TO EXPECT:    [x] The day of your procedure you will be greeted and checked in by the [employer].  In addition, you will be registered in the [place] by a Patient Access Representative. Please bring your photo ID and insurance card. A nurse will greet you in accordance to the time you are needed in the pre-op area to prepare you for surgery. Please do not be discouraged if you are not greeted in the order you arrive as there are many variables that are involved in patient preparation. Your patience is greatly appreciated as you wait for your nurse. Please bring in items such as: books, magazines, newspapers, electronics, or any other items  to occupy your time in the waiting area. [x]  Delays may occur. Staff will make a sincere effort to keep you informed of delays. If any delays occur with your procedure, we apologize ahead of time for your inconvenience as we recognize the value of your time.

## 2022-08-19 NOTE — TELEPHONE ENCOUNTER
Joanna Lee at Dr. Beronica Liu' office notified of Dr. Viky Gonzales risk assessment/recommendation for procedure.

## 2022-08-23 ENCOUNTER — HOSPITAL ENCOUNTER (OUTPATIENT)
Age: 72
Setting detail: OUTPATIENT SURGERY
Discharge: HOME | End: 2022-08-23
Attending: SURGERY | Admitting: SURGERY
Payer: MEDICAID

## 2022-08-23 ENCOUNTER — ANESTHESIA EVENT (OUTPATIENT)
Dept: ENDOSCOPY | Age: 72
End: 2022-08-23
Payer: MEDICAID

## 2022-08-23 ENCOUNTER — TELEPHONE (OUTPATIENT)
Dept: ENT CLINIC | Age: 72
End: 2022-08-23

## 2022-08-23 ENCOUNTER — ANESTHESIA (OUTPATIENT)
Dept: ENDOSCOPY | Age: 72
End: 2022-08-23
Payer: MEDICAID

## 2022-08-23 VITALS
HEIGHT: 74 IN | WEIGHT: 260 LBS | OXYGEN SATURATION: 99 % | RESPIRATION RATE: 18 BRPM | TEMPERATURE: 97 F | SYSTOLIC BLOOD PRESSURE: 141 MMHG | BODY MASS INDEX: 33.37 KG/M2 | HEART RATE: 62 BPM | DIASTOLIC BLOOD PRESSURE: 62 MMHG

## 2022-08-23 DIAGNOSIS — Z01.812 PRE-OPERATIVE LABORATORY EXAMINATION: Primary | ICD-10-CM

## 2022-08-23 DIAGNOSIS — R13.10 DYSPHAGIA, UNSPECIFIED TYPE: ICD-10-CM

## 2022-08-23 LAB — METER GLUCOSE: 108 MG/DL (ref 74–99)

## 2022-08-23 PROCEDURE — 88305 TISSUE EXAM BY PATHOLOGIST: CPT

## 2022-08-23 PROCEDURE — 3700000001 HC ADD 15 MINUTES (ANESTHESIA): Performed by: SURGERY

## 2022-08-23 PROCEDURE — 7100000010 HC PHASE II RECOVERY - FIRST 15 MIN: Performed by: SURGERY

## 2022-08-23 PROCEDURE — 7100000011 HC PHASE II RECOVERY - ADDTL 15 MIN: Performed by: SURGERY

## 2022-08-23 PROCEDURE — 2580000003 HC RX 258: Performed by: SURGERY

## 2022-08-23 PROCEDURE — 3609017700 HC EGD DILATION GASTRIC/DUODENAL STRICTURE: Performed by: SURGERY

## 2022-08-23 PROCEDURE — 2709999900 HC NON-CHARGEABLE SUPPLY: Performed by: SURGERY

## 2022-08-23 PROCEDURE — 2580000003 HC RX 258: Performed by: NURSE ANESTHETIST, CERTIFIED REGISTERED

## 2022-08-23 PROCEDURE — 2500000003 HC RX 250 WO HCPCS: Performed by: NURSE ANESTHETIST, CERTIFIED REGISTERED

## 2022-08-23 PROCEDURE — 82962 GLUCOSE BLOOD TEST: CPT

## 2022-08-23 PROCEDURE — 3700000000 HC ANESTHESIA ATTENDED CARE: Performed by: SURGERY

## 2022-08-23 PROCEDURE — 43239 EGD BIOPSY SINGLE/MULTIPLE: CPT | Performed by: SURGERY

## 2022-08-23 PROCEDURE — 88305 TISSUE EXAM BY PATHOLOGIST: CPT | Performed by: NURSE ANESTHETIST, CERTIFIED REGISTERED

## 2022-08-23 PROCEDURE — 6360000002 HC RX W HCPCS: Performed by: NURSE ANESTHETIST, CERTIFIED REGISTERED

## 2022-08-23 PROCEDURE — 3609012400 HC EGD TRANSORAL BIOPSY SINGLE/MULTIPLE: Performed by: SURGERY

## 2022-08-23 PROCEDURE — 88342 IMHCHEM/IMCYTCHM 1ST ANTB: CPT

## 2022-08-23 PROCEDURE — 88342 IMHCHEM/IMCYTCHM 1ST ANTB: CPT | Performed by: NURSE ANESTHETIST, CERTIFIED REGISTERED

## 2022-08-23 RX ORDER — LIDOCAINE HYDROCHLORIDE 20 MG/ML
INJECTION, SOLUTION INTRAVENOUS PRN
Status: DISCONTINUED | OUTPATIENT
Start: 2022-08-23 | End: 2022-08-23 | Stop reason: SDUPTHER

## 2022-08-23 RX ORDER — SODIUM CHLORIDE 0.9 % (FLUSH) 0.9 %
10 SYRINGE (ML) INJECTION PRN
Status: DISCONTINUED | OUTPATIENT
Start: 2022-08-23 | End: 2022-08-23 | Stop reason: HOSPADM

## 2022-08-23 RX ORDER — SODIUM CHLORIDE 0.9 % (FLUSH) 0.9 %
10 SYRINGE (ML) INJECTION EVERY 12 HOURS SCHEDULED
Status: DISCONTINUED | OUTPATIENT
Start: 2022-08-23 | End: 2022-08-23 | Stop reason: HOSPADM

## 2022-08-23 RX ORDER — GLYCOPYRROLATE 0.2 MG/ML
INJECTION INTRAMUSCULAR; INTRAVENOUS PRN
Status: DISCONTINUED | OUTPATIENT
Start: 2022-08-23 | End: 2022-08-23 | Stop reason: SDUPTHER

## 2022-08-23 RX ORDER — SODIUM CHLORIDE 9 MG/ML
25 INJECTION, SOLUTION INTRAVENOUS PRN
Status: DISCONTINUED | OUTPATIENT
Start: 2022-08-23 | End: 2022-08-23 | Stop reason: HOSPADM

## 2022-08-23 RX ORDER — PROPOFOL 10 MG/ML
INJECTION, EMULSION INTRAVENOUS PRN
Status: DISCONTINUED | OUTPATIENT
Start: 2022-08-23 | End: 2022-08-23 | Stop reason: SDUPTHER

## 2022-08-23 RX ORDER — SODIUM CHLORIDE 9 MG/ML
INJECTION, SOLUTION INTRAVENOUS CONTINUOUS PRN
Status: DISCONTINUED | OUTPATIENT
Start: 2022-08-23 | End: 2022-08-23 | Stop reason: SDUPTHER

## 2022-08-23 RX ORDER — SODIUM CHLORIDE 9 MG/ML
INJECTION, SOLUTION INTRAVENOUS CONTINUOUS
Status: DISCONTINUED | OUTPATIENT
Start: 2022-08-23 | End: 2022-08-23 | Stop reason: HOSPADM

## 2022-08-23 RX ADMIN — LIDOCAINE HYDROCHLORIDE 20 MG: 20 INJECTION, SOLUTION INTRAVENOUS at 10:54

## 2022-08-23 RX ADMIN — PROPOFOL 230 MG: 10 INJECTION, EMULSION INTRAVENOUS at 10:54

## 2022-08-23 RX ADMIN — SODIUM CHLORIDE: 9 INJECTION, SOLUTION INTRAVENOUS at 10:49

## 2022-08-23 RX ADMIN — SODIUM CHLORIDE: 9 INJECTION, SOLUTION INTRAVENOUS at 10:15

## 2022-08-23 RX ADMIN — GLYCOPYRROLATE 0.2 MG: 1 INJECTION INTRAMUSCULAR; INTRAVENOUS at 10:54

## 2022-08-23 ASSESSMENT — ENCOUNTER SYMPTOMS
GASTROINTESTINAL NEGATIVE: 1
RESPIRATORY NEGATIVE: 1
TROUBLE SWALLOWING: 1

## 2022-08-23 ASSESSMENT — PAIN - FUNCTIONAL ASSESSMENT: PAIN_FUNCTIONAL_ASSESSMENT: 0-10

## 2022-08-23 NOTE — OP NOTE
Operative Note      Patient: Mono Bryant  YOB: 1950  MRN: [id number]    Date of Procedure: 8/23/2022    Pre-Op Diagnosis: DYSPHAGIA, 2cm hiatal hernia ,  Gastritis, scattered small gastric polyps mid body  , Enlarged tonsils causing dysphagia? Post-Op Diagnosis: Same       Procedure(s):  EGD WITH POSSIBLE  DILATION BALLOON  EGD BIOPSY    Surgeon(s):  Modesta Bernabe MD    Assistant:   * No surgical staff found *    Anesthesia: Monitor Anesthesia Care    Estimated Blood Loss (mL): Minimal    Complications: None    Specimens:   ID Type Source Tests Collected by Time Destination   A : STOMACH ANTRUM BIOPSY R/O H PYLORI Tissue Stomach SURGICAL PATHOLOGY Modesta Bernabe MD 8/23/2022 1058    B : STOMACH GASTRIC POLYPS Tissue Stomach SURGICAL PATHOLOGY Modesta Bernabe MD 8/23/2022 1059        Implants:  * No implants in log *      Drains: * No LDAs found *    Findings: as above    Detailed Description of Procedure: The patient was placed on the table and sedated. The throat was numbed with Cetacaine spray. Bite block was placed. A lubricated scope was passed into the mouth  he had what appeared to be very large tonsils, then the scope was easily into the upper esophagus which looked  normal . The distal esophagus looked  normal. . The scope was passed into the stomach and retroflexed. There was a 2cm hiatal hernia. The [place] was at 40cm. The scope was passed down toward the pylorus. The antral mucosa all looked abnormal: gastritis. Antral biopsy was taken to check for H. pylori. There were a few small antral polyps one removed which appeared to likely be secondary to PPI use. The scope was then passed through the pylorus into the duodenal bulb which looked normal, then around to the distal duodenum which looked  normal. , and the scope was then withdrawn. The patient tolerated the procedure well.        PLAN:  Continue PPI    H pylori Bx and polyp  results pending     Will call with results   Will place a consult to ENT     Electronically signed by Gail Corrales MD on 8/23/2022 at 11:09 AM

## 2022-08-23 NOTE — H&P
History and Physical      CC: Dysphagia         HISTORY OF PRESENT ILLNESS: This is a patient known to me he originally was seen secondary to dysphagia and needing of a screening colonoscopy. On 9- I did an EGD and colonoscopy was found to have mild gastritis a 2 cm hiatal hernia and antral polyp and on his colonoscopy he had 3 separate polyps these came back as tubular adenomas. I did not find a Schatzki's ring or an esophageal stricture and it easily passed the scope at that time. The patient states he had no further dysphagia after the EGD for approximately 6 months and then he started having worsening dysphagia again. He states he feels it in the upper esophagus. Patient does have a significant history of a cervical fusion. Patient also offered general procedures that he has had since his last EGD was a ANALISA .   When he had his EGD in 2017 the op note reports multiple small antral erosions and gastritis, there was no note of a stricture but he was dilated with a 56 [employer] dilator     No changes since last being seen     Past Medical History:   Diagnosis Date    Benign hypertensive kidney disease with chronic kidney disease 10/6/2021    Cervical vertebra fusion 01/01/2000    Chronic kidney disease (CKD), stage III (moderate) ([address] 11/25/2013    Depression     Foot injury     History of gastric ulcer     Hypertension     Stroke [facility])     patient had a stroke in the late 90's    Type 2 diabetes mellitus with diabetic polyneuropathy, with long-term current use of insulin ([address]       Past Surgical History:   Procedure Laterality Date    COLONOSCOPY      COLONOSCOPY N/A 9/13/2021    COLONOSCOPY POLYPECTOMY SNARE/COLD BIOPSY performed by Liban Minor MD at [place] [address]  01/23/2010    [address] [place] [address]      UPPER GASTROINTESTINAL ENDOSCOPY      UPPER GASTROINTESTINAL ENDOSCOPY N/A 9/13/2021    EGD BIOPSY complications of bleeding and perforation.   The patient understands the above and agrees to proceed      Physician Signature: Zuleyma Church MD      Send copy of H&P to Diamond Hughes MD

## 2022-08-23 NOTE — ANESTHESIA PRE PROCEDURE
Department of Anesthesiology  Preprocedure Note       Name:  Gomez Retana   Age:  71 y.o.  :  1950                                          MRN:  [id number]         Date:  2022      Surgeon: Sherrie Cantrell):  Emily Perry MD    Procedure: Procedure(s):  EGD WITH POSSIBLE  DILATION BALLOON    Medications prior to admission:   Prior to Admission medications    Medication Sig Start Date End Date Taking?  Authorizing Provider   buPROPion [facility] SR) 100 MG extended release tablet Take 100 mg by mouth daily    Historical Provider, MD   hydrALAZINE (APRESOLINE) 25 MG tablet Take 25 mg by mouth daily 22   Historical Provider, MD   lisinopril (PRINIVIL;ZESTRIL) 40 MG tablet TAKE 1 TABLET BY MOUTH DAILY 22   Annette Arguello MD   pantoprazole (PROTONIX) 40 MG tablet TAKE 1 TABLET BY MOUTH DAILY 22   Annette Arguello MD   aspirin (ASPIRIN LOW DOSE) 81 MG EC tablet TAKE 1 TABLET BY MOUTH DAILY 22   Annette Arguello MD   hydroCHLOROthiazide (MICROZIDE) 12.5 MG capsule TAKE 1 CAPSULE BY MOUTH DAILY 22   Annette Arguello MD   metoprolol succinate (TOPROL XL) 100 MG extended release tablet Take 0.5 tablets by mouth in the morning and at bedtime 22   Historical Provider, MD   vitamin D (CHOLECALCIFEROL) 25 MCG (1000 UT) TABS tablet Take 1,000 Units by mouth daily    Historical Provider, MD   amLODIPine (NORVASC) 5 MG tablet Take 1 tablet by mouth daily 22   Annette Arguello MD   apixaban (ELIQUIS) 5 MG TABS tablet Take 1 tablet by mouth 2 times daily 22   Jake Segovia MD   ACETAMINOPHEN EXTRA STRENGTH 500 MG tablet TAKE 2 TABLETS BY MOUTH EVERY 6 HOURS FOR PAIN 21   Historical Provider, MD   ONETOUCH ULTRA strip  10/19/21   Historical Provider, MD   gabapentin (NEURONTIN) 300 MG capsule Takes prn 21   Historical Provider, MD   Blood Pressure KIT 1 kit by Does not apply route daily 21   Annette Arguello MD   Alcohol Swabs (B-D SINGLE USE SWABS REGULAR) PADS USE 3 - 4 TIMES DAILY AS DIRECTED 7/31/21   Historical Provider, MD   insulin lispro, 1 Unit Dial, 100 UNIT/ML SOPN Inject 6 Units into the skin 3 times daily Plus  2 units for each 50 above 150 Blood sugar level result    Historical Provider, MD [place] UF III MINI PEN NEEDLES 31G X 5 MM MISC 1 each by Does not apply route 4 times daily 3/15/21   Historical Provider, MD   tamsulosin (FLOMAX) 0.4 MG capsule Take 0.4 mg by mouth nightly    Historical Provider, MD   insulin glargine (LANTUS) 100 UNIT/ML injection vial Inject 40 Units into the skin nightly    Historical Provider, MD       Current medications:    Current Facility-Administered Medications   Medication Dose Route Frequency Provider Last Rate Last Admin    0.9 % sodium chloride infusion   IntraVENous Continuous Claudean Patella,  mL/hr at 08/23/22 1015 New Bag at 08/23/22 1015    sodium chloride flush 0.9 % injection 10 mL  10 mL IntraVENous 2 times per day Claudean Patella, MD        sodium chloride flush 0.9 % injection 10 mL  10 mL IntraVENous PRN Claudean Patella, MD        0.9 % sodium chloride infusion  25 mL IntraVENous PRN Claudean Patella, MD           Allergies:     Allergies   Allergen Reactions    Motrin [Ibuprofen]        Problem List:    Patient Active Problem List   Diagnosis Code    Type 2 diabetes mellitus with chronic kidney disease ([facility] E11.22    HTN (hypertension) I10    A-fib ([facility] I48.91    Avulsion of right patellar tendon I03.502I    Benign hypertensive kidney disease with chronic kidney disease I12.9    Stage 3b chronic kidney disease ([address] N18.32    Obesity with body mass index 30 or greater E66.9       Past Medical History:        Diagnosis Date    Benign hypertensive kidney disease with chronic kidney disease 10/6/2021    Cervical vertebra fusion 01/01/2000    Chronic kidney disease (CKD), stage III (moderate) ([address] 11/25/2013    Depression     Foot psychiatric history:depression/anxiety             GI/Hepatic/Renal:             Endo/Other:    (+) DiabetesType II DM, , .                 Abdominal:       Abdomen: soft. Vascular: Other Findings:           Anesthesia Plan      MAC     ASA 3             Anesthetic plan and risks discussed with patient. Use of blood products discussed with patient whom. JACQUES Rodriguez - CRNA   8/23/2022      Pt seen, examined, chart reviewed, plan discussed.   John Dawson MD  8/23/2022  11:52 AM

## 2022-08-23 NOTE — ANESTHESIA POSTPROCEDURE EVALUATION
Department of Anesthesiology  Postprocedure Note    Patient: Laquita Duncan  MRN: [id number]  YOB: 1950  Date of evaluation: 8/23/2022      Procedure Summary     Date: 08/23/22 Room / Location: [address] [place] [address] / [facility]    Anesthesia Start: 6520 Anesthesia Stop: 1106    Procedures:       EGD WITH POSSIBLE  DILATION BALLOON      EGD BIOPSY Diagnosis:       Dysphagia, unspecified type      (DYSPHAGIA)    Surgeons: Pastor Hull MD Responsible Provider: John Ray MD    Anesthesia Type: MAC ASA Status: 3          Anesthesia Type: No value filed.     Nael Phase I: Nael Score: 10    Nael Phase II: Nael Score: 10      Anesthesia Post Evaluation    Patient location during evaluation: PACU  Patient participation: complete - patient participated  Level of consciousness: awake  Pain score: 3  Airway patency: patent  Nausea & Vomiting: no nausea and no vomiting  Complications: no  Cardiovascular status: blood pressure returned to baseline  Respiratory status: acceptable  Hydration status: euvolemic

## 2022-08-23 NOTE — TELEPHONE ENCOUNTER
Referral Dysphagia, EGD 8/23/22 Dr Marinus Gosselin. Patient offered next available. Patient requesting to be seen as soon as possible. Please advise [phone].

## 2022-08-26 ENCOUNTER — HOSPITAL ENCOUNTER (OUTPATIENT)
Age: 72
Discharge: HOME | End: 2022-08-26
Payer: MEDICAID

## 2022-08-26 LAB
ALBUMIN SERPL-MCNC: 3.9 G/DL (ref 3.5–5.2)
ALP BLD-CCNC: 115 U/L (ref 40–129)
ALT SERPL-CCNC: 13 U/L (ref 0–40)
ANION GAP SERPL CALCULATED.3IONS-SCNC: 13 MMOL/L (ref 7–16)
AST SERPL-CCNC: 23 U/L (ref 0–39)
BASOPHILS ABSOLUTE: 0.04 E9/L (ref 0–0.2)
BASOPHILS RELATIVE PERCENT: 0.7 % (ref 0–2)
BILIRUB SERPL-MCNC: 0.3 MG/DL (ref 0–1.2)
BUN BLDV-MCNC: 17 MG/DL (ref 6–23)
CALCIUM SERPL-MCNC: 9 MG/DL (ref 8.6–10.2)
CHLORIDE BLD-SCNC: 109 MMOL/L (ref 98–107)
CO2: 22 MMOL/L (ref 22–29)
CREAT SERPL-MCNC: 2 MG/DL (ref 0.7–1.2)
EOSINOPHILS ABSOLUTE: 0.08 E9/L (ref 0.05–0.5)
EOSINOPHILS RELATIVE PERCENT: 1.5 % (ref 0–6)
GFR AFRICAN AMERICAN: 40
GFR NON-AFRICAN AMERICAN: 40 ML/MIN/1.73
GLUCOSE BLD-MCNC: 83 MG/DL (ref 74–99)
HCT VFR BLD CALC: 38.3 % (ref 37–54)
HEMOGLOBIN: 12.6 G/DL (ref 12.5–16.5)
IMMATURE GRANULOCYTES #: 0.01 E9/L
IMMATURE GRANULOCYTES %: 0.2 % (ref 0–5)
LYMPHOCYTES ABSOLUTE: 2.34 E9/L (ref 1.5–4)
LYMPHOCYTES RELATIVE PERCENT: 43.7 % (ref 20–42)
MAGNESIUM: 2 MG/DL (ref 1.6–2.6)
MCH RBC QN AUTO: 27.6 PG (ref 26–35)
MCHC RBC AUTO-ENTMCNC: 32.9 % (ref 32–34.5)
MCV RBC AUTO: 84 FL (ref 80–99.9)
MONOCYTES ABSOLUTE: 0.6 E9/L (ref 0.1–0.95)
MONOCYTES RELATIVE PERCENT: 11.2 % (ref 2–12)
NEUTROPHILS ABSOLUTE: 2.29 E9/L (ref 1.8–7.3)
NEUTROPHILS RELATIVE PERCENT: 42.7 % (ref 43–80)
PDW BLD-RTO: 14.5 FL (ref 11.5–15)
PHOSPHORUS: 2.7 MG/DL (ref 2.5–4.5)
PLATELET # BLD: 219 E9/L (ref 130–450)
PMV BLD AUTO: 11.6 FL (ref 7–12)
POTASSIUM SERPL-SCNC: 3.5 MMOL/L (ref 3.5–5)
RBC # BLD: 4.56 E12/L (ref 3.8–5.8)
SODIUM BLD-SCNC: 144 MMOL/L (ref 132–146)
TOTAL PROTEIN: 7.8 G/DL (ref 6.4–8.3)
URIC ACID, SERUM: 10.3 MG/DL (ref 3.4–7)
WBC # BLD: 5.4 E9/L (ref 4.5–11.5)

## 2022-08-26 PROCEDURE — 85025 COMPLETE CBC W/AUTO DIFF WBC: CPT

## 2022-08-26 PROCEDURE — 80053 COMPREHEN METABOLIC PANEL: CPT

## 2022-08-26 PROCEDURE — 36415 COLL VENOUS BLD VENIPUNCTURE: CPT

## 2022-08-26 PROCEDURE — 84550 ASSAY OF BLOOD/URIC ACID: CPT

## 2022-08-26 PROCEDURE — 83735 ASSAY OF MAGNESIUM: CPT

## 2022-08-26 PROCEDURE — 82570 ASSAY OF URINE CREATININE: CPT

## 2022-08-26 PROCEDURE — 84100 ASSAY OF PHOSPHORUS: CPT

## 2022-08-26 PROCEDURE — 84156 ASSAY OF PROTEIN URINE: CPT

## 2022-08-26 PROCEDURE — 82306 VITAMIN D 25 HYDROXY: CPT

## 2022-08-27 ENCOUNTER — HOSPITAL ENCOUNTER (OUTPATIENT)
Age: 72
Discharge: HOME | End: 2022-08-27

## 2022-08-27 LAB
CREATININE URINE: 198 MG/DL (ref 40–278)
PROTEIN PROTEIN: 11 MG/DL (ref 0–12)
PROTEIN/CREAT RATIO: 0.1
PROTEIN/CREAT RATIO: 0.1 (ref 0–0.2)
VITAMIN D 25-HYDROXY: 95 NG/ML (ref 30–100)

## 2022-08-30 ENCOUNTER — OFFICE VISIT (OUTPATIENT)
Dept: FAMILY MEDICINE CLINIC | Age: 72
End: 2022-08-30
Payer: MEDICAID

## 2022-08-30 VITALS
TEMPERATURE: 97.7 F | BODY MASS INDEX: 33.2 KG/M2 | RESPIRATION RATE: 16 BRPM | SYSTOLIC BLOOD PRESSURE: 136 MMHG | HEART RATE: 59 BPM | WEIGHT: 258.7 LBS | DIASTOLIC BLOOD PRESSURE: 70 MMHG | HEIGHT: 74 IN | OXYGEN SATURATION: 96 %

## 2022-08-30 DIAGNOSIS — I48.0 PAROXYSMAL ATRIAL FIBRILLATION (HCC): ICD-10-CM

## 2022-08-30 DIAGNOSIS — N18.32 STAGE 3B CHRONIC KIDNEY DISEASE (HCC): Primary | ICD-10-CM

## 2022-08-30 DIAGNOSIS — E11.9 TYPE 2 DIABETES MELLITUS WITHOUT COMPLICATION, WITH LONG-TERM CURRENT USE OF INSULIN (HCC): ICD-10-CM

## 2022-08-30 DIAGNOSIS — Z79.4 TYPE 2 DIABETES MELLITUS WITHOUT COMPLICATION, WITH LONG-TERM CURRENT USE OF INSULIN (HCC): ICD-10-CM

## 2022-08-30 DIAGNOSIS — I10 ESSENTIAL HYPERTENSION: ICD-10-CM

## 2022-08-30 PROCEDURE — 1123F ACP DISCUSS/DSCN MKR DOCD: CPT | Performed by: FAMILY MEDICINE

## 2022-08-30 PROCEDURE — 99213 OFFICE O/P EST LOW 20 MIN: CPT | Performed by: FAMILY MEDICINE

## 2022-08-30 PROCEDURE — 3044F HG A1C LEVEL LT 7.0%: CPT | Performed by: FAMILY MEDICINE

## 2022-08-30 RX ORDER — ASPIRIN 81 MG/1
TABLET ORAL
Qty: 90 TABLET | Refills: 1 | Status: SHIPPED | OUTPATIENT
Start: 2022-08-30

## 2022-08-30 RX ORDER — LISINOPRIL 40 MG/1
40 TABLET ORAL DAILY
Qty: 90 TABLET | Refills: 1 | Status: CANCELLED | OUTPATIENT
Start: 2022-08-30

## 2022-08-30 SDOH — ECONOMIC STABILITY: FOOD INSECURITY: WITHIN THE PAST 12 MONTHS, YOU WORRIED THAT YOUR FOOD WOULD RUN OUT BEFORE YOU GOT MONEY TO BUY MORE.: NEVER TRUE

## 2022-08-30 SDOH — ECONOMIC STABILITY: FOOD INSECURITY: WITHIN THE PAST 12 MONTHS, THE FOOD YOU BOUGHT JUST DIDN'T LAST AND YOU DIDN'T HAVE MONEY TO GET MORE.: NEVER TRUE

## 2022-08-30 ASSESSMENT — PATIENT HEALTH QUESTIONNAIRE - PHQ9
SUM OF ALL RESPONSES TO PHQ QUESTIONS 1-9: 0
SUM OF ALL RESPONSES TO PHQ QUESTIONS 1-9: 0
2. FEELING DOWN, DEPRESSED OR HOPELESS: 0
SUM OF ALL RESPONSES TO PHQ QUESTIONS 1-9: 0
1. LITTLE INTEREST OR PLEASURE IN DOING THINGS: 0
SUM OF ALL RESPONSES TO PHQ QUESTIONS 1-9: 0
SUM OF ALL RESPONSES TO PHQ9 QUESTIONS 1 & 2: 0

## 2022-08-30 ASSESSMENT — ENCOUNTER SYMPTOMS
COUGH: 0
NAUSEA: 0
WHEEZING: 0
TROUBLE SWALLOWING: 1
CONSTIPATION: 0
SHORTNESS OF BREATH: 0
ABDOMINAL PAIN: 0
DIARRHEA: 0
VOMITING: 0

## 2022-08-30 ASSESSMENT — SOCIAL DETERMINANTS OF HEALTH (SDOH): HOW HARD IS IT FOR YOU TO PAY FOR THE VERY BASICS LIKE FOOD, HOUSING, MEDICAL CARE, AND HEATING?: NOT HARD AT ALL

## 2022-08-30 NOTE — PROGRESS NOTES
[facility]  DATE OF VISIT : 2022    Patient : Shelbie Peraza   Age : 71 y.o.  : 1950   MRN : [id number]   ______________________________________________________________________    Chief Complaint :   Chief Complaint   Patient presents with    Follow-up     Patient is here today for a chronic condition follow up. Medications have been reviewed and is complying, refills requested today. Diabetes     Sugars have been under control with meds. Sugars this morning were 108. A1C checked today in office. Hypertension     Well controlled with meds. Denies any stroke like sx's. HPI : Shelbie Peraza is 71 y.o. male who presented to the clinic today for office visit. HTN: HCTZ 12.5mg daily, Lisinopril 30mg daily and Hydralazine 50mg TID. A. fib: On metoprolol and Eliquis. History of CVA on aspirin    IDDM: following with endo. Last A1C 5.7(). Lantus 40 units nightly and Humalog 6 units with meals. Seen by Dr. Della Coffman (endocrinology) and of July. Seen podiatry on Friday for DFE. Seen Ophtho recently with FU appt in early October. CKD: Elevated  creatinine, recently decreased HCTZ to 12.5. Has appt with  today. Recent EGD with dilation for dysphagia on  by Dr. Pola Goldberg. Referral to ENT placed for enlarged tonsils during postop. Mild chronic gastritis and hyperplastic polyps noted on stomach without evidence of H. pylori. Also noted to have a small hiatal hernia. I reviewed the patient's past medications, allergies and past medical history during this visit.     Past Medical History :      Past Medical History:   Diagnosis Date    Benign hypertensive kidney disease with chronic kidney disease 10/6/2021    Cervical vertebra fusion 2000    Chronic kidney disease (CKD), stage III (moderate) ([facility] [address] 2013    Depression     Foot injury     History of gastric ulcer     Hypertension     Stroke [facility])     patient had a stroke in the late 90's    Type 2 diabetes mellitus with diabetic polyneuropathy, with long-term current use of insulin ([facility] [address]      Past Surgical History:   Procedure Laterality Date    COLONOSCOPY      COLONOSCOPY N/A 9/13/2021    COLONOSCOPY POLYPECTOMY SNARE/COLD BIOPSY performed by Holly Marcus MD at [facility]  01/23/2010 2019    [address] [place] [address] SURGERY      UPPER GASTROINTESTINAL ENDOSCOPY      UPPER GASTROINTESTINAL ENDOSCOPY N/A 9/13/2021    EGD BIOPSY performed by Holly Marcus MD at [place] [address] N/A 9/13/2021    EGD POLYP SNARE performed by Holly Marcus MD at [place] [address] N/A 8/23/2022    EGD WITH POSSIBLE  DILATION BALLOON performed by Holly Marcus MD at [place] [address] N/A 8/23/2022    EGD BIOPSY performed by Holly Marcus MD at [facility] History :  Social History       Tobacco History       Smoking Status  Never      Smokeless Tobacco Use  Never              Alcohol History       Alcohol Use Status  No              Drug Use       Drug Use Status  No              Sexual Activity       Sexually Active  Not Asked                     Allergies :    Allergies   Allergen Reactions    Motrin [Ibuprofen]        Medication List :    Current Outpatient Medications   Medication Sig Dispense Refill    aspirin (ASPIRIN LOW DOSE) 81 MG EC tablet TAKE 1 TABLET BY MOUTH DAILY 90 tablet 1    buPROPion (WELLBUTRIN SR) 100 MG extended release tablet Take 100 mg by mouth daily      hydrALAZINE (APRESOLINE) 25 MG tablet Take 25 mg by mouth daily      lisinopril (PRINIVIL;ZESTRIL) 40 MG tablet TAKE 1 TABLET BY MOUTH DAILY 90 tablet 1    pantoprazole (PROTONIX) 40 MG tablet TAKE 1 TABLET BY MOUTH DAILY 90 tablet 1    hydroCHLOROthiazide (MICROZIDE) 12.5 MG capsule TAKE 1 CAPSULE BY MOUTH DAILY 90 capsule 1    metoprolol succinate (TOPROL XL) 100 MG extended release tablet Take 0.5 tablets by mouth in the morning and at bedtime      vitamin D (CHOLECALCIFEROL) 25 MCG (1000 UT) TABS tablet Take 1,000 Units by mouth daily      amLODIPine (NORVASC) 5 MG tablet Take 1 tablet by mouth daily 90 tablet 1    apixaban (ELIQUIS) 5 MG TABS tablet Take 1 tablet by mouth 2 times daily 180 tablet 3    ACETAMINOPHEN EXTRA STRENGTH 500 MG tablet TAKE 2 TABLETS BY MOUTH EVERY 6 HOURS FOR PAIN      ONETOUCH ULTRA strip       gabapentin (NEURONTIN) 300 MG capsule Takes prn      Blood Pressure KIT 1 kit by Does not apply route daily 1 kit 0    Alcohol Swabs (B-D SINGLE USE SWABS REGULAR) PADS USE 3 - 4 TIMES DAILY AS DIRECTED      insulin lispro, 1 Unit Dial, 100 UNIT/ML SOPN Inject 6 Units into the skin 3 times daily Plus  2 units for each 50 above 150 Blood sugar level result      B-D UF III MINI PEN NEEDLES 31G X 5 MM MISC 1 each by Does not apply route 4 times daily      tamsulosin (FLOMAX) 0.4 MG capsule Take 0.4 mg by mouth nightly      insulin glargine (LANTUS) 100 UNIT/ML injection vial Inject 40 Units into the skin nightly       No current facility-administered medications for this visit. Review of Systems :  Review of Systems   Constitutional:  Negative for chills, fatigue and fever. HENT:  Positive for trouble swallowing. Respiratory:  Negative for cough, shortness of breath and wheezing. Cardiovascular:  Negative for chest pain, palpitations and leg swelling. Gastrointestinal:  Negative for abdominal pain, constipation, diarrhea, nausea and vomiting. Endocrine: Negative for polydipsia and polyuria.    Neurological:  Negative for dizziness, weakness, light-headedness, numbness and headaches.   ______________________________________________________________________    Physical Exam :    Vitals: /70   Pulse 59   Temp 97.7 °F (36.5 °C)   Resp 16   Ht 6' 2\" (1.88 m)   Wt 258 lb 11.2 oz (117.3 kg)   SpO2 96%   BMI 33.22 kg/m²   Physical Exam  Vitals reviewed. Constitutional:       General: He is not in acute distress. Appearance: Normal appearance. Cardiovascular:      Rate and Rhythm: Normal rate and regular rhythm. Pulses: Normal pulses. Heart sounds: Normal heart sounds. No murmur heard. Pulmonary:      Effort: Pulmonary effort is normal. No respiratory distress. Breath sounds: Normal breath sounds. No wheezing. Abdominal:      General: Bowel sounds are normal. There is no distension. Palpations: Abdomen is soft. Tenderness: There is no abdominal tenderness. Musculoskeletal:      Right lower leg: No edema. Left lower leg: No edema. Neurological:      Mental Status: He is alert.         ___________________    Assessment & Plan :    1. Essential hypertension  -Controlled  -Has appointment with nephrology today, pending visit to determine if we will continue the same medication regimen  -If okay with nephrology to continue current medication regimen refills will be sent      2. Paroxysmal atrial fibrillation (HCC)  -Stable  -Continue Eliquis    3. Type 2 diabetes mellitus without complication, with long-term current use of insulin (HCC)  -Stable  -Last A1c 5.7  -Continue management per endocrinology  - aspirin (ASPIRIN LOW DOSE) 81 MG EC tablet; TAKE 1 TABLET BY MOUTH DAILY  Dispense: 90 tablet; Refill: 1    4. Stage 3b chronic kidney disease (HCC)  -Last creatinine 2.0 (stable)  -Has appointment with Dr. Jennine Fabry today    Educational materials and/or home exercises printed for patient's review and were included in patient instructions on his/her After Visit Summary and given to patient at the end of visit. Counseled regarding above diagnosis, including possible risks and complications,  especially if left uncontrolled.      Counseled regarding the possible side effects, risks, benefits and alternatives to treatment; patient and/or guardian verbalizes understanding, agrees, feels comfortable with and wishes to proceed with above treatment plan. Advised patient to call with any new medication issues, and read all Rx info from pharmacy to assure aware of all possible risks and side effects of medication before taking. Reviewed age and gender appropriate health screening exams and vaccinations. Advised patient regarding importance of keeping up with recommended health maintenance and to schedule as soon as possible if overdue, as this is important in assessing for undiagnosed pathology, especially cancer, as well as protecting against potentially harmful/life threatening disease. Patient and/or guardian verbalizes understanding and agrees with above counseling, assessment and plan. All questions answered    Additional plan and future considerations:   RTO in 3 to 5 months    Return to Office: Return in about 4 months (around 12/30/2022) for Diabetes, Depression, HTN.     Electronically signed by Rush Baig MD on 8/30/2022 at 8:53 AM

## 2022-08-31 ENCOUNTER — TELEPHONE (OUTPATIENT)
Dept: SURGERY | Age: 72
End: 2022-08-31

## 2022-08-31 NOTE — TELEPHONE ENCOUNTER
MA contacted pt to inform of Dr. Iman Fischer let the patient know the biopsies were negative and I recommend him following up with ENT. I placed the consult the day of the EGD\" All questions were answered for pt.     Electronically signed by Sea Freitas MA on 8/31/2022 at 10:41 AM

## 2022-08-31 NOTE — TELEPHONE ENCOUNTER
----- Message from Denise Larkin MD sent at 8/29/2022  3:11 PM EDT -----  Regarding: follow up  Please let the patient know the biopsies were negative and I recommend him following up with ENT.  I placed the consult the day of the EGD     Denise Larkin MD    ----- Message -----  From: Purvi Stewart Incoming Results From [employer]  Sent: 8/25/2022   4:16 PM EDT  To: Denise Larkin MD

## 2022-09-09 ENCOUNTER — OFFICE VISIT (OUTPATIENT)
Dept: PAIN MANAGEMENT | Age: 72
End: 2022-09-09
Payer: MEDICAID

## 2022-09-09 VITALS
RESPIRATION RATE: 26 BRPM | DIASTOLIC BLOOD PRESSURE: 75 MMHG | OXYGEN SATURATION: 96 % | SYSTOLIC BLOOD PRESSURE: 139 MMHG | HEIGHT: 74 IN | WEIGHT: 258 LBS | HEART RATE: 111 BPM | TEMPERATURE: 97.3 F | BODY MASS INDEX: 33.11 KG/M2

## 2022-09-09 DIAGNOSIS — M48.062 SPINAL STENOSIS OF LUMBAR REGION WITH NEUROGENIC CLAUDICATION: ICD-10-CM

## 2022-09-09 DIAGNOSIS — M54.16 LUMBAR RADICULOPATHY: ICD-10-CM

## 2022-09-09 DIAGNOSIS — G89.29 CHRONIC BILATERAL LOW BACK PAIN WITH BILATERAL SCIATICA: ICD-10-CM

## 2022-09-09 DIAGNOSIS — G89.4 CHRONIC PAIN SYNDROME: Primary | ICD-10-CM

## 2022-09-09 DIAGNOSIS — M54.42 CHRONIC BILATERAL LOW BACK PAIN WITH BILATERAL SCIATICA: ICD-10-CM

## 2022-09-09 DIAGNOSIS — M54.41 CHRONIC BILATERAL LOW BACK PAIN WITH BILATERAL SCIATICA: ICD-10-CM

## 2022-09-09 PROCEDURE — 3017F COLORECTAL CA SCREEN DOC REV: CPT | Performed by: PAIN MEDICINE

## 2022-09-09 PROCEDURE — G8427 DOCREV CUR MEDS BY ELIG CLIN: HCPCS | Performed by: PAIN MEDICINE

## 2022-09-09 PROCEDURE — G8417 CALC BMI ABV UP PARAM F/U: HCPCS | Performed by: PAIN MEDICINE

## 2022-09-09 PROCEDURE — 1036F TOBACCO NON-USER: CPT | Performed by: PAIN MEDICINE

## 2022-09-09 PROCEDURE — 1123F ACP DISCUSS/DSCN MKR DOCD: CPT | Performed by: PAIN MEDICINE

## 2022-09-09 PROCEDURE — 99204 OFFICE O/P NEW MOD 45 MIN: CPT | Performed by: PAIN MEDICINE

## 2022-09-09 PROCEDURE — 99204 OFFICE O/P NEW MOD 45 MIN: CPT

## 2022-09-09 NOTE — PROGRESS NOTES
Do you currently have any of the following:    Fever: No  Headache:  No  Cough: No  Shortness of breath: No  Exposed to anyone with these symptoms: No         [facility] Jocelyn presents to the [address] [place] [address] on 9/9/2022. Dilshad Tesfaye is complaining of pain Neck, back \"everywhere\". The pain is intermittent. The pain is described as shooting, stabbing, dull, and sharp. Pain is rated on his best day at a 2, on his worst day at a 10, and on average at a 5 on the VAS scale. He took his last dose of Neurontin yesterday. Any procedures since your last visit: No, with 0 % relief. Pacemaker or defibrillator: No managed by 0. He is  on NSAIDS and is  on anticoagulation medications to include Eliquis and is managed by Felipe Lehman and Yisel Sharma. Medication Contract and Consent for Opioid Use Documents Filed        No documents found                    There were no vitals taken for this visit. No LMP for male patient.

## 2022-09-09 NOTE — PROGRESS NOTES
[address] [facility]        [address]  [facility], [address] [facility]  Dept: [phone]        Patient:  RODNEY Allen 1950    Date of Service:  22     Requesting Physician:  No ref. provider found    Reason for Consult:      Patient presents with complaints of bilateral, lower back pain that started >1 years ago    HISTORY OF PRESENT ILLNESS:      Pain is intermittent and is described as aching. Pain does radiate to b/l buttocks. He  has weakness of the BLE with walking and does not have bladder or bowel dysfunction. Alleviating factors include: sitting. Aggravating factors include:  walking. He has been on anticoagulation medications to include ELIQUIS and ASA and has not been on herbal supplements. He is diabetic. Imagin lumbar MRI -  FINDINGS:   BONES/ALIGNMENT: There is normal alignment of the spine. The vertebral body   heights are maintained. The bone marrow signal appears unremarkable. SPINAL CORD: The conus terminates normally. SOFT TISSUES: No mass or edema seen. L1-L2: No disc herniation. No significant facet or ligamentum flavum   hypertrophy. No significant central stenosis. There are mild-to-moderate   neural stenoses which appear to result from developmentally thickened and   shortened pedicles. L2-L3: Minimal disc bulge. Mild facet hypertrophy. No significant central   canal or lateral recess stenoses. Moderate neural foraminal stenoses. L3-L4: Disc desiccation with a small disc bulge. Mild-to-moderate facet and   ligamentum flavum hypertrophy. Mild central canal stenosis. Moderate   lateral recess and severe neural foraminal stenoses. L4-L5: Grade 1 anterolisthesis of L4 over L5 by 3-4 mm. Small disc bulge. Moderate facet and ligamentous hypertrophy. Severe central canal stenosis,   with narrowing of the AP diameter of the thecal sac in the midsagittal plane   to 6 mm.   Moderate to severe lateral recess and severe neural foraminal   stenoses. L5-S1: Small disc protrusion in the right neural foramina. Mild facet and   ligamentous hypertrophy. No significant central canal or lateral recess   stenosis. Severe right and moderate to severe left neural foraminal stenoses. Impression   1. No fracture or bony destructive lesion. 2. Grade 1 anterolisthesis of L4 over L5.   3. Severe central canal stenosis at L4-5. Mild stenosis at L3-4.   4.  Multilevel neural foraminal stenoses, worst (severe) at bilateral L3-4   and L4-5 and right L5-S1 levels. 5. The central canal stenoses and especially the neural foraminal stenoses   appear to be predominantly stenotic as result of developmentally thickened   and shortened pedicles. 6/2022 lumbar f/e -  FINDINGS:   Neutral, flexion and extension lateral views demonstrate no abnormal motion. Stable minimal anterolisthesis of L4 on L5 due to facet arthropathy. Calcification in the right posterior abdomen likely represents   nephrolithiasis. No acute fracture or subluxation in the vertebral bodies. There are 5 lumbar type vertebral bodies. Impression   1. Grade 1 anterolisthesis of 4 on 5. No abnormal motion with flexion and   extension. 2.  Lower lumbar facet arthropathy. 3.  Right nephrolithiasis     Previous treatments: Physical Therapy, Epidural Steroid Injection, and medications.       Past Medical History:   Diagnosis Date    Benign hypertensive kidney disease with chronic kidney disease 10/6/2021    Cervical vertebra fusion 01/01/2000    Chronic bilateral low back pain with bilateral sciatica 9/9/2022    Chronic kidney disease (CKD), stage III (moderate) ([address] 11/25/2013    Depression     Foot injury     History of gastric ulcer     Hypertension     Stroke [facility])     patient had a stroke in the late 90's    Type 2 diabetes mellitus with diabetic polyneuropathy, with long-term current use of insulin ([address] Past Surgical History:   Procedure Laterality Date    COLONOSCOPY      COLONOSCOPY N/A 9/13/2021    COLONOSCOPY POLYPECTOMY SNARE/COLD BIOPSY performed by Sweetie Ho MD at [facility] [address] [address]  01/23/2010 2019    ROTATOR CUFF REPAIR  1999    SPINE SURGERY      UPPER GASTROINTESTINAL ENDOSCOPY      UPPER GASTROINTESTINAL ENDOSCOPY N/A 9/13/2021    EGD BIOPSY performed by Sweetie Ho MD at [address] N/A 9/13/2021    EGD POLYP SNARE performed by Sweetie Ho MD at [address] N/A 8/23/2022    EGD WITH POSSIBLE  DILATION BALLOON performed by Sweetie Ho MD at [address] N/A 8/23/2022    EGD BIOPSY performed by Sweetie Ho MD at [address]       Prior to Admission medications    Medication Sig Start Date End Date Taking?  Authorizing Provider   aspirin (ASPIRIN LOW DOSE) 81 MG EC tablet TAKE 1 TABLET BY MOUTH DAILY 8/30/22  Yes Morris Lehman MD   buPROPion [facility] SR) 100 MG extended release tablet Take 100 mg by mouth daily   Yes Historical Provider, MD   hydrALAZINE (APRESOLINE) 25 MG tablet Take 25 mg by mouth daily 5/23/22  Yes Historical Provider, MD   lisinopril (PRINIVIL;ZESTRIL) 40 MG tablet TAKE 1 TABLET BY MOUTH DAILY 5/23/22  Yes Morris Lehman MD   pantoprazole (PROTONIX) 40 MG tablet TAKE 1 TABLET BY MOUTH DAILY 5/23/22  Yes Morris Lehman MD   hydroCHLOROthiazide (MICROZIDE) 12.5 MG capsule TAKE 1 CAPSULE BY MOUTH DAILY 5/6/22  Yes Morris Lehman MD   metoprolol succinate (TOPROL XL) 100 MG extended release tablet Take 0.5 tablets by mouth in the morning and at bedtime 4/19/22  Yes Historical Provider, MD   vitamin D (CHOLECALCIFEROL) 25 MCG (1000 UT) TABS tablet Take 1,000 Units by mouth daily   Yes Historical Provider, MD   amLODIPine (NORVASC) 5 MG tablet Take 1 tablet by mouth daily 4/22/22  Yes Silas Castro MD   apixaban [facility]) 5 MG TABS tablet Take 1 tablet by mouth 2 times daily 4/19/22  Yes Edward Krishnamurthy MD   ACETAMINOPHEN EXTRA STRENGTH 500 MG tablet TAKE 2 TABLETS BY MOUTH EVERY 6 HOURS FOR PAIN 11/5/21  Yes Historical Provider, MD Wilson Bran strip  10/19/21  Yes Historical Provider, MD   gabapentin (NEURONTIN) 300 MG capsule Takes prn 9/9/21  Yes Historical Provider, MD   Blood Pressure KIT 1 kit by Does not apply route daily 8/24/21  Yes Silas Castro MD   Alcohol Swabs (B-D SINGLE USE SWABS REGULAR) PADS USE 3 - 4 TIMES DAILY AS DIRECTED 7/31/21  Yes Historical Provider, MD   insulin lispro, 1 Unit Dial, 100 UNIT/ML SOPN Inject 6 Units into the skin 3 times daily Plus  2 units for each 50 above 150 Blood sugar level result   Yes Historical Provider, MD [place] UF III MINI PEN NEEDLES 31G X 5 MM MISC 1 each by Does not apply route 4 times daily 3/15/21  Yes Historical Provider, MD   tamsulosin (FLOMAX) 0.4 MG capsule Take 0.4 mg by mouth nightly   Yes Historical Provider, MD   insulin glargine (LANTUS) 100 UNIT/ML injection vial Inject 40 Units into the skin nightly   Yes Historical Provider, MD       Allergies   Allergen Reactions    Motrin [Ibuprofen]        Social History     Socioeconomic History    Marital status: Single     Spouse name: Not on file    Number of children: Not on file    Years of education: Not on file    Highest education level: Not on file   Occupational History    Not on file   Tobacco Use    Smoking status: Never    Smokeless tobacco: Never   Vaping Use    Vaping Use: Never used   Substance and Sexual Activity    Alcohol use: No    Drug use: No    Sexual activity: Not on file   Other Topics Concern    Not on file   Social History Narrative    Not on file     Social Determinants of Health     Financial Resource Strain: Low Risk     Difficulty of Paying Living Expenses: Not hard at all   Food Insecurity: No Food Insecurity    Worried About Running Out of Food in the Last Year: Never true    Ran Out of Food in the Last Year: Never true   Transportation Needs: Not on file   Physical Activity: Not on file   Stress: Not on file   Social Connections: Not on file   Intimate Partner Violence: Not on file   Housing Stability: Not on file       Family History   Problem Relation Age of Onset    Heart Disease Mother     Diabetes Sister        REVIEW OF SYSTEMS:     Patient specifically denies fever/chills, chest pain, shortness of breath, new bowel or bladder complaints. All other review of systems was negative. PHYSICAL EXAMINATION:      /75   Pulse (!) 111   Temp 97.3 °F (36.3 °C)   Resp 26   Ht 6' 2\" (1.88 m)   Wt 258 lb (117 kg)   SpO2 96%   BMI 33.13 kg/m²     General:      General appearance:pleasant and well-hydrated, in no distress and A & O x3  Build:Overweight  Function:Rises from a seated position with difficulty    HEENT:    Head:normocephalic, atraumatic  Pupils:regular, round, equal  Sclera: icterus absent    Lungs:    Breathing:normal breathing pattern    Abdomen:    Shape:non-distended  Tenderness:none  Guarding:none    Lumbar spine:    Spine inspection:normal   CVA tenderness:No   Palpation:tenderness paravertebral muscles, left, right positive. Range of motion:abnormal mildly in lateral bending, flexion, extension rotation bilateral and is painful. Musculoskeletal:    Trigger points in Paraveteral:absent bilaterally  SI joint tenderness:negative right, negative left              NELLY test:not done right, not done left  Piriformis tenderness:negative right, negative left  Trochanteric bursa tenderness:negative right, negative left  SLR:negative right, negative left, sitting     Extremities:    Tremors:None bilaterally upper and lower  Range of motion:pain with internal rotation of hips negative.   Intact:Yes  Varicose veins:absent   Pulses:present Lt radial  Cyanosis:none  Edema:none x all 4 extremities    Neurological:    Sensory:decreased to light touch in stocking distribution  Motor:                Right Quadriceps5/5          Left Quadriceps5/5           Right Gastrocnemius5/5    Left Gastrocnemius5/5  Right Ant Tibialis5/5  Left Ant Tibialis5/5    Reflexes:    Right Quadriceps reflex0-1+  Left Quadriceps reflex0-1+  Right Achilles reflex0-1+  Left Achilles reflex0-1+  Gait:forward flexed    Dermatology:    Skin:no rashes or lesions noted    Impression:    LBP b/l buttock pain with s/s consistent with neurogenic claudication due to severe stenosis at   PMHx: A. Fib on ELIQUIS and ASA, HTN, CKD stage III, CVA, DM2, depression    Plan:    Pending appt with Dr. Darryle Hahn to eval for enlarged tonsils noted during esophageal dilation  Reviewed referral documents and imaging  Urine screen deferred  OARRS report reviewed  On gabapentin 300 mg BID (pt reports taking only prn) for DM neuropathy  On ELIQUIS  and ASA for a. Fib  B/l L4 TFESI #1 - r/b and procedure discussed (must hold ELIQUIS and ASA  per AYLIN guidelines)  Patient encouraged to stay active and to lose weight  Treatment plan discussed with the patient including medication and procedure side effects     CC:  Referring physician    JOSSELINE Hernandez.

## 2022-09-13 ENCOUNTER — OFFICE VISIT (OUTPATIENT)
Dept: ENT CLINIC | Age: 72
End: 2022-09-13
Payer: MEDICAID

## 2022-09-13 VITALS
BODY MASS INDEX: 33.37 KG/M2 | DIASTOLIC BLOOD PRESSURE: 78 MMHG | HEIGHT: 74 IN | WEIGHT: 260 LBS | SYSTOLIC BLOOD PRESSURE: 140 MMHG | HEART RATE: 67 BPM

## 2022-09-13 DIAGNOSIS — J35.8 TONSIL STONE: ICD-10-CM

## 2022-09-13 DIAGNOSIS — J35.1 TONSILLAR HYPERTROPHY: ICD-10-CM

## 2022-09-13 DIAGNOSIS — R13.10 DYSPHAGIA, UNSPECIFIED TYPE: Primary | ICD-10-CM

## 2022-09-13 DIAGNOSIS — K22.2 ESOPHAGEAL STRICTURE: ICD-10-CM

## 2022-09-13 PROCEDURE — 1036F TOBACCO NON-USER: CPT | Performed by: NURSE PRACTITIONER

## 2022-09-13 PROCEDURE — 99203 OFFICE O/P NEW LOW 30 MIN: CPT | Performed by: NURSE PRACTITIONER

## 2022-09-13 PROCEDURE — 3017F COLORECTAL CA SCREEN DOC REV: CPT | Performed by: NURSE PRACTITIONER

## 2022-09-13 PROCEDURE — 1123F ACP DISCUSS/DSCN MKR DOCD: CPT | Performed by: NURSE PRACTITIONER

## 2022-09-13 PROCEDURE — G8427 DOCREV CUR MEDS BY ELIG CLIN: HCPCS | Performed by: NURSE PRACTITIONER

## 2022-09-13 PROCEDURE — G8417 CALC BMI ABV UP PARAM F/U: HCPCS | Performed by: NURSE PRACTITIONER

## 2022-09-13 RX ORDER — CHLORHEXIDINE GLUCONATE 0.12 MG/ML
15 RINSE ORAL 2 TIMES DAILY
Qty: 420 ML | Refills: 0 | Status: SHIPPED | OUTPATIENT
Start: 2022-09-13 | End: 2022-09-27

## 2022-09-13 ASSESSMENT — ENCOUNTER SYMPTOMS
SINUS PAIN: 0
SHORTNESS OF BREATH: 0
RESPIRATORY NEGATIVE: 1
RHINORRHEA: 0
SINUS PRESSURE: 0
STRIDOR: 0
VOICE CHANGE: 0
EYES NEGATIVE: 1
TROUBLE SWALLOWING: 1
SORE THROAT: 0

## 2022-09-15 ENCOUNTER — TELEPHONE (OUTPATIENT)
Dept: CARDIOLOGY CLINIC | Age: 72
End: 2022-09-15

## 2022-09-15 NOTE — TELEPHONE ENCOUNTER
Patient scheduled for epidural by [address] [place] [address] Pain Management. Recommended to hold Aspirin (7) days prior and hold Eliquis (3) days prior. Please advise.

## 2022-09-16 ENCOUNTER — PREP FOR PROCEDURE (OUTPATIENT)
Dept: PAIN MANAGEMENT | Age: 72
End: 2022-09-16

## 2022-09-16 ENCOUNTER — TELEPHONE (OUTPATIENT)
Dept: PAIN MANAGEMENT | Age: 72
End: 2022-09-16

## 2022-09-16 NOTE — TELEPHONE ENCOUNTER
Prior Authorization Form:      DEMOGRAPHICS:                     Patient Name:  Mono Bryant  Patient :  1950            Insurance:  Payor: [facility] / Plan: [facility] / Product Type: *No Product type* /   Insurance ID Number:    Payer/Plan Subscr  Sex Relation Sub. Ins. ID Effective Group Num   1. [facility]* Carolina Rouse 1950 Male Self [id number]                                    [id number]         DIAGNOSIS & PROCEDURE:                       Procedure/Operation: Lumbar transforaminal epidural steroid injection under fluoroscopy bilateral L4          CPT Code: 40003    Diagnosis:  lumbar radiculopathy    ICD10 Code: M54.16    Location:  [facility]    Surgeon:  Odilia Benedict D.O.    SCHEDULING INFORMATION:                          Date: 2022    Time: N/A              Anesthesia:  Local Only                                                       Status:  Outpatient        Special Comments:  No auth required         Call to Mono Bryant that procedure was approved for 2022 and that PrabhakarYolynallison should call him a few days before for the pre op call and between 2:00 PM and 4:00 PM  the business day before with the arrival time. Instructed Hemal to hold ibuprofen for 24 hours, Eliquis for 3 days, naprosyn for 4 days and any aspirin containing products or fish oil for 7 days. Instructed to call office back if any questions. Hemal verbalized understanding.     Electronically signed by Vivian Kam RN on 2022 at 1:34 PM

## 2022-09-21 NOTE — PROGRESS NOTES
[facility]  INSTRUCTIONS  . .......................................................................................................................................... [x] Parking the day of Surgery is located in the [facility].   Upon entering the door, make immediate right into the surgery reception room    [x]  Bring photo ID and insurance card     [x] You may have a light breakfast day of procedure    [x]  Wear loose comfortable clothing    [x]  Please follow instructions for medications as given per Dr's office    [x] You can expect a call the business day prior to procedure to notify you of your arrival time     [x] Please arrange for     []  Other instructions

## 2022-09-27 ENCOUNTER — HOSPITAL ENCOUNTER (OUTPATIENT)
Age: 72
Setting detail: OUTPATIENT SURGERY
Discharge: HOME | End: 2022-09-27
Attending: PAIN MEDICINE | Admitting: PAIN MEDICINE
Payer: MEDICAID

## 2022-09-27 ENCOUNTER — HOSPITAL ENCOUNTER (OUTPATIENT)
Dept: GENERAL RADIOLOGY | Age: 72
Setting detail: OUTPATIENT SURGERY
Discharge: HOME | End: 2022-09-29
Attending: PAIN MEDICINE
Payer: MEDICAID

## 2022-09-27 VITALS
OXYGEN SATURATION: 100 % | RESPIRATION RATE: 16 BRPM | SYSTOLIC BLOOD PRESSURE: 142 MMHG | HEART RATE: 56 BPM | DIASTOLIC BLOOD PRESSURE: 78 MMHG | WEIGHT: 260 LBS | HEIGHT: 74 IN | TEMPERATURE: 97.4 F | BODY MASS INDEX: 33.37 KG/M2

## 2022-09-27 DIAGNOSIS — R52 PAIN MANAGEMENT: ICD-10-CM

## 2022-09-27 LAB — METER GLUCOSE: 85 MG/DL (ref 74–99)

## 2022-09-27 PROCEDURE — 7100000010 HC PHASE II RECOVERY - FIRST 15 MIN: Performed by: PAIN MEDICINE

## 2022-09-27 PROCEDURE — 2500000003 HC RX 250 WO HCPCS: Performed by: PAIN MEDICINE

## 2022-09-27 PROCEDURE — 6360000004 HC RX CONTRAST MEDICATION: Performed by: PAIN MEDICINE

## 2022-09-27 PROCEDURE — 64483 NJX AA&/STRD TFRM EPI L/S 1: CPT | Performed by: PAIN MEDICINE

## 2022-09-27 PROCEDURE — 7100000011 HC PHASE II RECOVERY - ADDTL 15 MIN: Performed by: PAIN MEDICINE

## 2022-09-27 PROCEDURE — 3600000002 HC SURGERY LEVEL 2 BASE: Performed by: PAIN MEDICINE

## 2022-09-27 PROCEDURE — 82962 GLUCOSE BLOOD TEST: CPT

## 2022-09-27 PROCEDURE — 2709999900 HC NON-CHARGEABLE SUPPLY: Performed by: PAIN MEDICINE

## 2022-09-27 PROCEDURE — 6360000002 HC RX W HCPCS: Performed by: PAIN MEDICINE

## 2022-09-27 PROCEDURE — 3209999900 FLUORO FOR SURGICAL PROCEDURES

## 2022-09-27 RX ORDER — LIDOCAINE HYDROCHLORIDE 5 MG/ML
INJECTION, SOLUTION INFILTRATION; INTRAVENOUS PRN
Status: DISCONTINUED | OUTPATIENT
Start: 2022-09-27 | End: 2022-09-27 | Stop reason: ALTCHOICE

## 2022-09-27 RX ORDER — METHYLPREDNISOLONE ACETATE 40 MG/ML
INJECTION, SUSPENSION INTRA-ARTICULAR; INTRALESIONAL; INTRAMUSCULAR; SOFT TISSUE PRN
Status: DISCONTINUED | OUTPATIENT
Start: 2022-09-27 | End: 2022-09-27 | Stop reason: ALTCHOICE

## 2022-09-27 ASSESSMENT — PAIN SCALES - GENERAL
PAINLEVEL_OUTOF10: 0

## 2022-09-27 ASSESSMENT — PAIN - FUNCTIONAL ASSESSMENT: PAIN_FUNCTIONAL_ASSESSMENT: 0-10

## 2022-09-27 NOTE — H&P
[facility]        [address] [facility], [address] [facility]  Dept: [phone]    Procedure History & Physical      Dinora Banks     HPI:    Patient  is here for LBP BLE pain for b/l L4 TFESI  Labs/imaging studies reviewed   All question and concerns addressed including R/B/A associated with the procedure    Past Medical History:   Diagnosis Date    Arthritis     Benign hypertensive kidney disease with chronic kidney disease 10/06/2021    Cervical vertebra fusion 01/01/2000    Chronic bilateral low back pain with bilateral sciatica 09/09/2022    Chronic kidney disease (CKD), stage III (moderate) ([facility] [address] 11/25/2013    Depression     Hypertension     Stroke [facility])     patient had a stroke in the late 90's    Type 2 diabetes mellitus with diabetic polyneuropathy, with long-term current use of insulin ([facility] [address]        Past Surgical History:   Procedure Laterality Date    COLONOSCOPY      COLONOSCOPY N/A 09/13/2021    COLONOSCOPY POLYPECTOMY SNARE/COLD BIOPSY performed by Ivy Waters MD at [place] [address]  01/23/2010    2019    ROTATOR CUFF REPAIR Left 1999    SPINE SURGERY      UPPER GASTROINTESTINAL ENDOSCOPY      UPPER GASTROINTESTINAL ENDOSCOPY N/A 09/13/2021    EGD BIOPSY performed by Ivy Waters MD at [address] [facility] N/A 09/13/2021    EGD POLYP SNARE performed by Ivy Waters MD at [address] [place] [address] N/A 08/23/2022    EGD WITH POSSIBLE  DILATION BALLOON performed by Ivy Waters MD at [address] [facility] N/A 08/23/2022    EGD BIOPSY performed by Ivy Waters MD at [address] [place] [address]       Prior to Admission medications    Medication Sig Start Date End Date Taking?  Authorizing Provider   chlorhexidine (PERIDEX) 0.12 % solution Take 15 mLs by mouth 2 times daily for 14 days Gargle aggressively for 30-60 seconds 9/13/22 9/27/22  JACQUES Betancourt - CNP   aspirin (ASPIRIN LOW DOSE) 81 MG EC tablet TAKE 1 TABLET BY MOUTH DAILY 8/30/22   Susannah Spencer MD   buPROPion [facility] SR) 100 MG extended release tablet Take 100 mg by mouth daily    Historical Provider, MD   hydrALAZINE (APRESOLINE) 25 MG tablet Take 25 mg by mouth daily 5/23/22   Historical Provider, MD   lisinopril (PRINIVIL;ZESTRIL) 40 MG tablet TAKE 1 TABLET BY MOUTH DAILY 5/23/22   Susannah Spencer MD   pantoprazole (PROTONIX) 40 MG tablet TAKE 1 TABLET BY MOUTH DAILY 5/23/22   Susannah Spencer MD   hydroCHLOROthiazide (MICROZIDE) 12.5 MG capsule TAKE 1 CAPSULE BY MOUTH DAILY 5/6/22   Susannah Spencer MD   metoprolol succinate (TOPROL XL) 100 MG extended release tablet Take 0.5 tablets by mouth in the morning and at bedtime 4/19/22   Historical Provider, MD   vitamin D (CHOLECALCIFEROL) 25 MCG (1000 UT) TABS tablet Take 1,000 Units by mouth daily    Historical Provider, MD   amLODIPine (NORVASC) 5 MG tablet Take 1 tablet by mouth daily 4/22/22   Susannah Spencer MD   apixaban (ELIQUIS) 5 MG TABS tablet Take 1 tablet by mouth 2 times daily 4/19/22   Ashlyn Núñez MD   ACETAMINOPHEN EXTRA STRENGTH 500 MG tablet TAKE 2 TABLETS BY MOUTH EVERY 6 HOURS FOR PAIN 11/5/21   Historical Provider, MD   ONETOUCH ULTRA strip  10/19/21   Historical Provider, MD   gabapentin (NEURONTIN) 300 MG capsule Take 300 mg by mouth as needed.  Takes prn 9/9/21   Historical Provider, MD   Blood Pressure KIT 1 kit by Does not apply route daily 8/24/21   Susannah Spencer MD   Alcohol Swabs (B-D SINGLE USE SWABS REGULAR) PADS USE 3 - 4 TIMES DAILY AS DIRECTED 7/31/21   Historical Provider, MD   insulin lispro, 1 Unit Dial, 100 UNIT/ML SOPN Inject 6 Units into the skin 3 times daily Plus  2 units for each 50 above 150 Blood sugar level result    Historical Provider, MD DEL VALLE UF III MINI PEN NEEDLES 31G X 5 MM MISC 1 each by Does not apply route 4 times daily 3/15/21   Historical Provider, MD   tamsulosin (FLOMAX) 0.4 MG capsule Take 0.4 mg by mouth nightly    Historical Provider, MD   insulin glargine (LANTUS) 100 UNIT/ML injection vial Inject 40 Units into the skin nightly    Historical Provider, MD       No Known Allergies    Social History     Socioeconomic History    Marital status: Single     Spouse name: Not on file    Number of children: Not on file    Years of education: Not on file    Highest education level: Not on file   Occupational History    Not on file   Tobacco Use    Smoking status: Never    Smokeless tobacco: Never   Vaping Use    Vaping Use: Never used   Substance and Sexual Activity    Alcohol use: No    Drug use: No    Sexual activity: Not on file   Other Topics Concern    Not on file   Social History Narrative    Not on file     Social Determinants of Health     Financial Resource Strain: Low Risk     Difficulty of Paying Living Expenses: Not hard at all   Food Insecurity: No Food Insecurity    Worried About Running Out of Food in the Last Year: Never true    Ran Out of Food in the Last Year: Never true   Transportation Needs: Not on file   Physical Activity: Not on file   Stress: Not on file   Social Connections: Not on file   Intimate Partner Violence: Not on file   Housing Stability: Not on file       Family History   Problem Relation Age of Onset    Heart Disease Mother     Diabetes Sister          REVIEW OF SYSTEMS:    CONSTITUTIONAL:  negative for  fevers, chills, sweats and fatigue    RESPIRATORY:  negative for  dry cough, cough with sputum, dyspnea, wheezing and chest pain    CARDIOVASCULAR:  negative for chest pain, dyspnea, palpitations, syncope    GASTROINTESTINAL:  negative for nausea, vomiting, change in bowel habits, diarrhea, constipation and abdominal pain    MUSCULOSKELETAL: negative for muscle weakness    SKIN: negative for itching or rashes.     BEHAVIOR/PSYCH:  negative for poor appetite, increased appetite, decreased sleep and poor concentration    All other systems negative      PHYSICAL EXAM:    VITALS:  BP (!) 181/78   Pulse 59   Temp 97.1 °F (36.2 °C) (Temporal)   Resp 16   Ht 6' 2\" (1.88 m)   Wt 260 lb (117.9 kg)   SpO2 100%   BMI 33.38 kg/m²     CONSTITUTIONAL:  awake, alert, cooperative, no apparent distress, and appears stated age    EYES: PERRLA, EOMI    LUNGS:  No increased work of breathing, no audible wheezing    CARDIOVASCULAR:  regular rate and rhythm    ABDOMEN:  Soft non tender non distended     EXTREMITIES: no signs of clubbing or cyanosis. MUSCULOSKELETAL: negative for flaccid muscle tone or spastic movements. SKIN: gross examination reveals no signs of rashes, or diaphoresis. NEURO: Cranial nerves II-XII grossly intact. No signs of agitated mood.        Assessment/Plan:    LBP BLE pain for b/l L4 TFESI

## 2022-09-27 NOTE — DISCHARGE INSTRUCTIONS
Shayan Partida Block/Radiofrequency  Home Going Instructions    1-Go home, rest for the remainder of the day  2-Please do not lift over 20 pounds the day of the injection  3-If you received sedation No: alcohol, driving, operating lawn mowers, plows, tractors or other dangerous equipment until next morning. Do not make important decisions or sign legal documents for 24 hours. You may experience light headedness, dizziness, nausea or sleepiness after sedation. Do not stay alone. A responsible adult must be with you for 24 hours. You could be nauseated from the medications you have received. Your IV site may be sore and bruised. 4-No dietary restrictions     5-Resume all medications the same day, blood thinners to be resumed 24 hours after injection if you were instructed to stop any. 6-Keep the surgical site clean and dry, you may shower the next morning and remove the      dressing. 7- No sitz baths, tub baths or hot tubs/swimming for 24 hours. 8- If you have any pain at the injection site(s), application of an ice pack to the area should be       helpful, 20 minutes on/20 minutes off for next 48 hours. 9- Call [facility] immediately at if you develop.   Fever greater than 100.4 F  Have bleeding or drainage from the puncture site  Have progressive Leg/arm numbness and or weakness  Loss of control of bowel and or bladder (wet/soil yourself)  Severe headache with inability to lift head  10-You may return to work the next day

## 2022-09-27 NOTE — PROGRESS NOTES
Discharge instructions gone over, follow up discussed. Pt verbalized understanding of discharge instructions, all questions answered. Pt ambulatory to exit with this nurse.

## 2022-09-27 NOTE — OP NOTE
2022    Patient: Delfin De La Rosa  :  1950  Age:  71 y.o. Sex:  male     PRE-OPERATIVE DIAGNOSIS: Lumbar disc displacement, lumbar neural foraminal stenosis, lumbar radiculopathy. POST-OPERATIVE DIAGNOSIS: Same. PROCEDURE: Bilateral Transforaminal epidural steroid injection under fluoroscopic guidance at foraminal level L4 (#1). SURGEON: JOSSELINE Bhakta. ANESTHESIA: local    ESTIMATED BLOOD LOSS: None.  ______________________________________________________________________  BRIEF HISTORY: Delfin De La Rosa comes in today for the first Bilateral transforaminal epidural steroid injection under fluoroscopic guidance at foraminal level L4. The potential complications of this procedure were discussed with him again today. He has elected to undergo the aforementioned procedureTomasz Cross complete History & Physical examination were reviewed in depth, a copy of which is in the chart. DESCRIPTION OF PROCEDURE:    After confirming written and informed consent, a time-out was performed and [place] name and date of birth, the procedure to be performed as well as the plan for the location of the needle insertion were confirmed. The patient was brought into the procedure room and placed in the prone position on the fluoroscopy table. Standard monitors were placed and vital signs were observed throughout the procedure. The area of the lumbar spine was prepped with chloraprep and draped in a sterile manner. The vertebral body was identified with AP fluoroscopy. An oblique view was obtained to better visualize the inferior junction of the pedicle and transverse process . The 6 o'clock position of the pedicle was marked and identified. The skin and subcutaneous tissue were anesthetized with 0.5% lidocaine. A # 22 gauge pencil point needle was directed toward the targeted point under fluoroscopy until bone was contacted.  The needle was then walked inferiorly until the neural foramen was entered . A lateral fluoroscopic view was then used to place the needle tip in the middle to anterior aspect of the foramen. Negative aspiration was confirmed for blood and CSF and 1 cc of Isovue-M 300 contrast was injected at each level under live AP fluoroscopy. Appropriate neurograms were observed under live AP fluoroscopy. Then after negative aspiration, a solution of the 2 cc of 0.5% lidocaine and 40 mg DepoMedrol was easily injected between each level. The needles were removed with constant aspiration technique. The patient's back was cleaned and a bandage was placed over the needle insertion points    Disposition the patient tolerated the procedure well and there were no complications . Vital signs remained stable throughout the procedure. The patient was escorted to the recovery area where they remained until discharge and written discharge instructions for the procedure were given. Plan: Rosaline Johnson will return to our pain management center as scheduled.      Archana Pollock, DO

## 2022-10-02 DIAGNOSIS — K21.9 GASTROESOPHAGEAL REFLUX DISEASE, UNSPECIFIED WHETHER ESOPHAGITIS PRESENT: ICD-10-CM

## 2022-10-03 NOTE — TELEPHONE ENCOUNTER
Last Appointment:  8/30/2022  Future Appointments   Date Time Provider Vikram Chelsi   10/4/2022  9:00 AM [facility] 9 XRAY RM SEYZ RAD [facility]   10/14/2022  9:15 AM [facility]ES [facility]ata [facility]NP [facility]   10/19/2022 11:30 AM Brian Goldberg DO BDM PAIN MAR [facility]   12/13/2022  8:30 AM MD Nissa Madsen Patient PC [facility]

## 2022-10-04 ENCOUNTER — HOSPITAL ENCOUNTER (OUTPATIENT)
Dept: GENERAL RADIOLOGY | Age: 72
Discharge: HOME | End: 2022-10-06
Payer: MEDICAID

## 2022-10-04 DIAGNOSIS — R13.10 DYSPHAGIA, UNSPECIFIED TYPE: ICD-10-CM

## 2022-10-04 DIAGNOSIS — K22.2 ESOPHAGEAL STRICTURE: ICD-10-CM

## 2022-10-04 PROCEDURE — 92611 MOTION FLUOROSCOPY/SWALLOW: CPT | Performed by: SPEECH-LANGUAGE PATHOLOGIST

## 2022-10-04 PROCEDURE — 92526 ORAL FUNCTION THERAPY: CPT | Performed by: SPEECH-LANGUAGE PATHOLOGIST

## 2022-10-04 PROCEDURE — 74230 X-RAY XM SWLNG FUNCJ C+: CPT

## 2022-10-04 RX ORDER — PANTOPRAZOLE SODIUM 40 MG/1
40 TABLET, DELAYED RELEASE ORAL DAILY
Qty: 90 TABLET | Refills: 1 | Status: SHIPPED | OUTPATIENT
Start: 2022-10-04

## 2022-10-04 NOTE — PROGRESS NOTES
SPEECH/LANGUAGE PATHOLOGY  VIDEOFLUOROSCOPIC STUDY OF SWALLOWING (MBS)   and PLAN OF CARE    PATIENT NAME:  Francisco Javier Lopez  (male)     MRN:  [id number]    :  1950  (71 y.o.)  STATUS:  Inpatient: Room Room/bed info not found    TODAY'S DATE:  10/4/2022  REFERRING PROVIDER:   GERRY Jolley   SPECIFIC PROVIDER ORDER: FL modified barium swallow with video  Date of order:  22     REASON FOR REFERRAL: Pt reports trouble swallowing, and reports enlarged tonsils on most recent dilation. Pt reports his dysphagia is OK in the am s/p taking Pantoprazole but gets harder as day progresses. S/P ACDF years ago. Hiatal Hernia per Pt report.       EVALUATING THERAPIST: FARZANA Munoz      RESULTS:      DYSPHAGIA DIAGNOSIS:  normal swallow function    -- cannot rule out esophageal motility issue; consider esophogram and/or GI consult to further assess     DIET RECOMMENDATIONS:  Regular consistency solids (IDDSI level 7) with  thin liquids (IDDSI level 0)    FEEDING RECOMMENDATIONS:    Assistance level:  Not applicable     Compensatory strategies recommended: Double Swallow     Discussed recommendations with nursing and/or faxed report to referring provider: Yes    SPEECH THERAPY  PLAN OF CARE   The dysphagia POC is established based on physician order and dysphagia diagnosis    Dysphagia therapy is not recommended       Conditions Requiring Skilled Therapeutic Intervention for dysphagia:    not applicable    SPECIFIC DYSPHAGIA INTERVENTIONS TO INCLUDE:     Not applicable    Specific instructions for next treatment:  not applicable   Treatment Goals:    Short Term Goals:  Not applicable no therapy warranted     Long Term Goals:   Not applicable no therapy warranted      Patient/family Goal:    not applicable                    ADMITTING DIAGNOSIS: Dysphagia, unspecified type [R13.10]  Esophageal stricture [K22.2]     VISIT DIAGNOSIS:   Visit Diagnoses         Codes    Dysphagia, unspecified type     R13.10 Esophageal stricture     K22.2                PATIENT REPORT/COMPLAINT: see above    PRIOR LEVEL OF SWALLOW FUNCTION:    Past History of Dysphagia?:  none reported    Home diet: Regular consistency solids (IDDSI level 7) with  thin liquids (IDDSI level 0)      Current Diet Order:  No diet orders on file    PROCEDURE:  Consistencies Administered During the Evaluation   Liquids: thin liquid and nectar thick liquid   Solids:  pureed foods and solid foods      Method of Intake:   cup, straw, spoon  Self fed      Position:   Standing    INSTRUMENTAL ASSESSMENT:    ORAL PREPARATION PHASE:    Within functional limits    ORAL PHASE:   Within functional limits    PHARYNGEAL PHASE:     ONSET TIME       Onset time of the pharyngeal swallow was initiated at the base of tongue       PHARYNGEAL RESIDUALS        Vallecula/Pharyngeal Wall           Trace residuals were noted in the vallecula s/p thin liquid bolus which cleared with cued double swallow      Pyriform Sinuses      No significant residuals were noted in the pyriform sinuses     LARYNGEAL PENETRATION   Laryngeal penetration was not present during this evaluation    ASPIRATION  Aspiration was not present during this evaluation    PENETRATION-ASPIRATION SCALE (PAS):  THIN 2 = Material enters the airway, remains above vocal folds, and is ejected from the airway   MILDLY THICK 1 = Material does not enter the airway  MODERATELY THICK item not administered  PUREE 1 = Material does not enter the airway  HARD SOLID 1 = Material does not enter the airway       COMPENSATORY STRATEGIES    Compensatory strategies were not attempted      STRUCTURAL/FUNCTIONAL ANOMALIES   No structural/functional anomalies were noted    CERVICAL ESOPHAGEAL STAGE :     The cervical esophagus appeared adequate          ___________    Cognition:   Within functional limits for this exam    Oral Peripheral Examination   Adequate lingual/labial strength     Current Respiratory Status   room air     Parameters of Speech Production  Respiration:  Adequate for speech production  Quality:   Within functional limits  Intensity: Within functional limits    Pain: No pain reported. EDUCATION:   The Speech Language Pathologist (SLP) completed education regarding results of evaluation and that intervention is not warranted at this time. Learner: Patient  Education: Reviewed results and recommendations of this evaluation, Reviewed diet and strategies, Reviewed recommendations for follow-up, and Education Related to Potential Risks and Complications Due to Impairment/Illness/Injury  Evaluation of Education:  Verbalizes understanding    This plan may be re-evaluated and revised as warranted. Evaluation Time includes thorough review of current medical information, gathering information on past medical history/social history and prior level of function, completion of standardized testing/informal observation of tasks, assessment of data and education on plan of care and goals. [x]The admitting diagnosis and active problem list, have been reviewed prior to initiation of this evaluation.     CPT Code: 06469  dysphagia study    ACTIVE PROBLEM LIST:   Patient Active Problem List   Diagnosis    Type 2 diabetes mellitus with chronic kidney disease ([facility] [address]    HTN (hypertension)    A-fib ([facility] [address]    Avulsion of right patellar tendon    Benign hypertensive kidney disease with chronic kidney disease    Stage 3b chronic kidney disease ([address]    Obesity with body mass index 30 or greater    Chronic pain syndrome [G89.4 (ICD-10-CM)]    Spinal stenosis of lumbar region with neurogenic claudication    Chronic bilateral low back pain with bilateral sciatica    Lumbar radiculopathy       INTERVENTION  CPT Code: 82323  dysphagia tx    Speech Pathologist (SLP) completed education with the patient/family regarding procedure of Modified Barium Swallow Study prior to exam and then type of swallowing impairment following completion of MBSS. Reviewed current solid/liquid consistency diet recommendations --   Regular consistency solids with  thin liquids (IDDSI level 0) and discussed compensatory strategies (double swallow) to ensure safe PO intake. Images from MBSS reviewed with patient/ family and education provided. Reviewed aspiration precautions. Encouraged patient and/or family to engage SLP in unstructured Q&A session relative to identified deficit areas; indicated understanding of all information provided via satisfactory verbal response.         Harjit Ma M.S., [address] Pathologist  [id number]  10/4/2022

## 2022-10-14 ENCOUNTER — OFFICE VISIT (OUTPATIENT)
Dept: ENT CLINIC | Age: 72
End: 2022-10-14
Payer: MEDICAID

## 2022-10-14 VITALS — DIASTOLIC BLOOD PRESSURE: 82 MMHG | HEART RATE: 72 BPM | SYSTOLIC BLOOD PRESSURE: 147 MMHG

## 2022-10-14 DIAGNOSIS — R13.10 DYSPHAGIA, UNSPECIFIED TYPE: Primary | ICD-10-CM

## 2022-10-14 DIAGNOSIS — Z01.818 PREOP TESTING: ICD-10-CM

## 2022-10-14 DIAGNOSIS — J35.1 TONSILLAR HYPERTROPHY: ICD-10-CM

## 2022-10-14 DIAGNOSIS — K22.2 ESOPHAGEAL STRICTURE: ICD-10-CM

## 2022-10-14 DIAGNOSIS — J35.8 TONSIL STONE: ICD-10-CM

## 2022-10-14 PROCEDURE — 3017F COLORECTAL CA SCREEN DOC REV: CPT | Performed by: NURSE PRACTITIONER

## 2022-10-14 PROCEDURE — 1036F TOBACCO NON-USER: CPT | Performed by: NURSE PRACTITIONER

## 2022-10-14 PROCEDURE — G8484 FLU IMMUNIZE NO ADMIN: HCPCS | Performed by: NURSE PRACTITIONER

## 2022-10-14 PROCEDURE — 99214 OFFICE O/P EST MOD 30 MIN: CPT | Performed by: NURSE PRACTITIONER

## 2022-10-14 PROCEDURE — G8417 CALC BMI ABV UP PARAM F/U: HCPCS | Performed by: NURSE PRACTITIONER

## 2022-10-14 PROCEDURE — G8427 DOCREV CUR MEDS BY ELIG CLIN: HCPCS | Performed by: NURSE PRACTITIONER

## 2022-10-14 PROCEDURE — 1123F ACP DISCUSS/DSCN MKR DOCD: CPT | Performed by: NURSE PRACTITIONER

## 2022-10-14 ASSESSMENT — ENCOUNTER SYMPTOMS
SORE THROAT: 0
RHINORRHEA: 0
RESPIRATORY NEGATIVE: 1
EYES NEGATIVE: 1
SINUS PRESSURE: 0
SHORTNESS OF BREATH: 0
SINUS PAIN: 0
STRIDOR: 0
VOICE CHANGE: 0
TROUBLE SWALLOWING: 1

## 2022-10-14 NOTE — PROGRESS NOTES
[id number] [facility] Otolaryngology  Dr. Marlene Win. [facility] Ms.Ed        Patient Name:  Landy Valdes  :  1950     CHIEF C/O:    Chief Complaint   Patient presents with    Follow-up     1 month swallow study       HISTORY OBTAINED FROM:  patient    HISTORY OF PRESENT ILLNESS:       Boone Sears is a 71y.o. year old male, here today for follow up of dysphagia, enlarged tonsils. Last seen 1 month ago  Peridex for 14 days, no changes  Had swallow eval - normal  Continues to have some issues swallowing  States gets worse throughout the day  No current sore throat  No difficulty breathing.   States his symptoms do improve after he has esophageal dilations, last 2 months ago  Symptoms are slowly worsening        Past Medical History:   Diagnosis Date    Arthritis     Benign hypertensive kidney disease with chronic kidney disease 10/06/2021    Cervical vertebra fusion 2000    Chronic bilateral low back pain with bilateral sciatica 2022    Chronic kidney disease (CKD), stage III (moderate) ([address] 2013    Depression     Hypertension     Stroke [facility])     patient had a stroke in the late     Type 2 diabetes mellitus with diabetic polyneuropathy, with long-term current use of insulin ([address]      Past Surgical History:   Procedure Laterality Date    COLONOSCOPY      COLONOSCOPY N/A 2021    COLONOSCOPY POLYPECTOMY SNARE/COLD BIOPSY performed by Ness Hayward MD at [place] [address]  2010    2019    PAIN MANAGEMENT PROCEDURE Bilateral 2022    BILATERAL L4 TRANSFORAMINAL EPIDURAL STEROID INJECTION performed by Onur Shea DO at [facility] ENDOSCOPY      UPPER GASTROINTESTINAL ENDOSCOPY N/A 2021    EGD BIOPSY performed by Ness Hayward MD at [place] [address] N/A 2021    EGD POLYP SNARE performed by Loan Arenas MD Rafael at [address] [facility] 08/23/2022    EGD WITH POSSIBLE  DILATION BALLOON performed by Magalys Art MD at [address] [facility] N/A 08/23/2022    EGD BIOPSY performed by Magalys Art MD at [facility] ENDOSCOPY       Current Outpatient Medications:     pantoprazole (PROTONIX) 40 MG tablet, TAKE 1 TABLET BY MOUTH DAILY, Disp: 90 tablet, Rfl: 1    aspirin (ASPIRIN LOW DOSE) 81 MG EC tablet, TAKE 1 TABLET BY MOUTH DAILY, Disp: 90 tablet, Rfl: 1    buPROPion (WELLBUTRIN SR) 100 MG extended release tablet, Take 100 mg by mouth daily, Disp: , Rfl:     hydrALAZINE (APRESOLINE) 25 MG tablet, Take 25 mg by mouth daily, Disp: , Rfl:     lisinopril (PRINIVIL;ZESTRIL) 40 MG tablet, TAKE 1 TABLET BY MOUTH DAILY, Disp: 90 tablet, Rfl: 1    hydroCHLOROthiazide (MICROZIDE) 12.5 MG capsule, TAKE 1 CAPSULE BY MOUTH DAILY, Disp: 90 capsule, Rfl: 1    metoprolol succinate (TOPROL XL) 100 MG extended release tablet, Take 0.5 tablets by mouth in the morning and at bedtime, Disp: , Rfl:     vitamin D (CHOLECALCIFEROL) 25 MCG (1000 UT) TABS tablet, Take 1,000 Units by mouth daily, Disp: , Rfl:     amLODIPine (NORVASC) 5 MG tablet, Take 1 tablet by mouth daily, Disp: 90 tablet, Rfl: 1    apixaban (ELIQUIS) 5 MG TABS tablet, Take 1 tablet by mouth 2 times daily, Disp: 180 tablet, Rfl: 3    ACETAMINOPHEN EXTRA STRENGTH 500 MG tablet, TAKE 2 TABLETS BY MOUTH EVERY 6 HOURS FOR PAIN, Disp: , Rfl:     ONETOUCH ULTRA strip, , Disp: , Rfl:     gabapentin (NEURONTIN) 300 MG capsule, Take 300 mg by mouth as needed.  Takes prn, Disp: , Rfl:     Blood Pressure KIT, 1 kit by Does not apply route daily, Disp: 1 kit, Rfl: 0    Alcohol Swabs (B-D SINGLE USE SWABS REGULAR) PADS, USE 3 - 4 TIMES DAILY AS DIRECTED, Disp: , Rfl:     insulin lispro, 1 Unit Dial, 100 UNIT/ML SOPN, Inject 6 Units into the skin 3 times daily Plus  2 units for each 50 above 150 Blood sugar level result, Disp: , Rfl:     B-D UF III MINI PEN NEEDLES 31G X 5 MM MISC, 1 each by Does not apply route 4 times daily, Disp: , Rfl:     tamsulosin (FLOMAX) 0.4 MG capsule, Take 0.4 mg by mouth nightly, Disp: , Rfl:     insulin glargine (LANTUS) 100 UNIT/ML injection vial, Inject 40 Units into the skin nightly, Disp: , Rfl:   Patient has no known allergies. Social History     Tobacco Use    Smoking status: Never    Smokeless tobacco: Never   Vaping Use    Vaping Use: Never used   Substance Use Topics    Alcohol use: No    Drug use: No     Family History   Problem Relation Age of Onset    Heart Disease Mother     Diabetes Sister        Review of Systems   Constitutional: Negative. Negative for activity change and appetite change. HENT:  Positive for trouble swallowing. Negative for congestion, ear discharge, ear pain, hearing loss, postnasal drip, rhinorrhea, sinus pressure, sinus pain, sore throat and voice change. Eyes: Negative. Respiratory: Negative. Negative for shortness of breath and stridor. Cardiovascular: Negative. Negative for chest pain and palpitations. Endocrine: Negative. Musculoskeletal: Negative. Skin: Negative. Neurological: Negative. Negative for dizziness. Hematological: Negative. Psychiatric/Behavioral: Negative. BP (!) 147/82   Pulse 72   Physical Exam  Constitutional:       Appearance: Normal appearance. HENT:      Head: Normocephalic. Right Ear: Tympanic membrane, ear canal and external ear normal.      Left Ear: Tympanic membrane, ear canal and external ear normal.      Nose: No rhinorrhea. Right Turbinates: Enlarged. Not pale. Left Turbinates: Enlarged. Not pale. Mouth/Throat:      Lips: Pink. Mouth: Mucous membranes are moist.      Dentition: Abnormal dentition. Tonsils: 2+ on the right. 2+ on the left.       Comments: 2+ tonsils bilaterally with presence of multiple tonsil stones bilaterally  Eyes: Conjunctiva/sclera: Conjunctivae normal.      Pupils: Pupils are equal, round, and reactive to light. Cardiovascular:      Rate and Rhythm: Normal rate and regular rhythm. Pulses: Normal pulses. Pulmonary:      Effort: Pulmonary effort is normal. No respiratory distress. Breath sounds: No stridor. Musculoskeletal:         General: Normal range of motion. Cervical back: Normal range of motion. No rigidity. No muscular tenderness. Skin:     General: Skin is warm and dry. Neurological:      General: No focal deficit present. Mental Status: He is alert and oriented to person, place, and time. Psychiatric:         Mood and Affect: Mood normal.         Behavior: Behavior normal.         Thought Content: Thought content normal.         Judgment: Judgment normal.     Swallow Eval:  Impression   No barium aspiration or significant swallowing deficits. Transient laryngeal   penetration with thin liquid barium. Please see separate speech pathology report for full discussion of findings   and recommendations. IMPRESSION/PLAN:    Nayana Green was seen today for follow-up. Diagnoses and all orders for this visit:    Dysphagia, unspecified type  -     CT SOFT TISSUE NECK W WO CONTRAST; Future    Tonsillar hypertrophy  -     CT SOFT TISSUE NECK W WO CONTRAST; Future    Preop testing  -     BUN; Future  -     Creatinine; Future    Esophageal stricture    Tonsil stone      Swallow study is reviewed with patient showing no significant aspiration or swallowing deficits. There is some transient laryngeal penetration with thin liquids barium but upon review of speech pathology report these were cleared with a double swallow. Patient is again offered laryngoscope in the office for further evaluation of the larynx and hypopharynx but declines.   At this time he will undergo a CT of the neck with contrast if BUN/creatinine levels allow for contrast.  He will follow-up in 1 month for reevaluation. He is instructed to call with any new or worsening symptoms prior to his next appointment.       FLAKO Evans, FNP-C  [facility], Nose and Throat    The information contained in this note has been dictated using drug and medical speech recognition software and may contain errors

## 2022-10-18 NOTE — PROGRESS NOTES
[address] Pain Management        [facility] [address]  LOTTIE CHAUDHARY [facility] - [facility], [address] [place] [address]  Dept: [phone]        Follow up Note      Nicol Banks     Date of Visit:  10/19/22     CC:  Patient presents for follow up   Chief Complaint   Patient presents with    Follow Up After Procedure     BILATERAL L4 TRANSFORAMINAL EPIDURAL STEROID INJECTION    Lower Back Pain       HPI:    Pain is better. Change in quality of symptoms:no. Medication side effects:not applicable . Recent diagnostic testing:none. Recent interventional procedures:b/l L4 TFESI with 50% relief. He has been on anticoagulation medications to include ELIQUIS and ASA and has not been on herbal supplements. He is diabetic. Imagin lumbar MRI -  FINDINGS:   BONES/ALIGNMENT: There is normal alignment of the spine. The vertebral body   heights are maintained. The bone marrow signal appears unremarkable. SPINAL CORD: The conus terminates normally. SOFT TISSUES: No mass or edema seen. L1-L2: No disc herniation. No significant facet or ligamentum flavum   hypertrophy. No significant central stenosis. There are mild-to-moderate   neural stenoses which appear to result from developmentally thickened and   shortened pedicles. L2-L3: Minimal disc bulge. Mild facet hypertrophy. No significant central   canal or lateral recess stenoses. Moderate neural foraminal stenoses. L3-L4: Disc desiccation with a small disc bulge. Mild-to-moderate facet and   ligamentum flavum hypertrophy. Mild central canal stenosis. Moderate   lateral recess and severe neural foraminal stenoses. L4-L5: Grade 1 anterolisthesis of L4 over L5 by 3-4 mm. Small disc bulge. Moderate facet and ligamentous hypertrophy. Severe central canal stenosis,   with narrowing of the AP diameter of the thecal sac in the midsagittal plane   to 6 mm. Moderate to severe lateral recess and severe neural foraminal   stenoses. L5-S1: Small disc protrusion in the right neural foramina. Mild facet and   ligamentous hypertrophy. No significant central canal or lateral recess   stenosis. Severe right and moderate to severe left neural foraminal stenoses. Impression   1. No fracture or bony destructive lesion. 2. Grade 1 anterolisthesis of L4 over L5.   3. Severe central canal stenosis at L4-5. Mild stenosis at L3-4.   4.  Multilevel neural foraminal stenoses, worst (severe) at bilateral L3-4   and L4-5 and right L5-S1 levels. 5. The central canal stenoses and especially the neural foraminal stenoses   appear to be predominantly stenotic as result of developmentally thickened   and shortened pedicles. 6/2022 lumbar f/e -  FINDINGS:   Neutral, flexion and extension lateral views demonstrate no abnormal motion. Stable minimal anterolisthesis of L4 on L5 due to facet arthropathy. Calcification in the right posterior abdomen likely represents   nephrolithiasis. No acute fracture or subluxation in the vertebral bodies. There are 5 lumbar type vertebral bodies. Impression   1. Grade 1 anterolisthesis of 4 on 5. No abnormal motion with flexion and   extension. 2.  Lower lumbar facet arthropathy.        3.  Right nephrolithiasis         Potential Aberrant Drug-Related Behavior:      Urine Drug Screening:    OARRS report:    Past Medical History:   Diagnosis Date    Arthritis     Benign hypertensive kidney disease with chronic kidney disease 10/06/2021    Cervical vertebra fusion 01/01/2000    Chronic bilateral low back pain with bilateral sciatica 09/09/2022    Chronic kidney disease (CKD), stage III (moderate) ([address] 11/25/2013    Depression     Hypertension     Stroke [facility])     patient had a stroke in the late 90's    Type 2 diabetes mellitus with diabetic polyneuropathy, with long-term current use of insulin ([address]        Past Surgical History:   Procedure Laterality Date    COLONOSCOPY COLONOSCOPY N/A 09/13/2021    COLONOSCOPY POLYPECTOMY SNARE/COLD BIOPSY performed by Seth Amaya MD at [facility]  01/23/2010 2019    PAIN MANAGEMENT PROCEDURE Bilateral 9/27/2022    BILATERAL L4 TRANSFORAMINAL EPIDURAL STEROID INJECTION performed by Katie Mar DO at [address] [facility] GASTROINTESTINAL ENDOSCOPY      UPPER GASTROINTESTINAL ENDOSCOPY N/A 09/13/2021    EGD BIOPSY performed by Seth Amaya MD at [address] [facility] N/A 09/13/2021    EGD POLYP SNARE performed by Seth Amaya MD at [facility] [facility] 08/23/2022    EGD WITH POSSIBLE  DILATION BALLOON performed by Seth Amaya MD at [address] [facility] N/A 08/23/2022    EGD BIOPSY performed by Seth Amaya MD at [address] [place] [address]       Prior to Admission medications    Medication Sig Start Date End Date Taking?  Authorizing Provider   pantoprazole (PROTONIX) 40 MG tablet TAKE 1 TABLET BY MOUTH DAILY 10/4/22  Yes Isacc Gonzalez MD   aspirin (ASPIRIN LOW DOSE) 81 MG EC tablet TAKE 1 TABLET BY MOUTH DAILY 8/30/22  Yes Isacc Gonzalez MD   buPROPion [facility] SR) 100 MG extended release tablet Take 100 mg by mouth daily   Yes Historical Provider, MD   hydrALAZINE (APRESOLINE) 25 MG tablet Take 25 mg by mouth daily 5/23/22  Yes Historical Provider, MD   lisinopril (PRINIVIL;ZESTRIL) 40 MG tablet TAKE 1 TABLET BY MOUTH DAILY 5/23/22  Yes Isacc Gonzalez MD   hydroCHLOROthiazide (MICROZIDE) 12.5 MG capsule TAKE 1 CAPSULE BY MOUTH DAILY 5/6/22  Yes Isacc Gonzalez MD   metoprolol succinate (TOPROL XL) 100 MG extended release tablet Take 0.5 tablets by mouth in the morning and at bedtime 4/19/22  Yes Historical Provider, MD   vitamin D (CHOLECALCIFEROL) 25 MCG (1000 UT) TABS tablet Take Risk     Difficulty of Paying Living Expenses: Not hard at all   Food Insecurity: No Food Insecurity    Worried About Running Out of Food in the Last Year: Never true    Ran Out of Food in the Last Year: Never true   Transportation Needs: Not on file   Physical Activity: Not on file   Stress: Not on file   Social Connections: Not on file   Intimate Partner Violence: Not on file   Housing Stability: Not on file       Family History   Problem Relation Age of Onset    Heart Disease Mother     Diabetes Sister        REVIEW OF SYSTEMS:     [facility] denies fever/chills, chest pain, shortness of breath, new bowel or bladder complaints. All other review of systems was negative. PHYSICAL EXAMINATION:      /64   Pulse 65   Temp 98.3 °F (36.8 °C) (Infrared)   Resp 16   Ht 6' 2\" (1.88 m)   Wt 260 lb (117.9 kg)   SpO2 96%   BMI 33.38 kg/m²     General:       General appearance:pleasant and well-hydrated, in no distress and A & O x3  Build:Overweight  Function:Rises from a seated position with difficulty - improved     HEENT:     Head:normocephalic, atraumatic  Pupils:regular, round, equal  Sclera: icterus absent     Lungs:     Breathing:normal breathing pattern     Abdomen:     Shape:non-distended  Tenderness:none  Guarding:none     Lumbar spine:     Spine inspection:normal   CVA tenderness:No   Palpation:tenderness paravertebral muscles, left, right positive - improved. Range of motion:abnormal mildly in lateral bending, flexion, extension rotation bilateral and is painful.      Musculoskeletal:     Trigger points in Paraveteral:absent bilaterally  SI joint tenderness:negative right, negative left              NELLY test:not done right, not done left  Piriformis tenderness:negative right, negative left  Trochanteric bursa tenderness:negative right, negative left  SLR:negative right, negative left, sitting      Extremities:     Tremors:None bilaterally upper and lower  Range of motion:pain with internal rotation of hips negative. Intact:Yes  Varicose veins:absent   Pulses:present Lt radial  Cyanosis:none  Edema:none x all 4 extremities     Neurological:     Sensory:decreased to light touch in stocking distribution  Motor:                Right Quadriceps5/5          Left Quadriceps5/5           Right Gastrocnemius5/5    Left Gastrocnemius5/5  Right Ant Tibialis5/5  Left Ant Tibialis5/5     Reflexes:    Right Quadriceps reflex0-1+  Left Quadriceps reflex0-1+  Right Achilles reflex0-1+  Left Achilles reflex0-1+  Gait:forward flexed     Dermatology:     Skin:no rashes or lesions noted     Impression:     LBP b/l buttock pain with s/s consistent with neurogenic claudication due to severe stenosis at   PMHx: A. Fib on ELIQUIS and ASA, HTN, CKD stage III, CVA, DM2, depression     Plan:     Had appt with Dr. Sushma Rosen to eval for enlarged tonsils noted during esophageal dilation, declined in office laryngoscopy, scheduled for CT  Urine screen deferred  OARRS report reviewed  On gabapentin 300 mg BID (pt reports taking only prn) for DM neuropathy  On ELIQUIS  and ASA for a. Fib  B/l L4 TFESI #1 with 50% relief, pt would like to repeat - r/b and procedure discussed (must hold ELIQUIS and ASA  per AYLIN guidelines)  **Next visit pt would like to discuss cervical symptoms (prior ACDF in 2000)  Patient encouraged to stay active and to lose weight  Treatment plan discussed with the patient including medication and procedure side effects       Cc:  Referring physician    JOSSELINE Marquez.

## 2022-10-19 ENCOUNTER — OFFICE VISIT (OUTPATIENT)
Dept: PAIN MANAGEMENT | Age: 72
End: 2022-10-19
Payer: MEDICAID

## 2022-10-19 ENCOUNTER — PREP FOR PROCEDURE (OUTPATIENT)
Dept: PAIN MANAGEMENT | Age: 72
End: 2022-10-19

## 2022-10-19 VITALS
RESPIRATION RATE: 16 BRPM | WEIGHT: 260 LBS | OXYGEN SATURATION: 96 % | SYSTOLIC BLOOD PRESSURE: 126 MMHG | HEART RATE: 65 BPM | HEIGHT: 74 IN | TEMPERATURE: 98.3 F | BODY MASS INDEX: 33.37 KG/M2 | DIASTOLIC BLOOD PRESSURE: 64 MMHG

## 2022-10-19 DIAGNOSIS — M48.062 SPINAL STENOSIS OF LUMBAR REGION WITH NEUROGENIC CLAUDICATION: ICD-10-CM

## 2022-10-19 DIAGNOSIS — M54.16 LUMBAR RADICULOPATHY: Primary | ICD-10-CM

## 2022-10-19 DIAGNOSIS — G89.29 CHRONIC BILATERAL LOW BACK PAIN WITH BILATERAL SCIATICA: ICD-10-CM

## 2022-10-19 DIAGNOSIS — M54.42 CHRONIC BILATERAL LOW BACK PAIN WITH BILATERAL SCIATICA: ICD-10-CM

## 2022-10-19 DIAGNOSIS — G89.4 CHRONIC PAIN SYNDROME: Primary | ICD-10-CM

## 2022-10-19 DIAGNOSIS — M54.16 LUMBAR RADICULOPATHY: ICD-10-CM

## 2022-10-19 DIAGNOSIS — M54.41 CHRONIC BILATERAL LOW BACK PAIN WITH BILATERAL SCIATICA: ICD-10-CM

## 2022-10-19 PROCEDURE — 99213 OFFICE O/P EST LOW 20 MIN: CPT | Performed by: PAIN MEDICINE

## 2022-10-19 PROCEDURE — 1036F TOBACCO NON-USER: CPT | Performed by: PAIN MEDICINE

## 2022-10-19 PROCEDURE — 1123F ACP DISCUSS/DSCN MKR DOCD: CPT | Performed by: PAIN MEDICINE

## 2022-10-19 PROCEDURE — G8417 CALC BMI ABV UP PARAM F/U: HCPCS | Performed by: PAIN MEDICINE

## 2022-10-19 PROCEDURE — 3017F COLORECTAL CA SCREEN DOC REV: CPT | Performed by: PAIN MEDICINE

## 2022-10-19 PROCEDURE — G8484 FLU IMMUNIZE NO ADMIN: HCPCS | Performed by: PAIN MEDICINE

## 2022-10-19 PROCEDURE — G8427 DOCREV CUR MEDS BY ELIG CLIN: HCPCS | Performed by: PAIN MEDICINE

## 2022-10-24 ENCOUNTER — HOSPITAL ENCOUNTER (OUTPATIENT)
Age: 72
Discharge: HOME | End: 2022-10-24
Payer: MEDICAID

## 2022-10-24 ENCOUNTER — HOSPITAL ENCOUNTER (OUTPATIENT)
Dept: CT IMAGING | Age: 72
Discharge: HOME | End: 2022-10-26
Payer: MEDICAID

## 2022-10-24 ENCOUNTER — TELEPHONE (OUTPATIENT)
Dept: ENT CLINIC | Age: 72
End: 2022-10-24

## 2022-10-24 DIAGNOSIS — K22.2 ESOPHAGEAL STRICTURE: ICD-10-CM

## 2022-10-24 DIAGNOSIS — K22.2 ESOPHAGEAL STRICTURE: Primary | ICD-10-CM

## 2022-10-24 DIAGNOSIS — R13.10 DYSPHAGIA, UNSPECIFIED TYPE: Primary | ICD-10-CM

## 2022-10-24 DIAGNOSIS — J35.1 TONSILLAR HYPERTROPHY: ICD-10-CM

## 2022-10-24 DIAGNOSIS — R13.10 DYSPHAGIA, UNSPECIFIED TYPE: ICD-10-CM

## 2022-10-24 DIAGNOSIS — Z01.818 PREOP TESTING: ICD-10-CM

## 2022-10-24 LAB
BUN BLDV-MCNC: 16 MG/DL (ref 6–23)
CREAT SERPL-MCNC: 2 MG/DL (ref 0.7–1.2)
GFR SERPL CREATININE-BSD FRML MDRD: 35 ML/MIN/1.73

## 2022-10-24 PROCEDURE — 36415 COLL VENOUS BLD VENIPUNCTURE: CPT

## 2022-10-24 PROCEDURE — 84520 ASSAY OF UREA NITROGEN: CPT

## 2022-10-24 PROCEDURE — 82565 ASSAY OF CREATININE: CPT

## 2022-10-24 PROCEDURE — 70490 CT SOFT TISSUE NECK W/O DYE: CPT

## 2022-10-24 NOTE — TELEPHONE ENCOUNTER
Please Advise    [facility] from [facility] called to notify Romian Quan NP of labs, Shaji notified me notify CT process without contrast, will re evaluate at later time/ date with contrast.

## 2022-10-28 ENCOUNTER — HOSPITAL ENCOUNTER (OUTPATIENT)
Age: 72
Discharge: HOME | End: 2022-10-28
Payer: MEDICAID

## 2022-10-28 LAB
ALBUMIN SERPL-MCNC: 3.9 G/DL (ref 3.5–5.2)
ALP BLD-CCNC: 130 U/L (ref 40–129)
ALT SERPL-CCNC: 12 U/L (ref 0–40)
ANION GAP SERPL CALCULATED.3IONS-SCNC: 15 MMOL/L (ref 7–16)
AST SERPL-CCNC: 21 U/L (ref 0–39)
BASOPHILS ABSOLUTE: 0.04 E9/L (ref 0–0.2)
BASOPHILS RELATIVE PERCENT: 0.7 % (ref 0–2)
BILIRUB SERPL-MCNC: 0.4 MG/DL (ref 0–1.2)
BUN BLDV-MCNC: 21 MG/DL (ref 6–23)
CALCIUM SERPL-MCNC: 9.3 MG/DL (ref 8.6–10.2)
CHLORIDE BLD-SCNC: 103 MMOL/L (ref 98–107)
CHOLESTEROL, TOTAL: 152 MG/DL (ref 0–199)
CO2: 22 MMOL/L (ref 22–29)
CREAT SERPL-MCNC: 2.1 MG/DL (ref 0.7–1.2)
EOSINOPHILS ABSOLUTE: 0.14 E9/L (ref 0.05–0.5)
EOSINOPHILS RELATIVE PERCENT: 2.4 % (ref 0–6)
GFR SERPL CREATININE-BSD FRML MDRD: 33 ML/MIN/1.73
GLUCOSE BLD-MCNC: 132 MG/DL (ref 74–99)
HBA1C MFR BLD: 5.8 % (ref 4–5.6)
HCT VFR BLD CALC: 39.5 % (ref 37–54)
HDLC SERPL-MCNC: 39 MG/DL
HEMOGLOBIN: 12.7 G/DL (ref 12.5–16.5)
IMMATURE GRANULOCYTES #: 0.02 E9/L
IMMATURE GRANULOCYTES %: 0.3 % (ref 0–5)
LDL CHOLESTEROL CALCULATED: 95 MG/DL (ref 0–99)
LYMPHOCYTES ABSOLUTE: 1.74 E9/L (ref 1.5–4)
LYMPHOCYTES RELATIVE PERCENT: 30.1 % (ref 20–42)
MCH RBC QN AUTO: 28.2 PG (ref 26–35)
MCHC RBC AUTO-ENTMCNC: 32.2 % (ref 32–34.5)
MCV RBC AUTO: 87.8 FL (ref 80–99.9)
MONOCYTES ABSOLUTE: 0.67 E9/L (ref 0.1–0.95)
MONOCYTES RELATIVE PERCENT: 11.6 % (ref 2–12)
NEUTROPHILS ABSOLUTE: 3.18 E9/L (ref 1.8–7.3)
NEUTROPHILS RELATIVE PERCENT: 54.9 % (ref 43–80)
PDW BLD-RTO: 14.2 FL (ref 11.5–15)
PLATELET # BLD: 222 E9/L (ref 130–450)
PMV BLD AUTO: 12 FL (ref 7–12)
POTASSIUM SERPL-SCNC: 3.5 MMOL/L (ref 3.5–5)
RBC # BLD: 4.5 E12/L (ref 3.8–5.8)
SODIUM BLD-SCNC: 140 MMOL/L (ref 132–146)
T4 FREE: 1.35 NG/DL (ref 0.93–1.7)
TOTAL PROTEIN: 8 G/DL (ref 6.4–8.3)
TRIGL SERPL-MCNC: 89 MG/DL (ref 0–149)
TSH SERPL DL<=0.05 MIU/L-ACNC: 1.08 UIU/ML (ref 0.27–4.2)
VITAMIN B-12: 287 PG/ML (ref 211–946)
VLDLC SERPL CALC-MCNC: 18 MG/DL
WBC # BLD: 5.8 E9/L (ref 4.5–11.5)

## 2022-10-28 PROCEDURE — 84439 ASSAY OF FREE THYROXINE: CPT

## 2022-10-28 PROCEDURE — 80061 LIPID PANEL: CPT

## 2022-10-28 PROCEDURE — 82607 VITAMIN B-12: CPT

## 2022-10-28 PROCEDURE — 80053 COMPREHEN METABOLIC PANEL: CPT

## 2022-10-28 PROCEDURE — 84443 ASSAY THYROID STIM HORMONE: CPT

## 2022-10-28 PROCEDURE — 83036 HEMOGLOBIN GLYCOSYLATED A1C: CPT

## 2022-10-28 PROCEDURE — 85025 COMPLETE CBC W/AUTO DIFF WBC: CPT

## 2022-10-28 PROCEDURE — 36415 COLL VENOUS BLD VENIPUNCTURE: CPT

## 2022-10-31 ENCOUNTER — HOSPITAL ENCOUNTER (OUTPATIENT)
Age: 72
Discharge: HOME | End: 2022-10-31
Payer: MEDICAID

## 2022-10-31 LAB
CREATININE URINE: 357 MG/DL (ref 40–278)
MICROALBUMIN UR-MCNC: 51.1 MG/L
MICROALBUMIN/CREAT UR-RTO: 14.3 (ref 0–30)

## 2022-10-31 PROCEDURE — 82570 ASSAY OF URINE CREATININE: CPT

## 2022-10-31 PROCEDURE — 82044 UR ALBUMIN SEMIQUANTITATIVE: CPT

## 2022-11-09 ENCOUNTER — TELEPHONE (OUTPATIENT)
Dept: ENT CLINIC | Age: 72
End: 2022-11-09

## 2022-11-09 NOTE — TELEPHONE ENCOUNTER
Called pt and gave his the following information: Ct scan scehduled for 11/16/22 @ 2:45 pm at [facility] [place] in the emergency room and go to the registration desk

## 2022-11-14 NOTE — TELEPHONE ENCOUNTER
Last Appointment:  8/30/2022  Future Appointments   Date Time Provider Vikram Alasi   11/16/2022 10:15 AM [facility]   11/16/2022  3:00 PM [facility] CT SCAN 3 [facility] CT [facility]   12/13/2022  8:30 AM Yareli Graf MD [facility]   1/10/2023  9:00 AM JEWELL Mims BDM PAIN [facility]

## 2022-11-15 ENCOUNTER — TELEPHONE (OUTPATIENT)
Dept: ENT CLINIC | Age: 72
End: 2022-11-15

## 2022-11-15 RX ORDER — HYDROCHLOROTHIAZIDE 12.5 MG/1
CAPSULE, GELATIN COATED ORAL
Qty: 90 CAPSULE | Refills: 1 | Status: SHIPPED | OUTPATIENT
Start: 2022-11-15

## 2022-11-15 NOTE — TELEPHONE ENCOUNTER
----- Message from JACQUES Mcintosh CNP sent at 11/14/2022  3:24 PM EST -----  Regarding: RE: ct scan  No, this does not need repeated. ----- Message -----  From: Erika Simmons LPN  Sent: 17/35/7956   3:03 PM EST  To: JACQUES Mcintosh CNP  Subject: ct scan                                          I see that this patient has had a ct scan in October do you still want another ? It is scheduled for 11/16/22. Can you let me know please .  Akila Ayon

## 2022-11-15 NOTE — TELEPHONE ENCOUNTER
Called patient and explained that he just had the ct scan done in October so we are going to cancelthe 16th . Just need to see provider on the 16th . Pt verbalized understanding. Called scheduling and had them cancel ct scan for the 16th.

## 2022-11-16 ENCOUNTER — OFFICE VISIT (OUTPATIENT)
Dept: ENT CLINIC | Age: 72
End: 2022-11-16
Payer: MEDICAID

## 2022-11-16 VITALS
HEIGHT: 74 IN | WEIGHT: 260 LBS | BODY MASS INDEX: 33.37 KG/M2 | DIASTOLIC BLOOD PRESSURE: 73 MMHG | HEART RATE: 75 BPM | SYSTOLIC BLOOD PRESSURE: 116 MMHG

## 2022-11-16 DIAGNOSIS — K22.2 ESOPHAGEAL STRICTURE: Primary | ICD-10-CM

## 2022-11-16 DIAGNOSIS — J35.1 TONSILLAR HYPERTROPHY: ICD-10-CM

## 2022-11-16 DIAGNOSIS — R13.10 DYSPHAGIA, UNSPECIFIED TYPE: ICD-10-CM

## 2022-11-16 PROCEDURE — G8484 FLU IMMUNIZE NO ADMIN: HCPCS | Performed by: NURSE PRACTITIONER

## 2022-11-16 PROCEDURE — 3074F SYST BP LT 130 MM HG: CPT | Performed by: NURSE PRACTITIONER

## 2022-11-16 PROCEDURE — 3078F DIAST BP <80 MM HG: CPT | Performed by: NURSE PRACTITIONER

## 2022-11-16 PROCEDURE — 1036F TOBACCO NON-USER: CPT | Performed by: NURSE PRACTITIONER

## 2022-11-16 PROCEDURE — 99213 OFFICE O/P EST LOW 20 MIN: CPT | Performed by: NURSE PRACTITIONER

## 2022-11-16 PROCEDURE — 3017F COLORECTAL CA SCREEN DOC REV: CPT | Performed by: NURSE PRACTITIONER

## 2022-11-16 PROCEDURE — G8427 DOCREV CUR MEDS BY ELIG CLIN: HCPCS | Performed by: NURSE PRACTITIONER

## 2022-11-16 PROCEDURE — G8417 CALC BMI ABV UP PARAM F/U: HCPCS | Performed by: NURSE PRACTITIONER

## 2022-11-16 PROCEDURE — 1123F ACP DISCUSS/DSCN MKR DOCD: CPT | Performed by: NURSE PRACTITIONER

## 2022-11-16 ASSESSMENT — ENCOUNTER SYMPTOMS
SORE THROAT: 0
STRIDOR: 0
RHINORRHEA: 0
EYES NEGATIVE: 1
SINUS PRESSURE: 0
RESPIRATORY NEGATIVE: 1
SINUS PAIN: 0
VOICE CHANGE: 0
SHORTNESS OF BREATH: 0
TROUBLE SWALLOWING: 1

## 2022-11-16 NOTE — PROGRESS NOTES
[facility] Otolaryngology  Dr. Zack Jordan. Bashir Gar, [address] [facility]        Patient Name:  Vinod Altamirano  :  1950     CHIEF C/O:    Chief Complaint   Patient presents with    Follow-up     F/U CT dysphagia       HISTORY OBTAINED FROM:  patient    HISTORY OF PRESENT ILLNESS:       Jil Rand is a 71y.o. year old male, here today for follow up of CT results and dysphagia. Patient was last seen 1 month ago and underwent a CT scan of the neck with contrast showing enlarged tonsils but no intrusion or blockage into the airway or esophagus. Patient is noted to have osteophytes of the cervical spine which may be protruding and causing some of his swallowing issues. He states that since last being seen he has noted some improvement in his symptoms. Patient is scheduled to be seen for steroid injections of the cervical spine with future cervical spine revision surgery. He denies any current sore throat, nasal congestion, rhinorrhea, or postnasal drainage.       Past Medical History:   Diagnosis Date    Arthritis     Benign hypertensive kidney disease with chronic kidney disease 10/06/2021    Cervical vertebra fusion 2000    Chronic bilateral low back pain with bilateral sciatica 2022    Chronic kidney disease (CKD), stage III (moderate) ([facility] [address] 2013    Depression     Hypertension     Stroke [facility])     patient had a stroke in the late s    Type 2 diabetes mellitus with diabetic polyneuropathy, with long-term current use of insulin ([facility] [address]      Past Surgical History:   Procedure Laterality Date    COLONOSCOPY      COLONOSCOPY N/A 2021    COLONOSCOPY POLYPECTOMY SNARE/COLD BIOPSY performed by Seth Amaya MD at [place] [address]  2010    2019    PAIN MANAGEMENT PROCEDURE Bilateral 2022    BILATERAL L4 TRANSFORAMINAL EPIDURAL STEROID INJECTION performed by Katie Mar DO at [facility] GASTROINTESTINAL ENDOSCOPY      UPPER GASTROINTESTINAL ENDOSCOPY N/A 09/13/2021    EGD BIOPSY performed by Maryse Osman MD at [place] [address] 09/13/2021    EGD POLYP SNARE performed by Maryse Osman MD at [place] [address] 08/23/2022    EGD WITH POSSIBLE  DILATION BALLOON performed by Maryse Osman MD at [place] [address] N/A 08/23/2022    EGD BIOPSY performed by Maryse Osman MD at [facility] ENDOSCOPY       Current Outpatient Medications:     hydroCHLOROthiazide (MICROZIDE) 12.5 MG capsule, TAKE 1 CAPSULE BY MOUTH DAILY, Disp: 90 capsule, Rfl: 1    pantoprazole (PROTONIX) 40 MG tablet, TAKE 1 TABLET BY MOUTH DAILY, Disp: 90 tablet, Rfl: 1    aspirin (ASPIRIN LOW DOSE) 81 MG EC tablet, TAKE 1 TABLET BY MOUTH DAILY, Disp: 90 tablet, Rfl: 1    buPROPion (WELLBUTRIN SR) 100 MG extended release tablet, Take 100 mg by mouth daily, Disp: , Rfl:     hydrALAZINE (APRESOLINE) 25 MG tablet, Take 25 mg by mouth daily, Disp: , Rfl:     lisinopril (PRINIVIL;ZESTRIL) 40 MG tablet, TAKE 1 TABLET BY MOUTH DAILY, Disp: 90 tablet, Rfl: 1    metoprolol succinate (TOPROL XL) 100 MG extended release tablet, Take 0.5 tablets by mouth in the morning and at bedtime, Disp: , Rfl:     vitamin D (CHOLECALCIFEROL) 25 MCG (1000 UT) TABS tablet, Take 1,000 Units by mouth daily, Disp: , Rfl:     amLODIPine (NORVASC) 5 MG tablet, Take 1 tablet by mouth daily, Disp: 90 tablet, Rfl: 1    apixaban (ELIQUIS) 5 MG TABS tablet, Take 1 tablet by mouth 2 times daily, Disp: 180 tablet, Rfl: 3    ACETAMINOPHEN EXTRA STRENGTH 500 MG tablet, TAKE 2 TABLETS BY MOUTH EVERY 6 HOURS FOR PAIN, Disp: , Rfl:     ONETOUCH ULTRA strip, , Disp: , Rfl:     gabapentin (NEURONTIN) 300 MG capsule, Take 300 mg by mouth as needed.  Takes prn, Disp: , Rfl:     Blood Pressure KIT, 1 kit by Does not apply route daily, Disp: 1 kit, Rfl: 0 Alcohol Swabs (B-D SINGLE USE SWABS REGULAR) PADS, USE 3 - 4 TIMES DAILY AS DIRECTED, Disp: , Rfl:     insulin lispro, 1 Unit Dial, 100 UNIT/ML SOPN, Inject 6 Units into the skin 3 times daily Plus  2 units for each 50 above 150 Blood sugar level result, Disp: , Rfl:     B-D UF III MINI PEN NEEDLES 31G X 5 MM MISC, 1 each by Does not apply route 4 times daily, Disp: , Rfl:     tamsulosin (FLOMAX) 0.4 MG capsule, Take 0.4 mg by mouth nightly, Disp: , Rfl:     insulin glargine (LANTUS) 100 UNIT/ML injection vial, Inject 40 Units into the skin nightly, Disp: , Rfl:   Patient has no known allergies. Social History     Tobacco Use    Smoking status: Never    Smokeless tobacco: Never   Vaping Use    Vaping Use: Never used   Substance Use Topics    Alcohol use: No    Drug use: No     Family History   Problem Relation Age of Onset    Heart Disease Mother     Diabetes Sister        Review of Systems   Constitutional: Negative. Negative for activity change and appetite change. HENT:  Positive for trouble swallowing. Negative for congestion, ear discharge, ear pain, hearing loss, postnasal drip, rhinorrhea, sinus pressure, sinus pain, sore throat and voice change. Eyes: Negative. Respiratory: Negative. Negative for shortness of breath and stridor. Cardiovascular: Negative. Negative for chest pain and palpitations. Endocrine: Negative. Musculoskeletal: Negative. Skin: Negative. Neurological: Negative. Negative for dizziness. Hematological: Negative. Psychiatric/Behavioral: Negative. /73   Pulse 75   Ht 6' 2\" (1.88 m)   Wt 260 lb (117.9 kg)   BMI 33.38 kg/m²   Physical Exam  Constitutional:       Appearance: Normal appearance. HENT:      Head: Normocephalic. Right Ear: Tympanic membrane, ear canal and external ear normal.      Left Ear: Tympanic membrane, ear canal and external ear normal.      Nose: No rhinorrhea. Right Turbinates: Enlarged. Not pale.       Left Turbinates: Enlarged. Not pale. Mouth/Throat:      Lips: Pink. Mouth: Mucous membranes are moist.      Dentition: Abnormal dentition. Tonsils: 2+ on the right. 2+ on the left. Comments: 2+ tonsils bilaterally with presence of multiple tonsil stones bilaterally  Eyes:      Conjunctiva/sclera: Conjunctivae normal.      Pupils: Pupils are equal, round, and reactive to light. Cardiovascular:      Rate and Rhythm: Normal rate and regular rhythm. Pulses: Normal pulses. Pulmonary:      Effort: Pulmonary effort is normal. No respiratory distress. Breath sounds: No stridor. Musculoskeletal:         General: Normal range of motion. Cervical back: Normal range of motion. No rigidity. No muscular tenderness. Skin:     General: Skin is warm and dry. Neurological:      General: No focal deficit present. Mental Status: He is alert and oriented to person, place, and time. Psychiatric:         Mood and Affect: Mood normal.         Behavior: Behavior normal.         Thought Content: Thought content normal.         Judgment: Judgment normal.     CT Neck:  Impression   1. Spinal canal stenosis at C3-4 resulting from prominent disc   bulge/broad-based disc herniation. Central canal measures 4 mm AP. 2.  Bilateral tonsillar enlargement, right greater than left, without focal   mass or fluid collection. Evaluation is compromised due to lack of IV   contrast.       3.  No evidence of pathologic cervical adenopathy. RECOMMENDATIONS:   Consider MRI of the cervical spine. IMPRESSION/PLAN:    Dustin Smith was seen today for follow-up. Diagnoses and all orders for this visit:    Esophageal stricture    Dysphagia, unspecified type    Tonsillar hypertrophy      CT scan reviewed with no findings of concern for an ENT standpoint. Patient will follow up with current neurosurgeon for further evaluation and treatment of his cervical spine stenosis and herniations.   He will follow-up in this office as needed in the future. He is instructed to call with any new or worsening symptoms.       Goyo Le, MSN, FNP-C  [facility], Nose and Throat    The information contained in this note has been dictated using drug and medical speech recognition software and may contain errors

## 2022-12-06 ENCOUNTER — TELEPHONE (OUTPATIENT)
Dept: PAIN MANAGEMENT | Age: 72
End: 2022-12-06

## 2022-12-06 NOTE — TELEPHONE ENCOUNTER
Call to [address] [place] [address] that procedure was approved for 12/13/2022 and that Joesph should call him a few days before for the pre op call and between 2:00 PM and 4:00 PM  the business day before with the arrival time. Instructed Hemal to hold ibuprofen for 24 hours, naprosyn for 4 days and any aspirin containing products or fish oil for 7 days. Eliquis for 3 days, last dose 12/9/2022. Instructed to call office back if any questions. Hemal verbalized understanding.     Electronically signed by Jozef Devine RN on 12/6/2022 at 3:13 PM

## 2022-12-12 NOTE — PROGRESS NOTES
[facility]NAG[facility]  INSTRUCTIONS  . .......................................................................................................................................... [x] Parking the day of Surgery is located in the [facility].   Upon entering the door, make immediate right into the surgery reception room    [x]  Bring photo ID and insurance card     [x] You may have a light breakfast day of procedure    [x]  Wear loose comfortable clothing    [x]  Please follow instructions for medications as given per Dr's office    [x] You can expect a call the business day prior to procedure to notify you of your arrival time     [x] Please arrange for     []  Other instructions

## 2022-12-13 ENCOUNTER — HOSPITAL ENCOUNTER (OUTPATIENT)
Age: 72
Setting detail: OUTPATIENT SURGERY
Discharge: HOME | End: 2022-12-13
Attending: PAIN MEDICINE | Admitting: PAIN MEDICINE
Payer: MEDICAID

## 2022-12-13 ENCOUNTER — HOSPITAL ENCOUNTER (OUTPATIENT)
Dept: GENERAL RADIOLOGY | Age: 72
Setting detail: OUTPATIENT SURGERY
Discharge: HOME | End: 2022-12-15
Attending: PAIN MEDICINE
Payer: MEDICAID

## 2022-12-13 VITALS
DIASTOLIC BLOOD PRESSURE: 75 MMHG | SYSTOLIC BLOOD PRESSURE: 172 MMHG | WEIGHT: 265 LBS | RESPIRATION RATE: 15 BRPM | OXYGEN SATURATION: 99 % | HEART RATE: 59 BPM | HEIGHT: 74 IN | BODY MASS INDEX: 34.01 KG/M2 | TEMPERATURE: 97.8 F

## 2022-12-13 DIAGNOSIS — R52 PAIN MANAGEMENT: ICD-10-CM

## 2022-12-13 LAB — METER GLUCOSE: 81 MG/DL (ref 74–99)

## 2022-12-13 PROCEDURE — 82962 GLUCOSE BLOOD TEST: CPT

## 2022-12-13 PROCEDURE — 6360000002 HC RX W HCPCS: Performed by: PAIN MEDICINE

## 2022-12-13 PROCEDURE — 2500000003 HC RX 250 WO HCPCS: Performed by: PAIN MEDICINE

## 2022-12-13 PROCEDURE — 7100000011 HC PHASE II RECOVERY - ADDTL 15 MIN: Performed by: PAIN MEDICINE

## 2022-12-13 PROCEDURE — 3209999900 FLUORO FOR SURGICAL PROCEDURES

## 2022-12-13 PROCEDURE — 2709999900 HC NON-CHARGEABLE SUPPLY: Performed by: PAIN MEDICINE

## 2022-12-13 PROCEDURE — 3600000002 HC SURGERY LEVEL 2 BASE: Performed by: PAIN MEDICINE

## 2022-12-13 PROCEDURE — 6360000004 HC RX CONTRAST MEDICATION: Performed by: PAIN MEDICINE

## 2022-12-13 PROCEDURE — 7100000010 HC PHASE II RECOVERY - FIRST 15 MIN: Performed by: PAIN MEDICINE

## 2022-12-13 RX ORDER — METHYLPREDNISOLONE ACETATE 40 MG/ML
INJECTION, SUSPENSION INTRA-ARTICULAR; INTRALESIONAL; INTRAMUSCULAR; SOFT TISSUE PRN
Status: DISCONTINUED | OUTPATIENT
Start: 2022-12-13 | End: 2022-12-13 | Stop reason: ALTCHOICE

## 2022-12-13 RX ORDER — LIDOCAINE HYDROCHLORIDE 5 MG/ML
INJECTION, SOLUTION INFILTRATION; INTRAVENOUS PRN
Status: DISCONTINUED | OUTPATIENT
Start: 2022-12-13 | End: 2022-12-13 | Stop reason: ALTCHOICE

## 2022-12-13 ASSESSMENT — PAIN SCALES - GENERAL: PAINLEVEL_OUTOF10: 2

## 2022-12-13 ASSESSMENT — PAIN DESCRIPTION - DESCRIPTORS: DESCRIPTORS: DISCOMFORT

## 2022-12-13 ASSESSMENT — PAIN - FUNCTIONAL ASSESSMENT: PAIN_FUNCTIONAL_ASSESSMENT: 0-10

## 2022-12-13 NOTE — PROGRESS NOTES
Patient up and dressed without difficulties, discharge instructions given to patient, denies any questions, his transport was called and they will be here in half hour not applicable (Male)

## 2022-12-13 NOTE — H&P
[facility]        [address] [facility], [address]  Dept: [phone]    Procedure History & Physical      Essie Banks     HPI:    Patient  is here for LBP BLE pain for b/l L4 TFESI  Labs/imaging studies reviewed   All question and concerns addressed including R/B/A associated with the procedure    Past Medical History:   Diagnosis Date    Arthritis     Benign hypertensive kidney disease with chronic kidney disease 10/06/2021    Cervical vertebra fusion 01/01/2000    Chronic bilateral low back pain with bilateral sciatica 09/09/2022    Chronic kidney disease (CKD), stage III (moderate) ([facility] [address] 11/25/2013    Depression     Hypertension     Stroke [facility])     patient had a stroke in the late 90's    Type 2 diabetes mellitus with diabetic polyneuropathy, with long-term current use of insulin ([facility] [address]        Past Surgical History:   Procedure Laterality Date    COLONOSCOPY      COLONOSCOPY N/A 09/13/2021    COLONOSCOPY POLYPECTOMY SNARE/COLD BIOPSY performed by Ravi Navarro MD at [place] [address]  01/23/2010    2019    PAIN MANAGEMENT PROCEDURE Bilateral 9/27/2022    BILATERAL L4 TRANSFORAMINAL EPIDURAL STEROID INJECTION performed by Sneha Landis DO at [facility] ENDOSCOPY      UPPER GASTROINTESTINAL ENDOSCOPY N/A 09/13/2021    EGD BIOPSY performed by Ravi Navarro MD at [address] [facility] 09/13/2021    EGD POLYP SNARE performed by Ravi Navarro MD at [place] [address] N/A 08/23/2022    EGD WITH POSSIBLE  DILATION BALLOON performed by Ravi Navarro MD at [address] N/A 08/23/2022    EGD BIOPSY performed by Ravi Navarro MD at [address]       Prior to Admission medications    Medication Sig Start Date End Date Taking? Authorizing Provider   hydroCHLOROthiazide (MICROZIDE) 12.5 MG capsule TAKE 1 CAPSULE BY MOUTH DAILY 11/15/22   Oli Canales MD   pantoprazole (PROTONIX) 40 MG tablet TAKE 1 TABLET BY MOUTH DAILY 10/4/22   Oli Canales MD   aspirin (ASPIRIN LOW DOSE) 81 MG EC tablet TAKE 1 TABLET BY MOUTH DAILY 8/30/22   Oli Canales MD   buPROPion [facility] SR) 100 MG extended release tablet Take 100 mg by mouth daily    Historical Provider, MD   hydrALAZINE (APRESOLINE) 25 MG tablet Take 25 mg by mouth daily 5/23/22   Historical Provider, MD   lisinopril (PRINIVIL;ZESTRIL) 40 MG tablet TAKE 1 TABLET BY MOUTH DAILY 5/23/22   Oli Canales MD   metoprolol succinate (TOPROL XL) 100 MG extended release tablet Take 0.5 tablets by mouth in the morning and at bedtime 4/19/22   Historical Provider, MD   vitamin D (CHOLECALCIFEROL) 25 MCG (1000 UT) TABS tablet Take 1,000 Units by mouth daily    Historical Provider, MD   amLODIPine (NORVASC) 5 MG tablet Take 1 tablet by mouth daily 4/22/22   Oli Canales MD   apixaban (ELIQUIS) 5 MG TABS tablet Take 1 tablet by mouth 2 times daily 4/19/22   Niurka Art MD   ACETAMINOPHEN EXTRA STRENGTH 500 MG tablet TAKE 2 TABLETS BY MOUTH EVERY 6 HOURS FOR PAIN 11/5/21   Historical Provider, MD   ONETOUCH ULTRA strip  10/19/21   Historical Provider, MD   gabapentin (NEURONTIN) 300 MG capsule Take 300 mg by mouth as needed.  Takes prn 9/9/21   Historical Provider, MD   Blood Pressure KIT 1 kit by Does not apply route daily 8/24/21   Oli Canales MD   Alcohol Swabs (B-D SINGLE USE SWABS REGULAR) PADS USE 3 - 4 TIMES DAILY AS DIRECTED 7/31/21   Historical Provider, MD   insulin lispro, 1 Unit Dial, 100 UNIT/ML SOPN Inject 6 Units into the skin 3 times daily Plus  2 units for each 50 above 150 Blood sugar level result    Historical ProviderMD [place] UF III MINI PEN NEEDLES 31G X 5 MM MISC 1 each by Does not apply route 4 times daily 3/15/21 Historical Provider, MD   tamsulosin (FLOMAX) 0.4 MG capsule Take 0.4 mg by mouth nightly    Historical Provider, MD   insulin glargine (LANTUS) 100 UNIT/ML injection vial Inject 40 Units into the skin nightly    Historical Provider, MD       No Known Allergies    Social History     Socioeconomic History    Marital status: Single     Spouse name: Not on file    Number of children: Not on file    Years of education: Not on file    Highest education level: Not on file   Occupational History    Not on file   Tobacco Use    Smoking status: Never    Smokeless tobacco: Never   Vaping Use    Vaping Use: Never used   Substance and Sexual Activity    Alcohol use: No    Drug use: No    Sexual activity: Not on file   Other Topics Concern    Not on file   Social History Narrative    Not on file     Social Determinants of Health     Financial Resource Strain: Low Risk     Difficulty of Paying Living Expenses: Not hard at all   Food Insecurity: No Food Insecurity    Worried About Running Out of Food in the Last Year: Never true    Ran Out of Food in the Last Year: Never true   Transportation Needs: Not on file   Physical Activity: Not on file   Stress: Not on file   Social Connections: Not on file   Intimate Partner Violence: Not on file   Housing Stability: Not on file       Family History   Problem Relation Age of Onset    Heart Disease Mother     Diabetes Sister          REVIEW OF SYSTEMS:    CONSTITUTIONAL:  negative for  fevers, chills, sweats and fatigue    RESPIRATORY:  negative for  dry cough, cough with sputum, dyspnea, wheezing and chest pain    CARDIOVASCULAR:  negative for chest pain, dyspnea, palpitations, syncope    GASTROINTESTINAL:  negative for nausea, vomiting, change in bowel habits, diarrhea, constipation and abdominal pain    MUSCULOSKELETAL: negative for muscle weakness    SKIN: negative for itching or rashes.     BEHAVIOR/PSYCH:  negative for poor appetite, increased appetite, decreased sleep and poor

## 2022-12-13 NOTE — OP NOTE
2022    Patient: Zachariah Ann  :  1950  Age:  71 y.o. Sex:  male     PRE-OPERATIVE DIAGNOSIS: Lumbar disc displacement, lumbar neural foraminal stenosis, lumbar radiculopathy. POST-OPERATIVE DIAGNOSIS: Same. PROCEDURE: Bilateral Transforaminal epidural steroid injection under fluoroscopic guidance at foraminal level L4 (#2). SURGEON: JOSSELINE Knowles. ANESTHESIA: local    ESTIMATED BLOOD LOSS: None.  ______________________________________________________________________  BRIEF HISTORY: Zachariah Ann comes in today for the second Bilateral transforaminal epidural steroid injection under fluoroscopic guidance at foraminal level L4. The potential complications of this procedure were discussed with him again today. He has elected to undergo the aforementioned procedure. America complete History & Physical examination were reviewed in depth, a copy of which is in the chart. DESCRIPTION OF PROCEDURE:    After confirming written and informed consent, a time-out was performed and America name and date of birth, the procedure to be performed as well as the plan for the location of the needle insertion were confirmed. The patient was brought into the procedure room and placed in the prone position on the fluoroscopy table. Standard monitors were placed and vital signs were observed throughout the procedure. The area of the lumbar spine was prepped with chloraprep and draped in a sterile manner. The vertebral body was identified with AP fluoroscopy. An oblique view was obtained to better visualize the inferior junction of the pedicle and transverse process . The 6 o'clock position of the pedicle was marked and identified. The skin and subcutaneous tissue were anesthetized with 0.5% lidocaine. A # 22 gauge pencil point needle was directed toward the targeted point under fluoroscopy until bone was contacted.  The needle was then walked inferiorly until the neural foramen was entered . A lateral fluoroscopic view was then used to place the needle tip in the middle to anterior aspect of the foramen. Negative aspiration was confirmed for blood and CSF and 1 cc of Isovue-M 300 contrast was injected at each level under live AP fluoroscopy. Appropriate neurograms were observed under live AP fluoroscopy. Then after negative aspiration, a solution of the 2 cc of 0.5% lidocaine and 40 mg DepoMedrol was easily injected between each level. The needles were removed with constant aspiration technique. The patient's back was cleaned and a bandage was placed over the needle insertion points    Disposition the patient tolerated the procedure well and there were no complications . Vital signs remained stable throughout the procedure. The patient was escorted to the recovery area where they remained until discharge and written discharge instructions for the procedure were given. Plan: Mariza Kumarrajnistephon will return to our pain management center as scheduled.      Elier Villela DO

## 2022-12-13 NOTE — DISCHARGE INSTRUCTIONS
[facility] Block/Radiofrequency  Home Going Instructions    1-Go home, rest for the remainder of the day  2-Please do not lift over 20 pounds the day of the injection  3-If you received sedation No: alcohol, driving, operating lawn mowers, plows, tractors or other dangerous equipment until next morning. Do not make important decisions or sign legal documents for 24 hours. You may experience light headedness, dizziness, nausea or sleepiness after sedation. Do not stay alone. A responsible adult must be with you for 24 hours. You could be nauseated from the medications you have received. Your IV site may be sore and bruised. 4-No dietary restrictions     5-Resume all medications the same day, blood thinners to be resumed 24 hours after injection if you were instructed to stop any. 6-Keep the surgical site clean and dry, you may shower the next morning and remove the      dressing. 7- No sitz baths, tub baths or hot tubs/swimming for 24 hours. 8- If you have any pain at the injection site(s), application of an ice pack to the area should be       helpful, 20 minutes on/20 minutes off for next 48 hours. 9- Call [facility] immediately at if you develop.   Fever greater than 100.4 F  Have bleeding or drainage from the puncture site  Have progressive Leg/arm numbness and or weakness  Loss of control of bowel and or bladder (wet/soil yourself)  Severe headache with inability to lift head  10-You may return to work the next day

## 2022-12-14 ENCOUNTER — OFFICE VISIT (OUTPATIENT)
Dept: FAMILY MEDICINE CLINIC | Age: 72
End: 2022-12-14
Payer: MEDICAID

## 2022-12-14 VITALS
WEIGHT: 264 LBS | SYSTOLIC BLOOD PRESSURE: 134 MMHG | HEART RATE: 67 BPM | RESPIRATION RATE: 18 BRPM | BODY MASS INDEX: 33.88 KG/M2 | HEIGHT: 74 IN | TEMPERATURE: 98 F | OXYGEN SATURATION: 97 % | DIASTOLIC BLOOD PRESSURE: 68 MMHG

## 2022-12-14 DIAGNOSIS — I48.91 ATRIAL FIBRILLATION, UNSPECIFIED TYPE (HCC): ICD-10-CM

## 2022-12-14 DIAGNOSIS — K21.9 GASTROESOPHAGEAL REFLUX DISEASE, UNSPECIFIED WHETHER ESOPHAGITIS PRESENT: ICD-10-CM

## 2022-12-14 DIAGNOSIS — E11.9 TYPE 2 DIABETES MELLITUS WITHOUT COMPLICATION, WITH LONG-TERM CURRENT USE OF INSULIN (HCC): ICD-10-CM

## 2022-12-14 DIAGNOSIS — I10 ESSENTIAL HYPERTENSION: ICD-10-CM

## 2022-12-14 DIAGNOSIS — E55.9 VITAMIN D DEFICIENCY: Primary | ICD-10-CM

## 2022-12-14 DIAGNOSIS — Z79.4 TYPE 2 DIABETES MELLITUS WITHOUT COMPLICATION, WITH LONG-TERM CURRENT USE OF INSULIN (HCC): ICD-10-CM

## 2022-12-14 PROCEDURE — 3044F HG A1C LEVEL LT 7.0%: CPT | Performed by: FAMILY MEDICINE

## 2022-12-14 PROCEDURE — 3017F COLORECTAL CA SCREEN DOC REV: CPT | Performed by: FAMILY MEDICINE

## 2022-12-14 PROCEDURE — G8417 CALC BMI ABV UP PARAM F/U: HCPCS | Performed by: FAMILY MEDICINE

## 2022-12-14 PROCEDURE — 1036F TOBACCO NON-USER: CPT | Performed by: FAMILY MEDICINE

## 2022-12-14 PROCEDURE — 1123F ACP DISCUSS/DSCN MKR DOCD: CPT | Performed by: FAMILY MEDICINE

## 2022-12-14 PROCEDURE — 2022F DILAT RTA XM EVC RTNOPTHY: CPT | Performed by: FAMILY MEDICINE

## 2022-12-14 PROCEDURE — G8427 DOCREV CUR MEDS BY ELIG CLIN: HCPCS | Performed by: FAMILY MEDICINE

## 2022-12-14 PROCEDURE — 99214 OFFICE O/P EST MOD 30 MIN: CPT | Performed by: FAMILY MEDICINE

## 2022-12-14 PROCEDURE — G8484 FLU IMMUNIZE NO ADMIN: HCPCS | Performed by: FAMILY MEDICINE

## 2022-12-14 PROCEDURE — 3078F DIAST BP <80 MM HG: CPT | Performed by: FAMILY MEDICINE

## 2022-12-14 PROCEDURE — 3074F SYST BP LT 130 MM HG: CPT | Performed by: FAMILY MEDICINE

## 2022-12-14 RX ORDER — PANTOPRAZOLE SODIUM 40 MG/1
40 TABLET, DELAYED RELEASE ORAL DAILY
Qty: 90 TABLET | Refills: 1 | Status: SHIPPED | OUTPATIENT
Start: 2022-12-14

## 2022-12-14 RX ORDER — AMLODIPINE BESYLATE 5 MG/1
5 TABLET ORAL DAILY
Qty: 90 TABLET | Refills: 1 | Status: SHIPPED | OUTPATIENT
Start: 2022-12-14

## 2022-12-14 RX ORDER — METOPROLOL SUCCINATE 100 MG/1
50 TABLET, EXTENDED RELEASE ORAL 2 TIMES DAILY
Qty: 180 TABLET | Refills: 1 | Status: SHIPPED | OUTPATIENT
Start: 2022-12-14

## 2022-12-14 RX ORDER — HYDROCHLOROTHIAZIDE 12.5 MG/1
CAPSULE, GELATIN COATED ORAL
Qty: 90 CAPSULE | Refills: 1 | Status: SHIPPED | OUTPATIENT
Start: 2022-12-14

## 2022-12-14 RX ORDER — ASPIRIN 81 MG/1
TABLET ORAL
Qty: 90 TABLET | Refills: 1 | Status: SHIPPED | OUTPATIENT
Start: 2022-12-14

## 2022-12-14 RX ORDER — LISINOPRIL 40 MG/1
40 TABLET ORAL DAILY
Qty: 90 TABLET | Refills: 1 | Status: SHIPPED | OUTPATIENT
Start: 2022-12-14

## 2022-12-14 ASSESSMENT — ENCOUNTER SYMPTOMS
CONSTIPATION: 0
COUGH: 0
SHORTNESS OF BREATH: 0
WHEEZING: 0
NAUSEA: 0
VOMITING: 0
ABDOMINAL PAIN: 0
DIARRHEA: 0

## 2022-12-14 NOTE — PROGRESS NOTES
[facility]  DATE OF VISIT : 2022    Patient : Isamar León   Age : 71 y.o.  : 1950   MRN : [id number]   ______________________________________________________________________    Chief Complaint :   Chief Complaint   Patient presents with    Follow-up Chronic Condition     22 Bilateral L4 Steroid Injection, last one was in October. Speaking about possibly starting neck injections next month. Diabetes     Just got a Derm[employer] and is working well for him. HPI : Isamar León is 71 y.o. male who presented to the clinic today for office visit. HTN: HCTZ 12.5mg daily, Lisinopril 30mg daily and Hydralazine 50mg TID. A. fib: On metoprolol and Eliquis. History of CVA on aspirin     IDDM: following with endo. Last A1C 5.8(10/28). Lantus 38 units nightly and Humalog 2 units with meals. Intermittently does not take his mealtime insulin as his sugars are good. I reviewed the patient's past medications, allergies and past medical history during this visit.     Past Medical History :    Past Medical History:   Diagnosis Date    Arthritis     Benign hypertensive kidney disease with chronic kidney disease 10/06/2021    Cervical vertebra fusion 2000    Chronic bilateral low back pain with bilateral sciatica 2022    Chronic kidney disease (CKD), stage III (moderate) ([address] 2013    Depression     Hypertension     Stroke [facility])     patient had a stroke in the late     Type 2 diabetes mellitus with diabetic polyneuropathy, with long-term current use of insulin ([address]      Past Surgical History:   Procedure Laterality Date    COLONOSCOPY      COLONOSCOPY N/A 2021    COLONOSCOPY POLYPECTOMY SNARE/COLD BIOPSY performed by Ninoska Sharma MD at [place] [address]  2010    PAIN MANAGEMENT PROCEDURE Bilateral 2022    BILATERAL L4 TRANSFORAMINAL EPIDURAL STEROID INJECTION performed by Monica Gibson DO at [address] Bilateral 12/13/2022    BILATERAL L4 TRANSFORAMINAL EPIDURAL STEROID INJECTION performed by Monica Gibson DO at [address] SURGERY      UPPER GASTROINTESTINAL ENDOSCOPY      UPPER GASTROINTESTINAL ENDOSCOPY N/A 09/13/2021    EGD BIOPSY performed by Sharona Girard MD at [place] [address] N/A 09/13/2021    EGD POLYP SNARE performed by Sharona Girard MD at [place] [address] N/A 08/23/2022    EGD WITH POSSIBLE  DILATION BALLOON performed by Sharona Girard MD at [place] [address] N/A 08/23/2022    EGD BIOPSY performed by Sharona Girard MD at [facility] History :  Social History       Tobacco History       Smoking Status  Never      Smokeless Tobacco Use  Never              Alcohol History       Alcohol Use Status  No              Drug Use       Drug Use Status  No              Sexual Activity       Sexually Active  Not Asked                     Allergies :   No Known Allergies    Medication List :    Current Outpatient Medications   Medication Sig Dispense Refill    hydroCHLOROthiazide (MICROZIDE) 12.5 MG capsule TAKE 1 CAPSULE BY MOUTH DAILY 90 capsule 1    pantoprazole (PROTONIX) 40 MG tablet Take 1 tablet by mouth daily 90 tablet 1    aspirin (ASPIRIN LOW DOSE) 81 MG EC tablet TAKE 1 TABLET BY MOUTH DAILY 90 tablet 1    lisinopril (PRINIVIL;ZESTRIL) 40 MG tablet Take 1 tablet by mouth daily 90 tablet 1    metoprolol succinate (TOPROL XL) 100 MG extended release tablet Take 0.5 tablets by mouth in the morning and at bedtime Take 0.5 tablets by mouth in the morning and at bedtime 180 tablet 1    vitamin D (CHOLECALCIFEROL) 25 MCG (1000 UT) TABS tablet Take 1 tablet by mouth daily Take 1,000 Units by mouth daily 90 tablet 1    amLODIPine (NORVASC) 5 MG tablet Take 1 tablet by mouth daily 90 tablet 1    buPROPion (WELLBUTRIN SR) 100 MG extended release tablet Take 100 mg by mouth daily      hydrALAZINE (APRESOLINE) 25 MG tablet Take 25 mg by mouth daily      apixaban (ELIQUIS) 5 MG TABS tablet Take 1 tablet by mouth 2 times daily 180 tablet 3    ACETAMINOPHEN EXTRA STRENGTH 500 MG tablet TAKE 2 TABLETS BY MOUTH EVERY 6 HOURS FOR PAIN      ONETOUCH ULTRA strip       gabapentin (NEURONTIN) 300 MG capsule Take 300 mg by mouth as needed. Takes prn      Blood Pressure KIT 1 kit by Does not apply route daily 1 kit 0    Alcohol Swabs (B-D SINGLE USE SWABS REGULAR) PADS USE 3 - 4 TIMES DAILY AS DIRECTED      insulin lispro, 1 Unit Dial, 100 UNIT/ML SOPN Inject 6 Units into the skin 3 times daily Plus  2 units for each 50 above 150 Blood sugar level result      B-D UF III MINI PEN NEEDLES 31G X 5 MM MISC 1 each by Does not apply route 4 times daily      tamsulosin (FLOMAX) 0.4 MG capsule Take 0.4 mg by mouth nightly      insulin glargine (LANTUS) 100 UNIT/ML injection vial Inject 40 Units into the skin nightly       No current facility-administered medications for this visit. Review of Systems :  Review of Systems   Constitutional:  Negative for chills, fatigue and fever. Respiratory:  Negative for cough, shortness of breath and wheezing. Cardiovascular:  Negative for chest pain, palpitations and leg swelling. Gastrointestinal:  Negative for abdominal pain, constipation, diarrhea, nausea and vomiting. Endocrine: Negative for polydipsia and polyuria.    Neurological:  Negative for dizziness, weakness, light-headedness, numbness and headaches.   ______________________________________________________________________    Physical Exam :    Vitals: /68 (Site: Left Upper Arm, Position: Sitting, Cuff Size: Large Adult)   Pulse 67   Temp 98 °F (36.7 °C) (Temporal)   Resp 18   Ht 6' 2\" (1.88 m)   Wt 264 lb (119.7 kg)   SpO2 97%   BMI 33.90 kg/m² Physical Exam  Vitals reviewed. Constitutional:       General: He is not in acute distress. Appearance: Normal appearance. Cardiovascular:      Rate and Rhythm: Normal rate and regular rhythm. Pulses: Normal pulses. Heart sounds: Normal heart sounds. No murmur heard. Pulmonary:      Effort: Pulmonary effort is normal. No respiratory distress. Breath sounds: Normal breath sounds. No wheezing. Abdominal:      General: Bowel sounds are normal. There is no distension. Palpations: Abdomen is soft. Tenderness: There is no abdominal tenderness. Musculoskeletal:      Right lower leg: No edema. Left lower leg: No edema. Neurological:      Mental Status: He is alert.         ___________________    Assessment & Plan :    1. Essential hypertension  -Well-controlled on current regimen  -Refills were provided for patient  - hydroCHLOROthiazide (MICROZIDE) 12.5 MG capsule; TAKE 1 CAPSULE BY MOUTH DAILY  Dispense: 90 capsule; Refill: 1  - aspirin (ASPIRIN LOW DOSE) 81 MG EC tablet; TAKE 1 TABLET BY MOUTH DAILY  Dispense: 90 tablet; Refill: 1  - lisinopril (PRINIVIL;ZESTRIL) 40 MG tablet; Take 1 tablet by mouth daily  Dispense: 90 tablet; Refill: 1  - metoprolol succinate (TOPROL XL) 100 MG extended release tablet; Take 0.5 tablets by mouth in the morning and at bedtime Take 0.5 tablets by mouth in the morning and at bedtime  Dispense: 180 tablet; Refill: 1  - amLODIPine (NORVASC) 5 MG tablet; Take 1 tablet by mouth daily  Dispense: 90 tablet; Refill: 1    2. Atrial fibrillation, unspecified type ([facility]  -Well-controlled  -Continue Eliquis at current dose    3.  Type 2 diabetes mellitus without complication, with long-term current use of insulin ([facility])  -Most recent A1c 5.8  -Lantus 36 units nightly  -Humalog 2 units with meals but has been taking inconsistently  -Patient blood glucose log was reviewed lowest 68, highest 143  -Patient wants to trial off mealtime insulin as he has not been taking it consistently  -Patient is okay to trial of mealtime insulin, had discussion with patient about how to safely wean himself off of the mealtime insulin. 4. Gastroesophageal reflux disease, unspecified whether esophagitis present  - pantoprazole (PROTONIX) 40 MG tablet; Take 1 tablet by mouth daily  Dispense: 90 tablet; Refill: 1    5. Vitamin D deficiency  - vitamin D (CHOLECALCIFEROL) 25 MCG (1000 UT) TABS tablet; Take 1 tablet by mouth daily Take 1,000 Units by mouth daily  Dispense: 90 tablet; Refill: 1      Educational materials and/or home exercises printed for patient's review and were included in patient instructions on his/her After Visit Summary and given to patient at the end of visit. Counseled regarding above diagnosis, including possible risks and complications,  especially if left uncontrolled. Counseled regarding the possible side effects, risks, benefits and alternatives to treatment; patient and/or guardian verbalizes understanding, agrees, feels comfortable with and wishes to proceed with above treatment plan. Advised patient to call with any new medication issues, and read all Rx info from pharmacy to assure aware of all possible risks and side effects of medication before taking. Reviewed age and gender appropriate health screening exams and vaccinations. Advised patient regarding importance of keeping up with recommended health maintenance and to schedule as soon as possible if overdue, as this is important in assessing for undiagnosed pathology, especially cancer, as well as protecting against potentially harmful/life threatening disease. Patient and/or guardian verbalizes understanding and agrees with above counseling, assessment and plan. All questions answered    Additional plan and future considerations:   RTO in 3 months for office visit    Return to Office: Return in about 3 months (around 3/14/2023).     Electronically signed by Silvana Bhatt Lamar Abdullahi MD on 12/14/2022 at 10:32 AM

## 2023-01-10 ENCOUNTER — OFFICE VISIT (OUTPATIENT)
Dept: PAIN MANAGEMENT | Age: 73
End: 2023-01-10
Payer: MEDICAID

## 2023-01-10 VITALS
DIASTOLIC BLOOD PRESSURE: 75 MMHG | HEART RATE: 61 BPM | WEIGHT: 264 LBS | OXYGEN SATURATION: 98 % | SYSTOLIC BLOOD PRESSURE: 143 MMHG | HEIGHT: 74 IN | RESPIRATION RATE: 16 BRPM | BODY MASS INDEX: 33.88 KG/M2 | TEMPERATURE: 97.3 F

## 2023-01-10 DIAGNOSIS — M48.062 SPINAL STENOSIS OF LUMBAR REGION WITH NEUROGENIC CLAUDICATION: ICD-10-CM

## 2023-01-10 DIAGNOSIS — M54.42 CHRONIC BILATERAL LOW BACK PAIN WITH BILATERAL SCIATICA: ICD-10-CM

## 2023-01-10 DIAGNOSIS — M54.41 CHRONIC BILATERAL LOW BACK PAIN WITH BILATERAL SCIATICA: ICD-10-CM

## 2023-01-10 DIAGNOSIS — G89.4 CHRONIC PAIN SYNDROME: Primary | ICD-10-CM

## 2023-01-10 DIAGNOSIS — G89.29 CHRONIC BILATERAL LOW BACK PAIN WITH BILATERAL SCIATICA: ICD-10-CM

## 2023-01-10 DIAGNOSIS — M54.16 LUMBAR RADICULOPATHY: ICD-10-CM

## 2023-01-10 PROCEDURE — 99214 OFFICE O/P EST MOD 30 MIN: CPT | Performed by: PHYSICIAN ASSISTANT

## 2023-01-10 PROCEDURE — G8417 CALC BMI ABV UP PARAM F/U: HCPCS | Performed by: PHYSICIAN ASSISTANT

## 2023-01-10 PROCEDURE — G8427 DOCREV CUR MEDS BY ELIG CLIN: HCPCS | Performed by: PHYSICIAN ASSISTANT

## 2023-01-10 PROCEDURE — 3077F SYST BP >= 140 MM HG: CPT | Performed by: PHYSICIAN ASSISTANT

## 2023-01-10 PROCEDURE — 1123F ACP DISCUSS/DSCN MKR DOCD: CPT | Performed by: PHYSICIAN ASSISTANT

## 2023-01-10 PROCEDURE — 99213 OFFICE O/P EST LOW 20 MIN: CPT | Performed by: PHYSICIAN ASSISTANT

## 2023-01-10 PROCEDURE — G8484 FLU IMMUNIZE NO ADMIN: HCPCS | Performed by: PHYSICIAN ASSISTANT

## 2023-01-10 PROCEDURE — 3017F COLORECTAL CA SCREEN DOC REV: CPT | Performed by: PHYSICIAN ASSISTANT

## 2023-01-10 PROCEDURE — 3078F DIAST BP <80 MM HG: CPT | Performed by: PHYSICIAN ASSISTANT

## 2023-01-10 PROCEDURE — 1036F TOBACCO NON-USER: CPT | Performed by: PHYSICIAN ASSISTANT

## 2023-01-10 NOTE — PROGRESS NOTES
[address] [facility], [address] [place] [address] [place]  [phone]    Follow up Note      Ruchi Banks     Date of Visit:  1/10/2023    CC:  Patient presents for follow up   Chief Complaint   Patient presents with    Follow Up After Procedure     BILATERAL L4 TRANSFORAMINAL EPIDURAL STEROID INJECTION             HPI:    Pain is unchanged to his lower back and neck. Reports lower back injection only helpful x 1 week and then back to baseline. Appropriate analgesia with current medications regimen: n/a   Change in quality of symptoms:no. Medication side effects:none. Recent diagnostic testing:none. Recent interventional procedures: 2022 PROCEDURE: Bilateral Transforaminal epidural steroid injection under fluoroscopic guidance at foraminal level L4 (#2) - 70% relief x 1 week and then back to baseline. .    He has been on anticoagulation medications to include ELIQUIS and ASA and has not been on herbal supplements. He is diabetic. Imagin lumbar MRI -  FINDINGS:   BONES/ALIGNMENT: There is normal alignment of the spine. The vertebral body   heights are maintained. The bone marrow signal appears unremarkable. SPINAL CORD: The conus terminates normally. SOFT TISSUES: No mass or edema seen. L1-L2: No disc herniation. No significant facet or ligamentum flavum   hypertrophy. No significant central stenosis. There are mild-to-moderate   neural stenoses which appear to result from developmentally thickened and   shortened pedicles. L2-L3: Minimal disc bulge. Mild facet hypertrophy. No significant central   canal or lateral recess stenoses. Moderate neural foraminal stenoses. L3-L4: Disc desiccation with a small disc bulge. Mild-to-moderate facet and   ligamentum flavum hypertrophy. Mild central canal stenosis. Moderate   lateral recess and severe neural foraminal stenoses.        L4-L5: Grade 1 anterolisthesis of L4 over L5 by 3-4 mm. Small disc bulge. Moderate facet and ligamentous hypertrophy. Severe central canal stenosis,   with narrowing of the AP diameter of the thecal sac in the midsagittal plane   to 6 mm. Moderate to severe lateral recess and severe neural foraminal   stenoses. L5-S1: Small disc protrusion in the right neural foramina. Mild facet and   ligamentous hypertrophy. No significant central canal or lateral recess   stenosis. Severe right and moderate to severe left neural foraminal stenoses. Impression   1. No fracture or bony destructive lesion. 2. Grade 1 anterolisthesis of L4 over L5.   3. Severe central canal stenosis at L4-5. Mild stenosis at L3-4.   4.  Multilevel neural foraminal stenoses, worst (severe) at bilateral L3-4   and L4-5 and right L5-S1 levels. 5. The central canal stenoses and especially the neural foraminal stenoses   appear to be predominantly stenotic as result of developmentally thickened   and shortened pedicles. 6/2022 lumbar f/e -  FINDINGS:   Neutral, flexion and extension lateral views demonstrate no abnormal motion. Stable minimal anterolisthesis of L4 on L5 due to facet arthropathy. Calcification in the right posterior abdomen likely represents   nephrolithiasis. No acute fracture or subluxation in the vertebral bodies. There are 5 lumbar type vertebral bodies. Impression   1. Grade 1 anterolisthesis of 4 on 5. No abnormal motion with flexion and   extension. 2.  Lower lumbar facet arthropathy. 3.  Right nephrolithiasis                                          Potential Aberrant Drug-Related Behavior:  None. Urine Drug Screening:  No opioids initiated.      OARRS report:  01/2023 consistent    Opioid Agreement:  Renewal date: n/a    Past Medical History:   Diagnosis Date    Arthritis     Benign hypertensive kidney disease with chronic kidney disease 10/06/2021    Cervical vertebra fusion 01/01/2000    Chronic bilateral low back pain with bilateral sciatica 09/09/2022    Chronic kidney disease (CKD), stage III (moderate) ([facility] 11/25/2013    Depression     Hypertension     Stroke [facility])     patient had a stroke in the late 90's    Type 2 diabetes mellitus with diabetic polyneuropathy, with long-term current use of insulin [facility])        Past Surgical History:   Procedure Laterality Date    COLONOSCOPY      COLONOSCOPY N/A 09/13/2021    COLONOSCOPY POLYPECTOMY SNARE/COLD BIOPSY performed by Chapincito Fontanez MD at [place] [id number]  01/23/2010    2019    PAIN MANAGEMENT PROCEDURE Bilateral 9/27/2022    BILATERAL L4 TRANSFORAMINAL EPIDURAL STEROID INJECTION performed by Kathrin Cruz DO at [facility] [address] Bilateral 12/13/2022    BILATERAL L4 TRANSFORAMINAL EPIDURAL STEROID INJECTION performed by Kathrin Cruz DO at [facility] ENDOSCOPY      UPPER GASTROINTESTINAL ENDOSCOPY N/A 09/13/2021    EGD BIOPSY performed by Chapincito Fontanez MD at [place] [address] N/A 09/13/2021    EGD POLYP SNARE performed by Chapincito Fontanez MD at [place] [address] N/A 08/23/2022    EGD WITH POSSIBLE  DILATION BALLOON performed by Chapincito Fontanez MD at [place] [address] N/A 08/23/2022    EGD BIOPSY performed by Chapincito Fontanez MD at [address] [place] [address]       Prior to Admission medications    Medication Sig Start Date End Date Taking?  Authorizing Provider   hydroCHLOROthiazide (MICROZIDE) 12.5 MG capsule TAKE 1 CAPSULE BY MOUTH DAILY 12/14/22  Yes Chan Iraheta MD   pantoprazole (PROTONIX) 40 MG tablet Take 1 tablet by mouth daily 12/14/22  Yes Chan Iraheta MD   aspirin (ASPIRIN LOW DOSE) 81 MG EC tablet TAKE 1 TABLET BY MOUTH DAILY 12/14/22  Yes Chan Iraheta MD lisinopril (PRINIVIL;ZESTRIL) 40 MG tablet Take 1 tablet by mouth daily 12/14/22  Yes Eulalio Delacruz MD   metoprolol succinate (TOPROL XL) 100 MG extended release tablet Take 0.5 tablets by mouth in the morning and at bedtime Take 0.5 tablets by mouth in the morning and at bedtime 12/14/22  Yes Eulalio Delacruz MD   vitamin D (CHOLECALCIFEROL) 25 MCG (1000 UT) TABS tablet Take 1 tablet by mouth daily Take 1,000 Units by mouth daily 12/14/22  Yes Eulalio Delacruz MD   amLODIPine (NORVASC) 5 MG tablet Take 1 tablet by mouth daily 12/14/22  Yes Eulalio Delacruz MD   buPROPion [facility]) 100 MG extended release tablet Take 100 mg by mouth daily   Yes Historical Provider, MD   hydrALAZINE (APRESOLINE) 25 MG tablet Take 25 mg by mouth daily 5/23/22  Yes Historical Provider, MD   apixaban (ELIQUIS) 5 MG TABS tablet Take 1 tablet by mouth 2 times daily 4/19/22  Yes Liliana Vegas MD   ACETAMINOPHEN EXTRA STRENGTH 500 MG tablet TAKE 2 TABLETS BY MOUTH EVERY 6 HOURS FOR PAIN 11/5/21  Yes Historical Provider, MD   ONETOUCH ULTRA strip  10/19/21  Yes Historical Provider, MD   gabapentin (NEURONTIN) 300 MG capsule Take 300 mg by mouth as needed.  Takes prn 9/9/21  Yes Historical Provider, MD   Blood Pressure KIT 1 kit by Does not apply route daily 8/24/21  Yes Eulalio Delacruz MD   Alcohol Swabs (B-D SINGLE USE SWABS REGULAR) PADS USE 3 - 4 TIMES DAILY AS DIRECTED 7/31/21  Yes Historical Provider, MD   insulin lispro, 1 Unit Dial, 100 UNIT/ML SOPN Inject 6 Units into the skin 3 times daily Plus  2 units for each 50 above 150 Blood sugar level result   Yes Historical Provider, MD [place] UF III MINI PEN NEEDLES 31G X 5 MM MISC 1 each by Does not apply route 4 times daily 3/15/21  Yes Historical Provider, MD   tamsulosin (FLOMAX) 0.4 MG capsule Take 0.4 mg by mouth nightly   Yes Historical Provider, MD   insulin glargine (LANTUS) 100 UNIT/ML injection vial Inject 40 Units into the skin nightly Yes Historical Provider, MD       No Known Allergies    Social History     Socioeconomic History    Marital status: Single     Spouse name: Not on file    Number of children: Not on file    Years of education: Not on file    Highest education level: Not on file   Occupational History    Not on file   Tobacco Use    Smoking status: Never    Smokeless tobacco: Never   Vaping Use    Vaping Use: Never used   Substance and Sexual Activity    Alcohol use: No    Drug use: No    Sexual activity: Not on file   Other Topics Concern    Not on file   Social History Narrative    Not on file     Social Determinants of Health     Financial Resource Strain: Low Risk     Difficulty of Paying Living Expenses: Not hard at all   Food Insecurity: No Food Insecurity    Worried About Running Out of Food in the Last Year: Never true    Ran Out of Food in the Last Year: Never true   Transportation Needs: Not on file   Physical Activity: Not on file   Stress: Not on file   Social Connections: Not on file   Intimate Partner Violence: Not on file   Housing Stability: Not on file       Family History   Problem Relation Age of Onset    Heart Disease Mother     Diabetes Sister        REVIEW OF SYSTEMS:     Kathy Valencia denies fever/chills, chest pain, shortness of breath, new bowel or bladder complaints. All other review of systems was negative. PHYSICAL EXAMINATION:      BP (!) 143/75   Pulse 61   Temp 97.3 °F (36.3 °C) (Temporal)   Resp 16   Ht 6' 2\" (1.88 m)   Wt 264 lb (119.7 kg)   SpO2 98%   BMI 33.90 kg/m²     General:      General appearance:   pleasant and well-hydrated. , in moderate discomfort and A & O x3  Build:Overweight    HEENT:    Head:normocephalic and atraumatic  Sclera: icterus absent,    Lungs:    Breathing:Normal expansion. No wheezing. Abdomen:    Shape:non-distended and normal    Cervical spine:    Inspection:anterior fusion scar  Palpation:non-tender paraspinals. non-tender midline.   Range of motion:abnormal moderately flexion, extension rotation bilateral and is  painful. Lumbar spine:    Spine inspection:normal   Range of motion:abnormal moderately Lateral bending, flexion, extension rotation bilateral and is  painful. Extremities:    Tremors:None bilaterally upper and lower  Range of motion:Generally normal shoulders, pain with internal rotation of hips not done. Intact:Yes  Edema:Normal    Neurological:    [address]    Dermatology:    Skin:no unusual rashes, no skin lesions, no palpable subcutaneous nodules, and good skin turgor    Impression:       LBP b/l buttock pain with s/s consistent with neurogenic claudication due to severe stenosis at   PMHx: A. Fib on ELIQUIS and ASA, HTN, CKD stage III, CVA, DM2, depression     Plan:     Had appt with Dr. Rico Park to eval for enlarged tonsils noted during esophageal dilation, declined in office laryngoscopy, scheduled for CT  Urine screen deferred  OARRS report reviewed  On gabapentin 300 mg BID (pt reports taking only prn) for DM neuropathy  On ELIQUIS  and ASA for a. Fib  B/l L4 TFESI #1 with 50% relief x 1 month, pt would like to repeat with 70% relief x 1 week only. Can consider different level. (must hold ELIQUIS and ASA  per AYLIN guidelines)  Patient would like to f/u with NSG for surgical options. Saw patient for cervical pain today as well. Prior MRI reviewed. 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 (2021). ELI would be contraindicated due to this.  (prior ACDF in 2000)  I did discuss this case with Dr. Maribell Gary who agrees with return to [facility]. Patient is not interested in any type of medication management at this point. Patient encouraged to stay active and to lose weight  Treatment plan discussed with the patient including medication and procedure side effects             30 minutes was spent on this case reviewing records and counseling/educating patient on his treatment plan.        We discussed with the patient that combining opioids, benzodiazepines, alcohol, illicit drugs or sleep aids increases the risk of respiratory depression including death. We discussed that these medications may cause drowsiness, sedation or dizziness and have counseled the patient not to drive or operate machinery. We have discussed that these medications will be prescribed only by one provider. We have discussed with the patient about age related risk factors and have thoroughly discussed the importance of taking these medications as prescribed. The patient verbalizes understanding.     ccreferring physic

## 2023-01-10 NOTE — Clinical Note
Hi.  I'm sending [facility] back [facility].  His first lumbar epidural lasted one month.  The 2nd only 1 week.  He was also seen for his neck.  Cannot do ELI based on the severity of his neck.  He is not interested in medication management.  Thanks!

## 2023-01-10 NOTE — Clinical Note
Seen for lower back and neck today. I do not see any other options for his neck. Please let me know if you have any other recommendations. Has tried topicals/tens. Saw patient for cervical pain today as well. Prior MRI reviewed. 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 (2021). ELI would be contraindicated due to this.  (prior ACDF in 2000) Patient is not interested in any type of medication management at this point.

## 2023-01-10 NOTE — PROGRESS NOTES
Do you currently have any of the following:    Fever: No  Headache:  No  Cough: No  Shortness of breath: No  Exposed to anyone with these symptoms: No         Rickey Banks presents to the [facility] on 1/10/2023. Jolanta Ricci is complaining of pain lower back . The pain is constant. The pain is described as throbbing. Pain is rated on his best day at a 2, on his worst day at a 9, and on average at a 7 on the VAS scale. He took his last dose of Tylenol this weekend. Gabapentin couple weeks    Any procedures since your last visit: Yes, with 70 % relief. Pacemaker or defibrillator: No    He is  on NSAIDS and is  on anticoagulation medications to include Eliquis and is managed by Dr Sree Crenshaw.     Medication Contract and Consent for Opioid Use Documents Filed        No documents found                    BP (!) 143/75   Pulse 61   Temp 97.3 °F (36.3 °C) (Temporal)   Resp 16   Ht 6' 2\" (1.88 m)   Wt 264 lb (119.7 kg)   SpO2 98%   BMI 33.90 kg/m²      No LMP for male patient.

## 2023-01-20 ENCOUNTER — OFFICE VISIT (OUTPATIENT)
Dept: FAMILY MEDICINE CLINIC | Age: 73
End: 2023-01-20
Payer: MEDICAID

## 2023-01-20 VITALS
HEIGHT: 74 IN | OXYGEN SATURATION: 97 % | WEIGHT: 258 LBS | DIASTOLIC BLOOD PRESSURE: 74 MMHG | SYSTOLIC BLOOD PRESSURE: 136 MMHG | BODY MASS INDEX: 33.11 KG/M2 | HEART RATE: 66 BPM | TEMPERATURE: 97.5 F

## 2023-01-20 DIAGNOSIS — R10.9 LEFT FLANK PAIN: ICD-10-CM

## 2023-01-20 DIAGNOSIS — R30.0 DYSURIA: ICD-10-CM

## 2023-01-20 DIAGNOSIS — N20.0 NEPHROLITHIASIS: Primary | ICD-10-CM

## 2023-01-20 LAB
BILIRUBIN, POC: NEGATIVE
BLOOD URINE, POC: NEGATIVE
CLARITY, POC: CLEAR
COLOR, POC: YELLOW
GLUCOSE URINE, POC: 250
KETONES, POC: NEGATIVE
LEUKOCYTE EST, POC: NORMAL
NITRITE, POC: NEGATIVE
PH, POC: 5.5
PROTEIN, POC: NEGATIVE
SPECIFIC GRAVITY, POC: 1.02
UROBILINOGEN, POC: 1

## 2023-01-20 PROCEDURE — 3017F COLORECTAL CA SCREEN DOC REV: CPT | Performed by: FAMILY MEDICINE

## 2023-01-20 PROCEDURE — G8427 DOCREV CUR MEDS BY ELIG CLIN: HCPCS | Performed by: FAMILY MEDICINE

## 2023-01-20 PROCEDURE — 3075F SYST BP GE 130 - 139MM HG: CPT | Performed by: FAMILY MEDICINE

## 2023-01-20 PROCEDURE — G8417 CALC BMI ABV UP PARAM F/U: HCPCS | Performed by: FAMILY MEDICINE

## 2023-01-20 PROCEDURE — G8484 FLU IMMUNIZE NO ADMIN: HCPCS | Performed by: FAMILY MEDICINE

## 2023-01-20 PROCEDURE — 81002 URINALYSIS NONAUTO W/O SCOPE: CPT | Performed by: FAMILY MEDICINE

## 2023-01-20 PROCEDURE — 1036F TOBACCO NON-USER: CPT | Performed by: FAMILY MEDICINE

## 2023-01-20 PROCEDURE — 1123F ACP DISCUSS/DSCN MKR DOCD: CPT | Performed by: FAMILY MEDICINE

## 2023-01-20 PROCEDURE — 99213 OFFICE O/P EST LOW 20 MIN: CPT | Performed by: FAMILY MEDICINE

## 2023-01-20 PROCEDURE — 3078F DIAST BP <80 MM HG: CPT | Performed by: FAMILY MEDICINE

## 2023-01-20 RX ORDER — SULFAMETHOXAZOLE AND TRIMETHOPRIM 800; 160 MG/1; MG/1
1 TABLET ORAL 2 TIMES DAILY
Qty: 10 TABLET | Refills: 0 | Status: SHIPPED | OUTPATIENT
Start: 2023-01-20 | End: 2023-01-25

## 2023-01-20 RX ORDER — TAMSULOSIN HYDROCHLORIDE 0.4 MG/1
0.4 CAPSULE ORAL NIGHTLY
Qty: 90 CAPSULE | Refills: 1 | Status: SHIPPED | OUTPATIENT
Start: 2023-01-20

## 2023-01-20 ASSESSMENT — PATIENT HEALTH QUESTIONNAIRE - PHQ9
1. LITTLE INTEREST OR PLEASURE IN DOING THINGS: 2
6. FEELING BAD ABOUT YOURSELF - OR THAT YOU ARE A FAILURE OR HAVE LET YOURSELF OR YOUR FAMILY DOWN: 2
7. TROUBLE CONCENTRATING ON THINGS, SUCH AS READING THE NEWSPAPER OR WATCHING TELEVISION: 0
SUM OF ALL RESPONSES TO PHQ QUESTIONS 1-9: 13
3. TROUBLE FALLING OR STAYING ASLEEP: 2
SUM OF ALL RESPONSES TO PHQ QUESTIONS 1-9: 13
SUM OF ALL RESPONSES TO PHQ QUESTIONS 1-9: 13
4. FEELING TIRED OR HAVING LITTLE ENERGY: 2
2. FEELING DOWN, DEPRESSED OR HOPELESS: 2
8. MOVING OR SPEAKING SO SLOWLY THAT OTHER PEOPLE COULD HAVE NOTICED. OR THE OPPOSITE, BEING SO FIGETY OR RESTLESS THAT YOU HAVE BEEN MOVING AROUND A LOT MORE THAN USUAL: 1
SUM OF ALL RESPONSES TO PHQ9 QUESTIONS 1 & 2: 4
5. POOR APPETITE OR OVEREATING: 2
9. THOUGHTS THAT YOU WOULD BE BETTER OFF DEAD, OR OF HURTING YOURSELF: 0
SUM OF ALL RESPONSES TO PHQ QUESTIONS 1-9: 13
10. IF YOU CHECKED OFF ANY PROBLEMS, HOW DIFFICULT HAVE THESE PROBLEMS MADE IT FOR YOU TO DO YOUR WORK, TAKE CARE OF THINGS AT HOME, OR GET ALONG WITH OTHER PEOPLE: 0

## 2023-01-20 ASSESSMENT — ENCOUNTER SYMPTOMS
ABDOMINAL PAIN: 0
NAUSEA: 0
VOMITING: 0
CONSTIPATION: 0
DIARRHEA: 0
WHEEZING: 0
COUGH: 0
SHORTNESS OF BREATH: 0

## 2023-01-20 NOTE — PROGRESS NOTES
[facility]  DATE OF VISIT : 2023    Patient : Steven Smoker   Age : 71 y.o.  : 1950   MRN : [id number]   ______________________________________________________________________    Chief Complaint :   Chief Complaint   Patient presents with    Nephrolithiasis     Patient he is here today for possible kidney stones. Pain started two weeks ago. Has been drinking a lot fluids. Pain level 5/10 and gets worse at times. No blood in urine present. Using the bathroom regularly. No painful urination. HPI : Steven Smoker is 71 y.o. male who presented to the clinic today for office visit. Left flank pain: started about 2 weeks ago. He has previous history of kidney stones and notes this pain feels exactly like the previous. Some dysuria but denies any hematuria, frequency or urgency. He does not notice any changes in the pain with micturition. He has increased his fluid intake to try to \"pass the stone\". I reviewed the patient's past medications, allergies and past medical history during this visit.     Past Medical History :    Past Medical History:   Diagnosis Date    Arthritis     Benign hypertensive kidney disease with chronic kidney disease 10/06/2021    Cervical vertebra fusion 2000    Chronic bilateral low back pain with bilateral sciatica 2022    Chronic kidney disease (CKD), stage III (moderate) ([address] 2013    Depression     Hypertension     Stroke [facility])     patient had a stroke in the late     Type 2 diabetes mellitus with diabetic polyneuropathy, with long-term current use of insulin ([address]      Past Surgical History:   Procedure Laterality Date    COLONOSCOPY      COLONOSCOPY N/A 2021    COLONOSCOPY POLYPECTOMY SNARE/COLD BIOPSY performed by Natali Gary MD at [place] [id number]  2010    PAIN MANAGEMENT PROCEDURE Bilateral 2022    BILATERAL L4 TRANSFORAMINAL EPIDURAL STEROID INJECTION performed by Dioni Gonzáles DO at [address] Bilateral 12/13/2022    BILATERAL L4 TRANSFORAMINAL EPIDURAL STEROID INJECTION performed by Dioni Gonzáles DO at [address] [facility] Left 1999    SPINE SURGERY      UPPER GASTROINTESTINAL ENDOSCOPY      UPPER GASTROINTESTINAL ENDOSCOPY N/A 09/13/2021    EGD BIOPSY performed by Mary Carmen Devries MD at [place] [address] N/A 09/13/2021    EGD POLYP SNARE performed by Mary Carmen Devries MD at [place] [address] N/A 08/23/2022    EGD WITH POSSIBLE  DILATION BALLOON performed by Mary Carmen Devries MD at [place] [address] N/A 08/23/2022    EGD BIOPSY performed by Mary Carmen Devries MD at [facility] History :  Social History       Tobacco History       Smoking Status  Never      Smokeless Tobacco Use  Never              Alcohol History       Alcohol Use Status  No              Drug Use       Drug Use Status  No              Sexual Activity       Sexually Active  Not Asked                     Allergies :   No Known Allergies    Medication List :    Current Outpatient Medications   Medication Sig Dispense Refill    tamsulosin (FLOMAX) 0.4 MG capsule Take 1 capsule by mouth nightly 90 capsule 1    sulfamethoxazole-trimethoprim (BACTRIM DS;SEPTRA DS) 800-160 MG per tablet Take 1 tablet by mouth 2 times daily for 5 days 10 tablet 0    hydroCHLOROthiazide (MICROZIDE) 12.5 MG capsule TAKE 1 CAPSULE BY MOUTH DAILY 90 capsule 1    pantoprazole (PROTONIX) 40 MG tablet Take 1 tablet by mouth daily 90 tablet 1    aspirin (ASPIRIN LOW DOSE) 81 MG EC tablet TAKE 1 TABLET BY MOUTH DAILY 90 tablet 1    lisinopril (PRINIVIL;ZESTRIL) 40 MG tablet Take 1 tablet by mouth daily 90 tablet 1    metoprolol succinate (TOPROL XL) 100 MG extended release tablet Take 0.5 tablets by mouth in the morning and at bedtime Take 0.5 tablets by mouth in the morning and at bedtime 180 tablet 1    vitamin D (CHOLECALCIFEROL) 25 MCG (1000 UT) TABS tablet Take 1 tablet by mouth daily Take 1,000 Units by mouth daily 90 tablet 1    amLODIPine (NORVASC) 5 MG tablet Take 1 tablet by mouth daily 90 tablet 1    buPROPion (WELLBUTRIN SR) 100 MG extended release tablet Take 100 mg by mouth daily      hydrALAZINE (APRESOLINE) 25 MG tablet Take 25 mg by mouth daily      apixaban (ELIQUIS) 5 MG TABS tablet Take 1 tablet by mouth 2 times daily 180 tablet 3    ACETAMINOPHEN EXTRA STRENGTH 500 MG tablet TAKE 2 TABLETS BY MOUTH EVERY 6 HOURS FOR PAIN      ONETOUCH ULTRA strip       gabapentin (NEURONTIN) 300 MG capsule Take 300 mg by mouth as needed. Takes prn      Blood Pressure KIT 1 kit by Does not apply route daily 1 kit 0    Alcohol Swabs (B-D SINGLE USE SWABS REGULAR) PADS USE 3 - 4 TIMES DAILY AS DIRECTED      insulin lispro, 1 Unit Dial, 100 UNIT/ML SOPN Inject 6 Units into the skin 3 times daily Plus  2 units for each 50 above 150 Blood sugar level result      B-D UF III MINI PEN NEEDLES 31G X 5 MM MISC 1 each by Does not apply route 4 times daily      insulin glargine (LANTUS) 100 UNIT/ML injection vial Inject 40 Units into the skin nightly       No current facility-administered medications for this visit. Review of Systems :  Review of Systems   Constitutional:  Negative for chills, fatigue and fever. Respiratory:  Negative for cough, shortness of breath and wheezing. Cardiovascular:  Negative for chest pain, palpitations and leg swelling. Gastrointestinal:  Negative for abdominal pain, constipation, diarrhea, nausea and vomiting. Endocrine: Negative for polydipsia and polyuria. Genitourinary:  Positive for dysuria and flank pain (left). Negative for decreased urine volume, difficulty urinating and urgency. Neurological:  Negative for dizziness, weakness, light-headedness, numbness and headaches. ______________________________________________________________________    Physical Exam :    Vitals: /74   Pulse 66   Temp 97.5 °F (36.4 °C) (Temporal)   Ht 6' 2\" (1.88 m)   Wt 258 lb (117 kg)   SpO2 97%   BMI 33.13 kg/m²   Physical Exam  Vitals reviewed. Constitutional:       General: He is not in acute distress. Appearance: Normal appearance. Cardiovascular:      Rate and Rhythm: Normal rate and regular rhythm. Pulses: Normal pulses. Heart sounds: Normal heart sounds. No murmur heard. Pulmonary:      Effort: Pulmonary effort is normal. No respiratory distress. Breath sounds: Normal breath sounds. No wheezing. Abdominal:      General: Bowel sounds are normal. There is no distension. Palpations: Abdomen is soft. Tenderness: There is abdominal tenderness (Left flank pain). There is left CVA tenderness. Musculoskeletal:      Right lower leg: No edema. Left lower leg: No edema. Neurological:      Mental Status: He is alert.     ___________________    Assessment & Plan :    1. Nephrolithiasis  - tamsulosin (FLOMAX) 0.4 MG capsule; Take 1 capsule by mouth nightly  Dispense: 90 capsule; Refill: 1  - US RETROPERITONEAL COMPLETE; Future    2. Left flank pain  - tamsulosin (FLOMAX) 0.4 MG capsule; Take 1 capsule by mouth nightly  Dispense: 90 capsule; Refill: 1  - US RETROPERITONEAL COMPLETE; Future    3. Dysuria  - Moderate leukocytes and glucose in UA  - No blood noted in UA  - sulfamethoxazole-trimethoprim (BACTRIM DS;SEPTRA DS) 800-160 MG per tablet; Take 1 tablet by mouth 2 times daily for 5 days  Dispense: 10 tablet; Refill: 0    Educational materials and/or home exercises printed for patient's review and were included in patient instructions on his/her After Visit Summary and given to patient at the end of visit. Counseled regarding above diagnosis, including possible risks and complications,  especially if left uncontrolled.      Counseled regarding the possible side effects, risks, benefits and alternatives to treatment; patient and/or guardian verbalizes understanding, agrees, feels comfortable with and wishes to proceed with above treatment plan. Advised patient to call with any new medication issues, and read all Rx info from pharmacy to assure aware of all possible risks and side effects of medication before taking. Reviewed age and gender appropriate health screening exams and vaccinations. Advised patient regarding importance of keeping up with recommended health maintenance and to schedule as soon as possible if overdue, as this is important in assessing for undiagnosed pathology, especially cancer, as well as protecting against potentially harmful/life threatening disease. Patient and/or guardian verbalizes understanding and agrees with above counseling, assessment and plan. All questions answered    Additional plan and future considerations:   RTO following US     Return to Office: No follow-ups on file.     Electronically signed by Carlus Dandy, MD on 1/20/2023 at 11:32 AM

## 2023-01-20 NOTE — TELEPHONE ENCOUNTER
1/20/23: Patient is a 71 year old female with past medical history or HTN, IBS, and anal cancer.  
br
nain Patient was seeing Dr. Armas in December for a routine check-up and was found to have a hemoglobin of 9.2. She was prescribed iron tablets for a month, labs rechecked on 1/12/2023 and hemoglobin was 11.9, iron was 41. Patient states she had been taking Aleve twice daily for her arthritis for years. She stopped taking Aleve for the last month due to the low blood counts.   br
nain She reports abdominal pain, vomiting, and heartburn. She has had reflux for years. She states the reflux worsened during chemo and radiation. She was prescribed pantoprazole 40 mg daily which has helped, but she sometimes has episodes of vomiting once a month that she attributes to food. She reports dysphagia that has been going on for years. In her 30's she had a swallow study that was normal. She states dry chicken, bread, or her vitamins get stuck and occasionally will vomit it back up. 
br
nain She reports black stools, which has been consistent since the iron tablets but reports having black BM's intermittently a few weeks prior to the iron tablets. She has a BM 2-3 times per day which is normal for her. br
nain Data Reviewed:
br 
12/13/22: RBC 3.64, HGB 9.2 br 11/12/23: RBC 4.37, HGB 11.9, Iron 41 Spoke with patient appointment rescheduled for 2/28/2022.

## 2023-02-07 ENCOUNTER — HOSPITAL ENCOUNTER (OUTPATIENT)
Dept: ULTRASOUND IMAGING | Age: 73
Discharge: HOME | End: 2023-02-09
Payer: MEDICAID

## 2023-02-07 DIAGNOSIS — R10.9 LEFT FLANK PAIN: ICD-10-CM

## 2023-02-07 DIAGNOSIS — N20.0 NEPHROLITHIASIS: ICD-10-CM

## 2023-02-07 PROCEDURE — 76770 US EXAM ABDO BACK WALL COMP: CPT

## 2023-02-08 DIAGNOSIS — R10.9 LEFT FLANK PAIN: ICD-10-CM

## 2023-02-08 DIAGNOSIS — N13.30 HYDRONEPHROSIS OF LEFT KIDNEY: Primary | ICD-10-CM

## 2023-02-08 DIAGNOSIS — N20.0 NEPHROLITHIASIS: ICD-10-CM

## 2023-02-09 ENCOUNTER — HOSPITAL ENCOUNTER (OUTPATIENT)
Age: 73
Discharge: HOME | End: 2023-02-09
Payer: MEDICAID

## 2023-02-09 LAB
ALBUMIN SERPL-MCNC: 3.7 G/DL (ref 3.5–5.2)
ALP BLD-CCNC: 116 U/L (ref 40–129)
ALT SERPL-CCNC: 18 U/L (ref 0–40)
ANION GAP SERPL CALCULATED.3IONS-SCNC: 10 MMOL/L (ref 7–16)
AST SERPL-CCNC: 22 U/L (ref 0–39)
BASOPHILS ABSOLUTE: 0.04 E9/L (ref 0–0.2)
BASOPHILS RELATIVE PERCENT: 0.8 % (ref 0–2)
BILIRUB SERPL-MCNC: 0.3 MG/DL (ref 0–1.2)
BUN BLDV-MCNC: 20 MG/DL (ref 6–23)
CALCIUM SERPL-MCNC: 8.8 MG/DL (ref 8.6–10.2)
CHLORIDE BLD-SCNC: 107 MMOL/L (ref 98–107)
CO2: 26 MMOL/L (ref 22–29)
CREAT SERPL-MCNC: 2.4 MG/DL (ref 0.7–1.2)
CREATININE URINE: 254 MG/DL (ref 40–278)
EOSINOPHILS ABSOLUTE: 0.08 E9/L (ref 0.05–0.5)
EOSINOPHILS RELATIVE PERCENT: 1.6 % (ref 0–6)
GFR SERPL CREATININE-BSD FRML MDRD: 28 ML/MIN/1.73
GLUCOSE BLD-MCNC: 116 MG/DL (ref 74–99)
HBA1C MFR BLD: 6.6 % (ref 4–5.6)
HCT VFR BLD CALC: 38.8 % (ref 37–54)
HEMOGLOBIN: 12.5 G/DL (ref 12.5–16.5)
IMMATURE GRANULOCYTES #: 0.01 E9/L
IMMATURE GRANULOCYTES %: 0.2 % (ref 0–5)
LYMPHOCYTES ABSOLUTE: 1.81 E9/L (ref 1.5–4)
LYMPHOCYTES RELATIVE PERCENT: 36.5 % (ref 20–42)
MCH RBC QN AUTO: 26.8 PG (ref 26–35)
MCHC RBC AUTO-ENTMCNC: 32.2 % (ref 32–34.5)
MCV RBC AUTO: 83.3 FL (ref 80–99.9)
MICROALBUMIN UR-MCNC: 36.6 MG/L
MICROALBUMIN/CREAT UR-RTO: 14.4 (ref 0–30)
MONOCYTES ABSOLUTE: 0.56 E9/L (ref 0.1–0.95)
MONOCYTES RELATIVE PERCENT: 11.3 % (ref 2–12)
NEUTROPHILS ABSOLUTE: 2.46 E9/L (ref 1.8–7.3)
NEUTROPHILS RELATIVE PERCENT: 49.6 % (ref 43–80)
PDW BLD-RTO: 14.6 FL (ref 11.5–15)
PLATELET # BLD: 242 E9/L (ref 130–450)
PMV BLD AUTO: 11.3 FL (ref 7–12)
POTASSIUM SERPL-SCNC: 4 MMOL/L (ref 3.5–5)
RBC # BLD: 4.66 E12/L (ref 3.8–5.8)
SODIUM BLD-SCNC: 143 MMOL/L (ref 132–146)
TOTAL PROTEIN: 7.2 G/DL (ref 6.4–8.3)
URIC ACID, SERUM: 6 MG/DL (ref 3.4–7)
VITAMIN B-12: 215 PG/ML (ref 211–946)
VITAMIN D 25-HYDROXY: 54 NG/ML (ref 30–100)
WBC # BLD: 5 E9/L (ref 4.5–11.5)

## 2023-02-09 PROCEDURE — 82607 VITAMIN B-12: CPT

## 2023-02-09 PROCEDURE — 83036 HEMOGLOBIN GLYCOSYLATED A1C: CPT

## 2023-02-09 PROCEDURE — 82306 VITAMIN D 25 HYDROXY: CPT

## 2023-02-09 PROCEDURE — 80053 COMPREHEN METABOLIC PANEL: CPT

## 2023-02-09 PROCEDURE — 36415 COLL VENOUS BLD VENIPUNCTURE: CPT

## 2023-02-09 PROCEDURE — 84550 ASSAY OF BLOOD/URIC ACID: CPT

## 2023-02-09 PROCEDURE — 82570 ASSAY OF URINE CREATININE: CPT

## 2023-02-09 PROCEDURE — 82044 UR ALBUMIN SEMIQUANTITATIVE: CPT

## 2023-02-09 PROCEDURE — 85025 COMPLETE CBC W/AUTO DIFF WBC: CPT

## 2023-02-20 ENCOUNTER — OFFICE VISIT (OUTPATIENT)
Dept: PRIMARY CARE CLINIC | Age: 73
End: 2023-02-20

## 2023-02-20 VITALS
HEART RATE: 64 BPM | HEIGHT: 74 IN | DIASTOLIC BLOOD PRESSURE: 77 MMHG | TEMPERATURE: 97.9 F | RESPIRATION RATE: 16 BRPM | WEIGHT: 257 LBS | BODY MASS INDEX: 32.98 KG/M2 | OXYGEN SATURATION: 98 % | SYSTOLIC BLOOD PRESSURE: 127 MMHG

## 2023-02-20 DIAGNOSIS — H65.91 RIGHT SEROUS OTITIS MEDIA, UNSPECIFIED CHRONICITY: ICD-10-CM

## 2023-02-20 DIAGNOSIS — J02.9 SORE THROAT: ICD-10-CM

## 2023-02-20 DIAGNOSIS — J01.00 ACUTE NON-RECURRENT MAXILLARY SINUSITIS: Primary | ICD-10-CM

## 2023-02-20 LAB — S PYO AG THROAT QL: NORMAL

## 2023-02-20 RX ORDER — DOXYCYCLINE HYCLATE 100 MG/1
100 CAPSULE ORAL 2 TIMES DAILY
Qty: 20 CAPSULE | Refills: 0 | Status: SHIPPED | OUTPATIENT
Start: 2023-02-20 | End: 2023-03-02

## 2023-02-20 RX ORDER — CETIRIZINE HYDROCHLORIDE 10 MG/1
10 TABLET ORAL DAILY
Qty: 30 TABLET | Refills: 0 | Status: SHIPPED | OUTPATIENT
Start: 2023-02-20

## 2023-02-20 RX ORDER — FLUTICASONE PROPIONATE 50 MCG
SPRAY, SUSPENSION (ML) NASAL
Qty: 16 G | Refills: 0 | Status: SHIPPED | OUTPATIENT
Start: 2023-02-20

## 2023-02-20 NOTE — PROGRESS NOTES
23  Fred Mondragon : 1950 Sex: male  Age 71 y.o. Subjective:  Chief Complaint   Patient presents with    Pharyngitis     Sore throat, right ear pain since 2023 No sweats/chills, no body aches, no nausea/vomiting, no runny/stuffy nose. No medication taken for symptoms. HPI:   Fred Mondragon , 71 y.o. male presents to the clinic for evaluation of constant right ear pain x 2 days. The patient also reports right sore throat. The patient has not taken any treatment for symptoms. The patient reports unchanged symptoms over time. The patient denies sinus congestion, ear drainage, trauma, dizziness, and loss of hearing. The patient also denies headache, fever, chest pain, abdominal pain, shortness of breath, and nausea / vomiting / diarrhea. ROS:   Unless otherwise stated in this report the patient's positive and negative responses for review of systems for constitutional, eyes, ENT, cardiovascular, respiratory, gastrointestinal, neurological, , musculoskeletal, and integument systems and related systems to the presenting problem are either stated in the history of present illness or were not pertinent or were negative for the symptoms and/or complaints related to the presenting medical problem. Positives and pertinent negatives as per HPI. All others reviewed and are negative.       PMH:     Past Medical History:   Diagnosis Date    Arthritis     Benign hypertensive kidney disease with chronic kidney disease 10/06/2021    Cervical vertebra fusion 2000    Chronic bilateral low back pain with bilateral sciatica 2022    Chronic kidney disease (CKD), stage III (moderate) ([address] 2013    Depression     Hypertension     Stroke [facility])     patient had a stroke in the late     Type 2 diabetes mellitus with diabetic polyneuropathy, with long-term current use of insulin ([address]        Past Surgical History:   Procedure Laterality Date    COLONOSCOPY      COLONOSCOPY N/A 09/13/2021    COLONOSCOPY POLYPECTOMY SNARE/COLD BIOPSY performed by Matthew Israel MD at [facility] [address]  01/23/2010 2019    PAIN MANAGEMENT PROCEDURE Bilateral 9/27/2022    BILATERAL L4 TRANSFORAMINAL EPIDURAL STEROID INJECTION performed by Jeannine Rucker DO at [address] Bilateral 12/13/2022    BILATERAL L4 TRANSFORAMINAL EPIDURAL STEROID INJECTION performed by Jeannine Rucker DO at [address] [facility] GASTROINTESTINAL ENDOSCOPY      UPPER GASTROINTESTINAL ENDOSCOPY N/A 09/13/2021    EGD BIOPSY performed by Matthew Israel MD at [place] [address] N/A 09/13/2021    EGD POLYP SNARE performed by Matthew Israel MD at [place] [address] N/A 08/23/2022    EGD WITH POSSIBLE  DILATION BALLOON performed by Matthew Israel MD at [place] [address] N/A 08/23/2022    EGD BIOPSY performed by Matthew Israel MD at [place] [address] History   Problem Relation Age of Onset    Heart Disease Mother     Diabetes Sister        Medications:     Current Outpatient Medications:     doxycycline hyclate (VIBRAMYCIN) 100 MG capsule, Take 1 capsule by mouth 2 times daily for 10 days, Disp: 20 capsule, Rfl: 0    cetirizine (ZYRTEC ALLERGY) 10 MG tablet, Take 1 tablet by mouth daily, Disp: 30 tablet, Rfl: 0    fluticasone (FLONASE ALLERGY RELIEF) 50 MCG/ACT nasal spray, 1-2 sprays, each nostril daily as needed for nasal congestion. , Disp: 16 g, Rfl: 0    tamsulosin (FLOMAX) 0.4 MG capsule, Take 1 capsule by mouth nightly, Disp: 90 capsule, Rfl: 1    hydroCHLOROthiazide (MICROZIDE) 12.5 MG capsule, TAKE 1 CAPSULE BY MOUTH DAILY, Disp: 90 capsule, Rfl: 1    pantoprazole (PROTONIX) 40 MG tablet, Take 1 tablet by mouth daily, Disp: 90 tablet, Rfl: 1    aspirin (ASPIRIN LOW DOSE) 81 MG EC tablet, TAKE 1 TABLET BY MOUTH DAILY, Disp: 90 tablet, Rfl: 1    lisinopril (PRINIVIL;ZESTRIL) 40 MG tablet, Take 1 tablet by mouth daily, Disp: 90 tablet, Rfl: 1    metoprolol succinate (TOPROL XL) 100 MG extended release tablet, Take 0.5 tablets by mouth in the morning and at bedtime Take 0.5 tablets by mouth in the morning and at bedtime, Disp: 180 tablet, Rfl: 1    vitamin D (CHOLECALCIFEROL) 25 MCG (1000 UT) TABS tablet, Take 1 tablet by mouth daily Take 1,000 Units by mouth daily, Disp: 90 tablet, Rfl: 1    amLODIPine (NORVASC) 5 MG tablet, Take 1 tablet by mouth daily, Disp: 90 tablet, Rfl: 1    buPROPion (WELLBUTRIN SR) 100 MG extended release tablet, Take 100 mg by mouth daily, Disp: , Rfl:     hydrALAZINE (APRESOLINE) 25 MG tablet, Take 25 mg by mouth daily, Disp: , Rfl:     apixaban (ELIQUIS) 5 MG TABS tablet, Take 1 tablet by mouth 2 times daily, Disp: 180 tablet, Rfl: 3    ACETAMINOPHEN EXTRA STRENGTH 500 MG tablet, TAKE 2 TABLETS BY MOUTH EVERY 6 HOURS FOR PAIN, Disp: , Rfl:     ONETOUCH ULTRA strip, , Disp: , Rfl:     gabapentin (NEURONTIN) 300 MG capsule, Take 300 mg by mouth as needed. Takes prn, Disp: , Rfl:     Blood Pressure KIT, 1 kit by Does not apply route daily, Disp: 1 kit, Rfl: 0    Alcohol Swabs (B-D SINGLE USE SWABS REGULAR) PADS, USE 3 - 4 TIMES DAILY AS DIRECTED, Disp: , Rfl:     insulin lispro, 1 Unit Dial, 100 UNIT/ML SOPN, Inject 6 Units into the skin 3 times daily Plus  2 units for each 50 above 150 Blood sugar level result, Disp: , Rfl:     B-D UF III MINI PEN NEEDLES 31G X 5 MM MISC, 1 each by Does not apply route 4 times daily, Disp: , Rfl:     insulin glargine (LANTUS) 100 UNIT/ML injection vial, Inject 40 Units into the skin nightly, Disp: , Rfl:     Allergies:   No Known Allergies    Social History:     Social History     Tobacco Use    Smoking status: Never    Smokeless tobacco: Never   Vaping Use    Vaping Use: Never used   Substance Use Topics    Alcohol use:  No Drug use: No       Physical Exam:     Vitals:    02/20/23 0953   BP: 127/77   Site: Left Upper Arm   Position: Sitting   Cuff Size: Large Adult   Pulse: 64   Resp: 16   Temp: 97.9 °F (36.6 °C)   TempSrc: Oral   SpO2: 98%   Weight: 257 lb (116.6 kg)   Height: 6' 2\" (1.88 m)       Physical Exam (PE)  Physical Exam  Constitutional:       Appearance: Normal appearance. HENT:      Head: Normocephalic. Right Ear: Ear canal and external ear normal. A middle ear effusion is present. Left Ear: Tympanic membrane, ear canal and external ear normal.      Nose: Congestion and rhinorrhea present. Right Sinus: Maxillary sinus tenderness present. Mouth/Throat:      Mouth: Mucous membranes are moist.      Pharynx: Oropharynx is clear. No oropharyngeal exudate or posterior oropharyngeal erythema. Eyes:      Pupils: Pupils are equal, round, and reactive to light. Cardiovascular:      Rate and Rhythm: Normal rate and regular rhythm. Pulses: Normal pulses. Heart sounds: Normal heart sounds. Pulmonary:      Effort: Pulmonary effort is normal.      Breath sounds: Normal breath sounds. No wheezing, rhonchi or rales. Abdominal:      General: Bowel sounds are normal.      Palpations: Abdomen is soft. Musculoskeletal:         General: Normal range of motion. Cervical back: Normal range of motion and neck supple. Lymphadenopathy:      Cervical: No cervical adenopathy. Skin:     General: Skin is warm and dry. Capillary Refill: Capillary refill takes less than 2 seconds. Neurological:      General: No focal deficit present. Mental Status: He is alert and oriented to person, place, and time.    Psychiatric:         Mood and Affect: Mood normal.         Behavior: Behavior normal.       Testing:   (All laboratory and radiology results have been personally reviewed by myself)  Labs:  Results for orders placed or performed in visit on 02/20/23   POCT rapid strep A   Result Value Ref Range Strep A Ag None Detected None Detected       Imaging: All Radiology results interpreted by Radiologist unless otherwise noted. No orders to display       Assessment / Plan:   The patient's vitals, allergies, medications, and past medical history have been reviewed. Caryn Beltre was seen today for pharyngitis. Diagnoses and all orders for this visit:    Acute non-recurrent maxillary sinusitis  -     doxycycline hyclate (VIBRAMYCIN) 100 MG capsule; Take 1 capsule by mouth 2 times daily for 10 days  -     cetirizine (ZYRTEC ALLERGY) 10 MG tablet; Take 1 tablet by mouth daily  -     fluticasone (FLONASE ALLERGY RELIEF) 50 MCG/ACT nasal spray; 1-2 sprays, each nostril daily as needed for nasal congestion. Right serous otitis media, unspecified chronicity    Sore throat  -     POCT rapid strep A      - Disposition: Home    - Educational material printed for patient's review and were included in patient instructions. After Visit Summary was given to patient at the end of visit. - COVID-19 test declined. Discussed symptomatic treatments with the patient today. The patient is to follow-up with PCP in the next 2-3 days for reevaluation. Red flag symptoms were also discussed with the patient today. If symptoms worsen the patient is to go directly to the emergency department for reevaluation and treatment. Pt verbalizes understanding and is in agreement with plan of care. All questions answered. SIGNATURE: GERRY Wagner    *NOTE: This report was transcribed using voice recognition software. Every effort was made to ensure accuracy; however, inadvertent computerized transcription errors may be present.

## 2023-03-08 ENCOUNTER — OFFICE VISIT (OUTPATIENT)
Age: 73
End: 2023-03-08
Payer: MEDICAID

## 2023-03-08 VITALS
OXYGEN SATURATION: 98 % | SYSTOLIC BLOOD PRESSURE: 132 MMHG | TEMPERATURE: 97.7 F | WEIGHT: 251 LBS | HEART RATE: 69 BPM | HEIGHT: 74 IN | BODY MASS INDEX: 32.21 KG/M2 | RESPIRATION RATE: 18 BRPM | DIASTOLIC BLOOD PRESSURE: 79 MMHG

## 2023-03-08 DIAGNOSIS — I10 ESSENTIAL HYPERTENSION: ICD-10-CM

## 2023-03-08 DIAGNOSIS — N13.39 OTHER HYDRONEPHROSIS: Primary | ICD-10-CM

## 2023-03-08 PROCEDURE — 3017F COLORECTAL CA SCREEN DOC REV: CPT | Performed by: FAMILY MEDICINE

## 2023-03-08 PROCEDURE — 3078F DIAST BP <80 MM HG: CPT | Performed by: FAMILY MEDICINE

## 2023-03-08 PROCEDURE — 1036F TOBACCO NON-USER: CPT | Performed by: FAMILY MEDICINE

## 2023-03-08 PROCEDURE — G8484 FLU IMMUNIZE NO ADMIN: HCPCS | Performed by: FAMILY MEDICINE

## 2023-03-08 PROCEDURE — 1123F ACP DISCUSS/DSCN MKR DOCD: CPT | Performed by: FAMILY MEDICINE

## 2023-03-08 PROCEDURE — 3075F SYST BP GE 130 - 139MM HG: CPT | Performed by: FAMILY MEDICINE

## 2023-03-08 PROCEDURE — 99214 OFFICE O/P EST MOD 30 MIN: CPT | Performed by: FAMILY MEDICINE

## 2023-03-08 PROCEDURE — G8427 DOCREV CUR MEDS BY ELIG CLIN: HCPCS | Performed by: FAMILY MEDICINE

## 2023-03-08 PROCEDURE — G8417 CALC BMI ABV UP PARAM F/U: HCPCS | Performed by: FAMILY MEDICINE

## 2023-03-08 RX ORDER — LISINOPRIL 40 MG/1
40 TABLET ORAL DAILY
Qty: 90 TABLET | Refills: 1 | Status: SHIPPED | OUTPATIENT
Start: 2023-03-08

## 2023-03-08 ASSESSMENT — ENCOUNTER SYMPTOMS
SHORTNESS OF BREATH: 0
DIARRHEA: 0
COUGH: 0
WHEEZING: 0
ABDOMINAL PAIN: 1
CONSTIPATION: 0
VOMITING: 0
NAUSEA: 0

## 2023-03-08 NOTE — PROGRESS NOTES
[facility]  DATE OF VISIT : 3/8/2023    Patient : Violeta Napier   Age : 71 y.o.  : 1950   MRN : [id number]   ______________________________________________________________________    Chief Complaint :   Chief Complaint   Patient presents with    Follow-up Chronic Condition     Last A1C was 6.6 on 23    Kidney Problem     Hydronephrosis and nephrolithiasis. HPI : Violeta Napier is 71 y.o. male who presented to the clinic today for office visit. IDDM: Following with endocrinology. Last A1c 6.6 on . HTN: Well-controlled on lisinopril 40 mg daily. Patient does need refills. Right flank pain and left hydronephrosis: Had US retroperitoneal complete done on  which did show severe left hydronephrosis, a 1.2 cm nonobstructing right renal calculi, prostatomegaly and left-sided bladder calculi. He had previously been seen by Dr. Jessenia Brewer (urology). Continues to take his Flomax daily with poor relief. I reviewed the patient's past medications, allergies and past medical history during this visit.     Past Medical History :    Past Medical History:   Diagnosis Date    Arthritis     Benign hypertensive kidney disease with chronic kidney disease 10/06/2021    Cervical vertebra fusion 2000    Chronic bilateral low back pain with bilateral sciatica 2022    Chronic kidney disease (CKD), stage III (moderate) ([address] 2013    Depression     Hypertension     Stroke [facility])     patient had a stroke in the late     Type 2 diabetes mellitus with diabetic polyneuropathy, with long-term current use of insulin ([address]      Past Surgical History:   Procedure Laterality Date    COLONOSCOPY      COLONOSCOPY N/A 2021    COLONOSCOPY POLYPECTOMY SNARE/COLD BIOPSY performed by Diana Fleischer, MD at [place] [address]  2010    PAIN MANAGEMENT PROCEDURE Bilateral 2022 BILATERAL L4 TRANSFORAMINAL EPIDURAL STEROID INJECTION performed by Kelsey Kearney DO at [address] Bilateral 12/13/2022    BILATERAL L4 TRANSFORAMINAL EPIDURAL STEROID INJECTION performed by Kelsey Kearney DO at [address] [facility] Left 1999    SPINE SURGERY      UPPER GASTROINTESTINAL ENDOSCOPY      UPPER GASTROINTESTINAL ENDOSCOPY N/A 09/13/2021    EGD BIOPSY performed by Paulo Lundborg, MD at [place] [address] N/A 09/13/2021    EGD POLYP SNARE performed by Paulo Lundborg, MD at [place] [address] N/A 08/23/2022    EGD WITH POSSIBLE  DILATION BALLOON performed by Paulo Lundborg, MD at [place] [address] N/A 08/23/2022    EGD BIOPSY performed by Paulo Lundborg, MD at [facility] History :  Social History       Tobacco History       Smoking Status  Never      Smokeless Tobacco Use  Never              Alcohol History       Alcohol Use Status  No              Drug Use       Drug Use Status  No              Sexual Activity       Sexually Active  Not Asked                     Allergies :   No Known Allergies    Medication List :    Current Outpatient Medications   Medication Sig Dispense Refill    lisinopril (PRINIVIL;ZESTRIL) 40 MG tablet Take 1 tablet by mouth daily 90 tablet 1    cetirizine (ZYRTEC ALLERGY) 10 MG tablet Take 1 tablet by mouth daily 30 tablet 0    fluticasone (FLONASE ALLERGY RELIEF) 50 MCG/ACT nasal spray 1-2 sprays, each nostril daily as needed for nasal congestion.  16 g 0    tamsulosin (FLOMAX) 0.4 MG capsule Take 1 capsule by mouth nightly 90 capsule 1    hydroCHLOROthiazide (MICROZIDE) 12.5 MG capsule TAKE 1 CAPSULE BY MOUTH DAILY 90 capsule 1    pantoprazole (PROTONIX) 40 MG tablet Take 1 tablet by mouth daily 90 tablet 1    aspirin (ASPIRIN LOW DOSE) 81 MG EC tablet TAKE 1 TABLET BY MOUTH DAILY 90 tablet 1 metoprolol succinate (TOPROL XL) 100 MG extended release tablet Take 0.5 tablets by mouth in the morning and at bedtime Take 0.5 tablets by mouth in the morning and at bedtime 180 tablet 1    vitamin D (CHOLECALCIFEROL) 25 MCG (1000 UT) TABS tablet Take 1 tablet by mouth daily Take 1,000 Units by mouth daily 90 tablet 1    amLODIPine (NORVASC) 5 MG tablet Take 1 tablet by mouth daily 90 tablet 1    buPROPion (WELLBUTRIN SR) 100 MG extended release tablet Take 100 mg by mouth daily      hydrALAZINE (APRESOLINE) 25 MG tablet Take 25 mg by mouth daily      apixaban (ELIQUIS) 5 MG TABS tablet Take 1 tablet by mouth 2 times daily 180 tablet 3    ACETAMINOPHEN EXTRA STRENGTH 500 MG tablet TAKE 2 TABLETS BY MOUTH EVERY 6 HOURS FOR PAIN      ONETOUCH ULTRA strip       gabapentin (NEURONTIN) 300 MG capsule Take 300 mg by mouth as needed. Takes prn      Blood Pressure KIT 1 kit by Does not apply route daily 1 kit 0    Alcohol Swabs (B-D SINGLE USE SWABS REGULAR) PADS USE 3 - 4 TIMES DAILY AS DIRECTED      insulin lispro, 1 Unit Dial, 100 UNIT/ML SOPN Inject 6 Units into the skin 3 times daily Plus  2 units for each 50 above 150 Blood sugar level result      B-D UF III MINI PEN NEEDLES 31G X 5 MM MISC 1 each by Does not apply route 4 times daily      insulin glargine (LANTUS) 100 UNIT/ML injection vial Inject 40 Units into the skin nightly       No current facility-administered medications for this visit. Review of Systems :  Review of Systems   Constitutional:  Negative for chills, fatigue and fever. Respiratory:  Negative for cough, shortness of breath and wheezing. Cardiovascular:  Negative for chest pain, palpitations and leg swelling. Gastrointestinal:  Positive for abdominal pain. Negative for constipation, diarrhea, nausea and vomiting. Endocrine: Negative for polydipsia and polyuria. Genitourinary:  Positive for difficulty urinating.    Neurological:  Negative for dizziness, weakness, light-headedness, numbness and headaches.   ______________________________________________________________________    Physical Exam :    Vitals: /79   Pulse 69   Temp 97.7 °F (36.5 °C)   Resp 18   Ht 6' 2\" (1.88 m)   Wt 251 lb (113.9 kg)   SpO2 98%   BMI 32.23 kg/m²   Physical Exam  Vitals reviewed. Constitutional:       General: He is not in acute distress. Appearance: Normal appearance. Cardiovascular:      Rate and Rhythm: Normal rate and regular rhythm. Pulses: Normal pulses. Heart sounds: Normal heart sounds. No murmur heard. Pulmonary:      Effort: Pulmonary effort is normal. No respiratory distress. Breath sounds: Normal breath sounds. No wheezing. Abdominal:      General: Bowel sounds are normal. There is no distension. Palpations: Abdomen is soft. Tenderness: There is no abdominal tenderness. There is right CVA tenderness. There is no left CVA tenderness. Musculoskeletal:      Right lower leg: No edema. Left lower leg: No edema. Neurological:      Mental Status: He is alert.         ___________________    Assessment & Plan :    1. Essential hypertension  -Well-controlled  -Continue lisinopril 40 mg daily  - lisinopril (PRINIVIL;ZESTRIL) 40 MG tablet; Take 1 tablet by mouth daily  Dispense: 90 tablet; Refill: 1    2. IDDM  -Continue management per endocrinology    3.  Other hydronephrosis  -Called Dr. Sarahi Carranza office to schedule patient for follow-up appointment.  -Talked to [facility]  -Had agreed with patient that once I get him an appointment I was in a call him with his appointment information  -All documents were faxed to Dr. Sarahi Carranza office  -Patient was scheduled for an appointment for tomorrow at 10:50 AM but we were unable to get a hold of patient to inform him off his appointment  -Provided Dr. Sarahi Carranza office with patient's contact information, we will try again tomorrow to get a hold of patient to provide him information to schedule his appointment. Educational materials and/or home exercises printed for patient's review and were included in patient instructions on his/her After Visit Summary and given to patient at the end of visit. Counseled regarding above diagnosis, including possible risks and complications,  especially if left uncontrolled. Counseled regarding the possible side effects, risks, benefits and alternatives to treatment; patient and/or guardian verbalizes understanding, agrees, feels comfortable with and wishes to proceed with above treatment plan. Advised patient to call with any new medication issues, and read all Rx info from pharmacy to assure aware of all possible risks and side effects of medication before taking. Reviewed age and gender appropriate health screening exams and vaccinations. Advised patient regarding importance of keeping up with recommended health maintenance and to schedule as soon as possible if overdue, as this is important in assessing for undiagnosed pathology, especially cancer, as well as protecting against potentially harmful/life threatening disease. Patient and/or guardian verbalizes understanding and agrees with above counseling, assessment and plan. All questions answered    Additional plan and future considerations:       Return to Office: No follow-ups on file.     Electronically signed by Sal Mcmahan MD on 3/8/2023 at 3:50 PM

## 2023-03-09 ENCOUNTER — TELEPHONE (OUTPATIENT)
Dept: FAMILY MEDICINE CLINIC | Age: 73
End: 2023-03-09

## 2023-03-09 NOTE — TELEPHONE ENCOUNTER
Spoke to patient and notified him that he had fernandez ppt today with Dr. Liliana Evans at 10:50 AM but has to be rescheduled according to patient due to needing transportation from local bus. Also reached out to their office and asked them to call him to reschedule him. We called the patient over 10 times yesterday to let him know of this appt but he did not answer due to phone difficulties.     Thanks :)

## 2023-03-30 ENCOUNTER — HOSPITAL ENCOUNTER (OUTPATIENT)
Age: 73
Discharge: HOME | End: 2023-03-30
Payer: MEDICAID

## 2023-03-30 LAB — PSA SERPL-MCNC: 9.81 NG/ML (ref 0–4)

## 2023-03-30 PROCEDURE — 84153 ASSAY OF PSA TOTAL: CPT

## 2023-03-30 PROCEDURE — 36415 COLL VENOUS BLD VENIPUNCTURE: CPT

## 2023-04-24 ENCOUNTER — OFFICE VISIT (OUTPATIENT)
Dept: PRIMARY CARE CLINIC | Age: 73
End: 2023-04-24

## 2023-04-24 ENCOUNTER — HOSPITAL ENCOUNTER (OUTPATIENT)
Age: 73
Discharge: HOME | End: 2023-04-26
Payer: MEDICAID

## 2023-04-24 ENCOUNTER — HOSPITAL ENCOUNTER (OUTPATIENT)
Dept: GENERAL RADIOLOGY | Age: 73
Discharge: HOME | End: 2023-04-26
Payer: MEDICAID

## 2023-04-24 VITALS
HEART RATE: 66 BPM | OXYGEN SATURATION: 97 % | TEMPERATURE: 98 F | DIASTOLIC BLOOD PRESSURE: 74 MMHG | RESPIRATION RATE: 20 BRPM | HEIGHT: 74 IN | WEIGHT: 254 LBS | BODY MASS INDEX: 32.6 KG/M2 | SYSTOLIC BLOOD PRESSURE: 156 MMHG

## 2023-04-24 DIAGNOSIS — R09.89 CHEST CONGESTION: ICD-10-CM

## 2023-04-24 DIAGNOSIS — J02.9 SORE THROAT: ICD-10-CM

## 2023-04-24 DIAGNOSIS — J06.9 UPPER RESPIRATORY INFECTION WITH COUGH AND CONGESTION: Primary | ICD-10-CM

## 2023-04-24 LAB — S PYO AG THROAT QL: NORMAL

## 2023-04-24 PROCEDURE — 71046 X-RAY EXAM CHEST 2 VIEWS: CPT

## 2023-04-24 RX ORDER — DOXYCYCLINE HYCLATE 100 MG/1
100 CAPSULE ORAL 2 TIMES DAILY
Qty: 20 CAPSULE | Refills: 0 | Status: SHIPPED | OUTPATIENT
Start: 2023-04-24 | End: 2023-05-04

## 2023-04-24 RX ORDER — ALBUTEROL SULFATE 90 UG/1
2 AEROSOL, METERED RESPIRATORY (INHALATION) 4 TIMES DAILY PRN
Qty: 18 G | Refills: 0 | Status: SHIPPED | OUTPATIENT
Start: 2023-04-24

## 2023-04-24 RX ORDER — IPRATROPIUM BROMIDE AND ALBUTEROL SULFATE 2.5; .5 MG/3ML; MG/3ML
1 SOLUTION RESPIRATORY (INHALATION) ONCE
Status: COMPLETED | OUTPATIENT
Start: 2023-04-24 | End: 2023-04-24

## 2023-04-24 RX ADMIN — IPRATROPIUM BROMIDE AND ALBUTEROL SULFATE 1 AMPULE: 2.5; .5 SOLUTION RESPIRATORY (INHALATION) at 08:59

## 2023-04-24 NOTE — PROGRESS NOTES
Chief Complaint:   Chest Congestion (Chest congestion, sore throat, cough x 2 weeks. )    History of Present Illness   Source of history provided by:  patient. Aidee Calixto is a 68 y.o. old male who presents to walk-in for chest congestion, which began 2 week(s) prior to arrival. The symptoms are associated with sore throat and productive cough. There has been NO fever, chills, N/V/D, chest pain or SOB. Denies any hx of asthma, COPD, or tobacco use. Denies any known sick contacts. Has been taking nothing for the symptoms. Review of Systems   Unless otherwise stated in this report or unable to obtain because of the patient's clinical or mental status as evidenced by the medical record, this patients's positive and negative responses for Review of Systems, constitutional, psych, eyes, ENT, cardiovascular, respiratory, gastrointestinal, neurological, genitourinary, musculoskeletal, integument systems and systems related to the presenting problem are either stated in the preceding or were not pertinent or were negative for the symptoms and/or complaints related to the medical problem. Past Medical History:  has a past medical history of Arthritis, Benign hypertensive kidney disease with chronic kidney disease, Cervical vertebra fusion, Chronic bilateral low back pain with bilateral sciatica, Chronic kidney disease (CKD), stage III (moderate) ([address] Depression, Hypertension, Stroke ([address] and Type 2 diabetes mellitus with diabetic polyneuropathy, with long-term current use of insulin ([address] Past Surgical History:  has a past surgical history that includes fracture surgery; Rotator cuff repair (Left, 1999); Spine surgery; Kidney stone surgery (01/23/2010); Colonoscopy; Upper gastrointestinal endoscopy; Upper gastrointestinal endoscopy (N/A, 09/13/2021); Colonoscopy (N/A, 09/13/2021); Upper gastrointestinal endoscopy (N/A, 09/13/2021); Upper gastrointestinal endoscopy (N/A, 08/23/2022);  Upper

## 2023-05-15 ENCOUNTER — HOSPITAL ENCOUNTER (OUTPATIENT)
Age: 73
Discharge: HOME | End: 2023-05-15
Payer: MEDICAID

## 2023-05-15 LAB
ALBUMIN SERPL-MCNC: 3.8 G/DL (ref 3.5–5.2)
ALP SERPL-CCNC: 99 U/L (ref 40–129)
ALT SERPL-CCNC: 19 U/L (ref 0–40)
ANION GAP SERPL CALCULATED.3IONS-SCNC: 11 MMOL/L (ref 7–16)
AST SERPL-CCNC: 31 U/L (ref 0–39)
BILIRUB SERPL-MCNC: 0.4 MG/DL (ref 0–1.2)
BUN SERPL-MCNC: 12 MG/DL (ref 6–23)
CALCIUM SERPL-MCNC: 8.7 MG/DL (ref 8.6–10.2)
CHLORIDE SERPL-SCNC: 106 MMOL/L (ref 98–107)
CO2 SERPL-SCNC: 23 MMOL/L (ref 22–29)
CREAT SERPL-MCNC: 1.8 MG/DL (ref 0.7–1.2)
GLUCOSE SERPL-MCNC: 134 MG/DL (ref 74–99)
HBA1C MFR BLD: 6.7 % (ref 4–5.6)
POTASSIUM SERPL-SCNC: 3.4 MMOL/L (ref 3.5–5)
PROT SERPL-MCNC: 7.5 G/DL (ref 6.4–8.3)
SODIUM SERPL-SCNC: 140 MMOL/L (ref 132–146)

## 2023-05-15 PROCEDURE — 83036 HEMOGLOBIN GLYCOSYLATED A1C: CPT

## 2023-05-15 PROCEDURE — 36415 COLL VENOUS BLD VENIPUNCTURE: CPT

## 2023-05-15 PROCEDURE — 80053 COMPREHEN METABOLIC PANEL: CPT

## 2023-06-05 RX ORDER — APIXABAN 5 MG/1
TABLET, FILM COATED ORAL
Qty: 180 TABLET | Refills: 0 | Status: SHIPPED | OUTPATIENT
Start: 2023-06-05

## 2023-06-20 DIAGNOSIS — E11.9 TYPE 2 DIABETES MELLITUS WITHOUT COMPLICATION, WITH LONG-TERM CURRENT USE OF INSULIN (HCC): ICD-10-CM

## 2023-06-20 DIAGNOSIS — Z79.4 TYPE 2 DIABETES MELLITUS WITHOUT COMPLICATION, WITH LONG-TERM CURRENT USE OF INSULIN (HCC): ICD-10-CM

## 2023-06-20 DIAGNOSIS — I10 ESSENTIAL HYPERTENSION: ICD-10-CM

## 2023-06-20 RX ORDER — ASPIRIN 81 MG/1
TABLET ORAL
Qty: 90 TABLET | Refills: 1 | Status: SHIPPED | OUTPATIENT
Start: 2023-06-20

## 2023-06-21 ENCOUNTER — OFFICE VISIT (OUTPATIENT)
Age: 73
End: 2023-06-21
Payer: MEDICAID

## 2023-06-21 VITALS
RESPIRATION RATE: 18 BRPM | TEMPERATURE: 96.9 F | DIASTOLIC BLOOD PRESSURE: 62 MMHG | OXYGEN SATURATION: 98 % | HEART RATE: 60 BPM | HEIGHT: 74 IN | SYSTOLIC BLOOD PRESSURE: 114 MMHG | WEIGHT: 245 LBS | BODY MASS INDEX: 31.44 KG/M2

## 2023-06-21 DIAGNOSIS — E11.22 TYPE 2 DIABETES MELLITUS WITH STAGE 3B CHRONIC KIDNEY DISEASE, WITH LONG-TERM CURRENT USE OF INSULIN (HCC): ICD-10-CM

## 2023-06-21 DIAGNOSIS — J01.00 ACUTE NON-RECURRENT MAXILLARY SINUSITIS: ICD-10-CM

## 2023-06-21 DIAGNOSIS — K21.9 GASTROESOPHAGEAL REFLUX DISEASE, UNSPECIFIED WHETHER ESOPHAGITIS PRESENT: ICD-10-CM

## 2023-06-21 DIAGNOSIS — N18.32 STAGE 3B CHRONIC KIDNEY DISEASE (HCC): Primary | ICD-10-CM

## 2023-06-21 DIAGNOSIS — Z79.4 TYPE 2 DIABETES MELLITUS WITH STAGE 3B CHRONIC KIDNEY DISEASE, WITH LONG-TERM CURRENT USE OF INSULIN (HCC): ICD-10-CM

## 2023-06-21 DIAGNOSIS — I10 ESSENTIAL HYPERTENSION: ICD-10-CM

## 2023-06-21 DIAGNOSIS — I48.0 PAROXYSMAL ATRIAL FIBRILLATION (HCC): ICD-10-CM

## 2023-06-21 DIAGNOSIS — N18.32 TYPE 2 DIABETES MELLITUS WITH STAGE 3B CHRONIC KIDNEY DISEASE, WITH LONG-TERM CURRENT USE OF INSULIN (HCC): ICD-10-CM

## 2023-06-21 PROBLEM — E78.2 MIXED HYPERLIPIDEMIA: Status: ACTIVE | Noted: 2019-05-15

## 2023-06-21 PROBLEM — H35.30 MACULAR DEGENERATION: Status: ACTIVE | Noted: 2020-12-07

## 2023-06-21 PROBLEM — E11.42 DIABETIC PERIPHERAL NEUROPATHY (HCC): Status: ACTIVE | Noted: 2019-05-15

## 2023-06-21 PROBLEM — H26.9 CATARACT: Status: ACTIVE | Noted: 2022-04-14

## 2023-06-21 PROBLEM — B35.1 ONYCHOMYCOSIS: Status: ACTIVE | Noted: 2022-04-14

## 2023-06-21 PROCEDURE — 1123F ACP DISCUSS/DSCN MKR DOCD: CPT | Performed by: FAMILY MEDICINE

## 2023-06-21 PROCEDURE — G8427 DOCREV CUR MEDS BY ELIG CLIN: HCPCS | Performed by: FAMILY MEDICINE

## 2023-06-21 PROCEDURE — 2022F DILAT RTA XM EVC RTNOPTHY: CPT | Performed by: FAMILY MEDICINE

## 2023-06-21 PROCEDURE — 1036F TOBACCO NON-USER: CPT | Performed by: FAMILY MEDICINE

## 2023-06-21 PROCEDURE — 3078F DIAST BP <80 MM HG: CPT | Performed by: FAMILY MEDICINE

## 2023-06-21 PROCEDURE — 3074F SYST BP LT 130 MM HG: CPT | Performed by: FAMILY MEDICINE

## 2023-06-21 PROCEDURE — 99214 OFFICE O/P EST MOD 30 MIN: CPT | Performed by: FAMILY MEDICINE

## 2023-06-21 PROCEDURE — 3044F HG A1C LEVEL LT 7.0%: CPT | Performed by: FAMILY MEDICINE

## 2023-06-21 PROCEDURE — G8417 CALC BMI ABV UP PARAM F/U: HCPCS | Performed by: FAMILY MEDICINE

## 2023-06-21 PROCEDURE — 3017F COLORECTAL CA SCREEN DOC REV: CPT | Performed by: FAMILY MEDICINE

## 2023-06-21 RX ORDER — LANOLIN ALCOHOL/MO/W.PET/CERES
1000 CREAM (GRAM) TOPICAL DAILY
COMMUNITY
Start: 2023-05-24

## 2023-06-21 RX ORDER — PANTOPRAZOLE SODIUM 40 MG/1
40 TABLET, DELAYED RELEASE ORAL DAILY
Qty: 90 TABLET | Refills: 1 | Status: SHIPPED | OUTPATIENT
Start: 2023-06-21

## 2023-06-21 RX ORDER — DAPAGLIFLOZIN 10 MG/1
10 TABLET, FILM COATED ORAL DAILY
COMMUNITY
Start: 2023-05-24

## 2023-06-21 RX ORDER — HYDROCHLOROTHIAZIDE 12.5 MG/1
CAPSULE, GELATIN COATED ORAL
Qty: 90 CAPSULE | Refills: 1 | Status: SHIPPED | OUTPATIENT
Start: 2023-06-21

## 2023-06-21 RX ORDER — METOPROLOL SUCCINATE 100 MG/1
50 TABLET, EXTENDED RELEASE ORAL 2 TIMES DAILY
Qty: 180 TABLET | Refills: 1 | Status: SHIPPED | OUTPATIENT
Start: 2023-06-21

## 2023-06-21 RX ORDER — BUPROPION HYDROCHLORIDE 100 MG/1
100 TABLET, EXTENDED RELEASE ORAL DAILY
Qty: 60 TABLET | Status: CANCELLED | OUTPATIENT
Start: 2023-06-21

## 2023-06-21 RX ORDER — LISINOPRIL 40 MG/1
40 TABLET ORAL DAILY
Qty: 90 TABLET | Refills: 1 | Status: SHIPPED | OUTPATIENT
Start: 2023-06-21

## 2023-06-21 ASSESSMENT — ENCOUNTER SYMPTOMS
NAUSEA: 0
DIARRHEA: 0
CONSTIPATION: 0
COUGH: 0
SHORTNESS OF BREATH: 0
ABDOMINAL PAIN: 0
VOMITING: 0
WHEEZING: 0

## 2023-06-21 NOTE — PROGRESS NOTES
SURGERY      KIDNEY STONE SURGERY  01/23/2010 2019    PAIN MANAGEMENT PROCEDURE Bilateral 9/27/2022    BILATERAL L4 TRANSFORAMINAL EPIDURAL STEROID INJECTION performed by Izabella Elias DO at [address] Bilateral 12/13/2022    BILATERAL L4 TRANSFORAMINAL EPIDURAL STEROID INJECTION performed by Izabella Elias DO at [address] [facility] Left 1999    SPINE SURGERY      UPPER GASTROINTESTINAL ENDOSCOPY      UPPER GASTROINTESTINAL ENDOSCOPY N/A 09/13/2021    EGD BIOPSY performed by Matthieu Paz MD at [place] [address] N/A 09/13/2021    EGD POLYP SNARE performed by Matthieu Paz MD at [place] [address] N/A 08/23/2022    EGD WITH POSSIBLE  DILATION BALLOON performed by Matthieu Paz MD at [place] [address] N/A 08/23/2022    EGD BIOPSY performed by Matthieu Paz MD at [facility] History :  Social History       Tobacco History       Smoking Status  Never      Smokeless Tobacco Use  Never              Alcohol History       Alcohol Use Status  No              Drug Use       Drug Use Status  No              Sexual Activity       Sexually Active  Not Asked                     Allergies :   No Known Allergies    Medication List :    Current Outpatient Medications   Medication Sig Dispense Refill    lisinopril (PRINIVIL;ZESTRIL) 40 MG tablet Take 1 tablet by mouth daily 90 tablet 1    metoprolol succinate (TOPROL XL) 100 MG extended release tablet Take 0.5 tablets by mouth in the morning and at bedtime Take 0.5 tablets by mouth in the morning and at bedtime 180 tablet 1    pantoprazole (PROTONIX) 40 MG tablet Take 1 tablet by mouth daily 90 tablet 1    hydroCHLOROthiazide (MICROZIDE) 12.5 MG capsule TAKE 1 CAPSULE BY MOUTH DAILY 90 capsule 1    Continuous Blood Gluc  (FREESTYLE AGNES 2 READER) OLEGARIO 2 Devices      FARXIGA 10 MG

## 2023-07-03 ENCOUNTER — TELEPHONE (OUTPATIENT)
Dept: FAMILY MEDICINE CLINIC | Age: 73
End: 2023-07-03

## 2023-07-05 NOTE — TELEPHONE ENCOUNTER
Spoke to patient and he states that when he has a ride he will call Surgeons office and reschedule his biopsy and surgery.  :)

## 2023-07-19 ENCOUNTER — TELEPHONE (OUTPATIENT)
Age: 73
End: 2023-07-19

## 2023-07-19 NOTE — TELEPHONE ENCOUNTER
Called patient and left a voicemail so he can call us to schedule an appt due to his upcoming surgery on 7/31/23 and needs a pre-op clearance.

## 2023-07-26 ENCOUNTER — TELEPHONE (OUTPATIENT)
Age: 73
End: 2023-07-26

## 2023-07-30 DIAGNOSIS — N20.0 NEPHROLITHIASIS: ICD-10-CM

## 2023-07-30 DIAGNOSIS — I10 ESSENTIAL HYPERTENSION: ICD-10-CM

## 2023-07-30 DIAGNOSIS — R10.9 LEFT FLANK PAIN: ICD-10-CM

## 2023-07-31 ENCOUNTER — APPOINTMENT (OUTPATIENT)
Dept: CT IMAGING | Age: 73
End: 2023-07-31
Payer: MEDICAID

## 2023-07-31 ENCOUNTER — APPOINTMENT (OUTPATIENT)
Dept: GENERAL RADIOLOGY | Age: 73
End: 2023-07-31
Payer: MEDICAID

## 2023-07-31 ENCOUNTER — HOSPITAL ENCOUNTER (INPATIENT)
Age: 73
LOS: 2 days | Discharge: NURSING, REHAB OR LONG-TERM CARE | End: 2023-08-02
Attending: EMERGENCY MEDICINE | Admitting: INTERNAL MEDICINE
Payer: MEDICAID

## 2023-07-31 DIAGNOSIS — F14.90 COCAINE USE: ICD-10-CM

## 2023-07-31 DIAGNOSIS — N39.0 URINARY TRACT INFECTION WITHOUT HEMATURIA, SITE UNSPECIFIED: ICD-10-CM

## 2023-07-31 DIAGNOSIS — A15.9 TUBERCULOSIS: Primary | ICD-10-CM

## 2023-07-31 LAB
ALBUMIN SERPL-MCNC: 4 G/DL (ref 3.5–5.2)
ALP SERPL-CCNC: 106 U/L (ref 40–129)
ALT SERPL-CCNC: 19 U/L (ref 0–40)
AMPHET UR QL SCN: NEGATIVE
ANION GAP SERPL CALCULATED.3IONS-SCNC: 8 MMOL/L (ref 7–16)
APAP SERPL-MCNC: <5 UG/ML (ref 10–30)
AST SERPL-CCNC: 32 U/L (ref 0–39)
BACTERIA URNS QL MICRO: ABNORMAL
BARBITURATES UR QL SCN: NEGATIVE
BASOPHILS # BLD: 0.03 K/UL (ref 0–0.2)
BASOPHILS NFR BLD: 1 % (ref 0–2)
BENZODIAZ UR QL: NEGATIVE
BILIRUB SERPL-MCNC: 0.5 MG/DL (ref 0–1.2)
BILIRUB UR QL STRIP: NEGATIVE
BUN SERPL-MCNC: 24 MG/DL (ref 6–23)
BUPRENORPHINE UR QL: NEGATIVE
CALCIUM SERPL-MCNC: 9.3 MG/DL (ref 8.6–10.2)
CANNABINOIDS UR QL SCN: NEGATIVE
CHLORIDE SERPL-SCNC: 108 MMOL/L (ref 98–107)
CK SERPL-CCNC: 363 U/L (ref 20–200)
CLARITY UR: CLEAR
CO2 SERPL-SCNC: 25 MMOL/L (ref 22–29)
COCAINE UR QL SCN: POSITIVE
COLOR UR: YELLOW
CREAT SERPL-MCNC: 2.1 MG/DL (ref 0.7–1.2)
EKG ATRIAL RATE: 62 BPM
EKG P AXIS: 52 DEGREES
EKG P-R INTERVAL: 208 MS
EKG Q-T INTERVAL: 452 MS
EKG QRS DURATION: 98 MS
EKG QTC CALCULATION (BAZETT): 458 MS
EKG R AXIS: 13 DEGREES
EKG T AXIS: -16 DEGREES
EKG VENTRICULAR RATE: 62 BPM
EOSINOPHIL # BLD: 0.06 K/UL (ref 0.05–0.5)
EOSINOPHILS RELATIVE PERCENT: 1 % (ref 0–6)
ERYTHROCYTE [DISTWIDTH] IN BLOOD BY AUTOMATED COUNT: 14.3 % (ref 11.5–15)
ETHANOLAMINE SERPL-MCNC: <10 MG/DL
FENTANYL UR QL: NEGATIVE
GFR SERPL CREATININE-BSD FRML MDRD: 34 ML/MIN/1.73M2
GLUCOSE SERPL-MCNC: 99 MG/DL (ref 74–99)
GLUCOSE UR STRIP-MCNC: >=1000 MG/DL
HCT VFR BLD AUTO: 44.6 % (ref 37–54)
HGB BLD-MCNC: 13.3 G/DL (ref 12.5–16.5)
HGB UR QL STRIP.AUTO: ABNORMAL
IMM GRANULOCYTES # BLD AUTO: 0.03 K/UL (ref 0–0.58)
IMM GRANULOCYTES NFR BLD: 1 % (ref 0–5)
KETONES UR STRIP-MCNC: NEGATIVE MG/DL
LEUKOCYTE ESTERASE UR QL STRIP: ABNORMAL
LYMPHOCYTES NFR BLD: 1.85 K/UL (ref 1.5–4)
LYMPHOCYTES RELATIVE PERCENT: 35 % (ref 20–42)
MCH RBC QN AUTO: 26.7 PG (ref 26–35)
MCHC RBC AUTO-ENTMCNC: 29.8 G/DL (ref 32–34.5)
MCV RBC AUTO: 89.6 FL (ref 80–99.9)
METHADONE UR QL: NEGATIVE
MONOCYTES NFR BLD: 0.65 K/UL (ref 0.1–0.95)
MONOCYTES NFR BLD: 12 % (ref 2–12)
NEUTROPHILS NFR BLD: 51 % (ref 43–80)
NEUTS SEG NFR BLD: 2.7 K/UL (ref 1.8–7.3)
NITRITE UR QL STRIP: NEGATIVE
OPIATES UR QL SCN: NEGATIVE
OXYCODONE UR QL SCN: NEGATIVE
PCP UR QL SCN: NEGATIVE
PH UR STRIP: 5.5 [PH] (ref 5–9)
PLATELET # BLD AUTO: 219 K/UL (ref 130–450)
PMV BLD AUTO: 10.9 FL (ref 7–12)
POTASSIUM SERPL-SCNC: 3.6 MMOL/L (ref 3.5–5)
PROT SERPL-MCNC: 8.1 G/DL (ref 6.4–8.3)
PROT UR STRIP-MCNC: NEGATIVE MG/DL
RBC # BLD AUTO: 4.98 M/UL (ref 3.8–5.8)
RBC #/AREA URNS HPF: ABNORMAL /HPF
SALICYLATES SERPL-MCNC: <0.3 MG/DL (ref 0–30)
SODIUM SERPL-SCNC: 141 MMOL/L (ref 132–146)
SP GR UR STRIP: 1.02 (ref 1–1.03)
TEST INFORMATION: ABNORMAL
TOXIC TRICYCLIC SC,BLOOD: NEGATIVE
TSH SERPL DL<=0.05 MIU/L-ACNC: 1.06 UIU/ML (ref 0.27–4.2)
UROBILINOGEN UR STRIP-ACNC: 0.2 EU/DL (ref 0–1)
WBC #/AREA URNS HPF: ABNORMAL /HPF
WBC OTHER # BLD: 5.3 K/UL (ref 4.5–11.5)

## 2023-07-31 PROCEDURE — 99285 EMERGENCY DEPT VISIT HI MDM: CPT

## 2023-07-31 PROCEDURE — 71046 X-RAY EXAM CHEST 2 VIEWS: CPT

## 2023-07-31 PROCEDURE — 84443 ASSAY THYROID STIM HORMONE: CPT

## 2023-07-31 PROCEDURE — 2060000000 HC ICU INTERMEDIATE R&B

## 2023-07-31 PROCEDURE — 82550 ASSAY OF CK (CPK): CPT

## 2023-07-31 PROCEDURE — 80053 COMPREHEN METABOLIC PANEL: CPT

## 2023-07-31 PROCEDURE — 82947 ASSAY GLUCOSE BLOOD QUANT: CPT

## 2023-07-31 PROCEDURE — 86360 T CELL ABSOLUTE COUNT/RATIO: CPT

## 2023-07-31 PROCEDURE — 87340 HEPATITIS B SURFACE AG IA: CPT

## 2023-07-31 PROCEDURE — 87040 BLOOD CULTURE FOR BACTERIA: CPT

## 2023-07-31 PROCEDURE — 80307 DRUG TEST PRSMV CHEM ANLYZR: CPT

## 2023-07-31 PROCEDURE — 80143 DRUG ASSAY ACETAMINOPHEN: CPT

## 2023-07-31 PROCEDURE — 81001 URINALYSIS AUTO W/SCOPE: CPT

## 2023-07-31 PROCEDURE — 85027 COMPLETE CBC AUTOMATED: CPT

## 2023-07-31 PROCEDURE — 85025 COMPLETE CBC W/AUTO DIFF WBC: CPT

## 2023-07-31 PROCEDURE — 86359 T CELLS TOTAL COUNT: CPT

## 2023-07-31 PROCEDURE — 6360000002 HC RX W HCPCS

## 2023-07-31 PROCEDURE — 86803 HEPATITIS C AB TEST: CPT

## 2023-07-31 PROCEDURE — 93005 ELECTROCARDIOGRAM TRACING: CPT | Performed by: EMERGENCY MEDICINE

## 2023-07-31 PROCEDURE — 2580000003 HC RX 258

## 2023-07-31 PROCEDURE — 87389 HIV-1 AG W/HIV-1&-2 AB AG IA: CPT

## 2023-07-31 PROCEDURE — G0480 DRUG TEST DEF 1-7 CLASSES: HCPCS

## 2023-07-31 PROCEDURE — 93010 ELECTROCARDIOGRAM REPORT: CPT | Performed by: INTERNAL MEDICINE

## 2023-07-31 PROCEDURE — 80179 DRUG ASSAY SALICYLATE: CPT

## 2023-07-31 PROCEDURE — 71250 CT THORAX DX C-: CPT

## 2023-07-31 RX ORDER — SODIUM CHLORIDE 9 MG/ML
INJECTION, SOLUTION INTRAVENOUS PRN
Status: DISCONTINUED | OUTPATIENT
Start: 2023-07-31 | End: 2023-08-02 | Stop reason: HOSPADM

## 2023-07-31 RX ORDER — ONDANSETRON 4 MG/1
4 TABLET, ORALLY DISINTEGRATING ORAL EVERY 8 HOURS PRN
Status: DISCONTINUED | OUTPATIENT
Start: 2023-07-31 | End: 2023-08-02 | Stop reason: HOSPADM

## 2023-07-31 RX ORDER — SODIUM CHLORIDE 0.9 % (FLUSH) 0.9 %
5-40 SYRINGE (ML) INJECTION PRN
Status: DISCONTINUED | OUTPATIENT
Start: 2023-07-31 | End: 2023-08-02 | Stop reason: HOSPADM

## 2023-07-31 RX ORDER — ONDANSETRON 2 MG/ML
4 INJECTION INTRAMUSCULAR; INTRAVENOUS EVERY 6 HOURS PRN
Status: DISCONTINUED | OUTPATIENT
Start: 2023-07-31 | End: 2023-08-02 | Stop reason: HOSPADM

## 2023-07-31 RX ORDER — DEXTROSE MONOHYDRATE 100 MG/ML
INJECTION, SOLUTION INTRAVENOUS CONTINUOUS PRN
Status: DISCONTINUED | OUTPATIENT
Start: 2023-07-31 | End: 2023-08-02 | Stop reason: HOSPADM

## 2023-07-31 RX ORDER — POLYETHYLENE GLYCOL 3350 17 G/17G
17 POWDER, FOR SOLUTION ORAL DAILY PRN
Status: DISCONTINUED | OUTPATIENT
Start: 2023-07-31 | End: 2023-08-02 | Stop reason: HOSPADM

## 2023-07-31 RX ORDER — ACETAMINOPHEN 325 MG/1
650 TABLET ORAL EVERY 6 HOURS PRN
Status: DISCONTINUED | OUTPATIENT
Start: 2023-07-31 | End: 2023-08-02 | Stop reason: HOSPADM

## 2023-07-31 RX ORDER — INSULIN LISPRO 100 [IU]/ML
0-4 INJECTION, SOLUTION INTRAVENOUS; SUBCUTANEOUS
Status: DISCONTINUED | OUTPATIENT
Start: 2023-08-01 | End: 2023-08-02 | Stop reason: HOSPADM

## 2023-07-31 RX ORDER — SODIUM CHLORIDE 0.9 % (FLUSH) 0.9 %
5-40 SYRINGE (ML) INJECTION EVERY 12 HOURS SCHEDULED
Status: DISCONTINUED | OUTPATIENT
Start: 2023-07-31 | End: 2023-08-02 | Stop reason: HOSPADM

## 2023-07-31 RX ORDER — ACETAMINOPHEN 650 MG/1
650 SUPPOSITORY RECTAL EVERY 6 HOURS PRN
Status: DISCONTINUED | OUTPATIENT
Start: 2023-07-31 | End: 2023-08-02 | Stop reason: HOSPADM

## 2023-07-31 RX ORDER — INSULIN LISPRO 100 [IU]/ML
0-4 INJECTION, SOLUTION INTRAVENOUS; SUBCUTANEOUS NIGHTLY
Status: DISCONTINUED | OUTPATIENT
Start: 2023-07-31 | End: 2023-08-02 | Stop reason: HOSPADM

## 2023-07-31 RX ADMIN — CEFTRIAXONE 1000 MG: 1 INJECTION, POWDER, FOR SOLUTION INTRAMUSCULAR; INTRAVENOUS at 23:43

## 2023-07-31 ASSESSMENT — LIFESTYLE VARIABLES
HOW OFTEN DO YOU HAVE A DRINK CONTAINING ALCOHOL: NEVER
HOW MANY STANDARD DRINKS CONTAINING ALCOHOL DO YOU HAVE ON A TYPICAL DAY: PATIENT DOES NOT DRINK

## 2023-07-31 NOTE — CARE COORDINATION
[facility] Support Note    Name: Nafisa Felipe  Date: 7/31/2023    Chief Complaint   Patient presents with    Addiction Problem     Patient states that he is here for detox from cocaine, last used 5am today. Denies SI/HI       Peer Support met with patient. [] Support and education provided  [] Resources provided   [x] Treatment referral: [facility].  [] Other:   [] Patient declined peer recovery services     Referred By: Fareed Ye (MARK)    Notes: [facility] was in the process of accepting patient. Patient was later admitted to hospital for medical needs. Will follow up.      Signed: Moshe Sierra, 7/31/2023

## 2023-07-31 NOTE — ED PROVIDER NOTES
[facility]        Pt Name: Briana Broderick  MRN: [id number]  [address] [facility] [place] 1950  Date of evaluation: 2023  Provider: Mckayla Ramos DO  PCP: Lyla Santos MD  Note Started: 10:22 AM EDT 23    CHIEF COMPLAINT       Chief Complaint   Patient presents with    Addiction Problem     Patient states that he is here for detox from cocaine, last used 5am today. Denies SI/HI       HISTORY OF PRESENT ILLNESS: 1 or more Elements   History From: patient     Limitations to history : None    Briana Broderick is a 68 y.o. male who presents to the emergency department with chief complaint of cocaine use and desire for rehabilitation. Patient states that he has been smoking crack cocaine every day for at least the last month. States that after he smoked this morning he had a feeling of \"jolting\" in his head that concerned him that he may be having a stroke as he has had a history of strokes with using cocaine in the past.  Due to these concerns he came to the emergency department so that he may be admitted to an inpatient facility for rehabilitation. Last time he smoked was at 5 AM this morning. Denies any other illicit drug use, denies smoking cigarettes, denies alcohol use. During evaluation and interview patient did state that his mother  of tuberculosis and that he was positive for tuberculosis in the past with active infection without having been treated. No current cough at this time. No shortness of breath. Nursing Notes were all reviewed and agreed with or any disagreements were addressed in the HPI. REVIEW OF SYSTEMS :      Positives and Pertinent negatives as per HPI.      PAST MEDICAL HISTORY/Chronic Conditions Affecting Care      has a past medical history of Arthritis, Benign hypertensive kidney disease with chronic kidney disease (10/06/2021), Cervical vertebra fusion (2000),

## 2023-07-31 NOTE — CARE COORDINATION
Social Work/ Transition of Care:    Pt presents to the ED secondary to an addiction problem. Pt requesting referral for inpatient addiction treatment. SW met with pt who reports using cocaine with his last use being this morning. Pt reports no other drug or alcohol use. Pt requesting referral to [facility]. SW called [facility] to make referral for inpatient treatment but they do not have any beds available for 1 week.   SW made referral to Ileana with [facility] to further assist.

## 2023-07-31 NOTE — H&P
home  -Low-dose sliding scale started  -Hypoglycemia protocol in place  -Monitor BG ACHS  -Low carb diet in place    History of DM neuropathy  On gabapentin at home    History of HTN  Patient's blood pressure on admission 151/79  On hydralazine, metoprolol, lisinopril, and hydrochlorothiazide at home  -Continue current medications  -Monitor blood pressure    History of A-fib  On metoprolol and Eliquis at home  Regular rhythm on physical exam   -Continue current medications      History of CVA on 1968  Aspirin at home  No residual weakness  - Continue current medication    History of nephrolithiasis  Follows with urology    History of BPH  Follows with urology  On tamsulosin  -Continue current medication        PT/OT evaluation: not indicated at this time   DVT prophylaxis:   GI prophylaxis: Protonix  Diet:   No diet orders on file   Bowel regimen: GlycoLax  Pain management: as needed  Code status: Prior   Disposition: Continue Current Care  Family: no family     Fariha Land MD, PGY-1   Attending physician: Dr. Malia Nava  Precepted With: Dr. Malia Nava

## 2023-08-01 LAB
ANION GAP SERPL CALCULATED.3IONS-SCNC: 11 MMOL/L (ref 7–16)
ANION GAP SERPL CALCULATED.3IONS-SCNC: 13 MMOL/L (ref 7–16)
ANION GAP SERPL CALCULATED.3IONS-SCNC: 9 MMOL/L (ref 7–16)
BASOPHILS # BLD: 0.03 K/UL (ref 0–0.2)
BASOPHILS NFR BLD: 1 % (ref 0–2)
BUN SERPL-MCNC: 27 MG/DL (ref 6–23)
BUN SERPL-MCNC: 28 MG/DL (ref 6–23)
BUN SERPL-MCNC: 29 MG/DL (ref 6–23)
CALCIUM SERPL-MCNC: 8.9 MG/DL (ref 8.6–10.2)
CHLORIDE SERPL-SCNC: 107 MMOL/L (ref 98–107)
CHLORIDE SERPL-SCNC: 107 MMOL/L (ref 98–107)
CHLORIDE SERPL-SCNC: 108 MMOL/L (ref 98–107)
CHP ED QC CHECK: NORMAL
CO2 SERPL-SCNC: 22 MMOL/L (ref 22–29)
CO2 SERPL-SCNC: 25 MMOL/L (ref 22–29)
CO2 SERPL-SCNC: 27 MMOL/L (ref 22–29)
CREAT SERPL-MCNC: 2 MG/DL (ref 0.7–1.2)
CREAT SERPL-MCNC: 2.1 MG/DL (ref 0.7–1.2)
CREAT SERPL-MCNC: 2.2 MG/DL (ref 0.7–1.2)
EOSINOPHIL # BLD: 0.12 K/UL (ref 0.05–0.5)
EOSINOPHILS RELATIVE PERCENT: 2 % (ref 0–6)
ERYTHROCYTE [DISTWIDTH] IN BLOOD BY AUTOMATED COUNT: 14.2 % (ref 11.5–15)
GFR SERPL CREATININE-BSD FRML MDRD: 32 ML/MIN/1.73M2
GFR SERPL CREATININE-BSD FRML MDRD: 33 ML/MIN/1.73M2
GFR SERPL CREATININE-BSD FRML MDRD: 34 ML/MIN/1.73M2
GLUCOSE BLD-MCNC: 174 MG/DL
GLUCOSE SERPL-MCNC: 168 MG/DL (ref 74–99)
GLUCOSE SERPL-MCNC: 177 MG/DL (ref 74–99)
GLUCOSE SERPL-MCNC: 185 MG/DL (ref 74–99)
HBV SURFACE AG SERPL QL IA: NONREACTIVE
HCT VFR BLD AUTO: 38.8 % (ref 37–54)
HCV AB SERPL QL IA: NONREACTIVE
HGB BLD-MCNC: 12.2 G/DL (ref 12.5–16.5)
HIV 1+2 AB+HIV1 P24 AG SERPL QL IA: NONREACTIVE
IMM GRANULOCYTES # BLD AUTO: <0.03 K/UL (ref 0–0.58)
IMM GRANULOCYTES NFR BLD: 0 % (ref 0–5)
LYMPHOCYTES NFR BLD: 1.97 K/UL (ref 1.5–4)
LYMPHOCYTES RELATIVE PERCENT: 36 % (ref 20–42)
MAGNESIUM SERPL-MCNC: 2 MG/DL (ref 1.6–2.6)
MAGNESIUM SERPL-MCNC: 2 MG/DL (ref 1.6–2.6)
MAGNESIUM SERPL-MCNC: 2.1 MG/DL (ref 1.6–2.6)
MCH RBC QN AUTO: 27.1 PG (ref 26–35)
MCHC RBC AUTO-ENTMCNC: 31.4 G/DL (ref 32–34.5)
MCV RBC AUTO: 86.2 FL (ref 80–99.9)
METER GLUCOSE: 121 MG/DL (ref 74–99)
METER GLUCOSE: 136 MG/DL (ref 74–99)
METER GLUCOSE: 162 MG/DL (ref 74–99)
METER GLUCOSE: 174 MG/DL (ref 74–99)
METER GLUCOSE: 96 MG/DL (ref 74–99)
MONOCYTES NFR BLD: 0.54 K/UL (ref 0.1–0.95)
MONOCYTES NFR BLD: 10 % (ref 2–12)
NEUTROPHILS NFR BLD: 51 % (ref 43–80)
NEUTS SEG NFR BLD: 2.76 K/UL (ref 1.8–7.3)
PHOSPHATE SERPL-MCNC: 2.5 MG/DL (ref 2.5–4.5)
PHOSPHATE SERPL-MCNC: 2.8 MG/DL (ref 2.5–4.5)
PHOSPHATE SERPL-MCNC: 3 MG/DL (ref 2.5–4.5)
PLATELET # BLD AUTO: 200 K/UL (ref 130–450)
PMV BLD AUTO: 11.5 FL (ref 7–12)
POTASSIUM SERPL-SCNC: 3.3 MMOL/L (ref 3.5–5)
POTASSIUM SERPL-SCNC: 3.6 MMOL/L (ref 3.5–5)
POTASSIUM SERPL-SCNC: 4.2 MMOL/L (ref 3.5–5)
RBC # BLD AUTO: 4.5 M/UL (ref 3.8–5.8)
REASON FOR REJECTION: NORMAL
SODIUM SERPL-SCNC: 143 MMOL/L (ref 132–146)
SPECIMEN SOURCE: NORMAL
WBC OTHER # BLD: 5.4 K/UL (ref 4.5–11.5)
ZZ NTE CLEAN UP: ORDERED TEST: NORMAL

## 2023-08-01 PROCEDURE — 83735 ASSAY OF MAGNESIUM: CPT

## 2023-08-01 PROCEDURE — 2580000003 HC RX 258

## 2023-08-01 PROCEDURE — 80048 BASIC METABOLIC PNL TOTAL CA: CPT

## 2023-08-01 PROCEDURE — 86481 TB AG RESPONSE T-CELL SUSP: CPT

## 2023-08-01 PROCEDURE — 6370000000 HC RX 637 (ALT 250 FOR IP)

## 2023-08-01 PROCEDURE — 2060000000 HC ICU INTERMEDIATE R&B

## 2023-08-01 PROCEDURE — 36415 COLL VENOUS BLD VENIPUNCTURE: CPT

## 2023-08-01 PROCEDURE — 84100 ASSAY OF PHOSPHORUS: CPT

## 2023-08-01 RX ORDER — AMLODIPINE BESYLATE 5 MG/1
5 TABLET ORAL DAILY
Qty: 90 TABLET | Refills: 1 | Status: SHIPPED | OUTPATIENT
Start: 2023-08-01

## 2023-08-01 RX ORDER — BUPROPION HYDROCHLORIDE 100 MG/1
100 TABLET, EXTENDED RELEASE ORAL DAILY
Status: DISCONTINUED | OUTPATIENT
Start: 2023-08-01 | End: 2023-08-02 | Stop reason: HOSPADM

## 2023-08-01 RX ORDER — AMLODIPINE BESYLATE 5 MG/1
5 TABLET ORAL DAILY
Status: DISCONTINUED | OUTPATIENT
Start: 2023-08-01 | End: 2023-08-02 | Stop reason: HOSPADM

## 2023-08-01 RX ORDER — TAMSULOSIN HYDROCHLORIDE 0.4 MG/1
CAPSULE ORAL
Qty: 90 CAPSULE | Refills: 1 | Status: SHIPPED | OUTPATIENT
Start: 2023-08-01

## 2023-08-01 RX ORDER — METOPROLOL SUCCINATE 25 MG/1
50 TABLET, EXTENDED RELEASE ORAL 2 TIMES DAILY
Status: DISCONTINUED | OUTPATIENT
Start: 2023-08-01 | End: 2023-08-01

## 2023-08-01 RX ORDER — LISINOPRIL 10 MG/1
40 TABLET ORAL DAILY
Status: DISCONTINUED | OUTPATIENT
Start: 2023-08-01 | End: 2023-08-02 | Stop reason: HOSPADM

## 2023-08-01 RX ORDER — METOPROLOL SUCCINATE 50 MG/1
50 TABLET, EXTENDED RELEASE ORAL 2 TIMES DAILY
Status: DISCONTINUED | OUTPATIENT
Start: 2023-08-01 | End: 2023-08-02 | Stop reason: HOSPADM

## 2023-08-01 RX ORDER — TAMSULOSIN HYDROCHLORIDE 0.4 MG/1
0.4 CAPSULE ORAL NIGHTLY
Status: DISCONTINUED | OUTPATIENT
Start: 2023-08-01 | End: 2023-08-02 | Stop reason: HOSPADM

## 2023-08-01 RX ORDER — PANTOPRAZOLE SODIUM 40 MG/1
40 TABLET, DELAYED RELEASE ORAL DAILY
Status: DISCONTINUED | OUTPATIENT
Start: 2023-08-01 | End: 2023-08-02 | Stop reason: HOSPADM

## 2023-08-01 RX ADMIN — Medication 10 ML: at 09:21

## 2023-08-01 RX ADMIN — APIXABAN 5 MG: 5 TABLET, FILM COATED ORAL at 21:50

## 2023-08-01 RX ADMIN — APIXABAN 5 MG: 5 TABLET, FILM COATED ORAL at 09:21

## 2023-08-01 RX ADMIN — TAMSULOSIN HYDROCHLORIDE 0.4 MG: 0.4 CAPSULE ORAL at 21:50

## 2023-08-01 RX ADMIN — Medication 10 ML: at 21:50

## 2023-08-01 RX ADMIN — BUPROPION HYDROCHLORIDE 100 MG: 100 TABLET, EXTENDED RELEASE ORAL at 13:58

## 2023-08-01 RX ADMIN — POTASSIUM BICARBONATE 40 MEQ: 782 TABLET, EFFERVESCENT ORAL at 06:13

## 2023-08-01 RX ADMIN — LISINOPRIL 40 MG: 10 TABLET ORAL at 09:21

## 2023-08-01 RX ADMIN — METOPROLOL SUCCINATE 50 MG: 50 TABLET, EXTENDED RELEASE ORAL at 09:21

## 2023-08-01 RX ADMIN — PANTOPRAZOLE SODIUM 40 MG: 40 TABLET, DELAYED RELEASE ORAL at 09:21

## 2023-08-01 RX ADMIN — POTASSIUM BICARBONATE 20 MEQ: 782 TABLET, EFFERVESCENT ORAL at 09:21

## 2023-08-01 RX ADMIN — Medication 10 ML: at 00:16

## 2023-08-01 ASSESSMENT — PAIN SCALES - GENERAL
PAINLEVEL_OUTOF10: 0

## 2023-08-01 NOTE — ED NOTES
Unable to obtain sputum sample at this time as patient has not been coughing and denies any recent cough.         Kenna Sood RN  08/01/23 [id number]

## 2023-08-02 VITALS
SYSTOLIC BLOOD PRESSURE: 146 MMHG | BODY MASS INDEX: 32.08 KG/M2 | HEART RATE: 64 BPM | TEMPERATURE: 97.5 F | RESPIRATION RATE: 18 BRPM | WEIGHT: 250 LBS | HEIGHT: 74 IN | OXYGEN SATURATION: 93 % | DIASTOLIC BLOOD PRESSURE: 80 MMHG

## 2023-08-02 LAB
ANION GAP SERPL CALCULATED.3IONS-SCNC: 11 MMOL/L (ref 7–16)
BUN SERPL-MCNC: 29 MG/DL (ref 6–23)
CALCIUM SERPL-MCNC: 8.6 MG/DL (ref 8.6–10.2)
CHLORIDE SERPL-SCNC: 107 MMOL/L (ref 98–107)
CO2 SERPL-SCNC: 23 MMOL/L (ref 22–29)
CREAT SERPL-MCNC: 2 MG/DL (ref 0.7–1.2)
GFR SERPL CREATININE-BSD FRML MDRD: 35 ML/MIN/1.73M2
GLUCOSE SERPL-MCNC: 183 MG/DL (ref 74–99)
MAGNESIUM SERPL-MCNC: 2 MG/DL (ref 1.6–2.6)
METER GLUCOSE: 112 MG/DL (ref 74–99)
METER GLUCOSE: 119 MG/DL (ref 74–99)
PHOSPHATE SERPL-MCNC: 2.6 MG/DL (ref 2.5–4.5)
POTASSIUM SERPL-SCNC: 4 MMOL/L (ref 3.5–5)
SODIUM SERPL-SCNC: 141 MMOL/L (ref 132–146)

## 2023-08-02 PROCEDURE — 97161 PT EVAL LOW COMPLEX 20 MIN: CPT

## 2023-08-02 PROCEDURE — 82947 ASSAY GLUCOSE BLOOD QUANT: CPT

## 2023-08-02 PROCEDURE — 2580000003 HC RX 258

## 2023-08-02 PROCEDURE — 80048 BASIC METABOLIC PNL TOTAL CA: CPT

## 2023-08-02 PROCEDURE — 84100 ASSAY OF PHOSPHORUS: CPT

## 2023-08-02 PROCEDURE — 6370000000 HC RX 637 (ALT 250 FOR IP)

## 2023-08-02 PROCEDURE — 83735 ASSAY OF MAGNESIUM: CPT

## 2023-08-02 PROCEDURE — 36415 COLL VENOUS BLD VENIPUNCTURE: CPT

## 2023-08-02 PROCEDURE — 99238 HOSP IP/OBS DSCHRG MGMT 30/<: CPT | Performed by: INTERNAL MEDICINE

## 2023-08-02 RX ADMIN — Medication 10 ML: at 09:27

## 2023-08-02 RX ADMIN — AMLODIPINE BESYLATE 5 MG: 5 TABLET ORAL at 09:27

## 2023-08-02 RX ADMIN — PANTOPRAZOLE SODIUM 40 MG: 40 TABLET, DELAYED RELEASE ORAL at 09:27

## 2023-08-02 RX ADMIN — LISINOPRIL 40 MG: 10 TABLET ORAL at 09:27

## 2023-08-02 RX ADMIN — APIXABAN 5 MG: 5 TABLET, FILM COATED ORAL at 09:27

## 2023-08-02 RX ADMIN — BUPROPION HYDROCHLORIDE 100 MG: 100 TABLET, EXTENDED RELEASE ORAL at 09:27

## 2023-08-02 ASSESSMENT — PAIN SCALES - GENERAL
PAINLEVEL_OUTOF10: 0
PAINLEVEL_OUTOF10: 0

## 2023-08-02 NOTE — DISCHARGE INSTR - COC
Risk Assessment Score:  Readmission Risk              Risk of Unplanned Readmission:  22           Discharging to Facility/ Agency   Name:   Address:  Phone:  Fax:    Dialysis Facility (if applicable)   Name:  Address:  Dialysis Schedule:  Phone:  Fax:    / signature: {Deysiature:[id number]}    PHYSICIAN SECTION    Prognosis: Good    Condition at Discharge: Stable    Rehab Potential (if transferring to Rehab): Good    Recommended Labs or Other Treatments After Discharge: Follow up the T-spot TB test results, if positive pt will need to be started on treatment for latent TB. Physician Certification: I certify the above information and transfer of Moisés Rogers  is necessary for the continuing treatment of the diagnosis listed and that he requires Acute Rehab for less 30 days. Update Admission H&P:    The patient is a 68 y.o. male , presented with CC of headache while using cocaine and desire for rehabilitation. Pt said that he had been smoking cocaine for the last month and would get a headache every time he smoked. After smoking the morning of admission pt said that he felt a \"electrical shock\" in his head, he was concerned about a stroke as he had a history of stroke in . During interview in the ER, patient stated that his mother  of tuberculosis and that he was positive for a tuberculosis skin test in the past. He denied active infection in the past, and denied cough, fever, hemoptysis, and night sweats. He acknowledged a 20 weight loss in the previous 3 months that he attributed to eating less. He denied changes in his stool, blood in stools. In the ED patient vitals were stable. /79. Relevant labs:  Creatinine 2.1, CK3 163, urinalysis showed bacteriuria, UDS was positive for cocaine, ethanol level was less than 10, x-ray showed no acute process. Pt was admitted to the hospital for concern of TB reactivation.  During his hospital stay, patient was initially

## 2023-08-02 NOTE — DISCHARGE SUMMARY
[address]  Discharge Summary    PCP: Gala Naylor MD    Admit Date:2023  Discharge Date: 2023    Admission Diagnosis:     Concern for TB reactivation  Drug abuse  History of DM II with neuropathy  History of HTN  History of A-fib  History of CVA on   History of Major depressive disorder  History of nephrolithiasis  History of BPH    Discharge Diagnosis:    Concern for TB reactivation - resolved. Pt asymptomatic. Awaiting t-spot for possible latent TB  Drug abuse - referred to rehabilitation  History of DM II with neuropathy - controlled  History of HTN  History of A-fib   History of CVA on   History of major depressive disorder  History of nephrolithiasis  History of BPH    Hospital Course: The patient is a 68 y.o. male , presented with CC of headache while using cocaine and desire for rehabilitation. Pt said that he had been smoking cocaine for the last month and would get a headache every time he smoked. After smoking the morning of admission pt said that he felt a \"electrical shock\" in his head, he was concerned about a stroke as he had a history of stroke in . During interview in the ER, patient stated that his mother  of tuberculosis and that he was positive for a tuberculosis skin test in the past. He denied active infection in the past, and denied cough, fever, hemoptysis, and night sweats. He acknowledged a 20 weight loss in the previous 3 months that he attributed to eating less. He denied changes in his stool, blood in stools. In the ED patient vitals were stable. /79. Relevant labs:  Creatinine 2.1, CK3 163, urinalysis showed bacteriuria, UDS was positive for cocaine, ethanol level was less than 10, x-ray showed no acute process. Pt was admitted to the hospital for concern of TB reactivation. During his hospital stay, patient was initially placed in airborne isolation and infectious disease was consulted.  Pt

## 2023-08-02 NOTE — CARE COORDINATION
Here for  Addiction problem - states he is here for detox from cocaine last used 5 am 7/31. ID consult History of latent TB Substance abuse-Lift airborne precaution. No indication for airborne precaution. Follow up T-spot. If positive, recommend to treat for latent TB with 4 months of rifampin. Follow up blood cultures. Spoke with Laura Warner from [facility] recovery - clinicals emailed to [facility]. cm/sw to follow. Electronically signed by Jesenia Bull RN on 8/2/2023 at 10:19 AM    Faxed clinicals to [place] [phone]. Electronically signed by Jesenia Bull RN on 8/2/2023 at 10:53 AM    Call from [facility]-  patient needs to call to [facility]  [phone]. Sheron with [facility] recovery ntfd. Call placed to patient to discuss role/transition of care. He lives alone in  4 th floor apartment with elevator access. His PCp is Dr Brayden Carpenter ad he uses [facility] on [address] had hhc with [facility] in past  no may. Gave him number to [facility]. Electronically signed by Jesenia Bull RN on 8/2/2023 at 12:11 PM    Call to [facility] spoke with admissions Latisha - pending  approval - medical status Dr Nataliya Flores. Electronically signed by Jesenia Bull RN on 8/2/2023 at 2:13 PM    Call from [facility] ([address] )patient has been accepted and they will be picking him up [address] entrance await call back with time. Message to resident  Bedside rn updated and patient . Electronically signed by Jesenia Bull RN on 8/2/2023 at 3:04 PM    Call to [facility] recovery admissions- [phone] spoke with Evelia Lacey - transport set up for 6 pm Erica alvarado. They have floor ph #. Bedside RN updated. Electronically signed by Jesenia Bull RN on 8/2/2023 at 3:29 PM    Call from [facility] recovery-  asked if patient can be ready in next  15 - 20 minutes checked with bedside RN and this would be ok - [place] aware. .Electronically signed by Jesenia Bull RN on 8/2/2023

## 2023-08-02 NOTE — DISCHARGE INSTRUCTIONS
Internal medicine    Follow ups  Please follow up with your primary care physician [facility]) within 10 days of discharge from hospital. Please call as soon as possible to make an appointment. Please contact the internal medicine clinic for an appointment if you are unable to get an appointment with your PCP. Changes in healthcare   Please take all medications as indicated  Diet: regular diet   Activity: activity as tolerated  New Medications started during this hospital stay  none  Changes to your medications  none  Medications you should stop taking   none  Additional labs, testing or imaging needed after discharge   Follow up on T-spot results, and AFB stain and culture results    Please contact us if you have any concerns, wish to change or make an appointment:  Internal medicine clinic   Phone: [phone]  Fax: [phone]  [address] [place] [address]  Or please call the nurse line at [phone]. Should you have further questions in regards to this visit, you can review your clinical note and after visit summary document on your [email or web address] account. Other questions can be directed to our nurse line at [phone]. Other than any new prescriptions given to you today, the list of home medications on this After Visit Summary are based on information provided to us from you and your healthcare providers. This information, including the list, dose, and frequency of medications is only as accurate as the information you provided. If you have any questions or concerns about your home medications, please contact your Primary Care Physician for further clarification.

## 2023-08-02 NOTE — CARE COORDINATION
Peer Recovery Support Note    Name: Alfonzo Chance  Date: 8/2/2023    Chief Complaint   Patient presents with    Addiction Problem     Patient states that he is here for detox from cocaine, last used 5am today. Denies SI/HI       Peer Support met with patient. [] Support and education provided  [] Resources provided   [x] Treatment referral: [facility]  [] Other:   [] Patient declined peer recovery services     Referred By: De Pineda (RN)    Notes: Patient information emailed to Ernesto Chavarria @ [facility]. Will follow up.      Signed: Eulogio Monson, 8/2/2023

## 2023-08-03 LAB
ANNOTATION COMMENT IMP: NORMAL
CD3 CELLS # BLD: 1570 CELLS/UL (ref 660–2200)
CD3+CD4+ CELLS # BLD: 1325 CELLS/UL (ref 490–1600)
CD3+CD4+ CELLS/CD3+CD8+ CLL BLD: 5.45 RATIO (ref 0.8–6.17)
CD3+CD8+ CELLS # BLD: 243 CELLS/UL (ref 150–1050)

## 2023-08-04 ENCOUNTER — TELEPHONE (OUTPATIENT)
Age: 73
End: 2023-08-04

## 2023-08-04 ENCOUNTER — APPOINTMENT (OUTPATIENT)
Dept: CT IMAGING | Age: 73
End: 2023-08-04
Attending: EMERGENCY MEDICINE
Payer: MEDICAID

## 2023-08-04 ENCOUNTER — HOSPITAL ENCOUNTER (EMERGENCY)
Age: 73
Discharge: OTHER INSTITUTION | End: 2023-08-05
Attending: EMERGENCY MEDICINE
Payer: MEDICAID

## 2023-08-04 ENCOUNTER — APPOINTMENT (OUTPATIENT)
Dept: GENERAL RADIOLOGY | Age: 73
End: 2023-08-04
Payer: MEDICAID

## 2023-08-04 DIAGNOSIS — R55 NEAR SYNCOPE: Primary | ICD-10-CM

## 2023-08-04 LAB
ALBUMIN SERPL-MCNC: 3.7 G/DL (ref 3.5–5.2)
ALP SERPL-CCNC: 104 U/L (ref 40–129)
ALT SERPL-CCNC: 21 U/L (ref 0–40)
ANION GAP SERPL CALCULATED.3IONS-SCNC: 11 MMOL/L (ref 7–16)
AST SERPL-CCNC: 35 U/L (ref 0–39)
BASOPHILS # BLD: 0.05 K/UL (ref 0–0.2)
BASOPHILS NFR BLD: 1 % (ref 0–2)
BILIRUB SERPL-MCNC: 0.3 MG/DL (ref 0–1.2)
BUN SERPL-MCNC: 18 MG/DL (ref 6–23)
CALCIUM SERPL-MCNC: 9 MG/DL (ref 8.6–10.2)
CHLORIDE SERPL-SCNC: 105 MMOL/L (ref 98–107)
CO2 SERPL-SCNC: 20 MMOL/L (ref 22–29)
CREAT SERPL-MCNC: 1.7 MG/DL (ref 0.7–1.2)
EKG ATRIAL RATE: 53 BPM
EKG P AXIS: 69 DEGREES
EKG P-R INTERVAL: 222 MS
EKG Q-T INTERVAL: 448 MS
EKG QRS DURATION: 82 MS
EKG QTC CALCULATION (BAZETT): 420 MS
EKG T AXIS: -12 DEGREES
EKG VENTRICULAR RATE: 53 BPM
EOSINOPHIL # BLD: 0.14 K/UL (ref 0.05–0.5)
EOSINOPHILS RELATIVE PERCENT: 3 % (ref 0–6)
ERYTHROCYTE [DISTWIDTH] IN BLOOD BY AUTOMATED COUNT: 14.6 % (ref 11.5–15)
GFR SERPL CREATININE-BSD FRML MDRD: 44 ML/MIN/1.73M2
GLUCOSE SERPL-MCNC: 100 MG/DL (ref 74–99)
HCT VFR BLD AUTO: 42.6 % (ref 37–54)
HGB BLD-MCNC: 12.8 G/DL (ref 12.5–16.5)
IMM GRANULOCYTES # BLD AUTO: <0.03 K/UL (ref 0–0.58)
IMM GRANULOCYTES NFR BLD: 0 % (ref 0–5)
INR PPP: 1.3
LYMPHOCYTES NFR BLD: 1.98 K/UL (ref 1.5–4)
LYMPHOCYTES RELATIVE PERCENT: 48 % (ref 20–42)
MAGNESIUM SERPL-MCNC: 2.2 MG/DL (ref 1.6–2.6)
MCH RBC QN AUTO: 26.6 PG (ref 26–35)
MCHC RBC AUTO-ENTMCNC: 30 G/DL (ref 32–34.5)
MCV RBC AUTO: 88.4 FL (ref 80–99.9)
MONOCYTES NFR BLD: 0.54 K/UL (ref 0.1–0.95)
MONOCYTES NFR BLD: 13 % (ref 2–12)
NEUTROPHILS NFR BLD: 34 % (ref 43–80)
NEUTS SEG NFR BLD: 1.41 K/UL (ref 1.8–7.3)
PLATELET # BLD AUTO: 221 K/UL (ref 130–450)
PMV BLD AUTO: 11.9 FL (ref 7–12)
POTASSIUM SERPL-SCNC: 4.4 MMOL/L (ref 3.5–5)
PROT SERPL-MCNC: 7.5 G/DL (ref 6.4–8.3)
PROTHROMBIN TIME: 14.2 SEC (ref 9.3–12.4)
RBC # BLD AUTO: 4.82 M/UL (ref 3.8–5.8)
SODIUM SERPL-SCNC: 136 MMOL/L (ref 132–146)
T SPOT TB TEST: NORMAL
WBC OTHER # BLD: 4.1 K/UL (ref 4.5–11.5)

## 2023-08-04 PROCEDURE — 36415 COLL VENOUS BLD VENIPUNCTURE: CPT

## 2023-08-04 PROCEDURE — 2580000003 HC RX 258: Performed by: EMERGENCY MEDICINE

## 2023-08-04 PROCEDURE — 93005 ELECTROCARDIOGRAM TRACING: CPT | Performed by: EMERGENCY MEDICINE

## 2023-08-04 PROCEDURE — 99285 EMERGENCY DEPT VISIT HI MDM: CPT

## 2023-08-04 PROCEDURE — 80053 COMPREHEN METABOLIC PANEL: CPT

## 2023-08-04 PROCEDURE — 93010 ELECTROCARDIOGRAM REPORT: CPT | Performed by: INTERNAL MEDICINE

## 2023-08-04 PROCEDURE — 83735 ASSAY OF MAGNESIUM: CPT

## 2023-08-04 PROCEDURE — 70450 CT HEAD/BRAIN W/O DYE: CPT

## 2023-08-04 PROCEDURE — 85025 COMPLETE CBC W/AUTO DIFF WBC: CPT

## 2023-08-04 PROCEDURE — 85610 PROTHROMBIN TIME: CPT

## 2023-08-04 PROCEDURE — 71045 X-RAY EXAM CHEST 1 VIEW: CPT

## 2023-08-04 RX ORDER — 0.9 % SODIUM CHLORIDE 0.9 %
500 INTRAVENOUS SOLUTION INTRAVENOUS ONCE
Status: COMPLETED | OUTPATIENT
Start: 2023-08-04 | End: 2023-08-04

## 2023-08-04 RX ADMIN — SODIUM CHLORIDE 500 ML: 9 INJECTION, SOLUTION INTRAVENOUS at 13:34

## 2023-08-04 ASSESSMENT — PAIN - FUNCTIONAL ASSESSMENT: PAIN_FUNCTIONAL_ASSESSMENT: NONE - DENIES PAIN

## 2023-08-04 NOTE — ED NOTES
Called [facility] for transportation back to [facility] at [facility]. ETA [id number].       Kajal Denton RN  08/04/23 4423

## 2023-08-05 VITALS
RESPIRATION RATE: 18 BRPM | HEART RATE: 69 BPM | SYSTOLIC BLOOD PRESSURE: 137 MMHG | DIASTOLIC BLOOD PRESSURE: 59 MMHG | TEMPERATURE: 97.9 F | OXYGEN SATURATION: 99 %

## 2023-08-06 LAB
MICROORGANISM SPEC CULT: NORMAL
MICROORGANISM SPEC CULT: NORMAL
SERVICE CMNT-IMP: NORMAL
SERVICE CMNT-IMP: NORMAL
SPECIMEN DESCRIPTION: NORMAL
SPECIMEN DESCRIPTION: NORMAL

## 2023-08-09 ENCOUNTER — TELEPHONE (OUTPATIENT)
Dept: FAMILY MEDICINE CLINIC | Age: 73
End: 2023-08-09

## 2023-08-24 NOTE — TELEPHONE ENCOUNTER
The health department called today, stating that patient has been resulted in positive lab work for TB. Patient has been diagnosed for some time with it. Health department states they have not been able to reach [address] since he was discharged from the hospital. Confirmed same contact information for patient.

## 2023-08-28 ENCOUNTER — TELEPHONE (OUTPATIENT)
Age: 73
End: 2023-08-28

## 2023-08-28 NOTE — TELEPHONE ENCOUNTER
Ellis Carver from Dr Kaylah Gordon office called stating that patient has not answered his phone and has not scheduled his surgery. They called our office to let us know. If we hear from him he is to call them at [phone] option [phone].      Thanks :)

## 2023-09-25 ENCOUNTER — HOSPITAL ENCOUNTER (OUTPATIENT)
Age: 73
Discharge: HOME | End: 2023-09-25
Payer: MEDICAID

## 2023-09-25 LAB
ALBUMIN SERPL-MCNC: 3.9 G/DL (ref 3.5–5.2)
ALP SERPL-CCNC: 123 U/L (ref 40–129)
ALT SERPL-CCNC: 11 U/L (ref 0–40)
ANION GAP SERPL CALCULATED.3IONS-SCNC: 12 MMOL/L (ref 7–16)
AST SERPL-CCNC: 17 U/L (ref 0–39)
BILIRUB SERPL-MCNC: 0.4 MG/DL (ref 0–1.2)
BUN SERPL-MCNC: 11 MG/DL (ref 6–23)
CALCIUM SERPL-MCNC: 8.8 MG/DL (ref 8.6–10.2)
CHLORIDE SERPL-SCNC: 102 MMOL/L (ref 98–107)
CO2 SERPL-SCNC: 23 MMOL/L (ref 22–29)
CREAT SERPL-MCNC: 2.1 MG/DL (ref 0.7–1.2)
GFR SERPL CREATININE-BSD FRML MDRD: 34 ML/MIN/1.73M2
GLUCOSE SERPL-MCNC: 366 MG/DL (ref 74–99)
HBA1C MFR BLD: 9.2 % (ref 4–5.6)
POTASSIUM SERPL-SCNC: 4.4 MMOL/L (ref 3.5–5)
PROT SERPL-MCNC: 7.7 G/DL (ref 6.4–8.3)
SODIUM SERPL-SCNC: 137 MMOL/L (ref 132–146)

## 2023-09-25 PROCEDURE — 83036 HEMOGLOBIN GLYCOSYLATED A1C: CPT

## 2023-09-25 PROCEDURE — 36415 COLL VENOUS BLD VENIPUNCTURE: CPT

## 2023-09-25 PROCEDURE — 80053 COMPREHEN METABOLIC PANEL: CPT

## 2023-10-12 RX ORDER — APIXABAN 5 MG/1
TABLET, FILM COATED ORAL
Qty: 180 TABLET | Refills: 0 | Status: SHIPPED | OUTPATIENT
Start: 2023-10-12

## 2023-10-26 ENCOUNTER — HOSPITAL ENCOUNTER (OUTPATIENT)
Age: 73
Discharge: HOME | End: 2023-10-26
Payer: MEDICAID

## 2023-10-26 LAB
25(OH)D3 SERPL-MCNC: 43 NG/ML (ref 30–100)
ALBUMIN SERPL-MCNC: 3.7 G/DL (ref 3.5–5.2)
ALP SERPL-CCNC: 107 U/L (ref 40–129)
ALT SERPL-CCNC: 18 U/L (ref 0–40)
ANION GAP SERPL CALCULATED.3IONS-SCNC: 15 MMOL/L (ref 7–16)
AST SERPL-CCNC: 31 U/L (ref 0–39)
BASOPHILS # BLD: 0.04 K/UL (ref 0–0.2)
BASOPHILS NFR BLD: 1 % (ref 0–2)
BILIRUB SERPL-MCNC: 0.5 MG/DL (ref 0–1.2)
BUN SERPL-MCNC: 17 MG/DL (ref 6–23)
CALCIUM SERPL-MCNC: 8.2 MG/DL (ref 8.6–10.2)
CHLORIDE SERPL-SCNC: 108 MMOL/L (ref 98–107)
CO2 SERPL-SCNC: 19 MMOL/L (ref 22–29)
CREAT SERPL-MCNC: 1.9 MG/DL (ref 0.7–1.2)
CREAT UR-MCNC: 146.3 MG/DL (ref 40–278)
EOSINOPHIL # BLD: 0.15 K/UL (ref 0.05–0.5)
EOSINOPHILS RELATIVE PERCENT: 3 % (ref 0–6)
ERYTHROCYTE [DISTWIDTH] IN BLOOD BY AUTOMATED COUNT: 15.7 % (ref 11.5–15)
GFR SERPL CREATININE-BSD FRML MDRD: 38 ML/MIN/1.73M2
GLUCOSE SERPL-MCNC: 238 MG/DL (ref 74–99)
HCT VFR BLD AUTO: 40.1 % (ref 37–54)
HGB BLD-MCNC: 12.5 G/DL (ref 12.5–16.5)
IMM GRANULOCYTES # BLD AUTO: <0.03 K/UL (ref 0–0.58)
IMM GRANULOCYTES NFR BLD: 0 % (ref 0–5)
LYMPHOCYTES NFR BLD: 1.82 K/UL (ref 1.5–4)
LYMPHOCYTES RELATIVE PERCENT: 32 % (ref 20–42)
MAGNESIUM SERPL-MCNC: 1.9 MG/DL (ref 1.6–2.6)
MCH RBC QN AUTO: 25.6 PG (ref 26–35)
MCHC RBC AUTO-ENTMCNC: 31.2 G/DL (ref 32–34.5)
MCV RBC AUTO: 82.2 FL (ref 80–99.9)
MICROALBUMIN UR-MCNC: 41 MG/L (ref 0–19)
MICROALBUMIN/CREAT UR-RTO: 28 MCG/MG CREAT (ref 0–30)
MONOCYTES NFR BLD: 0.53 K/UL (ref 0.1–0.95)
MONOCYTES NFR BLD: 9 % (ref 2–12)
NEUTROPHILS NFR BLD: 55 % (ref 43–80)
NEUTS SEG NFR BLD: 3.13 K/UL (ref 1.8–7.3)
PHOSPHATE SERPL-MCNC: 2.8 MG/DL (ref 2.5–4.5)
PLATELET # BLD AUTO: 195 K/UL (ref 130–450)
PMV BLD AUTO: 11.6 FL (ref 7–12)
POTASSIUM SERPL-SCNC: 3.9 MMOL/L (ref 3.5–5)
PROT SERPL-MCNC: 7.2 G/DL (ref 6.4–8.3)
RBC # BLD AUTO: 4.88 M/UL (ref 3.8–5.8)
SODIUM SERPL-SCNC: 142 MMOL/L (ref 132–146)
TOTAL PROTEIN, URINE: 21 MG/DL (ref 0–12)
URATE SERPL-MCNC: 6 MG/DL (ref 3.4–7)
WBC OTHER # BLD: 5.7 K/UL (ref 4.5–11.5)

## 2023-10-26 PROCEDURE — 82570 ASSAY OF URINE CREATININE: CPT

## 2023-10-26 PROCEDURE — 83735 ASSAY OF MAGNESIUM: CPT

## 2023-10-26 PROCEDURE — 82043 UR ALBUMIN QUANTITATIVE: CPT

## 2023-10-26 PROCEDURE — 36415 COLL VENOUS BLD VENIPUNCTURE: CPT

## 2023-10-26 PROCEDURE — 84100 ASSAY OF PHOSPHORUS: CPT

## 2023-10-26 PROCEDURE — 84156 ASSAY OF PROTEIN URINE: CPT

## 2023-10-26 PROCEDURE — 85025 COMPLETE CBC W/AUTO DIFF WBC: CPT

## 2023-10-26 PROCEDURE — 82306 VITAMIN D 25 HYDROXY: CPT

## 2023-10-26 PROCEDURE — 84550 ASSAY OF BLOOD/URIC ACID: CPT

## 2023-10-26 PROCEDURE — 80053 COMPREHEN METABOLIC PANEL: CPT

## 2023-10-30 ENCOUNTER — OFFICE VISIT (OUTPATIENT)
Dept: PRIMARY CARE CLINIC | Age: 73
End: 2023-10-30
Payer: MEDICAID

## 2023-10-30 VITALS
BODY MASS INDEX: 30.93 KG/M2 | TEMPERATURE: 97.9 F | SYSTOLIC BLOOD PRESSURE: 160 MMHG | OXYGEN SATURATION: 98 % | WEIGHT: 241 LBS | HEART RATE: 73 BPM | HEIGHT: 74 IN | DIASTOLIC BLOOD PRESSURE: 82 MMHG | RESPIRATION RATE: 18 BRPM

## 2023-10-30 DIAGNOSIS — H69.93 DYSFUNCTION OF BOTH EUSTACHIAN TUBES: ICD-10-CM

## 2023-10-30 DIAGNOSIS — J01.10 ACUTE NON-RECURRENT FRONTAL SINUSITIS: Primary | ICD-10-CM

## 2023-10-30 PROCEDURE — 3077F SYST BP >= 140 MM HG: CPT | Performed by: NURSE PRACTITIONER

## 2023-10-30 PROCEDURE — 99213 OFFICE O/P EST LOW 20 MIN: CPT | Performed by: NURSE PRACTITIONER

## 2023-10-30 PROCEDURE — 1036F TOBACCO NON-USER: CPT | Performed by: NURSE PRACTITIONER

## 2023-10-30 PROCEDURE — G8427 DOCREV CUR MEDS BY ELIG CLIN: HCPCS | Performed by: NURSE PRACTITIONER

## 2023-10-30 PROCEDURE — G8484 FLU IMMUNIZE NO ADMIN: HCPCS | Performed by: NURSE PRACTITIONER

## 2023-10-30 PROCEDURE — 3079F DIAST BP 80-89 MM HG: CPT | Performed by: NURSE PRACTITIONER

## 2023-10-30 PROCEDURE — G8417 CALC BMI ABV UP PARAM F/U: HCPCS | Performed by: NURSE PRACTITIONER

## 2023-10-30 PROCEDURE — 1123F ACP DISCUSS/DSCN MKR DOCD: CPT | Performed by: NURSE PRACTITIONER

## 2023-10-30 PROCEDURE — 3017F COLORECTAL CA SCREEN DOC REV: CPT | Performed by: NURSE PRACTITIONER

## 2023-10-30 RX ORDER — LORATADINE 10 MG/1
10 TABLET ORAL DAILY
Qty: 30 TABLET | Refills: 0 | Status: SHIPPED | OUTPATIENT
Start: 2023-10-30

## 2023-10-30 RX ORDER — CEFDINIR 300 MG/1
300 CAPSULE ORAL 2 TIMES DAILY
Qty: 20 CAPSULE | Refills: 0 | Status: SHIPPED | OUTPATIENT
Start: 2023-10-30 | End: 2023-11-09

## 2023-10-30 NOTE — PROGRESS NOTES
Chief Complaint:   Otalgia (Almost 3 weeks), Fatigue (3 weeks), and Hoarse (3 weeks)    History of Present Illness   Source of history provided by:  patient. Elías Rivera is a 68 y.o. old male who presents to walk-in for evaluation of sinus pressure, nasal congestion, bilateral ear pain, mild nonproductive cough and hoarse voice x 3 weeks. Has been taking nothing for the symptoms OTC without relief. Denies any fever, chills, wheezing, CP, SOB, or GI symptoms. Denies any hx of asthma, COPD, or tobacco use. Review of Systems   Unless otherwise stated in this report or unable to obtain because of the patient's clinical or mental status as evidenced by the medical record, this patients's positive and negative responses for Review of Systems, constitutional, psych, eyes, ENT, cardiovascular, respiratory, gastrointestinal, neurological, genitourinary, musculoskeletal, integument systems and systems related to the presenting problem are either stated in the preceding or were negative for the symptoms and/or complaints related to the medical problem. Past Medical History:  has a past medical history of Arthritis, Benign hypertensive kidney disease with chronic kidney disease, Cervical vertebra fusion, Chronic bilateral low back pain with bilateral sciatica, Chronic kidney disease (CKD), stage III (moderate) ([address]), Depression, Hypertension, Stroke ([address]), and Type 2 diabetes mellitus with diabetic polyneuropathy, with long-term current use of insulin ([address]). Past Surgical History:  has a past surgical history that includes fracture surgery; Rotator cuff repair (Left, 1999); Spine surgery; Kidney stone surgery (01/23/2010); Colonoscopy; Upper gastrointestinal endoscopy; Upper gastrointestinal endoscopy (N/A, 09/13/2021); Colonoscopy (N/A, 09/13/2021); Upper gastrointestinal endoscopy (N/A, 09/13/2021); Upper gastrointestinal endoscopy (N/A, 08/23/2022);  Upper gastrointestinal endoscopy (N/A, 08/23/2022);

## 2023-11-06 ENCOUNTER — HOSPITAL ENCOUNTER (INPATIENT)
Age: 73
LOS: 3 days | Discharge: NURSING, REHAB OR LONG-TERM CARE | End: 2023-11-10
Attending: EMERGENCY MEDICINE | Admitting: FAMILY MEDICINE
Payer: MEDICAID

## 2023-11-06 DIAGNOSIS — R45.851 SUICIDAL IDEATION: Primary | ICD-10-CM

## 2023-11-06 LAB
ALBUMIN SERPL-MCNC: 3.7 G/DL (ref 3.5–5.2)
ALP SERPL-CCNC: 116 U/L (ref 40–129)
ALT SERPL-CCNC: 23 U/L (ref 0–40)
AMPHET UR QL SCN: NEGATIVE
ANION GAP SERPL CALCULATED.3IONS-SCNC: 12 MMOL/L (ref 7–16)
APAP SERPL-MCNC: <5 UG/ML (ref 10–30)
AST SERPL-CCNC: 41 U/L (ref 0–39)
BARBITURATES UR QL SCN: NEGATIVE
BASOPHILS # BLD: 0.04 K/UL (ref 0–0.2)
BASOPHILS NFR BLD: 1 % (ref 0–2)
BENZODIAZ UR QL: NEGATIVE
BILIRUB SERPL-MCNC: 0.4 MG/DL (ref 0–1.2)
BUN SERPL-MCNC: 15 MG/DL (ref 6–23)
BUPRENORPHINE UR QL: NEGATIVE
CALCIUM SERPL-MCNC: 8.9 MG/DL (ref 8.6–10.2)
CANNABINOIDS UR QL SCN: NEGATIVE
CHLORIDE SERPL-SCNC: 109 MMOL/L (ref 98–107)
CK SERPL-CCNC: 470 U/L (ref 20–200)
CO2 SERPL-SCNC: 22 MMOL/L (ref 22–29)
COCAINE UR QL SCN: POSITIVE
CREAT SERPL-MCNC: 1.9 MG/DL (ref 0.7–1.2)
EOSINOPHIL # BLD: 0.11 K/UL (ref 0.05–0.5)
EOSINOPHILS RELATIVE PERCENT: 2 % (ref 0–6)
ERYTHROCYTE [DISTWIDTH] IN BLOOD BY AUTOMATED COUNT: 15.7 % (ref 11.5–15)
ETHANOLAMINE SERPL-MCNC: <10 MG/DL
FENTANYL UR QL: NEGATIVE
GFR SERPL CREATININE-BSD FRML MDRD: 38 ML/MIN/1.73M2
GLUCOSE BLD-MCNC: 102 MG/DL
GLUCOSE BLD-MCNC: 102 MG/DL (ref 74–99)
GLUCOSE SERPL-MCNC: 192 MG/DL (ref 74–99)
HCT VFR BLD AUTO: 44.1 % (ref 37–54)
HGB BLD-MCNC: 13.4 G/DL (ref 12.5–16.5)
IMM GRANULOCYTES # BLD AUTO: <0.03 K/UL (ref 0–0.58)
IMM GRANULOCYTES NFR BLD: 0 % (ref 0–5)
LYMPHOCYTES NFR BLD: 1.96 K/UL (ref 1.5–4)
LYMPHOCYTES RELATIVE PERCENT: 36 % (ref 20–42)
MCH RBC QN AUTO: 25.4 PG (ref 26–35)
MCHC RBC AUTO-ENTMCNC: 30.4 G/DL (ref 32–34.5)
MCV RBC AUTO: 83.7 FL (ref 80–99.9)
METHADONE UR QL: NEGATIVE
MONOCYTES NFR BLD: 0.61 K/UL (ref 0.1–0.95)
MONOCYTES NFR BLD: 11 % (ref 2–12)
NEUTROPHILS NFR BLD: 49 % (ref 43–80)
NEUTS SEG NFR BLD: 2.64 K/UL (ref 1.8–7.3)
OPIATES UR QL SCN: NEGATIVE
OXYCODONE UR QL SCN: NEGATIVE
PCP UR QL SCN: NEGATIVE
PLATELET # BLD AUTO: 284 K/UL (ref 130–450)
PMV BLD AUTO: 10.9 FL (ref 7–12)
POTASSIUM SERPL-SCNC: 3.6 MMOL/L (ref 3.5–5)
PROT SERPL-MCNC: 7.5 G/DL (ref 6.4–8.3)
RBC # BLD AUTO: 5.27 M/UL (ref 3.8–5.8)
SALICYLATES SERPL-MCNC: <0.3 MG/DL (ref 0–30)
SODIUM SERPL-SCNC: 143 MMOL/L (ref 132–146)
TEST INFORMATION: ABNORMAL
TOXIC TRICYCLIC SC,BLOOD: NEGATIVE
WBC OTHER # BLD: 5.4 K/UL (ref 4.5–11.5)

## 2023-11-06 PROCEDURE — 93005 ELECTROCARDIOGRAM TRACING: CPT | Performed by: EMERGENCY MEDICINE

## 2023-11-06 PROCEDURE — 80179 DRUG ASSAY SALICYLATE: CPT

## 2023-11-06 PROCEDURE — 82550 ASSAY OF CK (CPK): CPT

## 2023-11-06 PROCEDURE — 80143 DRUG ASSAY ACETAMINOPHEN: CPT

## 2023-11-06 PROCEDURE — 85025 COMPLETE CBC W/AUTO DIFF WBC: CPT

## 2023-11-06 PROCEDURE — G0480 DRUG TEST DEF 1-7 CLASSES: HCPCS

## 2023-11-06 PROCEDURE — 80307 DRUG TEST PRSMV CHEM ANLYZR: CPT

## 2023-11-06 PROCEDURE — 80053 COMPREHEN METABOLIC PANEL: CPT

## 2023-11-06 PROCEDURE — 99285 EMERGENCY DEPT VISIT HI MDM: CPT

## 2023-11-06 PROCEDURE — 82962 GLUCOSE BLOOD TEST: CPT

## 2023-11-06 ASSESSMENT — PATIENT HEALTH QUESTIONNAIRE - PHQ9
7. TROUBLE CONCENTRATING ON THINGS, SUCH AS READING THE NEWSPAPER OR WATCHING TELEVISION: 0
9. THOUGHTS THAT YOU WOULD BE BETTER OFF DEAD, OR OF HURTING YOURSELF: 3
5. POOR APPETITE OR OVEREATING: 1
8. MOVING OR SPEAKING SO SLOWLY THAT OTHER PEOPLE COULD HAVE NOTICED. OR THE OPPOSITE, BEING SO FIGETY OR RESTLESS THAT YOU HAVE BEEN MOVING AROUND A LOT MORE THAN USUAL: 2
SUM OF ALL RESPONSES TO PHQ9 QUESTIONS 1 & 2: 6
3. TROUBLE FALLING OR STAYING ASLEEP: 2
SUM OF ALL RESPONSES TO PHQ QUESTIONS 1-9: 14
1. LITTLE INTEREST OR PLEASURE IN DOING THINGS: 3
SUM OF ALL RESPONSES TO PHQ QUESTIONS 1-9: 17
10. IF YOU CHECKED OFF ANY PROBLEMS, HOW DIFFICULT HAVE THESE PROBLEMS MADE IT FOR YOU TO DO YOUR WORK, TAKE CARE OF THINGS AT HOME, OR GET ALONG WITH OTHER PEOPLE: 1
SUM OF ALL RESPONSES TO PHQ QUESTIONS 1-9: 17
6. FEELING BAD ABOUT YOURSELF - OR THAT YOU ARE A FAILURE OR HAVE LET YOURSELF OR YOUR FAMILY DOWN: 0
SUM OF ALL RESPONSES TO PHQ QUESTIONS 1-9: 17
2. FEELING DOWN, DEPRESSED OR HOPELESS: 3
4. FEELING TIRED OR HAVING LITTLE ENERGY: 3

## 2023-11-06 ASSESSMENT — LIFESTYLE VARIABLES
HOW OFTEN DO YOU HAVE A DRINK CONTAINING ALCOHOL: NEVER
HOW MANY STANDARD DRINKS CONTAINING ALCOHOL DO YOU HAVE ON A TYPICAL DAY: PATIENT DOES NOT DRINK
HOW OFTEN DO YOU HAVE A DRINK CONTAINING ALCOHOL: NEVER
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
## 2023-11-06 NOTE — ED TRIAGE NOTES
Patient feeling depressed and suicidal as he does not feel he can stay [facility] the [facility] due to not being able to stay in bed.   Was at [facility] counseling today and they suggested he come to the hospital

## 2023-11-07 ENCOUNTER — APPOINTMENT (OUTPATIENT)
Dept: GENERAL RADIOLOGY | Age: 73
End: 2023-11-07
Payer: MEDICAID

## 2023-11-07 PROBLEM — T14.91XA SUICIDAL BEHAVIOR WITH ATTEMPTED SELF-INJURY (HCC): Status: ACTIVE | Noted: 2023-11-07

## 2023-11-07 PROBLEM — Z22.7 LATENT TUBERCULOSIS: Status: ACTIVE | Noted: 2023-11-07

## 2023-11-07 LAB
EKG ATRIAL RATE: 98 BPM
EKG P AXIS: 67 DEGREES
EKG P-R INTERVAL: 170 MS
EKG Q-T INTERVAL: 392 MS
EKG QRS DURATION: 84 MS
EKG QTC CALCULATION (BAZETT): 500 MS
EKG R AXIS: 29 DEGREES
EKG T AXIS: -71 DEGREES
EKG VENTRICULAR RATE: 98 BPM

## 2023-11-07 PROCEDURE — 2580000003 HC RX 258: Performed by: FAMILY MEDICINE

## 2023-11-07 PROCEDURE — 2580000003 HC RX 258: Performed by: INTERNAL MEDICINE

## 2023-11-07 PROCEDURE — 6370000000 HC RX 637 (ALT 250 FOR IP): Performed by: INTERNAL MEDICINE

## 2023-11-07 PROCEDURE — 1200000000 HC SEMI PRIVATE

## 2023-11-07 PROCEDURE — 93010 ELECTROCARDIOGRAM REPORT: CPT | Performed by: INTERNAL MEDICINE

## 2023-11-07 PROCEDURE — 90792 PSYCH DIAG EVAL W/MED SRVCS: CPT | Performed by: NURSE PRACTITIONER

## 2023-11-07 PROCEDURE — 71046 X-RAY EXAM CHEST 2 VIEWS: CPT

## 2023-11-07 RX ORDER — METOPROLOL SUCCINATE 50 MG/1
50 TABLET, EXTENDED RELEASE ORAL 2 TIMES DAILY
Status: DISCONTINUED | OUTPATIENT
Start: 2023-11-07 | End: 2023-11-10 | Stop reason: HOSPADM

## 2023-11-07 RX ORDER — SODIUM CHLORIDE 9 MG/ML
INJECTION, SOLUTION INTRAVENOUS CONTINUOUS
Status: DISCONTINUED | OUTPATIENT
Start: 2023-11-07 | End: 2023-11-08

## 2023-11-07 RX ORDER — BUPROPION HYDROCHLORIDE 100 MG/1
100 TABLET, EXTENDED RELEASE ORAL DAILY
Status: DISCONTINUED | OUTPATIENT
Start: 2023-11-07 | End: 2023-11-10 | Stop reason: HOSPADM

## 2023-11-07 RX ORDER — TAMSULOSIN HYDROCHLORIDE 0.4 MG/1
0.4 CAPSULE ORAL NIGHTLY
Status: DISCONTINUED | OUTPATIENT
Start: 2023-11-07 | End: 2023-11-10 | Stop reason: HOSPADM

## 2023-11-07 RX ORDER — POTASSIUM CHLORIDE 20 MEQ/1
40 TABLET, EXTENDED RELEASE ORAL PRN
Status: DISCONTINUED | OUTPATIENT
Start: 2023-11-07 | End: 2023-11-10 | Stop reason: HOSPADM

## 2023-11-07 RX ORDER — POLYETHYLENE GLYCOL 3350 17 G/17G
17 POWDER, FOR SOLUTION ORAL DAILY PRN
Status: DISCONTINUED | OUTPATIENT
Start: 2023-11-07 | End: 2023-11-10 | Stop reason: HOSPADM

## 2023-11-07 RX ORDER — ALBUTEROL SULFATE 2.5 MG/3ML
2.5 SOLUTION RESPIRATORY (INHALATION) EVERY 4 HOURS PRN
Status: DISCONTINUED | OUTPATIENT
Start: 2023-11-07 | End: 2023-11-10 | Stop reason: HOSPADM

## 2023-11-07 RX ORDER — SODIUM CHLORIDE 0.9 % (FLUSH) 0.9 %
10 SYRINGE (ML) INJECTION EVERY 12 HOURS SCHEDULED
Status: DISCONTINUED | OUTPATIENT
Start: 2023-11-07 | End: 2023-11-10 | Stop reason: HOSPADM

## 2023-11-07 RX ORDER — ACETAMINOPHEN 325 MG/1
650 TABLET ORAL EVERY 6 HOURS PRN
Status: DISCONTINUED | OUTPATIENT
Start: 2023-11-07 | End: 2023-11-10 | Stop reason: HOSPADM

## 2023-11-07 RX ORDER — LISINOPRIL 20 MG/1
40 TABLET ORAL DAILY
Status: DISCONTINUED | OUTPATIENT
Start: 2023-11-07 | End: 2023-11-10 | Stop reason: HOSPADM

## 2023-11-07 RX ORDER — PANTOPRAZOLE SODIUM 40 MG/1
40 TABLET, DELAYED RELEASE ORAL DAILY
Status: DISCONTINUED | OUTPATIENT
Start: 2023-11-07 | End: 2023-11-10 | Stop reason: HOSPADM

## 2023-11-07 RX ORDER — POTASSIUM CHLORIDE 7.45 MG/ML
10 INJECTION INTRAVENOUS PRN
Status: DISCONTINUED | OUTPATIENT
Start: 2023-11-07 | End: 2023-11-10 | Stop reason: HOSPADM

## 2023-11-07 RX ORDER — SODIUM CHLORIDE 9 MG/ML
INJECTION, SOLUTION INTRAVENOUS PRN
Status: DISCONTINUED | OUTPATIENT
Start: 2023-11-07 | End: 2023-11-10 | Stop reason: HOSPADM

## 2023-11-07 RX ORDER — AMLODIPINE BESYLATE 5 MG/1
5 TABLET ORAL DAILY
Status: DISCONTINUED | OUTPATIENT
Start: 2023-11-07 | End: 2023-11-10 | Stop reason: HOSPADM

## 2023-11-07 RX ORDER — SODIUM CHLORIDE 0.9 % (FLUSH) 0.9 %
5-40 SYRINGE (ML) INJECTION PRN
Status: DISCONTINUED | OUTPATIENT
Start: 2023-11-07 | End: 2023-11-10 | Stop reason: HOSPADM

## 2023-11-07 RX ORDER — MAGNESIUM SULFATE IN WATER 40 MG/ML
2000 INJECTION, SOLUTION INTRAVENOUS PRN
Status: DISCONTINUED | OUTPATIENT
Start: 2023-11-07 | End: 2023-11-10 | Stop reason: HOSPADM

## 2023-11-07 RX ORDER — PROMETHAZINE HYDROCHLORIDE 12.5 MG/1
12.5 TABLET ORAL EVERY 6 HOURS PRN
Status: DISCONTINUED | OUTPATIENT
Start: 2023-11-07 | End: 2023-11-10 | Stop reason: HOSPADM

## 2023-11-07 RX ORDER — POLYETHYLENE GLYCOL 3350 17 G/17G
17 POWDER, FOR SOLUTION ORAL DAILY PRN
Status: DISCONTINUED | OUTPATIENT
Start: 2023-11-07 | End: 2023-11-07 | Stop reason: SDUPTHER

## 2023-11-07 RX ORDER — ONDANSETRON 2 MG/ML
4 INJECTION INTRAMUSCULAR; INTRAVENOUS EVERY 6 HOURS PRN
Status: DISCONTINUED | OUTPATIENT
Start: 2023-11-07 | End: 2023-11-10 | Stop reason: HOSPADM

## 2023-11-07 RX ORDER — ASPIRIN 81 MG/1
81 TABLET ORAL DAILY
Status: DISCONTINUED | OUTPATIENT
Start: 2023-11-07 | End: 2023-11-10 | Stop reason: HOSPADM

## 2023-11-07 RX ORDER — GABAPENTIN 300 MG/1
300 CAPSULE ORAL 2 TIMES DAILY
Status: DISCONTINUED | OUTPATIENT
Start: 2023-11-07 | End: 2023-11-10 | Stop reason: HOSPADM

## 2023-11-07 RX ORDER — ACETAMINOPHEN 650 MG/1
650 SUPPOSITORY RECTAL EVERY 6 HOURS PRN
Status: DISCONTINUED | OUTPATIENT
Start: 2023-11-07 | End: 2023-11-10 | Stop reason: HOSPADM

## 2023-11-07 RX ORDER — ACETAMINOPHEN 325 MG/1
650 TABLET ORAL EVERY 6 HOURS PRN
Status: DISCONTINUED | OUTPATIENT
Start: 2023-11-07 | End: 2023-11-07 | Stop reason: SDUPTHER

## 2023-11-07 RX ORDER — SODIUM CHLORIDE 0.9 % (FLUSH) 0.9 %
10 SYRINGE (ML) INJECTION PRN
Status: DISCONTINUED | OUTPATIENT
Start: 2023-11-07 | End: 2023-11-10 | Stop reason: HOSPADM

## 2023-11-07 RX ORDER — HYDROCHLOROTHIAZIDE 12.5 MG/1
25 CAPSULE, GELATIN COATED ORAL DAILY
Status: DISCONTINUED | OUTPATIENT
Start: 2023-11-07 | End: 2023-11-07

## 2023-11-07 RX ORDER — ACETAMINOPHEN 650 MG/1
650 SUPPOSITORY RECTAL EVERY 6 HOURS PRN
Status: DISCONTINUED | OUTPATIENT
Start: 2023-11-07 | End: 2023-11-07 | Stop reason: SDUPTHER

## 2023-11-07 RX ORDER — HYDROCHLOROTHIAZIDE 12.5 MG/1
12.5 TABLET ORAL DAILY
Status: DISCONTINUED | OUTPATIENT
Start: 2023-11-07 | End: 2023-11-10 | Stop reason: HOSPADM

## 2023-11-07 RX ORDER — SODIUM CHLORIDE 0.9 % (FLUSH) 0.9 %
5-40 SYRINGE (ML) INJECTION EVERY 12 HOURS SCHEDULED
Status: DISCONTINUED | OUTPATIENT
Start: 2023-11-07 | End: 2023-11-10 | Stop reason: HOSPADM

## 2023-11-07 RX ORDER — ALBUTEROL SULFATE 90 UG/1
2 AEROSOL, METERED RESPIRATORY (INHALATION) 4 TIMES DAILY PRN
Status: DISCONTINUED | OUTPATIENT
Start: 2023-11-07 | End: 2023-11-07 | Stop reason: CLARIF

## 2023-11-07 RX ADMIN — APIXABAN 5 MG: 5 TABLET, FILM COATED ORAL at 09:21

## 2023-11-07 RX ADMIN — GABAPENTIN 300 MG: 300 CAPSULE ORAL at 09:21

## 2023-11-07 RX ADMIN — SODIUM CHLORIDE, PRESERVATIVE FREE 10 ML: 5 INJECTION INTRAVENOUS at 21:01

## 2023-11-07 RX ADMIN — SODIUM CHLORIDE: 9 INJECTION, SOLUTION INTRAVENOUS at 09:34

## 2023-11-07 RX ADMIN — AMLODIPINE BESYLATE 5 MG: 5 TABLET ORAL at 09:21

## 2023-11-07 RX ADMIN — SODIUM CHLORIDE, PRESERVATIVE FREE 10 ML: 5 INJECTION INTRAVENOUS at 09:26

## 2023-11-07 RX ADMIN — LISINOPRIL 40 MG: 20 TABLET ORAL at 09:21

## 2023-11-07 RX ADMIN — SODIUM CHLORIDE, PRESERVATIVE FREE 10 ML: 5 INJECTION INTRAVENOUS at 09:34

## 2023-11-07 RX ADMIN — HYDROCHLOROTHIAZIDE 12.5 MG: 12.5 TABLET ORAL at 09:21

## 2023-11-07 RX ADMIN — METOPROLOL SUCCINATE 50 MG: 50 TABLET, EXTENDED RELEASE ORAL at 21:00

## 2023-11-07 RX ADMIN — TAMSULOSIN HYDROCHLORIDE 0.4 MG: 0.4 CAPSULE ORAL at 21:00

## 2023-11-07 RX ADMIN — ASPIRIN 81 MG: 81 TABLET, COATED ORAL at 09:22

## 2023-11-07 RX ADMIN — PANTOPRAZOLE SODIUM 40 MG: 40 TABLET, DELAYED RELEASE ORAL at 09:25

## 2023-11-07 RX ADMIN — GABAPENTIN 300 MG: 300 CAPSULE ORAL at 21:00

## 2023-11-07 RX ADMIN — APIXABAN 5 MG: 5 TABLET, FILM COATED ORAL at 20:59

## 2023-11-07 RX ADMIN — METOPROLOL SUCCINATE 50 MG: 50 TABLET, EXTENDED RELEASE ORAL at 09:21

## 2023-11-07 NOTE — ED NOTES
Charge nurse Hannah Moreira RN notified patient is admitted with Latent Tuberculosis diagnosis and admitting physician ordered droplet plus isolation. Charge RN states curtain should be closed as much as possible while still being able to visualize patient until a room becomes available in main ED.        Pennie Mehta RN  11/07/23 0798

## 2023-11-07 NOTE — CONSULTS
[facility] CONSULT NOTE    Reason for Consult: Positive T-spot TB test   Requested by: Gene Samayoa MD     Chief complaint: Suicidal ideation    History Obtained From: Patient and EMR     HISTORY [facility]              The patient is a 68 y.o. male with history of hypertension, depression, stroke, CKD, substance abuse, presented on  with suicidal ideation. He was previously admitted in 2023 for severe headache after using cocaine and incidentally tested positive on T-spot TB test  with unremarkable CXR, negative HIV screen, hepatitis B surface antigen and hepatitis C antibody. He reported that his mother had  of tuberculosis at least 21 years ago during which he and his siblings were long under the care of his grandmother already. He reports that he has had tuberculin skin testing since grade school and had last positive tuberculin skin test about 20 years ago or so but did not receive any treatment and was told that it was \"dormant\". He reports no history of overseas travel, no history of incarceration. He is not on immunosuppressive medication. [facility]. The health department had tried to reach out to him after T-spot TB test turned positive but was unable to get to him. On admission, he was afebrile and hemodynamically stable with no leukocytosis. ID service was subsequently consulted for further recommendations.     Past Medical History  Past Medical History:   Diagnosis Date    Arthritis     Benign hypertensive kidney disease with chronic kidney disease 10/06/2021    Cervical vertebra fusion 2000    Chronic bilateral low back pain with bilateral sciatica 2022    Chronic kidney disease (CKD), stage III (moderate) ([address]) 2013    Depression     Hypertension     Stroke [facility])     patient had a stroke in the late     Type 2 diabetes mellitus with diabetic polyneuropathy, with long-term current use of insulin ([address])        Current Facility-Administered 1    pantoprazole (PROTONIX) 40 MG tablet Take 1 tablet by mouth daily 90 tablet 1    hydroCHLOROthiazide (MICROZIDE) 12.5 MG capsule TAKE 1 CAPSULE BY MOUTH DAILY 90 capsule 1    aspirin (ASPIRIN LOW DOSE) 81 MG EC tablet TAKE 1 TABLET BY MOUTH DAILY 90 tablet 1    albuterol sulfate HFA (VENTOLIN HFA) 108 (90 Base) MCG/ACT inhaler Inhale 2 puffs into the lungs 4 times daily as needed for Wheezing 18 g 0    vitamin D (CHOLECALCIFEROL) 25 MCG (1000 UT) TABS tablet Take 1 tablet by mouth daily Take 1,000 Units by mouth daily 90 tablet 1    buPROPion (WELLBUTRIN SR) 100 MG extended release tablet Take 1 tablet by mouth daily      ACETAMINOPHEN EXTRA STRENGTH 500 MG tablet TAKE 2 TABLETS BY MOUTH EVERY 6 HOURS FOR PAIN      gabapentin (NEURONTIN) 300 MG capsule Take 1 capsule by mouth as needed.  Takes prn      Blood Pressure KIT 1 kit by Does not apply route daily 1 kit 0    Alcohol Swabs (B-D SINGLE USE SWABS REGULAR) PADS USE 3 - 4 TIMES DAILY AS DIRECTED      B-D UF III MINI PEN NEEDLES 31G X 5 MM MISC 1 each by Does not apply route 4 times daily         No Known Allergies    Surgical History  Past Surgical History:   Procedure Laterality Date    COLONOSCOPY      COLONOSCOPY N/A 09/13/2021    COLONOSCOPY POLYPECTOMY SNARE/COLD BIOPSY performed by Marilynn Corley MD at [facility]  01/23/2010    2019    PAIN MANAGEMENT PROCEDURE Bilateral 9/27/2022    BILATERAL L4 TRANSFORAMINAL EPIDURAL STEROID INJECTION performed by Gomez Angeles DO at [id number] [place] [address] Bilateral 12/13/2022    BILATERAL L4 TRANSFORAMINAL EPIDURAL STEROID INJECTION performed by Gomez Angeles DO at [facility] GASTROINTESTINAL ENDOSCOPY      UPPER GASTROINTESTINAL ENDOSCOPY N/A 09/13/2021    EGD BIOPSY performed by Marilynn Corley MD at [facility] N/A 09/13/2021    EGD

## 2023-11-07 NOTE — ED NOTES
Called Dr. Mogran Patel to present patient, waiting for call back from physician.        Cherise Cote RN  11/07/23 4300

## 2023-11-07 NOTE — CONSULTS
Department of Psychiatry  Behavioral Health Consult    REASON FOR CONSULT: Suicidal ideation    CONSULTING PHYSICIAN: Cory De Jesus DO    History obtained from: Patient and chart    HISTORY OF PRESENT ILLNESS:    A 52-year-old male with a past medical history of benign hypertensive kidney disease with chronic kidney disease, cervical vertebral fusion, chronic bilaterally low back pain with bilateral sciatica, chronic kidney disease, depression and hypertension and stroke as well as diabetes type 2 with diabetic polyneuropathy currently on insulin presented to the ED sent in by his counselor at [facility] reporting he was suicidal with a plan to cut his wrists. Patient reports that he had recently gone to rehab at [facility] recovery and did not pay his rent during that time. He states that his landlord was not informed that he was at rehab and that when he got back his landlord demanded a full amount of money which was $1400 and patient states he does not have $1400 because his check is only $900. He states he was not able to pay his landlord that amounts though they told him that he had to get out. He states he went to the rescue [place] for a while but he was not able to stay there because he is not able to sleep there and that they make him get out of bed rather than stay in bed and he states because of his medical issues he needs to stay in the bed during the day. He states that he was supposed to be getting his own place in a week and a half he states his  Lizabeth at [facility] was working on getting him his own place. I asked patient what the status that is but he states he is not sure. He states that his suicidal thoughts were coming from his frustration with not having a place to live but currently patient is future focused and has been working with his  to work on getting his own place.   He states he is no longer suicidal he states his biggest problem right now is that he does not

## 2023-11-07 NOTE — H&P
Hospitalist History & Physical      PCP: Leora Resendiz MD    Date of Admission: 11/6/2023    Date of Service: Pt seen/examined on 11/6/2023 and is  admitted to Inpatient with expected LOS greater than two midnights due to medical therapy. Placed in Observation. Chief Complaint:  had concerns including Mental Health Problem (Suicidal with paln to cut his writs. Reports lost his apartment and an not [facility] as he can not walk around all day and needs to stay in bed due to kidney stones and back surgery). History Of Present Illness:    Mr. Vish Funk, a 68y.o. year old male  who  has a past medical history of Arthritis, Benign hypertensive kidney disease with chronic kidney disease, Cervical vertebra fusion, Chronic bilateral low back pain with bilateral sciatica, Chronic kidney disease (CKD), stage III (moderate) ([address]), Depression, Hypertension, Stroke ([address]), and Type 2 diabetes mellitus with diabetic polyneuropathy, with long-term current use of insulin ([address]). A 80-year-old male with past medical history significant as above who came in last night with suicidal ideation with a plan on cutting his wrist.  Patient states that he lost his apartment and was at [facility] for the last week but refuses to stay there any longer. He states that he is planning to hurt himself because of the situation. In the ER, he had stable vitals and unremarkable labs. His creatinine is about 2 that is his baseline. Urine drug screen was positive for cocaine. He was admitted for psych evaluation and possible inpatient psych due to suicidal thoughts. Chart review revealed that patient was tested for TB spot test that came back positive and ID recommended rifampin for 4 months but patient had already gone by that time and hospital and health care department could not reach out to him. I have seen and examined this patient today.   Patient is alert awake oriented x3 and provided me capsule by mouth 2 times daily for 10 days  Patient not taking: Reported on 11/6/2023 10/30/23 11/9/23  Cara Almanza APRN - CNP   loratadine (CLARITIN) 10 MG tablet Take 1 tablet by mouth daily  Patient not taking: Reported on 11/6/2023 10/30/23   Cara Almanza APRN - CNP   ELIQUIS 5 MG TABS tablet TAKE 1 TABLET BY MOUTH TWICE DAILY 10/12/23   Prakash Banerjee MD   tamsulosin (FLOMAX) 0.4 MG capsule TAKE 1 CAPSULE BY MOUTH EVERY NIGHT 8/1/23   Romelia Colbert MD   amLODIPine (NORVASC) 5 MG tablet TAKE 1 TABLET BY MOUTH DAILY 8/1/23   Nichole Carver MD   Continuous Blood Gluc  (FREESTYLE AGNES 2 READER) [address] Devices    Rosio Gillette MD   FARXIGA 10 MG tablet Take 1 tablet by mouth daily 5/24/23   Rosio Gillette MD   vitamin B-12 (CYANOCOBALAMIN) 1000 MCG tablet Take 1 tablet by mouth daily 5/24/23   Rosio Gillette MD   lisinopril (PRINIVIL;ZESTRIL) 40 MG tablet Take 1 tablet by mouth daily 6/21/23   Romelia Colbert MD   metoprolol succinate (TOPROL XL) 100 MG extended release tablet Take 0.5 tablets by mouth in the morning and at bedtime Take 0.5 tablets by mouth in the morning and at bedtime 6/21/23   Nichole Carver MD   pantoprazole (PROTONIX) 40 MG tablet Take 1 tablet by mouth daily 6/21/23   Nichole Carver MD   hydroCHLOROthiazide (MICROZIDE) 12.5 MG capsule TAKE 1 CAPSULE BY MOUTH DAILY 6/21/23   Nichole Carver MD   aspirin (ASPIRIN LOW DOSE) 81 MG EC tablet TAKE 1 TABLET BY MOUTH DAILY 6/20/23   Romelia Colbert MD   albuterol sulfate HFA (VENTOLIN HFA) 108 (90 Base) MCG/ACT inhaler Inhale 2 puffs into the lungs 4 times daily as needed for Wheezing 4/24/23   Hrelec, Cara Sony, APRN - CNP   vitamin D (CHOLECALCIFEROL) 25 MCG (1000 UT) TABS tablet Take 1 tablet by mouth daily Take 1,000 Units by mouth daily 12/14/22   Nichole Carver MD   buPROPion [facility] SR) 100 MG extended release tablet Take 1 tablet by

## 2023-11-07 NOTE — ED NOTES
Spoke to Dr. Rosendo Garcia who declined to admit due to patient non-compliant with multiple medical conditions including treatment for latent TB.  Ariana notified.        Tennille Rodriguez RN  11/07/23 0659

## 2023-11-07 NOTE — ED NOTES
ED attending Dr. Prince Nava notified Dr. Donald Has declined to admit due to patient non-compliant with multiple medical conditions including treatment for latent TB.       Alexia Alvarado RN  11/07/23 0255

## 2023-11-07 NOTE — PROGRESS NOTES
Pharmacy Note    This patient was ordered jardiance 10mg (for DM2). [facility] the [facility], this medication is non-formulary and not stocked by pharmacy for the reason indicated below. The medication can be reordered at discharge.      Medications in which risks outweigh benefits during hospitalization:           -  oral bisphosphonates         -  raloxifene (Evista)        -  SGLT2 inhibitors (ordered in the hospital for an indication other than heart failure or chronic kidney disease)    Medications that lack necessity during an acute hospital stay:        -  nasal antihistamines        -  nasal ipratropium 0.03% and 0.06%        -  nasal miacalcin        -  acyclovir topical cream/ointment orders for herpes labialis (cold sores)

## 2023-11-07 NOTE — ED NOTES
There is no pink slip in patient's paper chart at this time. Will notify  once  returns to [facility].       Carlos Doll RN  11/06/23 1953

## 2023-11-07 NOTE — ED NOTES
Nurse to nurse report given to [facility] RN included patient presentation, one to one status, medical condition and hospitalist in to see patient and new orders.      Lavern Leslie RN  11/07/23 1118

## 2023-11-07 NOTE — ED NOTES
Spoke to charge nurse Shilo Shane RN who states patient will be moved out of [facility] as soon as a bed is available in main ED. Didi Zavala RN notified.       Zaki Nguyen RN  11/07/23 6268

## 2023-11-07 NOTE — ED NOTES
Patient currently resting with eyes closed, respirations non-labored, skin warm/dry, no distress noted. Patient responds to voice, oriented x 4 when awake. Patient calm and cooperative at present. Patient denies SI at present, states \"not now, not here\". Patient denies any HI. Patient denies any hallucinations at present. Patient voicing no complaints at present. Patient cooperative for vitals. Patient states he hasn't taken any medications in over a week because he left them at the [address] [facility] GRETCHEN Daugherty  11/06/23 9361

## 2023-11-07 NOTE — PROGRESS NOTES
4 Eyes Skin Assessment     NAME:  Tom Kim  YOB: 1950  MEDICAL RECORD NUMBER:  [id number]    The patient is being assessed for  Admission    I agree that at least one RN has performed a thorough Head to Toe Skin Assessment on the patient. ALL assessment sites listed below have been assessed. Areas assessed by both nurses:    Head, Face, Ears, Shoulders, Back, Chest, Arms, Elbows, Hands, Sacrum. Buttock, Coccyx, Ischium, Legs. Feet and Heels, and Under Medical Devices         Does the Patient have a Wound?  No noted wound(s)       Jacques Prevention initiated by RN: Yes  Wound Care Orders initiated by RN: No    Pressure Injury (Stage 3,4, Unstageable, DTI, NWPT, and Complex wounds) if present, place Wound referral order by RN under : No    New Ostomies, if present place, Ostomy referral order under : No     Nurse 1 eSignature: Electronically signed by Les Childress RN on 11/7/23 at 4:37 PM EST    **SHARE this note so that the co-signing nurse can place an eSignature**    Nurse 2 eSignature: Electronically signed by Mohinder Dupree RN on 11/7/23 at 4:53 PM EST

## 2023-11-08 LAB
ALBUMIN SERPL-MCNC: 3.2 G/DL (ref 3.5–5.2)
ALP SERPL-CCNC: 105 U/L (ref 40–129)
ALT SERPL-CCNC: 19 U/L (ref 0–40)
ANION GAP SERPL CALCULATED.3IONS-SCNC: 11 MMOL/L (ref 7–16)
AST SERPL-CCNC: 32 U/L (ref 0–39)
BASOPHILS # BLD: 0.05 K/UL (ref 0–0.2)
BASOPHILS NFR BLD: 1 % (ref 0–2)
BILIRUB SERPL-MCNC: 0.3 MG/DL (ref 0–1.2)
BUN SERPL-MCNC: 14 MG/DL (ref 6–23)
CALCIUM SERPL-MCNC: 8 MG/DL (ref 8.6–10.2)
CHLORIDE SERPL-SCNC: 109 MMOL/L (ref 98–107)
CO2 SERPL-SCNC: 20 MMOL/L (ref 22–29)
CREAT SERPL-MCNC: 1.5 MG/DL (ref 0.7–1.2)
EOSINOPHIL # BLD: 0.1 K/UL (ref 0.05–0.5)
EOSINOPHILS RELATIVE PERCENT: 2 % (ref 0–6)
ERYTHROCYTE [DISTWIDTH] IN BLOOD BY AUTOMATED COUNT: 15.5 % (ref 11.5–15)
GFR SERPL CREATININE-BSD FRML MDRD: 50 ML/MIN/1.73M2
GLUCOSE SERPL-MCNC: 214 MG/DL (ref 74–99)
HCT VFR BLD AUTO: 40.8 % (ref 37–54)
HGB BLD-MCNC: 12.1 G/DL (ref 12.5–16.5)
HIV 1+2 AB+HIV1 P24 AG SERPL QL IA: NONREACTIVE
IMM GRANULOCYTES # BLD AUTO: <0.03 K/UL (ref 0–0.58)
IMM GRANULOCYTES NFR BLD: 0 % (ref 0–5)
LYMPHOCYTES NFR BLD: 1.64 K/UL (ref 1.5–4)
LYMPHOCYTES RELATIVE PERCENT: 38 % (ref 20–42)
MCH RBC QN AUTO: 24.8 PG (ref 26–35)
MCHC RBC AUTO-ENTMCNC: 29.7 G/DL (ref 32–34.5)
MCV RBC AUTO: 83.6 FL (ref 80–99.9)
MONOCYTES NFR BLD: 0.59 K/UL (ref 0.1–0.95)
MONOCYTES NFR BLD: 14 % (ref 2–12)
NEUTROPHILS NFR BLD: 44 % (ref 43–80)
NEUTS SEG NFR BLD: 1.89 K/UL (ref 1.8–7.3)
PLATELET # BLD AUTO: 240 K/UL (ref 130–450)
PMV BLD AUTO: 11.7 FL (ref 7–12)
POTASSIUM SERPL-SCNC: 3.5 MMOL/L (ref 3.5–5)
PROT SERPL-MCNC: 6.6 G/DL (ref 6.4–8.3)
RBC # BLD AUTO: 4.88 M/UL (ref 3.8–5.8)
SODIUM SERPL-SCNC: 140 MMOL/L (ref 132–146)
WBC OTHER # BLD: 4.3 K/UL (ref 4.5–11.5)

## 2023-11-08 PROCEDURE — 1200000000 HC SEMI PRIVATE

## 2023-11-08 PROCEDURE — 85025 COMPLETE CBC W/AUTO DIFF WBC: CPT

## 2023-11-08 PROCEDURE — 97530 THERAPEUTIC ACTIVITIES: CPT

## 2023-11-08 PROCEDURE — 99232 SBSQ HOSP IP/OBS MODERATE 35: CPT | Performed by: NURSE PRACTITIONER

## 2023-11-08 PROCEDURE — 80053 COMPREHEN METABOLIC PANEL: CPT

## 2023-11-08 PROCEDURE — 36415 COLL VENOUS BLD VENIPUNCTURE: CPT

## 2023-11-08 PROCEDURE — 6370000000 HC RX 637 (ALT 250 FOR IP): Performed by: INTERNAL MEDICINE

## 2023-11-08 PROCEDURE — 87389 HIV-1 AG W/HIV-1&-2 AB AG IA: CPT

## 2023-11-08 PROCEDURE — 97161 PT EVAL LOW COMPLEX 20 MIN: CPT

## 2023-11-08 PROCEDURE — 97165 OT EVAL LOW COMPLEX 30 MIN: CPT

## 2023-11-08 RX ADMIN — GABAPENTIN 300 MG: 300 CAPSULE ORAL at 08:24

## 2023-11-08 RX ADMIN — AMLODIPINE BESYLATE 5 MG: 5 TABLET ORAL at 08:25

## 2023-11-08 RX ADMIN — METOPROLOL SUCCINATE 50 MG: 50 TABLET, EXTENDED RELEASE ORAL at 20:55

## 2023-11-08 RX ADMIN — BUPROPION HYDROCHLORIDE 100 MG: 100 TABLET, EXTENDED RELEASE ORAL at 08:25

## 2023-11-08 RX ADMIN — HYDROCHLOROTHIAZIDE 12.5 MG: 12.5 TABLET ORAL at 08:25

## 2023-11-08 RX ADMIN — GABAPENTIN 300 MG: 300 CAPSULE ORAL at 20:55

## 2023-11-08 RX ADMIN — PANTOPRAZOLE SODIUM 40 MG: 40 TABLET, DELAYED RELEASE ORAL at 08:24

## 2023-11-08 RX ADMIN — APIXABAN 5 MG: 5 TABLET, FILM COATED ORAL at 20:55

## 2023-11-08 RX ADMIN — LISINOPRIL 40 MG: 20 TABLET ORAL at 08:24

## 2023-11-08 RX ADMIN — APIXABAN 5 MG: 5 TABLET, FILM COATED ORAL at 08:25

## 2023-11-08 RX ADMIN — ASPIRIN 81 MG: 81 TABLET, COATED ORAL at 08:24

## 2023-11-08 RX ADMIN — TAMSULOSIN HYDROCHLORIDE 0.4 MG: 0.4 CAPSULE ORAL at 20:55

## 2023-11-08 RX ADMIN — METOPROLOL SUCCINATE 50 MG: 50 TABLET, EXTENDED RELEASE ORAL at 08:24

## 2023-11-08 NOTE — PROGRESS NOTES
Physical Therapy  Physical Therapy Initial Assessment     Name: Prakash Lake  : 1950  MRN: [id number]      Date of Service: 2023    Evaluating PT:  Shantelle Merino PT, DPT [id number]    Room #:  2429/7687-I  Diagnosis:  Suicidal ideation [R45.851]  Latent tuberculosis [Z22.7]  Suicidal behavior with attempted self-injury ([address]) Casimiro Calvert  PMHx/PSHx:    Past Medical History:   Diagnosis Date    Arthritis     Benign hypertensive kidney disease with chronic kidney disease 10/06/2021    Cervical vertebra fusion 2000    Chronic bilateral low back pain with bilateral sciatica 2022    Chronic kidney disease (CKD), stage III (moderate) ([address]) 2013    Depression     Hypertension     Stroke [facility])     patient had a stroke in the late     Type 2 diabetes mellitus with diabetic polyneuropathy, with long-term current use of insulin ([address])       Procedure/Surgery:  none this admission  Precautions:  Falls  Equipment Needs:  potential need for FWW if home DC    SUBJECTIVE:    Pt admitted from rescue mission where he reports that he was ambulating with no AD. However, he was having trouble maintaining functional level required to remain at rescue mission. OBJECTIVE:   Initial Evaluation  Date: 23 Treatment Short Term/ Long Term   Goals   AM-PAC 6 Clicks 59/51     Was pt agreeable to Eval/treatment? yes     Does pt have pain? Back pain, does not rate     Bed Mobility  Rolling: NT  Supine to sit: NT  Sit to supine: NT  Scooting: NT  Rolling: Independent   Supine to sit: Independent   Sit to supine: Independent   Scooting: Independent    Transfers Sit to stand: SBA  Stand to sit: SBA  Stand pivot: SBA  Sit to stand: Independent   Stand to sit: Independent   Stand pivot:  Mod I with [employer]   Ambulation    15 feet x2 with no AD Sandra  50 feet x2 with [employer] SBA  >200 feet with [employer] Mod I    Stair negotiation: ascended and descended  NT  3 steps with 1 rail Mod I    ROM BUE:  Defer to OT note  BLE:  [facility] Complexity PT evaluation [id number]  [] High Complexity PT evaluation [id number]  [] PT Re-evaluation [id number]  [] Gait training 54945 - minutes  [] Manual therapy 67828 - minutes  [x] Therapeutic activities 34211 10 minutes  [] Therapeutic exercises 41080 - minutes  [] Neuromuscular reeducation 60601 - minutes     Marilee Harris PT, DPT  [id number]

## 2023-11-08 NOTE — PLAN OF CARE
Problem: Skin/Tissue Integrity  Goal: Absence of new skin breakdown  Description: 1. Monitor for areas of redness and/or skin breakdown  2. Assess vascular access sites hourly  3. Every 4-6 hours minimum:  Change oxygen saturation probe site  4. Every 4-6 hours:  If on nasal continuous positive airway pressure, respiratory therapy assess nares and determine need for appliance change or resting period.   11/7/2023 2139 by Will Flores RN  Outcome: Progressing  11/7/2023 1621 by Diane Tatum RN  Outcome: Progressing     Problem: Safety - Adult  Goal: Free from fall injury  11/7/2023 2139 by Will Flores RN  Outcome: Progressing  11/7/2023 1621 by Diane Tatum RN  Outcome: Progressing

## 2023-11-08 NOTE — PROGRESS NOTES
[address], [facility],[address]        NEIQ:                                                  Patient Name: Nafisa Felipe    MRN: [id number]    : 1950    Room: [address] [place] [address]      Evaluating OT: Luis Eadithya Morgan Brian Maldonado Deloris OTR/L; [id number]      Referring Provider: Kareem Lopez MD    Specific Provider Orders/Date: OT Eval and Treat 23      Diagnosis: Latent tuberculosis      Suicidal behavior with attempted self-injury     Surgery: none this admission     Pertinent Medical History:  has a past medical history of Arthritis, Benign hypertensive kidney disease with chronic kidney disease, Cervical vertebra fusion, Chronic bilateral low back pain with bilateral sciatica, Chronic kidney disease (CKD), stage III (moderate) ([address]), Depression, Hypertension, Stroke ([address]), and Type 2 diabetes mellitus with diabetic polyneuropathy, with long-term current use of insulin ([address]).       Reason for Admission: psych eval suicidal thoughts     Recommended Adaptive Equipment:  TBD pending progression/discharge plan     Precautions:  Fall Risk,      Assessment of current deficits:    [x] Functional mobility  [x]ADLs  [x] Strength               [x]Cognition    [x] Functional transfers   [x] IADLs         [x] Safety Awareness   [x]Endurance    [x] Fine Coordination              [x] Balance      [] Vision/perception   []Sensation     []Gross Motor Coordination  [] ROM  [] Delirium                   [] Motor Control     OT PLAN OF CARE   OT POC based on physician orders, patient diagnosis and results of clinical assessment    Frequency/Duration: 1-3 days/wk for 2 weeks PRN   Specific OT Treatment Interventions to include:   * Instruction/training on adapted ADL techniques and AE recommendations to increase functional independence within precautions       * Training on energy conservation strategies, correct breathing

## 2023-11-08 NOTE — CARE COORDINATION
CM transition of care. Pt admitted with Suicidal ideation and latent TB. Psych and ID consulted. Psych has signed off ID recommends;  / coordinate with [facility] Department to establish care with them for possible initiation of DOT LTBI treatment. Met with patient to discuss d/c planning. Pt reports being at the rescue for about a week now after being evicted from his apartment. He reports being sore and tired and not being able to get around well. Denies any suicidal ideations. Reports last cocaine use was on Monday. He is requesting MANNY. Went over facilities with him. Agreeable to [facility]. Referral called to [facility]. Pt/ot evals pending. Pt reports that he has applications [facility] and waiting  for acceptance. Has history with [facility] back in August.  Spoke with Justus at the TB Clinic at [facility]. They are familiar with him and have tried to reach out to him in August.  Pt set up with appointment at the TB Clinic on January 12, 2024 at 1:00 pm.  Clinical information faxed to them at 21 [phone]. Appointment added to the discharge instructions. Pt updated. Will await acceptance for [facility].      1110[facility] Per Becky they can take at [facility] and do tele health for the drug rehab there. Met with pt. He does not want to go to [facility]. Does not want to be that far from the area. He asked for referral to [facility]. Referral called to [facility] with [facility]. CM/SW to follow. Jackelyn Dey RN [place] [phone]:  [facility] declined. Met with pt. Wants referral to [address] [place] [address]. Referral called to [facility]. Will follow.   Jackelyn Dey RN  [phone]    Case Management Assessment  Initial Evaluation    Date/Time of Evaluation: 11/8/2023 11:00 AM  Assessment Completed by: Dominick San RN    If patient is discharged prior to next notation, then this

## 2023-11-08 NOTE — PROGRESS NOTES
Behavior health consult    Chief complaint: \"I am doing really good, now that my physical needs are taken care of. \"    HPI: Psychiatry came of this morning to follow up with patient from consult yesterday. Patient is smiling bright and pleasant he states that he is feeling much better now that his physical needs are met. He states that due to his medical problems he has been having a hard time being homeless. He vehemently denies any suicidal ideations intent or plan he states as long as he is able to get the things he needs when he leaves the hospital that his mental health will be \"fine. \"  He states he has been eating well and sleeping well there are no neurovegetative signs of depression and there are no overt or covert signs psychosis he is future focused he denies any feelings of hopelessness worthlessness or guilt. He denies any auditory or visual hallucinations he does not appear to be internally stimulated internally preoccupied and there were no overt or covert signs of psychosis        Mental status examination:  Mental status examination revealed a 67-year-old male, smiling pleasant, cooperative forthcoming with information. Psychomotor evaluation revealed no agitation retardation any abnormal movements. His eye contact is fair his speech is normal rate rhythm and tone. His mood is \"I feel better. \"  Affect is mood is Bright appropriate and pleasant. His thought process is linear without flight of ideas loose associations. Thought contents devoid of any auditory visual hallucinations delusions or any other perceptual abnormalities. He denies suicidal homicidal ideations intent or plan.   He is able to repeat words and phrases, his vocabulary is intact he has knowledge of current events and past events recent remote memory are intact   impulse control is adequate cognitive function peers to be his baseline his insight judgment is fair he is alert oriented time place and person        Clinical

## 2023-11-09 PROCEDURE — 6370000000 HC RX 637 (ALT 250 FOR IP): Performed by: INTERNAL MEDICINE

## 2023-11-09 PROCEDURE — 1200000000 HC SEMI PRIVATE

## 2023-11-09 RX ADMIN — ACETAMINOPHEN 650 MG: 325 TABLET ORAL at 17:37

## 2023-11-09 RX ADMIN — LISINOPRIL 40 MG: 20 TABLET ORAL at 08:29

## 2023-11-09 RX ADMIN — TAMSULOSIN HYDROCHLORIDE 0.4 MG: 0.4 CAPSULE ORAL at 20:27

## 2023-11-09 RX ADMIN — APIXABAN 5 MG: 5 TABLET, FILM COATED ORAL at 20:27

## 2023-11-09 RX ADMIN — METOPROLOL SUCCINATE 50 MG: 50 TABLET, EXTENDED RELEASE ORAL at 08:28

## 2023-11-09 RX ADMIN — BUPROPION HYDROCHLORIDE 100 MG: 100 TABLET, EXTENDED RELEASE ORAL at 08:35

## 2023-11-09 RX ADMIN — PANTOPRAZOLE SODIUM 40 MG: 40 TABLET, DELAYED RELEASE ORAL at 08:28

## 2023-11-09 RX ADMIN — APIXABAN 5 MG: 5 TABLET, FILM COATED ORAL at 08:28

## 2023-11-09 RX ADMIN — HYDROCHLOROTHIAZIDE 12.5 MG: 12.5 TABLET ORAL at 08:35

## 2023-11-09 RX ADMIN — ASPIRIN 81 MG: 81 TABLET, COATED ORAL at 08:29

## 2023-11-09 RX ADMIN — AMLODIPINE BESYLATE 5 MG: 5 TABLET ORAL at 08:29

## 2023-11-09 RX ADMIN — METOPROLOL SUCCINATE 50 MG: 50 TABLET, EXTENDED RELEASE ORAL at 20:27

## 2023-11-09 RX ADMIN — GABAPENTIN 300 MG: 300 CAPSULE ORAL at 08:28

## 2023-11-09 RX ADMIN — GABAPENTIN 300 MG: 300 CAPSULE ORAL at 20:27

## 2023-11-09 ASSESSMENT — PAIN - FUNCTIONAL ASSESSMENT: PAIN_FUNCTIONAL_ASSESSMENT: ACTIVITIES ARE NOT PREVENTED

## 2023-11-09 ASSESSMENT — PAIN DESCRIPTION - ORIENTATION: ORIENTATION: OTHER (COMMENT)

## 2023-11-09 ASSESSMENT — PAIN SCALES - GENERAL
PAINLEVEL_OUTOF10: 2
PAINLEVEL_OUTOF10: 0

## 2023-11-09 ASSESSMENT — PAIN DESCRIPTION - DESCRIPTORS: DESCRIPTORS: ACHING;CRAMPING;DISCOMFORT

## 2023-11-09 ASSESSMENT — PAIN DESCRIPTION - LOCATION: LOCATION: GENERALIZED

## 2023-11-09 NOTE — PLAN OF CARE
Problem: Safety - Adult  Goal: Free from fall injury  11/8/2023 6972 by Archana Mahmood RN  Outcome: Progressing

## 2023-11-09 NOTE — CARE COORDINATION
CM note. Spoke with Susan from [facility]. She will be up to meet with pt. Acceptance pending outcome of conversation with pt. Pt updated. Will follow. Leandro German RN [facility] [phone].     3451: [facility] has accepted and will start insurance precert today. 7000 completed. ALESSIO and destination completed. [facility] form with envelope placed on the soft chart. CM/SW to follow. Leandro German RN [facility] [phone].

## 2023-11-09 NOTE — DISCHARGE INSTR - COC
Continuity of Care Form    Patient Name: Sylwia Lacey   :  1950  MRN:  [id number]    Admit date:  2023  Discharge date:  11/10/2023    Code Status Order: Full Code   Advance Directives:     Admitting Physician:  Candida Nelson DO  PCP: Cindy Llamas MD    Discharging Nurse:   Taj Conti Unit/Room#: 9893/5167-L  Discharging Unit Phone Number: [phone]    Emergency Contact:   Extended Emergency Contact Information  Primary Emergency Contact: Augustina Moreno  Address: [address] [place] [address]           [address]           [place], [address] [facility] [address] [address] Phone: [phone]  Mobile Phone: [phone]  Relation: Other    Past Surgical History:  Past Surgical History:   Procedure Laterality Date    COLONOSCOPY      COLONOSCOPY N/A 2021    COLONOSCOPY POLYPECTOMY SNARE/COLD BIOPSY performed by Misti Mcmahon MD at [facility]  2010    2019    PAIN MANAGEMENT PROCEDURE Bilateral 2022    BILATERAL L4 TRANSFORAMINAL EPIDURAL STEROID INJECTION performed by Linda Rodriguez DO at [address] [facility] Bilateral 2022    BILATERAL L4 TRANSFORAMINAL EPIDURAL STEROID INJECTION performed by Linda Rodriguez DO at [facility] GASTROINTESTINAL ENDOSCOPY      UPPER GASTROINTESTINAL ENDOSCOPY N/A 2021    EGD BIOPSY performed by Misti Mcmahon MD at [address] [place] [address] N/A 2021    EGD POLYP SNARE performed by Misti Mcmahon MD at [address] [place] [address] N/A 2022    EGD WITH POSSIBLE  DILATION BALLOON performed by Misti Mcmahon MD at [address] [place] [address] N/A 2022    EGD BIOPSY performed by Misti Mcmahon MD at [address] [place] [address] History:   Immunization History   Administered Date(s)

## 2023-11-10 VITALS
RESPIRATION RATE: 20 BRPM | SYSTOLIC BLOOD PRESSURE: 142 MMHG | TEMPERATURE: 98.1 F | OXYGEN SATURATION: 100 % | WEIGHT: 240.3 LBS | DIASTOLIC BLOOD PRESSURE: 89 MMHG | BODY MASS INDEX: 29.88 KG/M2 | HEART RATE: 72 BPM | HEIGHT: 75 IN

## 2023-11-10 PROCEDURE — 6370000000 HC RX 637 (ALT 250 FOR IP): Performed by: FAMILY MEDICINE

## 2023-11-10 PROCEDURE — 6370000000 HC RX 637 (ALT 250 FOR IP): Performed by: INTERNAL MEDICINE

## 2023-11-10 RX ORDER — GUAIFENESIN/DEXTROMETHORPHAN 100-10MG/5
5 SYRUP ORAL EVERY 6 HOURS PRN
Status: DISCONTINUED | OUTPATIENT
Start: 2023-11-10 | End: 2023-11-10 | Stop reason: HOSPADM

## 2023-11-10 RX ADMIN — APIXABAN 5 MG: 5 TABLET, FILM COATED ORAL at 08:16

## 2023-11-10 RX ADMIN — LISINOPRIL 40 MG: 20 TABLET ORAL at 08:15

## 2023-11-10 RX ADMIN — PANTOPRAZOLE SODIUM 40 MG: 40 TABLET, DELAYED RELEASE ORAL at 08:16

## 2023-11-10 RX ADMIN — GABAPENTIN 300 MG: 300 CAPSULE ORAL at 08:16

## 2023-11-10 RX ADMIN — HYDROCHLOROTHIAZIDE 12.5 MG: 12.5 TABLET ORAL at 08:16

## 2023-11-10 RX ADMIN — PROMETHAZINE HYDROCHLORIDE 12.5 MG: 12.5 TABLET ORAL at 05:02

## 2023-11-10 RX ADMIN — METOPROLOL SUCCINATE 50 MG: 50 TABLET, EXTENDED RELEASE ORAL at 08:16

## 2023-11-10 RX ADMIN — AMLODIPINE BESYLATE 5 MG: 5 TABLET ORAL at 08:16

## 2023-11-10 RX ADMIN — BUPROPION HYDROCHLORIDE 100 MG: 100 TABLET, EXTENDED RELEASE ORAL at 08:16

## 2023-11-10 RX ADMIN — ASPIRIN 81 MG: 81 TABLET, COATED ORAL at 08:15

## 2023-11-10 NOTE — DISCHARGE SUMMARY
cleared for discharge whenever place available. Consults:   IP CONSULT TO SOCIAL WORK  IP CONSULT TO INTERNAL MEDICINE  IP CONSULT TO PSYCHIATRY  IP CONSULT TO INFECTIOUS DISEASES  IP CONSULT TO INFECTIOUS DISEASES  IP CONSULT TO SOCIAL WORK    Discharge Diagnoses:  Suicidal ideation, resolved  Consult psych, cleared by psych as patient does not meet criteria for involuntary hold as patient is not a risk to self or others  Sitter was discontinued on 11/7     Positive TB spot test  Consult ID for further evaluation and management  Isolation discontinued by ID on 11/7  ID ordered a chest x-ray and contact with health department to establish with them for initiation of. LTBI  Ordered HIV screening, nonreactive  Patient is set up with the TB clinic with an appointment for January 12, 2024 at 1 PM.     History of drug abuse  Cocaine is positive again on urine drug screen on 11/6  Counseling done  Continue IV fluids for 24 hours     History of diabetes mellitus type 2  Continue patient's home dose insulin  Continue for SGLT2 inhibitor  Continue gabapentin for diabetic neuropathy     Hypertension  Blood pressure well controlled  Monitor closely for now     History of atrial fibrillation  On metoprolol and Eliquis  Continue     History of CVA in 1968  Continue aspirin     Nephrolithiasis and BPH  Continue tamsulosin      Discharge Instructions / Follow up:    No future appointments.     Continued appropriate risk factor modification of blood pressure, diabetes and serum lipids will remain essential to reducing risk of future atherosclerotic development    Activity: activity as tolerated    Significant labs:  CBC:   Recent Labs     11/08/23 0512   WBC 4.3*   RBC 4.88   HGB 12.1*   HCT 40.8   MCV 83.6   RDW 15.5*        BMP:   Recent Labs     11/08/23 0512      K 3.5   *   CO2 20*   BUN 14   CREATININE 1.5*     LFT:  Recent Labs     11/08/23 0512   PROT 6.6   ALKPHOS 105   ALT 19   AST 32   BILITOT [place]  +++++++++++++++++++++++++++++++++++++++++++++++++  NOTE: This report was transcribed using voice recognition software. Every effort was made to ensure accuracy; however, inadvertent computerized transcription errors may be present.

## 2023-11-10 NOTE — CARE COORDINATION
CM note. D/c order noted. Insurance auth obtained for [employer]. Facility will provide transport.  time is 1230. Nurse will need to call report to [phone]. Patient and RN notified of  time. [facility] form with envelope placed on the soft chart. Matilde Rolon [phone].

## 2023-11-10 NOTE — PLAN OF CARE
Problem: Skin/Tissue Integrity  Goal: Absence of new skin breakdown  Description: 1. Monitor for areas of redness and/or skin breakdown  2. Assess vascular access sites hourly  3. Every 4-6 hours minimum:  Change oxygen saturation probe site  4. Every 4-6 hours:  If on nasal continuous positive airway pressure, respiratory therapy assess nares and determine need for appliance change or resting period.   11/10/2023 0920 by Priti Diehl, RN  Outcome: Progressing     Problem: Safety - Adult  Goal: Free from fall injury  11/10/2023 0920 by Priti Diehl, RN  Outcome: Progressing

## 2023-11-10 NOTE — PLAN OF CARE
Problem: Skin/Tissue Integrity  Goal: Absence of new skin breakdown  Description: 1. Monitor for areas of redness and/or skin breakdown  2. Assess vascular access sites hourly  3. Every 4-6 hours minimum:  Change oxygen saturation probe site  4. Every 4-6 hours:  If on nasal continuous positive airway pressure, respiratory therapy assess nares and determine need for appliance change or resting period.   Outcome: Progressing     Problem: Safety - Adult  Goal: Free from fall injury  Outcome: Progressing  Flowsheets (Taken 11/9/2023 7764 by Liliane Cardona RN)  Free From Fall Injury: Instruct family/caregiver on patient safety

## 2023-11-10 NOTE — PLAN OF CARE
Problem: Skin/Tissue Integrity  Goal: Absence of new skin breakdown  Description: 1. Monitor for areas of redness and/or skin breakdown  2. Assess vascular access sites hourly  3. Every 4-6 hours minimum:  Change oxygen saturation probe site  4. Every 4-6 hours:  If on nasal continuous positive airway pressure, respiratory therapy assess nares and determine need for appliance change or resting period.   11/10/2023 1148 by Farhan Lundberg RN  Outcome: Adequate for Discharge  11/10/2023 0920 by Farhan Lundberg RN  Outcome: Progressing  11/10/2023 0137 by Milton Jackson RN  Outcome: Progressing  11/9/2023 2240 by Kyara Jeronimo RN  Outcome: Progressing     Problem: Safety - Adult  Goal: Free from fall injury  11/10/2023 1148 by Farhan Lundberg RN  Outcome: Adequate for Discharge  11/10/2023 0920 by Farhan Lundberg RN  Outcome: Progressing  11/10/2023 0137 by Milton Jackson RN  Outcome: Progressing  11/9/2023 2240 by Kyara Jeronimo RN  Outcome: Progressing  Flowsheets (Taken 11/9/2023 0845 by Beatriz Amador RN)  Free From Fall Injury: Instruct family/caregiver on patient safety

## 2023-11-10 NOTE — PROGRESS NOTES
Nurse to Nurse attempted to [address] [place] [address]. No one answers the phone and then it is sent to a voicemail that says unable to record voice message at this time. Will attempt again at a later time.

## 2023-12-05 ENCOUNTER — HOSPITAL ENCOUNTER (EMERGENCY)
Age: 73
Discharge: HOME | End: 2023-12-05
Payer: MEDICAID

## 2023-12-05 VITALS
BODY MASS INDEX: 30.16 KG/M2 | DIASTOLIC BLOOD PRESSURE: 86 MMHG | WEIGHT: 240 LBS | RESPIRATION RATE: 18 BRPM | OXYGEN SATURATION: 100 % | TEMPERATURE: 97.7 F | SYSTOLIC BLOOD PRESSURE: 154 MMHG | HEART RATE: 82 BPM

## 2023-12-05 DIAGNOSIS — K21.9 GASTROESOPHAGEAL REFLUX DISEASE, UNSPECIFIED WHETHER ESOPHAGITIS PRESENT: ICD-10-CM

## 2023-12-05 DIAGNOSIS — Z76.0 ENCOUNTER FOR MEDICATION REFILL: Primary | ICD-10-CM

## 2023-12-05 DIAGNOSIS — R10.9 LEFT FLANK PAIN: ICD-10-CM

## 2023-12-05 DIAGNOSIS — N20.0 NEPHROLITHIASIS: ICD-10-CM

## 2023-12-05 DIAGNOSIS — I10 ESSENTIAL HYPERTENSION: ICD-10-CM

## 2023-12-05 PROCEDURE — 99285 EMERGENCY DEPT VISIT HI MDM: CPT

## 2023-12-05 RX ORDER — TAMSULOSIN HYDROCHLORIDE 0.4 MG/1
0.4 CAPSULE ORAL NIGHTLY
Qty: 15 CAPSULE | Refills: 0 | Status: SHIPPED | OUTPATIENT
Start: 2023-12-05 | End: 2023-12-20

## 2023-12-05 RX ORDER — PANTOPRAZOLE SODIUM 40 MG/1
40 TABLET, DELAYED RELEASE ORAL DAILY
Qty: 15 TABLET | Refills: 0 | Status: SHIPPED | OUTPATIENT
Start: 2023-12-05 | End: 2023-12-20

## 2023-12-05 RX ORDER — DAPAGLIFLOZIN 10 MG/1
10 TABLET, FILM COATED ORAL DAILY
Qty: 15 TABLET | Refills: 0 | Status: SHIPPED | OUTPATIENT
Start: 2023-12-05 | End: 2023-12-20

## 2023-12-05 RX ORDER — METOPROLOL SUCCINATE 100 MG/1
50 TABLET, EXTENDED RELEASE ORAL 2 TIMES DAILY
Qty: 15 TABLET | Refills: 0 | Status: SHIPPED | OUTPATIENT
Start: 2023-12-05 | End: 2023-12-20

## 2023-12-05 RX ORDER — PSEUDOEPHED/ACETAMINOPH/DIPHEN 30MG-500MG
TABLET ORAL
Qty: 10 TABLET | Refills: 0 | Status: SHIPPED | OUTPATIENT
Start: 2023-12-05

## 2023-12-05 RX ORDER — AMLODIPINE BESYLATE 5 MG/1
5 TABLET ORAL DAILY
Qty: 30 TABLET | Refills: 0 | Status: SHIPPED | OUTPATIENT
Start: 2023-12-05 | End: 2024-01-04

## 2023-12-05 RX ORDER — BUPROPION HYDROCHLORIDE 100 MG/1
100 TABLET, EXTENDED RELEASE ORAL DAILY
Qty: 30 TABLET | Refills: 0 | Status: SHIPPED | OUTPATIENT
Start: 2023-12-05 | End: 2024-01-04

## 2023-12-05 RX ORDER — GABAPENTIN 300 MG/1
300 CAPSULE ORAL PRN
Qty: 15 CAPSULE | Refills: 0 | Status: SHIPPED | OUTPATIENT
Start: 2023-12-05 | End: 2023-12-20

## 2023-12-05 RX ORDER — LISINOPRIL 40 MG/1
40 TABLET ORAL DAILY
Qty: 15 TABLET | Refills: 0 | Status: SHIPPED | OUTPATIENT
Start: 2023-12-05 | End: 2023-12-20

## 2023-12-05 ASSESSMENT — PAIN - FUNCTIONAL ASSESSMENT: PAIN_FUNCTIONAL_ASSESSMENT: NONE - DENIES PAIN

## 2023-12-14 ENCOUNTER — APPOINTMENT (OUTPATIENT)
Dept: CT IMAGING | Age: 73
End: 2023-12-14
Payer: MEDICAID

## 2023-12-14 ENCOUNTER — APPOINTMENT (OUTPATIENT)
Dept: GENERAL RADIOLOGY | Age: 73
End: 2023-12-14
Payer: MEDICAID

## 2023-12-14 LAB
ALBUMIN SERPL-MCNC: 3.9 G/DL (ref 3.5–5.2)
ALP SERPL-CCNC: 120 U/L (ref 40–129)
ALT SERPL-CCNC: 15 U/L (ref 0–40)
ANION GAP SERPL CALCULATED.3IONS-SCNC: 12 MMOL/L (ref 7–16)
AST SERPL-CCNC: 24 U/L (ref 0–39)
B PARAP IS1001 DNA NPH QL NAA+NON-PROBE: NOT DETECTED
B PERT DNA SPEC QL NAA+PROBE: NOT DETECTED
BASOPHILS # BLD: 0 K/UL (ref 0–0.2)
BASOPHILS NFR BLD: 0 % (ref 0–2)
BILIRUB SERPL-MCNC: 0.5 MG/DL (ref 0–1.2)
BUN SERPL-MCNC: 23 MG/DL (ref 6–23)
C PNEUM DNA NPH QL NAA+NON-PROBE: NOT DETECTED
CALCIUM SERPL-MCNC: 9.1 MG/DL (ref 8.6–10.2)
CHLORIDE SERPL-SCNC: 109 MMOL/L (ref 98–107)
CO2 SERPL-SCNC: 22 MMOL/L (ref 22–29)
CREAT SERPL-MCNC: 1.7 MG/DL (ref 0.7–1.2)
EOSINOPHIL # BLD: 0 K/UL (ref 0.05–0.5)
EOSINOPHILS RELATIVE PERCENT: 0 % (ref 0–6)
ERYTHROCYTE [DISTWIDTH] IN BLOOD BY AUTOMATED COUNT: 16.1 % (ref 11.5–15)
FLUAV RNA NPH QL NAA+NON-PROBE: NOT DETECTED
FLUBV RNA NPH QL NAA+NON-PROBE: NOT DETECTED
GFR SERPL CREATININE-BSD FRML MDRD: 41 ML/MIN/1.73M2
GLUCOSE SERPL-MCNC: 159 MG/DL (ref 74–99)
HADV DNA NPH QL NAA+NON-PROBE: NOT DETECTED
HCOV 229E RNA NPH QL NAA+NON-PROBE: NOT DETECTED
HCOV HKU1 RNA NPH QL NAA+NON-PROBE: NOT DETECTED
HCOV NL63 RNA NPH QL NAA+NON-PROBE: NOT DETECTED
HCOV OC43 RNA NPH QL NAA+NON-PROBE: NOT DETECTED
HCT VFR BLD AUTO: 43.4 % (ref 37–54)
HGB BLD-MCNC: 13.3 G/DL (ref 12.5–16.5)
HMPV RNA NPH QL NAA+NON-PROBE: NOT DETECTED
HPIV1 RNA NPH QL NAA+NON-PROBE: NOT DETECTED
HPIV2 RNA NPH QL NAA+NON-PROBE: NOT DETECTED
HPIV3 RNA NPH QL NAA+NON-PROBE: NOT DETECTED
HPIV4 RNA NPH QL NAA+NON-PROBE: NOT DETECTED
INFLUENZA A BY PCR: NOT DETECTED
INFLUENZA B BY PCR: NOT DETECTED
LACTATE BLDV-SCNC: 1.8 MMOL/L (ref 0.5–2.2)
LIPASE SERPL-CCNC: 39 U/L (ref 13–60)
LYMPHOCYTES NFR BLD: 0.6 K/UL (ref 1.5–4)
LYMPHOCYTES RELATIVE PERCENT: 7 % (ref 20–42)
M PNEUMO DNA NPH QL NAA+NON-PROBE: NOT DETECTED
MCH RBC QN AUTO: 25.3 PG (ref 26–35)
MCHC RBC AUTO-ENTMCNC: 30.6 G/DL (ref 32–34.5)
MCV RBC AUTO: 82.5 FL (ref 80–99.9)
MONOCYTES NFR BLD: 0.37 K/UL (ref 0.1–0.95)
MONOCYTES NFR BLD: 4 % (ref 2–12)
NEUTROPHILS NFR BLD: 89 % (ref 43–80)
NEUTS SEG NFR BLD: 7.63 K/UL (ref 1.8–7.3)
PLATELET # BLD AUTO: 220 K/UL (ref 130–450)
PMV BLD AUTO: 11.7 FL (ref 7–12)
POTASSIUM SERPL-SCNC: 4.5 MMOL/L (ref 3.5–5)
PROT SERPL-MCNC: 7.9 G/DL (ref 6.4–8.3)
RBC # BLD AUTO: 5.26 M/UL (ref 3.8–5.8)
RBC # BLD: ABNORMAL 10*6/UL
RSV RNA NPH QL NAA+NON-PROBE: NOT DETECTED
RV+EV RNA NPH QL NAA+NON-PROBE: NOT DETECTED
SARS-COV-2 RNA NPH QL NAA+NON-PROBE: NOT DETECTED
SODIUM SERPL-SCNC: 143 MMOL/L (ref 132–146)
SPECIMEN DESCRIPTION: NORMAL
TROPONIN I SERPL HS-MCNC: 15 NG/L (ref 0–11)
TROPONIN I SERPL HS-MCNC: 16 NG/L (ref 0–11)
WBC OTHER # BLD: 8.6 K/UL (ref 4.5–11.5)

## 2023-12-14 PROCEDURE — 83690 ASSAY OF LIPASE: CPT

## 2023-12-14 PROCEDURE — 93005 ELECTROCARDIOGRAM TRACING: CPT | Performed by: EMERGENCY MEDICINE

## 2023-12-14 PROCEDURE — 6360000002 HC RX W HCPCS: Performed by: EMERGENCY MEDICINE

## 2023-12-14 PROCEDURE — 99285 EMERGENCY DEPT VISIT HI MDM: CPT

## 2023-12-14 PROCEDURE — C9113 INJ PANTOPRAZOLE SODIUM, VIA: HCPCS | Performed by: EMERGENCY MEDICINE

## 2023-12-14 PROCEDURE — 81001 URINALYSIS AUTO W/SCOPE: CPT

## 2023-12-14 PROCEDURE — 2580000003 HC RX 258: Performed by: EMERGENCY MEDICINE

## 2023-12-14 PROCEDURE — 85025 COMPLETE CBC W/AUTO DIFF WBC: CPT

## 2023-12-14 PROCEDURE — 80053 COMPREHEN METABOLIC PANEL: CPT

## 2023-12-14 PROCEDURE — 74176 CT ABD & PELVIS W/O CONTRAST: CPT

## 2023-12-14 PROCEDURE — 0202U NFCT DS 22 TRGT SARS-COV-2: CPT

## 2023-12-14 PROCEDURE — 71045 X-RAY EXAM CHEST 1 VIEW: CPT

## 2023-12-14 PROCEDURE — 84484 ASSAY OF TROPONIN QUANT: CPT

## 2023-12-14 PROCEDURE — 83605 ASSAY OF LACTIC ACID: CPT

## 2023-12-14 PROCEDURE — 87502 INFLUENZA DNA AMP PROBE: CPT

## 2023-12-14 PROCEDURE — 96374 THER/PROPH/DIAG INJ IV PUSH: CPT

## 2023-12-14 RX ORDER — PANTOPRAZOLE SODIUM 40 MG/10ML
40 INJECTION, POWDER, LYOPHILIZED, FOR SOLUTION INTRAVENOUS ONCE
Status: COMPLETED | OUTPATIENT
Start: 2023-12-14 | End: 2023-12-14

## 2023-12-14 RX ORDER — 0.9 % SODIUM CHLORIDE 0.9 %
1000 INTRAVENOUS SOLUTION INTRAVENOUS ONCE
Status: COMPLETED | OUTPATIENT
Start: 2023-12-14 | End: 2023-12-14

## 2023-12-14 RX ORDER — ONDANSETRON 2 MG/ML
4 INJECTION INTRAMUSCULAR; INTRAVENOUS EVERY 6 HOURS PRN
Status: DISCONTINUED | OUTPATIENT
Start: 2023-12-14 | End: 2023-12-15 | Stop reason: SDUPTHER

## 2023-12-14 RX ADMIN — PANTOPRAZOLE SODIUM 40 MG: 40 INJECTION, POWDER, FOR SOLUTION INTRAVENOUS at 22:17

## 2023-12-14 RX ADMIN — SODIUM CHLORIDE 1000 ML: 9 INJECTION, SOLUTION INTRAVENOUS at 21:04

## 2023-12-15 ENCOUNTER — HOSPITAL ENCOUNTER (INPATIENT)
Age: 73
LOS: 1 days | Discharge: OTHER INSTITUTION | End: 2023-12-16
Attending: EMERGENCY MEDICINE | Admitting: INTERNAL MEDICINE
Payer: MEDICAID

## 2023-12-15 DIAGNOSIS — N39.0 UTI (URINARY TRACT INFECTION), UNCOMPLICATED: ICD-10-CM

## 2023-12-15 DIAGNOSIS — R11.2 NAUSEA VOMITING AND DIARRHEA: Primary | ICD-10-CM

## 2023-12-15 DIAGNOSIS — R53.1 GENERAL WEAKNESS: ICD-10-CM

## 2023-12-15 DIAGNOSIS — R19.7 NAUSEA VOMITING AND DIARRHEA: Primary | ICD-10-CM

## 2023-12-15 DIAGNOSIS — R10.10 PAIN OF UPPER ABDOMEN: ICD-10-CM

## 2023-12-15 PROBLEM — R10.9 ABDOMINAL PAIN: Status: ACTIVE | Noted: 2023-12-15

## 2023-12-15 LAB
ANION GAP SERPL CALCULATED.3IONS-SCNC: 14 MMOL/L (ref 7–16)
BACTERIA URNS QL MICRO: ABNORMAL
BILIRUB UR QL STRIP: NEGATIVE
BUN SERPL-MCNC: 21 MG/DL (ref 6–23)
CALCIUM SERPL-MCNC: 8.6 MG/DL (ref 8.6–10.2)
CHLORIDE SERPL-SCNC: 105 MMOL/L (ref 98–107)
CLARITY UR: CLEAR
CO2 SERPL-SCNC: 20 MMOL/L (ref 22–29)
COLOR UR: YELLOW
CREAT SERPL-MCNC: 1.6 MG/DL (ref 0.7–1.2)
EKG ATRIAL RATE: 82 BPM
EKG P AXIS: 60 DEGREES
EKG P-R INTERVAL: 178 MS
EKG Q-T INTERVAL: 356 MS
EKG QRS DURATION: 74 MS
EKG QTC CALCULATION (BAZETT): 415 MS
EKG R AXIS: 24 DEGREES
EKG T AXIS: -19 DEGREES
EKG VENTRICULAR RATE: 82 BPM
GFR SERPL CREATININE-BSD FRML MDRD: 44 ML/MIN/1.73M2
GLUCOSE BLD-MCNC: 84 MG/DL (ref 74–99)
GLUCOSE BLD-MCNC: 89 MG/DL (ref 74–99)
GLUCOSE BLD-MCNC: 93 MG/DL (ref 74–99)
GLUCOSE BLD-MCNC: 95 MG/DL (ref 74–99)
GLUCOSE SERPL-MCNC: 116 MG/DL (ref 74–99)
GLUCOSE UR STRIP-MCNC: 250 MG/DL
HBA1C MFR BLD: 7.7 % (ref 4–5.6)
HGB UR QL STRIP.AUTO: NEGATIVE
KETONES UR STRIP-MCNC: NEGATIVE MG/DL
LEUKOCYTE ESTERASE UR QL STRIP: ABNORMAL
NITRITE UR QL STRIP: NEGATIVE
PH UR STRIP: 7 [PH] (ref 5–9)
POTASSIUM SERPL-SCNC: 3.7 MMOL/L (ref 3.5–5)
PROT UR STRIP-MCNC: ABNORMAL MG/DL
RBC #/AREA URNS HPF: ABNORMAL /HPF
SODIUM SERPL-SCNC: 139 MMOL/L (ref 132–146)
SP GR UR STRIP: 1.01 (ref 1–1.03)
UROBILINOGEN UR STRIP-ACNC: 0.2 EU/DL (ref 0–1)
WBC #/AREA URNS HPF: ABNORMAL /HPF

## 2023-12-15 PROCEDURE — 96372 THER/PROPH/DIAG INJ SC/IM: CPT

## 2023-12-15 PROCEDURE — 80048 BASIC METABOLIC PNL TOTAL CA: CPT

## 2023-12-15 PROCEDURE — 2580000003 HC RX 258: Performed by: EMERGENCY MEDICINE

## 2023-12-15 PROCEDURE — 6360000002 HC RX W HCPCS: Performed by: EMERGENCY MEDICINE

## 2023-12-15 PROCEDURE — 2580000003 HC RX 258: Performed by: INTERNAL MEDICINE

## 2023-12-15 PROCEDURE — 82962 GLUCOSE BLOOD TEST: CPT

## 2023-12-15 PROCEDURE — 83036 HEMOGLOBIN GLYCOSYLATED A1C: CPT

## 2023-12-15 PROCEDURE — 6370000000 HC RX 637 (ALT 250 FOR IP): Performed by: INTERNAL MEDICINE

## 2023-12-15 PROCEDURE — 6360000002 HC RX W HCPCS: Performed by: INTERNAL MEDICINE

## 2023-12-15 PROCEDURE — 96375 TX/PRO/DX INJ NEW DRUG ADDON: CPT

## 2023-12-15 PROCEDURE — 87086 URINE CULTURE/COLONY COUNT: CPT

## 2023-12-15 PROCEDURE — 93010 ELECTROCARDIOGRAM REPORT: CPT | Performed by: INTERNAL MEDICINE

## 2023-12-15 PROCEDURE — 2060000000 HC ICU INTERMEDIATE R&B

## 2023-12-15 RX ORDER — ACETAMINOPHEN 325 MG/1
650 TABLET ORAL EVERY 6 HOURS PRN
Status: DISCONTINUED | OUTPATIENT
Start: 2023-12-15 | End: 2023-12-16 | Stop reason: HOSPADM

## 2023-12-15 RX ORDER — DICYCLOMINE HYDROCHLORIDE 10 MG/ML
20 INJECTION INTRAMUSCULAR ONCE
Status: COMPLETED | OUTPATIENT
Start: 2023-12-15 | End: 2023-12-15

## 2023-12-15 RX ORDER — ONDANSETRON 4 MG/1
4 TABLET, ORALLY DISINTEGRATING ORAL EVERY 8 HOURS PRN
Status: DISCONTINUED | OUTPATIENT
Start: 2023-12-15 | End: 2023-12-16 | Stop reason: HOSPADM

## 2023-12-15 RX ORDER — DEXTROSE MONOHYDRATE 100 MG/ML
INJECTION, SOLUTION INTRAVENOUS CONTINUOUS PRN
Status: DISCONTINUED | OUTPATIENT
Start: 2023-12-15 | End: 2023-12-16 | Stop reason: HOSPADM

## 2023-12-15 RX ORDER — AMLODIPINE BESYLATE 5 MG/1
5 TABLET ORAL DAILY
Status: DISCONTINUED | OUTPATIENT
Start: 2023-12-15 | End: 2023-12-15

## 2023-12-15 RX ORDER — SENNOSIDES A AND B 8.6 MG/1
1 TABLET, FILM COATED ORAL DAILY PRN
Status: DISCONTINUED | OUTPATIENT
Start: 2023-12-15 | End: 2023-12-16 | Stop reason: HOSPADM

## 2023-12-15 RX ORDER — AMLODIPINE BESYLATE 10 MG/1
10 TABLET ORAL DAILY
Status: DISCONTINUED | OUTPATIENT
Start: 2023-12-16 | End: 2023-12-16 | Stop reason: HOSPADM

## 2023-12-15 RX ORDER — BUPROPION HYDROCHLORIDE 100 MG/1
100 TABLET, EXTENDED RELEASE ORAL DAILY
Status: DISCONTINUED | OUTPATIENT
Start: 2023-12-15 | End: 2023-12-16 | Stop reason: HOSPADM

## 2023-12-15 RX ORDER — METOPROLOL SUCCINATE 50 MG/1
50 TABLET, EXTENDED RELEASE ORAL 2 TIMES DAILY
Status: DISCONTINUED | OUTPATIENT
Start: 2023-12-15 | End: 2023-12-16 | Stop reason: HOSPADM

## 2023-12-15 RX ORDER — INSULIN LISPRO 100 [IU]/ML
0-4 INJECTION, SOLUTION INTRAVENOUS; SUBCUTANEOUS NIGHTLY
Status: DISCONTINUED | OUTPATIENT
Start: 2023-12-15 | End: 2023-12-16 | Stop reason: HOSPADM

## 2023-12-15 RX ORDER — ONDANSETRON 2 MG/ML
4 INJECTION INTRAMUSCULAR; INTRAVENOUS EVERY 6 HOURS PRN
Status: DISCONTINUED | OUTPATIENT
Start: 2023-12-15 | End: 2023-12-16 | Stop reason: HOSPADM

## 2023-12-15 RX ORDER — DROPERIDOL 2.5 MG/ML
0.62 INJECTION, SOLUTION INTRAMUSCULAR; INTRAVENOUS EVERY 6 HOURS PRN
Status: DISCONTINUED | OUTPATIENT
Start: 2023-12-15 | End: 2023-12-16 | Stop reason: HOSPADM

## 2023-12-15 RX ORDER — TAMSULOSIN HYDROCHLORIDE 0.4 MG/1
0.4 CAPSULE ORAL NIGHTLY
Status: DISCONTINUED | OUTPATIENT
Start: 2023-12-15 | End: 2023-12-16 | Stop reason: HOSPADM

## 2023-12-15 RX ORDER — HYDRALAZINE HYDROCHLORIDE 20 MG/ML
10 INJECTION INTRAMUSCULAR; INTRAVENOUS EVERY 6 HOURS PRN
Status: DISCONTINUED | OUTPATIENT
Start: 2023-12-15 | End: 2023-12-16 | Stop reason: HOSPADM

## 2023-12-15 RX ORDER — POTASSIUM CHLORIDE 7.45 MG/ML
10 INJECTION INTRAVENOUS PRN
Status: DISCONTINUED | OUTPATIENT
Start: 2023-12-15 | End: 2023-12-16 | Stop reason: HOSPADM

## 2023-12-15 RX ORDER — ASPIRIN 81 MG/1
81 TABLET ORAL DAILY
Status: DISCONTINUED | OUTPATIENT
Start: 2023-12-15 | End: 2023-12-16 | Stop reason: HOSPADM

## 2023-12-15 RX ORDER — SODIUM CHLORIDE 9 MG/ML
INJECTION, SOLUTION INTRAVENOUS PRN
Status: DISCONTINUED | OUTPATIENT
Start: 2023-12-15 | End: 2023-12-16 | Stop reason: HOSPADM

## 2023-12-15 RX ORDER — ACETAMINOPHEN 500 MG
1000 TABLET ORAL EVERY 6 HOURS PRN
COMMUNITY

## 2023-12-15 RX ORDER — ACETAMINOPHEN 650 MG/1
650 SUPPOSITORY RECTAL EVERY 6 HOURS PRN
Status: DISCONTINUED | OUTPATIENT
Start: 2023-12-15 | End: 2023-12-16 | Stop reason: HOSPADM

## 2023-12-15 RX ORDER — SODIUM CHLORIDE 9 MG/ML
INJECTION, SOLUTION INTRAVENOUS CONTINUOUS
Status: ACTIVE | OUTPATIENT
Start: 2023-12-15 | End: 2023-12-15

## 2023-12-15 RX ORDER — LISINOPRIL 20 MG/1
40 TABLET ORAL DAILY
Status: DISCONTINUED | OUTPATIENT
Start: 2023-12-15 | End: 2023-12-16 | Stop reason: HOSPADM

## 2023-12-15 RX ORDER — METOPROLOL SUCCINATE 100 MG/1
50 TABLET, EXTENDED RELEASE ORAL 2 TIMES DAILY
COMMUNITY

## 2023-12-15 RX ORDER — ASPIRIN 81 MG/1
81 TABLET ORAL DAILY
COMMUNITY

## 2023-12-15 RX ORDER — MAGNESIUM HYDROXIDE/ALUMINUM HYDROXICE/SIMETHICONE 120; 1200; 1200 MG/30ML; MG/30ML; MG/30ML
30 SUSPENSION ORAL EVERY 6 HOURS PRN
Status: DISCONTINUED | OUTPATIENT
Start: 2023-12-15 | End: 2023-12-16 | Stop reason: HOSPADM

## 2023-12-15 RX ORDER — SODIUM CHLORIDE 0.9 % (FLUSH) 0.9 %
5-40 SYRINGE (ML) INJECTION EVERY 12 HOURS SCHEDULED
Status: DISCONTINUED | OUTPATIENT
Start: 2023-12-15 | End: 2023-12-16 | Stop reason: HOSPADM

## 2023-12-15 RX ORDER — INSULIN LISPRO 100 [IU]/ML
0-4 INJECTION, SOLUTION INTRAVENOUS; SUBCUTANEOUS
Status: DISCONTINUED | OUTPATIENT
Start: 2023-12-15 | End: 2023-12-16 | Stop reason: HOSPADM

## 2023-12-15 RX ORDER — MAGNESIUM SULFATE IN WATER 40 MG/ML
2000 INJECTION, SOLUTION INTRAVENOUS PRN
Status: DISCONTINUED | OUTPATIENT
Start: 2023-12-15 | End: 2023-12-16 | Stop reason: HOSPADM

## 2023-12-15 RX ORDER — PANTOPRAZOLE SODIUM 40 MG/1
40 TABLET, DELAYED RELEASE ORAL DAILY
Status: DISCONTINUED | OUTPATIENT
Start: 2023-12-15 | End: 2023-12-16 | Stop reason: HOSPADM

## 2023-12-15 RX ORDER — POTASSIUM CHLORIDE 20 MEQ/1
40 TABLET, EXTENDED RELEASE ORAL PRN
Status: DISCONTINUED | OUTPATIENT
Start: 2023-12-15 | End: 2023-12-16 | Stop reason: HOSPADM

## 2023-12-15 RX ORDER — SODIUM CHLORIDE 0.9 % (FLUSH) 0.9 %
5-40 SYRINGE (ML) INJECTION PRN
Status: DISCONTINUED | OUTPATIENT
Start: 2023-12-15 | End: 2023-12-16 | Stop reason: HOSPADM

## 2023-12-15 RX ADMIN — SODIUM CHLORIDE, PRESERVATIVE FREE 10 ML: 5 INJECTION INTRAVENOUS at 09:06

## 2023-12-15 RX ADMIN — TAMSULOSIN HYDROCHLORIDE 0.4 MG: 0.4 CAPSULE ORAL at 20:59

## 2023-12-15 RX ADMIN — APIXABAN 5 MG: 5 TABLET, FILM COATED ORAL at 20:59

## 2023-12-15 RX ADMIN — DICYCLOMINE HYDROCHLORIDE 20 MG: 10 INJECTION, SOLUTION INTRAMUSCULAR at 00:51

## 2023-12-15 RX ADMIN — AMLODIPINE BESYLATE 5 MG: 5 TABLET ORAL at 09:04

## 2023-12-15 RX ADMIN — APIXABAN 5 MG: 5 TABLET, FILM COATED ORAL at 09:03

## 2023-12-15 RX ADMIN — DROPERIDOL 0.62 MG: 2.5 INJECTION, SOLUTION INTRAMUSCULAR; INTRAVENOUS at 00:55

## 2023-12-15 RX ADMIN — HYDRALAZINE HYDROCHLORIDE 10 MG: 20 INJECTION INTRAMUSCULAR; INTRAVENOUS at 17:51

## 2023-12-15 RX ADMIN — BUPROPION HYDROCHLORIDE 100 MG: 100 TABLET, EXTENDED RELEASE ORAL at 09:07

## 2023-12-15 RX ADMIN — PANTOPRAZOLE SODIUM 40 MG: 40 TABLET, DELAYED RELEASE ORAL at 09:03

## 2023-12-15 RX ADMIN — METOPROLOL SUCCINATE 50 MG: 50 TABLET, EXTENDED RELEASE ORAL at 21:00

## 2023-12-15 RX ADMIN — METOPROLOL SUCCINATE 50 MG: 50 TABLET, EXTENDED RELEASE ORAL at 09:04

## 2023-12-15 RX ADMIN — SODIUM CHLORIDE, PRESERVATIVE FREE 10 ML: 5 INJECTION INTRAVENOUS at 20:59

## 2023-12-15 RX ADMIN — LISINOPRIL 40 MG: 20 TABLET ORAL at 09:10

## 2023-12-15 RX ADMIN — WATER 1000 MG: 1 INJECTION INTRAMUSCULAR; INTRAVENOUS; SUBCUTANEOUS at 01:53

## 2023-12-15 RX ADMIN — SODIUM CHLORIDE: 9 INJECTION, SOLUTION INTRAVENOUS at 06:35

## 2023-12-15 RX ADMIN — ASPIRIN 81 MG: 81 TABLET, COATED ORAL at 09:03

## 2023-12-15 ASSESSMENT — PAIN DESCRIPTION - LOCATION: LOCATION: ABDOMEN

## 2023-12-15 ASSESSMENT — PAIN SCALES - GENERAL: PAINLEVEL_OUTOF10: 8

## 2023-12-15 ASSESSMENT — PAIN DESCRIPTION - DESCRIPTORS: DESCRIPTORS: ACHING

## 2023-12-15 NOTE — H&P
[facility] History and Physical    PATIENT NAME:  Brent Valdez    MRN:  [id number]  SERVICE DATE:  12/15/23    Primary Care Physician: Remi Felty, MD       SUBJECTIVE  CHIEF COMPLAINT:  had concerns including Emesis (Patient has had nausea and vomiting since 4pm today at the rescue mission. Pt complaint of a new cough with yellow sputum. Hx. Of TB which pt reports being treated and on medication for. Pt denies any chest pain or SOB). HPI:  Mr. Brent Valdez, a 68y.o. year old male  who  has a past medical history of Arthritis, Benign hypertensive kidney disease with chronic kidney disease, Cervical vertebra fusion, Chronic bilateral low back pain with bilateral sciatica, Chronic kidney disease (CKD), stage III (moderate) ([address]), Depression, Hypertension, Stroke ([address]), and Type 2 diabetes mellitus with diabetic polyneuropathy, with long-term current use of insulin ([address]). presents with Emesis (Patient has had nausea and vomiting since 4pm today at the rescue mission. Pt complaint of a new cough with yellow sputum. Hx. Of TB which pt reports being treated and on medication for. Pt denies any chest pain or SOB)  , nausea and vomiting associated with diarrhea started earlier today, no hematemesis no melena or hematochezia, no fever or chills no chest pain or SOB headache limb weakness or tingling.      PAST MEDICAL HISTORY:    Past Medical History:   Diagnosis Date    Arthritis     Benign hypertensive kidney disease with chronic kidney disease 10/06/2021    Cervical vertebra fusion 01/01/2000    Chronic bilateral low back pain with bilateral sciatica 09/09/2022    Chronic kidney disease (CKD), stage III (moderate) ([address]) 11/25/2013    Depression     Hypertension     Stroke [facility])     patient had a stroke in the late 90's    Type 2 diabetes mellitus with diabetic polyneuropathy, with long-term current use of insulin ([address])      PAST SURGICAL HISTORY:    Past Surgical History: scd  DVT Prophylaxis: []Lovenox []Heparin []PCD [] [address] []Encouraged ambulation    Diet: No diet orders on file  Code Status: Prior  Surrogate decision maker confirmed with patient:  Primary Emergency Contact: Augustina Moreno, Home Phone: [phone]      Disposition: []Med/Surg [x] Intermediate [] ICU/CCU   Admit status: [] Observation [x] Inpatient       Additional work up or/and treatment plan may be added today or thereafter based on clinical progression. I am managing a portion of pt care. Some medical issues are handled by other specialists. Additional work up and treatment should be done by my colleague hospitalist and at out pt setting by pt PCP and other out pt providers.      SIGNATURE: Zenaida Moon MD

## 2023-12-15 NOTE — FLOWSHEET NOTE
12/15/23 1642   Belongings   Dental Appliances None   Vision - Corrective Lenses Eyeglasses   Hearing Aid None   Clothing Pants;Sweater   Jewelry None   Electronic Devices Cell Phone   Weapons (Notify Protective Services/Security) None   Home Medications None   Valuables Given To Patient   Provide Name(s) of Who Valuable(s) Were Given To self     Pt arrived with the following

## 2023-12-15 NOTE — PROGRESS NOTES
4 Eyes Skin Assessment     NAME:  Azael Villatoro  YOB: 1950  MEDICAL RECORD NUMBER:  [id number]    The patient is being assessed for  Admission    I agree that at least one RN has performed a thorough Head to Toe Skin Assessment on the patient. ALL assessment sites listed below have been assessed. Areas assessed by both nurses:    Head, Face, Ears, Shoulders, Back, Chest, Arms, Elbows, Hands, Sacrum. Buttock, Coccyx, Ischium, and Legs. Feet and Heels        Does the Patient have a Wound? No noted wound(s)   patient has splint to L foot.  Rani L foot        Jacques Prevention initiated by RN: No  Wound Care Orders initiated by RN: No    Pressure Injury (Stage 3,4, Unstageable, DTI, NWPT, and Complex wounds) if present, place Wound referral order by RN under : No    New Ostomies, if present place, Ostomy referral order under : No     Nurse 1 eSignature: Electronically signed by Charmayne Profit, RN on 12/15/23 at 6:18 PM EST    **SHARE this note so that the co-signing nurse can place an eSignature**    Nurse 2 eSignature: Electronically signed by Manasa El RN on 12/15/23 at 6:22 PM EST

## 2023-12-15 NOTE — PLAN OF CARE
Patient seen and examined in the ED. He was admitted overnight. Nausea and vomiting has been improved he has tolerated his lunch. Assessment  Nausea vomiting improved  Possible UTI question  CKD  Atrial fibrillation  Hypertension  Depression  Type 2 diabetes  Latent TB    Plan  Continue IV fluids  Continue Rocephin follow urine cultures  Continue Eliquis and metoprolol for A-fib  Low-dose sliding scale  Protonix  He is scheduled for an outpatient follow-up with the TB clinic in January 2024.

## 2023-12-15 NOTE — ED NOTES
Report called to [address] [place] [address] [facility]  12/15/23 9232

## 2023-12-15 NOTE — ED PROVIDER NOTES
HPI:  12/14/23, Time: 8:30 PM GRAEME Lake is a 68 y.o. male history of arthritis hypertension history of bilateral back pain history of chronic kidney disease depression hypertension stroke history of diabetes presenting to the ED for nausea vomiting and diarrhea, beginning hours ago. The complaint has been persistent, moderate in severity, and worsened by nothing. Patient reporting nausea vomiting and diarrhea that started hours prior arrival.  Patient reporting no hematemesis he reports no black or tarry stools he reports no fever he does report cough. He does report feeling short of breath as well. Patient reporting no leg pain or swelling he reports no syncopal event he reports no headache. Patient does report feeling weak. ROS:   Pertinent positives and negatives are stated within HPI, all other systems reviewed and are negative.  --------------------------------------------- PAST HISTORY ---------------------------------------------  Past Medical History:  has a past medical history of Arthritis, Benign hypertensive kidney disease with chronic kidney disease, Cervical vertebra fusion, Chronic bilateral low back pain with bilateral sciatica, Chronic kidney disease (CKD), stage III (moderate) ([address]), Depression, Hypertension, Stroke ([address]), and Type 2 diabetes mellitus with diabetic polyneuropathy, with long-term current use of insulin ([address]). Past Surgical History:  has a past surgical history that includes fracture surgery; Rotator cuff repair (Left, 1999); Spine surgery; Kidney stone surgery (01/23/2010); Colonoscopy; Upper gastrointestinal endoscopy; Upper gastrointestinal endoscopy (N/A, 09/13/2021); Colonoscopy (N/A, 09/13/2021); Upper gastrointestinal endoscopy (N/A, 09/13/2021); Upper gastrointestinal endoscopy (N/A, 08/23/2022); Upper gastrointestinal endoscopy (N/A, 08/23/2022);  Pain management procedure (Bilateral, 9/27/2022); and Pain management procedure (Bilateral, history of bilateral back pain history of chronic kidney disease depression hypertension stroke history of diabetes presenting to the ED for nausea vomiting and diarrhea, beginning hours ago. The complaint has been persistent, moderate in severity, and worsened by nothing. Patient reporting nausea vomiting and diarrhea that started hours prior arrival.  Patient reporting no hematemesis he reports no black or tarry stools he reports no fever he does report cough. He does report feeling short of breath as well. Patient reporting no leg pain or swelling he reports no syncopal event he reports no headache. Patient does report feeling weak  CC/HPI Summary, DDx, ED Course, Reassessment, Tests Considered, Patient expectation:        Marko Cerrato is a 68 y.o. male history of arthritis hypertension history of bilateral back pain history of chronic kidney disease depression hypertension stroke history of diabetes presenting to the ED for nausea vomiting and diarrhea, beginning hours ago. The complaint has been persistent, moderate in severity, and worsened by nothing. Patient reporting nausea vomiting and diarrhea that started hours prior arrival.  Patient reporting no hematemesis he reports no black or tarry stools he reports no fever he does report cough. He does report feeling short of breath as well. Patient reporting no leg pain or swelling he reports no syncopal event he reports no headache. Patient does report feeling weak. Patient is awake alert Pala x 3 heart exam normal lungs are clear abdomen soft but tender upper abdomen. Patient has normal strength in all extremities. He has no edema. Patient differential includes gastroenteritis as well as viral infection as well as pneumonia as well as bowel obstruction as well as pancreatitis as well as cholecystitis  Patient while here in the emergency room was ordered IV fluids.   Patient's white count was 8.6 hemoglobin was 13.3 platelet count was 898

## 2023-12-15 NOTE — DISCHARGE INSTRUCTIONS
Internal Medicine Discharge Instruction    Discharge to:  Home  Diet: Regular   Activity: As tolerated       Be compliant with medications   Please start taking hydralazine 25 mg thrice/day. 2. Please follow up with Podiatry, and TB clinic in January.          Electronically signed by Toro Menezes MD on 12/16/2023 at 4:04 PM

## 2023-12-16 VITALS
DIASTOLIC BLOOD PRESSURE: 70 MMHG | TEMPERATURE: 97.6 F | SYSTOLIC BLOOD PRESSURE: 147 MMHG | WEIGHT: 232.4 LBS | BODY MASS INDEX: 29.82 KG/M2 | OXYGEN SATURATION: 98 % | RESPIRATION RATE: 17 BRPM | HEIGHT: 74 IN | HEART RATE: 66 BPM

## 2023-12-16 LAB
ALBUMIN SERPL-MCNC: 3.4 G/DL (ref 3.5–5.2)
ALP SERPL-CCNC: 96 U/L (ref 40–129)
ALT SERPL-CCNC: 31 U/L (ref 0–40)
ANION GAP SERPL CALCULATED.3IONS-SCNC: 15 MMOL/L (ref 7–16)
AST SERPL-CCNC: 52 U/L (ref 0–39)
BASOPHILS # BLD: 0.01 K/UL (ref 0–0.2)
BASOPHILS NFR BLD: 0 % (ref 0–2)
BILIRUB SERPL-MCNC: 0.3 MG/DL (ref 0–1.2)
BUN SERPL-MCNC: 16 MG/DL (ref 6–23)
CALCIUM SERPL-MCNC: 8.4 MG/DL (ref 8.6–10.2)
CHLORIDE SERPL-SCNC: 104 MMOL/L (ref 98–107)
CO2 SERPL-SCNC: 17 MMOL/L (ref 22–29)
CREAT SERPL-MCNC: 1.6 MG/DL (ref 0.7–1.2)
EOSINOPHIL # BLD: 0.07 K/UL (ref 0.05–0.5)
EOSINOPHILS RELATIVE PERCENT: 2 % (ref 0–6)
ERYTHROCYTE [DISTWIDTH] IN BLOOD BY AUTOMATED COUNT: 16.1 % (ref 11.5–15)
GFR SERPL CREATININE-BSD FRML MDRD: 47 ML/MIN/1.73M2
GLUCOSE BLD-MCNC: 106 MG/DL (ref 74–99)
GLUCOSE BLD-MCNC: 91 MG/DL (ref 74–99)
GLUCOSE SERPL-MCNC: 79 MG/DL (ref 74–99)
HCT VFR BLD AUTO: 39.3 % (ref 37–54)
HGB BLD-MCNC: 11.9 G/DL (ref 12.5–16.5)
IMM GRANULOCYTES # BLD AUTO: <0.03 K/UL (ref 0–0.58)
IMM GRANULOCYTES NFR BLD: 0 % (ref 0–5)
LYMPHOCYTES NFR BLD: 1.25 K/UL (ref 1.5–4)
LYMPHOCYTES RELATIVE PERCENT: 37 % (ref 20–42)
MCH RBC QN AUTO: 24.9 PG (ref 26–35)
MCHC RBC AUTO-ENTMCNC: 30.3 G/DL (ref 32–34.5)
MCV RBC AUTO: 82.4 FL (ref 80–99.9)
MONOCYTES NFR BLD: 0.71 K/UL (ref 0.1–0.95)
MONOCYTES NFR BLD: 21 % (ref 2–12)
NEUTROPHILS NFR BLD: 39 % (ref 43–80)
NEUTS SEG NFR BLD: 1.33 K/UL (ref 1.8–7.3)
PLATELET # BLD AUTO: 189 K/UL (ref 130–450)
PMV BLD AUTO: 12 FL (ref 7–12)
POTASSIUM SERPL-SCNC: 3.8 MMOL/L (ref 3.5–5)
PROT SERPL-MCNC: 6.9 G/DL (ref 6.4–8.3)
RBC # BLD AUTO: 4.77 M/UL (ref 3.8–5.8)
SODIUM SERPL-SCNC: 136 MMOL/L (ref 132–146)
WBC OTHER # BLD: 3.4 K/UL (ref 4.5–11.5)

## 2023-12-16 PROCEDURE — 6370000000 HC RX 637 (ALT 250 FOR IP): Performed by: INTERNAL MEDICINE

## 2023-12-16 PROCEDURE — 99232 SBSQ HOSP IP/OBS MODERATE 35: CPT | Performed by: INTERNAL MEDICINE

## 2023-12-16 PROCEDURE — 82962 GLUCOSE BLOOD TEST: CPT

## 2023-12-16 PROCEDURE — 6360000002 HC RX W HCPCS: Performed by: INTERNAL MEDICINE

## 2023-12-16 PROCEDURE — 36415 COLL VENOUS BLD VENIPUNCTURE: CPT

## 2023-12-16 PROCEDURE — 85025 COMPLETE CBC W/AUTO DIFF WBC: CPT

## 2023-12-16 PROCEDURE — 80053 COMPREHEN METABOLIC PANEL: CPT

## 2023-12-16 PROCEDURE — 2580000003 HC RX 258: Performed by: INTERNAL MEDICINE

## 2023-12-16 RX ORDER — HYDRALAZINE HYDROCHLORIDE 25 MG/1
25 TABLET, FILM COATED ORAL EVERY 8 HOURS SCHEDULED
Status: DISCONTINUED | OUTPATIENT
Start: 2023-12-16 | End: 2023-12-16 | Stop reason: HOSPADM

## 2023-12-16 RX ORDER — HYDRALAZINE HYDROCHLORIDE 25 MG/1
25 TABLET, FILM COATED ORAL EVERY 8 HOURS SCHEDULED
Qty: 90 TABLET | Refills: 3 | Status: SHIPPED | OUTPATIENT
Start: 2023-12-16

## 2023-12-16 RX ADMIN — ASPIRIN 81 MG: 81 TABLET, COATED ORAL at 10:10

## 2023-12-16 RX ADMIN — AMLODIPINE BESYLATE 10 MG: 10 TABLET ORAL at 10:09

## 2023-12-16 RX ADMIN — APIXABAN 5 MG: 5 TABLET, FILM COATED ORAL at 10:10

## 2023-12-16 RX ADMIN — HYDRALAZINE HYDROCHLORIDE 25 MG: 25 TABLET, FILM COATED ORAL at 12:44

## 2023-12-16 RX ADMIN — LISINOPRIL 40 MG: 20 TABLET ORAL at 10:09

## 2023-12-16 RX ADMIN — WATER 1000 MG: 1 INJECTION INTRAMUSCULAR; INTRAVENOUS; SUBCUTANEOUS at 05:41

## 2023-12-16 RX ADMIN — BUPROPION HYDROCHLORIDE 100 MG: 100 TABLET, EXTENDED RELEASE ORAL at 10:09

## 2023-12-16 RX ADMIN — PANTOPRAZOLE SODIUM 40 MG: 40 TABLET, DELAYED RELEASE ORAL at 10:10

## 2023-12-16 RX ADMIN — METOPROLOL SUCCINATE 50 MG: 50 TABLET, EXTENDED RELEASE ORAL at 10:10

## 2023-12-16 NOTE — DISCHARGE INSTR - DIET
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
## 2023-12-16 NOTE — CARE COORDINATION
Discharge order noted. Spoke to pt at bedside.  He is going to return to The [address] [place] [address]  Case Management  [phone]

## 2023-12-17 LAB
MICROORGANISM SPEC CULT: ABNORMAL
SPECIMEN DESCRIPTION: ABNORMAL

## 2024-02-02 RX ORDER — ASPIRIN 81 MG/1
81 TABLET, COATED ORAL DAILY
Qty: 90 TABLET | Refills: 1 | Status: SHIPPED | OUTPATIENT
Start: 2024-02-02

## 2024-02-02 NOTE — TELEPHONE ENCOUNTER
Last Appointment:  12/22/2023  Future Appointments   Date Time Provider Department Center   3/19/2024  9:30 AM Carlita Castellano MD [facility]

## 2024-02-19 ENCOUNTER — HOSPITAL ENCOUNTER (EMERGENCY)
Age: 74
Discharge: HOME | End: 2024-02-20
Attending: EMERGENCY MEDICINE
Payer: MEDICAID

## 2024-02-19 ENCOUNTER — APPOINTMENT (OUTPATIENT)
Dept: CT IMAGING | Age: 74
End: 2024-02-19
Payer: MEDICAID

## 2024-02-19 DIAGNOSIS — S01.81XA FACIAL LACERATION, INITIAL ENCOUNTER: ICD-10-CM

## 2024-02-19 DIAGNOSIS — S00.83XA FACIAL HEMATOMA, INITIAL ENCOUNTER: ICD-10-CM
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
## 2024-02-19 DIAGNOSIS — S02.2XXA CLOSED FRACTURE OF NASAL BONE, INITIAL ENCOUNTER: ICD-10-CM

## 2024-02-19 PROCEDURE — 70486 CT MAXILLOFACIAL W/O DYE: CPT

## 2024-02-19 PROCEDURE — 96374 THER/PROPH/DIAG INJ IV PUSH: CPT

## 2024-02-19 PROCEDURE — 12011 RPR F/E/E/N/L/M 2.5 CM/<: CPT

## 2024-02-19 PROCEDURE — 99284 EMERGENCY DEPT VISIT MOD MDM: CPT

## 2024-02-19 PROCEDURE — 6360000002 HC RX W HCPCS: Performed by: STUDENT IN AN ORGANIZED HEALTH CARE EDUCATION/TRAINING PROGRAM

## 2024-02-19 PROCEDURE — 72125 CT NECK SPINE W/O DYE: CPT

## 2024-02-19 PROCEDURE — 70450 CT HEAD/BRAIN W/O DYE: CPT

## 2024-02-19 RX ORDER — LIDOCAINE HYDROCHLORIDE 10 MG/ML
5 INJECTION, SOLUTION INFILTRATION; PERINEURAL ONCE
Status: COMPLETED | OUTPATIENT
Start: 2024-02-19 | End: 2024-02-20

## 2024-02-19 RX ORDER — FENTANYL CITRATE 50 UG/ML
50 INJECTION, SOLUTION INTRAMUSCULAR; INTRAVENOUS ONCE
Status: COMPLETED | OUTPATIENT
Start: 2024-02-19 | End: 2024-02-19

## 2024-02-19 RX ORDER — BACITRACIN ZINC 500 [USP'U]/G
OINTMENT TOPICAL ONCE
Status: COMPLETED | OUTPATIENT
Start: 2024-02-19 | End: 2024-02-20

## 2024-02-19 RX ORDER — HYDROCODONE BITARTRATE AND ACETAMINOPHEN 5; 325 MG/1; MG/1
1 TABLET ORAL ONCE
Status: COMPLETED | OUTPATIENT
Start: 2024-02-19 | End: 2024-02-20

## 2024-02-19 RX ADMIN — FENTANYL CITRATE 50 MCG: 50 INJECTION INTRAMUSCULAR; INTRAVENOUS at 20:23

## 2024-02-20 VITALS
RESPIRATION RATE: 18 BRPM | OXYGEN SATURATION: 99 % | SYSTOLIC BLOOD PRESSURE: 178 MMHG | TEMPERATURE: 97.8 F | DIASTOLIC BLOOD PRESSURE: 80 MMHG | HEART RATE: 88 BPM

## 2024-02-20 PROCEDURE — 2500000003 HC RX 250 WO HCPCS: Performed by: STUDENT IN AN ORGANIZED HEALTH CARE EDUCATION/TRAINING PROGRAM

## 2024-02-20 PROCEDURE — 90471 IMMUNIZATION ADMIN: CPT | Performed by: STUDENT IN AN ORGANIZED HEALTH CARE EDUCATION/TRAINING PROGRAM

## 2024-02-20 PROCEDURE — 90714 TD VACC NO PRESV 7 YRS+ IM: CPT | Performed by: STUDENT IN AN ORGANIZED HEALTH CARE EDUCATION/TRAINING PROGRAM

## 2024-02-20 PROCEDURE — 6370000000 HC RX 637 (ALT 250 FOR IP): Performed by: STUDENT IN AN ORGANIZED HEALTH CARE EDUCATION/TRAINING PROGRAM

## 2024-02-20 PROCEDURE — 6360000002 HC RX W HCPCS: Performed by: STUDENT IN AN ORGANIZED HEALTH CARE EDUCATION/TRAINING PROGRAM

## 2024-02-20 RX ORDER — TETANUS AND DIPHTHERIA TOXOIDS ADSORBED 2; 2 [LF]/.5ML; [LF]/.5ML
0.5 INJECTION INTRAMUSCULAR ONCE
Status: COMPLETED | OUTPATIENT
Start: 2024-02-20 | End: 2024-02-20

## 2024-02-20 RX ADMIN — BACITRACIN ZINC: 500 OINTMENT TOPICAL at 01:00

## 2024-02-20 RX ADMIN — HYDROCODONE BITARTRATE AND ACETAMINOPHEN 1 TABLET: 5; 325 TABLET ORAL at 00:59

## 2024-02-20 RX ADMIN — LIDOCAINE HYDROCHLORIDE 5 ML: 10 INJECTION, SOLUTION INFILTRATION; PERINEURAL at 01:00

## 2024-02-20 RX ADMIN — TETANUS AND DIPHTHERIA TOXOIDS ADSORBED 0.5 ML: 2; 2 INJECTION INTRAMUSCULAR at 03:37

## 2024-02-20 NOTE — ED PROVIDER NOTES
MANAGEMENT PROCEDURE Bilateral 9/27/2022    BILATERAL L4 TRANSFORAMINAL EPIDURAL STEROID INJECTION performed by Maria Teresa Romano DO at [facility] OR    PAIN MANAGEMENT PROCEDURE Bilateral 12/13/2022    BILATERAL L4 TRANSFORAMINAL EPIDURAL STEROID INJECTION performed by Maria Teresa Romano DO at [facility] OR    ROTATOR CUFF REPAIR Left 1999    SPINE SURGERY      UPPER GASTROINTESTINAL ENDOSCOPY      UPPER GASTROINTESTINAL ENDOSCOPY N/A 09/13/2021    EGD BIOPSY performed by Bo Hall MD at [facility]    UPPER GASTROINTESTINAL ENDOSCOPY N/A 09/13/2021    EGD POLYP SNARE performed by Bo Hall MD at [facility]    UPPER GASTROINTESTINAL ENDOSCOPY N/A 08/23/2022    EGD WITH POSSIBLE  DILATION BALLOON performed by Bo Hall MD at [facility]    UPPER GASTROINTESTINAL ENDOSCOPY N/A 08/23/2022    EGD BIOPSY performed by Bo Hall MD at [facility]       CURRENTMEDICATIONS       Previous Medications    ACETAMINOPHEN (TYLENOL) 500 MG TABLET    Take 2 tablets by mouth every 6 hours as needed for Pain    AMLODIPINE (NORVASC) 5 MG TABLET    Take 1 tablet by mouth daily    APIXABAN (ELIQUIS) 5 MG TABS TABLET    Take 1 tablet by mouth 2 times daily    ASPIRIN (ASPIRIN LOW DOSE) 81 MG EC TABLET    TAKE 1 TABLET BY MOUTH DAILY    BLOOD GLUCOSE MONITOR KIT AND SUPPLIES    Dispense sufficient amount for indicated testing frequency plus additional to accommodate PRN testing needs. Dispense all needed supplies to include: monitor, strips, lancing device, lancets,alcohol swabs.    BLOOD GLUCOSE MONITOR STRIPS    Test 4 times a day & as needed for symptoms of irregular blood glucose. Dispense sufficient amount for indicated testing frequency plus additional to accommodate PRN testing needs.    BUPROPION (WELLBUTRIN SR) 100 MG EXTENDED RELEASE TABLET    Take 1 tablet by mouth daily    FARXIGA 10 MG TABLET    Take 1 tablet by mouth daily    GABAPENTIN (NEURONTIN) 300 MG CAPSULE    Take 1

## 2024-02-20 NOTE — DISCHARGE INSTRUCTIONS
Skip your next Eliquis dose.  Apply ice and compression to the swelling in your face.  Take Tylenol as needed for pain relief every 6 hours.

## 2024-02-22 ENCOUNTER — OFFICE VISIT (OUTPATIENT)
Age: 74
End: 2024-02-22
Payer: MEDICAID

## 2024-02-22 VITALS
OXYGEN SATURATION: 100 % | BODY MASS INDEX: 29.9 KG/M2 | HEART RATE: 97 BPM | RESPIRATION RATE: 18 BRPM | DIASTOLIC BLOOD PRESSURE: 64 MMHG | SYSTOLIC BLOOD PRESSURE: 122 MMHG | TEMPERATURE: 98.2 F | WEIGHT: 233 LBS | HEIGHT: 74 IN

## 2024-02-22 DIAGNOSIS — K12.2 ORAL ABSCESS: Primary | ICD-10-CM
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
## 2024-02-22 PROCEDURE — G8427 DOCREV CUR MEDS BY ELIG CLIN: HCPCS | Performed by: FAMILY MEDICINE

## 2024-02-22 PROCEDURE — 3078F DIAST BP <80 MM HG: CPT | Performed by: FAMILY MEDICINE

## 2024-02-22 PROCEDURE — G8484 FLU IMMUNIZE NO ADMIN: HCPCS | Performed by: FAMILY MEDICINE

## 2024-02-22 PROCEDURE — 1036F TOBACCO NON-USER: CPT | Performed by: FAMILY MEDICINE

## 2024-02-22 PROCEDURE — 3074F SYST BP LT 130 MM HG: CPT | Performed by: FAMILY MEDICINE

## 2024-02-22 PROCEDURE — G8417 CALC BMI ABV UP PARAM F/U: HCPCS | Performed by: FAMILY MEDICINE

## 2024-02-22 PROCEDURE — 3017F COLORECTAL CA SCREEN DOC REV: CPT | Performed by: FAMILY MEDICINE

## 2024-02-22 PROCEDURE — 99213 OFFICE O/P EST LOW 20 MIN: CPT | Performed by: FAMILY MEDICINE

## 2024-02-22 PROCEDURE — 1123F ACP DISCUSS/DSCN MKR DOCD: CPT | Performed by: FAMILY MEDICINE

## 2024-02-22 RX ORDER — AMOXICILLIN AND CLAVULANATE POTASSIUM 875; 125 MG/1; MG/1
1 TABLET, FILM COATED ORAL 2 TIMES DAILY
Qty: 14 TABLET | Refills: 0 | Status: SHIPPED | OUTPATIENT
Start: 2024-02-22 | End: 2024-02-29

## 2024-02-22 RX ORDER — PSEUDOEPHEDRINE HCL 30 MG
TABLET ORAL
COMMUNITY

## 2024-02-22 RX ORDER — INSULIN GLARGINE 300 U/ML
INJECTION, SOLUTION SUBCUTANEOUS
COMMUNITY
Start: 2023-12-04

## 2024-02-22 RX ORDER — TRAZODONE HYDROCHLORIDE 100 MG/1
TABLET ORAL
COMMUNITY

## 2024-02-22 RX ORDER — REPAGLINIDE 2 MG/1
TABLET ORAL
COMMUNITY

## 2024-02-22 RX ORDER — ONDANSETRON 4 MG/1
TABLET, ORALLY DISINTEGRATING ORAL
COMMUNITY

## 2024-02-22 ASSESSMENT — ENCOUNTER SYMPTOMS
CONSTIPATION: 0
VOMITING: 0
NAUSEA: 0
EYE PAIN: 1
FACIAL SWELLING: 1
WHEEZING: 0
COUGH: 0
ABDOMINAL PAIN: 0
DIARRHEA: 0
EYE REDNESS: 1
SHORTNESS OF BREATH: 0
TROUBLE SWALLOWING: 1

## 2024-02-22 NOTE — PATIENT INSTRUCTIONS
Patient seen in the office today. Was given a new script for Ozempic.    [facility], [facility] -  Lewis Walton DO  [address] [place] [address]  PH:[phone]

## 2024-02-22 NOTE — PROGRESS NOTES
Social History :  Social History       Tobacco History       Smoking Status  Never      Smokeless Tobacco Use  Never              Alcohol History       Alcohol Use Status  No              Drug Use       Drug Use Status  No              Sexual Activity       Sexually Active  Not Asked                     Allergies :   No Known Allergies    Medication List :    Current Outpatient Medications   Medication Sig Dispense Refill    Continuous Blood Gluc  (FREESTYLE AGNES 2 READER) OLEGARIO USE AS DIRECTED      Continuous Blood Gluc Sensor (FREESTYLE AGNES 2 SENSOR) MISC APPLY 1 SENSOR TO BACK OF UPPER ARM . REMOVE AND REPLACE EVERY 14 DAYS AS DIRECTED      TOUJEO SOLOSTAR 300 UNIT/ML concentrated injection pen INJECT 16 UNITS SUBCUTANEOUSLY AT BEDTIME      amoxicillin-clavulanate (AUGMENTIN) 875-125 MG per tablet Take 1 tablet by mouth 2 times daily for 7 days 14 tablet 0    docusate (COLACE, DULCOLAX) 100 MG CAPS       Melatonin 10 MG SUBL       ondansetron (ZOFRAN-ODT) 4 MG disintegrating tablet       repaglinide (PRANDIN) 2 MG tablet TAKE 1 TABLET BY MOUTH BEFORE DINNER      traZODone (DESYREL) 100 MG tablet       aspirin (ASPIRIN LOW DOSE) 81 MG EC tablet TAKE 1 TABLET BY MOUTH DAILY 90 tablet 1    amLODIPine (NORVASC) 5 MG tablet Take 1 tablet by mouth daily 90 tablet 1    lisinopril (PRINIVIL;ZESTRIL) 40 MG tablet Take 1 tablet by mouth daily 90 tablet 1    pantoprazole (PROTONIX) 40 MG tablet Take 1 tablet by mouth daily 90 tablet 1    tamsulosin (FLOMAX) 0.4 MG capsule Take 1 capsule by mouth nightly 90 capsule 1    apixaban (ELIQUIS) 5 MG TABS tablet Take 1 tablet by mouth 2 times daily 90 tablet 1    FARXIGA 10 MG tablet Take 1 tablet by mouth daily 90 tablet 1    metoprolol succinate (TOPROL XL) 100 MG extended release tablet Take 0.5 tablets by mouth 2 times daily 90 tablet 1    gabapentin (NEURONTIN) 300 MG capsule Take 1 capsule by mouth in the morning and at bedtime for 180 days. Takes prn 180

## 2024-02-27 ENCOUNTER — PROCEDURE VISIT (OUTPATIENT)
Age: 74
End: 2024-02-27

## 2024-02-27 VITALS
SYSTOLIC BLOOD PRESSURE: 142 MMHG | OXYGEN SATURATION: 98 % | RESPIRATION RATE: 18 BRPM | TEMPERATURE: 97.9 F | HEIGHT: 74 IN | DIASTOLIC BLOOD PRESSURE: 84 MMHG | HEART RATE: 91 BPM | WEIGHT: 235 LBS | BODY MASS INDEX: 30.16 KG/M2
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
## 2024-02-27 ASSESSMENT — PATIENT HEALTH QUESTIONNAIRE - PHQ9
8. MOVING OR SPEAKING SO SLOWLY THAT OTHER PEOPLE COULD HAVE NOTICED. OR THE OPPOSITE, BEING SO FIGETY OR RESTLESS THAT YOU HAVE BEEN MOVING AROUND A LOT MORE THAN USUAL: 1
7. TROUBLE CONCENTRATING ON THINGS, SUCH AS READING THE NEWSPAPER OR WATCHING TELEVISION: 1
4. FEELING TIRED OR HAVING LITTLE ENERGY: 1
SUM OF ALL RESPONSES TO PHQ QUESTIONS 1-9: 8
1. LITTLE INTEREST OR PLEASURE IN DOING THINGS: 0
SUM OF ALL RESPONSES TO PHQ QUESTIONS 1-9: 8
SUM OF ALL RESPONSES TO PHQ9 QUESTIONS 1 & 2: 1
6. FEELING BAD ABOUT YOURSELF - OR THAT YOU ARE A FAILURE OR HAVE LET YOURSELF OR YOUR FAMILY DOWN: 1
9. THOUGHTS THAT YOU WOULD BE BETTER OFF DEAD, OR OF HURTING YOURSELF: 1
SUM OF ALL RESPONSES TO PHQ QUESTIONS 1-9: 8
2. FEELING DOWN, DEPRESSED OR HOPELESS: 1
5. POOR APPETITE OR OVEREATING: 1
SUM OF ALL RESPONSES TO PHQ QUESTIONS 1-9: 7
10. IF YOU CHECKED OFF ANY PROBLEMS, HOW DIFFICULT HAVE THESE PROBLEMS MADE IT FOR YOU TO DO YOUR WORK, TAKE CARE OF THINGS AT HOME, OR GET ALONG WITH OTHER PEOPLE: 1
3. TROUBLE FALLING OR STAYING ASLEEP: 1

## 2024-02-27 ASSESSMENT — COLUMBIA-SUICIDE SEVERITY RATING SCALE - C-SSRS
1. WITHIN THE PAST MONTH, HAVE YOU WISHED YOU WERE DEAD OR WISHED YOU COULD GO TO SLEEP AND NOT WAKE UP?: NO
2. HAVE YOU ACTUALLY HAD ANY THOUGHTS OF KILLING YOURSELF?: NO
6. HAVE YOU EVER DONE ANYTHING, STARTED TO DO ANYTHING, OR PREPARED TO DO ANYTHING TO END YOUR LIFE?: NO
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
## 2024-02-29 ENCOUNTER — TELEPHONE (OUTPATIENT)
Dept: ENT CLINIC | Age: 74
End: 2024-02-29
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
## 2024-03-01 NOTE — TELEPHONE ENCOUNTER
Please review imaging/notes and advise. Patient seen 2/19/24 and is currently out of 10 day window.

## 2024-03-06 NOTE — TELEPHONE ENCOUNTER
Patient reports not cosmetic changes, and no breathing issues. Reports that he has a history of swallowing issues and had his throat stretched in the past, and will be following up with that Dr to be checked.

## 2024-03-19 ENCOUNTER — OFFICE VISIT (OUTPATIENT)
Age: 74
End: 2024-03-19
Payer: MEDICAID

## 2024-03-19 ENCOUNTER — HOSPITAL ENCOUNTER (OUTPATIENT)
Age: 74
Discharge: HOME | End: 2024-03-19
Payer: MEDICAID

## 2024-03-19 VITALS
TEMPERATURE: 97 F | HEART RATE: 100 BPM | BODY MASS INDEX: 29.52 KG/M2 | HEIGHT: 74 IN | SYSTOLIC BLOOD PRESSURE: 146 MMHG | DIASTOLIC BLOOD PRESSURE: 88 MMHG | RESPIRATION RATE: 16 BRPM | OXYGEN SATURATION: 97 % | WEIGHT: 230 LBS

## 2024-03-19 DIAGNOSIS — E78.2 MIXED HYPERLIPIDEMIA: ICD-10-CM

## 2024-03-19 DIAGNOSIS — E11.22 TYPE 2 DIABETES MELLITUS WITH STAGE 3B CHRONIC KIDNEY DISEASE, WITH LONG-TERM CURRENT USE OF INSULIN (HCC): ICD-10-CM

## 2024-03-19 DIAGNOSIS — N20.0 NEPHROLITHIASIS: ICD-10-CM

## 2024-03-19 DIAGNOSIS — R10.9 LEFT FLANK PAIN: ICD-10-CM

## 2024-03-19 DIAGNOSIS — N18.32 TYPE 2 DIABETES MELLITUS WITH STAGE 3B CHRONIC KIDNEY DISEASE, WITH LONG-TERM CURRENT USE OF INSULIN (HCC): ICD-10-CM

## 2024-03-19 DIAGNOSIS — Z79.4 TYPE 2 DIABETES MELLITUS WITH STAGE 3B CHRONIC KIDNEY DISEASE, WITH LONG-TERM CURRENT USE OF INSULIN (HCC): ICD-10-CM

## 2024-03-19 DIAGNOSIS — M54.16 LUMBAR RADICULOPATHY: ICD-10-CM

## 2024-03-19 DIAGNOSIS — K21.9 GASTROESOPHAGEAL REFLUX DISEASE, UNSPECIFIED WHETHER ESOPHAGITIS PRESENT: ICD-10-CM

## 2024-03-19 DIAGNOSIS — I10 ESSENTIAL HYPERTENSION: ICD-10-CM

## 2024-03-19 DIAGNOSIS — I48.0 PAROXYSMAL ATRIAL FIBRILLATION (HCC): Primary | ICD-10-CM

## 2024-03-19 LAB
ALBUMIN SERPL-MCNC: 4 G/DL (ref 3.5–5.2)
ALP SERPL-CCNC: 115 U/L (ref 40–129)
ALT SERPL-CCNC: 21 U/L (ref 0–40)
ANION GAP SERPL CALCULATED.3IONS-SCNC: 14 MMOL/L (ref 7–16)
AST SERPL-CCNC: 40 U/L (ref 0–39)
BASOPHILS # BLD: 0.03 K/UL (ref 0–0.2)
BASOPHILS NFR BLD: 1 % (ref 0–2)
BILIRUB SERPL-MCNC: 0.5 MG/DL (ref 0–1.2)
BUN SERPL-MCNC: 11 MG/DL (ref 6–23)
CALCIUM SERPL-MCNC: 8.9 MG/DL (ref 8.6–10.2)
CHLORIDE SERPL-SCNC: 104 MMOL/L (ref 98–107)
CHOLEST SERPL-MCNC: 154 MG/DL
CO2 SERPL-SCNC: 22 MMOL/L (ref 22–29)
CREAT SERPL-MCNC: 1.6 MG/DL (ref 0.7–1.2)
CREAT UR-MCNC: 204.9 MG/DL (ref 40–278)
EOSINOPHIL # BLD: 0.09 K/UL (ref 0.05–0.5)
EOSINOPHILS RELATIVE PERCENT: 2 % (ref 0–6)
ERYTHROCYTE [DISTWIDTH] IN BLOOD BY AUTOMATED COUNT: 14.9 % (ref 11.5–15)
GFR SERPL CREATININE-BSD FRML MDRD: 47 ML/MIN/1.73M2
GLUCOSE SERPL-MCNC: 115 MG/DL (ref 74–99)
HBA1C MFR BLD: 6.4 %
HBA1C MFR BLD: 6.7 % (ref 4–5.6)
HCT VFR BLD AUTO: 41 % (ref 37–54)
HDLC SERPL-MCNC: 48 MG/DL
HGB BLD-MCNC: 13.2 G/DL (ref 12.5–16.5)
IMM GRANULOCYTES # BLD AUTO: <0.03 K/UL (ref 0–0.58)
IMM GRANULOCYTES NFR BLD: 0 % (ref 0–5)
LDLC SERPL CALC-MCNC: 89 MG/DL
LYMPHOCYTES NFR BLD: 1.47 K/UL (ref 1.5–4)
LYMPHOCYTES RELATIVE PERCENT: 40 % (ref 20–42)
MAGNESIUM SERPL-MCNC: 1.6 MG/DL (ref 1.6–2.6)
MCH RBC QN AUTO: 26.2 PG (ref 26–35)
MCHC RBC AUTO-ENTMCNC: 32.2 G/DL (ref 32–34.5)
MCV RBC AUTO: 81.3 FL (ref 80–99.9)
MICROALBUMIN UR-MCNC: 311 MG/L (ref 0–19)
MICROALBUMIN/CREAT UR-RTO: 152 MCG/MG CREAT (ref 0–30)
MONOCYTES NFR BLD: 0.42 K/UL (ref 0.1–0.95)
MONOCYTES NFR BLD: 11 % (ref 2–12)
NEUTROPHILS NFR BLD: 45 % (ref 43–80)
NEUTS SEG NFR BLD: 1.67 K/UL (ref 1.8–7.3)
PHOSPHATE SERPL-MCNC: 1.8 MG/DL (ref 2.5–4.5)
PLATELET # BLD AUTO: 184 K/UL (ref 130–450)
PMV BLD AUTO: 11.6 FL (ref 7–12)
POTASSIUM SERPL-SCNC: 3.1 MMOL/L (ref 3.5–5)
PROT SERPL-MCNC: 8 G/DL (ref 6.4–8.3)
RBC # BLD AUTO: 5.04 M/UL (ref 3.8–5.8)
SODIUM SERPL-SCNC: 140 MMOL/L (ref 132–146)
TRIGL SERPL-MCNC: 86 MG/DL
URATE SERPL-MCNC: 7.5 MG/DL (ref 3.4–7)
VIT B12 SERPL-MCNC: 830 PG/ML (ref 211–946)
VLDLC SERPL CALC-MCNC: 17 MG/DL
WBC OTHER # BLD: 3.7 K/UL (ref 4.5–11.5)

## 2024-03-19 PROCEDURE — 3077F SYST BP >= 140 MM HG: CPT | Performed by: FAMILY MEDICINE

## 2024-03-19 PROCEDURE — 82570 ASSAY OF URINE CREATININE: CPT

## 2024-03-19 PROCEDURE — 83036 HEMOGLOBIN GLYCOSYLATED A1C: CPT | Performed by: FAMILY MEDICINE

## 2024-03-19 PROCEDURE — 84550 ASSAY OF BLOOD/URIC ACID: CPT

## 2024-03-19 PROCEDURE — 99214 OFFICE O/P EST MOD 30 MIN: CPT | Performed by: FAMILY MEDICINE

## 2024-03-19 PROCEDURE — 3017F COLORECTAL CA SCREEN DOC REV: CPT | Performed by: FAMILY MEDICINE

## 2024-03-19 PROCEDURE — 85025 COMPLETE CBC W/AUTO DIFF WBC: CPT

## 2024-03-19 PROCEDURE — G8484 FLU IMMUNIZE NO ADMIN: HCPCS | Performed by: FAMILY MEDICINE

## 2024-03-19 PROCEDURE — 36415 COLL VENOUS BLD VENIPUNCTURE: CPT

## 2024-03-19 PROCEDURE — 80053 COMPREHEN METABOLIC PANEL: CPT

## 2024-03-19 PROCEDURE — 1123F ACP DISCUSS/DSCN MKR DOCD: CPT | Performed by: FAMILY MEDICINE

## 2024-03-19 PROCEDURE — G8417 CALC BMI ABV UP PARAM F/U: HCPCS | Performed by: FAMILY MEDICINE

## 2024-03-19 PROCEDURE — 1036F TOBACCO NON-USER: CPT | Performed by: FAMILY MEDICINE

## 2024-03-19 PROCEDURE — 82607 VITAMIN B-12: CPT

## 2024-03-19 PROCEDURE — G8427 DOCREV CUR MEDS BY ELIG CLIN: HCPCS | Performed by: FAMILY MEDICINE

## 2024-03-19 PROCEDURE — 84100 ASSAY OF PHOSPHORUS: CPT

## 2024-03-19 PROCEDURE — 83735 ASSAY OF MAGNESIUM: CPT

## 2024-03-19 PROCEDURE — 82043 UR ALBUMIN QUANTITATIVE: CPT

## 2024-03-19 PROCEDURE — 84156 ASSAY OF PROTEIN URINE: CPT

## 2024-03-19 PROCEDURE — 3044F HG A1C LEVEL LT 7.0%: CPT | Performed by: FAMILY MEDICINE

## 2024-03-19 PROCEDURE — 80061 LIPID PANEL: CPT

## 2024-03-19 PROCEDURE — 2022F DILAT RTA XM EVC RTNOPTHY: CPT | Performed by: FAMILY MEDICINE

## 2024-03-19 PROCEDURE — 83036 HEMOGLOBIN GLYCOSYLATED A1C: CPT

## 2024-03-19 PROCEDURE — 3079F DIAST BP 80-89 MM HG: CPT | Performed by: FAMILY MEDICINE

## 2024-03-19 RX ORDER — AMLODIPINE BESYLATE 5 MG/1
5 TABLET ORAL DAILY
Qty: 90 TABLET | Refills: 1 | Status: SHIPPED | OUTPATIENT
Start: 2024-03-19

## 2024-03-19 RX ORDER — HYDRALAZINE HYDROCHLORIDE 25 MG/1
25 TABLET, FILM COATED ORAL EVERY 8 HOURS SCHEDULED
Qty: 270 TABLET | Refills: 1 | Status: SHIPPED | OUTPATIENT
Start: 2024-03-19

## 2024-03-19 RX ORDER — GLUCOSAMINE HCL/CHONDROITIN SU 500-400 MG
CAPSULE ORAL
Qty: 200 STRIP | Refills: 0 | Status: SHIPPED | OUTPATIENT
Start: 2024-03-19

## 2024-03-19 RX ORDER — PANTOPRAZOLE SODIUM 40 MG/1
40 TABLET, DELAYED RELEASE ORAL DAILY
Qty: 90 TABLET | Refills: 1 | Status: SHIPPED | OUTPATIENT
Start: 2024-03-19

## 2024-03-19 RX ORDER — METOPROLOL SUCCINATE 100 MG/1
50 TABLET, EXTENDED RELEASE ORAL 2 TIMES DAILY
Qty: 90 TABLET | Refills: 1 | Status: SHIPPED | OUTPATIENT
Start: 2024-03-19

## 2024-03-19 RX ORDER — LANCETS 30 GAUGE
1 EACH MISCELLANEOUS 4 TIMES DAILY
Qty: 200 EACH | Refills: 0 | Status: SHIPPED | OUTPATIENT
Start: 2024-03-19

## 2024-03-19 RX ORDER — DAPAGLIFLOZIN 10 MG/1
10 TABLET, FILM COATED ORAL DAILY
Qty: 90 TABLET | Refills: 1 | Status: SHIPPED | OUTPATIENT
Start: 2024-03-19

## 2024-03-19 RX ORDER — ASPIRIN 81 MG/1
81 TABLET ORAL DAILY
Qty: 90 TABLET | Refills: 1 | Status: SHIPPED | OUTPATIENT
Start: 2024-03-19

## 2024-03-19 RX ORDER — GABAPENTIN 300 MG/1
300 CAPSULE ORAL 2 TIMES DAILY
Qty: 180 CAPSULE | Refills: 1 | Status: SHIPPED | OUTPATIENT
Start: 2024-03-19 | End: 2024-09-15

## 2024-03-19 RX ORDER — TAMSULOSIN HYDROCHLORIDE 0.4 MG/1
0.4 CAPSULE ORAL NIGHTLY
Qty: 90 CAPSULE | Refills: 1 | Status: SHIPPED | OUTPATIENT
Start: 2024-03-19

## 2024-03-19 RX ORDER — LISINOPRIL 40 MG/1
40 TABLET ORAL DAILY
Qty: 90 TABLET | Refills: 1 | Status: SHIPPED | OUTPATIENT
Start: 2024-03-19

## 2024-03-19 ASSESSMENT — ENCOUNTER SYMPTOMS
DIARRHEA: 0
SHORTNESS OF BREATH: 0
NAUSEA: 0
ABDOMINAL PAIN: 0
CONSTIPATION: 0
WHEEZING: 0
COUGH: 0
VOMITING: 0

## 2024-03-19 NOTE — PROGRESS NOTES
Dispense: 90 tablet; Refill: 1  - metoprolol succinate (TOPROL XL) 100 MG extended release tablet; Take 0.5 tablets by mouth 2 times daily  Dispense: 90 tablet; Refill: 1    4. Lumbar radiculopathy  -Doing well with the gabapentin 300 mg BID  -Refills provided for gabapentin  - gabapentin (NEURONTIN) 300 MG capsule; Take 1 capsule by mouth in the morning and at bedtime for 180 days. Takes prn  Dispense: 180 capsule; Refill: 1    5. Gastroesophageal reflux disease, unspecified whether esophagitis present  -Well-controlled  -Continue Protonix 40 mg daily  -Patient likely due for another esophageal manometry or esophageal dilation  -Provided patient with contact information for Dr. Hall for patient to call and schedule an appointment  -Last esophageal dilation done 8/23/2022  - pantoprazole (PROTONIX) 40 MG tablet; Take 1 tablet by mouth daily  Dispense: 90 tablet; Refill: 1    6. Nephrolithiasis  7. Left flank pain  - tamsulosin (FLOMAX) 0.4 MG capsule; Take 1 capsule by mouth nightly  Dispense: 90 capsule; Refill: 1    8. Mixed hyperlipidemia  - Lipid Panel; Future      Educational materials and/or home exercises printed for patient's review and were included in patient instructions on his/her After Visit Summary and given to patient at the end of visit.        Counseled regarding above diagnosis, including possible risks and complications,  especially if left uncontrolled.     Counseled regarding the possible side effects, risks, benefits and alternatives to treatment; patient and/or guardian verbalizes understanding, agrees, feels comfortable with and wishes to proceed with above treatment plan.     Advised patient to call with any new medication issues, and read all Rx info from pharmacy to assure aware of all possible risks and side effects of medication before taking.     Reviewed age and gender appropriate health screening exams and vaccinations.  Advised patient regarding importance of keeping up with

## 2024-03-20 LAB
CREAT UR-MCNC: 201.5 MG/DL (ref 40–278)
TOTAL PROTEIN, URINE: 67 MG/DL (ref 0–12)
URINE TOTAL PROTEIN CREATININE RATIO: 0.33 (ref 0–0.2)

## 2024-05-09 ENCOUNTER — TELEPHONE (OUTPATIENT)
Age: 74
End: 2024-05-09

## 2024-05-09 NOTE — TELEPHONE ENCOUNTER
Patient call saying he has a headache,dizziness and fall and I tell him the he need to go to the ER.

## 2024-05-10 ENCOUNTER — APPOINTMENT (OUTPATIENT)
Dept: GENERAL RADIOLOGY | Age: 74
End: 2024-05-10
Payer: MEDICAID

## 2024-05-10 ENCOUNTER — APPOINTMENT (OUTPATIENT)
Dept: CT IMAGING | Age: 74
End: 2024-05-10
Attending: EMERGENCY MEDICINE
Payer: MEDICAID

## 2024-05-10 ENCOUNTER — HOSPITAL ENCOUNTER (OUTPATIENT)
Age: 74
Setting detail: OBSERVATION
Discharge: HOME | End: 2024-05-15
Attending: EMERGENCY MEDICINE | Admitting: INTERNAL MEDICINE
Payer: MEDICAID

## 2024-05-10 DIAGNOSIS — R42 ORTHOSTATIC DIZZINESS: ICD-10-CM

## 2024-05-10 DIAGNOSIS — R42 DIZZINESS: ICD-10-CM

## 2024-05-10 DIAGNOSIS — I10 ESSENTIAL HYPERTENSION: ICD-10-CM

## 2024-05-10 DIAGNOSIS — R27.0 ATAXIA: Primary | ICD-10-CM

## 2024-05-10 DIAGNOSIS — R79.89 ELEVATED TROPONIN: ICD-10-CM

## 2024-05-10 LAB
ALBUMIN SERPL-MCNC: 4.1 G/DL (ref 3.5–5.2)
ALP SERPL-CCNC: 118 U/L (ref 40–129)
ALT SERPL-CCNC: 15 U/L (ref 0–40)
ANION GAP SERPL CALCULATED.3IONS-SCNC: 14 MMOL/L (ref 7–16)
AST SERPL-CCNC: 32 U/L (ref 0–39)
BASOPHILS # BLD: 0.03 K/UL (ref 0–0.2)
BASOPHILS NFR BLD: 1 % (ref 0–2)
BILIRUB SERPL-MCNC: 0.4 MG/DL (ref 0–1.2)
BUN SERPL-MCNC: 20 MG/DL (ref 6–23)
CALCIUM SERPL-MCNC: 9.1 MG/DL (ref 8.6–10.2)
CHLORIDE SERPL-SCNC: 106 MMOL/L (ref 98–107)
CO2 SERPL-SCNC: 19 MMOL/L (ref 22–29)
CREAT SERPL-MCNC: 1.8 MG/DL (ref 0.7–1.2)
EKG ATRIAL RATE: 64 BPM
EKG P-R INTERVAL: 200 MS
EKG Q-T INTERVAL: 438 MS
EKG QRS DURATION: 78 MS
EKG QTC CALCULATION (BAZETT): 451 MS
EKG R AXIS: 28 DEGREES
EKG T AXIS: 19 DEGREES
EKG VENTRICULAR RATE: 64 BPM
EOSINOPHIL # BLD: 0.17 K/UL (ref 0.05–0.5)
EOSINOPHILS RELATIVE PERCENT: 4 % (ref 0–6)
ERYTHROCYTE [DISTWIDTH] IN BLOOD BY AUTOMATED COUNT: 14.6 % (ref 11.5–15)
GFR, ESTIMATED: 40 ML/MIN/1.73M2
GLUCOSE BLD-MCNC: 131 MG/DL (ref 74–99)
GLUCOSE BLD-MCNC: 136 MG/DL (ref 74–99)
GLUCOSE BLD-MCNC: 147 MG/DL (ref 74–99)
GLUCOSE SERPL-MCNC: 148 MG/DL (ref 74–99)
HCT VFR BLD AUTO: 39.6 % (ref 37–54)
HGB BLD-MCNC: 12.3 G/DL (ref 12.5–16.5)
IMM GRANULOCYTES # BLD AUTO: <0.03 K/UL (ref 0–0.58)
IMM GRANULOCYTES NFR BLD: 0 % (ref 0–5)
LYMPHOCYTES NFR BLD: 1.89 K/UL (ref 1.5–4)
LYMPHOCYTES RELATIVE PERCENT: 39 % (ref 20–42)
MCH RBC QN AUTO: 25.2 PG (ref 26–35)
MCHC RBC AUTO-ENTMCNC: 31.1 G/DL (ref 32–34.5)
MCV RBC AUTO: 81 FL (ref 80–99.9)
MONOCYTES NFR BLD: 0.64 K/UL (ref 0.1–0.95)
MONOCYTES NFR BLD: 13 % (ref 2–12)
NEUTROPHILS NFR BLD: 44 % (ref 43–80)
NEUTS SEG NFR BLD: 2.13 K/UL (ref 1.8–7.3)
PLATELET # BLD AUTO: 196 K/UL (ref 130–450)
PMV BLD AUTO: 11.7 FL (ref 7–12)
POTASSIUM SERPL-SCNC: 4.1 MMOL/L (ref 3.5–5)
PROT SERPL-MCNC: 8 G/DL (ref 6.4–8.3)
RBC # BLD AUTO: 4.89 M/UL (ref 3.8–5.8)
SODIUM SERPL-SCNC: 139 MMOL/L (ref 132–146)
TROPONIN I SERPL HS-MCNC: 19 NG/L (ref 0–11)
TROPONIN I SERPL HS-MCNC: 20 NG/L (ref 0–11)
WBC OTHER # BLD: 4.9 K/UL (ref 4.5–11.5)

## 2024-05-10 PROCEDURE — 71045 X-RAY EXAM CHEST 1 VIEW: CPT

## 2024-05-10 PROCEDURE — 93010 ELECTROCARDIOGRAM REPORT: CPT | Performed by: INTERNAL MEDICINE

## 2024-05-10 PROCEDURE — 6370000000 HC RX 637 (ALT 250 FOR IP): Performed by: INTERNAL MEDICINE

## 2024-05-10 PROCEDURE — 99285 EMERGENCY DEPT VISIT HI MDM: CPT

## 2024-05-10 PROCEDURE — 99253 IP/OBS CNSLTJ NEW/EST LOW 45: CPT | Performed by: OTOLARYNGOLOGY

## 2024-05-10 PROCEDURE — G0378 HOSPITAL OBSERVATION PER HR: HCPCS

## 2024-05-10 PROCEDURE — 84484 ASSAY OF TROPONIN QUANT: CPT

## 2024-05-10 PROCEDURE — 6360000002 HC RX W HCPCS: Performed by: STUDENT IN AN ORGANIZED HEALTH CARE EDUCATION/TRAINING PROGRAM

## 2024-05-10 PROCEDURE — 93005 ELECTROCARDIOGRAM TRACING: CPT | Performed by: EMERGENCY MEDICINE

## 2024-05-10 PROCEDURE — 96374 THER/PROPH/DIAG INJ IV PUSH: CPT

## 2024-05-10 PROCEDURE — 80053 COMPREHEN METABOLIC PANEL: CPT

## 2024-05-10 PROCEDURE — 70450 CT HEAD/BRAIN W/O DYE: CPT

## 2024-05-10 PROCEDURE — 99223 1ST HOSP IP/OBS HIGH 75: CPT | Performed by: INTERNAL MEDICINE

## 2024-05-10 PROCEDURE — 82962 GLUCOSE BLOOD TEST: CPT

## 2024-05-10 PROCEDURE — 85025 COMPLETE CBC W/AUTO DIFF WBC: CPT

## 2024-05-10 PROCEDURE — 2580000003 HC RX 258: Performed by: INTERNAL MEDICINE

## 2024-05-10 RX ORDER — ASPIRIN 81 MG/1
81 TABLET ORAL DAILY
Status: DISCONTINUED | OUTPATIENT
Start: 2024-05-10 | End: 2024-05-15 | Stop reason: HOSPADM

## 2024-05-10 RX ORDER — SODIUM CHLORIDE 0.9 % (FLUSH) 0.9 %
5-40 SYRINGE (ML) INJECTION EVERY 12 HOURS SCHEDULED
Status: DISCONTINUED | OUTPATIENT
Start: 2024-05-10 | End: 2024-05-15 | Stop reason: HOSPADM

## 2024-05-10 RX ORDER — BUPROPION HYDROCHLORIDE 100 MG/1
100 TABLET, EXTENDED RELEASE ORAL DAILY
COMMUNITY

## 2024-05-10 RX ORDER — POTASSIUM CHLORIDE 20 MEQ/1
40 TABLET, EXTENDED RELEASE ORAL PRN
Status: DISCONTINUED | OUTPATIENT
Start: 2024-05-10 | End: 2024-05-15 | Stop reason: HOSPADM

## 2024-05-10 RX ORDER — METOPROLOL SUCCINATE 50 MG/1
50 TABLET, EXTENDED RELEASE ORAL 2 TIMES DAILY
Status: DISCONTINUED | OUTPATIENT
Start: 2024-05-10 | End: 2024-05-15 | Stop reason: HOSPADM

## 2024-05-10 RX ORDER — SODIUM CHLORIDE 9 MG/ML
INJECTION, SOLUTION INTRAVENOUS PRN
Status: DISCONTINUED | OUTPATIENT
Start: 2024-05-10 | End: 2024-05-15 | Stop reason: HOSPADM

## 2024-05-10 RX ORDER — GABAPENTIN 300 MG/1
300 CAPSULE ORAL 2 TIMES DAILY
Status: DISCONTINUED | OUTPATIENT
Start: 2024-05-10 | End: 2024-05-15 | Stop reason: HOSPADM

## 2024-05-10 RX ORDER — AMLODIPINE BESYLATE 10 MG/1
10 TABLET ORAL DAILY
Status: DISCONTINUED | OUTPATIENT
Start: 2024-05-11 | End: 2024-05-15 | Stop reason: HOSPADM

## 2024-05-10 RX ORDER — ACETAMINOPHEN 650 MG/1
650 SUPPOSITORY RECTAL EVERY 6 HOURS PRN
Status: DISCONTINUED | OUTPATIENT
Start: 2024-05-10 | End: 2024-05-15 | Stop reason: HOSPADM

## 2024-05-10 RX ORDER — HYDRALAZINE HYDROCHLORIDE 20 MG/ML
10 INJECTION INTRAMUSCULAR; INTRAVENOUS ONCE
Status: COMPLETED | OUTPATIENT
Start: 2024-05-10 | End: 2024-05-10

## 2024-05-10 RX ORDER — POLYETHYLENE GLYCOL 3350 17 G/17G
17 POWDER, FOR SOLUTION ORAL DAILY PRN
Status: DISCONTINUED | OUTPATIENT
Start: 2024-05-10 | End: 2024-05-15 | Stop reason: HOSPADM

## 2024-05-10 RX ORDER — DEXTROSE MONOHYDRATE 100 MG/ML
INJECTION, SOLUTION INTRAVENOUS CONTINUOUS PRN
Status: DISCONTINUED | OUTPATIENT
Start: 2024-05-10 | End: 2024-05-15 | Stop reason: HOSPADM

## 2024-05-10 RX ORDER — MAGNESIUM SULFATE IN WATER 40 MG/ML
2000 INJECTION, SOLUTION INTRAVENOUS PRN
Status: DISCONTINUED | OUTPATIENT
Start: 2024-05-10 | End: 2024-05-15 | Stop reason: HOSPADM

## 2024-05-10 RX ORDER — AMLODIPINE BESYLATE 5 MG/1
5 TABLET ORAL DAILY
Status: DISCONTINUED | OUTPATIENT
Start: 2024-05-10 | End: 2024-05-10

## 2024-05-10 RX ORDER — HYDRALAZINE HYDROCHLORIDE 25 MG/1
25 TABLET, FILM COATED ORAL EVERY 8 HOURS SCHEDULED
Status: DISCONTINUED | OUTPATIENT
Start: 2024-05-10 | End: 2024-05-15 | Stop reason: HOSPADM

## 2024-05-10 RX ORDER — TRAZODONE HYDROCHLORIDE 50 MG/1
100 TABLET ORAL NIGHTLY
Status: DISCONTINUED | OUTPATIENT
Start: 2024-05-10 | End: 2024-05-10

## 2024-05-10 RX ORDER — ONDANSETRON 2 MG/ML
4 INJECTION INTRAMUSCULAR; INTRAVENOUS EVERY 6 HOURS PRN
Status: DISCONTINUED | OUTPATIENT
Start: 2024-05-10 | End: 2024-05-15 | Stop reason: HOSPADM

## 2024-05-10 RX ORDER — LISINOPRIL 20 MG/1
40 TABLET ORAL DAILY
Status: DISCONTINUED | OUTPATIENT
Start: 2024-05-10 | End: 2024-05-15 | Stop reason: HOSPADM

## 2024-05-10 RX ORDER — TAMSULOSIN HYDROCHLORIDE 0.4 MG/1
0.4 CAPSULE ORAL NIGHTLY
Status: DISCONTINUED | OUTPATIENT
Start: 2024-05-10 | End: 2024-05-15 | Stop reason: HOSPADM

## 2024-05-10 RX ORDER — GABAPENTIN 300 MG/1
300 CAPSULE ORAL NIGHTLY
COMMUNITY

## 2024-05-10 RX ORDER — PANTOPRAZOLE SODIUM 40 MG/1
40 TABLET, DELAYED RELEASE ORAL DAILY
Status: DISCONTINUED | OUTPATIENT
Start: 2024-05-10 | End: 2024-05-15 | Stop reason: HOSPADM

## 2024-05-10 RX ORDER — HYDRALAZINE HYDROCHLORIDE 25 MG/1
25 TABLET, FILM COATED ORAL 2 TIMES DAILY
COMMUNITY

## 2024-05-10 RX ORDER — ONDANSETRON 4 MG/1
4 TABLET, ORALLY DISINTEGRATING ORAL EVERY 8 HOURS PRN
Status: DISCONTINUED | OUTPATIENT
Start: 2024-05-10 | End: 2024-05-15 | Stop reason: HOSPADM

## 2024-05-10 RX ORDER — INSULIN LISPRO 100 [IU]/ML
0-4 INJECTION, SOLUTION INTRAVENOUS; SUBCUTANEOUS NIGHTLY
Status: DISCONTINUED | OUTPATIENT
Start: 2024-05-10 | End: 2024-05-15 | Stop reason: HOSPADM

## 2024-05-10 RX ORDER — INSULIN LISPRO 100 [IU]/ML
0-8 INJECTION, SOLUTION INTRAVENOUS; SUBCUTANEOUS
Status: DISCONTINUED | OUTPATIENT
Start: 2024-05-10 | End: 2024-05-15 | Stop reason: HOSPADM

## 2024-05-10 RX ORDER — SODIUM CHLORIDE 0.9 % (FLUSH) 0.9 %
5-40 SYRINGE (ML) INJECTION PRN
Status: DISCONTINUED | OUTPATIENT
Start: 2024-05-10 | End: 2024-05-15 | Stop reason: HOSPADM

## 2024-05-10 RX ORDER — HYDROXYZINE PAMOATE 25 MG/1
25 CAPSULE ORAL EVERY 6 HOURS PRN
Status: DISCONTINUED | OUTPATIENT
Start: 2024-05-10 | End: 2024-05-15 | Stop reason: HOSPADM

## 2024-05-10 RX ORDER — GLUCAGON 1 MG/ML
1 KIT INJECTION PRN
Status: DISCONTINUED | OUTPATIENT
Start: 2024-05-10 | End: 2024-05-15 | Stop reason: HOSPADM

## 2024-05-10 RX ORDER — ACETAMINOPHEN 325 MG/1
650 TABLET ORAL EVERY 6 HOURS PRN
Status: DISCONTINUED | OUTPATIENT
Start: 2024-05-10 | End: 2024-05-15 | Stop reason: HOSPADM

## 2024-05-10 RX ORDER — REPAGLINIDE 2 MG/1
2 TABLET ORAL
Status: DISCONTINUED | OUTPATIENT
Start: 2024-05-10 | End: 2024-05-10

## 2024-05-10 RX ORDER — POTASSIUM CHLORIDE 7.45 MG/ML
10 INJECTION INTRAVENOUS PRN
Status: DISCONTINUED | OUTPATIENT
Start: 2024-05-10 | End: 2024-05-15 | Stop reason: HOSPADM

## 2024-05-10 RX ADMIN — METOPROLOL SUCCINATE 50 MG: 50 TABLET, FILM COATED, EXTENDED RELEASE ORAL at 20:13

## 2024-05-10 RX ADMIN — HYDRALAZINE HYDROCHLORIDE 25 MG: 25 TABLET ORAL at 14:46

## 2024-05-10 RX ADMIN — TAMSULOSIN HYDROCHLORIDE 0.4 MG: 0.4 CAPSULE ORAL at 20:14

## 2024-05-10 RX ADMIN — GABAPENTIN 300 MG: 300 CAPSULE ORAL at 20:14

## 2024-05-10 RX ADMIN — SODIUM CHLORIDE, PRESERVATIVE FREE 10 ML: 5 INJECTION INTRAVENOUS at 20:15

## 2024-05-10 RX ADMIN — HYDRALAZINE HYDROCHLORIDE 10 MG: 20 INJECTION INTRAMUSCULAR; INTRAVENOUS at 13:18

## 2024-05-10 RX ADMIN — APIXABAN 5 MG: 5 TABLET, FILM COATED ORAL at 20:14

## 2024-05-10 RX ADMIN — HYDRALAZINE HYDROCHLORIDE 25 MG: 25 TABLET ORAL at 22:59
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
## 2024-05-10 NOTE — H&P
performed by Maria Teresa Romano DO at [facility] OR    PAIN MANAGEMENT PROCEDURE Bilateral 12/13/2022    BILATERAL L4 TRANSFORAMINAL EPIDURAL STEROID INJECTION performed by Maria Teresa Romano DO at [facility] OR    ROTATOR CUFF REPAIR Left 1999    SPINE SURGERY      UPPER GASTROINTESTINAL ENDOSCOPY      UPPER GASTROINTESTINAL ENDOSCOPY N/A 09/13/2021    EGD BIOPSY performed by Bo Hall MD at [facility]    UPPER GASTROINTESTINAL ENDOSCOPY N/A 09/13/2021    EGD POLYP SNARE performed by Bo Hall MD at [facility]    UPPER GASTROINTESTINAL ENDOSCOPY N/A 08/23/2022    EGD WITH POSSIBLE  DILATION BALLOON performed by Bo Hall MD at [facility]    UPPER GASTROINTESTINAL ENDOSCOPY N/A 08/23/2022    EGD BIOPSY performed by Bo Hall MD at [facility]       Medications Prior to Admission:    Prior to Admission medications    Medication Sig Start Date End Date Taking? Authorizing Provider   amLODIPine (NORVASC) 5 MG tablet Take 1 tablet by mouth daily 3/19/24   Carlita Castellano MD   apixaban (ELIQUIS) 5 MG TABS tablet Take 1 tablet by mouth 2 times daily 3/19/24   Carlita Castellano MD   aspirin (ASPIRIN LOW DOSE) 81 MG EC tablet Take 1 tablet by mouth daily 3/19/24   Carlita Castellano MD   blood glucose monitor strips Test 4 times a day & as needed for symptoms of irregular blood glucose. Dispense sufficient amount for indicated testing frequency plus additional to accommodate PRN testing needs. 3/19/24   Carlita Castellano MD   Continuous Blood Gluc Sensor (FREESTYLE AGNES 2 SENSOR) MISC APPLY 1 SENSOR TO BACK OF UPPER ARM . REMOVE AND REPLACE EVERY 14 DAYS AS DIRECTED 3/19/24   Carlita Castellano MD   FARXIGA 10 MG tablet Take 1 tablet by mouth daily 3/19/24   Carlita Castellano MD   gabapentin (NEURONTIN) 300 MG capsule Take 1 capsule by mouth in the morning and at bedtime for 180 days. Takes prn 3/19/24 9/15/24  Carlita Castellano MD

## 2024-05-10 NOTE — ED NOTES
Department of Emergency Medicine  FIRST PROVIDER TRIAGE NOTE             Independent MLP           5/10/24  9:32 AM EDT    Date of Encounter: 5/10/24   MRN: [id number]      HPI: Hemal Banks is a 74 y.o. male who presents to the ED for No chief complaint on file.     Patient is a 71 last month he has noticed a lot of unsteadiness.  Patient states he would walk patient states he feels very dizzy.  Patient states he just feels like patient states in February he had a bad patient states has had strokes in the past.  Patient states he has not had any weakness on one side the other.  States on aspirin and Plavix patient states he takes them \"most of the time.    ROS: Negative for cp, sob, abd pain, or back pain.    PE: Gen Appearance/Constitutional: alert  HEENT: NC/NT. PERRLA,  Airway patent.  Neck: supple     Initial Plan of Care: All treatment areas with department are currently occupied. Plan to order/Initiate the following while awaiting opening in ED: labs and imaging studies.  Initiate Treatment-Testing, Proceed toTreatment Area When Bed Available for ED Attending/MLP to Continue Care    Electronically signed by Cristel Galvez PA-C   DD: 5/10/24       Cristel Galvez PA-C  05/10/24 0995

## 2024-05-11 LAB
ALBUMIN SERPL-MCNC: 3.6 G/DL (ref 3.5–5.2)
ALP SERPL-CCNC: 110 U/L (ref 40–129)
ALT SERPL-CCNC: 14 U/L (ref 0–40)
AMPHET UR QL SCN: NEGATIVE
ANION GAP SERPL CALCULATED.3IONS-SCNC: 11 MMOL/L (ref 7–16)
AST SERPL-CCNC: 31 U/L (ref 0–39)
BARBITURATES UR QL SCN: NEGATIVE
BASOPHILS # BLD: 0.06 K/UL (ref 0–0.2)
BASOPHILS NFR BLD: 1 % (ref 0–2)
BENZODIAZ UR QL: NEGATIVE
BILIRUB SERPL-MCNC: 0.3 MG/DL (ref 0–1.2)
BUN SERPL-MCNC: 20 MG/DL (ref 6–23)
BUPRENORPHINE UR QL: NEGATIVE
CALCIUM SERPL-MCNC: 8.5 MG/DL (ref 8.6–10.2)
CANNABINOIDS UR QL SCN: NEGATIVE
CHLORIDE SERPL-SCNC: 108 MMOL/L (ref 98–107)
CO2 SERPL-SCNC: 19 MMOL/L (ref 22–29)
COCAINE UR QL SCN: NEGATIVE
CREAT SERPL-MCNC: 1.8 MG/DL (ref 0.7–1.2)
EOSINOPHIL # BLD: 0.21 K/UL (ref 0.05–0.5)
EOSINOPHILS RELATIVE PERCENT: 5 % (ref 0–6)
ERYTHROCYTE [DISTWIDTH] IN BLOOD BY AUTOMATED COUNT: 14.8 % (ref 11.5–15)
FENTANYL UR QL: NEGATIVE
GFR, ESTIMATED: 41 ML/MIN/1.73M2
GLUCOSE BLD-MCNC: 109 MG/DL (ref 74–99)
GLUCOSE BLD-MCNC: 109 MG/DL (ref 74–99)
GLUCOSE BLD-MCNC: 126 MG/DL (ref 74–99)
GLUCOSE SERPL-MCNC: 139 MG/DL (ref 74–99)
HCT VFR BLD AUTO: 39.5 % (ref 37–54)
HGB BLD-MCNC: 12.1 G/DL (ref 12.5–16.5)
IMM GRANULOCYTES # BLD AUTO: <0.03 K/UL (ref 0–0.58)
IMM GRANULOCYTES NFR BLD: 0 % (ref 0–5)
LYMPHOCYTES NFR BLD: 1.58 K/UL (ref 1.5–4)
LYMPHOCYTES RELATIVE PERCENT: 35 % (ref 20–42)
MCH RBC QN AUTO: 24.5 PG (ref 26–35)
MCHC RBC AUTO-ENTMCNC: 30.6 G/DL (ref 32–34.5)
MCV RBC AUTO: 80.1 FL (ref 80–99.9)
METHADONE UR QL: NEGATIVE
MONOCYTES NFR BLD: 0.7 K/UL (ref 0.1–0.95)
MONOCYTES NFR BLD: 16 % (ref 2–12)
NEUTROPHILS NFR BLD: 43 % (ref 43–80)
NEUTS SEG NFR BLD: 1.96 K/UL (ref 1.8–7.3)
OPIATES UR QL SCN: NEGATIVE
OXYCODONE UR QL SCN: NEGATIVE
PCP UR QL SCN: NEGATIVE
PLATELET # BLD AUTO: 208 K/UL (ref 130–450)
PMV BLD AUTO: 11.9 FL (ref 7–12)
POTASSIUM SERPL-SCNC: 4.1 MMOL/L (ref 3.5–5)
PROT SERPL-MCNC: 6.9 G/DL (ref 6.4–8.3)
RBC # BLD AUTO: 4.93 M/UL (ref 3.8–5.8)
SODIUM SERPL-SCNC: 138 MMOL/L (ref 132–146)
TEST INFORMATION: NORMAL
WBC OTHER # BLD: 4.5 K/UL (ref 4.5–11.5)

## 2024-05-11 PROCEDURE — 36415 COLL VENOUS BLD VENIPUNCTURE: CPT

## 2024-05-11 PROCEDURE — 6370000000 HC RX 637 (ALT 250 FOR IP): Performed by: INTERNAL MEDICINE

## 2024-05-11 PROCEDURE — 85025 COMPLETE CBC W/AUTO DIFF WBC: CPT

## 2024-05-11 PROCEDURE — G0378 HOSPITAL OBSERVATION PER HR: HCPCS

## 2024-05-11 PROCEDURE — 80053 COMPREHEN METABOLIC PANEL: CPT

## 2024-05-11 PROCEDURE — 99232 SBSQ HOSP IP/OBS MODERATE 35: CPT | Performed by: STUDENT IN AN ORGANIZED HEALTH CARE EDUCATION/TRAINING PROGRAM

## 2024-05-11 PROCEDURE — 80307 DRUG TEST PRSMV CHEM ANLYZR: CPT

## 2024-05-11 PROCEDURE — 2580000003 HC RX 258: Performed by: INTERNAL MEDICINE

## 2024-05-11 PROCEDURE — 82962 GLUCOSE BLOOD TEST: CPT

## 2024-05-11 RX ORDER — MECLIZINE HCL 12.5 MG/1
12.5 TABLET ORAL 3 TIMES DAILY PRN
Status: DISCONTINUED | OUTPATIENT
Start: 2024-05-11 | End: 2024-05-15 | Stop reason: HOSPADM

## 2024-05-11 RX ADMIN — ASPIRIN 81 MG: 81 TABLET, COATED ORAL at 10:24

## 2024-05-11 RX ADMIN — SODIUM CHLORIDE, PRESERVATIVE FREE 10 ML: 5 INJECTION INTRAVENOUS at 19:57

## 2024-05-11 RX ADMIN — HYDRALAZINE HYDROCHLORIDE 25 MG: 25 TABLET ORAL at 14:47

## 2024-05-11 RX ADMIN — AMLODIPINE BESYLATE 10 MG: 10 TABLET ORAL at 10:24

## 2024-05-11 RX ADMIN — PANTOPRAZOLE SODIUM 40 MG: 40 TABLET, DELAYED RELEASE ORAL at 10:24

## 2024-05-11 RX ADMIN — METOPROLOL SUCCINATE 50 MG: 50 TABLET, FILM COATED, EXTENDED RELEASE ORAL at 10:24

## 2024-05-11 RX ADMIN — APIXABAN 5 MG: 5 TABLET, FILM COATED ORAL at 19:56

## 2024-05-11 RX ADMIN — APIXABAN 5 MG: 5 TABLET, FILM COATED ORAL at 10:24

## 2024-05-11 RX ADMIN — TAMSULOSIN HYDROCHLORIDE 0.4 MG: 0.4 CAPSULE ORAL at 19:56

## 2024-05-11 RX ADMIN — EMPAGLIFLOZIN 10 MG: 10 TABLET, FILM COATED ORAL at 10:24

## 2024-05-11 RX ADMIN — GABAPENTIN 300 MG: 300 CAPSULE ORAL at 19:55

## 2024-05-11 RX ADMIN — LISINOPRIL 40 MG: 20 TABLET ORAL at 10:07

## 2024-05-11 RX ADMIN — HYDRALAZINE HYDROCHLORIDE 25 MG: 25 TABLET ORAL at 05:09

## 2024-05-11 RX ADMIN — GABAPENTIN 300 MG: 300 CAPSULE ORAL at 10:24

## 2024-05-11 RX ADMIN — METOPROLOL SUCCINATE 50 MG: 50 TABLET, FILM COATED, EXTENDED RELEASE ORAL at 19:55

## 2024-05-11 RX ADMIN — SODIUM CHLORIDE, PRESERVATIVE FREE 10 ML: 5 INJECTION INTRAVENOUS at 10:28

## 2024-05-11 RX ADMIN — HYDRALAZINE HYDROCHLORIDE 25 MG: 25 TABLET ORAL at 22:00

## 2024-05-11 NOTE — PLAN OF CARE
Problem: Chronic Conditions and Co-morbidities  Goal: Patient's chronic conditions and co-morbidity symptoms are monitored and maintained or improved  Outcome: Progressing     Problem: Safety - Adult  Goal: Free from fall injury  Outcome: Progressing      23-Sep-2017 17:37

## 2024-05-12 LAB
GLUCOSE BLD-MCNC: 101 MG/DL (ref 74–99)
GLUCOSE BLD-MCNC: 105 MG/DL (ref 74–99)
GLUCOSE BLD-MCNC: 109 MG/DL (ref 74–99)
GLUCOSE BLD-MCNC: 122 MG/DL (ref 74–99)

## 2024-05-12 PROCEDURE — 6370000000 HC RX 637 (ALT 250 FOR IP): Performed by: INTERNAL MEDICINE

## 2024-05-12 PROCEDURE — 82962 GLUCOSE BLOOD TEST: CPT

## 2024-05-12 PROCEDURE — 2580000003 HC RX 258: Performed by: INTERNAL MEDICINE

## 2024-05-12 PROCEDURE — G0378 HOSPITAL OBSERVATION PER HR: HCPCS

## 2024-05-12 PROCEDURE — 6370000000 HC RX 637 (ALT 250 FOR IP): Performed by: STUDENT IN AN ORGANIZED HEALTH CARE EDUCATION/TRAINING PROGRAM

## 2024-05-12 PROCEDURE — 99232 SBSQ HOSP IP/OBS MODERATE 35: CPT | Performed by: STUDENT IN AN ORGANIZED HEALTH CARE EDUCATION/TRAINING PROGRAM

## 2024-05-12 RX ADMIN — HYDRALAZINE HYDROCHLORIDE 25 MG: 25 TABLET ORAL at 05:30

## 2024-05-12 RX ADMIN — LISINOPRIL 40 MG: 20 TABLET ORAL at 08:17

## 2024-05-12 RX ADMIN — EMPAGLIFLOZIN 10 MG: 10 TABLET, FILM COATED ORAL at 08:17

## 2024-05-12 RX ADMIN — SODIUM CHLORIDE, PRESERVATIVE FREE 10 ML: 5 INJECTION INTRAVENOUS at 20:43

## 2024-05-12 RX ADMIN — ASPIRIN 81 MG: 81 TABLET, COATED ORAL at 08:17

## 2024-05-12 RX ADMIN — METOPROLOL SUCCINATE 50 MG: 50 TABLET, FILM COATED, EXTENDED RELEASE ORAL at 20:41

## 2024-05-12 RX ADMIN — GABAPENTIN 300 MG: 300 CAPSULE ORAL at 08:17

## 2024-05-12 RX ADMIN — TAMSULOSIN HYDROCHLORIDE 0.4 MG: 0.4 CAPSULE ORAL at 20:41

## 2024-05-12 RX ADMIN — METOPROLOL SUCCINATE 50 MG: 50 TABLET, FILM COATED, EXTENDED RELEASE ORAL at 08:17

## 2024-05-12 RX ADMIN — APIXABAN 5 MG: 5 TABLET, FILM COATED ORAL at 20:41

## 2024-05-12 RX ADMIN — HYDRALAZINE HYDROCHLORIDE 25 MG: 25 TABLET ORAL at 15:34

## 2024-05-12 RX ADMIN — AMLODIPINE BESYLATE 10 MG: 10 TABLET ORAL at 08:17

## 2024-05-12 RX ADMIN — PANTOPRAZOLE SODIUM 40 MG: 40 TABLET, DELAYED RELEASE ORAL at 08:17

## 2024-05-12 RX ADMIN — Medication 5 DROP: at 20:43

## 2024-05-12 RX ADMIN — SODIUM CHLORIDE, PRESERVATIVE FREE 10 ML: 5 INJECTION INTRAVENOUS at 08:19

## 2024-05-12 RX ADMIN — APIXABAN 5 MG: 5 TABLET, FILM COATED ORAL at 08:17

## 2024-05-12 RX ADMIN — HYDRALAZINE HYDROCHLORIDE 25 MG: 25 TABLET ORAL at 22:59

## 2024-05-12 RX ADMIN — GABAPENTIN 300 MG: 300 CAPSULE ORAL at 20:41

## 2024-05-12 RX ADMIN — MECLIZINE 12.5 MG: 12.5 TABLET ORAL at 08:25

## 2024-05-13 ENCOUNTER — APPOINTMENT (OUTPATIENT)
Dept: MRI IMAGING | Age: 74
End: 2024-05-13
Payer: MEDICAID

## 2024-05-13 LAB
ANION GAP SERPL CALCULATED.3IONS-SCNC: 14 MMOL/L (ref 7–16)
BASOPHILS # BLD: 0.04 K/UL (ref 0–0.2)
BASOPHILS NFR BLD: 1 % (ref 0–2)
BUN SERPL-MCNC: 27 MG/DL (ref 6–23)
CALCIUM SERPL-MCNC: 8.9 MG/DL (ref 8.6–10.2)
CHLORIDE SERPL-SCNC: 106 MMOL/L (ref 98–107)
CO2 SERPL-SCNC: 19 MMOL/L (ref 22–29)
CREAT SERPL-MCNC: 1.9 MG/DL (ref 0.7–1.2)
EOSINOPHIL # BLD: 0.24 K/UL (ref 0.05–0.5)
EOSINOPHILS RELATIVE PERCENT: 4 % (ref 0–6)
ERYTHROCYTE [DISTWIDTH] IN BLOOD BY AUTOMATED COUNT: 14.8 % (ref 11.5–15)
GFR, ESTIMATED: 37 ML/MIN/1.73M2
GLUCOSE BLD-MCNC: 104 MG/DL (ref 74–99)
GLUCOSE BLD-MCNC: 110 MG/DL (ref 74–99)
GLUCOSE BLD-MCNC: 142 MG/DL (ref 74–99)
GLUCOSE BLD-MCNC: 169 MG/DL (ref 74–99)
GLUCOSE SERPL-MCNC: 112 MG/DL (ref 74–99)
HCT VFR BLD AUTO: 40.3 % (ref 37–54)
HGB BLD-MCNC: 12.6 G/DL (ref 12.5–16.5)
IMM GRANULOCYTES # BLD AUTO: <0.03 K/UL (ref 0–0.58)
IMM GRANULOCYTES NFR BLD: 0 % (ref 0–5)
LYMPHOCYTES NFR BLD: 1.59 K/UL (ref 1.5–4)
LYMPHOCYTES RELATIVE PERCENT: 29 % (ref 20–42)
MCH RBC QN AUTO: 25 PG (ref 26–35)
MCHC RBC AUTO-ENTMCNC: 31.3 G/DL (ref 32–34.5)
MCV RBC AUTO: 80 FL (ref 80–99.9)
MONOCYTES NFR BLD: 0.6 K/UL (ref 0.1–0.95)
MONOCYTES NFR BLD: 11 % (ref 2–12)
NEUTROPHILS NFR BLD: 54 % (ref 43–80)
NEUTS SEG NFR BLD: 2.92 K/UL (ref 1.8–7.3)
PLATELET # BLD AUTO: 193 K/UL (ref 130–450)
PMV BLD AUTO: 11.6 FL (ref 7–12)
POTASSIUM SERPL-SCNC: 3.9 MMOL/L (ref 3.5–5)
RBC # BLD AUTO: 5.04 M/UL (ref 3.8–5.8)
SODIUM SERPL-SCNC: 139 MMOL/L (ref 132–146)
WBC OTHER # BLD: 5.4 K/UL (ref 4.5–11.5)

## 2024-05-13 PROCEDURE — 96375 TX/PRO/DX INJ NEW DRUG ADDON: CPT

## 2024-05-13 PROCEDURE — G0378 HOSPITAL OBSERVATION PER HR: HCPCS

## 2024-05-13 PROCEDURE — 6370000000 HC RX 637 (ALT 250 FOR IP): Performed by: INTERNAL MEDICINE

## 2024-05-13 PROCEDURE — 2580000003 HC RX 258: Performed by: INTERNAL MEDICINE

## 2024-05-13 PROCEDURE — 82962 GLUCOSE BLOOD TEST: CPT

## 2024-05-13 PROCEDURE — 99232 SBSQ HOSP IP/OBS MODERATE 35: CPT | Performed by: STUDENT IN AN ORGANIZED HEALTH CARE EDUCATION/TRAINING PROGRAM

## 2024-05-13 PROCEDURE — 6360000002 HC RX W HCPCS: Performed by: STUDENT IN AN ORGANIZED HEALTH CARE EDUCATION/TRAINING PROGRAM

## 2024-05-13 PROCEDURE — 36415 COLL VENOUS BLD VENIPUNCTURE: CPT

## 2024-05-13 PROCEDURE — 85025 COMPLETE CBC W/AUTO DIFF WBC: CPT

## 2024-05-13 PROCEDURE — 80048 BASIC METABOLIC PNL TOTAL CA: CPT

## 2024-05-13 PROCEDURE — 6370000000 HC RX 637 (ALT 250 FOR IP): Performed by: STUDENT IN AN ORGANIZED HEALTH CARE EDUCATION/TRAINING PROGRAM

## 2024-05-13 PROCEDURE — 70551 MRI BRAIN STEM W/O DYE: CPT

## 2024-05-13 RX ORDER — MECLIZINE HCL 12.5 MG/1
12.5 TABLET ORAL 3 TIMES DAILY PRN
Qty: 60 TABLET | Refills: 0 | Status: SHIPPED | OUTPATIENT
Start: 2024-05-13 | End: 2024-06-12

## 2024-05-13 RX ORDER — AMLODIPINE BESYLATE 10 MG/1
10 TABLET ORAL DAILY
Qty: 30 TABLET | Refills: 3 | Status: SHIPPED | OUTPATIENT
Start: 2024-05-14

## 2024-05-13 RX ORDER — LANOLIN ALCOHOL/MO/W.PET/CERES
50 CREAM (GRAM) TOPICAL DAILY
Status: DISCONTINUED | OUTPATIENT
Start: 2024-05-13 | End: 2024-05-15 | Stop reason: HOSPADM

## 2024-05-13 RX ORDER — LORAZEPAM 2 MG/ML
2 INJECTION INTRAMUSCULAR ONCE
Status: COMPLETED | OUTPATIENT
Start: 2024-05-13 | End: 2024-05-13

## 2024-05-13 RX ORDER — ISONIAZID 300 MG/1
300 TABLET ORAL DAILY
Status: DISCONTINUED | OUTPATIENT
Start: 2024-05-13 | End: 2024-05-15 | Stop reason: HOSPADM

## 2024-05-13 RX ADMIN — HYDRALAZINE HYDROCHLORIDE 25 MG: 25 TABLET ORAL at 05:21

## 2024-05-13 RX ADMIN — APIXABAN 5 MG: 5 TABLET, FILM COATED ORAL at 21:08

## 2024-05-13 RX ADMIN — EMPAGLIFLOZIN 10 MG: 10 TABLET, FILM COATED ORAL at 08:23

## 2024-05-13 RX ADMIN — PYRIDOXINE HCL TAB 50 MG 50 MG: 50 TAB at 18:25

## 2024-05-13 RX ADMIN — GABAPENTIN 300 MG: 300 CAPSULE ORAL at 21:08

## 2024-05-13 RX ADMIN — LISINOPRIL 40 MG: 20 TABLET ORAL at 08:23

## 2024-05-13 RX ADMIN — MECLIZINE 12.5 MG: 12.5 TABLET ORAL at 08:22

## 2024-05-13 RX ADMIN — GABAPENTIN 300 MG: 300 CAPSULE ORAL at 08:23

## 2024-05-13 RX ADMIN — APIXABAN 5 MG: 5 TABLET, FILM COATED ORAL at 08:23

## 2024-05-13 RX ADMIN — LORAZEPAM 2 MG: 2 INJECTION INTRAMUSCULAR; INTRAVENOUS at 13:31

## 2024-05-13 RX ADMIN — ISONIAZID 300 MG: 300 TABLET ORAL at 18:25

## 2024-05-13 RX ADMIN — METOPROLOL SUCCINATE 50 MG: 50 TABLET, FILM COATED, EXTENDED RELEASE ORAL at 08:23

## 2024-05-13 RX ADMIN — SODIUM CHLORIDE, PRESERVATIVE FREE 10 ML: 5 INJECTION INTRAVENOUS at 21:07

## 2024-05-13 RX ADMIN — SODIUM CHLORIDE, PRESERVATIVE FREE 10 ML: 5 INJECTION INTRAVENOUS at 08:28

## 2024-05-13 RX ADMIN — HYDRALAZINE HYDROCHLORIDE 25 MG: 25 TABLET ORAL at 21:08

## 2024-05-13 RX ADMIN — TAMSULOSIN HYDROCHLORIDE 0.4 MG: 0.4 CAPSULE ORAL at 21:08

## 2024-05-13 RX ADMIN — HYDRALAZINE HYDROCHLORIDE 25 MG: 25 TABLET ORAL at 13:31

## 2024-05-13 RX ADMIN — ASPIRIN 81 MG: 81 TABLET, COATED ORAL at 08:23

## 2024-05-13 RX ADMIN — PANTOPRAZOLE SODIUM 40 MG: 40 TABLET, DELAYED RELEASE ORAL at 08:23

## 2024-05-13 RX ADMIN — METOPROLOL SUCCINATE 50 MG: 50 TABLET, FILM COATED, EXTENDED RELEASE ORAL at 21:08

## 2024-05-13 RX ADMIN — MECLIZINE 12.5 MG: 12.5 TABLET ORAL at 15:31

## 2024-05-13 RX ADMIN — AMLODIPINE BESYLATE 10 MG: 10 TABLET ORAL at 08:22

## 2024-05-13 RX ADMIN — Medication 5 DROP: at 15:20

## 2024-05-13 RX ADMIN — Medication 5 DROP: at 08:23

## 2024-05-13 NOTE — ACP (ADVANCE CARE PLANNING)
Advance Care Planning   Healthcare Decision Maker:    Primary Decision Maker: Katie Hammond - [phone]    Click here to complete Healthcare Decision Makers including selection of the Healthcare Decision Maker Relationship (ie \"Primary\").     Electronically signed by Mickey George RN CM on 5/13/2024 at 9:56 AM

## 2024-05-13 NOTE — PLAN OF CARE
Called multiple times the [facility] multiple times to find out his current TB regimen, went to voicemail

## 2024-05-13 NOTE — CARE COORDINATION
05/13/24 Transition of care:  Pt admitted OBS from ED for orthostatic dizziness Pt for MRI brain pending Order for Vestibular Therapy this will need done as an outpatient Pt will need order for this for outpatient Met with pt to discuss transition of care and discharge preferences Pt lives in transitional housing and is [facility] Pt has no stairs to enter pt has no AD for ambulation Pt states he could use a cane CM explained that if we got him a cane at this time he would not be able to get any other AD for 5 years Pt states he will get a cane at [facility] Pt has had HHC with Rosa [facility] at [place]  PCP is Williams Lundy [facility] is [facility] on [place]  Plan is to return home at discharge with no other needs expressed at this time Pt states he will call a cab Pt also has transportation through his insurance if needed CM/SW to follow Electronically signed by Mickey George RN CM on 5/13/2024 at 10:11 AM       05/13/24 Appointment made at [facility] [place] for vestibular therapy 05/16 at 2pm order faxed to [facility] at [phone] BSRN notified placed on AVS Electronically signed by Mickey George RN CM on 5/13/2024 at 4:03 PM     Case Management Assessment  Initial Evaluation    Date/Time of Evaluation: 5/13/2024 10:11 AM  Assessment Completed by: Mickey George RN    If patient is discharged prior to next notation, then this note serves as note for discharge by case management.    Patient Name: Hemal Banks                   YOB: 1950  Diagnosis: Dizziness [R42]  Ataxia [R27.0]  Essential hypertension [I10]  Elevated troponin [R79.89]  Orthostatic dizziness [R42]                   Date / Time: 5/10/2024  9:34 AM    Patient Admission Status: Observation   Readmission Risk (Low < 19, Mod (19-27), High > 27): Readmission Risk Score: 20.3    Current PCP: Carlita Castellano MD  PCP verified by CM? Yes    Chart Reviewed: Yes      History Provided by:

## 2024-05-14 ENCOUNTER — TELEPHONE (OUTPATIENT)
Age: 74
End: 2024-05-14

## 2024-05-14 LAB
ANION GAP SERPL CALCULATED.3IONS-SCNC: 13 MMOL/L (ref 7–16)
BUN SERPL-MCNC: 34 MG/DL (ref 6–23)
CALCIUM SERPL-MCNC: 9.1 MG/DL (ref 8.6–10.2)
CHLORIDE SERPL-SCNC: 109 MMOL/L (ref 98–107)
CO2 SERPL-SCNC: 20 MMOL/L (ref 22–29)
CREAT SERPL-MCNC: 1.9 MG/DL (ref 0.7–1.2)
GFR, ESTIMATED: 37 ML/MIN/1.73M2
GLUCOSE BLD-MCNC: 104 MG/DL (ref 74–99)
GLUCOSE BLD-MCNC: 120 MG/DL (ref 74–99)
GLUCOSE BLD-MCNC: 131 MG/DL (ref 74–99)
GLUCOSE SERPL-MCNC: 118 MG/DL (ref 74–99)
POTASSIUM SERPL-SCNC: 4.3 MMOL/L (ref 3.5–5)
SODIUM SERPL-SCNC: 142 MMOL/L (ref 132–146)

## 2024-05-14 PROCEDURE — 84207 ASSAY OF VITAMIN B-6: CPT

## 2024-05-14 PROCEDURE — 36415 COLL VENOUS BLD VENIPUNCTURE: CPT

## 2024-05-14 PROCEDURE — 6370000000 HC RX 637 (ALT 250 FOR IP): Performed by: STUDENT IN AN ORGANIZED HEALTH CARE EDUCATION/TRAINING PROGRAM

## 2024-05-14 PROCEDURE — G0378 HOSPITAL OBSERVATION PER HR: HCPCS

## 2024-05-14 PROCEDURE — 97161 PT EVAL LOW COMPLEX 20 MIN: CPT

## 2024-05-14 PROCEDURE — 80048 BASIC METABOLIC PNL TOTAL CA: CPT

## 2024-05-14 PROCEDURE — 82962 GLUCOSE BLOOD TEST: CPT

## 2024-05-14 PROCEDURE — 99232 SBSQ HOSP IP/OBS MODERATE 35: CPT | Performed by: INTERNAL MEDICINE

## 2024-05-14 PROCEDURE — 97535 SELF CARE MNGMENT TRAINING: CPT

## 2024-05-14 PROCEDURE — 99222 1ST HOSP IP/OBS MODERATE 55: CPT | Performed by: PSYCHIATRY & NEUROLOGY

## 2024-05-14 PROCEDURE — 6370000000 HC RX 637 (ALT 250 FOR IP): Performed by: INTERNAL MEDICINE

## 2024-05-14 PROCEDURE — 97530 THERAPEUTIC ACTIVITIES: CPT

## 2024-05-14 PROCEDURE — 2580000003 HC RX 258: Performed by: INTERNAL MEDICINE

## 2024-05-14 PROCEDURE — 97165 OT EVAL LOW COMPLEX 30 MIN: CPT

## 2024-05-14 RX ADMIN — PYRIDOXINE HCL TAB 50 MG 50 MG: 50 TAB at 08:58

## 2024-05-14 RX ADMIN — SODIUM CHLORIDE, PRESERVATIVE FREE 10 ML: 5 INJECTION INTRAVENOUS at 21:46

## 2024-05-14 RX ADMIN — METOPROLOL SUCCINATE 50 MG: 50 TABLET, FILM COATED, EXTENDED RELEASE ORAL at 08:50

## 2024-05-14 RX ADMIN — PANTOPRAZOLE SODIUM 40 MG: 40 TABLET, DELAYED RELEASE ORAL at 08:50

## 2024-05-14 RX ADMIN — LISINOPRIL 40 MG: 20 TABLET ORAL at 08:50

## 2024-05-14 RX ADMIN — AMLODIPINE BESYLATE 10 MG: 10 TABLET ORAL at 08:50

## 2024-05-14 RX ADMIN — GABAPENTIN 300 MG: 300 CAPSULE ORAL at 21:42

## 2024-05-14 RX ADMIN — ASPIRIN 81 MG: 81 TABLET, COATED ORAL at 08:50

## 2024-05-14 RX ADMIN — GABAPENTIN 300 MG: 300 CAPSULE ORAL at 08:50

## 2024-05-14 RX ADMIN — TAMSULOSIN HYDROCHLORIDE 0.4 MG: 0.4 CAPSULE ORAL at 21:42

## 2024-05-14 RX ADMIN — APIXABAN 5 MG: 5 TABLET, FILM COATED ORAL at 08:50

## 2024-05-14 RX ADMIN — EMPAGLIFLOZIN 10 MG: 10 TABLET, FILM COATED ORAL at 08:58

## 2024-05-14 RX ADMIN — APIXABAN 5 MG: 5 TABLET, FILM COATED ORAL at 21:42

## 2024-05-14 RX ADMIN — ISONIAZID 300 MG: 300 TABLET ORAL at 08:58

## 2024-05-14 RX ADMIN — HYDRALAZINE HYDROCHLORIDE 25 MG: 25 TABLET ORAL at 05:51

## 2024-05-14 RX ADMIN — HYDRALAZINE HYDROCHLORIDE 25 MG: 25 TABLET ORAL at 21:45

## 2024-05-14 RX ADMIN — HYDRALAZINE HYDROCHLORIDE 25 MG: 25 TABLET ORAL at 16:26

## 2024-05-14 RX ADMIN — METOPROLOL SUCCINATE 50 MG: 50 TABLET, FILM COATED, EXTENDED RELEASE ORAL at 21:44

## 2024-05-14 NOTE — CARE COORDINATION
5/14/24, SW spoke with Flavia BOOKER and Cyn Counselor in patient's room from the [facility].  Patient has to be up and moving independently to be able to return back to the transitional housing place.  PT/OT evals will be needed.  Sober Housing uses [facility]  Call placed to Becky and vm left for her to return call on referral.  SW to follow.      Niki Starks, [facility] Case Management  [phone]
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
## 2024-05-14 NOTE — CARE COORDINATION
5/14/24, MARK Update-MARK spoke with Keiry from [facility] -unable to take patient at this time.  Becky from [facility] can accept and will start precert on patient.  Per Flavia and Adm. Ass't from the [facility] patient is unable to go back to them due to no medical staff on hand and liability should patient fall.  Per CM note-should contact them back in the am.  SW to follow.      Niki Starks, [facility] Case Management  [phone]

## 2024-05-14 NOTE — CARE COORDINATION
05/14/24 Update CM Note: Becky at [facility] to review benefits and will begin the precert today. Electronically signed by Jane Thomason RN CM on 5/14/2024 at 3:20 PM        Addendum: Passar completed/krystin/destination complete. SW to do envelope. Electronically signed by Jane Thomason RN CM on 5/14/2024 at 3:50 PM

## 2024-05-14 NOTE — CARE COORDINATION
5/14/24, SW spoke with Becky from [facility] Becky to check with supervisor on if they can take patient precert pending.  Precert has not been started yet.  PT was 16/24 and ambulated 75 feet and OT was 16/24.  Call was placed to Flavia at the [facility].  Flavia to speak with her supervisor on if they can take patient back given how patient did with therapy.  Flavia to contact this worker back.  Discussed patient needing the Vertigo therapy.  Call place to Keiry from [facility] on patient and Keiry to take a look at patient and let this worker know if patient can go to facility precert pending.  Discharge plan is questionable at this time.  Will know more of plan once calls are returned to this worker later today.  Met with patient in room and patient is agreeable to either going to rehab or going back to [facility].  SW to follow.      Niki Starks, ROSALIND  [facility] Case Management  [phone]

## 2024-05-14 NOTE — TELEPHONE ENCOUNTER
Hemal is currently being treated for latent TB. Regimen started on 1/12/24 and is currently doing  mg and Vitamin B6 daily for the next 9 months beginning 1/12/24. [facility].

## 2024-05-14 NOTE — CARE COORDINATION
5/14/24, SW spoke with Becky from [facility] on med is available for patient.  Becky to run insurance and let this worker know about referral.  Therapy called for [facility].  SW to follow.      Niki Starks, [facility] Case Management  [phone]
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
## 2024-05-14 NOTE — DISCHARGE INSTR - COC
Continuity of Care Form    Patient Name: Hemal Banks   :  1950  MRN:  [id number]    Admit date:  5/10/2024  Discharge date:  ***    Code Status Order: Full Code   Advance Directives:     Admitting Physician:  Asif Harris MD  PCP: Carlita Castellano MD    Discharging Nurse: ***  Discharging Hospital Unit/Room#: 8203/8203-A  Discharging Unit Phone Number: ***    Emergency Contact:   No emergency contact information on file.    Past Surgical History:  Past Surgical History:   Procedure Laterality Date    COLONOSCOPY      COLONOSCOPY N/A 2021    COLONOSCOPY POLYPECTOMY SNARE/COLD BIOPSY performed by Bo Hall MD at [facility] ENDOSCOPY    FRACTURE SURGERY      KIDNEY STONE SURGERY  2010    2019    PAIN MANAGEMENT PROCEDURE Bilateral 2022    BILATERAL L4 TRANSFORAMINAL EPIDURAL STEROID INJECTION performed by Maria Teresa Romano DO at [facility] OR    PAIN MANAGEMENT PROCEDURE Bilateral 2022    BILATERAL L4 TRANSFORAMINAL EPIDURAL STEROID INJECTION performed by Maria Teresa Romano DO at [facility] OR    ROTATOR CUFF REPAIR Left     SPINE SURGERY      UPPER GASTROINTESTINAL ENDOSCOPY      UPPER GASTROINTESTINAL ENDOSCOPY N/A 2021    EGD BIOPSY performed by Bo Hall MD at [facility] ENDOSCOPY    UPPER GASTROINTESTINAL ENDOSCOPY N/A 2021    EGD POLYP SNARE performed by Bo Hall MD at [facility] ENDOSCOPY    UPPER GASTROINTESTINAL ENDOSCOPY N/A 2022    EGD WITH POSSIBLE  DILATION BALLOON performed by Bo Hall MD at [facility] ENDOSCOPY    UPPER GASTROINTESTINAL ENDOSCOPY N/A 2022    EGD BIOPSY performed by Bo Hall MD at [facility] ENDOSCOPY       Immunization History:   Immunization History   Administered Date(s) Administered    COVID-19, MODERNA Bivalent, (age 12y+), IM, 50 mcg/0.5 mL 10/13/2022    COVID-19, PFIZER, (- formula), (age 12y+), IM, 30mcg/0.3mL 2023    COVID-19, US Vaccine, Vaccine Unspecified 2021,

## 2024-05-14 NOTE — PLAN OF CARE
Problem: Chronic Conditions and Co-morbidities  Goal: Patient's chronic conditions and co-morbidity symptoms are monitored and maintained or improved  5/14/2024 1139 by Nona Villasenor RN  Outcome: Progressing  5/13/2024 2218 by Lori Dunn RN  Outcome: Progressing     Problem: Safety - Adult  Goal: Free from fall injury  5/14/2024 1139 by Nona Villasenor RN  Outcome: Progressing  5/13/2024 2218 by Lori Dunn RN  Outcome: Progressing

## 2024-05-14 NOTE — CARE COORDINATION
05/14/24 Update CM Note: Per Dr Velazquez the Lumbar MRI can be scheduled as outpatient. Message sent to Krys PENA in MRI to work on scheduling procedure. Patient remains observation currently. Electronically signed by Jane Thomason RN CM on 5/14/2024 at 2:46 PM

## 2024-05-15 VITALS
DIASTOLIC BLOOD PRESSURE: 77 MMHG | HEIGHT: 74 IN | TEMPERATURE: 97.5 F | SYSTOLIC BLOOD PRESSURE: 135 MMHG | BODY MASS INDEX: 30.16 KG/M2 | WEIGHT: 235 LBS | RESPIRATION RATE: 18 BRPM | OXYGEN SATURATION: 97 % | HEART RATE: 68 BPM

## 2024-05-15 LAB — GLUCOSE BLD-MCNC: 125 MG/DL (ref 74–99)

## 2024-05-15 PROCEDURE — G0378 HOSPITAL OBSERVATION PER HR: HCPCS

## 2024-05-15 PROCEDURE — 99239 HOSP IP/OBS DSCHRG MGMT >30: CPT | Performed by: INTERNAL MEDICINE

## 2024-05-15 PROCEDURE — 97530 THERAPEUTIC ACTIVITIES: CPT

## 2024-05-15 PROCEDURE — 97535 SELF CARE MNGMENT TRAINING: CPT

## 2024-05-15 PROCEDURE — 6370000000 HC RX 637 (ALT 250 FOR IP): Performed by: STUDENT IN AN ORGANIZED HEALTH CARE EDUCATION/TRAINING PROGRAM

## 2024-05-15 PROCEDURE — 6370000000 HC RX 637 (ALT 250 FOR IP): Performed by: INTERNAL MEDICINE

## 2024-05-15 PROCEDURE — 82962 GLUCOSE BLOOD TEST: CPT

## 2024-05-15 PROCEDURE — 2580000003 HC RX 258: Performed by: INTERNAL MEDICINE

## 2024-05-15 RX ADMIN — AMLODIPINE BESYLATE 10 MG: 10 TABLET ORAL at 08:21

## 2024-05-15 RX ADMIN — ASPIRIN 81 MG: 81 TABLET, COATED ORAL at 08:20

## 2024-05-15 RX ADMIN — LISINOPRIL 40 MG: 20 TABLET ORAL at 08:21

## 2024-05-15 RX ADMIN — GABAPENTIN 300 MG: 300 CAPSULE ORAL at 08:21

## 2024-05-15 RX ADMIN — PANTOPRAZOLE SODIUM 40 MG: 40 TABLET, DELAYED RELEASE ORAL at 08:21

## 2024-05-15 RX ADMIN — METOPROLOL SUCCINATE 50 MG: 50 TABLET, FILM COATED, EXTENDED RELEASE ORAL at 08:20

## 2024-05-15 RX ADMIN — PYRIDOXINE HCL TAB 50 MG 50 MG: 50 TAB at 08:21

## 2024-05-15 RX ADMIN — SODIUM CHLORIDE, PRESERVATIVE FREE 10 ML: 5 INJECTION INTRAVENOUS at 08:20

## 2024-05-15 RX ADMIN — APIXABAN 5 MG: 5 TABLET, FILM COATED ORAL at 08:20

## 2024-05-15 RX ADMIN — ACETAMINOPHEN 650 MG: 325 TABLET ORAL at 00:03

## 2024-05-15 RX ADMIN — EMPAGLIFLOZIN 10 MG: 10 TABLET, FILM COATED ORAL at 08:21

## 2024-05-15 RX ADMIN — ISONIAZID 300 MG: 300 TABLET ORAL at 08:21

## 2024-05-15 ASSESSMENT — PAIN DESCRIPTION - DESCRIPTORS: DESCRIPTORS: THROBBING

## 2024-05-15 ASSESSMENT — PAIN SCALES - GENERAL
PAINLEVEL_OUTOF10: 1
PAINLEVEL_OUTOF10: 2
PAINLEVEL_OUTOF10: 0

## 2024-05-15 ASSESSMENT — PAIN DESCRIPTION - LOCATION: LOCATION: HEAD

## 2024-05-15 ASSESSMENT — PAIN - FUNCTIONAL ASSESSMENT: PAIN_FUNCTIONAL_ASSESSMENT: ACTIVITIES ARE NOT PREVENTED

## 2024-05-15 ASSESSMENT — PAIN SCALES - WONG BAKER: WONGBAKER_NUMERICALRESPONSE: NO HURT

## 2024-05-15 NOTE — CARE COORDINATION
5/15/24, MARK Update-Spoke with patient in room and informed patient he is able to return back to the [facility].  RN is preparing discharge papers at this time.  Patient says he will go down to the [facility] and get money out for the taxi cab ride he will pay for on own.  Patient says he has [employer] # in his cell phone which he will call.  MARK updated RN on the above.  SW to follow.      Niki Starks, [facility] Case Management  [phone]

## 2024-05-15 NOTE — DISCHARGE INSTRUCTIONS
Neurologist recommended obtaining an MRI of the lumbar spine and  EMG/NCS as outpatient for peripheral neuropathy

## 2024-05-15 NOTE — CARE COORDINATION
05/15/24 CM update:  Spoke with Soha from the sober living home Explained that pt worked with PT/OT today and pt is independent walking without AD Soha states that pt can return there They are unable to come and pick the patient up or provide transport as they are \"short staffed\" Perfectserved MD for discharge he will see pt and discharge today Pt stated he can call taxi or possible transportation through his insurance CM/SW to follow Electronically signed by Mickey George RN on 5/15/2024 at 10:03 AM
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
## 2024-05-15 NOTE — PLAN OF CARE
Problem: Chronic Conditions and Co-morbidities  Goal: Patient's chronic conditions and co-morbidity symptoms are monitored and maintained or improved  5/14/2024 2351 by Manuel Miller RN  Outcome: Progressing  5/14/2024 1139 by Nona Villasenor RN  Outcome: Progressing     Problem: Safety - Adult  Goal: Free from fall injury  5/14/2024 2351 by Manuel Miller, RN  Outcome: Progressing  5/14/2024 1139 by Nona Villasenor RN  Outcome: Progressing

## 2024-05-15 NOTE — CARE COORDINATION
5/15/24, MARK informed Becky from [facility] that patient will be discharging back to the sober living home today.  MARK to follow.      Niki Starks, ROSALIND  [facility] Case Management  [phone]

## 2024-05-15 NOTE — DISCHARGE SUMMARY
rhonchi, normal air movement, no respiratory distress  Cardiovascular: normal rate, normal S1 and S2 and no carotid bruits  Abdomen: soft, non-tender, non-distended, normal bowel sounds, no masses or organomegaly  Extremities: no cyanosis, no clubbing and no edema  Neurologic: no cranial nerve deficit and speech normal    I/O last 3 completed shifts:  In: 720 [P.O.:720]  Out: -   I/O this shift:  In: 240 [P.O.:240]  Out: -       LABS:  Recent Labs     05/13/24  0444 05/14/24  0530    142   K 3.9 4.3    109*   CO2 19* 20*   BUN 27* 34*   CREATININE 1.9* 1.9*   GLUCOSE 112* 118*   CALCIUM 8.9 9.1       Recent Labs     05/13/24 0444   WBC 5.4   RBC 5.04   HGB 12.6   HCT 40.3   MCV 80.0   MCH 25.0*   MCHC 31.3*   RDW 14.8      MPV 11.6       Recent Labs     05/14/24  0652 05/14/24  1119 05/14/24  2136 05/15/24  0553   POCGLU 120* 104* 131* 125*       Imaging:  XR CHEST PORTABLE    Result Date: 5/10/2024  EXAMINATION: ONE XRAY VIEW OF THE CHEST 5/10/2024 12:05 pm COMPARISON: 12/14/2013 HISTORY: ORDERING SYSTEM PROVIDED HISTORY: dizziness TECHNOLOGIST PROVIDED HISTORY: Reason for exam:->dizziness FINDINGS: The lungs are without acute focal process.  There is no effusion or pneumothorax. The cardiomediastinal silhouette is without acute process. The osseous structures are without acute process.     No acute process.     CT HEAD WO CONTRAST    Result Date: 5/10/2024  EXAMINATION: CT OF THE HEAD WITHOUT CONTRAST  5/10/2024 11:17 am TECHNIQUE: CT of the head was performed without the administration of intravenous contrast. Automated exposure control, iterative reconstruction, and/or weight based adjustment of the mA/kV was utilized to reduce the radiation dose to as low as reasonably achievable. COMPARISON: 02/19/2024 HISTORY: ORDERING SYSTEM PROVIDED HISTORY: CVA, ataxia TECHNOLOGIST PROVIDED HISTORY: Has a \"code stroke\" or \"stroke alert\" been called?->No Reason for exam:->CVA, ataxia Decision Support

## 2024-05-15 NOTE — CARE COORDINATION
5/15/24. SW & CM met with patient in room.  Discussed precert pending with [facility].  Patient prefers to go back to the [facility].  Discussed the issues that the [facility] was telling this worker and the CM about patient going back to the facility and being dizzy and using a WW.  Patient says he is not dizzy and was observed walking in room with no AD.  PT/OT called for Stat.  Will call [facility] once PT/OT updates are in on patient to discuss patient going back to facility.  SW to follow.      DAKOTA Mcgrath  [facility] Case Management  [phone]

## 2024-05-16 ENCOUNTER — TELEPHONE (OUTPATIENT)
Age: 74
End: 2024-05-16

## 2024-05-16 NOTE — TELEPHONE ENCOUNTER
Tried calling patient to schedule him for a hospital follow up but his phone was not allowing calls at the moment.

## 2024-05-20 ENCOUNTER — HOSPITAL ENCOUNTER (OUTPATIENT)
Age: 74
Discharge: HOME | End: 2024-05-20
Payer: MEDICAID

## 2024-05-20 LAB
ALBUMIN SERPL-MCNC: 4.3 G/DL (ref 3.5–5.2)
ALP SERPL-CCNC: 121 U/L (ref 40–129)
ALT SERPL-CCNC: 14 U/L (ref 0–40)
ANION GAP SERPL CALCULATED.3IONS-SCNC: 15 MMOL/L (ref 7–16)
AST SERPL-CCNC: 25 U/L (ref 0–39)
BASOPHILS # BLD: 0.04 K/UL (ref 0–0.2)
BASOPHILS NFR BLD: 1 % (ref 0–2)
BILIRUB SERPL-MCNC: 0.3 MG/DL (ref 0–1.2)
BUN SERPL-MCNC: 21 MG/DL (ref 6–23)
CALCIUM SERPL-MCNC: 9.2 MG/DL (ref 8.6–10.2)
CHLORIDE SERPL-SCNC: 108 MMOL/L (ref 98–107)
CO2 SERPL-SCNC: 19 MMOL/L (ref 22–29)
CREAT SERPL-MCNC: 1.9 MG/DL (ref 0.7–1.2)
CREAT UR-MCNC: 147.5 MG/DL (ref 40–278)
CREAT UR-MCNC: 147.9 MG/DL (ref 40–278)
EOSINOPHIL # BLD: 0.29 K/UL (ref 0.05–0.5)
EOSINOPHILS RELATIVE PERCENT: 5 % (ref 0–6)
ERYTHROCYTE [DISTWIDTH] IN BLOOD BY AUTOMATED COUNT: 15.2 % (ref 11.5–15)
GFR, ESTIMATED: 37 ML/MIN/1.73M2
GLUCOSE SERPL-MCNC: 93 MG/DL (ref 74–99)
HBA1C MFR BLD: 6.6 % (ref 4–5.6)
HCT VFR BLD AUTO: 40.3 % (ref 37–54)
HGB BLD-MCNC: 12.4 G/DL (ref 12.5–16.5)
IMM GRANULOCYTES # BLD AUTO: <0.03 K/UL (ref 0–0.58)
IMM GRANULOCYTES NFR BLD: 0 % (ref 0–5)
LYMPHOCYTES NFR BLD: 1.92 K/UL (ref 1.5–4)
LYMPHOCYTES RELATIVE PERCENT: 36 % (ref 20–42)
MAGNESIUM SERPL-MCNC: 2.2 MG/DL (ref 1.6–2.6)
MCH RBC QN AUTO: 25.2 PG (ref 26–35)
MCHC RBC AUTO-ENTMCNC: 30.8 G/DL (ref 32–34.5)
MCV RBC AUTO: 81.7 FL (ref 80–99.9)
MICROALBUMIN UR-MCNC: 48 MG/L (ref 0–19)
MICROALBUMIN/CREAT UR-RTO: 32 MCG/MG CREAT (ref 0–30)
MONOCYTES NFR BLD: 0.68 K/UL (ref 0.1–0.95)
MONOCYTES NFR BLD: 13 % (ref 2–12)
NEUTROPHILS NFR BLD: 45 % (ref 43–80)
NEUTS SEG NFR BLD: 2.39 K/UL (ref 1.8–7.3)
PLATELET # BLD AUTO: 221 K/UL (ref 130–450)
PMV BLD AUTO: 11.6 FL (ref 7–12)
POTASSIUM SERPL-SCNC: 4.2 MMOL/L (ref 3.5–5)
PROT SERPL-MCNC: 8 G/DL (ref 6.4–8.3)
RBC # BLD AUTO: 4.93 M/UL (ref 3.8–5.8)
SODIUM SERPL-SCNC: 142 MMOL/L (ref 132–146)
TOTAL PROTEIN, URINE: 21 MG/DL (ref 0–12)
URATE SERPL-MCNC: 7.9 MG/DL (ref 3.4–7)
URINE TOTAL PROTEIN CREATININE RATIO: 0.14 (ref 0–0.2)
VIT B12 SERPL-MCNC: 985 PG/ML (ref 211–946)
WBC OTHER # BLD: 5.3 K/UL (ref 4.5–11.5)

## 2024-05-20 PROCEDURE — 84100 ASSAY OF PHOSPHORUS: CPT

## 2024-05-20 PROCEDURE — 82570 ASSAY OF URINE CREATININE: CPT

## 2024-05-20 PROCEDURE — 85025 COMPLETE CBC W/AUTO DIFF WBC: CPT

## 2024-05-20 PROCEDURE — 84550 ASSAY OF BLOOD/URIC ACID: CPT

## 2024-05-20 PROCEDURE — 84156 ASSAY OF PROTEIN URINE: CPT

## 2024-05-20 PROCEDURE — 80053 COMPREHEN METABOLIC PANEL: CPT

## 2024-05-20 PROCEDURE — 82607 VITAMIN B-12: CPT

## 2024-05-20 PROCEDURE — 83735 ASSAY OF MAGNESIUM: CPT

## 2024-05-20 PROCEDURE — 82043 UR ALBUMIN QUANTITATIVE: CPT

## 2024-05-20 PROCEDURE — 36415 COLL VENOUS BLD VENIPUNCTURE: CPT

## 2024-05-20 PROCEDURE — 83036 HEMOGLOBIN GLYCOSYLATED A1C: CPT

## 2024-05-21 ENCOUNTER — OFFICE VISIT (OUTPATIENT)
Dept: PRIMARY CARE CLINIC | Age: 74
End: 2024-05-21
Payer: MEDICAID

## 2024-05-21 VITALS
OXYGEN SATURATION: 98 % | SYSTOLIC BLOOD PRESSURE: 153 MMHG | RESPIRATION RATE: 16 BRPM | BODY MASS INDEX: 32.51 KG/M2 | WEIGHT: 240 LBS | DIASTOLIC BLOOD PRESSURE: 78 MMHG | TEMPERATURE: 97.2 F | HEIGHT: 72 IN | HEART RATE: 69 BPM

## 2024-05-21 DIAGNOSIS — H92.02 ACUTE OTALGIA, LEFT: Primary | ICD-10-CM

## 2024-05-21 LAB — PHOSPHATE SERPL-MCNC: 2.6 MG/DL (ref 2.5–4.5)

## 2024-05-21 PROCEDURE — 1123F ACP DISCUSS/DSCN MKR DOCD: CPT | Performed by: NURSE PRACTITIONER

## 2024-05-21 PROCEDURE — 1036F TOBACCO NON-USER: CPT | Performed by: NURSE PRACTITIONER

## 2024-05-21 PROCEDURE — 99213 OFFICE O/P EST LOW 20 MIN: CPT | Performed by: NURSE PRACTITIONER

## 2024-05-21 PROCEDURE — G8417 CALC BMI ABV UP PARAM F/U: HCPCS | Performed by: NURSE PRACTITIONER

## 2024-05-21 PROCEDURE — 3017F COLORECTAL CA SCREEN DOC REV: CPT | Performed by: NURSE PRACTITIONER

## 2024-05-21 PROCEDURE — 3078F DIAST BP <80 MM HG: CPT | Performed by: NURSE PRACTITIONER

## 2024-05-21 PROCEDURE — 3077F SYST BP >= 140 MM HG: CPT | Performed by: NURSE PRACTITIONER

## 2024-05-21 PROCEDURE — G8427 DOCREV CUR MEDS BY ELIG CLIN: HCPCS | Performed by: NURSE PRACTITIONER

## 2024-05-21 NOTE — PROGRESS NOTES
Chief Complaint:   Otalgia (Released from hospital on Wednesday for motion and was given medicine in the hospital but nothing to go home with. And the pain is back)    History of Present Illness   Source of history provided by:  patient.     Hemal Banks is a 74 y.o. old male presenting to walk-in for evaluation of left ear pain x several days. Reports he was recently admitted to the hospital for ataxia, was seen by ENT, they evaluated him and did not suspect any middle or inner ear pathology. He has not had any recent illness or URI symptoms. Denies any discharge from the ear canal. Has tried taking nothing for the symptoms. Denies any fever, chills, CP, SOB, abdominal pain, neck stiffness, rash, or lethargy.    Review of Systems   Unless otherwise stated in this report or unable to obtain because of the patient's clinical or mental status as evidenced by the medical record, this patients's positive and negative responses for Review of Systems, constitutional, psych, eyes, ENT, cardiovascular, respiratory, gastrointestinal, neurological, genitourinary, musculoskeletal, integument systems and systems related to the presenting problem are either stated in the preceding or were not pertinent or were negative for the symptoms and/or complaints related to the medical problem.    Past Medical History:  has a past medical history of Arthritis, Benign hypertensive kidney disease with chronic kidney disease, Cervical vertebra fusion, Chronic bilateral low back pain with bilateral sciatica, Chronic kidney disease (CKD), stage III (moderate) ([facility]), Depression, Hypertension, Stroke ([facility]), and Type 2 diabetes mellitus with diabetic polyneuropathy, with long-term current use of insulin ([facility]).   Past Surgical History:  has a past surgical history that includes fracture surgery; Rotator cuff repair (Left, 1999); Spine surgery; Kidney stone surgery (01/23/2010); Colonoscopy; Upper gastrointestinal endoscopy; Upper

## 2024-06-11 ENCOUNTER — APPOINTMENT (OUTPATIENT)
Dept: GENERAL RADIOLOGY | Age: 74
End: 2024-06-11
Payer: MEDICAID

## 2024-06-11 ENCOUNTER — HOSPITAL ENCOUNTER (EMERGENCY)
Age: 74
Discharge: PSYCHIATRIC OR SUBSTANCE USE FACILITY | End: 2024-06-13
Attending: EMERGENCY MEDICINE
Payer: MEDICAID

## 2024-06-11 DIAGNOSIS — T39.1X2A SUICIDE ATTEMPT BY ACETAMINOPHEN OVERDOSE, INITIAL ENCOUNTER (HCC): Primary | ICD-10-CM

## 2024-06-11 DIAGNOSIS — R74.8 ELEVATED CPK: ICD-10-CM

## 2024-06-11 DIAGNOSIS — F19.10 SUBSTANCE ABUSE (HCC): ICD-10-CM

## 2024-06-11 LAB
ALBUMIN SERPL-MCNC: 4.1 G/DL (ref 3.5–5.2)
ALP SERPL-CCNC: 109 U/L (ref 40–129)
ALT SERPL-CCNC: 24 U/L (ref 0–40)
AMPHET UR QL SCN: NEGATIVE
ANION GAP SERPL CALCULATED.3IONS-SCNC: 12 MMOL/L (ref 7–16)
APAP SERPL-MCNC: 14 UG/ML (ref 10–30)
APAP SERPL-MCNC: <5 UG/ML (ref 10–30)
AST SERPL-CCNC: 68 U/L (ref 0–39)
BARBITURATES UR QL SCN: NEGATIVE
BASOPHILS # BLD: 0.03 K/UL (ref 0–0.2)
BASOPHILS NFR BLD: 1 % (ref 0–2)
BENZODIAZ UR QL: NEGATIVE
BILIRUB SERPL-MCNC: 0.4 MG/DL (ref 0–1.2)
BUN SERPL-MCNC: 30 MG/DL (ref 6–23)
BUPRENORPHINE UR QL: NEGATIVE
CALCIUM SERPL-MCNC: 8.7 MG/DL (ref 8.6–10.2)
CANNABINOIDS UR QL SCN: NEGATIVE
CHLORIDE SERPL-SCNC: 109 MMOL/L (ref 98–107)
CK SERPL-CCNC: 1253 U/L (ref 20–200)
CK SERPL-CCNC: 1444 U/L (ref 20–200)
CK SERPL-CCNC: 1458 U/L (ref 20–200)
CO2 SERPL-SCNC: 23 MMOL/L (ref 22–29)
COCAINE UR QL SCN: POSITIVE
CREAT SERPL-MCNC: 1.9 MG/DL (ref 0.7–1.2)
EKG ATRIAL RATE: 76 BPM
EKG P AXIS: 61 DEGREES
EKG P-R INTERVAL: 190 MS
EKG Q-T INTERVAL: 418 MS
EKG QRS DURATION: 90 MS
EKG QTC CALCULATION (BAZETT): 470 MS
EKG R AXIS: 5 DEGREES
EKG T AXIS: -8 DEGREES
EKG VENTRICULAR RATE: 76 BPM
EOSINOPHIL # BLD: 0.09 K/UL (ref 0.05–0.5)
EOSINOPHILS RELATIVE PERCENT: 2 % (ref 0–6)
ERYTHROCYTE [DISTWIDTH] IN BLOOD BY AUTOMATED COUNT: 16.7 % (ref 11.5–15)
ETHANOLAMINE SERPL-MCNC: <10 MG/DL (ref 0–0.08)
FENTANYL UR QL: NEGATIVE
GFR, ESTIMATED: 37 ML/MIN/1.73M2
GLUCOSE BLD-MCNC: 102 MG/DL (ref 74–99)
GLUCOSE SERPL-MCNC: 132 MG/DL (ref 74–99)
HCT VFR BLD AUTO: 41.7 % (ref 37–54)
HGB BLD-MCNC: 12.6 G/DL (ref 12.5–16.5)
IMM GRANULOCYTES # BLD AUTO: <0.03 K/UL (ref 0–0.58)
IMM GRANULOCYTES NFR BLD: 0 % (ref 0–5)
LYMPHOCYTES NFR BLD: 1.36 K/UL (ref 1.5–4)
LYMPHOCYTES RELATIVE PERCENT: 23 % (ref 20–42)
MCH RBC QN AUTO: 24.7 PG (ref 26–35)
MCHC RBC AUTO-ENTMCNC: 30.2 G/DL (ref 32–34.5)
MCV RBC AUTO: 81.8 FL (ref 80–99.9)
METHADONE UR QL: NEGATIVE
MONOCYTES NFR BLD: 0.68 K/UL (ref 0.1–0.95)
MONOCYTES NFR BLD: 12 % (ref 2–12)
NEUTROPHILS NFR BLD: 63 % (ref 43–80)
NEUTS SEG NFR BLD: 3.68 K/UL (ref 1.8–7.3)
OPIATES UR QL SCN: NEGATIVE
OXYCODONE UR QL SCN: NEGATIVE
PCP UR QL SCN: NEGATIVE
PLATELET # BLD AUTO: 173 K/UL (ref 130–450)
PMV BLD AUTO: 11.2 FL (ref 7–12)
POTASSIUM SERPL-SCNC: 3.6 MMOL/L (ref 3.5–5)
PROT SERPL-MCNC: 8.1 G/DL (ref 6.4–8.3)
RBC # BLD AUTO: 5.1 M/UL (ref 3.8–5.8)
SALICYLATES SERPL-MCNC: <0.3 MG/DL (ref 0–30)
SODIUM SERPL-SCNC: 144 MMOL/L (ref 132–146)
TEST INFORMATION: ABNORMAL
TOXIC TRICYCLIC SC,BLOOD: NEGATIVE
TROPONIN I SERPL HS-MCNC: 19 NG/L (ref 0–11)
TROPONIN I SERPL HS-MCNC: 21 NG/L (ref 0–11)
WBC OTHER # BLD: 5.9 K/UL (ref 4.5–11.5)

## 2024-06-11 PROCEDURE — 85025 COMPLETE CBC W/AUTO DIFF WBC: CPT

## 2024-06-11 PROCEDURE — 2580000003 HC RX 258: Performed by: EMERGENCY MEDICINE

## 2024-06-11 PROCEDURE — 84484 ASSAY OF TROPONIN QUANT: CPT

## 2024-06-11 PROCEDURE — 71045 X-RAY EXAM CHEST 1 VIEW: CPT

## 2024-06-11 PROCEDURE — 99285 EMERGENCY DEPT VISIT HI MDM: CPT

## 2024-06-11 PROCEDURE — 80179 DRUG ASSAY SALICYLATE: CPT

## 2024-06-11 PROCEDURE — 82962 GLUCOSE BLOOD TEST: CPT

## 2024-06-11 PROCEDURE — 93005 ELECTROCARDIOGRAM TRACING: CPT

## 2024-06-11 PROCEDURE — 80143 DRUG ASSAY ACETAMINOPHEN: CPT

## 2024-06-11 PROCEDURE — 80053 COMPREHEN METABOLIC PANEL: CPT

## 2024-06-11 PROCEDURE — 80307 DRUG TEST PRSMV CHEM ANLYZR: CPT

## 2024-06-11 PROCEDURE — 93010 ELECTROCARDIOGRAM REPORT: CPT | Performed by: INTERNAL MEDICINE

## 2024-06-11 PROCEDURE — G0480 DRUG TEST DEF 1-7 CLASSES: HCPCS

## 2024-06-11 PROCEDURE — 82550 ASSAY OF CK (CPK): CPT

## 2024-06-11 RX ORDER — 0.9 % SODIUM CHLORIDE 0.9 %
1000 INTRAVENOUS SOLUTION INTRAVENOUS ONCE
Status: COMPLETED | OUTPATIENT
Start: 2024-06-11 | End: 2024-06-11

## 2024-06-11 RX ORDER — 0.9 % SODIUM CHLORIDE 0.9 %
500 INTRAVENOUS SOLUTION INTRAVENOUS ONCE
Status: COMPLETED | OUTPATIENT
Start: 2024-06-11 | End: 2024-06-11

## 2024-06-11 RX ADMIN — SODIUM CHLORIDE 1000 ML: 9 INJECTION, SOLUTION INTRAVENOUS at 11:07

## 2024-06-11 RX ADMIN — SODIUM CHLORIDE 1000 ML: 9 INJECTION, SOLUTION INTRAVENOUS at 07:25

## 2024-06-11 RX ADMIN — SODIUM CHLORIDE 500 ML: 9 INJECTION, SOLUTION INTRAVENOUS at 22:04

## 2024-06-11 RX ADMIN — SODIUM CHLORIDE 1000 ML: 9 INJECTION, SOLUTION INTRAVENOUS at 11:09

## 2024-06-11 ASSESSMENT — PAIN - FUNCTIONAL ASSESSMENT: PAIN_FUNCTIONAL_ASSESSMENT: NONE - DENIES PAIN

## 2024-06-11 NOTE — ED NOTES
Pt was brought back to section G after intentionally ingesting medication in a suicide attempt.     Pt was pink slipped by Dr. Reyes indicating that pt was feeling suicidal and overdosed on Tylenol.    Per Dr. Reyes, Patient does not give specific reason as to why he wanted to hurt himself. Affect flat depressed suicidal.    PT WILL BE A MEDICAL ADMIT WITH A PSYCH CONSULT.

## 2024-06-11 NOTE — ED PROVIDER NOTES
HPI:  6/11/24, Time: 5:12 AM EDT         Hemal Banks is a 74 y.o. male history of arthritis history of hypertensive kidney disease history of sciatica history of diabetes depression hypertension stroke presenting to the ED for psychiatric evaluation, beginning 1 day ago.  The complaint has been persistent, moderate in severity, and worsened by nothing.  Patient reportedly had taken handful of Tylenol.  Patient also took medications he stated were used for motion sickness.  Patient reports he took the medication shortly before he called 911.  Patient reports recently admission for dizziness.  Patient reporting no complaints of any chest pain or difficulty breathing reports no abdominal pain or vomiting he reports no fall or injury.  Patient does not give specific reason as to why he wanted to hurt himself.  Patient reporting no homicidal ideation reports no hallucinations.  Reports no fever or chills.  Patient does admit to smoking crack cocaine.    ROS:   Pertinent positives and negatives are stated within HPI, all other systems reviewed and are negative.  --------------------------------------------- PAST HISTORY ---------------------------------------------  Past Medical History:  has a past medical history of Arthritis, Benign hypertensive kidney disease with chronic kidney disease, Cervical vertebra fusion, Chronic bilateral low back pain with bilateral sciatica, Chronic kidney disease (CKD), stage III (moderate) ([facility]), Depression, Hypertension, Stroke ([facility]), and Type 2 diabetes mellitus with diabetic polyneuropathy, with long-term current use of insulin ([facility]).    Past Surgical History:  has a past surgical history that includes fracture surgery; Rotator cuff repair (Left, 1999); Spine surgery; Kidney stone surgery (01/23/2010); Colonoscopy; Upper gastrointestinal endoscopy; Upper gastrointestinal endoscopy (N/A, 09/13/2021); Colonoscopy (N/A, 09/13/2021); Upper gastrointestinal endoscopy (N/A, 09/13/2021);

## 2024-06-11 NOTE — PROGRESS NOTES
Pt states ingestion of handful of Tylenol was 1 hour prior to arrival (time of ingestion 04:00).     Initial level at 05:40 was 14 mg/dL, repeat Tylenol level at 08:54 (5 hours) was <5. Plotted on Basilio-Iron Nomogram, NAC not indicated.     Discussed with Dr. Miller.     Mana Sanchez, PharmD, BCIDP, BCPS 6/11/2024 11:00 AM  [phone]

## 2024-06-11 NOTE — ED NOTES
Pt presented with unlabeled bottle of pills, two different kinds of pills in bottle. Verified with pharmacy that pills are Tylenol and Mucinex. Pt unsure how many he took. Pt states he \"tipped the bottle back and took them.\"

## 2024-06-12 LAB
CK SERPL-CCNC: 1006 U/L (ref 20–200)
CK SERPL-CCNC: 1095 U/L (ref 20–200)
CK SERPL-CCNC: 670 U/L (ref 20–200)
CK SERPL-CCNC: 741 U/L (ref 20–200)
CK SERPL-CCNC: 938 U/L (ref 20–200)

## 2024-06-12 PROCEDURE — 82550 ASSAY OF CK (CPK): CPT

## 2024-06-12 PROCEDURE — 2580000003 HC RX 258: Performed by: STUDENT IN AN ORGANIZED HEALTH CARE EDUCATION/TRAINING PROGRAM

## 2024-06-12 RX ORDER — 0.9 % SODIUM CHLORIDE 0.9 %
1000 INTRAVENOUS SOLUTION INTRAVENOUS ONCE
Status: COMPLETED | OUTPATIENT
Start: 2024-06-12 | End: 2024-06-12

## 2024-06-12 RX ADMIN — SODIUM CHLORIDE 1000 ML: 9 INJECTION, SOLUTION INTRAVENOUS at 20:42

## 2024-06-12 ASSESSMENT — PAIN - FUNCTIONAL ASSESSMENT
PAIN_FUNCTIONAL_ASSESSMENT: NONE - DENIES PAIN

## 2024-06-12 NOTE — ED NOTES
Second call made to present patient to the floor. No answer and message left for Margi Lazaro. Will attempt again in few minutes.

## 2024-06-12 NOTE — ED NOTES
received a call back from [place] and spoke to Celine and confirmed provider still is unwilling to accept patient with an elevated CK level.

## 2024-06-12 NOTE — ED NOTES
received a call from [place] inquiring on if patient willing to sign in voluntarily.    Patient is on a involuntary hold by ED physician for suicide attempt.     called EP back at [phone] and spoke to Celine GODINEZ and answered some questions on chief complaint.   informed she will further review with medical director.     awaiting a call back.

## 2024-06-12 NOTE — ED NOTES
received a call from Celine from  and provided her an update on repeat CK level.     Awaiting a call back.

## 2024-06-12 NOTE — ED NOTES
Please advise   Pt sitting up in bed eating dinner tray, CO at bedside. Pt remains calm and cooperative.

## 2024-06-12 NOTE — ED NOTES
Spoke to ER Dr. Nj regarding status of patient.  He stated that he is going to give the patient more fluids and determine if he is medically cleared after that time.

## 2024-06-12 NOTE — ED NOTES
Patient present to floor. Patient declined and not medically cleared per Margi Lazaro due to elevated BUN, Creatine, and CK. Request patient be referred out.

## 2024-06-12 NOTE — ED NOTES
called and followed up with EP and spoke to Celine who spoke to medical provider and still unwilling to accept patient at this time due to elevated CK level.    Repeat CK level added.    Celine plans to follow up with medical provider to inquire on an acceptable level.    Awaiting a call back.

## 2024-06-12 NOTE — ED NOTES
did meet with patient at bedside briefly.  Patient able to complete all ADLs.  Patient states he does not walk with a cane or walker however would be interested in the future.  Patient reports he lives at [facility].  Patient receives Social Security and manages his own funds.    Patient, cooperative no signs of agitation and able to answer brief questions.

## 2024-06-13 VITALS
DIASTOLIC BLOOD PRESSURE: 88 MMHG | BODY MASS INDEX: 32.51 KG/M2 | RESPIRATION RATE: 14 BRPM | HEIGHT: 72 IN | OXYGEN SATURATION: 95 % | HEART RATE: 91 BPM | WEIGHT: 240 LBS | TEMPERATURE: 98.2 F | SYSTOLIC BLOOD PRESSURE: 142 MMHG

## 2024-06-13 LAB — GLUCOSE BLD-MCNC: 147 MG/DL (ref 74–99)

## 2024-06-13 PROCEDURE — 82962 GLUCOSE BLOOD TEST: CPT

## 2024-06-13 NOTE — ED NOTES
[facility] UPDATE:     Pending:  [place]     Capacity:  [facility]- have packet  [place] [facility]  [facility]

## 2024-06-13 NOTE — ED NOTES
Patient has been accepted to [facility] by Dr. Giancarlo Meadows.     [facility] [phone]     Involuntary hold is to be faxed     Will not have a bed until 0700

## 2024-06-14 LAB
ALBUMIN SERPL-MCNC: 4 G/DL (ref 3.5–5.2)
ALP SERPL-CCNC: 107 U/L (ref 40–129)
ALT SERPL-CCNC: 25 U/L (ref 0–40)
ANION GAP SERPL CALCULATED.3IONS-SCNC: 14 MMOL/L (ref 7–16)
AST SERPL-CCNC: 38 U/L (ref 0–39)
BASOPHILS # BLD: 0.03 K/UL (ref 0–0.2)
BASOPHILS NFR BLD: 1 % (ref 0–2)
BILIRUB SERPL-MCNC: 0.4 MG/DL (ref 0–1.2)
BUN SERPL-MCNC: 19 MG/DL (ref 6–23)
CALCIUM SERPL-MCNC: 9.1 MG/DL (ref 8.6–10.2)
CHLORIDE SERPL-SCNC: 106 MMOL/L (ref 98–107)
CO2 SERPL-SCNC: 20 MMOL/L (ref 22–29)
CREAT SERPL-MCNC: 1.5 MG/DL (ref 0.7–1.2)
EOSINOPHIL # BLD: 0.26 K/UL (ref 0.05–0.5)
EOSINOPHILS RELATIVE PERCENT: 4 % (ref 0–6)
ERYTHROCYTE [DISTWIDTH] IN BLOOD BY AUTOMATED COUNT: 16.8 % (ref 11.5–15)
FOLATE SERPL-MCNC: 11.5 NG/ML (ref 4.8–24.2)
GFR, ESTIMATED: 47 ML/MIN/1.73M2
GLUCOSE SERPL-MCNC: 122 MG/DL (ref 74–99)
HCT VFR BLD AUTO: 41.3 % (ref 37–54)
HGB BLD-MCNC: 12.6 G/DL (ref 12.5–16.5)
IMM GRANULOCYTES # BLD AUTO: 0.03 K/UL (ref 0–0.58)
IMM GRANULOCYTES NFR BLD: 1 % (ref 0–5)
LYMPHOCYTES NFR BLD: 1.61 K/UL (ref 1.5–4)
LYMPHOCYTES RELATIVE PERCENT: 25 % (ref 20–42)
MAGNESIUM SERPL-MCNC: 2 MG/DL (ref 1.6–2.6)
MCH RBC QN AUTO: 25.3 PG (ref 26–35)
MCHC RBC AUTO-ENTMCNC: 30.5 G/DL (ref 32–34.5)
MCV RBC AUTO: 82.9 FL (ref 80–99.9)
MONOCYTES NFR BLD: 0.64 K/UL (ref 0.1–0.95)
MONOCYTES NFR BLD: 10 % (ref 2–12)
NEUTROPHILS NFR BLD: 61 % (ref 43–80)
NEUTS SEG NFR BLD: 4.01 K/UL (ref 1.8–7.3)
PLATELET # BLD AUTO: 194 K/UL (ref 130–450)
PMV BLD AUTO: 11.9 FL (ref 7–12)
POTASSIUM SERPL-SCNC: 3.9 MMOL/L (ref 3.5–5)
PROT SERPL-MCNC: 7.7 G/DL (ref 6.4–8.3)
RBC # BLD AUTO: 4.98 M/UL (ref 3.8–5.8)
RPR SER QL: NONREACTIVE
SODIUM SERPL-SCNC: 140 MMOL/L (ref 132–146)
TSH SERPL DL<=0.05 MIU/L-ACNC: 1.08 UIU/ML (ref 0.27–4.2)
VIT B12 SERPL-MCNC: 809 PG/ML (ref 211–946)
WBC OTHER # BLD: 6.6 K/UL (ref 4.5–11.5)

## 2024-07-02 ENCOUNTER — APPOINTMENT (OUTPATIENT)
Dept: GENERAL RADIOLOGY | Age: 74
End: 2024-07-02
Payer: MEDICAID

## 2024-07-02 ENCOUNTER — HOSPITAL ENCOUNTER (EMERGENCY)
Age: 74
Discharge: HOME | End: 2024-07-02
Payer: MEDICAID

## 2024-07-02 VITALS
OXYGEN SATURATION: 97 % | HEART RATE: 99 BPM | WEIGHT: 250 LBS | BODY MASS INDEX: 33.91 KG/M2 | DIASTOLIC BLOOD PRESSURE: 89 MMHG | TEMPERATURE: 97.6 F | SYSTOLIC BLOOD PRESSURE: 150 MMHG | RESPIRATION RATE: 19 BRPM

## 2024-07-02 DIAGNOSIS — Z76.0 ENCOUNTER FOR MEDICATION REFILL: ICD-10-CM

## 2024-07-02 DIAGNOSIS — I48.0 PAROXYSMAL ATRIAL FIBRILLATION (HCC): ICD-10-CM
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
## 2024-07-02 DIAGNOSIS — I10 ESSENTIAL HYPERTENSION: ICD-10-CM

## 2024-07-02 DIAGNOSIS — E11.22 TYPE 2 DIABETES MELLITUS WITH STAGE 3B CHRONIC KIDNEY DISEASE, WITH LONG-TERM CURRENT USE OF INSULIN (HCC): ICD-10-CM

## 2024-07-02 DIAGNOSIS — N18.32 TYPE 2 DIABETES MELLITUS WITH STAGE 3B CHRONIC KIDNEY DISEASE, WITH LONG-TERM CURRENT USE OF INSULIN (HCC): ICD-10-CM
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
## 2024-07-02 DIAGNOSIS — Z79.4 TYPE 2 DIABETES MELLITUS WITH STAGE 3B CHRONIC KIDNEY DISEASE, WITH LONG-TERM CURRENT USE OF INSULIN (HCC): ICD-10-CM

## 2024-07-02 DIAGNOSIS — S66.912A STRAIN OF LEFT WRIST, INITIAL ENCOUNTER: ICD-10-CM

## 2024-07-02 PROCEDURE — 99283 EMERGENCY DEPT VISIT LOW MDM: CPT

## 2024-07-02 PROCEDURE — 73110 X-RAY EXAM OF WRIST: CPT

## 2024-07-02 PROCEDURE — 73090 X-RAY EXAM OF FOREARM: CPT

## 2024-07-02 PROCEDURE — 73130 X-RAY EXAM OF HAND: CPT

## 2024-07-02 RX ORDER — HYDRALAZINE HYDROCHLORIDE 25 MG/1
25 TABLET, FILM COATED ORAL 2 TIMES DAILY
Qty: 60 TABLET | Refills: 0 | Status: SHIPPED | OUTPATIENT
Start: 2024-07-02 | End: 2024-08-01

## 2024-07-02 RX ORDER — DAPAGLIFLOZIN 10 MG/1
10 TABLET, FILM COATED ORAL DAILY
Qty: 30 TABLET | Refills: 0 | Status: SHIPPED | OUTPATIENT
Start: 2024-07-02 | End: 2024-08-01

## 2024-07-02 RX ORDER — METOPROLOL SUCCINATE 100 MG/1
50 TABLET, EXTENDED RELEASE ORAL 2 TIMES DAILY
Qty: 30 TABLET | Refills: 0 | Status: SHIPPED | OUTPATIENT
Start: 2024-07-02 | End: 2024-08-01

## 2024-07-02 RX ORDER — GABAPENTIN 300 MG/1
300 CAPSULE ORAL 2 TIMES DAILY
Qty: 60 CAPSULE | Refills: 0 | Status: SHIPPED | OUTPATIENT
Start: 2024-07-02 | End: 2024-08-01

## 2024-07-02 ASSESSMENT — PAIN - FUNCTIONAL ASSESSMENT: PAIN_FUNCTIONAL_ASSESSMENT: 0-10

## 2024-07-02 ASSESSMENT — PAIN DESCRIPTION - LOCATION: LOCATION: ARM

## 2024-07-02 ASSESSMENT — PAIN DESCRIPTION - ORIENTATION: ORIENTATION: LEFT

## 2024-07-02 ASSESSMENT — PAIN DESCRIPTION - DESCRIPTORS: DESCRIPTORS: SHARP

## 2024-07-02 ASSESSMENT — PAIN SCALES - GENERAL: PAINLEVEL_OUTOF10: 7

## 2024-07-02 NOTE — ED PROVIDER NOTES
Independent BETSY Visit.     [facility]  Department of Emergency Medicine   ED  Encounter Note  Admit Date/RoomTime: 2024 10:01 AM  ED Room: PR1/MN1    NAME: Hemal Banks  : 1950  MRN: [id number]     Chief Complaint:  Arm Injury (Pt states fell on left arm a few weeks ago and states having pain and not getting better-didn't hit head, no loc, on eliquis. Aaox4. ) and Medication Refill (Needs prescriptions for a few medications. )    History of Present Illness     Hemal Banks is a 74 y.o. old male who presents to the emergency department by private vehicle, for Left wrist and forearm pain x 3 weeks. The patient states he tripped and fell on concrete and braced his fall with his left arm. Patient states the fall happened 3 weeks ago and since onset the symptoms have been worsening.  Patient has no prior history of pain/injury with regards to today's visit. His pain is aggraveated by any movement or pressure on or palpation of painful area. Patient denies head injury, loss of consciousness, other injuries related with the fall, numbness or tingling, and weakness. Patient has good ROM of arm, wrist, and hand with moderate pain. Good dexterity. Median, radial, and ulnar nerve motor testing normal. No pulse deficits. Moderate swelling to radial aspect of wrist.    Patient also asking for refills on the following medications:    He states his next appointment with PCP is in one month.  Patient states he has been out for the last week and needs a refill.  Patient has no problems or concerns with this    The patients tetanus status is up to date.       ROS   Pertinent positives and negatives are stated within HPI, all other systems reviewed and are negative.    Past Medical History:  has a past medical history of Arthritis, Benign hypertensive kidney disease with chronic kidney disease, Cervical vertebra fusion, Chronic bilateral low back pain with bilateral sciatica, Chronic kidney

## 2024-07-02 NOTE — DISCHARGE INSTRUCTIONS
Please wear the wrist brace when you are up and moving around this will help support the wrist for the next 2 weeks.  Please make sure you take it off and continue to move the wrist around.  Please alternate Tylenol every 6-8 hours with each other to help with inflammation.  Please use ice 20 minutes on 20 minutes off.  Please follow-up with your family doctor.  Medication refill given for the next 30 days.  Please follow-up with your family doctor.

## 2024-07-12 ENCOUNTER — OFFICE VISIT (OUTPATIENT)
Age: 74
End: 2024-07-12
Payer: MEDICAID

## 2024-07-12 VITALS
SYSTOLIC BLOOD PRESSURE: 154 MMHG | BODY MASS INDEX: 33.03 KG/M2 | HEART RATE: 64 BPM | DIASTOLIC BLOOD PRESSURE: 70 MMHG | HEIGHT: 72 IN | OXYGEN SATURATION: 98 % | RESPIRATION RATE: 18 BRPM | WEIGHT: 243.9 LBS | TEMPERATURE: 97.4 F

## 2024-07-12 DIAGNOSIS — F33.1 MODERATE EPISODE OF RECURRENT MAJOR DEPRESSIVE DISORDER (HCC): ICD-10-CM

## 2024-07-12 DIAGNOSIS — I10 ESSENTIAL HYPERTENSION: ICD-10-CM

## 2024-07-12 DIAGNOSIS — N20.0 NEPHROLITHIASIS: ICD-10-CM

## 2024-07-12 DIAGNOSIS — E11.22 TYPE 2 DIABETES MELLITUS WITH STAGE 3B CHRONIC KIDNEY DISEASE, WITH LONG-TERM CURRENT USE OF INSULIN (HCC): ICD-10-CM

## 2024-07-12 DIAGNOSIS — R10.9 LEFT FLANK PAIN: ICD-10-CM

## 2024-07-12 DIAGNOSIS — K21.9 GASTROESOPHAGEAL REFLUX DISEASE, UNSPECIFIED WHETHER ESOPHAGITIS PRESENT: ICD-10-CM

## 2024-07-12 DIAGNOSIS — Z79.4 TYPE 2 DIABETES MELLITUS WITH STAGE 3B CHRONIC KIDNEY DISEASE, WITH LONG-TERM CURRENT USE OF INSULIN (HCC): ICD-10-CM

## 2024-07-12 DIAGNOSIS — I48.0 PAROXYSMAL ATRIAL FIBRILLATION (HCC): ICD-10-CM

## 2024-07-12 DIAGNOSIS — N18.32 TYPE 2 DIABETES MELLITUS WITH STAGE 3B CHRONIC KIDNEY DISEASE, WITH LONG-TERM CURRENT USE OF INSULIN (HCC): ICD-10-CM

## 2024-07-12 DIAGNOSIS — M54.16 LUMBAR RADICULOPATHY: Primary | ICD-10-CM

## 2024-07-12 DIAGNOSIS — H25.9 AGE-RELATED CATARACT OF BOTH EYES, UNSPECIFIED AGE-RELATED CATARACT TYPE: ICD-10-CM

## 2024-07-12 PROCEDURE — G8427 DOCREV CUR MEDS BY ELIG CLIN: HCPCS | Performed by: FAMILY MEDICINE

## 2024-07-12 PROCEDURE — 2022F DILAT RTA XM EVC RTNOPTHY: CPT | Performed by: FAMILY MEDICINE

## 2024-07-12 PROCEDURE — 3017F COLORECTAL CA SCREEN DOC REV: CPT | Performed by: FAMILY MEDICINE

## 2024-07-12 PROCEDURE — 3077F SYST BP >= 140 MM HG: CPT | Performed by: FAMILY MEDICINE

## 2024-07-12 PROCEDURE — 99214 OFFICE O/P EST MOD 30 MIN: CPT | Performed by: FAMILY MEDICINE

## 2024-07-12 PROCEDURE — 1123F ACP DISCUSS/DSCN MKR DOCD: CPT | Performed by: FAMILY MEDICINE

## 2024-07-12 PROCEDURE — 1036F TOBACCO NON-USER: CPT | Performed by: FAMILY MEDICINE

## 2024-07-12 PROCEDURE — G8417 CALC BMI ABV UP PARAM F/U: HCPCS | Performed by: FAMILY MEDICINE

## 2024-07-12 PROCEDURE — 3078F DIAST BP <80 MM HG: CPT | Performed by: FAMILY MEDICINE

## 2024-07-12 PROCEDURE — 3044F HG A1C LEVEL LT 7.0%: CPT | Performed by: FAMILY MEDICINE

## 2024-07-12 RX ORDER — BUPROPION HYDROCHLORIDE 100 MG/1
100 TABLET, EXTENDED RELEASE ORAL DAILY
Qty: 90 TABLET | Refills: 1 | Status: SHIPPED | OUTPATIENT
Start: 2024-07-12

## 2024-07-12 RX ORDER — DAPAGLIFLOZIN 10 MG/1
10 TABLET, FILM COATED ORAL DAILY
Qty: 90 TABLET | Refills: 1 | Status: SHIPPED | OUTPATIENT
Start: 2024-07-12

## 2024-07-12 RX ORDER — TAMSULOSIN HYDROCHLORIDE 0.4 MG/1
0.4 CAPSULE ORAL NIGHTLY
Qty: 90 CAPSULE | Refills: 1 | Status: SHIPPED | OUTPATIENT
Start: 2024-07-12

## 2024-07-12 RX ORDER — METOPROLOL SUCCINATE 100 MG/1
50 TABLET, EXTENDED RELEASE ORAL 2 TIMES DAILY
Qty: 90 TABLET | Refills: 1 | Status: SHIPPED | OUTPATIENT
Start: 2024-07-12

## 2024-07-12 RX ORDER — AMLODIPINE BESYLATE 10 MG/1
10 TABLET ORAL DAILY
Qty: 90 TABLET | Refills: 1 | Status: SHIPPED | OUTPATIENT
Start: 2024-07-12

## 2024-07-12 RX ORDER — ASPIRIN 81 MG/1
81 TABLET ORAL DAILY
Qty: 90 TABLET | Refills: 1 | Status: SHIPPED | OUTPATIENT
Start: 2024-07-12

## 2024-07-12 RX ORDER — HYDRALAZINE HYDROCHLORIDE 25 MG/1
25 TABLET, FILM COATED ORAL 2 TIMES DAILY
Qty: 180 TABLET | Refills: 1 | Status: SHIPPED | OUTPATIENT
Start: 2024-07-12

## 2024-07-12 RX ORDER — LISINOPRIL 40 MG/1
40 TABLET ORAL DAILY
Qty: 90 TABLET | Refills: 1 | Status: SHIPPED | OUTPATIENT
Start: 2024-07-12

## 2024-07-12 RX ORDER — PANTOPRAZOLE SODIUM 40 MG/1
40 TABLET, DELAYED RELEASE ORAL DAILY
Qty: 90 TABLET | Refills: 1 | Status: SHIPPED | OUTPATIENT
Start: 2024-07-12

## 2024-07-12 RX ORDER — GABAPENTIN 300 MG/1
300 CAPSULE ORAL 2 TIMES DAILY
Qty: 180 CAPSULE | Refills: 1 | Status: SHIPPED | OUTPATIENT
Start: 2024-07-12 | End: 2025-01-08

## 2024-07-12 ASSESSMENT — ENCOUNTER SYMPTOMS
ABDOMINAL PAIN: 0
DIARRHEA: 0
WHEEZING: 0
SHORTNESS OF BREATH: 0
COUGH: 0
NAUSEA: 0
CONSTIPATION: 0
VOMITING: 0

## 2024-07-12 NOTE — PATIENT INSTRUCTIONS
[facility] Food Resources*  (Call [employer] Way/211 for more resources)         HELP NETWORK OF [facility]:  What they do: Provides 24-hr, 7 days a week access to information on community resources for food & clothing help for [facility] AND [facility]  Phone: 211 or 491-013-2235  Text “HELP NETWORK” to 205098 for local food resources  DEPARTMENT OF JOB AND FAMILY SERVICES:  What they do: SNAP, provide a card to use like cash to purchase healthy foods at approved retailers  [facility]  Phone: [phone]  [facility]  Phone: [phone]  [facility]  Phone: [phone]  Website: s.[place].[facility]:  [address] [place] [address]  What they offer: Food and clothing distribution on Tuesdays from 9 am to 12pm & Thursdays from 4pm to 7pm.   Phone Number: [phone] ext. 503  Website: www.[employer]  [facility] [employer]  What they offer: food items that stop at various housing and community services organizations, follow a month schedule.  Call to learn more.  Phone Number: [phone]      [facility] [employer]:  [address] [place] [address]  What they offer: Serves breakfast and lunch daily, 7 AM-9AM and 11AM-1PM (closed Sundays)  Contact: [phone]  [facility]: [address] [place] [address]  What they offer: Hot evening meals on Mondays and Tuesdays; doors open at 4:00PM; dinner served 5PM-6PM  Phone Number: [phone]  Website: [employer]    [employer] FAMILY [employer]:  What they offer: Emergency food assistance  Phone number: [phone]  Website: [employer]  [employer]:  What they offer: Frozen meals for private pay, government funded programs and some insurances   Phone Number: [phone]  Website: [employer]  MEALS ON WHEELS ([facility]):  What they offer: Hot dinner and cold lunch Monday-Friday ($14/day); hot dinner Monday-Friday

## 2024-07-12 NOTE — PROGRESS NOTES
180 days.  Dispense: 180 capsule; Refill: 1    8. Age-related cataract of both eyes, unspecified age-related cataract type  -Patient unaware he had surgery scheduled for 7/18  [place]  -Currently dealing with some personal difficulties that would inhibit his ability to get surgery  -[facility] and spoke with [place], his surgery was canceled  -Patient decided to call to reschedule instead of rescheduling today  -Patient essentially optimized for surgery but given his housing situation unable to get it done    9. Moderate episode of recurrent major depressive disorder (HCC)  -Doing well on Wellbutrin 150 mg  -Refill Wellbutrin  -Discussed again the benefits of counseling  - buPROPion (WELLBUTRIN SR) 100 MG extended release tablet; Take 1 tablet by mouth daily  Dispense: 90 tablet; Refill: 1      Educational materials and/or home exercises printed for patient's review and were included in patient instructions on his/her After Visit Summary and given to patient at the end of visit.        Counseled regarding above diagnosis, including possible risks and complications,  especially if left uncontrolled.     Counseled regarding the possible side effects, risks, benefits and alternatives to treatment; patient and/or guardian verbalizes understanding, agrees, feels comfortable with and wishes to proceed with above treatment plan.     Advised patient to call with any new medication issues, and read all Rx info from pharmacy to assure aware of all possible risks and side effects of medication before taking.     Reviewed age and gender appropriate health screening exams and vaccinations.  Advised patient regarding importance of keeping up with recommended health maintenance and to schedule as soon as possible if overdue, as this is important in assessing for undiagnosed pathology, especially cancer, as well as protecting against potentially harmful/life threatening disease.

## 2024-07-22 ENCOUNTER — TELEPHONE (OUTPATIENT)
Age: 74
End: 2024-07-22

## 2024-07-22 DIAGNOSIS — R11.2 NAUSEA AND VOMITING, UNSPECIFIED VOMITING TYPE: Primary | ICD-10-CM

## 2024-07-22 NOTE — TELEPHONE ENCOUNTER
Please send refill for motion sickness to [facility] [address] for patient. States he got some when he was in the ED but I am not seeing med on his list.

## 2024-07-24 RX ORDER — MECLIZINE HCL 12.5 MG/1
12.5 TABLET ORAL 3 TIMES DAILY PRN
Qty: 30 TABLET | Refills: 0 | Status: SHIPPED | OUTPATIENT
Start: 2024-07-24 | End: 2024-08-03

## 2024-08-05 ENCOUNTER — TELEPHONE (OUTPATIENT)
Dept: SURGERY | Age: 74
End: 2024-08-05

## 2024-08-05 NOTE — TELEPHONE ENCOUNTER
MA left message to schedule repeat colonoscopy consult.  Electronically signed by Jenny Baxter MA on 8/5/24 at 8:32 AM EDT

## 2024-08-28 DIAGNOSIS — R11.2 NAUSEA AND VOMITING, UNSPECIFIED VOMITING TYPE: ICD-10-CM

## 2024-08-28 RX ORDER — MECLIZINE HCL 12.5 MG 12.5 MG/1
TABLET ORAL
Qty: 30 TABLET | Refills: 0 | OUTPATIENT
Start: 2024-08-28

## 2024-08-28 RX ORDER — MECLIZINE HCL 12.5 MG 12.5 MG/1
TABLET ORAL
Qty: 30 TABLET | Refills: 0 | Status: SHIPPED | OUTPATIENT
Start: 2024-08-28

## 2024-09-13 ENCOUNTER — HOSPITAL ENCOUNTER (OUTPATIENT)
Age: 74
Discharge: HOME | End: 2024-09-13
Payer: MEDICAID

## 2024-09-13 PROCEDURE — 84154 ASSAY OF PSA FREE: CPT

## 2024-09-13 PROCEDURE — 84153 ASSAY OF PSA TOTAL: CPT

## 2024-09-13 PROCEDURE — 36415 COLL VENOUS BLD VENIPUNCTURE: CPT

## 2024-09-15 LAB
PROSTATE SPECIFIC ANTIGEN FREE: 4.4 NG/ML
PROSTATE SPECIFIC ANTIGEN PERCENT FREE: 28 %
PROSTATE SPECIFIC ANTIGEN: 15.5 NG/ML (ref 0–4)

## 2024-09-20 ENCOUNTER — HOSPITAL ENCOUNTER (INPATIENT)
Age: 74
LOS: 7 days | Discharge: HOME | End: 2024-09-27
Attending: EMERGENCY MEDICINE | Admitting: HOSPITALIST
Payer: MEDICAID

## 2024-09-20 ENCOUNTER — OFFICE VISIT (OUTPATIENT)
Age: 74
End: 2024-09-20
Payer: MEDICAID

## 2024-09-20 ENCOUNTER — APPOINTMENT (OUTPATIENT)
Dept: GENERAL RADIOLOGY | Age: 74
End: 2024-09-20
Payer: MEDICAID

## 2024-09-20 VITALS
BODY MASS INDEX: 32.91 KG/M2 | HEART RATE: 71 BPM | DIASTOLIC BLOOD PRESSURE: 64 MMHG | OXYGEN SATURATION: 99 % | HEIGHT: 72 IN | TEMPERATURE: 97.3 F | RESPIRATION RATE: 18 BRPM | SYSTOLIC BLOOD PRESSURE: 112 MMHG | WEIGHT: 243 LBS

## 2024-09-20 DIAGNOSIS — I50.30 HEART FAILURE WITH PRESERVED EJECTION FRACTION, UNSPECIFIED HF CHRONICITY (HCC): ICD-10-CM

## 2024-09-20 DIAGNOSIS — I10 ESSENTIAL HYPERTENSION: ICD-10-CM

## 2024-09-20 DIAGNOSIS — R06.02 SHORTNESS OF BREATH: Primary | ICD-10-CM

## 2024-09-20 DIAGNOSIS — R06.09 DOE (DYSPNEA ON EXERTION): ICD-10-CM

## 2024-09-20 DIAGNOSIS — R01.1 SYSTOLIC MURMUR: ICD-10-CM

## 2024-09-20 DIAGNOSIS — R01.1 HEART MURMUR: ICD-10-CM

## 2024-09-20 DIAGNOSIS — R94.31 ABNORMAL ELECTROCARDIOGRAPHY: ICD-10-CM

## 2024-09-20 DIAGNOSIS — R26.2 AMBULATORY DYSFUNCTION: ICD-10-CM

## 2024-09-20 DIAGNOSIS — N30.00 ACUTE CYSTITIS WITHOUT HEMATURIA: ICD-10-CM

## 2024-09-20 DIAGNOSIS — Z59.82 TRANSPORTATION INSECURITY: ICD-10-CM

## 2024-09-20 DIAGNOSIS — R06.02 SHORTNESS OF BREATH: ICD-10-CM

## 2024-09-20 DIAGNOSIS — R06.89 ABNORMAL BREATH SOUNDS: ICD-10-CM

## 2024-09-20 DIAGNOSIS — R06.09 EXERTIONAL DYSPNEA: Primary | ICD-10-CM

## 2024-09-20 PROBLEM — E66.811 CLASS 1 OBESITY WITH SERIOUS COMORBIDITY AND BODY MASS INDEX (BMI) OF 32.0 TO 32.9 IN ADULT: Status: ACTIVE | Noted: 2022-05-20

## 2024-09-20 LAB
ALBUMIN SERPL-MCNC: 3.9 G/DL (ref 3.5–5.2)
ALP SERPL-CCNC: 123 U/L (ref 40–129)
ALT SERPL-CCNC: 21 U/L (ref 0–40)
ANION GAP SERPL CALCULATED.3IONS-SCNC: 13 MMOL/L (ref 7–16)
AST SERPL-CCNC: 31 U/L (ref 0–39)
BACTERIA URNS QL MICRO: ABNORMAL
BASOPHILS # BLD: 0.02 K/UL (ref 0–0.2)
BASOPHILS NFR BLD: 0 % (ref 0–2)
BILIRUB SERPL-MCNC: 0.3 MG/DL (ref 0–1.2)
BILIRUB UR QL STRIP: NEGATIVE
BILIRUB UR QL STRIP: NEGATIVE
BNP SERPL-MCNC: 613 PG/ML (ref 0–125)
BUN SERPL-MCNC: 22 MG/DL (ref 6–23)
CALCIUM SERPL-MCNC: 8.5 MG/DL (ref 8.6–10.2)
CHLORIDE SERPL-SCNC: 108 MMOL/L (ref 98–107)
CLARITY UR: ABNORMAL
CLARITY UR: ABNORMAL
CO2 SERPL-SCNC: 21 MMOL/L (ref 22–29)
COLOR UR: YELLOW
COLOR UR: YELLOW
CREAT SERPL-MCNC: 1.9 MG/DL (ref 0.7–1.2)
EKG ATRIAL RATE: 63 BPM
EKG P-R INTERVAL: 204 MS
EKG Q-T INTERVAL: 428 MS
EKG QRS DURATION: 94 MS
EKG QTC CALCULATION (BAZETT): 437 MS
EKG R AXIS: 43 DEGREES
EKG T AXIS: -4 DEGREES
EKG VENTRICULAR RATE: 63 BPM
EOSINOPHIL # BLD: 0.11 K/UL (ref 0.05–0.5)
EOSINOPHILS RELATIVE PERCENT: 2 % (ref 0–6)
EPI CELLS #/AREA URNS HPF: ABNORMAL /HPF
ERYTHROCYTE [DISTWIDTH] IN BLOOD BY AUTOMATED COUNT: 16.7 % (ref 11.5–15)
GFR, ESTIMATED: 37 ML/MIN/1.73M2
GLUCOSE SERPL-MCNC: 110 MG/DL (ref 74–99)
GLUCOSE UR STRIP-MCNC: >=1000 MG/DL
GLUCOSE UR STRIP-MCNC: >=1000 MG/DL
HCT VFR BLD AUTO: 39 % (ref 37–54)
HGB BLD-MCNC: 11.9 G/DL (ref 12.5–16.5)
HGB UR QL STRIP.AUTO: NEGATIVE
HGB UR QL STRIP.AUTO: NEGATIVE
IMM GRANULOCYTES # BLD AUTO: <0.03 K/UL (ref 0–0.58)
IMM GRANULOCYTES NFR BLD: 0 % (ref 0–5)
KETONES UR STRIP-MCNC: NEGATIVE MG/DL
KETONES UR STRIP-MCNC: NEGATIVE MG/DL
LEUKOCYTE ESTERASE UR QL STRIP: ABNORMAL
LEUKOCYTE ESTERASE UR QL STRIP: ABNORMAL
LYMPHOCYTES NFR BLD: 1.44 K/UL (ref 1.5–4)
LYMPHOCYTES RELATIVE PERCENT: 29 % (ref 20–42)
MAGNESIUM SERPL-MCNC: 2.3 MG/DL (ref 1.6–2.6)
MCH RBC QN AUTO: 25.2 PG (ref 26–35)
MCHC RBC AUTO-ENTMCNC: 30.5 G/DL (ref 32–34.5)
MCV RBC AUTO: 82.6 FL (ref 80–99.9)
MONOCYTES NFR BLD: 0.5 K/UL (ref 0.1–0.95)
MONOCYTES NFR BLD: 10 % (ref 2–12)
NEUTROPHILS NFR BLD: 58 % (ref 43–80)
NEUTS SEG NFR BLD: 2.82 K/UL (ref 1.8–7.3)
NITRITE UR QL STRIP: NEGATIVE
NITRITE UR QL STRIP: NEGATIVE
PH UR STRIP: 6 [PH] (ref 5–9)
PH UR STRIP: 6 [PH] (ref 5–9)
PLATELET # BLD AUTO: 189 K/UL (ref 130–450)
PMV BLD AUTO: 11.4 FL (ref 7–12)
POTASSIUM SERPL-SCNC: 3.9 MMOL/L (ref 3.5–5)
PROT SERPL-MCNC: 7.2 G/DL (ref 6.4–8.3)
PROT UR STRIP-MCNC: NEGATIVE MG/DL
PROT UR STRIP-MCNC: NEGATIVE MG/DL
RBC # BLD AUTO: 4.72 M/UL (ref 3.8–5.8)
RBC #/AREA URNS HPF: ABNORMAL /HPF
RBC #/AREA URNS HPF: ABNORMAL /HPF
SODIUM SERPL-SCNC: 142 MMOL/L (ref 132–146)
SP GR UR STRIP: 1.01 (ref 1–1.03)
SP GR UR STRIP: 1.02 (ref 1–1.03)
TROPONIN I SERPL HS-MCNC: 18 NG/L (ref 0–11)
TROPONIN I SERPL HS-MCNC: 19 NG/L (ref 0–11)
UROBILINOGEN UR STRIP-ACNC: 0.2 EU/DL (ref 0–1)
UROBILINOGEN UR STRIP-ACNC: 0.2 EU/DL (ref 0–1)
WBC #/AREA URNS HPF: ABNORMAL /HPF
WBC #/AREA URNS HPF: ABNORMAL /HPF
WBC OTHER # BLD: 4.9 K/UL (ref 4.5–11.5)

## 2024-09-20 PROCEDURE — 87086 URINE CULTURE/COLONY COUNT: CPT

## 2024-09-20 PROCEDURE — 3017F COLORECTAL CA SCREEN DOC REV: CPT | Performed by: FAMILY MEDICINE

## 2024-09-20 PROCEDURE — 85025 COMPLETE CBC W/AUTO DIFF WBC: CPT

## 2024-09-20 PROCEDURE — 80053 COMPREHEN METABOLIC PANEL: CPT

## 2024-09-20 PROCEDURE — 83735 ASSAY OF MAGNESIUM: CPT

## 2024-09-20 PROCEDURE — 99285 EMERGENCY DEPT VISIT HI MDM: CPT

## 2024-09-20 PROCEDURE — 71046 X-RAY EXAM CHEST 2 VIEWS: CPT

## 2024-09-20 PROCEDURE — 93005 ELECTROCARDIOGRAM TRACING: CPT | Performed by: EMERGENCY MEDICINE

## 2024-09-20 PROCEDURE — 3078F DIAST BP <80 MM HG: CPT | Performed by: FAMILY MEDICINE

## 2024-09-20 PROCEDURE — 84484 ASSAY OF TROPONIN QUANT: CPT

## 2024-09-20 PROCEDURE — 2140000000 HC CCU INTERMEDIATE R&B

## 2024-09-20 PROCEDURE — 83880 ASSAY OF NATRIURETIC PEPTIDE: CPT

## 2024-09-20 PROCEDURE — 96374 THER/PROPH/DIAG INJ IV PUSH: CPT

## 2024-09-20 PROCEDURE — 81001 URINALYSIS AUTO W/SCOPE: CPT

## 2024-09-20 PROCEDURE — G8428 CUR MEDS NOT DOCUMENT: HCPCS | Performed by: FAMILY MEDICINE

## 2024-09-20 PROCEDURE — G8417 CALC BMI ABV UP PARAM F/U: HCPCS | Performed by: FAMILY MEDICINE

## 2024-09-20 PROCEDURE — 93000 ELECTROCARDIOGRAM COMPLETE: CPT | Performed by: FAMILY MEDICINE

## 2024-09-20 PROCEDURE — 1036F TOBACCO NON-USER: CPT | Performed by: FAMILY MEDICINE

## 2024-09-20 PROCEDURE — 99222 1ST HOSP IP/OBS MODERATE 55: CPT | Performed by: HOSPITALIST

## 2024-09-20 PROCEDURE — 99215 OFFICE O/P EST HI 40 MIN: CPT | Performed by: FAMILY MEDICINE

## 2024-09-20 PROCEDURE — 6360000002 HC RX W HCPCS: Performed by: EMERGENCY MEDICINE

## 2024-09-20 PROCEDURE — 3074F SYST BP LT 130 MM HG: CPT | Performed by: FAMILY MEDICINE

## 2024-09-20 PROCEDURE — 93010 ELECTROCARDIOGRAM REPORT: CPT | Performed by: INTERNAL MEDICINE

## 2024-09-20 PROCEDURE — 2580000003 HC RX 258: Performed by: EMERGENCY MEDICINE

## 2024-09-20 PROCEDURE — 1123F ACP DISCUSS/DSCN MKR DOCD: CPT | Performed by: FAMILY MEDICINE

## 2024-09-20 RX ORDER — ONDANSETRON 2 MG/ML
4 INJECTION INTRAMUSCULAR; INTRAVENOUS EVERY 6 HOURS PRN
Status: DISCONTINUED | OUTPATIENT
Start: 2024-09-20 | End: 2024-09-27 | Stop reason: HOSPADM

## 2024-09-20 RX ORDER — ACETAMINOPHEN 650 MG/1
650 SUPPOSITORY RECTAL EVERY 6 HOURS PRN
Status: DISCONTINUED | OUTPATIENT
Start: 2024-09-20 | End: 2024-09-27 | Stop reason: HOSPADM

## 2024-09-20 RX ORDER — LISINOPRIL 20 MG/1
40 TABLET ORAL DAILY
Status: DISCONTINUED | OUTPATIENT
Start: 2024-09-21 | End: 2024-09-22

## 2024-09-20 RX ORDER — METOPROLOL SUCCINATE 50 MG/1
50 TABLET, EXTENDED RELEASE ORAL 2 TIMES DAILY
Status: DISCONTINUED | OUTPATIENT
Start: 2024-09-20 | End: 2024-09-27 | Stop reason: HOSPADM

## 2024-09-20 RX ORDER — SODIUM CHLORIDE 0.9 % (FLUSH) 0.9 %
5-40 SYRINGE (ML) INJECTION EVERY 12 HOURS SCHEDULED
Status: DISCONTINUED | OUTPATIENT
Start: 2024-09-20 | End: 2024-09-27 | Stop reason: HOSPADM

## 2024-09-20 RX ORDER — TAMSULOSIN HYDROCHLORIDE 0.4 MG/1
0.4 CAPSULE ORAL NIGHTLY
Status: DISCONTINUED | OUTPATIENT
Start: 2024-09-20 | End: 2024-09-27 | Stop reason: HOSPADM

## 2024-09-20 RX ORDER — ONDANSETRON 4 MG/1
4 TABLET, ORALLY DISINTEGRATING ORAL EVERY 8 HOURS PRN
Status: DISCONTINUED | OUTPATIENT
Start: 2024-09-20 | End: 2024-09-27 | Stop reason: HOSPADM

## 2024-09-20 RX ORDER — HYDRALAZINE HYDROCHLORIDE 25 MG/1
25 TABLET, FILM COATED ORAL 2 TIMES DAILY
Status: DISCONTINUED | OUTPATIENT
Start: 2024-09-21 | End: 2024-09-23

## 2024-09-20 RX ORDER — SODIUM CHLORIDE 9 MG/ML
INJECTION, SOLUTION INTRAVENOUS PRN
Status: DISCONTINUED | OUTPATIENT
Start: 2024-09-20 | End: 2024-09-27 | Stop reason: HOSPADM

## 2024-09-20 RX ORDER — INSULIN LISPRO 100 [IU]/ML
0-4 INJECTION, SOLUTION INTRAVENOUS; SUBCUTANEOUS
Status: DISCONTINUED | OUTPATIENT
Start: 2024-09-21 | End: 2024-09-27 | Stop reason: HOSPADM

## 2024-09-20 RX ORDER — POLYETHYLENE GLYCOL 3350 17 G/17G
17 POWDER, FOR SOLUTION ORAL DAILY PRN
Status: DISCONTINUED | OUTPATIENT
Start: 2024-09-20 | End: 2024-09-27 | Stop reason: HOSPADM

## 2024-09-20 RX ORDER — GABAPENTIN 300 MG/1
300 CAPSULE ORAL 2 TIMES DAILY
Status: DISCONTINUED | OUTPATIENT
Start: 2024-09-20 | End: 2024-09-26

## 2024-09-20 RX ORDER — LANOLIN ALCOHOL/MO/W.PET/CERES
1000 CREAM (GRAM) TOPICAL DAILY
Status: DISCONTINUED | OUTPATIENT
Start: 2024-09-21 | End: 2024-09-27 | Stop reason: HOSPADM

## 2024-09-20 RX ORDER — AMLODIPINE BESYLATE 10 MG/1
10 TABLET ORAL DAILY
Status: DISCONTINUED | OUTPATIENT
Start: 2024-09-21 | End: 2024-09-23

## 2024-09-20 RX ORDER — ASPIRIN 81 MG/1
81 TABLET ORAL DAILY
Status: DISCONTINUED | OUTPATIENT
Start: 2024-09-21 | End: 2024-09-27 | Stop reason: HOSPADM

## 2024-09-20 RX ORDER — INSULIN LISPRO 100 [IU]/ML
0-4 INJECTION, SOLUTION INTRAVENOUS; SUBCUTANEOUS NIGHTLY
Status: DISCONTINUED | OUTPATIENT
Start: 2024-09-21 | End: 2024-09-27 | Stop reason: HOSPADM

## 2024-09-20 RX ORDER — PANTOPRAZOLE SODIUM 40 MG/1
40 TABLET, DELAYED RELEASE ORAL DAILY
Status: DISCONTINUED | OUTPATIENT
Start: 2024-09-21 | End: 2024-09-27 | Stop reason: HOSPADM

## 2024-09-20 RX ORDER — ACETAMINOPHEN 325 MG/1
650 TABLET ORAL EVERY 6 HOURS PRN
Status: DISCONTINUED | OUTPATIENT
Start: 2024-09-20 | End: 2024-09-27 | Stop reason: HOSPADM

## 2024-09-20 RX ORDER — SODIUM CHLORIDE 0.9 % (FLUSH) 0.9 %
5-40 SYRINGE (ML) INJECTION PRN
Status: DISCONTINUED | OUTPATIENT
Start: 2024-09-20 | End: 2024-09-27 | Stop reason: HOSPADM

## 2024-09-20 RX ORDER — BUPROPION HYDROCHLORIDE 100 MG/1
100 TABLET, EXTENDED RELEASE ORAL DAILY
Status: DISCONTINUED | OUTPATIENT
Start: 2024-09-21 | End: 2024-09-21

## 2024-09-20 RX ADMIN — WATER 1000 MG: 1 INJECTION INTRAMUSCULAR; INTRAVENOUS; SUBCUTANEOUS at 16:32

## 2024-09-20 SDOH — ECONOMIC STABILITY - TRANSPORTATION SECURITY: TRANSPORTATION INSECURITY: Z59.82

## 2024-09-20 ASSESSMENT — ENCOUNTER SYMPTOMS
WHEEZING: 0
SHORTNESS OF BREATH: 1
DIARRHEA: 0
VOMITING: 0
ABDOMINAL PAIN: 0
COUGH: 0
CONSTIPATION: 0
NAUSEA: 0

## 2024-09-20 ASSESSMENT — PAIN - FUNCTIONAL ASSESSMENT: PAIN_FUNCTIONAL_ASSESSMENT: NONE - DENIES PAIN

## 2024-09-20 NOTE — ED PROVIDER NOTES
ED PROVIDER NOTE    Chief Complaint   Patient presents with    Shortness of Breath     On exertion for 6 months.       HPI:  9/20/24,   Time: 10:46 AM EDT       Hemal Banks is a 74 y.o. male presenting to the ED for Shortness of breath.  Ongoing for the last several months, however worse over the last few days.  Worse with exertion.  No associated orthopnea.  Has chronic leg swelling which is stable from baseline.  Not on diuretics.  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  Denies associated fever, chills, cough, chest pain, abdominal pain, nausea, vomiting, diarrhea.  No black or bloody stools.  He does have some associated lightheadedness with exertion as well.  Does not smoke.  Normal p.o. intake and urine output.  Was seen by his primary care provider today who was concerned about his exertional dyspnea and sent him to the ED for further evaluation.    Chart review: hx of CKD, HTN, depression, CVA, DM2, arthritis, afib on apixaban, latent TB  Lab Results   Component Value Date    LVEF 60 12/08/2021     Reviewed outpatient primary care progress note from 9/20/2024 by Dr. Bill Lundy:  Dx shortness of breath, dyspnea on exertion, abnormal breath sounds, abnormal EKG.  EKG did show nonspecific T waves and first-degree AV block.  Vital stable throughout office visit.  Plan was to get labs and CXR not emergently, however during ambulatory pulse ox patient became lightheaded and developed labored breathing, became weak on the legs, and had presyncope.  He was therefore sent to the ED for further evaluation.    Review of Systems:     Review of Systems  Pertinent positives and negatives as stated in HPI     --------------------------------------------- PAST HISTORY ---------------------------------------------  Past Medical History:   Past Medical History:   Diagnosis Date    Arthritis     Benign hypertensive kidney disease with chronic kidney  Sensitivity 18 (*)     All other components within normal limits   URINALYSIS WITH MICROSCOPIC - Abnormal; Notable for the following components:    Turbidity UA SLIGHTLY CLOUDY (*)     Glucose, Ur >=1000 (*)     Leukocyte Esterase, Urine SMALL (*)     WBC, UA 21 TO 50 (*)     Bacteria, UA TRACE (*)     All other components within normal limits   CULTURE, URINE   MAGNESIUM       RADIOLOGY:  Interpreted personally and by Radiologist.  XR CHEST (2 VW)   Final Result   No acute process.             EKG:  This EKG is signed and interpreted by the EP.    Normal sinus rhythm, ventricular rate 63 bpm, normal axis and intervals, nonspecific ST-T abnormality in inferior and lateral precordial leads, significant changes have occurred compared w/ prior EKG       ------------------------- NURSING NOTES AND VITALS REVIEWED ---------------------------   The nursing notes within the ED encounter and vital signs as below have been reviewed by myself.  /65   Pulse 69   Temp 97.8 °F (36.6 °C)   Resp 18   Wt 110.2 kg (243 lb)   SpO2 98%   BMI 32.96 kg/m²   Oxygen Saturation Interpretation: Normal    The patient’s available past medical records and past encounters were reviewed.        ------------------------------ ED COURSE/MEDICAL DECISION MAKING----------------------  Medications   cefTRIAXone (ROCEPHIN) 1,000 mg in sterile water 10 mL IV syringe (1,000 mg IntraVENous Given 9/20/24 1632)       Consultations:             Internal medicine - discussed w/ Dr. Galvez who will admit the patient    Independent interpretation of tests:  CXR - no focal consolidation, pneumothorax, or pleural effusion     The patient presents with a new acute problem or complaint.        Counseling:   The emergency provider has spoken with the patient and discussed today’s results, in addition to providing specific details for the plan of care and counseling regarding the diagnosis and prognosis.  Questions are answered at this time and they are

## 2024-09-20 NOTE — ED NOTES
Completed ambulating pulse oximetry. Pt tolerated well with sats staying between 97-99% with deep breathing upon excertion.

## 2024-09-21 LAB
ANION GAP SERPL CALCULATED.3IONS-SCNC: 12 MMOL/L (ref 7–16)
BASOPHILS # BLD: 0.03 K/UL (ref 0–0.2)
BASOPHILS NFR BLD: 1 % (ref 0–2)
BUN SERPL-MCNC: 20 MG/DL (ref 6–23)
CALCIUM SERPL-MCNC: 8.7 MG/DL (ref 8.6–10.2)
CHLORIDE SERPL-SCNC: 111 MMOL/L (ref 98–107)
CHOLEST SERPL-MCNC: 143 MG/DL
CO2 SERPL-SCNC: 22 MMOL/L (ref 22–29)
CREAT SERPL-MCNC: 1.8 MG/DL (ref 0.7–1.2)
EOSINOPHIL # BLD: 0.07 K/UL (ref 0.05–0.5)
EOSINOPHILS RELATIVE PERCENT: 2 % (ref 0–6)
ERYTHROCYTE [DISTWIDTH] IN BLOOD BY AUTOMATED COUNT: 16.6 % (ref 11.5–15)
GFR, ESTIMATED: 40 ML/MIN/1.73M2
GLUCOSE BLD-MCNC: 109 MG/DL (ref 74–99)
GLUCOSE BLD-MCNC: 115 MG/DL (ref 74–99)
GLUCOSE BLD-MCNC: 121 MG/DL (ref 74–99)
GLUCOSE BLD-MCNC: 150 MG/DL (ref 74–99)
GLUCOSE SERPL-MCNC: 125 MG/DL (ref 74–99)
HBA1C MFR BLD: 6.6 % (ref 4–5.6)
HCT VFR BLD AUTO: 40.5 % (ref 37–54)
HDLC SERPL-MCNC: 53 MG/DL
HGB BLD-MCNC: 12.4 G/DL (ref 12.5–16.5)
IMM GRANULOCYTES # BLD AUTO: <0.03 K/UL (ref 0–0.58)
IMM GRANULOCYTES NFR BLD: 0 % (ref 0–5)
LDLC SERPL CALC-MCNC: 77 MG/DL
LYMPHOCYTES NFR BLD: 1.45 K/UL (ref 1.5–4)
LYMPHOCYTES RELATIVE PERCENT: 33 % (ref 20–42)
MCH RBC QN AUTO: 25.5 PG (ref 26–35)
MCHC RBC AUTO-ENTMCNC: 30.6 G/DL (ref 32–34.5)
MCV RBC AUTO: 83.3 FL (ref 80–99.9)
MONOCYTES NFR BLD: 0.45 K/UL (ref 0.1–0.95)
MONOCYTES NFR BLD: 10 % (ref 2–12)
NEUTROPHILS NFR BLD: 54 % (ref 43–80)
NEUTS SEG NFR BLD: 2.36 K/UL (ref 1.8–7.3)
PLATELET # BLD AUTO: 196 K/UL (ref 130–450)
PMV BLD AUTO: 10.9 FL (ref 7–12)
POTASSIUM SERPL-SCNC: 4.4 MMOL/L (ref 3.5–5)
RBC # BLD AUTO: 4.86 M/UL (ref 3.8–5.8)
SODIUM SERPL-SCNC: 145 MMOL/L (ref 132–146)
TRIGL SERPL-MCNC: 63 MG/DL
VLDLC SERPL CALC-MCNC: 13 MG/DL
WBC OTHER # BLD: 4.4 K/UL (ref 4.5–11.5)

## 2024-09-21 PROCEDURE — 80061 LIPID PANEL: CPT

## 2024-09-21 PROCEDURE — 2580000003 HC RX 258: Performed by: HOSPITALIST

## 2024-09-21 PROCEDURE — 99232 SBSQ HOSP IP/OBS MODERATE 35: CPT | Performed by: STUDENT IN AN ORGANIZED HEALTH CARE EDUCATION/TRAINING PROGRAM

## 2024-09-21 PROCEDURE — 6360000002 HC RX W HCPCS: Performed by: HOSPITALIST

## 2024-09-21 PROCEDURE — 82962 GLUCOSE BLOOD TEST: CPT

## 2024-09-21 PROCEDURE — 6370000000 HC RX 637 (ALT 250 FOR IP): Performed by: HOSPITALIST

## 2024-09-21 PROCEDURE — 36415 COLL VENOUS BLD VENIPUNCTURE: CPT

## 2024-09-21 PROCEDURE — 80048 BASIC METABOLIC PNL TOTAL CA: CPT

## 2024-09-21 PROCEDURE — 2140000000 HC CCU INTERMEDIATE R&B

## 2024-09-21 PROCEDURE — 83036 HEMOGLOBIN GLYCOSYLATED A1C: CPT

## 2024-09-21 PROCEDURE — 6360000002 HC RX W HCPCS: Performed by: STUDENT IN AN ORGANIZED HEALTH CARE EDUCATION/TRAINING PROGRAM

## 2024-09-21 PROCEDURE — 85025 COMPLETE CBC W/AUTO DIFF WBC: CPT

## 2024-09-21 RX ORDER — BUPROPION HYDROCHLORIDE 150 MG/1
150 TABLET, EXTENDED RELEASE ORAL DAILY
Status: DISCONTINUED | OUTPATIENT
Start: 2024-09-21 | End: 2024-09-27 | Stop reason: HOSPADM

## 2024-09-21 RX ORDER — BUPROPION HYDROCHLORIDE 150 MG/1
150 TABLET, EXTENDED RELEASE ORAL DAILY
COMMUNITY

## 2024-09-21 RX ORDER — LANOLIN ALCOHOL/MO/W.PET/CERES
6 CREAM (GRAM) TOPICAL NIGHTLY PRN
Status: DISCONTINUED | OUTPATIENT
Start: 2024-09-21 | End: 2024-09-27 | Stop reason: HOSPADM

## 2024-09-21 RX ORDER — FUROSEMIDE 10 MG/ML
40 INJECTION INTRAMUSCULAR; INTRAVENOUS 2 TIMES DAILY
Status: DISCONTINUED | OUTPATIENT
Start: 2024-09-21 | End: 2024-09-22

## 2024-09-21 RX ADMIN — BUPROPION HYDROCHLORIDE 150 MG: 150 TABLET, FILM COATED, EXTENDED RELEASE ORAL at 12:12

## 2024-09-21 RX ADMIN — APIXABAN 5 MG: 5 TABLET, FILM COATED ORAL at 22:05

## 2024-09-21 RX ADMIN — ASPIRIN 81 MG: 81 TABLET, COATED ORAL at 10:03

## 2024-09-21 RX ADMIN — LISINOPRIL 40 MG: 20 TABLET ORAL at 10:03

## 2024-09-21 RX ADMIN — METOPROLOL SUCCINATE 50 MG: 50 TABLET, FILM COATED, EXTENDED RELEASE ORAL at 22:05

## 2024-09-21 RX ADMIN — GABAPENTIN 300 MG: 300 CAPSULE ORAL at 22:05

## 2024-09-21 RX ADMIN — SODIUM CHLORIDE, PRESERVATIVE FREE 10 ML: 5 INJECTION INTRAVENOUS at 10:04

## 2024-09-21 RX ADMIN — FUROSEMIDE 40 MG: 10 INJECTION, SOLUTION INTRAMUSCULAR; INTRAVENOUS at 11:13

## 2024-09-21 RX ADMIN — METOPROLOL SUCCINATE 50 MG: 50 TABLET, FILM COATED, EXTENDED RELEASE ORAL at 10:03

## 2024-09-21 RX ADMIN — CYANOCOBALAMIN TAB 1000 MCG 1000 MCG: 1000 TAB at 10:03

## 2024-09-21 RX ADMIN — APIXABAN 5 MG: 5 TABLET, FILM COATED ORAL at 10:03

## 2024-09-21 RX ADMIN — WATER 1000 MG: 1 INJECTION INTRAMUSCULAR; INTRAVENOUS; SUBCUTANEOUS at 10:03

## 2024-09-21 RX ADMIN — HYDRALAZINE HYDROCHLORIDE 25 MG: 25 TABLET ORAL at 10:03

## 2024-09-21 RX ADMIN — AMLODIPINE BESYLATE 10 MG: 10 TABLET ORAL at 10:03

## 2024-09-21 RX ADMIN — FUROSEMIDE 40 MG: 10 INJECTION, SOLUTION INTRAMUSCULAR; INTRAVENOUS at 17:44

## 2024-09-21 RX ADMIN — SODIUM CHLORIDE, PRESERVATIVE FREE 10 ML: 5 INJECTION INTRAVENOUS at 21:46

## 2024-09-21 RX ADMIN — PANTOPRAZOLE SODIUM 40 MG: 40 TABLET, DELAYED RELEASE ORAL at 10:03

## 2024-09-21 RX ADMIN — GABAPENTIN 300 MG: 300 CAPSULE ORAL at 10:02

## 2024-09-21 RX ADMIN — HYDRALAZINE HYDROCHLORIDE 25 MG: 25 TABLET ORAL at 17:44

## 2024-09-21 RX ADMIN — TAMSULOSIN HYDROCHLORIDE 0.4 MG: 0.4 CAPSULE ORAL at 22:05

## 2024-09-21 NOTE — H&P
mouth nightly 7/12/24   Carlita Castellano MD   aspirin (ASPIRIN LOW DOSE) 81 MG EC tablet Take 1 tablet by mouth daily 7/12/24   Carlita Castellano MD   apixaban (ELIQUIS) 5 MG TABS tablet Take 1 tablet by mouth 2 times daily 7/12/24   Carlita Castellano MD   FARXIGA 10 MG tablet Take 1 tablet by mouth daily 7/12/24   Carlita Castellano MD   hydrALAZINE (APRESOLINE) 25 MG tablet Take 1 tablet by mouth in the morning and 1 tablet in the evening. 7/12/24   Carlita Castellano MD   gabapentin (NEURONTIN) 300 MG capsule Take 1 capsule by mouth in the morning and at bedtime for 180 days. 7/12/24 1/8/25  Carlita Castellano MD   buPROPion (WELLBUTRIN SR) 100 MG extended release tablet Take 1 tablet by mouth daily 7/12/24   Carlita Castellano MD   amLODIPine (NORVASC) 10 MG tablet Take 1 tablet by mouth daily 7/12/24   Carlita Castellano MD   vitamin D (CHOLECALCIFEROL) 25 MCG (1000 UT) TABS tablet Take 1 tablet by mouth daily 7/2/24 8/1/24  Cristel Galvez PA-C   vitamin B-12 (CYANOCOBALAMIN) 1000 MCG tablet Take 1 tablet by mouth daily    Provider, MD Rosio       Allergies:  Patient has no known allergies.    Social History:    TOBACCO:   reports that he has never smoked. He has never used smokeless tobacco.  ETOH:   reports no history of alcohol use.    Family History:    Reviewed in detail and negative for DM, CAD, Cancer, CVA. Positive as follows\"      Problem Relation Age of Onset    Heart Disease Mother     Diabetes Sister        REVIEW OF SYSTEMS:   Pertinent positives as noted in the HPI. All other systems reviewed and negative.    PHYSICAL EXAM:  /63   Pulse 62   Temp 97.4 °F (36.3 °C) (Oral)   Resp 18   Wt 110.2 kg (243 lb)   SpO2 98%   BMI 32.96 kg/m²   General appearance: No acute distress, pt is well oriented  HEENT: Normal cephalic, atraumatic without obvious deformity. Pupils equal, round, and reactive to light.  Extra ocular muscles intact.  Medicine  +++++++++++++++++++++++++++++++++++++++++++++++++  NOTE: Report could be transcribed using voice recognition software. Every effort was made to ensure accuracy; however, inadvertent computerized transcription errors may be present.

## 2024-09-21 NOTE — PROGRESS NOTES
4 Eyes Skin Assessment     NAME:  Hemal Banks  YOB: 1950  MEDICAL RECORD NUMBER:  [id number]    The patient is being assessed for  Admission    I agree that at least one RN has performed a thorough Head to Toe Skin Assessment on the patient. ALL assessment sites listed below have been assessed.      Areas assessed by both nurses:    Head, Face, Ears, Shoulders, Back, Chest, Arms, Elbows, Hands, Sacrum. Buttock, Coccyx, Ischium, Legs. Feet and Heels, and Under Medical Devices         Does the Patient have a Wound? No noted wound(s)       Jacques Prevention initiated by RN: No  Wound Care Orders initiated by RN: No    Pressure Injury (Stage 3,4, Unstageable, DTI, NWPT, and Complex wounds) if present, place Wound referral order by RN under : No    New Ostomies, if present place, Ostomy referral order under : No     Nurse 1 eSignature: Electronically signed by Margot Moeller RN on 9/21/24 at 9:04 AM EDT    **SHARE this note so that the co-signing nurse can place an eSignature**    Nurse 2 eSignature: Electronically signed by Paxton Husain RN on 9/21/24 at 3:31 PM EDT

## 2024-09-21 NOTE — PROGRESS NOTES
[facility] Hospitalist Progress Note    SYNOPSIS: Patient admitted on 2024 for Acute cystitis without hematuria  74 y.o. year old male  with PMH of CLASS 1 obesity, T2DM, HTN, HLD, A fib on eliquis     Pt presented to ED for evaluation of SOB.   Pt was seen on room air, actually complains frequent urination ; UA s/o UTI; on empiric rocephin; pending urine cx    Patient has LE swelling ; complains of dyspnea on exertion; orthopnea-currently diuresing  Echo is pending    SUBJECTIVE:  Stable overnight. No other overnight issues reported.   Patient seen and examined  Records reviewed.           Temp (24hrs), Av.7 °F (36.5 °C), Min:97.4 °F (36.3 °C), Max:97.9 °F (36.6 °C)    DIET: ADULT DIET; Regular; 4 carb choices (60 gm/meal); Low Sodium (2 gm); 1000 ml  CODE: Full Code  No intake or output data in the 24 hours ending 24 1315    Review of Systems  All bolded are positive; please see HPI  General:  Fever, chills, diaphoresis, fatigue, malaise, night sweats, weight loss  Psychological:  Anxiety, disorientation, hallucinations.  ENT:  Epistaxis, headaches, vertigo, visual changes.  Cardiovascular:  Chest pain, irregular heartbeats, palpitations, paroxysmal nocturnal dyspnea.  Respiratory:  Shortness of breath, coughing, sputum production, hemoptysis, wheezing, orthopnea.  Gastrointestinal:  Nausea, vomiting, diarrhea, heartburn, constipation, abdominal pain, hematemesis, hematochezia, melena, acholic stools  Genito-Urinary:  Dysuria, urgency, frequency, hematuria  Musculoskeletal:  Joint pain, joint stiffness, joint swelling, muscle pain  Neurology:  Headache, focal neurological deficits, weakness, numbness, paresthesia  Derm:  Rashes, ulcers, excoriations, bruising  Extremities:  Decreased ROM, peripheral edema, mottling      OBJECTIVE:    /71   Pulse 63   Temp 97.8 °F (36.6 °C) (Oral)   Resp 17   Ht 1.829 m (6' 0.01\")   Wt 110.2 kg (243 lb)   SpO2 100%   BMI 32.95 kg/m²     General  Oral Daily    sodium chloride flush  5-40 mL IntraVENous 2 times per day    cefTRIAXone (ROCEPHIN) IV  1,000 mg IntraVENous Daily    insulin lispro  0-4 Units SubCUTAneous TID WC    insulin lispro  0-4 Units SubCUTAneous Nightly     PRN Meds: melatonin, sodium chloride flush, sodium chloride, ondansetron **OR** ondansetron, acetaminophen **OR** acetaminophen, polyethylene glycol    Labs:     Recent Labs     09/20/24  1119 09/21/24  0917   WBC 4.9 4.4*   HGB 11.9* 12.4*   HCT 39.0 40.5    196       Recent Labs     09/20/24  1119 09/21/24  0917    145   K 3.9 4.4   * 111*   CO2 21* 22   BUN 22 20   CREATININE 1.9* 1.8*   CALCIUM 8.5* 8.7       Recent Labs     09/20/24  1119   ALKPHOS 123   ALT 21   AST 31   BILITOT 0.3       No results for input(s): \"INR\" in the last 72 hours.    No results for input(s): \"CKTOTAL\", \"TROPONINI\" in the last 72 hours.    Chronic labs:    Lab Results   Component Value Date    CHOL 143 09/21/2024    TRIG 63 09/21/2024    HDL 53 09/21/2024    TSH 1.08 06/14/2024    PSA 15.5 (H) 09/13/2024    INR 1.3 08/04/2023    LABA1C 6.6 (H) 05/20/2024       Radiology: REVIEWED DAILY    +++++++++++++++++++++++++++++++++++++++++++++++++  Renuka Mejia MD  [facility] [place], [place]  +++++++++++++++++++++++++++++++++++++++++++++++++  NOTE: This report was transcribed using voice recognition software. Every effort was made to ensure accuracy; however, inadvertent computerized transcription errors may be present.

## 2024-09-22 LAB
ANION GAP SERPL CALCULATED.3IONS-SCNC: 11 MMOL/L (ref 7–16)
BUN SERPL-MCNC: 23 MG/DL (ref 6–23)
CALCIUM SERPL-MCNC: 8.4 MG/DL (ref 8.6–10.2)
CHLORIDE SERPL-SCNC: 106 MMOL/L (ref 98–107)
CO2 SERPL-SCNC: 25 MMOL/L (ref 22–29)
CREAT SERPL-MCNC: 2 MG/DL (ref 0.7–1.2)
GFR, ESTIMATED: 36 ML/MIN/1.73M2
GLUCOSE BLD-MCNC: 119 MG/DL (ref 74–99)
GLUCOSE BLD-MCNC: 137 MG/DL (ref 74–99)
GLUCOSE BLD-MCNC: 148 MG/DL (ref 74–99)
GLUCOSE BLD-MCNC: 92 MG/DL (ref 74–99)
GLUCOSE SERPL-MCNC: 145 MG/DL (ref 74–99)
MICROORGANISM SPEC CULT: ABNORMAL
MICROORGANISM SPEC CULT: ABNORMAL
POTASSIUM SERPL-SCNC: 3.9 MMOL/L (ref 3.5–5)
SODIUM SERPL-SCNC: 142 MMOL/L (ref 132–146)
SPECIMEN DESCRIPTION: ABNORMAL

## 2024-09-22 PROCEDURE — 36415 COLL VENOUS BLD VENIPUNCTURE: CPT

## 2024-09-22 PROCEDURE — 80048 BASIC METABOLIC PNL TOTAL CA: CPT

## 2024-09-22 PROCEDURE — 6370000000 HC RX 637 (ALT 250 FOR IP): Performed by: STUDENT IN AN ORGANIZED HEALTH CARE EDUCATION/TRAINING PROGRAM

## 2024-09-22 PROCEDURE — 82962 GLUCOSE BLOOD TEST: CPT

## 2024-09-22 PROCEDURE — 6370000000 HC RX 637 (ALT 250 FOR IP): Performed by: HOSPITALIST

## 2024-09-22 PROCEDURE — 2140000000 HC CCU INTERMEDIATE R&B

## 2024-09-22 PROCEDURE — 2580000003 HC RX 258: Performed by: HOSPITALIST

## 2024-09-22 PROCEDURE — 87086 URINE CULTURE/COLONY COUNT: CPT

## 2024-09-22 PROCEDURE — 99232 SBSQ HOSP IP/OBS MODERATE 35: CPT | Performed by: STUDENT IN AN ORGANIZED HEALTH CARE EDUCATION/TRAINING PROGRAM

## 2024-09-22 PROCEDURE — 6360000002 HC RX W HCPCS: Performed by: STUDENT IN AN ORGANIZED HEALTH CARE EDUCATION/TRAINING PROGRAM

## 2024-09-22 PROCEDURE — 6360000002 HC RX W HCPCS: Performed by: HOSPITALIST

## 2024-09-22 RX ADMIN — HYDRALAZINE HYDROCHLORIDE 25 MG: 25 TABLET ORAL at 17:49

## 2024-09-22 RX ADMIN — GABAPENTIN 300 MG: 300 CAPSULE ORAL at 08:14

## 2024-09-22 RX ADMIN — BUPROPION HYDROCHLORIDE 150 MG: 150 TABLET, FILM COATED, EXTENDED RELEASE ORAL at 08:13

## 2024-09-22 RX ADMIN — TAMSULOSIN HYDROCHLORIDE 0.4 MG: 0.4 CAPSULE ORAL at 20:41

## 2024-09-22 RX ADMIN — WATER 1000 MG: 1 INJECTION INTRAMUSCULAR; INTRAVENOUS; SUBCUTANEOUS at 08:14

## 2024-09-22 RX ADMIN — FUROSEMIDE 40 MG: 10 INJECTION, SOLUTION INTRAMUSCULAR; INTRAVENOUS at 08:13

## 2024-09-22 RX ADMIN — HYDRALAZINE HYDROCHLORIDE 25 MG: 25 TABLET ORAL at 08:14

## 2024-09-22 RX ADMIN — APIXABAN 5 MG: 5 TABLET, FILM COATED ORAL at 08:13

## 2024-09-22 RX ADMIN — METOPROLOL SUCCINATE 50 MG: 50 TABLET, FILM COATED, EXTENDED RELEASE ORAL at 20:41

## 2024-09-22 RX ADMIN — APIXABAN 5 MG: 5 TABLET, FILM COATED ORAL at 20:41

## 2024-09-22 RX ADMIN — SODIUM CHLORIDE, PRESERVATIVE FREE 10 ML: 5 INJECTION INTRAVENOUS at 20:41

## 2024-09-22 RX ADMIN — GABAPENTIN 300 MG: 300 CAPSULE ORAL at 20:41

## 2024-09-22 RX ADMIN — AMLODIPINE BESYLATE 10 MG: 10 TABLET ORAL at 09:12

## 2024-09-22 RX ADMIN — SODIUM CHLORIDE, PRESERVATIVE FREE 10 ML: 5 INJECTION INTRAVENOUS at 08:18

## 2024-09-22 RX ADMIN — ASPIRIN 81 MG: 81 TABLET, COATED ORAL at 08:13

## 2024-09-22 RX ADMIN — CYANOCOBALAMIN TAB 1000 MCG 1000 MCG: 1000 TAB at 08:13

## 2024-09-22 RX ADMIN — PANTOPRAZOLE SODIUM 40 MG: 40 TABLET, DELAYED RELEASE ORAL at 08:13

## 2024-09-22 RX ADMIN — METOPROLOL SUCCINATE 50 MG: 50 TABLET, FILM COATED, EXTENDED RELEASE ORAL at 08:13

## 2024-09-22 RX ADMIN — LISINOPRIL 40 MG: 20 TABLET ORAL at 08:13

## 2024-09-22 NOTE — PROGRESS NOTES
[facility] Hospitalist Progress Note    SYNOPSIS: Patient admitted on 2024 for Acute cystitis without hematuria  74 y.o. year old male  with PMH of CLASS 1 obesity, T2DM, HTN, HLD, A fib on eliquis     Pt presented to ED for evaluation of SOB.   Pt was seen on room air, actually complains frequent urination ; UA s/o UTI; on empiric rocephin; pending urine cx    Patient has LE swelling ; complains of dyspnea on exertion; orthopnea-currently diuresing  Echo is pending  Cardiology consulted    SUBJECTIVE:  Stable overnight. No other overnight issues reported.   Patient seen and examined  Records reviewed.           Temp (24hrs), Av.1 °F (36.7 °C), Min:97.9 °F (36.6 °C), Max:98.4 °F (36.9 °C)    DIET: ADULT DIET; Regular; 4 carb choices (60 gm/meal); Low Sodium (2 gm); 1000 ml  CODE: Full Code    Intake/Output Summary (Last 24 hours) at 2024 1315  Last data filed at 2024 2326  Gross per 24 hour   Intake --   Output 1100 ml   Net -1100 ml       Review of Systems  All bolded are positive; please see HPI  General:  Fever, chills, diaphoresis, fatigue, malaise, night sweats, weight loss  Psychological:  Anxiety, disorientation, hallucinations.  ENT:  Epistaxis, headaches, vertigo, visual changes.  Cardiovascular:  Chest pain, irregular heartbeats, palpitations, paroxysmal nocturnal dyspnea.  Respiratory:  Shortness of breath, coughing, sputum production, hemoptysis, wheezing, orthopnea.  Gastrointestinal:  Nausea, vomiting, diarrhea, heartburn, constipation, abdominal pain, hematemesis, hematochezia, melena, acholic stools  Genito-Urinary:  Dysuria, urgency, frequency, hematuria  Musculoskeletal:  Joint pain, joint stiffness, joint swelling, muscle pain  Neurology:  Headache, focal neurological deficits, weakness, numbness, paresthesia  Derm:  Rashes, ulcers, excoriations, bruising  Extremities:  Decreased ROM, peripheral edema, mottling      OBJECTIVE:    /71   Pulse 58   Temp 97.9 °F

## 2024-09-22 NOTE — PLAN OF CARE
Problem: Chronic Conditions and Co-morbidities  Goal: Patient's chronic conditions and co-morbidity symptoms are monitored and maintained or improved  Outcome: Progressing  Flowsheets (Taken 9/21/2024 2015)  Care Plan - Patient's Chronic Conditions and Co-Morbidity Symptoms are Monitored and Maintained or Improved: Monitor and assess patient's chronic conditions and comorbid symptoms for stability, deterioration, or improvement     Problem: Discharge Planning  Goal: Discharge to home or other facility with appropriate resources  Outcome: Progressing  Flowsheets (Taken 9/21/2024 2015)  Discharge to home or other facility with appropriate resources: Identify barriers to discharge with patient and caregiver     Problem: Safety - Adult  Goal: Free from fall injury  Outcome: Progressing

## 2024-09-22 NOTE — PLAN OF CARE
Problem: Chronic Conditions and Co-morbidities  Goal: Patient's chronic conditions and co-morbidity symptoms are monitored and maintained or improved  9/22/2024 1037 by Paxton Hsuain RN  Outcome: Progressing  Flowsheets (Taken 9/22/2024 0815)  Care Plan - Patient's Chronic Conditions and Co-Morbidity Symptoms are Monitored and Maintained or Improved: Monitor and assess patient's chronic conditions and comorbid symptoms for stability, deterioration, or improvement  9/22/2024 0136 by Kemar Campbell, RN  Outcome: Progressing  Flowsheets (Taken 9/21/2024 2015)  Care Plan - Patient's Chronic Conditions and Co-Morbidity Symptoms are Monitored and Maintained or Improved: Monitor and assess patient's chronic conditions and comorbid symptoms for stability, deterioration, or improvement     Problem: Discharge Planning  Goal: Discharge to home or other facility with appropriate resources  9/22/2024 1037 by Paxton Husain RN  Outcome: Progressing  Flowsheets (Taken 9/22/2024 0815)  Discharge to home or other facility with appropriate resources:   Arrange for needed discharge resources and transportation as appropriate   Identify barriers to discharge with patient and caregiver   Identify discharge learning needs (meds, wound care, etc)   Refer to discharge planning if patient needs post-hospital services based on physician order or complex needs related to functional status, cognitive ability or social support system  9/22/2024 0136 by Kemar Campbell, RN  Outcome: Progressing  Flowsheets (Taken 9/21/2024 2015)  Discharge to home or other facility with appropriate resources: Identify barriers to discharge with patient and caregiver     Problem: Safety - Adult  Goal: Free from fall injury  9/22/2024 1037 by Paxton Husain RN  Outcome: Progressing  9/22/2024 0136 by Kemar Campbell, RN  Outcome: Progressing

## 2024-09-23 ENCOUNTER — APPOINTMENT (OUTPATIENT)
Age: 74
End: 2024-09-23
Attending: HOSPITALIST
Payer: MEDICAID

## 2024-09-23 PROBLEM — R06.09 EXERTIONAL DYSPNEA: Status: ACTIVE | Noted: 2024-09-23

## 2024-09-23 LAB
ANION GAP SERPL CALCULATED.3IONS-SCNC: 11 MMOL/L (ref 7–16)
BASOPHILS # BLD: 0.03 K/UL (ref 0–0.2)
BASOPHILS NFR BLD: 1 % (ref 0–2)
BUN SERPL-MCNC: 27 MG/DL (ref 6–23)
CALCIUM SERPL-MCNC: 8.5 MG/DL (ref 8.6–10.2)
CHLORIDE SERPL-SCNC: 105 MMOL/L (ref 98–107)
CO2 SERPL-SCNC: 23 MMOL/L (ref 22–29)
CREAT SERPL-MCNC: 1.9 MG/DL (ref 0.7–1.2)
ECHO AO ASC DIAM: 4 CM
ECHO AO ASCENDING AORTA INDEX: 1.73 CM/M2
ECHO AR MAX VEL PISA: 3.3 M/S
ECHO AV AREA PEAK VELOCITY: 2.7 CM2
ECHO AV AREA VTI: 2.7 CM2
ECHO AV AREA/BSA PEAK VELOCITY: 1.2 CM2/M2
ECHO AV AREA/BSA VTI: 1.2 CM2/M2
ECHO AV CUSP MM: 1.8 CM
ECHO AV MEAN GRADIENT: 6 MMHG
ECHO AV MEAN VELOCITY: 1.2 M/S
ECHO AV PEAK GRADIENT: 10 MMHG
ECHO AV PEAK VELOCITY: 1.6 M/S
ECHO AV REGURGITANT PHT: 573.6 MILLISECOND
ECHO AV VELOCITY RATIO: 0.75
ECHO AV VTI: 37 CM
ECHO BSA: 2.37 M2
ECHO EST RA PRESSURE: 8 MMHG
ECHO LA DIAMETER INDEX: 1.77 CM/M2
ECHO LA DIAMETER: 4.1 CM
ECHO LA VOL A-L A2C: 78 ML (ref 18–58)
ECHO LA VOL A-L A4C: 63 ML (ref 18–58)
ECHO LA VOL MOD A2C: 75 ML (ref 18–58)
ECHO LA VOL MOD A4C: 59 ML (ref 18–58)
ECHO LA VOLUME AREA LENGTH: 73 ML
ECHO LA VOLUME INDEX A-L A2C: 34 ML/M2 (ref 16–34)
ECHO LA VOLUME INDEX A-L A4C: 27 ML/M2 (ref 16–34)
ECHO LA VOLUME INDEX AREA LENGTH: 32 ML/M2 (ref 16–34)
ECHO LA VOLUME INDEX MOD A2C: 32 ML/M2 (ref 16–34)
ECHO LA VOLUME INDEX MOD A4C: 26 ML/M2 (ref 16–34)
ECHO LV E' LATERAL VELOCITY: 11.2 CM/S
ECHO LV E' SEPTAL VELOCITY: 15.5 CM/S
ECHO LV EF PHYSICIAN: 57 %
ECHO LV FRACTIONAL SHORTENING: 30 % (ref 28–44)
ECHO LV INTERNAL DIMENSION DIASTOLE INDEX: 2.47 CM/M2
ECHO LV INTERNAL DIMENSION DIASTOLIC: 5.7 CM (ref 4.2–5.9)
ECHO LV INTERNAL DIMENSION SYSTOLIC INDEX: 1.73 CM/M2
ECHO LV INTERNAL DIMENSION SYSTOLIC: 4 CM
ECHO LV ISOVOLUMETRIC RELAXATION TIME (IVRT): 78.4 MS
ECHO LV IVSD: 1 CM (ref 0.6–1)
ECHO LV IVSS: 1.3 CM
ECHO LV MASS 2D: 197.5 G (ref 88–224)
ECHO LV MASS INDEX 2D: 85.5 G/M2 (ref 49–115)
ECHO LV POSTERIOR WALL DIASTOLIC: 0.8 CM (ref 0.6–1)
ECHO LV POSTERIOR WALL SYSTOLIC: 1.1 CM
ECHO LV RELATIVE WALL THICKNESS RATIO: 0.28
ECHO LVOT AREA: 3.8 CM2
ECHO LVOT AV VTI INDEX: 0.75
ECHO LVOT DIAM: 2.2 CM
ECHO LVOT MEAN GRADIENT: 3 MMHG
ECHO LVOT PEAK GRADIENT: 6 MMHG
ECHO LVOT PEAK VELOCITY: 1.2 M/S
ECHO LVOT STROKE VOLUME INDEX: 45.7 ML/M2
ECHO LVOT SV: 105.6 ML
ECHO LVOT VTI: 27.8 CM
ECHO MV "A" WAVE DURATION: 161.5 MSEC
ECHO MV A VELOCITY: 0.99 M/S
ECHO MV AREA PHT: 3 CM2
ECHO MV AREA VTI: 3.1 CM2
ECHO MV E DECELERATION TIME (DT): 164.7 MS
ECHO MV E VELOCITY: 0.94 M/S
ECHO MV E/A RATIO: 0.95
ECHO MV E/E' LATERAL: 8.39
ECHO MV E/E' RATIO (AVERAGED): 7.23
ECHO MV E/E' SEPTAL: 6.06
ECHO MV EROA PISA: 0.1 CM2
ECHO MV LVOT VTI INDEX: 1.23
ECHO MV MAX VELOCITY: 1 M/S
ECHO MV MEAN GRADIENT: 2 MMHG
ECHO MV MEAN VELOCITY: 0.6 M/S
ECHO MV PEAK GRADIENT: 4 MMHG
ECHO MV PRESSURE HALF TIME (PHT): 74.5 MS
ECHO MV REGURGITANT ALIASING (NYQUIST) VELOCITY: 37 CM/S
ECHO MV REGURGITANT RADIUS PISA: 0.51 CM
ECHO MV REGURGITANT VELOCITY PISA: 4.4 M/S
ECHO MV REGURGITANT VOLUME PISA: 24.11 ML
ECHO MV REGURGITANT VTIA: 175.5 CM
ECHO MV VTI: 34.3 CM
ECHO PV MAX VELOCITY: 0.7 M/S
ECHO PV MEAN GRADIENT: 1 MMHG
ECHO PV MEAN VELOCITY: 0.6 M/S
ECHO PV PEAK GRADIENT: 2 MMHG
ECHO PV VTI: 20.5 CM
ECHO PVEIN A DURATION: 106.1 MS
ECHO PVEIN A VELOCITY: 0.2 M/S
ECHO PVEIN PEAK D VELOCITY: 0.5 M/S
ECHO PVEIN PEAK S VELOCITY: 0.7 M/S
ECHO PVEIN S/D RATIO: 1.4
ECHO RIGHT VENTRICULAR SYSTOLIC PRESSURE (RVSP): 12 MMHG
ECHO RV INTERNAL DIMENSION: 3.3 CM
ECHO RV TAPSE: 2.5 CM (ref 1.7–?)
ECHO TV REGURGITANT MAX VELOCITY: 1 M/S
ECHO TV REGURGITANT PEAK GRADIENT: 4 MMHG
EOSINOPHIL # BLD: 0.09 K/UL (ref 0.05–0.5)
EOSINOPHILS RELATIVE PERCENT: 2 % (ref 0–6)
ERYTHROCYTE [DISTWIDTH] IN BLOOD BY AUTOMATED COUNT: 16.3 % (ref 11.5–15)
GFR, ESTIMATED: 37 ML/MIN/1.73M2
GLUCOSE BLD-MCNC: 103 MG/DL (ref 74–99)
GLUCOSE BLD-MCNC: 107 MG/DL (ref 74–99)
GLUCOSE BLD-MCNC: 127 MG/DL (ref 74–99)
GLUCOSE BLD-MCNC: 132 MG/DL (ref 74–99)
GLUCOSE SERPL-MCNC: 101 MG/DL (ref 74–99)
HCT VFR BLD AUTO: 39.3 % (ref 37–54)
HGB BLD-MCNC: 12.1 G/DL (ref 12.5–16.5)
IMM GRANULOCYTES # BLD AUTO: <0.03 K/UL (ref 0–0.58)
IMM GRANULOCYTES NFR BLD: 0 % (ref 0–5)
LYMPHOCYTES NFR BLD: 1.58 K/UL (ref 1.5–4)
LYMPHOCYTES RELATIVE PERCENT: 37 % (ref 20–42)
MCH RBC QN AUTO: 25.4 PG (ref 26–35)
MCHC RBC AUTO-ENTMCNC: 30.8 G/DL (ref 32–34.5)
MCV RBC AUTO: 82.4 FL (ref 80–99.9)
MICROORGANISM SPEC CULT: ABNORMAL
MONOCYTES NFR BLD: 0.54 K/UL (ref 0.1–0.95)
MONOCYTES NFR BLD: 13 % (ref 2–12)
NEUTROPHILS NFR BLD: 47 % (ref 43–80)
NEUTS SEG NFR BLD: 2 K/UL (ref 1.8–7.3)
PLATELET # BLD AUTO: 220 K/UL (ref 130–450)
PMV BLD AUTO: 10.8 FL (ref 7–12)
POTASSIUM SERPL-SCNC: 4.2 MMOL/L (ref 3.5–5)
RBC # BLD AUTO: 4.77 M/UL (ref 3.8–5.8)
SERVICE CMNT-IMP: ABNORMAL
SODIUM SERPL-SCNC: 139 MMOL/L (ref 132–146)
SPECIMEN DESCRIPTION: ABNORMAL
WBC OTHER # BLD: 4.3 K/UL (ref 4.5–11.5)

## 2024-09-23 PROCEDURE — 6370000000 HC RX 637 (ALT 250 FOR IP): Performed by: STUDENT IN AN ORGANIZED HEALTH CARE EDUCATION/TRAINING PROGRAM

## 2024-09-23 PROCEDURE — 99222 1ST HOSP IP/OBS MODERATE 55: CPT | Performed by: INTERNAL MEDICINE

## 2024-09-23 PROCEDURE — 82962 GLUCOSE BLOOD TEST: CPT

## 2024-09-23 PROCEDURE — APPSS180 APP SPLIT SHARED TIME > 60 MINUTES: Performed by: NURSE PRACTITIONER

## 2024-09-23 PROCEDURE — 6360000002 HC RX W HCPCS: Performed by: HOSPITALIST

## 2024-09-23 PROCEDURE — 36415 COLL VENOUS BLD VENIPUNCTURE: CPT

## 2024-09-23 PROCEDURE — 2140000000 HC CCU INTERMEDIATE R&B

## 2024-09-23 PROCEDURE — 2580000003 HC RX 258: Performed by: HOSPITALIST

## 2024-09-23 PROCEDURE — 93306 TTE W/DOPPLER COMPLETE: CPT | Performed by: INTERNAL MEDICINE

## 2024-09-23 PROCEDURE — 93306 TTE W/DOPPLER COMPLETE: CPT

## 2024-09-23 PROCEDURE — 6370000000 HC RX 637 (ALT 250 FOR IP): Performed by: HOSPITALIST

## 2024-09-23 PROCEDURE — 85025 COMPLETE CBC W/AUTO DIFF WBC: CPT

## 2024-09-23 PROCEDURE — 6370000000 HC RX 637 (ALT 250 FOR IP): Performed by: NURSE PRACTITIONER

## 2024-09-23 PROCEDURE — 80048 BASIC METABOLIC PNL TOTAL CA: CPT

## 2024-09-23 RX ORDER — REGADENOSON 0.08 MG/ML
0.4 INJECTION, SOLUTION INTRAVENOUS
Status: CANCELLED | OUTPATIENT
Start: 2024-09-23

## 2024-09-23 RX ORDER — HYDRALAZINE HYDROCHLORIDE 25 MG/1
25 TABLET, FILM COATED ORAL 3 TIMES DAILY
Status: DISCONTINUED | OUTPATIENT
Start: 2024-09-23 | End: 2024-09-26

## 2024-09-23 RX ADMIN — METOPROLOL SUCCINATE 50 MG: 50 TABLET, FILM COATED, EXTENDED RELEASE ORAL at 09:19

## 2024-09-23 RX ADMIN — WATER 1000 MG: 1 INJECTION INTRAMUSCULAR; INTRAVENOUS; SUBCUTANEOUS at 09:19

## 2024-09-23 RX ADMIN — HYDRALAZINE HYDROCHLORIDE 25 MG: 25 TABLET ORAL at 21:48

## 2024-09-23 RX ADMIN — BUPROPION HYDROCHLORIDE 150 MG: 150 TABLET, FILM COATED, EXTENDED RELEASE ORAL at 09:18

## 2024-09-23 RX ADMIN — PANTOPRAZOLE SODIUM 40 MG: 40 TABLET, DELAYED RELEASE ORAL at 09:19

## 2024-09-23 RX ADMIN — AMLODIPINE BESYLATE 10 MG: 10 TABLET ORAL at 09:18

## 2024-09-23 RX ADMIN — CYANOCOBALAMIN TAB 1000 MCG 1000 MCG: 1000 TAB at 09:18

## 2024-09-23 RX ADMIN — GABAPENTIN 300 MG: 300 CAPSULE ORAL at 21:48

## 2024-09-23 RX ADMIN — Medication 6 MG: at 21:47

## 2024-09-23 RX ADMIN — APIXABAN 5 MG: 5 TABLET, FILM COATED ORAL at 21:48

## 2024-09-23 RX ADMIN — ASPIRIN 81 MG: 81 TABLET, COATED ORAL at 09:22

## 2024-09-23 RX ADMIN — APIXABAN 5 MG: 5 TABLET, FILM COATED ORAL at 09:18

## 2024-09-23 RX ADMIN — HYDRALAZINE HYDROCHLORIDE 25 MG: 25 TABLET ORAL at 15:56

## 2024-09-23 RX ADMIN — SODIUM CHLORIDE, PRESERVATIVE FREE 10 ML: 5 INJECTION INTRAVENOUS at 21:47

## 2024-09-23 RX ADMIN — TAMSULOSIN HYDROCHLORIDE 0.4 MG: 0.4 CAPSULE ORAL at 21:47

## 2024-09-23 RX ADMIN — SODIUM CHLORIDE, PRESERVATIVE FREE 10 ML: 5 INJECTION INTRAVENOUS at 09:22

## 2024-09-23 RX ADMIN — METOPROLOL SUCCINATE 50 MG: 50 TABLET, FILM COATED, EXTENDED RELEASE ORAL at 21:48

## 2024-09-23 RX ADMIN — HYDRALAZINE HYDROCHLORIDE 25 MG: 25 TABLET ORAL at 09:19

## 2024-09-23 RX ADMIN — GABAPENTIN 300 MG: 300 CAPSULE ORAL at 09:19

## 2024-09-23 NOTE — CARE COORDINATION
Patient presented to the ED from the [facility] due to shortness of breath; admitted for acute cystitis without hematuria, heart murmur and exertional dyspnea. Patient reports he has been residing at the shelter for the past 2 months, he states he is currently independent, ambulates without a device and plans to return. Uses [email or web address] ([place]) and PCP is Dr. Carlita Lundy. Inquired about family, patient reports he has none, states he has children whom live in [place], but he has been estranged from them for years. Call made to the [facility] to check if patient is able to return. Spoke with Jorge Luis, he states the patient is able to return and they are holding his bed for him; patient will need to have a copy of his d/c summary when he returns. Patient will need transport set up through insurance to return to the shelter at [phone]. Patient went for an echo today; results pending and cardiology following.    Case Management Assessment  Initial Evaluation    Date/Time of Evaluation: 9/23/2024 3:23 PM  Assessment Completed by: ROSALINA Massey    If patient is discharged prior to next notation, then this note serves as note for discharge by case management.    Patient Name: Hemal Banks                   YOB: 1950  Diagnosis: Shortness of breath [R06.02]  Heart murmur [R01.1]  Exertional dyspnea [R06.09]  Systolic murmur [R01.1]  Acute cystitis without hematuria [N30.00]                   Date / Time: 9/20/2024  5:41 PM    Patient Admission Status: Inpatient   Readmission Risk (Low < 19, Mod (19-27), High > 27): Readmission Risk Score: 16.1    Current PCP: Carlita Castellano MD  PCP verified by CM? Yes    Chart Reviewed: Yes      History Provided by: Patient  Patient Orientation: Alert and Oriented    Patient Cognition: Alert    Hospitalization in the last 30 days (Readmission):  No    If yes, Readmission Assessment in CM Navigator will be

## 2024-09-23 NOTE — CONSULTS
(WELLBUTRIN SR) 150 MG extended release tablet Take 1 tablet by mouth daily   Yes Rosio Gillette MD   meclizine (ANTIVERT) 12.5 MG tablet TAKE 1 TABLET BY MOUTH THREE TIMES DAILY AS NEEDED FOR DIZZINESS OR NAUSEA 8/28/24  Yes Carlita Castellano MD   lisinopril (PRINIVIL;ZESTRIL) 40 MG tablet Take 1 tablet by mouth daily 7/12/24  Yes Carlita Castlelano MD   metoprolol succinate (TOPROL XL) 100 MG extended release tablet Take 0.5 tablets by mouth 2 times daily 7/12/24  Yes Carlita Castellano MD   pantoprazole (PROTONIX) 40 MG tablet Take 1 tablet by mouth daily 7/12/24  Yes Carlita Castellano MD   tamsulosin (FLOMAX) 0.4 MG capsule Take 1 capsule by mouth nightly 7/12/24  Yes Carlita Castellano MD   aspirin (ASPIRIN LOW DOSE) 81 MG EC tablet Take 1 tablet by mouth daily 7/12/24  Yes Carlita Castellano MD   apixaban (ELIQUIS) 5 MG TABS tablet Take 1 tablet by mouth 2 times daily 7/12/24  Yes Carlita Castellano MD   FARXIGA 10 MG tablet Take 1 tablet by mouth daily 7/12/24  Yes Carlita Castellano MD   hydrALAZINE (APRESOLINE) 25 MG tablet Take 1 tablet by mouth in the morning and 1 tablet in the evening. 7/12/24  Yes Carlita Castellano MD   gabapentin (NEURONTIN) 300 MG capsule Take 1 capsule by mouth in the morning and at bedtime for 180 days. 7/12/24 1/8/25 Yes Carlita Castellano MD   amLODIPine (NORVASC) 10 MG tablet Take 1 tablet by mouth daily 7/12/24  Yes Carlita Castellano MD   vitamin B-12 (CYANOCOBALAMIN) 1000 MCG tablet Take 1 tablet by mouth daily   Yes Rosio Gillette MD   vitamin D (CHOLECALCIFEROL) 25 MCG (1000 UT) TABS tablet Take 1 tablet by mouth daily 7/2/24 8/1/24  Cristel Galvez PA-C       Current Medications:    Current Facility-Administered Medications: perflutren lipid microspheres (DEFINITY) injection 1.5 mL, 1.5 mL, IntraVENous, ONCE PRN  buPROPion (WELLBUTRIN SR) extended release tablet 150 mg, 150 mg, Oral, Daily  melatonin tablet 6  ACS  HTN  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 to rule out significant myocardial ischemia    Above assessment and plan made in collaboration with Dr. Keller     Electronically signed by JACQUES Gillis CNP on 9/23/2024 at 7:15 AM     Patient chart reviewed with JACQUES Gillis CNP.  His current hospitalization, past medical history, medications, EKG, laboratory data, and imaging were reviewed.  Patient seen and examined independently.  His physical examination is pertinent for obesity, regular heart with distant heart sounds, clear lungs, soft, obese nontender abdomen with no abdominal bruit and minimal leg edema.  I agree with the above assessment and plan made in collaboration with JACQUES Gillis CNP.    Thank you for allowing me to share in the care of this patient.  Denise Keller MD

## 2024-09-23 NOTE — PROGRESS NOTES
[facility] Hospitalist Progress Note    SYNOPSIS: Patient admitted on 2024 for Acute cystitis without hematuria  74 y.o. year old male  with PMH of CLASS 1 obesity, T2DM, HTN, HLD, A fib on eliquis     Pt presented to ED for evaluation of SOB.   Pt was seen on room air, actually complains frequent urination ; UA s/o UTI; on empiric rocephin; pending urine cx    Patient has LE swelling ; complains of dyspnea on exertion; orthopnea-currently diuresing  Echo is pending  Cardiology consulted    SUBJECTIVE:  Stable overnight. No other overnight issues reported.   Patient seen and examined  Records reviewed.           Temp (24hrs), Av.8 °F (36.6 °C), Min:97.1 °F (36.2 °C), Max:98.2 °F (36.8 °C)    DIET: ADULT DIET; Regular; 4 carb choices (60 gm/meal); Low Sodium (2 gm); 1000 ml  CODE: Full Code    Intake/Output Summary (Last 24 hours) at 2024 1863  Last data filed at 2024 1558  Gross per 24 hour   Intake 970 ml   Output 1000 ml   Net -30 ml       Review of Systems  All bolded are positive; please see HPI  General:  Fever, chills, diaphoresis, fatigue, malaise, night sweats, weight loss  Psychological:  Anxiety, disorientation, hallucinations.  ENT:  Epistaxis, headaches, vertigo, visual changes.  Cardiovascular:  Chest pain, irregular heartbeats, palpitations, paroxysmal nocturnal dyspnea.  Respiratory:  Shortness of breath, coughing, sputum production, hemoptysis, wheezing, orthopnea.  Gastrointestinal:  Nausea, vomiting, diarrhea, heartburn, constipation, abdominal pain, hematemesis, hematochezia, melena, acholic stools  Genito-Urinary:  Dysuria, urgency, frequency, hematuria  Musculoskeletal:  Joint pain, joint stiffness, joint swelling, muscle pain  Neurology:  Headache, focal neurological deficits, weakness, numbness, paresthesia  Derm:  Rashes, ulcers, excoriations, bruising  Extremities:  Decreased ROM, peripheral edema, mottling      OBJECTIVE:    /66   Pulse 63   Temp 97.1 °F

## 2024-09-23 NOTE — PLAN OF CARE
Patient's chart updated to reflect:      .    - HF care plan, HF education points and HF discharge instructions.  -Orders: 2 gram sodium diet, daily weights, I/O.  -PCP and cardiology follow up appointments to be scheduled within 7 days of hospital discharge.  -CHF education session will be provided to the patient prior to hospital discharge.    Cierra Porter RN   Heart Failure Navigator

## 2024-09-23 NOTE — PLAN OF CARE
Problem: Chronic Conditions and Co-morbidities  Goal: Patient's chronic conditions and co-morbidity symptoms are monitored and maintained or improved  9/22/2024 2330 by Kemar Campbell RN  Outcome: Progressing  Flowsheets (Taken 9/22/2024 2043)  Care Plan - Patient's Chronic Conditions and Co-Morbidity Symptoms are Monitored and Maintained or Improved: Monitor and assess patient's chronic conditions and comorbid symptoms for stability, deterioration, or improvement  9/22/2024 1037 by Paxton Husain RN  Outcome: Progressing  Flowsheets (Taken 9/22/2024 0815)  Care Plan - Patient's Chronic Conditions and Co-Morbidity Symptoms are Monitored and Maintained or Improved: Monitor and assess patient's chronic conditions and comorbid symptoms for stability, deterioration, or improvement     Problem: Discharge Planning  Goal: Discharge to home or other facility with appropriate resources  9/22/2024 2330 by Kemar Campbell RN  Outcome: Progressing  Flowsheets (Taken 9/22/2024 2043)  Discharge to home or other facility with appropriate resources: Identify barriers to discharge with patient and caregiver  9/22/2024 1037 by Paxton Husain RN  Outcome: Progressing  Flowsheets (Taken 9/22/2024 0815)  Discharge to home or other facility with appropriate resources:   Arrange for needed discharge resources and transportation as appropriate   Identify barriers to discharge with patient and caregiver   Identify discharge learning needs (meds, wound care, etc)   Refer to discharge planning if patient needs post-hospital services based on physician order or complex needs related to functional status, cognitive ability or social support system     Problem: Safety - Adult  Goal: Free from fall injury  9/22/2024 2330 by Kemar Campbell RN  Outcome: Progressing  9/22/2024 1037 by Paxton Husain RN  Outcome: Progressing

## 2024-09-23 NOTE — PLAN OF CARE
Problem: Chronic Conditions and Co-morbidities  Goal: Patient's chronic conditions and co-morbidity symptoms are monitored and maintained or improved  9/23/2024 1055 by Raquel Ladd RN  Outcome: Progressing  9/22/2024 2330 by Kemar Campbell RN  Outcome: Progressing  Flowsheets (Taken 9/22/2024 2043)  Care Plan - Patient's Chronic Conditions and Co-Morbidity Symptoms are Monitored and Maintained or Improved: Monitor and assess patient's chronic conditions and comorbid symptoms for stability, deterioration, or improvement     Problem: Discharge Planning  Goal: Discharge to home or other facility with appropriate resources  9/23/2024 1055 by Raquel Ladd, RN  Outcome: Progressing  9/22/2024 2330 by Kemar Campbell RN  Outcome: Progressing  Flowsheets (Taken 9/22/2024 2043)  Discharge to home or other facility with appropriate resources: Identify barriers to discharge with patient and caregiver     Problem: Safety - Adult  Goal: Free from fall injury  9/23/2024 1055 by Raquel Ladd RN  Outcome: Progressing  9/22/2024 2330 by Kemar Campbell RN  Outcome: Progressing

## 2024-09-24 ENCOUNTER — APPOINTMENT (OUTPATIENT)
Age: 74
End: 2024-09-24
Attending: INTERNAL MEDICINE
Payer: MEDICAID

## 2024-09-24 LAB
ANION GAP SERPL CALCULATED.3IONS-SCNC: 13 MMOL/L (ref 7–16)
BASOPHILS # BLD: 0.03 K/UL (ref 0–0.2)
BASOPHILS NFR BLD: 1 % (ref 0–2)
BUN SERPL-MCNC: 22 MG/DL (ref 6–23)
CALCIUM SERPL-MCNC: 8.6 MG/DL (ref 8.6–10.2)
CHLORIDE SERPL-SCNC: 105 MMOL/L (ref 98–107)
CO2 SERPL-SCNC: 22 MMOL/L (ref 22–29)
CREAT SERPL-MCNC: 1.8 MG/DL (ref 0.7–1.2)
ECHO BSA: 2.4 M2
EOSINOPHIL # BLD: 0.11 K/UL (ref 0.05–0.5)
EOSINOPHILS RELATIVE PERCENT: 3 % (ref 0–6)
ERYTHROCYTE [DISTWIDTH] IN BLOOD BY AUTOMATED COUNT: 16.3 % (ref 11.5–15)
GFR, ESTIMATED: 41 ML/MIN/1.73M2
GLUCOSE BLD-MCNC: 105 MG/DL (ref 74–99)
GLUCOSE BLD-MCNC: 113 MG/DL (ref 74–99)
GLUCOSE BLD-MCNC: 184 MG/DL (ref 74–99)
GLUCOSE SERPL-MCNC: 100 MG/DL (ref 74–99)
HCT VFR BLD AUTO: 37.1 % (ref 37–54)
HGB BLD-MCNC: 11.3 G/DL (ref 12.5–16.5)
IMM GRANULOCYTES # BLD AUTO: <0.03 K/UL (ref 0–0.58)
IMM GRANULOCYTES NFR BLD: 0 % (ref 0–5)
LEFT VENTRICULAR EJECTION FRACTION MODE: NORMAL
LV EF: 63 %
LYMPHOCYTES NFR BLD: 1.43 K/UL (ref 1.5–4)
LYMPHOCYTES RELATIVE PERCENT: 33 % (ref 20–42)
MCH RBC QN AUTO: 25.2 PG (ref 26–35)
MCHC RBC AUTO-ENTMCNC: 30.5 G/DL (ref 32–34.5)
MCV RBC AUTO: 82.8 FL (ref 80–99.9)
MONOCYTES NFR BLD: 0.52 K/UL (ref 0.1–0.95)
MONOCYTES NFR BLD: 12 % (ref 2–12)
NEUTROPHILS NFR BLD: 52 % (ref 43–80)
NEUTS SEG NFR BLD: 2.25 K/UL (ref 1.8–7.3)
NUC STRESS EJECTION FRACTION: 63 %
PLATELET # BLD AUTO: 205 K/UL (ref 130–450)
PMV BLD AUTO: 10.4 FL (ref 7–12)
POTASSIUM SERPL-SCNC: 4.2 MMOL/L (ref 3.5–5)
RBC # BLD AUTO: 4.48 M/UL (ref 3.8–5.8)
SODIUM SERPL-SCNC: 140 MMOL/L (ref 132–146)
STRESS BASELINE DIAS BP: 74 MMHG
STRESS BASELINE HR: 59 BPM
STRESS BASELINE SYS BP: 132 MMHG
STRESS ESTIMATED WORKLOAD: 1 METS
STRESS PEAK DIAS BP: 70 MMHG
STRESS PEAK SYS BP: 140 MMHG
STRESS PERCENT HR ACHIEVED: 56 %
STRESS POST PEAK HR: 83 BPM
STRESS RATE PRESSURE PRODUCT: NORMAL BPM*MMHG
STRESS ST DEPRESSION: 0 MM
STRESS TARGET HR: 147 BPM
TID: 0.85
WBC OTHER # BLD: 4.4 K/UL (ref 4.5–11.5)

## 2024-09-24 PROCEDURE — 85025 COMPLETE CBC W/AUTO DIFF WBC: CPT

## 2024-09-24 PROCEDURE — 93018 CV STRESS TEST I&R ONLY: CPT | Performed by: INTERNAL MEDICINE

## 2024-09-24 PROCEDURE — 97530 THERAPEUTIC ACTIVITIES: CPT

## 2024-09-24 PROCEDURE — 78452 HT MUSCLE IMAGE SPECT MULT: CPT

## 2024-09-24 PROCEDURE — 3430000000 HC RX DIAGNOSTIC RADIOPHARMACEUTICAL: Performed by: RADIOLOGY

## 2024-09-24 PROCEDURE — 97161 PT EVAL LOW COMPLEX 20 MIN: CPT

## 2024-09-24 PROCEDURE — 2580000003 HC RX 258: Performed by: HOSPITALIST

## 2024-09-24 PROCEDURE — 78452 HT MUSCLE IMAGE SPECT MULT: CPT | Performed by: INTERNAL MEDICINE

## 2024-09-24 PROCEDURE — 99232 SBSQ HOSP IP/OBS MODERATE 35: CPT | Performed by: INTERNAL MEDICINE

## 2024-09-24 PROCEDURE — A9500 TC99M SESTAMIBI: HCPCS | Performed by: RADIOLOGY

## 2024-09-24 PROCEDURE — APPSS30 APP SPLIT SHARED TIME 16-30 MINUTES

## 2024-09-24 PROCEDURE — 2140000000 HC CCU INTERMEDIATE R&B

## 2024-09-24 PROCEDURE — 36415 COLL VENOUS BLD VENIPUNCTURE: CPT

## 2024-09-24 PROCEDURE — 80048 BASIC METABOLIC PNL TOTAL CA: CPT

## 2024-09-24 PROCEDURE — 93016 CV STRESS TEST SUPVJ ONLY: CPT | Performed by: INTERNAL MEDICINE

## 2024-09-24 PROCEDURE — 6370000000 HC RX 637 (ALT 250 FOR IP): Performed by: NURSE PRACTITIONER

## 2024-09-24 PROCEDURE — 6370000000 HC RX 637 (ALT 250 FOR IP): Performed by: HOSPITALIST

## 2024-09-24 PROCEDURE — 82962 GLUCOSE BLOOD TEST: CPT

## 2024-09-24 PROCEDURE — 6360000002 HC RX W HCPCS: Performed by: INTERNAL MEDICINE

## 2024-09-24 PROCEDURE — 93017 CV STRESS TEST TRACING ONLY: CPT

## 2024-09-24 PROCEDURE — 97165 OT EVAL LOW COMPLEX 30 MIN: CPT

## 2024-09-24 RX ORDER — TETRAKIS(2-METHOXYISOBUTYLISOCYANIDE)COPPER(I) TETRAFLUOROBORATE 1 MG/ML
12 INJECTION, POWDER, LYOPHILIZED, FOR SOLUTION INTRAVENOUS
Status: COMPLETED | OUTPATIENT
Start: 2024-09-24 | End: 2024-09-24

## 2024-09-24 RX ORDER — REGADENOSON 0.08 MG/ML
0.4 INJECTION, SOLUTION INTRAVENOUS
Status: COMPLETED | OUTPATIENT
Start: 2024-09-24 | End: 2024-09-24

## 2024-09-24 RX ORDER — TETRAKIS(2-METHOXYISOBUTYLISOCYANIDE)COPPER(I) TETRAFLUOROBORATE 1 MG/ML
35 INJECTION, POWDER, LYOPHILIZED, FOR SOLUTION INTRAVENOUS
Status: COMPLETED | OUTPATIENT
Start: 2024-09-24 | End: 2024-09-24

## 2024-09-24 RX ADMIN — HYDRALAZINE HYDROCHLORIDE 25 MG: 25 TABLET ORAL at 14:14

## 2024-09-24 RX ADMIN — APIXABAN 5 MG: 5 TABLET, FILM COATED ORAL at 09:02

## 2024-09-24 RX ADMIN — SODIUM CHLORIDE, PRESERVATIVE FREE 10 ML: 5 INJECTION INTRAVENOUS at 09:04

## 2024-09-24 RX ADMIN — HYDRALAZINE HYDROCHLORIDE 25 MG: 25 TABLET ORAL at 09:02

## 2024-09-24 RX ADMIN — APIXABAN 5 MG: 5 TABLET, FILM COATED ORAL at 20:40

## 2024-09-24 RX ADMIN — TAMSULOSIN HYDROCHLORIDE 0.4 MG: 0.4 CAPSULE ORAL at 20:40

## 2024-09-24 RX ADMIN — ASPIRIN 81 MG: 81 TABLET, COATED ORAL at 09:02

## 2024-09-24 RX ADMIN — CYANOCOBALAMIN TAB 1000 MCG 1000 MCG: 1000 TAB at 09:02

## 2024-09-24 RX ADMIN — GABAPENTIN 300 MG: 300 CAPSULE ORAL at 09:02

## 2024-09-24 RX ADMIN — REGADENOSON 0.4 MG: 0.08 INJECTION, SOLUTION INTRAVENOUS at 11:31

## 2024-09-24 RX ADMIN — Medication 13 MILLICURIE: at 09:30

## 2024-09-24 RX ADMIN — GABAPENTIN 300 MG: 300 CAPSULE ORAL at 20:40

## 2024-09-24 RX ADMIN — METOPROLOL SUCCINATE 50 MG: 50 TABLET, FILM COATED, EXTENDED RELEASE ORAL at 09:02

## 2024-09-24 RX ADMIN — BUPROPION HYDROCHLORIDE 150 MG: 150 TABLET, FILM COATED, EXTENDED RELEASE ORAL at 09:02

## 2024-09-24 RX ADMIN — Medication 31 MILLICURIE: at 11:34

## 2024-09-24 RX ADMIN — METOPROLOL SUCCINATE 50 MG: 50 TABLET, FILM COATED, EXTENDED RELEASE ORAL at 20:40

## 2024-09-24 RX ADMIN — PANTOPRAZOLE SODIUM 40 MG: 40 TABLET, DELAYED RELEASE ORAL at 09:02

## 2024-09-24 RX ADMIN — HYDRALAZINE HYDROCHLORIDE 25 MG: 25 TABLET ORAL at 20:40

## 2024-09-24 RX ADMIN — SODIUM CHLORIDE, PRESERVATIVE FREE 10 ML: 5 INJECTION INTRAVENOUS at 20:40

## 2024-09-24 ASSESSMENT — PAIN SCALES - GENERAL: PAINLEVEL_OUTOF10: 0

## 2024-09-24 NOTE — PROGRESS NOTES
independence/tolerance for self-care routine  * Functional transfer/mobility training/DME recommendations for increased independence, safety, and fall prevention  * Patient/Family education to increase follow through with safety techniques and functional independence  * Recommendation of environmental modifications for increased safety with functional transfers/mobility and ADLs  * Therapeutic exercise to improve motor endurance, ROM, and functional strength for ADLs/functional transfers  * Therapeutic activities to facilitate/challenge dynamic balance, stand tolerance for increased safety and independence with ADLs    Recommended Adaptive Equipment:  TBD     [facility]     Bathroom setup: handicap accessible    Equipment owned: none    Prior Level of Function: Independent  with ADLs , Independent  with IADLs; ambulated without AD   Occupation: na    Pain Level: Pt denies pain this session    Cognition: A&O: 4/4; Follows 2 step directions   Memory:  good   Sequencing:  good   Problem solving:  good   Judgement/safety:  F+     Functional Assessment:  AM-PAC Daily Activity Raw Score: 20/24   Initial Eval Status  Date: 9/24/24 Treatment Status  Date: STGs = LTGs  Time frame: 10-14 days   Feeding Independent      Grooming Supervision   Independent    UB Dressing Independent       LB Dressing Stand by Assist   Independent    Bathing Stand by Assist  Independent    Toileting Stand by Assist   Independent    Bed Mobility  Supine to sit: Supervision   Sit to supine: NT   Supine to sit: Independent   Sit to supine: Independent    Functional Transfers Stand by Assist   Independent    Functional Mobility Stand by Assist     Ambulated in room and hallway without AD  Independent    Balance Sitting:     Static:  indep    Dynamic:indep  Standing: Sup - SBA     Activity Tolerance F  G   Visual/  Perceptual Glasses: yes  wfl                  Hand Dominance right   Strength ROM Additional     Neuro Re-Ed [id number]       Non-Billable Time          Evaluation Time additionally includes thorough review of current medical information, gathering information on past medical history/social history and prior level of function, interpretation of standardized testing/informal observation of tasks, assessment of data and development of plan of care and goals.          Vitor Marmolejo OTR/L #[id number]

## 2024-09-24 NOTE — CARE COORDINATION
Patient for a stress test today and a potential discharge pending results. Plan is for the patient to return to the [facility], they are holding his bed for him and will need to have his discharge summary when he returns there. Patient will need transport set up through insurance to return to the shelter at [phone].     Electronically signed by ROSALINA Massey on 9/24/2024 at 1:22 PM

## 2024-09-24 NOTE — PROGRESS NOTES
[facility] Hospitalist Progress Note    SYNOPSIS: Patient admitted on 2024 for Acute cystitis without hematuria  74 y.o. year old male  with PMH of CLASS 1 obesity, T2DM, HTN, HLD, A fib on eliquis     Pt presented to ED for evaluation of SOB.   Pt was seen on room air, actually complains frequent urination ; UA s/o UTI; on empiric rocephin; pending urine cx    Patient has LE swelling ; complains of dyspnea on exertion; orthopnea-currently diuresing  Echo is reviewed  Stress test negative  Cardiology on board  Since cardiogenic cause of ADDISON is ruled out we will get CTA of chest  If no acute findings we will get pulmonary on board for further eval    SUBJECTIVE:  Stable overnight. No other overnight issues reported.   Patient seen and examined  Records reviewed.           Temp (24hrs), Av.1 °F (36.7 °C), Min:97.5 °F (36.4 °C), Max:98.6 °F (37 °C)    DIET: Diet NPO  CODE: Full Code  No intake or output data in the 24 hours ending 24 0848      Review of Systems  All bolded are positive; please see HPI  General:  Fever, chills, diaphoresis, fatigue, malaise, night sweats, weight loss  Psychological:  Anxiety, disorientation, hallucinations.  ENT:  Epistaxis, headaches, vertigo, visual changes.  Cardiovascular:  Chest pain, irregular heartbeats, palpitations, paroxysmal nocturnal dyspnea.  Respiratory:  Shortness of breath, coughing, sputum production, hemoptysis, wheezing, orthopnea.  Gastrointestinal:  Nausea, vomiting, diarrhea, heartburn, constipation, abdominal pain, hematemesis, hematochezia, melena, acholic stools  Genito-Urinary:  Dysuria, urgency, frequency, hematuria  Musculoskeletal:  Joint pain, joint stiffness, joint swelling, muscle pain  Neurology:  Headache, focal neurological deficits, weakness, numbness, paresthesia  Derm:  Rashes, ulcers, excoriations, bruising  Extremities:  Decreased ROM, peripheral edema, mottling      OBJECTIVE:    /70   Pulse 65   Temp 97.9 °F (36.6  metoprolol succinate  50 mg Oral BID    pantoprazole  40 mg Oral Daily    tamsulosin  0.4 mg Oral Nightly    vitamin B-12  1,000 mcg Oral Daily    sodium chloride flush  5-40 mL IntraVENous 2 times per day    insulin lispro  0-4 Units SubCUTAneous TID WC    insulin lispro  0-4 Units SubCUTAneous Nightly     PRN Meds: perflutren lipid microspheres, melatonin, sodium chloride flush, sodium chloride, ondansetron **OR** ondansetron, acetaminophen **OR** acetaminophen, polyethylene glycol    Labs:     Recent Labs     09/23/24 0825 09/24/24 0434   WBC 4.3* 4.4*   HGB 12.1* 11.3*   HCT 39.3 37.1    205       Recent Labs     09/23/24 0825 09/24/24 0434    140   K 4.2 4.2    105   CO2 23 22   BUN 27* 22   CREATININE 1.9* 1.8*   CALCIUM 8.5* 8.6       No results for input(s): \"ALKPHOS\", \"ALT\", \"AST\", \"BILITOT\", \"AMYLASE\", \"LIPASE\" in the last 72 hours.    Invalid input(s): \"PROT\", \"ALB\"      No results for input(s): \"INR\" in the last 72 hours.    No results for input(s): \"CKTOTAL\", \"TROPONINI\" in the last 72 hours.    Chronic labs:    Lab Results   Component Value Date    CHOL 143 09/21/2024    TRIG 63 09/21/2024    HDL 53 09/21/2024    TSH 1.08 06/14/2024    PSA 15.5 (H) 09/13/2024    INR 1.3 08/04/2023    LABA1C 6.6 (H) 09/21/2024       Radiology: REVIEWED DAILY    +++++++++++++++++++++++++++++++++++++++++++++++++  Renuka Mejia MD  [facility]- [place], [place]  +++++++++++++++++++++++++++++++++++++++++++++++++  NOTE: This report was transcribed using voice recognition software. Every effort was made to ensure accuracy; however, inadvertent computerized transcription errors may be present.

## 2024-09-24 NOTE — CONSULTS
[facility]   Inpatient CHF Nurse Navigator Consult      Cardiologist: Dr. Chriss Recio SUKHDEEP Banks is a 74 y.o. (1950) male with a history of HFpEF, most recent EF:  Lab Results   Component Value Date    LVEF 60 12/08/2021       Patient was awake and alert, sitting up in his chair during the consultation and is agreeable to heart failure education. He was engaged and asked appropriate questions throughout the education session. [facility] but interested in following up in the CHF clinic. He tells me that he usually has to take the bus. CHW has been consulted to help with transportation. He does not own a scale. One has been provided. He verbalizes understanding.     Barriers identified during consult contributing to HF Hospitalization:  [] Limited medication adherence   [] Poor health literacy, education regarding HF medications provided   [] Pill box provided to patient  [] Difficulty affording medications  [] Difficulty obtaining/ managing medications  [] Prescription assistance information given     [x] Not weighing themselves daily  [x] Weight log provided for easy monitoring  [x] Scale provided     [] Not following low sodium diet  [] Food insecurity   [x] 2 gram sodium diet education provided   [] Low sodium recipes provided  [] Sodium free seasoning provided   [] Low sodium meal delivery options given to patient  [] Dietician consulted     [] Lack of transportation to appointments     [] Depression, given chronic illness  [] Primary team notified     [] Goals of care need addressed  [] Palliative care consulted     [x] CHF CHW consulted, to assist with transportation.        Chart Reviewed:  Diet: ADULT DIET; Regular; 4 carb choices (60 gm/meal); Low Sodium (2 gm)   Daily Weights: Patient Vitals for the past 96 hrs (Last 3 readings):   Weight   09/24/24 0935 112.9 kg (249 lb)   09/24/24 0457 113.4 kg (249 lb 14.4 oz)   09/23/24  0900 109.8 kg (242 lb)     I/O:   Intake/Output Summary (Last 24 hours) at 9/24/2024 1334  Last data filed at 9/23/2024 1840  Gross per 24 hour   Intake 360 ml   Output --   Net 360 ml       [] Nursing staff/manager notified of inaccurate grier weights or I/O        Discharge Plan:  Above identified barriers reviewed and needs addressed    Patient/family educated on daily monitoring tools for CHF, made aware of signs and symptoms of worsening HF and to notify provider immediately of change in symptoms.     Heart Failure Home Instructions placed in patient's discharge instructions    Per AHA guidelines patient to be closely monitored following discharge with 7 day follow up appointment    Scheduling with the CHF clinic New consult to CHF clinic, appointment scheduled    Future Appointments   Date Time Provider Department Center   9/26/2024  8:15 AM Carlita Castellano MD [place] [facility]   10/4/2024  8:30 AM Tori Quispe, APRN - CNP [place] [facility]   10/11/2024  9:15 AM [facility] ROOM 2 [facility]           Patient Education:  Self Monitoring/management:  Reviewed the introduction to Heart Failure, the HF zones, signs and symptoms to report on day 1 of onset, medications, medication compliance, the importance of obtaining daily weights, following a low sodium diet, reading food labels for the sodium content, keeping physician appointments, and smoking cessation.  Discussed writing / tracking daily weights on a calendar / log because a 5 pound gain in 1 week can sneak up if you are not tracking it.   Advised patient they can reduce the risk for Heart Failure exacerbations by modifying / controlling the risk factors.  Discussed self-managed care which includes the following: take medications as prescribed, report any intolerable side effects of medications to the medical provider (PCP or cardiologist), do not just stop taking the medication; follow a cardiac heart healthy / low sodium diet; weigh

## 2024-09-24 NOTE — PROCEDURES
PROCEDURE NOTE  Date: 9/24/2024   Name: Hemal Banks  YOB: 1950    Procedures:    Lexiscan Nuclear Stress Test:    Cardiologist: Dr. Darlin Booker    Baseline EKG: Normal sinus rhythm, normal EKG.    Indications for study: Shortness of breath and atrial fibrillation.    1. No chest pain  2. No new arrhythmias  3. No EKG changes suggestive of stress induced ischemia  4. Nuclear images pending    Darlin Booker MD., FACC.   [facility]

## 2024-09-24 NOTE — PROGRESS NOTES
Physical Therapy  Initial Assessment     Name: Hemal Banks  : 1950  MRN: [id number]      Date of Service: 2024    Evaluating PT: Freddy Frias, PT, DPT [id number]      Room #:  6408/6408-A  Diagnosis:  Shortness of breath [R06.02]  Heart murmur [R01.1]  Exertional dyspnea [R06.09]  Systolic murmur [R01.1]  Acute cystitis without hematuria [N30.00]  PMHx/PSHx:   has a past medical history of Arthritis, Benign hypertensive kidney disease with chronic kidney disease, Cervical vertebra fusion, Chronic bilateral low back pain with bilateral sciatica, Chronic kidney disease (CKD), stage III (moderate) ([facility]), Depression, Hypertension, Stroke (HCC), and Type 2 diabetes mellitus with diabetic polyneuropathy, with long-term current use of insulin ([facility]).  Precautions:  Fall risk    SUBJECTIVE:    Pt lives at the [facility]. Pt ambulated without AD prior to admission.    OBJECTIVE:   Initial Evaluation  Date: 24 Treatment Date: Short Term/ Long Term   Goals   AM-PAC 6 Clicks      Was pt agreeable to Eval/treatment? Yes      Does pt have pain? No complaints of pain     Bed Mobility  Rolling: NT  Supine to sit: Supervision  Sit to supine: NT  Scooting: Supervision to EOB  Rolling: Independent   Supine to sit: Independent   Sit to supine: Independent   Scooting: Independent    Transfers Sit to stand: SBA  Stand to sit: SBA  Stand pivot: SBA without AD  Sit to stand: Independent   Stand to sit: Independent   Stand pivot: Independent    Ambulation   150 feet without AD with SBA  >400 feet without AD Independent    Stair negotiation: ascended and descended NT  >4 step(s) with 1 rail(s) Mod Independent    ROM BUE: Refer to OT note  BLE: WFL     Strength BUE: Refer to OT note  BLE: WFL     Balance Sitting EOB: Independent   Dynamic Standing: SBA without AD  Dynamic Standing: Independent      Pt is A & O x: 4 to person, place, month/year, and situation.   Sensation: Pt denies numbness and tingling of  extremities.   Edema: Unremarkable     Patient education  Pt educated on PT role in acute care setting.    Patient response to education:   Pt verbalized understanding Pt demonstrated skill Pt requires further education in this area   Yes NA No     ASSESSMENT:    Conditions Requiring Skilled Therapeutic Intervention:    [x]Decreased strength     []Decreased ROM  [x]Decreased functional mobility  [x]Decreased balance   [x]Decreased endurance   [x]Decreased posture  []Decreased sensation  []Decreased coordination   []Decreased vision  []Decreased safety awareness   []Increased pain       Comments:    Pt was in bed upon room entry; agreeable to PT evaluation. Pt completed all mobility noted above. Pt ambulated around unit without AD. Gait was slow but fairly steady. Knees were slightly flexed. No LOB occurred. O2 sat was 96% on RA with all activity. Pt was left in chair with all needs met at conclusion of session.    Treatment:  Patient practiced and was instructed in the following treatment:    Therapeutic activities:  Ambulation: Pt was cued for safety when ambulating in hallway.  Vitals and symptoms were closely monitored throughout session.    Pt's/family goals:  1. To return home.    Prognosis is Good for reaching above PT goals.    Patient and or family understand(s) diagnosis, prognosis, and plan of care.  Yes    PHYSICAL THERAPY PLAN OF CARE:    PT POC is established based on physician order and patient diagnosis     Referring provider/PT Order:    Start   Ordering Provider    09/20/24 2215  PT evaluation and treat  Start:  09/20/24 2215,   End:  09/20/24 2215,   ONE TIME,   Standing Count:  1 Occurrences,   R        Last continued at transfer on Sun Sep 22, 2024  7:42 AM    Lars Galvez MD      Diagnosis:  Shortness of breath [R06.02]  Heart murmur [R01.1]  Exertional dyspnea [R06.09]  Systolic murmur [R01.1]  Acute cystitis without hematuria [N30.00]  Specific instructions for next treatment:  Progress

## 2024-09-24 NOTE — PROGRESS NOTES
Dr. Ding notified of pt not comfortable being discharged and stating he is still sob when up to bathroom via [employer].     Per Dr. Ding, document and notify day team tomorrow morning.

## 2024-09-24 NOTE — PROGRESS NOTES
Inpatient Cardiology Progress note     PATIENT IS BEING FOLLOWED FOR: Concern for CHF    Hemal Banks is a 74 y.o. male known to Dr. Banerjee.  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    Patient presented to the emergency department on 2024 with complaints of 6-month history of shortness of breath with exertion worsening over the last 3 days, leg swelling and abnormal EKG.  BNP was elevated in the emergency department at 613 with no acute changes noted on chest XR.  Dr. Keller discontinued amlodipine secondary to lower extremity edema and hydralazine was increased to 25 mg 3 times daily.  Echocardiogram and [place] scan ordered.       PHYSICAL EXAM:   BP (!) 142/69   Pulse 73   Temp 98.6 °F (37 °C) (Oral)   Resp 16   Ht 1.829 m (6')   Wt 112.9 kg (249 lb)   SpO2 100%   BMI 33.77 kg/m²    B/P Range last 24 hours: Systolic (24hrs), Av , Min:117 , Max:142    Diastolic (24hrs), Av, Min:63, Max:74    CONST: Well developed, well nourished who appears stated age. Awake, alert and cooperative. No apparent distress  HEENT:   Head- Normocephalic, atraumatic   Eyes- Conjunctivae pink, anicteric  Throat- Oral mucosa pink and moist  Neck-  No stridor, trachea midline, no jugular venous distention. No carotid bruit  CHEST: Chest symmetrical and non-tender to palpation. No accessory muscle use or intercostal retractions  RESPIRATORY:  Lung sounds - clear throughout fields   CARDIOVASCULAR:     Heart Inspection- shows no noted pulsations  Heart Palpation- no heaves or thrills; PMI is non-displaced   Heart Ausculation- Regular rate and rhythm, no murmur. No s3, s4 or rub

## 2024-09-24 NOTE — PLAN OF CARE
Problem: Chronic Conditions and Co-morbidities  Goal: Patient's chronic conditions and co-morbidity symptoms are monitored and maintained or improved  9/23/2024 2229 by Laura Bob RN  Outcome: Progressing  9/23/2024 1055 by Raquel Ladd RN  Outcome: Progressing     Problem: Discharge Planning  Goal: Discharge to home or other facility with appropriate resources  9/23/2024 2229 by Laura Bob RN  Outcome: Progressing  9/23/2024 1055 by Raquel Ladd RN  Outcome: Progressing     Problem: Safety - Adult  Goal: Free from fall injury  9/23/2024 2229 by Laura Bob RN  Outcome: Progressing  9/23/2024 1055 by Raquel Ladd RN  Outcome: Progressing

## 2024-09-25 ENCOUNTER — TELEPHONE (OUTPATIENT)
Age: 74
End: 2024-09-25

## 2024-09-25 ENCOUNTER — APPOINTMENT (OUTPATIENT)
Dept: CT IMAGING | Age: 74
End: 2024-09-25
Attending: STUDENT IN AN ORGANIZED HEALTH CARE EDUCATION/TRAINING PROGRAM
Payer: MEDICAID

## 2024-09-25 LAB
ANION GAP SERPL CALCULATED.3IONS-SCNC: 12 MMOL/L (ref 7–16)
BASOPHILS # BLD: 0.02 K/UL (ref 0–0.2)
BASOPHILS NFR BLD: 1 % (ref 0–2)
BUN SERPL-MCNC: 24 MG/DL (ref 6–23)
CALCIUM SERPL-MCNC: 8.5 MG/DL (ref 8.6–10.2)
CHLORIDE SERPL-SCNC: 107 MMOL/L (ref 98–107)
CO2 SERPL-SCNC: 22 MMOL/L (ref 22–29)
CREAT SERPL-MCNC: 1.7 MG/DL (ref 0.7–1.2)
D-DIMER QUANTITATIVE: <200 NG/ML DDU (ref 0–230)
EOSINOPHIL # BLD: 0.1 K/UL (ref 0.05–0.5)
EOSINOPHILS RELATIVE PERCENT: 3 % (ref 0–6)
ERYTHROCYTE [DISTWIDTH] IN BLOOD BY AUTOMATED COUNT: 16.3 % (ref 11.5–15)
FERRITIN SERPL-MCNC: 30 NG/ML
GFR, ESTIMATED: 42 ML/MIN/1.73M2
GLUCOSE BLD-MCNC: 111 MG/DL (ref 74–99)
GLUCOSE BLD-MCNC: 118 MG/DL (ref 74–99)
GLUCOSE BLD-MCNC: 121 MG/DL (ref 74–99)
GLUCOSE BLD-MCNC: 127 MG/DL (ref 74–99)
GLUCOSE BLD-MCNC: 89 MG/DL (ref 74–99)
GLUCOSE SERPL-MCNC: 105 MG/DL (ref 74–99)
HCT VFR BLD AUTO: 39.7 % (ref 37–54)
HGB BLD-MCNC: 12.1 G/DL (ref 12.5–16.5)
IMM GRANULOCYTES # BLD AUTO: <0.03 K/UL (ref 0–0.58)
IMM GRANULOCYTES NFR BLD: 0 % (ref 0–5)
IRON SATN MFR SERPL: 12 % (ref 20–55)
IRON SERPL-MCNC: 36 UG/DL (ref 59–158)
LYMPHOCYTES NFR BLD: 1.47 K/UL (ref 1.5–4)
LYMPHOCYTES RELATIVE PERCENT: 39 % (ref 20–42)
MCH RBC QN AUTO: 25.2 PG (ref 26–35)
MCHC RBC AUTO-ENTMCNC: 30.5 G/DL (ref 32–34.5)
MCV RBC AUTO: 82.5 FL (ref 80–99.9)
MONOCYTES NFR BLD: 0.48 K/UL (ref 0.1–0.95)
MONOCYTES NFR BLD: 13 % (ref 2–12)
NEUTROPHILS NFR BLD: 45 % (ref 43–80)
NEUTS SEG NFR BLD: 1.68 K/UL (ref 1.8–7.3)
PLATELET # BLD AUTO: 223 K/UL (ref 130–450)
PMV BLD AUTO: 10.5 FL (ref 7–12)
POTASSIUM SERPL-SCNC: 4.3 MMOL/L (ref 3.5–5)
PROCALCITONIN SERPL-MCNC: 0.05 NG/ML (ref 0–0.08)
RBC # BLD AUTO: 4.81 M/UL (ref 3.8–5.8)
SODIUM SERPL-SCNC: 141 MMOL/L (ref 132–146)
TIBC SERPL-MCNC: 305 UG/DL (ref 250–450)
WBC OTHER # BLD: 3.8 K/UL (ref 4.5–11.5)

## 2024-09-25 PROCEDURE — 83550 IRON BINDING TEST: CPT

## 2024-09-25 PROCEDURE — 6370000000 HC RX 637 (ALT 250 FOR IP): Performed by: STUDENT IN AN ORGANIZED HEALTH CARE EDUCATION/TRAINING PROGRAM

## 2024-09-25 PROCEDURE — 2140000000 HC CCU INTERMEDIATE R&B

## 2024-09-25 PROCEDURE — 6360000004 HC RX CONTRAST MEDICATION: Performed by: RADIOLOGY

## 2024-09-25 PROCEDURE — 83540 ASSAY OF IRON: CPT

## 2024-09-25 PROCEDURE — 6370000000 HC RX 637 (ALT 250 FOR IP): Performed by: NURSE PRACTITIONER

## 2024-09-25 PROCEDURE — 82962 GLUCOSE BLOOD TEST: CPT

## 2024-09-25 PROCEDURE — 85025 COMPLETE CBC W/AUTO DIFF WBC: CPT

## 2024-09-25 PROCEDURE — 36415 COLL VENOUS BLD VENIPUNCTURE: CPT

## 2024-09-25 PROCEDURE — 99231 SBSQ HOSP IP/OBS SF/LOW 25: CPT | Performed by: INTERNAL MEDICINE

## 2024-09-25 PROCEDURE — 71275 CT ANGIOGRAPHY CHEST: CPT

## 2024-09-25 PROCEDURE — 2580000003 HC RX 258: Performed by: HOSPITALIST

## 2024-09-25 PROCEDURE — 82728 ASSAY OF FERRITIN: CPT

## 2024-09-25 PROCEDURE — 84145 PROCALCITONIN (PCT): CPT

## 2024-09-25 PROCEDURE — 6370000000 HC RX 637 (ALT 250 FOR IP): Performed by: HOSPITALIST

## 2024-09-25 PROCEDURE — 80048 BASIC METABOLIC PNL TOTAL CA: CPT

## 2024-09-25 PROCEDURE — 85379 FIBRIN DEGRADATION QUANT: CPT

## 2024-09-25 RX ORDER — IOPAMIDOL 755 MG/ML
75 INJECTION, SOLUTION INTRAVASCULAR
Status: COMPLETED | OUTPATIENT
Start: 2024-09-25 | End: 2024-09-25

## 2024-09-25 RX ORDER — HYDRALAZINE HYDROCHLORIDE 25 MG/1
25 TABLET, FILM COATED ORAL 3 TIMES DAILY
Qty: 180 TABLET | Refills: 1 | Status: SHIPPED | OUTPATIENT
Start: 2024-09-25 | End: 2024-09-26 | Stop reason: HOSPADM

## 2024-09-25 RX ORDER — FERROUS SULFATE 325(65) MG
325 TABLET ORAL 2 TIMES DAILY WITH MEALS
Qty: 30 TABLET | Refills: 3 | Status: SHIPPED | OUTPATIENT
Start: 2024-09-25

## 2024-09-25 RX ORDER — FERROUS SULFATE 325(65) MG
325 TABLET ORAL 2 TIMES DAILY WITH MEALS
Status: DISCONTINUED | OUTPATIENT
Start: 2024-09-25 | End: 2024-09-27 | Stop reason: HOSPADM

## 2024-09-25 RX ADMIN — TAMSULOSIN HYDROCHLORIDE 0.4 MG: 0.4 CAPSULE ORAL at 20:27

## 2024-09-25 RX ADMIN — APIXABAN 5 MG: 5 TABLET, FILM COATED ORAL at 12:46

## 2024-09-25 RX ADMIN — METOPROLOL SUCCINATE 50 MG: 50 TABLET, FILM COATED, EXTENDED RELEASE ORAL at 12:46

## 2024-09-25 RX ADMIN — IOPAMIDOL 75 ML: 755 INJECTION, SOLUTION INTRAVENOUS at 12:07

## 2024-09-25 RX ADMIN — SODIUM CHLORIDE, PRESERVATIVE FREE 10 ML: 5 INJECTION INTRAVENOUS at 20:27

## 2024-09-25 RX ADMIN — METOPROLOL SUCCINATE 50 MG: 50 TABLET, FILM COATED, EXTENDED RELEASE ORAL at 20:27

## 2024-09-25 RX ADMIN — BUPROPION HYDROCHLORIDE 150 MG: 150 TABLET, FILM COATED, EXTENDED RELEASE ORAL at 13:08

## 2024-09-25 RX ADMIN — GABAPENTIN 300 MG: 300 CAPSULE ORAL at 20:30

## 2024-09-25 RX ADMIN — HYDRALAZINE HYDROCHLORIDE 25 MG: 25 TABLET ORAL at 20:26

## 2024-09-25 RX ADMIN — APIXABAN 5 MG: 5 TABLET, FILM COATED ORAL at 20:30

## 2024-09-25 RX ADMIN — HYDRALAZINE HYDROCHLORIDE 25 MG: 25 TABLET ORAL at 12:46

## 2024-09-25 RX ADMIN — FERROUS SULFATE TAB 325 MG (65 MG ELEMENTAL FE) 325 MG: 325 (65 FE) TAB at 17:15

## 2024-09-25 RX ADMIN — ASPIRIN 81 MG: 81 TABLET, COATED ORAL at 12:47

## 2024-09-25 RX ADMIN — PANTOPRAZOLE SODIUM 40 MG: 40 TABLET, DELAYED RELEASE ORAL at 12:47

## 2024-09-25 RX ADMIN — GABAPENTIN 300 MG: 300 CAPSULE ORAL at 12:47

## 2024-09-25 RX ADMIN — CYANOCOBALAMIN TAB 1000 MCG 1000 MCG: 1000 TAB at 12:47

## 2024-09-25 ASSESSMENT — PAIN SCALES - GENERAL: PAINLEVEL_OUTOF10: 0

## 2024-09-25 NOTE — PLAN OF CARE
Reviewed cta chest -no acute findings  Stress test; echo-no significant abnormalities  Reviewed labs-no acute abnormality    Patient still has dyspnea on exertion;  will get pulmonary input  Ok to discharge no acute interventions from pulmonary    Renuka Mejia MD

## 2024-09-25 NOTE — CARE COORDINATION
Social Work/ Case Management Transition of Care Planning (Keiry Aziza, [phone]):     Per report and chart review Pt is on room air. Pt is NPO. Pt for CTA Chest today. Pulmonology consulted. Pt for sleep study OP. PT 18/24, OT 20/24. Plan is for the patient to return to the [facility], they are holding his bed for him and will need to have his discharge summary when he returns there. Patient will need transport set up through insurance to return to the shelter at [phone]. SW/CM to follow.  Keiry Ervin, [place]  9/25/2024

## 2024-09-25 NOTE — CARE COORDINATION
reached out to patients PCP office Dr Carlita Lundy at [phone] to schedule follow up D/C appointment.  spoke to Vivek and scheduled appointment on 10/8 at 9:45AM in office.     Information added to D/C summary.

## 2024-09-25 NOTE — PROGRESS NOTES
Reason for follow up: Chronic dyspnea on exertion and mild CHF    Subjective: Patient denies chest discomfort.  He has been complaining of chronic dyspnea on exertion.  Objective:    No distress. No events overnight.                                        Scheduled Meds:   hydrALAZINE  25 mg Oral TID    buPROPion  150 mg Oral Daily    apixaban  5 mg Oral BID    aspirin  81 mg Oral Daily    gabapentin  300 mg Oral BID    metoprolol succinate  50 mg Oral BID    pantoprazole  40 mg Oral Daily    tamsulosin  0.4 mg Oral Nightly    vitamin B-12  1,000 mcg Oral Daily    sodium chloride flush  5-40 mL IntraVENous 2 times per day    insulin lispro  0-4 Units SubCUTAneous TID WC    insulin lispro  0-4 Units SubCUTAneous Nightly       Continuous Infusions:   sodium chloride           No intake or output data in the 24 hours ending 09/25/24 1012    Patient Vitals for the past 96 hrs (Last 3 readings):   Weight   09/25/24 0521 110.8 kg (244 lb 4.8 oz)   09/24/24 0935 112.9 kg (249 lb)   09/24/24 0457 113.4 kg (249 lb 14.4 oz)          PE:   /67   Pulse 61   Temp 98.2 °F (36.8 °C) (Oral)   Resp 20   Ht 1.829 m (6')   Wt 110.8 kg (244 lb 4.8 oz)   SpO2 100%   BMI 33.13 kg/m²   CONST: I middle-age morbidly obese -American male laying in bed in no acute distress  HEENT: Head- normocephalic, atraumatic  Neck: no jugular venous distention. No carotid bruit noted.   LUNGS: Fair air entry with no wheezing or crackles.  CARDIOVASCULAR: RRR, no murmur, s3, s4 or rub noted.   PV: Minimal bilateral lower extremity edema. Pedal pulses palpable.  ABDOMEN: Soft, non-tender to light palpation. Bowel sounds present. No palpable masses no hepatosplenomegaly or splenomegaly; no abdominal bruit / pulsation  SKIN: Warm and dry  NEURO / PSYCH: Oriented to person, place and time. Speech clear and appropriate. Follows all commands. Pleasant affect.       Monitor: Sinus bradycardia at 58 bpm      Lab Review       Recent Labs

## 2024-09-25 NOTE — DISCHARGE SUMMARY
[phone]   ferrous sulfate 325 (65 Fe) MG tablet  hydrALAZINE 25 MG tablet         Time Spent on discharge is more than 45 minutes in the examination, evaluation, counseling and review of medications and discharge plan.    +++++++++++++++++++++++++++++++++++++++++++++++++  Renuka Mejia MD  [facility] [place], [place]  +++++++++++++++++++++++++++++++++++++++++++++++++  NOTE: This report was transcribed using voice recognition software. Every effort was made to ensure accuracy; however, inadvertent computerized transcription errors may be present.

## 2024-09-25 NOTE — PLAN OF CARE
Problem: Chronic Conditions and Co-morbidities  Goal: Patient's chronic conditions and co-morbidity symptoms are monitored and maintained or improved  Outcome: Progressing     Problem: Discharge Planning  Goal: Discharge to home or other facility with appropriate resources  Outcome: Progressing     Problem: Safety - Adult  Goal: Free from fall injury  Outcome: Progressing  Flowsheets (Taken 9/24/2024 2237)  Free From Fall Injury: Instruct family/caregiver on patient safety

## 2024-09-26 LAB
ANION GAP SERPL CALCULATED.3IONS-SCNC: 10 MMOL/L (ref 7–16)
BASOPHILS # BLD: 0.03 K/UL (ref 0–0.2)
BASOPHILS NFR BLD: 1 % (ref 0–2)
BUN SERPL-MCNC: 25 MG/DL (ref 6–23)
CALCIUM SERPL-MCNC: 8.9 MG/DL (ref 8.6–10.2)
CHLORIDE SERPL-SCNC: 107 MMOL/L (ref 98–107)
CO2 SERPL-SCNC: 25 MMOL/L (ref 22–29)
CREAT SERPL-MCNC: 1.9 MG/DL (ref 0.7–1.2)
EOSINOPHIL # BLD: 0.07 K/UL (ref 0.05–0.5)
EOSINOPHILS RELATIVE PERCENT: 2 % (ref 0–6)
ERYTHROCYTE [DISTWIDTH] IN BLOOD BY AUTOMATED COUNT: 16.2 % (ref 11.5–15)
GFR, ESTIMATED: 37 ML/MIN/1.73M2
GLUCOSE BLD-MCNC: 100 MG/DL (ref 74–99)
GLUCOSE BLD-MCNC: 112 MG/DL (ref 74–99)
GLUCOSE BLD-MCNC: 133 MG/DL (ref 74–99)
GLUCOSE BLD-MCNC: 94 MG/DL (ref 74–99)
GLUCOSE SERPL-MCNC: 97 MG/DL (ref 74–99)
HCT VFR BLD AUTO: 38.8 % (ref 37–54)
HGB BLD-MCNC: 11.6 G/DL (ref 12.5–16.5)
IMM GRANULOCYTES # BLD AUTO: <0.03 K/UL (ref 0–0.58)
IMM GRANULOCYTES NFR BLD: 0 % (ref 0–5)
LYMPHOCYTES NFR BLD: 1.46 K/UL (ref 1.5–4)
LYMPHOCYTES RELATIVE PERCENT: 40 % (ref 20–42)
MCH RBC QN AUTO: 24.8 PG (ref 26–35)
MCHC RBC AUTO-ENTMCNC: 29.9 G/DL (ref 32–34.5)
MCV RBC AUTO: 82.9 FL (ref 80–99.9)
MONOCYTES NFR BLD: 0.51 K/UL (ref 0.1–0.95)
MONOCYTES NFR BLD: 14 % (ref 2–12)
NEUTROPHILS NFR BLD: 43 % (ref 43–80)
NEUTS SEG NFR BLD: 1.59 K/UL (ref 1.8–7.3)
PLATELET # BLD AUTO: 209 K/UL (ref 130–450)
PMV BLD AUTO: 9.9 FL (ref 7–12)
POTASSIUM SERPL-SCNC: 4.6 MMOL/L (ref 3.5–5)
RBC # BLD AUTO: 4.68 M/UL (ref 3.8–5.8)
SODIUM SERPL-SCNC: 142 MMOL/L (ref 132–146)
WBC OTHER # BLD: 3.7 K/UL (ref 4.5–11.5)

## 2024-09-26 PROCEDURE — 36415 COLL VENOUS BLD VENIPUNCTURE: CPT

## 2024-09-26 PROCEDURE — 6370000000 HC RX 637 (ALT 250 FOR IP): Performed by: STUDENT IN AN ORGANIZED HEALTH CARE EDUCATION/TRAINING PROGRAM

## 2024-09-26 PROCEDURE — 6370000000 HC RX 637 (ALT 250 FOR IP): Performed by: HOSPITALIST

## 2024-09-26 PROCEDURE — 2140000000 HC CCU INTERMEDIATE R&B

## 2024-09-26 PROCEDURE — 82962 GLUCOSE BLOOD TEST: CPT

## 2024-09-26 PROCEDURE — 85025 COMPLETE CBC W/AUTO DIFF WBC: CPT

## 2024-09-26 PROCEDURE — 6370000000 HC RX 637 (ALT 250 FOR IP): Performed by: NURSE PRACTITIONER

## 2024-09-26 PROCEDURE — 80048 BASIC METABOLIC PNL TOTAL CA: CPT

## 2024-09-26 PROCEDURE — 2580000003 HC RX 258: Performed by: HOSPITALIST

## 2024-09-26 PROCEDURE — 99254 IP/OBS CNSLTJ NEW/EST MOD 60: CPT | Performed by: INTERNAL MEDICINE

## 2024-09-26 RX ORDER — HYDRALAZINE HYDROCHLORIDE 50 MG/1
50 TABLET, FILM COATED ORAL 3 TIMES DAILY
Qty: 90 TABLET | Refills: 3 | Status: SHIPPED | OUTPATIENT
Start: 2024-09-26

## 2024-09-26 RX ORDER — IPRATROPIUM BROMIDE AND ALBUTEROL SULFATE 2.5; .5 MG/3ML; MG/3ML
1 SOLUTION RESPIRATORY (INHALATION) EVERY 4 HOURS PRN
Status: DISCONTINUED | OUTPATIENT
Start: 2024-09-26 | End: 2024-09-27 | Stop reason: HOSPADM

## 2024-09-26 RX ORDER — GABAPENTIN 100 MG/1
100 CAPSULE ORAL 2 TIMES DAILY
Qty: 60 CAPSULE | Refills: 0 | Status: SHIPPED | OUTPATIENT
Start: 2024-09-26 | End: 2024-10-26

## 2024-09-26 RX ORDER — HYDRALAZINE HYDROCHLORIDE 50 MG/1
50 TABLET, FILM COATED ORAL 3 TIMES DAILY
Status: DISCONTINUED | OUTPATIENT
Start: 2024-09-26 | End: 2024-09-27 | Stop reason: HOSPADM

## 2024-09-26 RX ORDER — ALBUTEROL SULFATE 0.83 MG/ML
2.5 SOLUTION RESPIRATORY (INHALATION) EVERY 6 HOURS
Status: DISCONTINUED | OUTPATIENT
Start: 2024-09-26 | End: 2024-09-27 | Stop reason: HOSPADM

## 2024-09-26 RX ORDER — GABAPENTIN 100 MG/1
100 CAPSULE ORAL 2 TIMES DAILY
Status: DISCONTINUED | OUTPATIENT
Start: 2024-09-26 | End: 2024-09-27 | Stop reason: HOSPADM

## 2024-09-26 RX ORDER — ALBUTEROL SULFATE 0.83 MG/ML
2.5 SOLUTION RESPIRATORY (INHALATION) EVERY 6 HOURS PRN
Status: DISCONTINUED | OUTPATIENT
Start: 2024-09-26 | End: 2024-09-26

## 2024-09-26 RX ADMIN — METOPROLOL SUCCINATE 50 MG: 50 TABLET, FILM COATED, EXTENDED RELEASE ORAL at 20:14

## 2024-09-26 RX ADMIN — GABAPENTIN 100 MG: 100 CAPSULE ORAL at 08:49

## 2024-09-26 RX ADMIN — APIXABAN 5 MG: 5 TABLET, FILM COATED ORAL at 20:15

## 2024-09-26 RX ADMIN — HYDRALAZINE HYDROCHLORIDE 25 MG: 25 TABLET ORAL at 08:49

## 2024-09-26 RX ADMIN — BUPROPION HYDROCHLORIDE 150 MG: 150 TABLET, FILM COATED, EXTENDED RELEASE ORAL at 08:49

## 2024-09-26 RX ADMIN — SODIUM CHLORIDE, PRESERVATIVE FREE 10 ML: 5 INJECTION INTRAVENOUS at 08:53

## 2024-09-26 RX ADMIN — GABAPENTIN 100 MG: 100 CAPSULE ORAL at 20:16

## 2024-09-26 RX ADMIN — ASPIRIN 81 MG: 81 TABLET, COATED ORAL at 08:49

## 2024-09-26 RX ADMIN — TAMSULOSIN HYDROCHLORIDE 0.4 MG: 0.4 CAPSULE ORAL at 20:14

## 2024-09-26 RX ADMIN — METOPROLOL SUCCINATE 50 MG: 50 TABLET, FILM COATED, EXTENDED RELEASE ORAL at 08:49

## 2024-09-26 RX ADMIN — APIXABAN 5 MG: 5 TABLET, FILM COATED ORAL at 08:49

## 2024-09-26 RX ADMIN — FERROUS SULFATE TAB 325 MG (65 MG ELEMENTAL FE) 325 MG: 325 (65 FE) TAB at 15:37

## 2024-09-26 RX ADMIN — FERROUS SULFATE TAB 325 MG (65 MG ELEMENTAL FE) 325 MG: 325 (65 FE) TAB at 08:49

## 2024-09-26 RX ADMIN — PANTOPRAZOLE SODIUM 40 MG: 40 TABLET, DELAYED RELEASE ORAL at 08:49

## 2024-09-26 RX ADMIN — HYDRALAZINE HYDROCHLORIDE 50 MG: 50 TABLET ORAL at 15:37

## 2024-09-26 RX ADMIN — CYANOCOBALAMIN TAB 1000 MCG 1000 MCG: 1000 TAB at 08:49

## 2024-09-26 RX ADMIN — HYDRALAZINE HYDROCHLORIDE 50 MG: 50 TABLET ORAL at 20:14

## 2024-09-26 NOTE — CARE COORDINATION
Discharge order noted. Patient a potential discharge today and pending a pulmonology consult. Plan is for the patient to return to the [facility], they are holding his bed for him and will need to have his discharge summary when he returns there. Patient will need transport set up through insurance to return to the shelter at [phone].    3:44P   Transport scheduled via [facility] at [phone]. To  patient between now and 5:15P, will text patient 15 min prior to arrival. Nurse and patient informed.    Electronically signed by ROSALINA Massey on 9/26/2024 at 9:39 AM

## 2024-09-26 NOTE — PLAN OF CARE
Problem: Chronic Conditions and Co-morbidities  Goal: Patient's chronic conditions and co-morbidity symptoms are monitored and maintained or improved  Outcome: Progressing     Problem: Discharge Planning  Goal: Discharge to home or other facility with appropriate resources  Outcome: Progressing     Problem: Safety - Adult  Goal: Free from fall injury  Outcome: Progressing  Flowsheets (Taken 9/25/2024 2124)  Free From Fall Injury: Instruct family/caregiver on patient safety

## 2024-09-26 NOTE — PLAN OF CARE
Problem: Chronic Conditions and Co-morbidities  Goal: Patient's chronic conditions and co-morbidity symptoms are monitored and maintained or improved  9/26/2024 0812 by Vanessa Sewell RN  Outcome: Progressing  9/26/2024 0342 by Negra Kramer RN  Outcome: Progressing     Problem: Discharge Planning  Goal: Discharge to home or other facility with appropriate resources  9/26/2024 0812 by Vanessa Sewell RN  Outcome: Progressing  9/26/2024 0342 by Negra Kraemr RN  Outcome: Progressing     Problem: Safety - Adult  Goal: Free from fall injury  9/26/2024 0812 by Vanessa Sewell RN  Outcome: Progressing  9/26/2024 0342 by Negra Kramer RN  Outcome: Progressing  Flowsheets (Taken 9/25/2024 2124)  Free From Fall Injury: Instruct family/caregiver on patient safety     Problem: ABCDS Injury Assessment  Goal: Absence of physical injury  Outcome: Progressing  Flowsheets (Taken 9/25/2024 2124 by Negra Kramer RN)  Absence of Physical Injury: Implement safety measures based on patient assessment

## 2024-09-26 NOTE — PROGRESS NOTES
Physician Progress Note      PATIENT:               UBALDO YODER  [facility] #:                  [id number]  :                       1950  ADMIT DATE:       2024 5:41 PM  DISCH DATE:  RESPONDING  PROVIDER #:        Renuka Mejia MD          QUERY TEXT:    Patient admitted with , Acute Cystitis without hematuria.  Patient is noted to   have Paroxysmal atrial fibrillation and is maintained on Eliquis. If   possible, please document in progress notes and discharge summary if you are   evaluating and/or treating any of the following:?  ?  The medical record reflects the following:  Risk Factors: Paroxysmal atrial fibrillation; HTN; DM; Prior stroke; 74 y.o   male  Clinical Indicators:  24: Per Cardiology: Atrial fibrillation - Eliquis.  [id number] Score of   at least 4 (HTN, DM, prior stroke, age 65-74)  episodes of RVR (2021) -- duration of AF unknown at the time of 2021   hospitalization, elevated [id number] score --> spontaneously converted to SR   while hospitalized in 24: EKG: Normal sinus rhythm. Normal ECG  Treatment: Cardiology consult; echo; Lexiscan stress; EKG; Eliquis PO; Toprol   XL PO:    Thank you,  Katja Corrales BSN, R.N.  Clinical Documentation Integrity  [phone]  Options provided:  -- Secondary hypercoagulable state in a patient with atrial fibrillation  -- prophylactic treatment only without secondary hypercoagulable state  -- Other - I will add my own diagnosis  -- Disagree - Not applicable / Not valid  -- Disagree - Clinically unable to determine / Unknown  -- Refer to Clinical Documentation Reviewer    PROVIDER RESPONSE TEXT:    This patient has secondary hypercoagulable state in a patient with atrial   fibrillation.    Query created by: Katja Corrales on 2024 4:10 PM      Electronically signed by:  Renuka Mejia MD 2024 7:44 AM

## 2024-09-26 NOTE — CONSULTS
Pulmonary Critical Care Medicine      PULMONARY CRITICAL CARE CONSULTATION NOTE.    09/26/24    CONSULTING  PHYSICIAN: Renuka Mejia MD         Assessment / Recommendations-     Several areas of centrally located bronchiectatic areas on the CT scan of the chest, these are the sequelae from history of active TB treated in the past and latent TB currently on treatment from [facility].    Patient is a diabetic and recently received Farxiga-  combination which has extremely high risk of developing UTI which he was found to have.    The only respiratory symptom endorsed by patient is dyspnea on exertion with mild to moderate physical activities.  He has no symptoms at rest.  He has also no need for supplemental oxygen.    CKD stage III  Paroxysmal A-fib on Eliquis    His bronchiectasis and latent TB can be followed up as an outpatient    It is okay  for the patient to be discharged from pulmonary standpoint with pulmonary outpatient follow-up in about 4 weeks.      History of Present Illness    Hemal Banks  is a 74 y.o. male who initially presented 6 days ago for gradually worsening dyspnea on exertion and shortness of breath for about 6 months.  He was found to have UTI which was treated  CT scan of the chest reveals centrally located bilateral bronchiectatic changes  Patient is known to have diabetes mellitus type 2 and takes Farxiga as well.    Pulmonary  service was consulted for history of active TB which was completely treated and currently being treated latent TB and questionable discharge.    Baseline Exercise tolerance/MMRC score( Bold )     MMRC Dyspnea Scale  Grade Description of Breathlessness   0 I only get breathless with strenuous exercise.   1 I get short of breath when hurrying on level ground or walking up a slight hill.   2 On level ground, I walk slower than people of the same age because of breathlessness, or have to stop for breath when walking at my own pace.   3 I stop  09/26/24 1200   BP: (!) 144/68  (!) 149/70 (!) 159/81   Pulse: 63  63 66   Resp: 15  16 16   Temp: 97.7 °F (36.5 °C)  97.9 °F (36.6 °C) 97.6 °F (36.4 °C)   TempSrc: Oral  Oral Oral   SpO2: 96%  100% 98%   Weight:  110.9 kg (244 lb 9.6 oz)     Height:           General appearance: Not ill appearing, alert, no converstional dyspnea  Head: Normocephalic, without obvious abnormality, atraumatic   Eyes:Pupils bilateral equal and reactive, EOM intact, conjunctiva - no icterus , no injection   Throat: Clear, no lesions, Mallampti =2, no tonsillar eythema or edema  Neck: Supple, symmetrical, trachea midline, no lymphadenopathy, no JVD, no carotid bruits, no thyromegaly   Lungs: Bilateral  Air movement, decreased in bases, normal femitus, resonant to percussion, no crackles or rhonchi  Heart: RRR, S1, S2 normal, no murmur, click, rub or gallop   Abdomen: soft, non-tender, nondistended. Bowel sounds normal, no hepatomeagly  Extremities: extremities normal, atraumatic, no cyanosis, edema  Musculoskeletal - No deformities   Skin: Skin color, texture, turgor normal. No rashes or lesions   Neurological: No focal deficits, cranial nerves grossly intact, sensations intact   Psychiatric : Mood and effect normal , alert and oriented times 4    LABS and Studies:  Objective:  Vital signs: (most recent): Blood pressure (!) 159/81, pulse 66, temperature 97.6 °F (36.4 °C), temperature source Oral, resp. rate 16, height 1.829 m (6'), weight 110.9 kg (244 lb 9.6 oz), SpO2 98%.        CBC:   Recent Labs     09/24/24  0434 09/25/24  0835 09/26/24  0856   WBC 4.4* 3.8* 3.7*   HGB 11.3* 12.1* 11.6*   HCT 37.1 39.7 38.8    223 209     BMP:    Recent Labs     09/24/24  0434 09/25/24  0835 09/26/24  0856    141 142   K 4.2 4.3 4.6    107 107   CO2 22 22 25   BUN 22 24* 25*   CREATININE 1.8* 1.7* 1.9*   GLUCOSE 100* 105* 97   CALCIUM 8.6 8.5* 8.9       Radiology:  CT-scan of the chest (personally reviewed)    chest X-ray

## 2024-09-26 NOTE — PROGRESS NOTES
CLINICAL PHARMACY NOTE: MEDS TO BEDS    Total # of Prescriptions Filled: 2   The following medications were delivered to the patient:  Ferosul 325  Hydralazine 50    Additional Documentation:  Gabapentin too soon  Delivered to rn

## 2024-09-26 NOTE — DISCHARGE SUMMARY
Hospitalist Discharge Summary    Patient ID: Hemal Banks   Patient : 1950  Patient's PCP: Carlita Castellano MD    Admit Date: 2024   Admitting Physician: Lars Galvez MD    Discharge Date:  2024   Discharge Physician: Renuka Mejia MD   Discharge Condition: Stable  Discharge Disposition: Home      Hospital course in brief:  (Please refer to daily progress notes for a comprehensive review of the hospitalization by requesting medical records)    74 y.o. year old male  with PMH of CLASS 1 obesity, T2DM, HTN, HLD, A fib on eliquis     Pt presented to ED for evaluation of SOB on exertion.   Pt was seen on room air, actually complains frequent urination ; UA s/o UTI;  was on empiric rocephin but  urine cx revealed growth of contaminant ; rocephin was eventually stopped     Patient has LE swelling ; complains of dyspnea on exertion; amlodipine was stopped ; cardiology was consulted; echo and stress test ordered    Echo is reviewed- Normal left ventricular systolic function with a visually estimated EF of 55 - 60%. Left ventricle size is normal. Normal wall thickness. Ventricular mass is normal. Normal wall motion. Indeterminate diastolic function.    Right Ventricle: Right ventricle is mildly dilated. Normal systolic function. TAPSE is 2.5 cm.    Aortic Valve: Mild to moderate regurgitation. No stenosis.    Mitral Valve: Mild regurgitation.    Tricuspid Valve: Mild regurgitation. Normal RVSP. The estimated RVSP is 12 mmHg.    Pericardium: No pericardial effusion.    Stress test negative  Cardiology signed off     Since cardiogenic cause of ADDISON is ruled out , CTA of chest done to rule out PE  CTA chest-. No evidence of pulmonary embolism. 2. Ascending thoracic aortic aneurysm measuring 4.6 x 4.5 cm  Recommended the patient to follow up outpatient for repeat ct scan for assessing the aneurysm size in an year     Reviewed iron level ferritin and Tibc-s/o iron deficiency anemia; started iron  perfusion is normal. There is no evidence of inducible ischemia.   Stress Function: Left ventricular function post-stress is normal. The stress end diastolic cavity size is normal.   Perfusion Defect: There is a mild severity left ventricular stress perfusion defect that is small in size present in the basal to mid inferior segment(s) that is partially reversible.   Perfusion Conclusion: There is no evidence of transient ischemic dilation (TID). TID ratio is 0.85.   Stress Combined Conclusion: Normal pharmacological myocardial perfusion study with attenuation artifact. Findings suggest a low risk of cardiac events.     Echo (TTE) complete (PRN contrast/bubble/strain/3D)    Result Date: 9/23/2024    Left Ventricle: Normal left ventricular systolic function with a visually estimated EF of 55 - 60%. Left ventricle size is normal. Normal wall thickness. Ventricular mass is normal. Normal wall motion. Indeterminate diastolic function.   Right Ventricle: Right ventricle is mildly dilated. Normal systolic function. TAPSE is 2.5 cm.   Aortic Valve: Mild to moderate regurgitation. No stenosis.   Mitral Valve: Mild regurgitation.   Tricuspid Valve: Mild regurgitation. Normal RVSP. The estimated RVSP is 12 mmHg.   Pericardium: No pericardial effusion.   Image quality is adequate.     XR CHEST (2 VW)    Result Date: 9/20/2024  EXAMINATION: TWO XRAY VIEWS OF THE CHEST 9/20/2024 11:57 am COMPARISON: 06/11/2024 HISTORY: ORDERING SYSTEM PROVIDED HISTORY: Shortness of Breath TECHNOLOGIST PROVIDED HISTORY: Reason for exam:->Shortness of Breath FINDINGS: The lungs are without acute focal process.  There is no effusion or pneumothorax. The cardiomediastinal silhouette is without acute process. The osseous structures are without acute process.     No acute process.       Discharge Medications:      Medication List        START taking these medications      ferrous sulfate 325 (65 Fe) MG tablet  Commonly known as: IRON 325  Take 1 tablet

## 2024-09-27 VITALS
TEMPERATURE: 97.9 F | SYSTOLIC BLOOD PRESSURE: 169 MMHG | WEIGHT: 244.6 LBS | DIASTOLIC BLOOD PRESSURE: 82 MMHG | HEIGHT: 72 IN | HEART RATE: 66 BPM | OXYGEN SATURATION: 100 % | BODY MASS INDEX: 33.13 KG/M2 | RESPIRATION RATE: 18 BRPM

## 2024-09-27 LAB
CK SERPL-CCNC: 86 U/L (ref 20–200)
GLUCOSE BLD-MCNC: 101 MG/DL (ref 74–99)
GLUCOSE BLD-MCNC: 132 MG/DL (ref 74–99)
LDH SERPL-CCNC: 217 U/L (ref 135–225)

## 2024-09-27 PROCEDURE — 6370000000 HC RX 637 (ALT 250 FOR IP): Performed by: STUDENT IN AN ORGANIZED HEALTH CARE EDUCATION/TRAINING PROGRAM

## 2024-09-27 PROCEDURE — 82550 ASSAY OF CK (CPK): CPT

## 2024-09-27 PROCEDURE — 94640 AIRWAY INHALATION TREATMENT: CPT

## 2024-09-27 PROCEDURE — 83615 LACTATE (LD) (LDH) ENZYME: CPT

## 2024-09-27 PROCEDURE — 82962 GLUCOSE BLOOD TEST: CPT

## 2024-09-27 PROCEDURE — 6360000002 HC RX W HCPCS: Performed by: STUDENT IN AN ORGANIZED HEALTH CARE EDUCATION/TRAINING PROGRAM

## 2024-09-27 PROCEDURE — 99239 HOSP IP/OBS DSCHRG MGMT >30: CPT | Performed by: STUDENT IN AN ORGANIZED HEALTH CARE EDUCATION/TRAINING PROGRAM

## 2024-09-27 PROCEDURE — 6370000000 HC RX 637 (ALT 250 FOR IP): Performed by: HOSPITALIST

## 2024-09-27 RX ORDER — ALBUTEROL SULFATE 0.83 MG/ML
2.5 SOLUTION RESPIRATORY (INHALATION) EVERY 6 HOURS
Qty: 120 EACH | Refills: 3 | Status: SHIPPED | OUTPATIENT
Start: 2024-09-27

## 2024-09-27 RX ADMIN — BUPROPION HYDROCHLORIDE 150 MG: 150 TABLET, FILM COATED, EXTENDED RELEASE ORAL at 08:59

## 2024-09-27 RX ADMIN — FERROUS SULFATE TAB 325 MG (65 MG ELEMENTAL FE) 325 MG: 325 (65 FE) TAB at 08:59

## 2024-09-27 RX ADMIN — ALBUTEROL SULFATE 2.5 MG: 2.5 SOLUTION RESPIRATORY (INHALATION) at 01:08

## 2024-09-27 RX ADMIN — APIXABAN 5 MG: 5 TABLET, FILM COATED ORAL at 09:00

## 2024-09-27 RX ADMIN — ASPIRIN 81 MG: 81 TABLET, COATED ORAL at 09:01

## 2024-09-27 RX ADMIN — PANTOPRAZOLE SODIUM 40 MG: 40 TABLET, DELAYED RELEASE ORAL at 09:00

## 2024-09-27 RX ADMIN — HYDRALAZINE HYDROCHLORIDE 50 MG: 50 TABLET ORAL at 16:27

## 2024-09-27 RX ADMIN — FERROUS SULFATE TAB 325 MG (65 MG ELEMENTAL FE) 325 MG: 325 (65 FE) TAB at 16:27

## 2024-09-27 RX ADMIN — ALBUTEROL SULFATE 2.5 MG: 2.5 SOLUTION RESPIRATORY (INHALATION) at 12:10

## 2024-09-27 RX ADMIN — GABAPENTIN 100 MG: 100 CAPSULE ORAL at 08:59

## 2024-09-27 RX ADMIN — METOPROLOL SUCCINATE 50 MG: 50 TABLET, FILM COATED, EXTENDED RELEASE ORAL at 09:00

## 2024-09-27 RX ADMIN — HYDRALAZINE HYDROCHLORIDE 50 MG: 50 TABLET ORAL at 08:59

## 2024-09-27 RX ADMIN — CYANOCOBALAMIN TAB 1000 MCG 1000 MCG: 1000 TAB at 09:01

## 2024-09-27 ASSESSMENT — PAIN SCALES - GENERAL: PAINLEVEL_OUTOF10: 0

## 2024-09-27 NOTE — PLAN OF CARE
Problem: Chronic Conditions and Co-morbidities  Goal: Patient's chronic conditions and co-morbidity symptoms are monitored and maintained or improved  9/27/2024 1619 by aMrgot Moeller RN  Outcome: Completed  9/27/2024 1029 by Katja Cortez RN  Outcome: Progressing     Problem: Discharge Planning  Goal: Discharge to home or other facility with appropriate resources  9/27/2024 1619 by Margot Moeller RN  Outcome: Completed  9/27/2024 1029 by Katja Cortez RN  Outcome: Progressing     Problem: Safety - Adult  Goal: Free from fall injury  9/27/2024 1619 by Margot Moeller RN  Outcome: Completed  9/27/2024 1029 by Katja Cortez RN  Outcome: Progressing     Problem: ABCDS Injury Assessment  Goal: Absence of physical injury  9/27/2024 1619 by Margot Moeller RN  Outcome: Completed  9/27/2024 1029 by Katja Cortez RN  Outcome: Progressing

## 2024-09-27 NOTE — CARE COORDINATION
Patient to discharge to the [facility] today, new order for nebulizer to be placed. Patient would like meds and DME filled here in the hospital before discharged. Call made to [facility], they do not accept patient's insurance. Referral faxed to [facility] for nebulizer kit. Call made to [facility] regarding nebulizer solution; advised they have not received an order for it yet. Patient will need transport set up via [facility] at [phone] when DME and meds obtained.    The Plan for Transition of Care is related to the following treatment goals: discharge planning    The Patient and/or patient representative Hemal TARANGOTomasz Jocelyn was provided with a choice of provider and agrees with the discharge plan. [x] Yes [] No    Freedom of choice list was provided with basic dialogue that supports the patient's individualized plan of care/goals, treatment preferences and shares the quality data associated with the providers. [x] Yes [] No     Electronically signed by ROSALINA Massey on 9/27/2024 at 12:17 PM

## 2024-09-27 NOTE — PLAN OF CARE
Problem: Chronic Conditions and Co-morbidities  Goal: Patient's chronic conditions and co-morbidity symptoms are monitored and maintained or improved  9/27/2024 1029 by Katja Cortez RN  Outcome: Progressing     Problem: Discharge Planning  Goal: Discharge to home or other facility with appropriate resources  9/27/2024 1029 by Katja Cortez, RN  Outcome: Progressing     Problem: Safety - Adult  Goal: Free from fall injury  9/27/2024 1029 by Katja Cortez, RN  Outcome: Progressing     Problem: ABCDS Injury Assessment  Goal: Absence of physical injury  9/27/2024 1029 by Katja Cortez, RN  Outcome: Progressing

## 2024-09-27 NOTE — PROGRESS NOTES
CLINICAL PHARMACY NOTE: MEDS TO BEDS    Total # of Prescriptions Filled: 1   The following medications were delivered to the patient:  Albuterol 2.5 mg/ 3ml    Additional Documentation:

## 2024-09-27 NOTE — DISCHARGE SUMMARY
detail and they are aware of the specific conditions for emergent return, as well as the importance of follow-up.  Their questions are answered at this time and they are agreeable with the plan for discharge to home.    Continued appropriate risk factor modification of blood pressure, diabetes and serum lipids will remain essential to reducing risk of future atherosclerotic development    Activity: activity as tolerated    Physical exam:  General appearance: No apparent distress, appears stated age and cooperative.  HEENT: Conjunctivae/corneas clear. Mucous membranes moist.  Neck: Supple. No JVD.  Respiratory:  Clear to auscultation bilaterally.  Normal respiratory effort.   Cardiovascular:  RRR. S1, S2 without MRG.  PV: Pulses palpable. No edema.   Abdomen: Soft, non-tender, non-distended. +BS  Musculoskeletal: No obvious deformities.   Skin: Normal skin color.  No rashes or lesions. Good turgor.   Neurologic:  Grossly non-focal. Awake, alert, following commands.   Psychiatric: Alert and oriented, thought content appropriate, normal insight and judgement    Significant labs:  CBC:   Recent Labs     09/25/24  0835 09/26/24  0856   WBC 3.8* 3.7*   RBC 4.81 4.68   HGB 12.1* 11.6*   HCT 39.7 38.8   MCV 82.5 82.9   RDW 16.3* 16.2*    209     BMP:   Recent Labs     09/25/24  0835 09/26/24  0856    142   K 4.3 4.6    107   CO2 22 25   BUN 24* 25*   CREATININE 1.7* 1.9*     LFT:  No results for input(s): \"ALKPHOS\", \"ALT\", \"AST\", \"BILITOT\", \"AMYLASE\", \"LIPASE\" in the last 72 hours.    Invalid input(s): \"PROT\", \"ALB\"  PT/INR: No results for input(s): \"INR\", \"APTT\" in the last 72 hours.  BNP: No results for input(s): \"BNP\" in the last 72 hours.  Hgb A1C:   Lab Results   Component Value Date    LABA1C 6.6 (H) 09/21/2024     Folate and B12:   Lab Results   Component Value Date    TKVQXNWV26 809 06/14/2024   ,   Lab Results   Component Value Date    FOLATE 11.5 06/14/2024     Thyroid Studies:   Lab Results  ventricular function post-stress is normal. The stress end diastolic cavity size is normal.   Perfusion Defect: There is a mild severity left ventricular stress perfusion defect that is small in size present in the basal to mid inferior segment(s) that is partially reversible.   Perfusion Conclusion: There is no evidence of transient ischemic dilation (TID). TID ratio is 0.85.   Stress Combined Conclusion: Normal pharmacological myocardial perfusion study with attenuation artifact. Findings suggest a low risk of cardiac events.     Echo (TTE) complete (PRN contrast/bubble/strain/3D)    Result Date: 9/23/2024    Left Ventricle: Normal left ventricular systolic function with a visually estimated EF of 55 - 60%. Left ventricle size is normal. Normal wall thickness. Ventricular mass is normal. Normal wall motion. Indeterminate diastolic function.   Right Ventricle: Right ventricle is mildly dilated. Normal systolic function. TAPSE is 2.5 cm.   Aortic Valve: Mild to moderate regurgitation. No stenosis.   Mitral Valve: Mild regurgitation.   Tricuspid Valve: Mild regurgitation. Normal RVSP. The estimated RVSP is 12 mmHg.   Pericardium: No pericardial effusion.   Image quality is adequate.     XR CHEST (2 VW)    Result Date: 9/20/2024  EXAMINATION: TWO XRAY VIEWS OF THE CHEST 9/20/2024 11:57 am COMPARISON: 06/11/2024 HISTORY: ORDERING SYSTEM PROVIDED HISTORY: Shortness of Breath TECHNOLOGIST PROVIDED HISTORY: Reason for exam:->Shortness of Breath FINDINGS: The lungs are without acute focal process.  There is no effusion or pneumothorax. The cardiomediastinal silhouette is without acute process. The osseous structures are without acute process.     No acute process.       Discharge Medications:      Medication List        START taking these medications      albuterol (2.5 MG/3ML) 0.083% nebulizer solution  Commonly known as: PROVENTIL  Take 3 mLs by nebulization every 6 hours     ferrous sulfate 325 (65 Fe) MG

## 2024-09-27 NOTE — PROGRESS NOTES
Ambulated patient approximately 300ft on RA. Patient maintained an oxygen saturation of % but was exerted, SOB and c/o some dizziness as we returned to room. Respiratory at bedside for scheduled breathing treatment.

## 2024-09-27 NOTE — PLAN OF CARE
Problem: Chronic Conditions and Co-morbidities  Goal: Patient's chronic conditions and co-morbidity symptoms are monitored and maintained or improved  9/27/2024 0023 by Mary Medley RN  Outcome: Progressing     Problem: Discharge Planning  Goal: Discharge to home or other facility with appropriate resources  9/27/2024 0023 by Mary Medley, RN  Outcome: Progressing     Problem: Safety - Adult  Goal: Free from fall injury  9/27/2024 0023 by Mary Medley, RN  Outcome: Progressing     Problem: ABCDS Injury Assessment  Goal: Absence of physical injury  9/27/2024 0023 by Mary Medley, RN  Outcome: Progressing

## 2024-09-27 NOTE — PROGRESS NOTES
Ambulated patient post respiratory treatment, patient maintained a SpO2 of 100% and exerted on RA. Patient only ambulated approx 150ft and started to feel as his knees were buckling and legs feeling like jello. 2 RNs assisted patient back to bed.

## 2024-09-27 NOTE — PROGRESS NOTES
Patient ready for discharge and was visibly SOB with ambulating. Patient states he does not feel comfortable being discharged feeling this way and was under the impression he would be discharged with inhalers. Dr. Mejia notified. Patient to stay over night and will be seen by pulmonary in am.

## 2024-09-27 NOTE — PLAN OF CARE
Problem: Chronic Conditions and Co-morbidities  Goal: Patient's chronic conditions and co-morbidity symptoms are monitored and maintained or improved  9/26/2024 2145 by Vanessa Sewell RN  Outcome: Progressing  9/26/2024 0812 by Vanessa Sewell RN  Outcome: Progressing     Problem: Discharge Planning  Goal: Discharge to home or other facility with appropriate resources  9/26/2024 2145 by Vanessa Sewell RN  Outcome: Progressing  9/26/2024 0812 by Vanessa Sewell RN  Outcome: Progressing     Problem: Safety - Adult  Goal: Free from fall injury  9/26/2024 2145 by Vanessa Sewell RN  Outcome: Progressing  9/26/2024 0812 by Vanessa Sewell RN  Outcome: Progressing     Problem: ABCDS Injury Assessment  Goal: Absence of physical injury  9/26/2024 2145 by Vanessa Sewell RN  Outcome: Progressing  9/26/2024 0812 by Vanessa Sewell RN  Outcome: Progressing  Flowsheets (Taken 9/25/2024 2124 by Negra Kramer, RN)  Absence of Physical Injury: Implement safety measures based on patient assessment

## 2024-10-03 DIAGNOSIS — R11.2 NAUSEA AND VOMITING, UNSPECIFIED VOMITING TYPE: ICD-10-CM

## 2024-10-04 RX ORDER — MECLIZINE HCL 12.5 MG 12.5 MG/1
TABLET ORAL
Qty: 30 TABLET | Refills: 0 | Status: SHIPPED | OUTPATIENT
Start: 2024-10-04

## 2024-10-07 ENCOUNTER — TELEPHONE (OUTPATIENT)
Age: 74
End: 2024-10-07

## 2024-10-08 ENCOUNTER — OFFICE VISIT (OUTPATIENT)
Age: 74
End: 2024-10-08
Payer: MEDICAID

## 2024-10-08 VITALS
SYSTOLIC BLOOD PRESSURE: 140 MMHG | OXYGEN SATURATION: 98 % | BODY MASS INDEX: 34.28 KG/M2 | DIASTOLIC BLOOD PRESSURE: 61 MMHG | WEIGHT: 253.1 LBS | HEART RATE: 71 BPM | HEIGHT: 72 IN | RESPIRATION RATE: 18 BRPM | TEMPERATURE: 97.9 F

## 2024-10-08 DIAGNOSIS — Z09 HOSPITAL DISCHARGE FOLLOW-UP: ICD-10-CM

## 2024-10-08 DIAGNOSIS — R06.09 EXERTIONAL DYSPNEA: Primary | ICD-10-CM

## 2024-10-08 PROCEDURE — 99214 OFFICE O/P EST MOD 30 MIN: CPT | Performed by: FAMILY MEDICINE

## 2024-10-08 PROCEDURE — 1111F DSCHRG MED/CURRENT MED MERGE: CPT | Performed by: FAMILY MEDICINE

## 2024-10-08 RX ORDER — ALBUTEROL SULFATE 90 UG/1
2 INHALANT RESPIRATORY (INHALATION) 4 TIMES DAILY PRN
Qty: 18 G | Refills: 5 | Status: SHIPPED | OUTPATIENT
Start: 2024-10-08

## 2024-10-08 NOTE — PROGRESS NOTES
Post-Discharge Transitional Care  Follow Up      Hemal Banks   YOB: 1950    Date of Office Visit:  10/8/2024  Date of Hospital Admission: 9/20/24  Date of Hospital Discharge: 9/27/24  Risk of hospital readmission (high >=14%. Medium >=10%) :Readmission Risk Score: 15.3      Care management risk score Rising risk (score 2-5) and Complex Care (Scores >=6): No Risk Score On File     Non face to face  following discharge, date last encounter closed (first attempt may have been earlier): 09/25/2024    Call initiated 2 business days of discharge: Yes    ASSESSMENT/PLAN:   Exertional dyspnea  -     albuterol sulfate HFA (VENTOLIN HFA) 108 (90 Base) MCG/ACT inhaler; Inhale 2 puffs into the lungs 4 times daily as needed for Wheezing, Disp-18 g, R-5Normal  Hospital discharge follow-up  -     [facility] DISCHARGE MEDS RECONCILED W/ CURRENT OUTPATIENT MED LIST      Medical Decision Making: high complexity  Return in 3 months (on 1/8/2025).           Subjective:   HPI:  Follow up of Hospital problems/diagnosis(es):     Patient was sent to the ED on 9/20/2024 after presenting to the office complaining of dyspnea on exertion.  Vitals and physical examination appeared normal but upon ambulation patient had a presyncopal episode and was sent to the ER via ambulance.  Patient had full cardiac workup done which was unremarkable.  CTA chest did show a 4.6 x 4.5 cm ascending thoracic aortic aneurysm, based on measurements decision was made to monitor for 1 year without any intervention.  He was noted to have slightly decreased hemoglobin and was started on iron supplementation.    During admission hydralazine dose was increased from 25 mg BID to 50 mg TID, amlodipine was discontinued given his lower extremity swelling.  Gabapentin dose was decreased to 100 mg BID given patient's complaint of weakness and standing up.    Plan was to follow-up with pulmonary, cardiology and myself after 1 week of discharge.  Patient has not

## 2024-10-11 ENCOUNTER — HOSPITAL ENCOUNTER (OUTPATIENT)
Dept: OTHER | Age: 74
Setting detail: THERAPIES SERIES
Discharge: HOME | End: 2024-10-11
Payer: MEDICAID

## 2024-10-11 VITALS
RESPIRATION RATE: 18 BRPM | BODY MASS INDEX: 35.67 KG/M2 | DIASTOLIC BLOOD PRESSURE: 62 MMHG | SYSTOLIC BLOOD PRESSURE: 134 MMHG | WEIGHT: 263 LBS | HEART RATE: 60 BPM | OXYGEN SATURATION: 99 %

## 2024-10-11 LAB
ANION GAP SERPL CALCULATED.3IONS-SCNC: 11 MMOL/L (ref 7–16)
BNP SERPL-MCNC: 377 PG/ML (ref 0–125)
BUN SERPL-MCNC: 27 MG/DL (ref 6–23)
CALCIUM SERPL-MCNC: 9 MG/DL (ref 8.6–10.2)
CHLORIDE SERPL-SCNC: 110 MMOL/L (ref 98–107)
CO2 SERPL-SCNC: 21 MMOL/L (ref 22–29)
CREAT SERPL-MCNC: 1.9 MG/DL (ref 0.7–1.2)
GFR, ESTIMATED: 37 ML/MIN/1.73M2
GLUCOSE SERPL-MCNC: 115 MG/DL (ref 74–99)
POTASSIUM SERPL-SCNC: 4.1 MMOL/L (ref 3.5–5)
SODIUM SERPL-SCNC: 142 MMOL/L (ref 132–146)

## 2024-10-11 PROCEDURE — 96374 THER/PROPH/DIAG INJ IV PUSH: CPT

## 2024-10-11 PROCEDURE — 6360000002 HC RX W HCPCS: Performed by: NURSE PRACTITIONER

## 2024-10-11 PROCEDURE — 99204 OFFICE O/P NEW MOD 45 MIN: CPT

## 2024-10-11 PROCEDURE — 83880 ASSAY OF NATRIURETIC PEPTIDE: CPT

## 2024-10-11 PROCEDURE — 2580000003 HC RX 258: Performed by: NURSE PRACTITIONER

## 2024-10-11 PROCEDURE — 80048 BASIC METABOLIC PNL TOTAL CA: CPT

## 2024-10-11 RX ORDER — SODIUM CHLORIDE 0.9 % (FLUSH) 0.9 %
10 SYRINGE (ML) INJECTION ONCE
Status: COMPLETED | OUTPATIENT
Start: 2024-10-11 | End: 2024-10-11

## 2024-10-11 RX ORDER — FUROSEMIDE 10 MG/ML
40 INJECTION INTRAMUSCULAR; INTRAVENOUS ONCE
Status: COMPLETED | OUTPATIENT
Start: 2024-10-11 | End: 2024-10-11

## 2024-10-11 RX ADMIN — FUROSEMIDE 40 MG: 10 INJECTION, SOLUTION INTRAMUSCULAR; INTRAVENOUS at 11:53

## 2024-10-11 RX ADMIN — SODIUM CHLORIDE, PRESERVATIVE FREE 10 ML: 5 INJECTION INTRAVENOUS at 11:53

## 2024-10-11 NOTE — PLAN OF CARE
Problem: Chronic Conditions and Co-morbidities  Goal: Patient's chronic conditions and co-morbidity symptoms are monitored and maintained or improved  10/11/2024 0936 by Concepcion Beltran RN  Outcome: Progressing  10/11/2024 0920 by Qi Menjivar RN  Flowsheets (Taken 10/11/2024 0920)  Care Plan - Patient's Chronic Conditions and Co-Morbidity Symptoms are Monitored and Maintained or Improved: Monitor and assess patient's chronic conditions and comorbid symptoms for stability, deterioration, or improvement

## 2024-10-11 NOTE — PROGRESS NOTES
08/04/2023 1.3         Wt Readings from Last 3 Encounters:   10/11/24 119.3 kg (263 lb)   10/08/24 114.8 kg (253 lb 1.6 oz)   09/26/24 110.9 kg (244 lb 9.6 oz)           ASSESSMENT/PLAN:    [] Euvolemic          [x] Hypervolemic, with increase from baseline:  [x] Shortness of breath/ADDISON  [] JVD  [] HJR  [x] Abnormal lung assessment: expiratory wheeze (TB patient)   [] Orthopnea  [] PND  [] Decreased urinary response to oral diuretic   [] Scrotal swelling   [x] Lower extremity edema  [x] Compression stockings provided  [] Decline in functional capacity (unable to perform activities they had previously been able to do)  [] Weight gain     [x] IV diuretics given Yes lasix 40mg  [] Provider notified of recurrent IV diuretic use    Additional Notes: First visit with CHF clinic today. Patient presented and it was discussed with patient the purpose of CHF clinic and importance of daily weights and doing a self check every day to monitor for changes. Went over the three heart failure zones and to call cardiologist if in yellow zone immediately to prevent any further decline. Patient has a working scale. Patient is agreeable to future CHF clinic visits.     Patient is not on an oral diuretic.  He was given compression socks and explained to wear them in the day and take off at night before going to bed.  He just today completed treatment for TB.  Patient states he was taking Aspirin 81mg in addition to Elliquis.  Recently he no longer receives Aspirin from the pharmacy and he is questioning whether he should still be taking it.  It is on his medication list.      Patient scheduled with NEEL GARRETT.      [x]Lab work obtained    [x] Patient/Family Educated On:  [x] HF zones (Green, Yellow, Red) and aware of when to take action   [x] Daily weights  [] Scale provided   [x] Low sodium diet (2000 mg)  Barriers to compliance  [] Refuses to monitor diet  [] Socioeconomic difficulties  [] Unable to cook for self (use of

## 2024-10-11 NOTE — PLAN OF CARE
Problem: Chronic Conditions and Co-morbidities  Goal: Patient's chronic conditions and co-morbidity symptoms are monitored and maintained or improved  Flowsheets (Taken 10/11/2024 0920)  Care Plan - Patient's Chronic Conditions and Co-Morbidity Symptoms are Monitored and Maintained or Improved: Monitor and assess patient's chronic conditions and comorbid symptoms for stability, deterioration, or improvement

## 2024-10-25 ENCOUNTER — HOSPITAL ENCOUNTER (OUTPATIENT)
Dept: OTHER | Age: 74
Setting detail: THERAPIES SERIES
Discharge: HOME | End: 2024-10-25
Payer: MEDICAID

## 2024-10-25 VITALS
WEIGHT: 244 LBS | BODY MASS INDEX: 33.09 KG/M2 | HEART RATE: 68 BPM | DIASTOLIC BLOOD PRESSURE: 62 MMHG | OXYGEN SATURATION: 99 % | RESPIRATION RATE: 18 BRPM | SYSTOLIC BLOOD PRESSURE: 140 MMHG

## 2024-10-25 LAB
ANION GAP SERPL CALCULATED.3IONS-SCNC: 9 MMOL/L (ref 7–16)
BNP SERPL-MCNC: 533 PG/ML (ref 0–125)
BUN SERPL-MCNC: 20 MG/DL (ref 6–23)
CALCIUM SERPL-MCNC: 8.9 MG/DL (ref 8.6–10.2)
CHLORIDE SERPL-SCNC: 105 MMOL/L (ref 98–107)
CO2 SERPL-SCNC: 24 MMOL/L (ref 22–29)
CREAT SERPL-MCNC: 1.8 MG/DL (ref 0.7–1.2)
GFR, ESTIMATED: 40 ML/MIN/1.73M2
GLUCOSE SERPL-MCNC: 138 MG/DL (ref 74–99)
POTASSIUM SERPL-SCNC: 3.6 MMOL/L (ref 3.5–5)
SODIUM SERPL-SCNC: 138 MMOL/L (ref 132–146)

## 2024-10-25 PROCEDURE — 80048 BASIC METABOLIC PNL TOTAL CA: CPT

## 2024-10-25 PROCEDURE — 99214 OFFICE O/P EST MOD 30 MIN: CPT

## 2024-10-25 PROCEDURE — 83880 ASSAY OF NATRIURETIC PEPTIDE: CPT

## 2024-10-25 NOTE — PLAN OF CARE
Problem: Chronic Conditions and Co-morbidities  Goal: Patient's chronic conditions and co-morbidity symptoms are monitored and maintained or improved  Flowsheets (Taken 10/25/2024 7053)  Care Plan - Patient's Chronic Conditions and Co-Morbidity Symptoms are Monitored and Maintained or Improved: Monitor and assess patient's chronic conditions and comorbid symptoms for stability, deterioration, or improvement

## 2024-10-25 NOTE — PROGRESS NOTES
assistance program information given   [] Sample medications provided to patient to help bridge gap until affordability N/A          Scheduled to follow up in CHF clinic on:   Future Appointments   Date Time Provider Department Center   10/25/2024 10:00 AM [facility] CHF ROOM 2 [facility]   11/1/2024 10:00 AM Tori Quispe APRN - CNP [place] CHF [facility]   11/8/2024 10:00 AM [facility] CHF ROOM 2 [facility]   1/17/2025 11:45 AM Carlita Castellano MD [facility] DEP

## 2024-11-01 ENCOUNTER — TELEPHONE (OUTPATIENT)
Dept: OTHER | Age: 74
End: 2024-11-01

## 2024-11-01 ENCOUNTER — OFFICE VISIT (OUTPATIENT)
Age: 74
End: 2024-11-01
Payer: MEDICAID

## 2024-11-01 ENCOUNTER — TELEPHONE (OUTPATIENT)
Age: 74
End: 2024-11-01

## 2024-11-01 VITALS
HEART RATE: 82 BPM | RESPIRATION RATE: 18 BRPM | DIASTOLIC BLOOD PRESSURE: 69 MMHG | BODY MASS INDEX: 32.6 KG/M2 | OXYGEN SATURATION: 100 % | WEIGHT: 240.4 LBS | SYSTOLIC BLOOD PRESSURE: 145 MMHG

## 2024-11-01 DIAGNOSIS — Z59.41 FOOD INSECURITY: ICD-10-CM

## 2024-11-01 DIAGNOSIS — Z13.9 ENCOUNTER FOR SCREENING INVOLVING SOCIAL DETERMINANTS OF HEALTH (SDOH): Primary | ICD-10-CM

## 2024-11-01 DIAGNOSIS — I50.32 CHRONIC HEART FAILURE WITH PRESERVED EJECTION FRACTION (HCC): Primary | ICD-10-CM

## 2024-11-01 LAB
ANION GAP SERPL CALCULATED.3IONS-SCNC: 10 MMOL/L (ref 7–16)
BUN BLDV-MCNC: 19 MG/DL (ref 6–23)
CALCIUM SERPL-MCNC: 8.6 MG/DL (ref 8.6–10.2)
CHLORIDE BLD-SCNC: 107 MMOL/L (ref 98–107)
CO2: 24 MMOL/L (ref 22–29)
CREAT SERPL-MCNC: 1.9 MG/DL (ref 0.7–1.2)
GFR, ESTIMATED: 37 ML/MIN/1.73M2
GLUCOSE BLD-MCNC: 95 MG/DL (ref 74–99)
NT PRO BNP: 245 PG/ML (ref 0–125)
POTASSIUM SERPL-SCNC: 3.5 MMOL/L (ref 3.5–5)
SODIUM BLD-SCNC: 141 MMOL/L (ref 132–146)

## 2024-11-01 PROCEDURE — 36415 COLL VENOUS BLD VENIPUNCTURE: CPT | Performed by: NURSE PRACTITIONER

## 2024-11-01 RX ORDER — NITROGLYCERIN 0.4 MG/1
0.4 TABLET SUBLINGUAL
OUTPATIENT
Start: 2024-11-01 | End: 2024-11-02

## 2024-11-01 RX ORDER — METOPROLOL TARTRATE 50 MG
TABLET ORAL
Qty: 3 TABLET | Refills: 0 | Status: SHIPPED
Start: 2024-11-01 | End: 2024-11-08

## 2024-11-01 RX ORDER — SODIUM CHLORIDE 9 MG/ML
INJECTION, SOLUTION INTRAVENOUS PRN
OUTPATIENT
Start: 2024-11-01

## 2024-11-01 RX ORDER — SODIUM CHLORIDE 0.9 % (FLUSH) 0.9 %
5-40 SYRINGE (ML) INJECTION PRN
OUTPATIENT
Start: 2024-11-01

## 2024-11-01 RX ORDER — NITROGLYCERIN 0.4 MG/1
0.8 TABLET SUBLINGUAL
OUTPATIENT
Start: 2024-11-01 | End: 2024-11-02

## 2024-11-01 RX ORDER — METOPROLOL TARTRATE 1 MG/ML
5 INJECTION, SOLUTION INTRAVENOUS EVERY 5 MIN PRN
OUTPATIENT
Start: 2024-11-01

## 2024-11-01 RX ORDER — SODIUM CHLORIDE 0.9 % (FLUSH) 0.9 %
5-40 SYRINGE (ML) INJECTION EVERY 12 HOURS SCHEDULED
OUTPATIENT
Start: 2024-11-01

## 2024-11-01 SDOH — ECONOMIC STABILITY - FOOD INSECURITY: FOOD INSECURITY: Z59.41

## 2024-11-01 NOTE — TELEPHONE ENCOUNTER
Patient informed of instructions below per [employer] Cleaning    Patient verbalized understanding.    Ayanna Crowley LPN          ----- Message from JACQUES Perez CNP sent at 11/1/2024  2:47 PM EDT -----  Labs reviewed  Please have him start Jardiance 10 mg daily   Follow up in CHF clinic as scheduled    Thank you

## 2024-11-01 NOTE — PATIENT INSTRUCTIONS
Get blood work in CHF clinic today   We will call you after reviewing your blood work with any medication changes  Referral for coronary CTA  Referral for to sleep medicine   Follow up in CHF clinic as scheduled  Weigh yourself daily    -Stay Hydrated, 64 oz     -Diet should sodium restricted to 2 grams    -Again watch your daily weight trends and if you gain water weight please follow below instructions.    -If at any time you feel that you are retaining fluid, your medications are not working, or you feel ill in anyway, then please call us for either same day appointment or the next day, and for instructions. Our goal is to keep you out of the emergency room and the hospital and we have ways to do it. You just need to call us in a timely manner.     -If you become sick for other reasons, and notice that you are not urinating as much, the urine is very dark, you have significant diarrhea or vomiting, then please DO NOT take your water pill and CALL US immediately.

## 2024-11-01 NOTE — RESULT ENCOUNTER NOTE
Labs reviewed  Please have him start Jardiance 10 mg daily   Follow up in CHF clinic as scheduled    Thank you

## 2024-11-01 NOTE — PROGRESS NOTES
Congestive Heart Failure Clinic   [facility]       Reason for Visit: Heart Failure     Primary Cardiologist: Dr. Banerjee      History of Present Illness:     Mr. Banks is a 74 year old male with a PMHx of VHD, HTN, T2DM, CKD, prior CVA, obesity, probable TYSON and chronic back pain.    He was hospitalized in September increased shortness of breath and bilateral lower extremity edema.  He was admitted to the hospital for acute heart failure, IV diuresed and during hospitalization underwent TTE that demonstrated LVEF 55-60%, mildly dilated RV with normal RV function and mild TR, mild-moderate AI and mild MR. He symptomatically improved, NM stress with no ischemia however given multiple risk factors was advised to get outpatient ischemia evaluation.     He presents today in hospital follow up, since discharge from the hospital he reports compliance with his current cardiac medications. He denies dyspnea with exertion, shortness of breath, or decline in overall functional capacity. He denies orthopnea, PND, nocturnal cough or hemoptysis. He denies abdominal distention or bloating, early satiety, anorexia/change in appetite, unintentional weight loss. He does not lower extremity edema. He denies exertional lightheadedness.  He denies palpitations, syncope or near syncope.     Review of systems is negative for chest pain, pressure, discomfort. When ambulating on an incline, He does not leg claudication. History is negative for neurological symptoms including transient loss of vision, asymmetric weakness, aphasia, dysphasia, numbness, tingling.       Patient Active Problem List    Diagnosis Date Noted    Chronic pain syndrome [G89.4 (ICD-10-CM)] 09/09/2022     Priority: Medium    Spinal stenosis of lumbar region with neurogenic claudication 09/09/2022     Priority: Medium    Chronic bilateral low back pain with bilateral sciatica 09/09/2022     Priority: Medium    Lumbar radiculopathy

## 2024-11-01 NOTE — TELEPHONE ENCOUNTER
CHF CHW Clinic Visit- Initial  11/1/2024  3:41 PM    CHW received referral from JACQUES Perez.  Patient need: information on home delivered meals  Notes: CHW provided patient with information about the [facility] [employer], [employer] meals and [employer] meals. CHW explained each resource to the patient and how the resource(s) can be utilized. CHW wrote out clear instructions on how to call insurance plan to find out if he is eligible for home delivered meals. Pt also had questions about how to get furniture for his new apartment. I gave him some resources for him to call and find out if he is eligible for assistance.    Patient in agreement with plan and further assistance.  [x] Agreed    [] Declined      CHW informed patient of the services and resources that can be provided to them. CHW instructed patient to call CHF CHW at [phone] for any future assistance.     Electronically signed by Skylar Jarrell on 11/1/2024 at 3:41 PM

## 2024-11-08 ENCOUNTER — HOSPITAL ENCOUNTER (OUTPATIENT)
Dept: OTHER | Age: 74
Setting detail: THERAPIES SERIES
Discharge: HOME | End: 2024-11-08
Payer: MEDICAID

## 2024-11-08 VITALS
WEIGHT: 245 LBS | HEART RATE: 62 BPM | BODY MASS INDEX: 33.23 KG/M2 | RESPIRATION RATE: 18 BRPM | SYSTOLIC BLOOD PRESSURE: 154 MMHG | OXYGEN SATURATION: 100 % | DIASTOLIC BLOOD PRESSURE: 64 MMHG

## 2024-11-08 LAB
ANION GAP SERPL CALCULATED.3IONS-SCNC: 9 MMOL/L (ref 7–16)
BNP SERPL-MCNC: 952 PG/ML (ref 0–125)
BUN SERPL-MCNC: 16 MG/DL (ref 6–23)
CALCIUM SERPL-MCNC: 9.3 MG/DL (ref 8.6–10.2)
CHLORIDE SERPL-SCNC: 107 MMOL/L (ref 98–107)
CO2 SERPL-SCNC: 26 MMOL/L (ref 22–29)
CREAT SERPL-MCNC: 1.7 MG/DL (ref 0.7–1.2)
GFR, ESTIMATED: 41 ML/MIN/1.73M2
GLUCOSE SERPL-MCNC: 104 MG/DL (ref 74–99)
POTASSIUM SERPL-SCNC: 3.8 MMOL/L (ref 3.5–5)
SODIUM SERPL-SCNC: 142 MMOL/L (ref 132–146)

## 2024-11-08 PROCEDURE — 80048 BASIC METABOLIC PNL TOTAL CA: CPT

## 2024-11-08 PROCEDURE — 83880 ASSAY OF NATRIURETIC PEPTIDE: CPT

## 2024-11-08 PROCEDURE — 99214 OFFICE O/P EST MOD 30 MIN: CPT

## 2024-11-08 RX ORDER — DAPAGLIFLOZIN 10 MG/1
10 TABLET, FILM COATED ORAL EVERY MORNING
COMMUNITY

## 2024-11-08 NOTE — PLAN OF CARE
Problem: Chronic Conditions and Co-morbidities  Goal: Patient's chronic conditions and co-morbidity symptoms are monitored and maintained or improved  Flowsheets (Taken 11/8/2024 1123)  Care Plan - Patient's Chronic Conditions and Co-Morbidity Symptoms are Monitored and Maintained or Improved: Monitor and assess patient's chronic conditions and comorbid symptoms for stability, deterioration, or improvement

## 2024-11-08 NOTE — RESULT ENCOUNTER NOTE
Labs and CHF clinic note reviewed  Patient to return to CHF clinic in 1 week and bring all pill bottles with him so we can ensure appropriately taking

## 2024-11-08 NOTE — PROGRESS NOTES
Provider Department Center   11/15/2024  9:15 AM [facility] [place] 2 [facility] [place]   1/17/2025 11:45 AM Carlita Castellano MD [facility] DEP

## 2024-11-12 DIAGNOSIS — I50.32 CHRONIC HEART FAILURE WITH PRESERVED EJECTION FRACTION (HCC): Primary | ICD-10-CM

## 2024-11-13 ENCOUNTER — TELEPHONE (OUTPATIENT)
Dept: CARDIOLOGY CLINIC | Age: 74
End: 2024-11-13

## 2024-11-13 ENCOUNTER — TELEPHONE (OUTPATIENT)
Age: 74
End: 2024-11-13

## 2024-11-13 DIAGNOSIS — I50.32 CHRONIC HEART FAILURE WITH PRESERVED EJECTION FRACTION (HCC): Primary | ICD-10-CM

## 2024-11-13 NOTE — TELEPHONE ENCOUNTER
Dr. Banerjee's patient needs scheduled for   CTA CARDIAC W C STRC MORP W CONTRAST     Tori Quispe is the Authorizing provider     Thank you!

## 2024-11-14 ENCOUNTER — HOSPITAL ENCOUNTER (EMERGENCY)
Age: 74
Discharge: HOME | End: 2024-11-15
Attending: STUDENT IN AN ORGANIZED HEALTH CARE EDUCATION/TRAINING PROGRAM
Payer: MEDICAID

## 2024-11-14 ENCOUNTER — APPOINTMENT (OUTPATIENT)
Dept: GENERAL RADIOLOGY | Age: 74
End: 2024-11-14
Payer: MEDICAID

## 2024-11-14 ENCOUNTER — APPOINTMENT (OUTPATIENT)
Dept: ULTRASOUND IMAGING | Age: 74
End: 2024-11-14
Payer: MEDICAID

## 2024-11-14 ENCOUNTER — TELEPHONE (OUTPATIENT)
Age: 74
End: 2024-11-14

## 2024-11-14 VITALS
HEART RATE: 63 BPM | OXYGEN SATURATION: 99 % | DIASTOLIC BLOOD PRESSURE: 74 MMHG | SYSTOLIC BLOOD PRESSURE: 174 MMHG | TEMPERATURE: 97.8 F | RESPIRATION RATE: 17 BRPM

## 2024-11-14 DIAGNOSIS — M10.9 GOUTY ARTHRITIS: Primary | ICD-10-CM

## 2024-11-14 LAB
ALBUMIN SERPL-MCNC: 3.9 G/DL (ref 3.5–5.2)
ALP SERPL-CCNC: 119 U/L (ref 40–129)
ALT SERPL-CCNC: 21 U/L (ref 0–40)
ANION GAP SERPL CALCULATED.3IONS-SCNC: 8 MMOL/L (ref 7–16)
AST SERPL-CCNC: 33 U/L (ref 0–39)
BASOPHILS # BLD: 0.03 K/UL (ref 0–0.2)
BASOPHILS NFR BLD: 0 % (ref 0–2)
BILIRUB SERPL-MCNC: 0.3 MG/DL (ref 0–1.2)
BUN SERPL-MCNC: 19 MG/DL (ref 6–23)
CALCIUM SERPL-MCNC: 8.9 MG/DL (ref 8.6–10.2)
CHLORIDE SERPL-SCNC: 104 MMOL/L (ref 98–107)
CK SERPL-CCNC: 145 U/L (ref 20–200)
CO2 SERPL-SCNC: 28 MMOL/L (ref 22–29)
CREAT SERPL-MCNC: 1.7 MG/DL (ref 0.7–1.2)
CRP SERPL HS-MCNC: 4 MG/L (ref 0–5)
EOSINOPHIL # BLD: 0.06 K/UL (ref 0.05–0.5)
EOSINOPHILS RELATIVE PERCENT: 1 % (ref 0–6)
ERYTHROCYTE [DISTWIDTH] IN BLOOD BY AUTOMATED COUNT: 14.9 % (ref 11.5–15)
ERYTHROCYTE [SEDIMENTATION RATE] IN BLOOD BY WESTERGREN METHOD: 4 MM/HR (ref 0–15)
GFR, ESTIMATED: 41 ML/MIN/1.73M2
GLUCOSE SERPL-MCNC: 91 MG/DL (ref 74–99)
HCT VFR BLD AUTO: 39.6 % (ref 37–54)
HGB BLD-MCNC: 12 G/DL (ref 12.5–16.5)
IMM GRANULOCYTES # BLD AUTO: <0.03 K/UL (ref 0–0.58)
IMM GRANULOCYTES NFR BLD: 0 % (ref 0–5)
LYMPHOCYTES NFR BLD: 2.07 K/UL (ref 1.5–4)
LYMPHOCYTES RELATIVE PERCENT: 28 % (ref 20–42)
MCH RBC QN AUTO: 24.5 PG (ref 26–35)
MCHC RBC AUTO-ENTMCNC: 30.3 G/DL (ref 32–34.5)
MCV RBC AUTO: 81 FL (ref 80–99.9)
MONOCYTES NFR BLD: 0.76 K/UL (ref 0.1–0.95)
MONOCYTES NFR BLD: 10 % (ref 2–12)
NEUTROPHILS NFR BLD: 60 % (ref 43–80)
NEUTS SEG NFR BLD: 4.38 K/UL (ref 1.8–7.3)
PLATELET # BLD AUTO: 212 K/UL (ref 130–450)
PMV BLD AUTO: 11.7 FL (ref 7–12)
POTASSIUM SERPL-SCNC: 4.1 MMOL/L (ref 3.5–5)
PROT SERPL-MCNC: 7.2 G/DL (ref 6.4–8.3)
RBC # BLD AUTO: 4.89 M/UL (ref 3.8–5.8)
SODIUM SERPL-SCNC: 140 MMOL/L (ref 132–146)
URATE SERPL-MCNC: 7.1 MG/DL (ref 3.4–7)
WBC OTHER # BLD: 7.3 K/UL (ref 4.5–11.5)

## 2024-11-14 PROCEDURE — 6370000000 HC RX 637 (ALT 250 FOR IP): Performed by: STUDENT IN AN ORGANIZED HEALTH CARE EDUCATION/TRAINING PROGRAM

## 2024-11-14 PROCEDURE — 80053 COMPREHEN METABOLIC PANEL: CPT

## 2024-11-14 PROCEDURE — 85652 RBC SED RATE AUTOMATED: CPT

## 2024-11-14 PROCEDURE — 99284 EMERGENCY DEPT VISIT MOD MDM: CPT

## 2024-11-14 PROCEDURE — 82550 ASSAY OF CK (CPK): CPT

## 2024-11-14 PROCEDURE — 86140 C-REACTIVE PROTEIN: CPT

## 2024-11-14 PROCEDURE — 84550 ASSAY OF BLOOD/URIC ACID: CPT

## 2024-11-14 PROCEDURE — 96372 THER/PROPH/DIAG INJ SC/IM: CPT

## 2024-11-14 PROCEDURE — 6360000002 HC RX W HCPCS: Performed by: STUDENT IN AN ORGANIZED HEALTH CARE EDUCATION/TRAINING PROGRAM

## 2024-11-14 PROCEDURE — 73564 X-RAY EXAM KNEE 4 OR MORE: CPT

## 2024-11-14 PROCEDURE — 85025 COMPLETE CBC W/AUTO DIFF WBC: CPT

## 2024-11-14 PROCEDURE — 93970 EXTREMITY STUDY: CPT

## 2024-11-14 RX ORDER — KETOROLAC TROMETHAMINE 30 MG/ML
15 INJECTION, SOLUTION INTRAMUSCULAR; INTRAVENOUS ONCE
Status: DISCONTINUED | OUTPATIENT
Start: 2024-11-14 | End: 2024-11-14

## 2024-11-14 RX ORDER — OXYCODONE AND ACETAMINOPHEN 5; 325 MG/1; MG/1
1 TABLET ORAL ONCE
Status: COMPLETED | OUTPATIENT
Start: 2024-11-14 | End: 2024-11-14

## 2024-11-14 RX ORDER — PREDNISONE 20 MG/1
40 TABLET ORAL DAILY
Qty: 10 TABLET | Refills: 0 | Status: SHIPPED | OUTPATIENT
Start: 2024-11-14 | End: 2024-11-19

## 2024-11-14 RX ORDER — MORPHINE SULFATE 4 MG/ML
4 INJECTION, SOLUTION INTRAMUSCULAR; INTRAVENOUS ONCE
Status: COMPLETED | OUTPATIENT
Start: 2024-11-14 | End: 2024-11-14

## 2024-11-14 RX ORDER — HYDROCODONE BITARTRATE AND ACETAMINOPHEN 5; 325 MG/1; MG/1
1 TABLET ORAL ONCE
Status: COMPLETED | OUTPATIENT
Start: 2024-11-14 | End: 2024-11-14

## 2024-11-14 RX ORDER — KETOROLAC TROMETHAMINE 30 MG/ML
15 INJECTION, SOLUTION INTRAMUSCULAR; INTRAVENOUS ONCE
Status: COMPLETED | OUTPATIENT
Start: 2024-11-14 | End: 2024-11-14

## 2024-11-14 RX ORDER — DEXAMETHASONE SODIUM PHOSPHATE 10 MG/ML
10 INJECTION INTRAMUSCULAR; INTRAVENOUS ONCE
Status: COMPLETED | OUTPATIENT
Start: 2024-11-14 | End: 2024-11-14

## 2024-11-14 RX ORDER — HYDROCODONE BITARTRATE AND ACETAMINOPHEN 5; 325 MG/1; MG/1
1 TABLET ORAL EVERY 6 HOURS PRN
Qty: 6 TABLET | Refills: 0 | Status: SHIPPED | OUTPATIENT
Start: 2024-11-14 | End: 2024-11-18

## 2024-11-14 RX ORDER — DEXAMETHASONE SODIUM PHOSPHATE 10 MG/ML
10 INJECTION INTRAMUSCULAR; INTRAVENOUS ONCE
Status: DISCONTINUED | OUTPATIENT
Start: 2024-11-14 | End: 2024-11-14

## 2024-11-14 RX ADMIN — DEXAMETHASONE SODIUM PHOSPHATE 10 MG: 10 INJECTION INTRAMUSCULAR; INTRAVENOUS at 22:45

## 2024-11-14 RX ADMIN — OXYCODONE HYDROCHLORIDE AND ACETAMINOPHEN 1 TABLET: 5; 325 TABLET ORAL at 23:49

## 2024-11-14 RX ADMIN — KETOROLAC TROMETHAMINE 15 MG: 30 INJECTION, SOLUTION INTRAMUSCULAR at 22:45

## 2024-11-14 RX ADMIN — HYDROCODONE BITARTRATE AND ACETAMINOPHEN 1 TABLET: 5; 325 TABLET ORAL at 18:01

## 2024-11-14 RX ADMIN — MORPHINE SULFATE 4 MG: 4 INJECTION, SOLUTION INTRAMUSCULAR; INTRAVENOUS at 21:23

## 2024-11-14 ASSESSMENT — PAIN DESCRIPTION - LOCATION
LOCATION: KNEE

## 2024-11-14 ASSESSMENT — PAIN DESCRIPTION - ORIENTATION
ORIENTATION: LEFT

## 2024-11-14 ASSESSMENT — PAIN DESCRIPTION - DESCRIPTORS
DESCRIPTORS: ACHING;DULL;DISCOMFORT
DESCRIPTORS: ACHING;DISCOMFORT;DULL
DESCRIPTORS: ACHING;DISCOMFORT;DULL
DESCRIPTORS: ACHING;CRUSHING;DISCOMFORT

## 2024-11-14 ASSESSMENT — PAIN SCALES - GENERAL
PAINLEVEL_OUTOF10: 3
PAINLEVEL_OUTOF10: 8
PAINLEVEL_OUTOF10: 5
PAINLEVEL_OUTOF10: 2
PAINLEVEL_OUTOF10: 5

## 2024-11-14 NOTE — TELEPHONE ENCOUNTER
Patient states that he needs to speak to Doctor regarding a medication they gave him at Cardiology that has made him lose weight and now he is having a lot of pain and cannot walk please call him at [phone].    Thanks :)

## 2024-11-14 NOTE — TELEPHONE ENCOUNTER
Prior Auth NOT REQUIRED. Documentation will be scanned in the patient chart.       Case number: [id number]

## 2024-11-15 ENCOUNTER — HOSPITAL ENCOUNTER (OUTPATIENT)
Dept: OTHER | Age: 74
Setting detail: THERAPIES SERIES
Discharge: HOME | End: 2024-11-15
Payer: MEDICAID

## 2024-11-15 NOTE — ED PROVIDER NOTES
[facility] EMERGENCY DEPARTMENT  EMERGENCY DEPARTMENT ENCOUNTER      Pt Name: Hemal Banks  MRN: [id number]  Birthdate 1950  Date of evaluation: 11/14/2024  Provider: Kumar Domínguez DO  PCP: Carlita Castellano MD    CHIEF COMPLAINT       Chief Complaint   Patient presents with    Leg Pain     Bilateral leg pain x2 weeks, L knee pain today. Difficulty ambulating. Denies falls/traumas       HISTORY OF PRESENT ILLNESS: 1 or more Elements   History From: Patient  Limitations to history : None    Hemal Banks is a 74 y.o. male Past medical history of type 2 diabetes, hypertension, A-fib as well as hyperlipidemia.  Patient presents with chief complaint of bilateral lower extremity pain as well as left knee pain.  Patient states that over the last few days he has had gradual worsening lower extremity pain especially on his left knee.  Symptoms have been moderate in severity, since onset worsened with movement denies any relieving factors denies any similar episodes in the past.  Patient denies any falls or trauma.    Nursing Notes were all reviewed and agreed with or any disagreements were addressed in the HPI.    REVIEW OF SYSTEMS :    Positives and Pertinent negatives as per HPI.     PAST MEDICAL HISTORY/Chronic Conditions Affecting Care    has a past medical history of Arthritis, Benign hypertensive kidney disease with chronic kidney disease (10/06/2021), Cervical vertebra fusion (01/01/2000), Chronic bilateral low back pain with bilateral sciatica (09/09/2022), Chronic kidney disease (CKD), stage III (moderate) ([facility]) (11/25/2013), Depression, Hypertension, Stroke ([facility]), and Type 2 diabetes mellitus with diabetic polyneuropathy, with long-term current use of insulin ([facility]).     SURGICAL HISTORY     Past Surgical History:   Procedure Laterality Date    COLONOSCOPY      COLONOSCOPY N/A 09/13/2021    COLONOSCOPY POLYPECTOMY SNARE/COLD BIOPSY performed by Bo CLARK

## 2024-11-18 ENCOUNTER — TELEPHONE (OUTPATIENT)
Dept: CARDIOLOGY CLINIC | Age: 74
End: 2024-11-18

## 2024-11-18 ENCOUNTER — HOSPITAL ENCOUNTER (OUTPATIENT)
Dept: OTHER | Age: 74
Setting detail: THERAPIES SERIES
Discharge: HOME | End: 2024-11-18
Payer: MEDICAID

## 2024-11-18 VITALS
RESPIRATION RATE: 18 BRPM | DIASTOLIC BLOOD PRESSURE: 82 MMHG | OXYGEN SATURATION: 99 % | HEART RATE: 63 BPM | SYSTOLIC BLOOD PRESSURE: 172 MMHG | BODY MASS INDEX: 33.63 KG/M2 | WEIGHT: 248 LBS

## 2024-11-18 DIAGNOSIS — I50.32 CHRONIC HEART FAILURE WITH PRESERVED EJECTION FRACTION (HCC): Primary | ICD-10-CM

## 2024-11-18 LAB
ANION GAP SERPL CALCULATED.3IONS-SCNC: 6 MMOL/L (ref 7–16)
BNP SERPL-MCNC: 901 PG/ML (ref 0–125)
BUN SERPL-MCNC: 24 MG/DL (ref 6–23)
CALCIUM SERPL-MCNC: 9 MG/DL (ref 8.6–10.2)
CHLORIDE SERPL-SCNC: 103 MMOL/L (ref 98–107)
CO2 SERPL-SCNC: 28 MMOL/L (ref 22–29)
CREAT SERPL-MCNC: 1.9 MG/DL (ref 0.7–1.2)
GFR, ESTIMATED: 38 ML/MIN/1.73M2
GLUCOSE SERPL-MCNC: 145 MG/DL (ref 74–99)
POTASSIUM SERPL-SCNC: 3.9 MMOL/L (ref 3.5–5)
SODIUM SERPL-SCNC: 137 MMOL/L (ref 132–146)

## 2024-11-18 PROCEDURE — 2580000003 HC RX 258: Performed by: INTERNAL MEDICINE

## 2024-11-18 PROCEDURE — 6360000002 HC RX W HCPCS: Performed by: INTERNAL MEDICINE

## 2024-11-18 PROCEDURE — 83880 ASSAY OF NATRIURETIC PEPTIDE: CPT

## 2024-11-18 PROCEDURE — 99214 OFFICE O/P EST MOD 30 MIN: CPT

## 2024-11-18 PROCEDURE — 96374 THER/PROPH/DIAG INJ IV PUSH: CPT

## 2024-11-18 PROCEDURE — 80048 BASIC METABOLIC PNL TOTAL CA: CPT

## 2024-11-18 RX ORDER — FUROSEMIDE 10 MG/ML
40 INJECTION INTRAMUSCULAR; INTRAVENOUS ONCE
Status: COMPLETED | OUTPATIENT
Start: 2024-11-18 | End: 2024-11-18

## 2024-11-18 RX ORDER — SODIUM CHLORIDE 0.9 % (FLUSH) 0.9 %
10 SYRINGE (ML) INJECTION ONCE
Status: COMPLETED | OUTPATIENT
Start: 2024-11-18 | End: 2024-11-18

## 2024-11-18 RX ADMIN — SODIUM CHLORIDE, PRESERVATIVE FREE 10 ML: 5 INJECTION INTRAVENOUS at 09:57

## 2024-11-18 RX ADMIN — FUROSEMIDE 40 MG: 10 INJECTION, SOLUTION INTRAMUSCULAR; INTRAVENOUS at 09:56

## 2024-11-18 NOTE — TELEPHONE ENCOUNTER
----- Message from GRETCHEN MILLS RN sent at 11/18/2024 10:59 AM EST -----  Regarding: Appt needed  Bruno Alexander,     Please call and set pt up with an appt to see Dr. Banerjee. It has been quite a while since an outpt appt.    Thanks,   Niki Hickman RN

## 2024-11-18 NOTE — PROGRESS NOTES
N/A          Scheduled to follow up in CHF clinic on:   Future Appointments   Date Time Provider Department Center   12/4/2024  1:15 PM [facility] [place]F ROOM 4 [facility] [place] [facility]   1/17/2025 11:45 AM Carlita Castellano MD [facility] DEP

## 2024-11-19 ENCOUNTER — TELEPHONE (OUTPATIENT)
Dept: OTHER | Age: 74
End: 2024-11-19

## 2024-11-19 RX ORDER — TORSEMIDE 10 MG/1
10 TABLET ORAL DAILY PRN
Qty: 30 TABLET | Refills: 3 | Status: SHIPPED | OUTPATIENT
Start: 2024-11-19

## 2024-11-19 RX ORDER — HYDRALAZINE HYDROCHLORIDE 50 MG/1
75 TABLET, FILM COATED ORAL 3 TIMES DAILY
Qty: 90 TABLET | Refills: 3 | Status: SHIPPED | OUTPATIENT
Start: 2024-11-19

## 2024-11-19 NOTE — RESULT ENCOUNTER NOTE
Labs and CHF clinic note reviewed  Start Torsemide 10 mg daily x 3 days then use PRN  Increase hydralazine to 75 mg three times daily  Follow up in CHF clinic in 1 week

## 2024-11-19 NOTE — TELEPHONE ENCOUNTER
11:54 AM  Spoke with patient regarding medication adjustments/recommendations by Tori GARRETT from CHF clinic appt on 11/18/24:    Start Torsemide 10 mg daily x 3 days then use PRN  Increase hydralazine to 75 mg three times daily  Follow up in CHF clinic in 1 week    I have reviewed the provider's instructions with the patient, answering all questions to his satisfaction.    Electronically signed by Niki Hickman RN on 11/19/2024 at 11:55 AM

## 2024-11-19 NOTE — TELEPHONE ENCOUNTER
1300  Called and spoke with pt about the possibility of moving appt sooner for a one week follow-up appt per Tori Quispe's request, however we are unable d/t patients previous scheduled appts and not much flexibility left in CHF clinic schedule. Patient will follow-up on Dec 4th, however he will call if needing seen for a STAT appt.

## 2024-11-20 ENCOUNTER — HOSPITAL ENCOUNTER (OUTPATIENT)
Age: 74
Discharge: HOME | End: 2024-11-20
Payer: MEDICAID

## 2024-11-20 LAB
ALBUMIN SERPL-MCNC: 3.7 G/DL (ref 3.5–5.2)
ALP SERPL-CCNC: 112 U/L (ref 40–129)
ALT SERPL-CCNC: 20 U/L (ref 0–40)
ANION GAP SERPL CALCULATED.3IONS-SCNC: 9 MMOL/L (ref 7–16)
AST SERPL-CCNC: 22 U/L (ref 0–39)
BILIRUB SERPL-MCNC: 0.3 MG/DL (ref 0–1.2)
BUN SERPL-MCNC: 25 MG/DL (ref 6–23)
CALCIUM SERPL-MCNC: 8.9 MG/DL (ref 8.6–10.2)
CHLORIDE SERPL-SCNC: 106 MMOL/L (ref 98–107)
CO2 SERPL-SCNC: 28 MMOL/L (ref 22–29)
CREAT SERPL-MCNC: 1.9 MG/DL (ref 0.7–1.2)
CREAT UR-MCNC: 138.3 MG/DL (ref 40–278)
GFR, ESTIMATED: 38 ML/MIN/1.73M2
GLUCOSE SERPL-MCNC: 187 MG/DL (ref 74–99)
HBA1C MFR BLD: 6.6 % (ref 4–5.6)
MICROALBUMIN UR-MCNC: 70 MG/L (ref 0–19)
MICROALBUMIN/CREAT UR-RTO: 51 MCG/MG CREAT (ref 0–30)
POTASSIUM SERPL-SCNC: 3.9 MMOL/L (ref 3.5–5)
PROT SERPL-MCNC: 7.1 G/DL (ref 6.4–8.3)
SODIUM SERPL-SCNC: 143 MMOL/L (ref 132–146)
VIT B12 SERPL-MCNC: 753 PG/ML (ref 211–946)

## 2024-11-20 PROCEDURE — 82570 ASSAY OF URINE CREATININE: CPT

## 2024-11-20 PROCEDURE — 83036 HEMOGLOBIN GLYCOSYLATED A1C: CPT

## 2024-11-20 PROCEDURE — 36415 COLL VENOUS BLD VENIPUNCTURE: CPT

## 2024-11-20 PROCEDURE — 82607 VITAMIN B-12: CPT

## 2024-11-20 PROCEDURE — 82043 UR ALBUMIN QUANTITATIVE: CPT

## 2024-11-20 PROCEDURE — 80053 COMPREHEN METABOLIC PANEL: CPT

## 2024-12-04 ENCOUNTER — HOSPITAL ENCOUNTER (OUTPATIENT)
Dept: OTHER | Age: 74
Discharge: HOME | End: 2024-12-04

## 2024-12-12 ENCOUNTER — HOSPITAL ENCOUNTER (OUTPATIENT)
Dept: CT IMAGING | Age: 74
Discharge: HOME | End: 2024-12-14

## 2024-12-12 ENCOUNTER — HOSPITAL ENCOUNTER (EMERGENCY)
Age: 74
Discharge: HOME | End: 2024-12-12
Attending: STUDENT IN AN ORGANIZED HEALTH CARE EDUCATION/TRAINING PROGRAM
Payer: MEDICAID

## 2024-12-12 VITALS
BODY MASS INDEX: 31.44 KG/M2 | DIASTOLIC BLOOD PRESSURE: 89 MMHG | WEIGHT: 245 LBS | RESPIRATION RATE: 19 BRPM | SYSTOLIC BLOOD PRESSURE: 169 MMHG | TEMPERATURE: 97.8 F | OXYGEN SATURATION: 99 % | HEART RATE: 75 BPM | HEIGHT: 74 IN

## 2024-12-12 VITALS
SYSTOLIC BLOOD PRESSURE: 198 MMHG | BODY MASS INDEX: 31.7 KG/M2 | DIASTOLIC BLOOD PRESSURE: 90 MMHG | OXYGEN SATURATION: 97 % | RESPIRATION RATE: 20 BRPM | HEIGHT: 74 IN | HEART RATE: 68 BPM | WEIGHT: 247 LBS

## 2024-12-12 DIAGNOSIS — I10 ASYMPTOMATIC HYPERTENSION: Primary | ICD-10-CM

## 2024-12-12 DIAGNOSIS — I50.32 CHRONIC HEART FAILURE WITH PRESERVED EJECTION FRACTION (HCC): ICD-10-CM

## 2024-12-12 PROCEDURE — 99283 EMERGENCY DEPT VISIT LOW MDM: CPT

## 2024-12-12 PROCEDURE — 6370000000 HC RX 637 (ALT 250 FOR IP)

## 2024-12-12 RX ORDER — NITROGLYCERIN 0.4 MG/1
0.8 TABLET SUBLINGUAL ONCE
Status: CANCELLED | OUTPATIENT
Start: 2024-12-12

## 2024-12-12 RX ORDER — 0.9 % SODIUM CHLORIDE 0.9 %
1000 INTRAVENOUS SOLUTION INTRAVENOUS ONCE
Status: CANCELLED | OUTPATIENT
Start: 2024-12-12 | End: 2024-12-12

## 2024-12-12 RX ORDER — HYDRALAZINE HYDROCHLORIDE 25 MG/1
50 TABLET, FILM COATED ORAL ONCE
Status: COMPLETED | OUTPATIENT
Start: 2024-12-12 | End: 2024-12-12

## 2024-12-12 RX ORDER — LISINOPRIL 10 MG/1
40 TABLET ORAL ONCE
Status: COMPLETED | OUTPATIENT
Start: 2024-12-12 | End: 2024-12-12

## 2024-12-12 RX ORDER — METOPROLOL TARTRATE 50 MG
25 TABLET ORAL ONCE
Status: CANCELLED | OUTPATIENT
Start: 2024-12-12

## 2024-12-12 RX ADMIN — HYDRALAZINE HYDROCHLORIDE 50 MG: 25 TABLET ORAL at 10:39

## 2024-12-12 RX ADMIN — LISINOPRIL 40 MG: 10 TABLET ORAL at 10:38

## 2024-12-12 ASSESSMENT — VISUAL ACUITY: OU: 1

## 2024-12-12 ASSESSMENT — PAIN - FUNCTIONAL ASSESSMENT: PAIN_FUNCTIONAL_ASSESSMENT: NONE - DENIES PAIN

## 2024-12-12 NOTE — ED PROVIDER NOTES
[facility] EMERGENCY DEPARTMENT  EMERGENCY DEPARTMENT ENCOUNTER      Pt Name: Hemal Banks  MRN: [id number]  Birthdate 1950  Date of evaluation: 12/12/2024  Provider: Tosha Sumner MD  PCP: Carlita Castellano MD  Note Started: 10:42 AM EST 12/12/24    CHIEF COMPLAINT       Chief Complaint   Patient presents with    Hypertension     Came in for outpatient CT but BP too high to do - sent from [facility]     HISTORY OF PRESENT ILLNESS: 1 or more Elements   History From: PATIENT     Limitations to history : None    Hemal Banks is a 74 y.o. male with a PMHx of T2DM, HTN, atrial fibrillation, CVA with no residuals, CKD who came in for elevated BP.     Patient was getting scheduled CTA coronary when he was noted to have elevated /90. Patient was then sent to the ED for further evaluation and management. Denies chest pain, headache, nausea, vomiting, lightheadedness, new focal neurological deficits. He did have a headache during the weekend but has since resolved. He has been off of some of his medications for the past few days, unsure of which medications.     Nursing Notes were all reviewed and agreed with or any disagreements were addressed in the HPI.    SURGICAL HISTORY     Past Surgical History:   Procedure Laterality Date    COLONOSCOPY      COLONOSCOPY N/A 09/13/2021    COLONOSCOPY POLYPECTOMY SNARE/COLD BIOPSY performed by Bo Hall MD at [facility] [place]COPY    FRACTURE SURGERY      KIDNEY STONE SURGERY  01/23/2010    2019    PAIN MANAGEMENT PROCEDURE Bilateral 9/27/2022    BILATERAL L4 TRANSFORAMINAL EPIDURAL STEROID INJECTION performed by Maria Teresa Romano DO at [facility]    PAIN MANAGEMENT PROCEDURE Bilateral 12/13/2022    BILATERAL L4 TRANSFORAMINAL EPIDURAL STEROID INJECTION performed by Maria Teresa Romano DO at [facility]    ROTATOR CUFF REPAIR Left 1999    SPINE SURGERY      UPPER GASTROINTESTINAL ENDOSCOPY      UPPER GASTROINTESTINAL  as to which should return to the nearest available emergency department- should symptoms change or worsen. Patient acknowledges understanding of all of these instructions and is to be discharged at this time. Patient is agreeable to plan and all questions have been answered at this time.     I am the Primary Clinician of Record.    FINAL IMPRESSION      1. Asymptomatic hypertension          DISPOSITION/PLAN     DISPOSITION Decision To Discharge 12/12/2024 11:54:07 AM   DISPOSITION CONDITION Stable           PATIENT REFERRED TO:  Carlita Castellano MD  [address] [id number] [phone]    Schedule an appointment as soon as possible for a visit         DISCHARGE MEDICATIONS:  New Prescriptions    No medications on file       DISCONTINUED MEDICATIONS:  Discontinued Medications    No medications on file            (Please note that portions of this note were completed with a voice recognition program.  Efforts were made to edit the dictations but occasionally words are mis-transcribed.)    Tosha Sumner MD PGY-2  Internal Medicine Resident

## 2024-12-12 NOTE — ED NOTES
Handoff report received from GRETCHEN Greene. Pt care assumed at this time. Pt updated on plan of care.

## 2024-12-12 NOTE — PROCEDURES
0805Patient arrived via for CTA coronary arteries. Allergies reviewed, instructions given to  include protocol for heart rate, b/p, necessary medications that will be given, IV site and proper breath hold during scan. Questions answered and expressed understanding confirmed. Placed on monitoring devices for heart rate and rhythm, B/P taken,  IV flushed / started, flushed and prn adapter attached.  SBP >200 for 3 cycles, outpatient labs done 11/20/24 crt 1.9.    0902 Dr Ahmadi called, stated with pt, will return call.    0916Dr Ahmadi notified of patient arrival, history, test/lab results, home medications, vital signs and rhythm.  Dr Ahmadi requested to cancel this test and to have pt go  to ER for blood pressure control.  Spoke with charge rn in er, pt to be transferred to room 20 in emergency department.  Ordering physician notified via fax.     9728 pt transferred to ER, report given to nurse

## 2024-12-17 DIAGNOSIS — R11.2 NAUSEA AND VOMITING, UNSPECIFIED VOMITING TYPE: ICD-10-CM

## 2024-12-18 RX ORDER — MECLIZINE HCL 12.5 MG 12.5 MG/1
TABLET ORAL
Qty: 30 TABLET | Refills: 0 | Status: SHIPPED | OUTPATIENT
Start: 2024-12-18

## 2024-12-20 ENCOUNTER — TELEPHONE (OUTPATIENT)
Dept: OTHER | Age: 74
End: 2024-12-20

## 2024-12-20 ENCOUNTER — TELEPHONE (OUTPATIENT)
Age: 74
End: 2024-12-20

## 2024-12-20 ENCOUNTER — HOSPITAL ENCOUNTER (OUTPATIENT)
Dept: OTHER | Age: 74
Setting detail: THERAPIES SERIES
Discharge: HOME | End: 2024-12-20
Payer: MEDICAID

## 2024-12-20 VITALS
OXYGEN SATURATION: 97 % | RESPIRATION RATE: 20 BRPM | DIASTOLIC BLOOD PRESSURE: 71 MMHG | SYSTOLIC BLOOD PRESSURE: 139 MMHG | BODY MASS INDEX: 31.58 KG/M2 | WEIGHT: 246 LBS | HEART RATE: 114 BPM

## 2024-12-20 DIAGNOSIS — N18.32 STAGE 3B CHRONIC KIDNEY DISEASE (HCC): Primary | ICD-10-CM

## 2024-12-20 DIAGNOSIS — G89.29 CHRONIC BILATERAL LOW BACK PAIN WITH BILATERAL SCIATICA: ICD-10-CM

## 2024-12-20 DIAGNOSIS — R53.81 PHYSICAL DECONDITIONING: ICD-10-CM

## 2024-12-20 DIAGNOSIS — M54.42 CHRONIC BILATERAL LOW BACK PAIN WITH BILATERAL SCIATICA: ICD-10-CM

## 2024-12-20 DIAGNOSIS — R54 FRAIL ELDERLY: ICD-10-CM

## 2024-12-20 DIAGNOSIS — M54.41 CHRONIC BILATERAL LOW BACK PAIN WITH BILATERAL SCIATICA: ICD-10-CM

## 2024-12-20 DIAGNOSIS — M48.062 SPINAL STENOSIS OF LUMBAR REGION WITH NEUROGENIC CLAUDICATION: ICD-10-CM

## 2024-12-20 LAB
ANION GAP SERPL CALCULATED.3IONS-SCNC: 9 MMOL/L (ref 7–16)
BNP SERPL-MCNC: 488 PG/ML (ref 0–125)
BUN SERPL-MCNC: 14 MG/DL (ref 6–23)
CALCIUM SERPL-MCNC: 8.9 MG/DL (ref 8.6–10.2)
CHLORIDE SERPL-SCNC: 105 MMOL/L (ref 98–107)
CO2 SERPL-SCNC: 26 MMOL/L (ref 22–29)
CREAT SERPL-MCNC: 1.6 MG/DL (ref 0.7–1.2)
GFR, ESTIMATED: 46 ML/MIN/1.73M2
GLUCOSE SERPL-MCNC: 262 MG/DL (ref 74–99)
POTASSIUM SERPL-SCNC: 4 MMOL/L (ref 3.5–5)
SODIUM SERPL-SCNC: 140 MMOL/L (ref 132–146)

## 2024-12-20 PROCEDURE — 80048 BASIC METABOLIC PNL TOTAL CA: CPT

## 2024-12-20 PROCEDURE — 99214 OFFICE O/P EST MOD 30 MIN: CPT

## 2024-12-20 PROCEDURE — 83880 ASSAY OF NATRIURETIC PEPTIDE: CPT

## 2024-12-20 NOTE — PLAN OF CARE
Problem: Chronic Conditions and Co-morbidities  Goal: Patient's chronic conditions and co-morbidity symptoms are monitored and maintained or improved  Flowsheets (Taken 12/20/2024 5405)  Care Plan - Patient's Chronic Conditions and Co-Morbidity Symptoms are Monitored and Maintained or Improved: Monitor and assess patient's chronic conditions and comorbid symptoms for stability, deterioration, or improvement

## 2024-12-20 NOTE — RESULT ENCOUNTER NOTE
Labs and CHF clinic note reviewed    \"Patient presents to clinic today for one month follow-up appointment after a missed appt on 12/4/24. NO AM medications were taken prior to appt. Patient has actually missed about 3 or 4 days of multiple medications d/t being out of the medications. He has called into [facility] and will  refills today.     Patient remains hypervolemic upon assessment today. Patient never took the three days of toresemide as instructed on 11/18/24.He stated he was urinating a lot after IVP in clinic. He has not taken any PRN toresemide at all since last appt. He also never increased his hydralazine.\"    Advised to take medications as ordered.   Follow up as scheduled

## 2024-12-20 NOTE — TELEPHONE ENCOUNTER
1:59 PM  Spoke with patient regarding recommendations by Tori GARRETT from today's CHF clinic appt:     Advise to take medications as ordered.   Follow up as scheduled     Per pt he has picked up his medications and has resumed them all today.     I have reviewed the provider's instructions with the patient, answering all questions to his satisfaction.    Electronically signed by Niki Hickman RN on 12/20/2024 at 2:00 PM

## 2024-12-20 NOTE — PLAN OF CARE
Problem: Chronic Conditions and Co-morbidities  Goal: Patient's chronic conditions and co-morbidity symptoms are monitored and maintained or improved  12/20/2024 0755 by Niki Hickman RN  Outcome: Progressing  12/20/2024 0729 by Qi Menjivar RN  Flowsheets (Taken 12/20/2024 0729)  Care Plan - Patient's Chronic Conditions and Co-Morbidity Symptoms are Monitored and Maintained or Improved: Monitor and assess patient's chronic conditions and comorbid symptoms for stability, deterioration, or improvement

## 2024-12-20 NOTE — PROGRESS NOTES
Congestive Heart Failure Clinic   [facility]      Referring Provider: Dr. Banerjee     Primary Care Physician: Carlita Castellano MD   Cardiologist: Dr. Banerjee  Nephrologist: IDRIS      HISTORY OF PRESENT ILLNESS:     Hemal Banks is a 74 y.o. (1950) male with a history of HFpEF (EF> 50%)  Pre Cupid:     Lab Results   Component Value Date    LVEF 63 09/24/2024     Post Cupid:  No results found for: \"EFBP\"      He presents to the CHF clinic for ongoing evaluation and monitoring of heart failure.    In the CHF clinic today he denies any adverse symptoms except:  [x] Dizziness or lightheadedness- \"sometimes\"   [] Syncope or near syncope  [] Recent Fall  [] Shortness of breath at rest   [x] Dyspnea with exertion  [] Decline in functional capacity (unable to perform activities they had previously been able to do)  [x] Fatigue   [] Orthopnea  [] PND  [] Nocturnal cough  [] Hemoptysis  [] Chest pain, pressure, or discomfort  [] Palpitations  [] Abdominal distention  [] Abdominal bloating  [] Early satiety  [] Blood in stool   [] Diarrhea  [] Constipation  [] Nausea/Vomiting  [] Decreased urinary response to oral diuretic   [] Scrotal swelling   [x] Lower extremity edema- improved per pt   [] Used PRN doses of oral diuretic   [] Weight gain    Wt Readings from Last 3 Encounters:   12/20/24 111.6 kg (246 lb)   12/12/24 112 kg (247 lb)   12/12/24 111.1 kg (245 lb)           SOCIAL HISTORY:  [x] Denies tobacco, alcohol or illicit drug abuse  [] Tobacco use:  [] ETOH use:  [] Illicit drug use:        MEDICATIONS:    No Known Allergies  Prior to Visit Medications    Medication Sig Taking? Authorizing Provider   meclizine (ANTIVERT) 12.5 MG tablet TAKE 1 TABLET BY MOUTH THREE TIMES DAILY AS NEEDED FOR DIZZINESS OR NAUSEA Yes Carlita Castellano MD   torsemide (DEMADEX) 10 MG tablet Take 1 tablet by mouth daily as needed (weight gain, shortness of breath,

## 2025-01-07 ENCOUNTER — HOSPITAL ENCOUNTER (OUTPATIENT)
Dept: OTHER | Age: 75
Setting detail: THERAPIES SERIES
Discharge: HOME | End: 2025-01-07
Payer: MEDICAID

## 2025-01-07 VITALS
DIASTOLIC BLOOD PRESSURE: 76 MMHG | OXYGEN SATURATION: 99 % | WEIGHT: 243 LBS | SYSTOLIC BLOOD PRESSURE: 144 MMHG | BODY MASS INDEX: 31.2 KG/M2 | RESPIRATION RATE: 20 BRPM | HEART RATE: 76 BPM

## 2025-01-07 LAB
ANION GAP SERPL CALCULATED.3IONS-SCNC: 12 MMOL/L (ref 7–16)
BNP SERPL-MCNC: 810 PG/ML (ref 0–125)
BUN SERPL-MCNC: 14 MG/DL (ref 6–23)
CALCIUM SERPL-MCNC: 9.1 MG/DL (ref 8.6–10.2)
CHLORIDE SERPL-SCNC: 106 MMOL/L (ref 98–107)
CO2 SERPL-SCNC: 23 MMOL/L (ref 22–29)
CREAT SERPL-MCNC: 1.8 MG/DL (ref 0.7–1.2)
GFR, ESTIMATED: 38 ML/MIN/1.73M2
GLUCOSE SERPL-MCNC: 101 MG/DL (ref 74–99)
POTASSIUM SERPL-SCNC: 3.7 MMOL/L (ref 3.5–5)
SODIUM SERPL-SCNC: 141 MMOL/L (ref 132–146)

## 2025-01-07 PROCEDURE — 99214 OFFICE O/P EST MOD 30 MIN: CPT

## 2025-01-07 PROCEDURE — 83880 ASSAY OF NATRIURETIC PEPTIDE: CPT

## 2025-01-07 PROCEDURE — 80048 BASIC METABOLIC PNL TOTAL CA: CPT

## 2025-01-07 ASSESSMENT — PATIENT HEALTH QUESTIONNAIRE - PHQ9
SUM OF ALL RESPONSES TO PHQ QUESTIONS 1-9: 1
2. FEELING DOWN, DEPRESSED OR HOPELESS: SEVERAL DAYS
SUM OF ALL RESPONSES TO PHQ9 QUESTIONS 1 & 2: 1
1. LITTLE INTEREST OR PLEASURE IN DOING THINGS: NOT AT ALL
SUM OF ALL RESPONSES TO PHQ QUESTIONS 1-9: 1

## 2025-01-07 NOTE — PROGRESS NOTES
Congestive Heart Failure Clinic   [facility]      Referring Provider: Dr. Banerjee     Primary Care Physician: Carlita Castellano MD   Cardiologist: Dr. Banerjee  Nephrologist: IDRIS      HISTORY OF PRESENT ILLNESS:     Hemal Banks is a 74 y.o. (1950) male with a history of HFpEF (EF> 50%)  Pre Cupid:     Lab Results   Component Value Date    LVEF 63 09/24/2024           He presents to the CHF clinic for ongoing evaluation and monitoring of heart failure.    In the CHF clinic today he denies any adverse symptoms except:  [x] Dizziness or lightheadedness- \"sometimes\"   [] Syncope or near syncope  [] Recent Fall  [] Shortness of breath at rest   [x] Dyspnea with exertion  [] Decline in functional capacity (unable to perform activities they had previously been able to do)  [] Fatigue   [] Orthopnea  [] PND  [] Nocturnal cough  [] Hemoptysis  [] Chest pain, pressure, or discomfort  [] Palpitations  [] Abdominal distention  [] Abdominal bloating  [] Early satiety  [] Blood in stool   [] Diarrhea  [] Constipation  [] Nausea/Vomiting  [] Decreased urinary response to oral diuretic   [] Scrotal swelling   [] Lower extremity edema-  [] Used PRN doses of oral diuretic   [] Weight gain    Wt Readings from Last 3 Encounters:   01/07/25 110.2 kg (243 lb)   12/20/24 111.6 kg (246 lb)   12/12/24 112 kg (247 lb)           SOCIAL HISTORY:  [x] Denies tobacco, alcohol or illicit drug abuse  [] Tobacco use:  [] ETOH use:  [] Illicit drug use:        MEDICATIONS:    No Known Allergies  Prior to Visit Medications    Medication Sig Taking? Authorizing Provider   hydrALAZINE (APRESOLINE) 50 MG tablet Take 1.5 tablets by mouth in the morning, at noon, and at bedtime  Patient taking differently: Take 1.5 tablets by mouth in the morning, at noon, and at bedtime Patient takes BID Yes Tori Quispe, APRN - CNP   dapagliflozin (FARXIGA) 10 MG tablet Take 1 tablet by mouth

## 2025-01-08 ENCOUNTER — APPOINTMENT (OUTPATIENT)
Dept: GENERAL RADIOLOGY | Age: 75
DRG: 347 | End: 2025-01-08
Payer: MEDICAID

## 2025-01-08 ENCOUNTER — HOSPITAL ENCOUNTER (INPATIENT)
Age: 75
LOS: 5 days | Discharge: NURSING, REHAB OR LONG-TERM CARE | DRG: 347 | End: 2025-01-13
Attending: EMERGENCY MEDICINE | Admitting: HOSPITALIST
Payer: MEDICAID

## 2025-01-08 DIAGNOSIS — M25.561 ACUTE PAIN OF RIGHT KNEE: ICD-10-CM

## 2025-01-08 DIAGNOSIS — M10.9 ACUTE GOUT OF RIGHT KNEE, UNSPECIFIED CAUSE: ICD-10-CM

## 2025-01-08 DIAGNOSIS — M17.11 ARTHRITIS OF KNEE, RIGHT: ICD-10-CM

## 2025-01-08 DIAGNOSIS — R26.2 AMBULATORY DYSFUNCTION: Primary | ICD-10-CM

## 2025-01-08 LAB
ALBUMIN SERPL-MCNC: 4 G/DL (ref 3.5–5.2)
ALBUMIN SERPL-MCNC: NORMAL G/DL (ref 3.5–5.2)
ALBUMIN/GLOB SERPL: NORMAL {RATIO} (ref 1–2.5)
ALP SERPL-CCNC: 120 U/L (ref 40–129)
ALP SERPL-CCNC: NORMAL U/L (ref 40–129)
ALT SERPL-CCNC: 20 U/L (ref 0–40)
ALT SERPL-CCNC: NORMAL U/L (ref 5–41)
ANION GAP SERPL CALCULATED.3IONS-SCNC: 9 MMOL/L (ref 7–16)
ANION GAP SERPL CALCULATED.3IONS-SCNC: NORMAL MMOL/L (ref 9–17)
AST SERPL-CCNC: 27 U/L (ref 0–39)
AST SERPL-CCNC: NORMAL U/L
BASOPHILS # BLD: 0.05 K/UL (ref 0–0.2)
BASOPHILS NFR BLD: 1 % (ref 0–2)
BILIRUB SERPL-MCNC: 0.4 MG/DL (ref 0–1.2)
BILIRUB SERPL-MCNC: NORMAL MG/DL (ref 0.3–1.2)
BUN SERPL-MCNC: 16 MG/DL (ref 6–23)
BUN SERPL-MCNC: NORMAL MG/DL (ref 8–23)
BUN/CREAT SERPL: NORMAL (ref 9–20)
CALCIUM SERPL-MCNC: 9 MG/DL (ref 8.6–10.2)
CALCIUM SERPL-MCNC: NORMAL MG/DL (ref 8.6–10.4)
CHLORIDE SERPL-SCNC: 104 MMOL/L (ref 98–107)
CHLORIDE SERPL-SCNC: NORMAL MMOL/L (ref 98–107)
CO2 SERPL-SCNC: 24 MMOL/L (ref 22–29)
CO2 SERPL-SCNC: NORMAL MMOL/L (ref 20–31)
CREAT SERPL-MCNC: 1.9 MG/DL (ref 0.7–1.2)
CREAT SERPL-MCNC: NORMAL MG/DL (ref 0.7–1.2)
EOSINOPHIL # BLD: 0.09 K/UL (ref 0.05–0.5)
EOSINOPHILS RELATIVE PERCENT: 2 % (ref 0–6)
ERYTHROCYTE [DISTWIDTH] IN BLOOD BY AUTOMATED COUNT: 16.6 % (ref 11.5–15)
GFR, ESTIMATED: 37 ML/MIN/1.73M2
GFR, ESTIMATED: NORMAL ML/MIN/1.73M2
GLUCOSE SERPL-MCNC: 146 MG/DL (ref 74–99)
GLUCOSE SERPL-MCNC: NORMAL MG/DL (ref 70–99)
HCT VFR BLD AUTO: 43.7 % (ref 37–54)
HGB BLD-MCNC: 13.2 G/DL (ref 12.5–16.5)
IMM GRANULOCYTES # BLD AUTO: <0.03 K/UL (ref 0–0.58)
IMM GRANULOCYTES NFR BLD: 1 % (ref 0–5)
LYMPHOCYTES NFR BLD: 1.85 K/UL (ref 1.5–4)
LYMPHOCYTES RELATIVE PERCENT: 43 % (ref 20–42)
MCH RBC QN AUTO: 24.9 PG (ref 26–35)
MCHC RBC AUTO-ENTMCNC: 30.2 G/DL (ref 32–34.5)
MCV RBC AUTO: 82.3 FL (ref 80–99.9)
MONOCYTES NFR BLD: 0.55 K/UL (ref 0.1–0.95)
MONOCYTES NFR BLD: 13 % (ref 2–12)
NEUTROPHILS NFR BLD: 41 % (ref 43–80)
NEUTS SEG NFR BLD: 1.78 K/UL (ref 1.8–7.3)
PLATELET, FLUORESCENCE: 197 K/UL (ref 130–450)
PMV BLD AUTO: 13.3 FL (ref 7–12)
POTASSIUM SERPL-SCNC: 4 MMOL/L (ref 3.5–5)
POTASSIUM SERPL-SCNC: NORMAL MMOL/L (ref 3.7–5.3)
PROT SERPL-MCNC: 7.8 G/DL (ref 6.4–8.3)
PROT SERPL-MCNC: NORMAL G/DL (ref 6.4–8.3)
RBC # BLD AUTO: 5.31 M/UL (ref 3.8–5.8)
SODIUM SERPL-SCNC: 137 MMOL/L (ref 132–146)
SODIUM SERPL-SCNC: NORMAL MMOL/L (ref 135–144)
URATE SERPL-MCNC: 7.4 MG/DL (ref 3.4–7)
URATE SERPL-MCNC: NORMAL MG/DL (ref 3.4–7)
WBC OTHER # BLD: 4.3 K/UL (ref 4.5–11.5)

## 2025-01-08 PROCEDURE — 85025 COMPLETE CBC W/AUTO DIFF WBC: CPT

## 2025-01-08 PROCEDURE — 96372 THER/PROPH/DIAG INJ SC/IM: CPT

## 2025-01-08 PROCEDURE — 6360000002 HC RX W HCPCS: Performed by: NURSE PRACTITIONER

## 2025-01-08 PROCEDURE — 80053 COMPREHEN METABOLIC PANEL: CPT

## 2025-01-08 PROCEDURE — 73502 X-RAY EXAM HIP UNI 2-3 VIEWS: CPT

## 2025-01-08 PROCEDURE — 73562 X-RAY EXAM OF KNEE 3: CPT

## 2025-01-08 PROCEDURE — 99285 EMERGENCY DEPT VISIT HI MDM: CPT

## 2025-01-08 PROCEDURE — 1200000000 HC SEMI PRIVATE

## 2025-01-08 PROCEDURE — 84550 ASSAY OF BLOOD/URIC ACID: CPT

## 2025-01-08 PROCEDURE — 6370000000 HC RX 637 (ALT 250 FOR IP): Performed by: NURSE PRACTITIONER

## 2025-01-08 RX ORDER — PREDNISONE 10 MG/1
TABLET ORAL
Qty: 20 TABLET | Refills: 0 | Status: SHIPPED | OUTPATIENT
Start: 2025-01-08 | End: 2025-01-13 | Stop reason: HOSPADM

## 2025-01-08 RX ORDER — POTASSIUM CHLORIDE 1500 MG/1
40 TABLET, EXTENDED RELEASE ORAL PRN
Status: DISCONTINUED | OUTPATIENT
Start: 2025-01-08 | End: 2025-01-13 | Stop reason: HOSPADM

## 2025-01-08 RX ORDER — OXYCODONE HYDROCHLORIDE 5 MG/1
5 TABLET ORAL EVERY 8 HOURS PRN
Qty: 6 TABLET | Refills: 0 | Status: SHIPPED | OUTPATIENT
Start: 2025-01-08 | End: 2025-01-13 | Stop reason: HOSPADM

## 2025-01-08 RX ORDER — MAGNESIUM SULFATE IN WATER 40 MG/ML
2000 INJECTION, SOLUTION INTRAVENOUS PRN
Status: DISCONTINUED | OUTPATIENT
Start: 2025-01-08 | End: 2025-01-13 | Stop reason: HOSPADM

## 2025-01-08 RX ORDER — ONDANSETRON 2 MG/ML
4 INJECTION INTRAMUSCULAR; INTRAVENOUS EVERY 6 HOURS PRN
Status: DISCONTINUED | OUTPATIENT
Start: 2025-01-08 | End: 2025-01-13 | Stop reason: HOSPADM

## 2025-01-08 RX ORDER — SODIUM CHLORIDE 9 MG/ML
INJECTION, SOLUTION INTRAVENOUS PRN
Status: DISCONTINUED | OUTPATIENT
Start: 2025-01-08 | End: 2025-01-13 | Stop reason: HOSPADM

## 2025-01-08 RX ORDER — ACETAMINOPHEN 325 MG/1
650 TABLET ORAL EVERY 6 HOURS PRN
Status: DISCONTINUED | OUTPATIENT
Start: 2025-01-08 | End: 2025-01-13 | Stop reason: HOSPADM

## 2025-01-08 RX ORDER — SODIUM CHLORIDE 0.9 % (FLUSH) 0.9 %
5-40 SYRINGE (ML) INJECTION PRN
Status: DISCONTINUED | OUTPATIENT
Start: 2025-01-08 | End: 2025-01-13 | Stop reason: HOSPADM

## 2025-01-08 RX ORDER — MAGNESIUM HYDROXIDE/ALUMINUM HYDROXICE/SIMETHICONE 120; 1200; 1200 MG/30ML; MG/30ML; MG/30ML
30 SUSPENSION ORAL EVERY 6 HOURS PRN
Status: DISCONTINUED | OUTPATIENT
Start: 2025-01-08 | End: 2025-01-13 | Stop reason: HOSPADM

## 2025-01-08 RX ORDER — DEXAMETHASONE SODIUM PHOSPHATE 10 MG/ML
10 INJECTION INTRAMUSCULAR; INTRAVENOUS ONCE
Status: COMPLETED | OUTPATIENT
Start: 2025-01-08 | End: 2025-01-08

## 2025-01-08 RX ORDER — OXYCODONE AND ACETAMINOPHEN 5; 325 MG/1; MG/1
1 TABLET ORAL ONCE
Status: COMPLETED | OUTPATIENT
Start: 2025-01-08 | End: 2025-01-08

## 2025-01-08 RX ORDER — ENOXAPARIN SODIUM 100 MG/ML
40 INJECTION SUBCUTANEOUS DAILY
Status: DISCONTINUED | OUTPATIENT
Start: 2025-01-09 | End: 2025-01-09

## 2025-01-08 RX ORDER — ONDANSETRON 4 MG/1
4 TABLET, ORALLY DISINTEGRATING ORAL EVERY 8 HOURS PRN
Status: DISCONTINUED | OUTPATIENT
Start: 2025-01-08 | End: 2025-01-13 | Stop reason: HOSPADM

## 2025-01-08 RX ORDER — POLYETHYLENE GLYCOL 3350 17 G/17G
17 POWDER, FOR SOLUTION ORAL DAILY PRN
Status: DISCONTINUED | OUTPATIENT
Start: 2025-01-08 | End: 2025-01-13 | Stop reason: HOSPADM

## 2025-01-08 RX ORDER — SODIUM CHLORIDE 0.9 % (FLUSH) 0.9 %
5-40 SYRINGE (ML) INJECTION EVERY 12 HOURS SCHEDULED
Status: DISCONTINUED | OUTPATIENT
Start: 2025-01-08 | End: 2025-01-13 | Stop reason: HOSPADM

## 2025-01-08 RX ORDER — ACETAMINOPHEN 650 MG/1
650 SUPPOSITORY RECTAL EVERY 6 HOURS PRN
Status: DISCONTINUED | OUTPATIENT
Start: 2025-01-08 | End: 2025-01-13 | Stop reason: HOSPADM

## 2025-01-08 RX ORDER — FENTANYL CITRATE 50 UG/ML
50 INJECTION, SOLUTION INTRAMUSCULAR; INTRAVENOUS ONCE
Status: COMPLETED | OUTPATIENT
Start: 2025-01-08 | End: 2025-01-08

## 2025-01-08 RX ORDER — POTASSIUM CHLORIDE 7.45 MG/ML
10 INJECTION INTRAVENOUS PRN
Status: DISCONTINUED | OUTPATIENT
Start: 2025-01-08 | End: 2025-01-13 | Stop reason: HOSPADM

## 2025-01-08 RX ADMIN — FENTANYL CITRATE 50 MCG: 50 INJECTION INTRAMUSCULAR; INTRAVENOUS at 21:36

## 2025-01-08 RX ADMIN — OXYCODONE HYDROCHLORIDE AND ACETAMINOPHEN 1 TABLET: 5; 325 TABLET ORAL at 18:43

## 2025-01-08 RX ADMIN — DEXAMETHASONE SODIUM PHOSPHATE 10 MG: 10 INJECTION INTRAMUSCULAR; INTRAVENOUS at 18:43

## 2025-01-08 ASSESSMENT — PAIN SCALES - GENERAL
PAINLEVEL_OUTOF10: 8
PAINLEVEL_OUTOF10: 6

## 2025-01-08 ASSESSMENT — PAIN DESCRIPTION - ORIENTATION: ORIENTATION: RIGHT

## 2025-01-08 ASSESSMENT — PAIN - FUNCTIONAL ASSESSMENT: PAIN_FUNCTIONAL_ASSESSMENT: 0-10

## 2025-01-08 ASSESSMENT — PAIN DESCRIPTION - LOCATION: LOCATION: KNEE

## 2025-01-08 NOTE — ED PROVIDER NOTES
ED physician  HPI:  1/8/25, Time: 6:20 PM GRAEME Banks is a 74 y.o. male presenting to the ED for several day history of worsening right knee pain.  Patient presents to the emergency department with complaints of right knee pain.  He denies any injury, trauma, turning or twisting.  He does report history of gout and states that it feels just like that.  He reports that last time he had it in his left leg.  Patient reports that the pain will radiate into the right hip.  Patient otherwise denies any saddle anesthesia or any unexplained incontinence.  He reports taking the counter meds at home without relief.  Patient otherwise reports normal state health.  No fevers.  No noted chest pain no shortness of breath no abdominal pain.  Review of Systems:   A complete review of systems was performed and pertinent positives and negatives are stated within HPI, all other systems reviewed and are negative.          --------------------------------------------- PAST HISTORY ---------------------------------------------  Past Medical History:  has a past medical history of Arthritis, Benign hypertensive kidney disease with chronic kidney disease, Cervical vertebra fusion, CHF (congestive heart failure) ([facility]), Chronic bilateral low back pain with bilateral sciatica, Chronic kidney disease (CKD), stage III (moderate) ([facility]), Depression, Hypertension, Stroke ([facility]), and Type 2 diabetes mellitus with diabetic polyneuropathy, with long-term current use of insulin ([facility]).    Past Surgical History:  has a past surgical history that includes fracture surgery; Rotator cuff repair (Left, 1999); Spine surgery; Kidney stone surgery (01/23/2010); Colonoscopy; Upper gastrointestinal endoscopy; Upper gastrointestinal endoscopy (N/A, 09/13/2021); Colonoscopy (N/A, 09/13/2021); Upper gastrointestinal endoscopy (N/A, 09/13/2021); Upper gastrointestinal endoscopy (N/A, 08/23/2022); Upper gastrointestinal endoscopy (N/A, 08/23/2022);

## 2025-01-09 ENCOUNTER — APPOINTMENT (OUTPATIENT)
Dept: CT IMAGING | Age: 75
DRG: 347 | End: 2025-01-09
Payer: MEDICAID

## 2025-01-09 LAB
ALBUMIN SERPL-MCNC: 3.7 G/DL (ref 3.5–5.2)
ALP SERPL-CCNC: 121 U/L (ref 40–129)
ALT SERPL-CCNC: 19 U/L (ref 0–40)
ANION GAP SERPL CALCULATED.3IONS-SCNC: 13 MMOL/L (ref 7–16)
APPEARANCE: ABNORMAL
AST SERPL-CCNC: 25 U/L (ref 0–39)
BASOPHILS # BLD: 0.01 K/UL (ref 0–0.2)
BASOPHILS NFR BLD: 0 % (ref 0–2)
BILIRUB SERPL-MCNC: 0.3 MG/DL (ref 0–1.2)
BODY FLD TYPE: ABNORMAL
BUN SERPL-MCNC: 18 MG/DL (ref 6–23)
CALCIUM SERPL-MCNC: 8.9 MG/DL (ref 8.6–10.2)
CHLORIDE SERPL-SCNC: 106 MMOL/L (ref 98–107)
CLOT CHECK: ABNORMAL
CO2 SERPL-SCNC: 20 MMOL/L (ref 22–29)
COLOR FLUID: ABNORMAL
CREAT SERPL-MCNC: 1.7 MG/DL (ref 0.7–1.2)
CRP SERPL HS-MCNC: <3 MG/L (ref 0–5)
EOSINOPHIL # BLD: 0 K/UL (ref 0.05–0.5)
EOSINOPHILS RELATIVE PERCENT: 0 % (ref 0–6)
ERYTHROCYTE [DISTWIDTH] IN BLOOD BY AUTOMATED COUNT: 15.9 % (ref 11.5–15)
ERYTHROCYTE [SEDIMENTATION RATE] IN BLOOD BY WESTERGREN METHOD: 7 MM/HR (ref 0–15)
GFR, ESTIMATED: 42 ML/MIN/1.73M2
GLUCOSE FLD-MCNC: 189 MG/DL
GLUCOSE SERPL-MCNC: 215 MG/DL (ref 74–99)
HCT VFR BLD AUTO: 43.6 % (ref 37–54)
HGB BLD-MCNC: 13.2 G/DL (ref 12.5–16.5)
IMM GRANULOCYTES # BLD AUTO: <0.03 K/UL (ref 0–0.58)
IMM GRANULOCYTES NFR BLD: 0 % (ref 0–5)
LYMPHOCYTES NFR BLD: 0.61 K/UL (ref 1.5–4)
LYMPHOCYTES RELATIVE PERCENT: 13 % (ref 20–42)
MCH RBC QN AUTO: 24.6 PG (ref 26–35)
MCHC RBC AUTO-ENTMCNC: 30.3 G/DL (ref 32–34.5)
MCV RBC AUTO: 81.3 FL (ref 80–99.9)
MONO/MACROPHAGE FLUID: 33 %
MONOCYTES NFR BLD: 0.03 K/UL (ref 0.1–0.95)
MONOCYTES NFR BLD: 1 % (ref 2–12)
NEUTROPHIL, FLUID: 67 %
NEUTROPHILS NFR BLD: 86 % (ref 43–80)
NEUTS SEG NFR BLD: 4.07 K/UL (ref 1.8–7.3)
PLATELET # BLD AUTO: 224 K/UL (ref 130–450)
PMV BLD AUTO: 11.9 FL (ref 7–12)
POTASSIUM SERPL-SCNC: 4.2 MMOL/L (ref 3.5–5)
PROT SERPL-MCNC: 7.5 G/DL (ref 6.4–8.3)
RBC # BLD AUTO: 5.36 M/UL (ref 3.8–5.8)
RBC, SYNOVIAL FLUID: ABNORMAL CELLS/UL
SODIUM SERPL-SCNC: 139 MMOL/L (ref 132–146)
SPECIMEN TYPE: NORMAL
WBC COUNT, SYNOVIAL FLUID: 264 CELLS/UL
WBC OTHER # BLD: 4.7 K/UL (ref 4.5–11.5)

## 2025-01-09 PROCEDURE — 6360000002 HC RX W HCPCS

## 2025-01-09 PROCEDURE — 6370000000 HC RX 637 (ALT 250 FOR IP)

## 2025-01-09 PROCEDURE — 97165 OT EVAL LOW COMPLEX 30 MIN: CPT

## 2025-01-09 PROCEDURE — 86140 C-REACTIVE PROTEIN: CPT

## 2025-01-09 PROCEDURE — 87077 CULTURE AEROBIC IDENTIFY: CPT

## 2025-01-09 PROCEDURE — 94640 AIRWAY INHALATION TREATMENT: CPT

## 2025-01-09 PROCEDURE — 99222 1ST HOSP IP/OBS MODERATE 55: CPT

## 2025-01-09 PROCEDURE — 2500000003 HC RX 250 WO HCPCS

## 2025-01-09 PROCEDURE — 1200000000 HC SEMI PRIVATE

## 2025-01-09 PROCEDURE — 85652 RBC SED RATE AUTOMATED: CPT

## 2025-01-09 PROCEDURE — 89060 EXAM SYNOVIAL FLUID CRYSTALS: CPT

## 2025-01-09 PROCEDURE — 87070 CULTURE OTHR SPECIMN AEROBIC: CPT

## 2025-01-09 PROCEDURE — 72131 CT LUMBAR SPINE W/O DYE: CPT

## 2025-01-09 PROCEDURE — 85025 COMPLETE CBC W/AUTO DIFF WBC: CPT

## 2025-01-09 PROCEDURE — 89051 BODY FLUID CELL COUNT: CPT

## 2025-01-09 PROCEDURE — 87205 SMEAR GRAM STAIN: CPT

## 2025-01-09 PROCEDURE — 97161 PT EVAL LOW COMPLEX 20 MIN: CPT

## 2025-01-09 PROCEDURE — 97535 SELF CARE MNGMENT TRAINING: CPT

## 2025-01-09 PROCEDURE — 82945 GLUCOSE OTHER FLUID: CPT

## 2025-01-09 PROCEDURE — 97530 THERAPEUTIC ACTIVITIES: CPT

## 2025-01-09 PROCEDURE — 80053 COMPREHEN METABOLIC PANEL: CPT

## 2025-01-09 RX ORDER — HYDRALAZINE HYDROCHLORIDE 20 MG/ML
10 INJECTION INTRAMUSCULAR; INTRAVENOUS EVERY 6 HOURS PRN
Status: DISCONTINUED | OUTPATIENT
Start: 2025-01-09 | End: 2025-01-13 | Stop reason: HOSPADM

## 2025-01-09 RX ORDER — TAMSULOSIN HYDROCHLORIDE 0.4 MG/1
0.4 CAPSULE ORAL NIGHTLY
Status: DISCONTINUED | OUTPATIENT
Start: 2025-01-09 | End: 2025-01-13 | Stop reason: HOSPADM

## 2025-01-09 RX ORDER — FERROUS SULFATE 325(65) MG
325 TABLET ORAL 2 TIMES DAILY WITH MEALS
Status: DISCONTINUED | OUTPATIENT
Start: 2025-01-09 | End: 2025-01-13 | Stop reason: HOSPADM

## 2025-01-09 RX ORDER — METOPROLOL SUCCINATE 50 MG/1
50 TABLET, EXTENDED RELEASE ORAL 2 TIMES DAILY
Status: DISCONTINUED | OUTPATIENT
Start: 2025-01-09 | End: 2025-01-13 | Stop reason: HOSPADM

## 2025-01-09 RX ORDER — LISINOPRIL 20 MG/1
40 TABLET ORAL DAILY
Status: DISCONTINUED | OUTPATIENT
Start: 2025-01-09 | End: 2025-01-13 | Stop reason: HOSPADM

## 2025-01-09 RX ORDER — ALBUTEROL SULFATE 90 UG/1
2 INHALANT RESPIRATORY (INHALATION) 4 TIMES DAILY PRN
Status: DISCONTINUED | OUTPATIENT
Start: 2025-01-09 | End: 2025-01-09 | Stop reason: CLARIF

## 2025-01-09 RX ORDER — ALBUTEROL SULFATE 0.83 MG/ML
2.5 SOLUTION RESPIRATORY (INHALATION) EVERY 6 HOURS
Status: DISCONTINUED | OUTPATIENT
Start: 2025-01-09 | End: 2025-01-11

## 2025-01-09 RX ORDER — ALBUTEROL SULFATE 0.83 MG/ML
2.5 SOLUTION RESPIRATORY (INHALATION) 4 TIMES DAILY PRN
Status: DISCONTINUED | OUTPATIENT
Start: 2025-01-09 | End: 2025-01-11 | Stop reason: SDUPTHER

## 2025-01-09 RX ORDER — PANTOPRAZOLE SODIUM 40 MG/1
40 TABLET, DELAYED RELEASE ORAL DAILY
Status: DISCONTINUED | OUTPATIENT
Start: 2025-01-09 | End: 2025-01-13 | Stop reason: HOSPADM

## 2025-01-09 RX ORDER — BUPROPION HYDROCHLORIDE 150 MG/1
150 TABLET, EXTENDED RELEASE ORAL DAILY
Status: DISCONTINUED | OUTPATIENT
Start: 2025-01-09 | End: 2025-01-13 | Stop reason: HOSPADM

## 2025-01-09 RX ORDER — HYDRALAZINE HYDROCHLORIDE 25 MG/1
75 TABLET, FILM COATED ORAL 3 TIMES DAILY
Status: DISCONTINUED | OUTPATIENT
Start: 2025-01-09 | End: 2025-01-13 | Stop reason: HOSPADM

## 2025-01-09 RX ORDER — OXYCODONE AND ACETAMINOPHEN 5; 325 MG/1; MG/1
1 TABLET ORAL EVERY 6 HOURS PRN
Status: DISCONTINUED | OUTPATIENT
Start: 2025-01-09 | End: 2025-01-12

## 2025-01-09 RX ORDER — GABAPENTIN 100 MG/1
100 CAPSULE ORAL 2 TIMES DAILY
Status: DISCONTINUED | OUTPATIENT
Start: 2025-01-09 | End: 2025-01-11

## 2025-01-09 RX ORDER — ASPIRIN 81 MG/1
81 TABLET ORAL DAILY
Status: DISCONTINUED | OUTPATIENT
Start: 2025-01-09 | End: 2025-01-13 | Stop reason: HOSPADM

## 2025-01-09 RX ADMIN — GABAPENTIN 100 MG: 100 CAPSULE ORAL at 01:35

## 2025-01-09 RX ADMIN — PANTOPRAZOLE SODIUM 40 MG: 40 TABLET, DELAYED RELEASE ORAL at 08:15

## 2025-01-09 RX ADMIN — OXYCODONE HYDROCHLORIDE AND ACETAMINOPHEN 1 TABLET: 5; 325 TABLET ORAL at 01:35

## 2025-01-09 RX ADMIN — ASPIRIN 81 MG: 81 TABLET, COATED ORAL at 08:15

## 2025-01-09 RX ADMIN — BUPROPION HYDROCHLORIDE 150 MG: 150 TABLET, FILM COATED, EXTENDED RELEASE ORAL at 10:25

## 2025-01-09 RX ADMIN — METOPROLOL SUCCINATE 50 MG: 50 TABLET, EXTENDED RELEASE ORAL at 01:35

## 2025-01-09 RX ADMIN — ALBUTEROL SULFATE 2.5 MG: 2.5 SOLUTION RESPIRATORY (INHALATION) at 20:26

## 2025-01-09 RX ADMIN — HYDRALAZINE HYDROCHLORIDE 75 MG: 25 TABLET ORAL at 10:25

## 2025-01-09 RX ADMIN — TAMSULOSIN HYDROCHLORIDE 0.4 MG: 0.4 CAPSULE ORAL at 21:59

## 2025-01-09 RX ADMIN — FERROUS SULFATE TAB 325 MG (65 MG ELEMENTAL FE) 325 MG: 325 (65 FE) TAB at 17:12

## 2025-01-09 RX ADMIN — OXYCODONE HYDROCHLORIDE AND ACETAMINOPHEN 1 TABLET: 5; 325 TABLET ORAL at 08:15

## 2025-01-09 RX ADMIN — ALBUTEROL SULFATE 2.5 MG: 2.5 SOLUTION RESPIRATORY (INHALATION) at 02:17

## 2025-01-09 RX ADMIN — APIXABAN 5 MG: 5 TABLET, FILM COATED ORAL at 21:59

## 2025-01-09 RX ADMIN — GABAPENTIN 100 MG: 100 CAPSULE ORAL at 21:59

## 2025-01-09 RX ADMIN — OXYCODONE HYDROCHLORIDE AND ACETAMINOPHEN 1 TABLET: 5; 325 TABLET ORAL at 21:59

## 2025-01-09 RX ADMIN — LISINOPRIL 40 MG: 20 TABLET ORAL at 08:15

## 2025-01-09 RX ADMIN — ALBUTEROL SULFATE 2.5 MG: 2.5 SOLUTION RESPIRATORY (INHALATION) at 09:21

## 2025-01-09 RX ADMIN — METOPROLOL SUCCINATE 50 MG: 50 TABLET, EXTENDED RELEASE ORAL at 22:00

## 2025-01-09 RX ADMIN — METOPROLOL SUCCINATE 50 MG: 50 TABLET, EXTENDED RELEASE ORAL at 08:15

## 2025-01-09 RX ADMIN — EMPAGLIFLOZIN 10 MG: 10 TABLET, FILM COATED ORAL at 10:25

## 2025-01-09 RX ADMIN — HYDRALAZINE HYDROCHLORIDE 10 MG: 20 INJECTION INTRAMUSCULAR; INTRAVENOUS at 01:35

## 2025-01-09 RX ADMIN — FERROUS SULFATE TAB 325 MG (65 MG ELEMENTAL FE) 325 MG: 325 (65 FE) TAB at 08:15

## 2025-01-09 RX ADMIN — APIXABAN 5 MG: 5 TABLET, FILM COATED ORAL at 08:15

## 2025-01-09 RX ADMIN — GABAPENTIN 100 MG: 100 CAPSULE ORAL at 08:15

## 2025-01-09 RX ADMIN — HYDRALAZINE HYDROCHLORIDE 75 MG: 25 TABLET ORAL at 21:59

## 2025-01-09 RX ADMIN — APIXABAN 5 MG: 5 TABLET, FILM COATED ORAL at 01:35

## 2025-01-09 RX ADMIN — OXYCODONE HYDROCHLORIDE AND ACETAMINOPHEN 1 TABLET: 5; 325 TABLET ORAL at 14:32

## 2025-01-09 RX ADMIN — HYDRALAZINE HYDROCHLORIDE 75 MG: 25 TABLET ORAL at 14:24

## 2025-01-09 RX ADMIN — SODIUM CHLORIDE, PRESERVATIVE FREE 10 ML: 5 INJECTION INTRAVENOUS at 01:05

## 2025-01-09 RX ADMIN — ALBUTEROL SULFATE 2.5 MG: 2.5 SOLUTION RESPIRATORY (INHALATION) at 16:25

## 2025-01-09 RX ADMIN — SODIUM CHLORIDE, PRESERVATIVE FREE 10 ML: 5 INJECTION INTRAVENOUS at 08:16

## 2025-01-09 RX ADMIN — SODIUM CHLORIDE, PRESERVATIVE FREE 10 ML: 5 INJECTION INTRAVENOUS at 22:00

## 2025-01-09 RX ADMIN — TAMSULOSIN HYDROCHLORIDE 0.4 MG: 0.4 CAPSULE ORAL at 01:35

## 2025-01-09 ASSESSMENT — PAIN DESCRIPTION - LOCATION
LOCATION: LEG

## 2025-01-09 ASSESSMENT — PAIN DESCRIPTION - ONSET
ONSET: GRADUAL
ONSET: ON-GOING
ONSET: GRADUAL

## 2025-01-09 ASSESSMENT — PAIN DESCRIPTION - ORIENTATION
ORIENTATION: LEFT;RIGHT
ORIENTATION: LEFT;RIGHT
ORIENTATION: RIGHT

## 2025-01-09 ASSESSMENT — PAIN SCALES - GENERAL
PAINLEVEL_OUTOF10: 10
PAINLEVEL_OUTOF10: 0
PAINLEVEL_OUTOF10: 5
PAINLEVEL_OUTOF10: 8
PAINLEVEL_OUTOF10: 7
PAINLEVEL_OUTOF10: 7

## 2025-01-09 ASSESSMENT — PAIN - FUNCTIONAL ASSESSMENT
PAIN_FUNCTIONAL_ASSESSMENT: PREVENTS OR INTERFERES SOME ACTIVE ACTIVITIES AND ADLS

## 2025-01-09 ASSESSMENT — PAIN DESCRIPTION - DESCRIPTORS
DESCRIPTORS: ACHING;DISCOMFORT;DULL
DESCRIPTORS: ACHING;DISCOMFORT
DESCRIPTORS: ACHING;DISCOMFORT;THROBBING
DESCRIPTORS: ACHING;DISCOMFORT;THROBBING

## 2025-01-09 ASSESSMENT — PAIN DESCRIPTION - PAIN TYPE
TYPE: ACUTE PAIN;CHRONIC PAIN

## 2025-01-09 ASSESSMENT — PAIN DESCRIPTION - FREQUENCY
FREQUENCY: CONTINUOUS

## 2025-01-09 NOTE — CONSULTS
Department of Orthopedic Surgery  Resident Consult Note          Reason for Consult: Right knee pain    HISTORY OF PRESENT ILLNESS:       Patient is a 74 y.o. male who presents [facility] with complaints of worsening right knee pain.  He states that about a week ago he had a similar problem involving his left knee and that overall the problem with bilateral knee pain has been ongoing for several months.  Patient was initially going to be discharged from the emergency department however due to inability to ambulate he was admitted inpatient.  He states that the last time he came to the hospital he was suspected that he had gout and was given a steroid injections when patient made him feel better and allowed him to ambulate.  Today he complains of only right knee pain no pain in his left knee at this time.  Patient states that he typically ambulates without any assistive devices.  Denies numbness/tingling/paresthesias however he does report that he has history of neuropathy in bilateral feet..  Denies any other orthopedic complaints at this time.      Past Medical History:        Diagnosis Date    Arthritis     Benign hypertensive kidney disease with chronic kidney disease 10/06/2021    Cervical vertebra fusion 01/01/2000    CHF (congestive heart failure) ([facility])     Chronic bilateral low back pain with bilateral sciatica 09/09/2022    Chronic kidney disease (CKD), stage III (moderate) ([facility]) 11/25/2013    Depression     Hypertension     Stroke ([facility])     patient had a stroke in the late 90's    Type 2 diabetes mellitus with diabetic polyneuropathy, with long-term current use of insulin ([facility])      Past Surgical History:        Procedure Laterality Date    COLONOSCOPY      COLONOSCOPY N/A 09/13/2021    COLONOSCOPY POLYPECTOMY SNARE/COLD BIOPSY performed by Bo Hall MD at [facility]OSCOPY    FRACTURE SURGERY      KIDNEY STONE SURGERY  01/23/2010    2019    PAIN MANAGEMENT PROCEDURE Bilateral  9/27/2022    BILATERAL L4 TRANSFORAMINAL EPIDURAL STEROID INJECTION performed by Maria Teresa Romano DO at [facility] OR    PAIN MANAGEMENT PROCEDURE Bilateral 12/13/2022    BILATERAL L4 TRANSFORAMINAL EPIDURAL STEROID INJECTION performed by Maria Teresa Romano DO at [facility] OR    ROTATOR CUFF REPAIR Left 1999    SPINE SURGERY      UPPER GASTROINTESTINAL ENDOSCOPY      UPPER GASTROINTESTINAL ENDOSCOPY N/A 09/13/2021    EGD BIOPSY performed by Bo Hall MD at [facility] ENDOSCOPY    UPPER GASTROINTESTINAL ENDOSCOPY N/A 09/13/2021    EGD POLYP SNARE performed by Bo Hall MD at [facility] ENDOSCOPY    UPPER GASTROINTESTINAL ENDOSCOPY N/A 08/23/2022    EGD WITH POSSIBLE  DILATION BALLOON performed by Bo Hall MD at [facility] ENDOSCOPY    UPPER GASTROINTESTINAL ENDOSCOPY N/A 08/23/2022    EGD BIOPSY performed by Bo Hall MD at [facility] ENDOSCOPY     Current Medications:   Current Facility-Administered Medications: oxyCODONE-acetaminophen (PERCOCET) 5-325 MG per tablet 1 tablet, 1 tablet, Oral, Q6H PRN  hydrALAZINE (APRESOLINE) injection 10 mg, 10 mg, IntraVENous, Q6H PRN  albuterol (PROVENTIL) (2.5 MG/3ML) 0.083% nebulizer solution 2.5 mg, 2.5 mg, Nebulization, Q6H  apixaban (ELIQUIS) tablet 5 mg, 5 mg, Oral, BID  aspirin EC tablet 81 mg, 81 mg, Oral, Daily  buPROPion (WELLBUTRIN SR) extended release tablet 150 mg, 150 mg, Oral, Daily  empagliflozin (JARDIANCE) tablet 10 mg, 10 mg, Oral, Daily  ferrous sulfate (IRON 325) tablet 325 mg, 325 mg, Oral, BID WC  gabapentin (NEURONTIN) capsule 100 mg, 100 mg, Oral, BID  hydrALAZINE (APRESOLINE) tablet 75 mg, 75 mg, Oral, TID  lisinopril (PRINIVIL;ZESTRIL) tablet 40 mg, 40 mg, Oral, Daily  metoprolol succinate (TOPROL XL) extended release tablet 50 mg, 50 mg, Oral, BID  pantoprazole (PROTONIX) tablet 40 mg, 40 mg, Oral, Daily  tamsulosin (FLOMAX) capsule 0.4 mg, 0.4 mg, Oral, Nightly  albuterol (PROVENTIL) (2.5 MG/3ML) 0.083% nebulizer solution 2.5 mg, 2.5 mg,

## 2025-01-09 NOTE — PROGRESS NOTES
Occupational Therapy    OCCUPATIONAL THERAPY INITIAL EVALUATION    [facility]  [address], [place]        Date:2025                                                  Patient Name: Hemal Banks    MRN: [id number]    : 1950    Room: [facility]84[facility]          Evaluating OT: Humaira Valle, OTD, OTR/L; [id number]      Occupational therapy physician order:   Start   Ordering Provider    25  OT eval and treat  Start:  25,   End:  25,   ONE TIME,   Standing Count:  1 Occurrences,   R         Tyler, Wolf TAMAYO, APRN - CNP          Pt presents to ED with R knee pain      Diagnosis:    1. Acute gout of right knee, unspecified cause    2. Acute pain of right knee    3. Arthritis of knee, right       Patient Active Problem List   Diagnosis    Type 2 diabetes mellitus with chronic kidney disease (HCC)    HTN (hypertension)    A-fib (HCC)    Avulsion of right patellar tendon    Benign hypertensive kidney disease with chronic kidney disease    Stage 3b chronic kidney disease (HCC)    Class 1 obesity with serious comorbidity and body mass index (BMI) of 32.0 to 32.9 in adult    Chronic pain syndrome [G89.4 (ICD-10-CM)]    Spinal stenosis of lumbar region with neurogenic claudication    Chronic bilateral low back pain with bilateral sciatica    Lumbar radiculopathy    Cataract    Diabetic peripheral neuropathy (HCC)    Macular degeneration    Mixed hyperlipidemia    Onychomycosis    Tuberculosis    Latent tuberculosis    Suicidal behavior with attempted self-injury (HCC)    Abdominal pain    N&V (nausea and vomiting)    Orthostatic dizziness    Ataxia    Acute cystitis without hematuria    Ambulatory dysfunction    Exertional dyspnea          Pertinent Medical History:   Past Medical History:   Diagnosis Date    Arthritis     Benign hypertensive kidney disease with chronic kidney disease 10/06/2021    Cervical vertebra  and independence with ADLs  * Therapeutic activities to facilitate gross/fine motor skills for increased independence with ADLs  * Neuro-muscular re-education: facilitation of righting/equilibrium reactions, midline orientation, scapular stability/mobility, normalization of muscle tone, and facilitation of volitional active controled movement  * Positioning to improve skin integrity, interaction with environment and functional independence    Modified Wagner Scale   Score     Description  0             No symptoms  1             No significant disability despite symptoms  2             Slight disability; able to look after own affairs  3             Moderate disability; able to ambulate without assist/ requires assist with ADLs  4             Moderate/Severe disability;requires assist to ambulate/assist with ADLs  5             Severe disability;bedridden/incontinent   6               Score:   4    Home Living: Pt lives alone in a 1 story apartment  with 0 steps to enter  , elevator access    Bedroom setup: main level  standard bed   Bathroom setup: main level  tub/shower combination  and elevated commode    Equipment owned: none    Prior Level of Function:  Independent with ADLs , Independent with IADLs; using no AD for functional mobility.   Driving: not currently  Occupation: retired    Pain Level: mild R knee pain; requested pain meds from RN, OT provided education on compensatory strategies, repositioning, and diversion for pain management      Cognition: A&O: 4/4   Follows 1-2 step directions: Fair +   Memory: Fair +   Sequencing: Fair +   Problem solving: Fair +   Judgement/safety: Fair +   Attention:  Good      Functional Assessment:  AM-PAC Daily Activity Raw Score: 1624   Initial Eval Status  Date: 2025  Treatment Status  Date: STGs = LTGs  Time frame: 10-14 days   Feeding Set up    Mod I      Grooming Stand by assist   seated  Mod I      UB Dressing Stand by assist   seated  Mod I      LB

## 2025-01-09 NOTE — H&P
long-term current use of insulin (HCC)        Past Surgical History:        Procedure Laterality Date    COLONOSCOPY      COLONOSCOPY N/A 09/13/2021    COLONOSCOPY POLYPECTOMY SNARE/COLD BIOPSY performed by Bo Hall MD at [facility] ENDOSCOPY    FRACTURE SURGERY      KIDNEY STONE SURGERY  01/23/2010    2019    PAIN MANAGEMENT PROCEDURE Bilateral 9/27/2022    BILATERAL L4 TRANSFORAMINAL EPIDURAL STEROID INJECTION performed by Maria Teresa Romano DO at [facility] OR    PAIN MANAGEMENT PROCEDURE Bilateral 12/13/2022    BILATERAL L4 TRANSFORAMINAL EPIDURAL STEROID INJECTION performed by Maria Teresa Romano DO at [facility] OR    ROTATOR CUFF REPAIR Left 1999    SPINE SURGERY      UPPER GASTROINTESTINAL ENDOSCOPY      UPPER GASTROINTESTINAL ENDOSCOPY N/A 09/13/2021    EGD BIOPSY performed by Bo Hall MD at [facility] ENDOSCOPY    UPPER GASTROINTESTINAL ENDOSCOPY N/A 09/13/2021    EGD POLYP SNARE performed by Bo Hall MD at [facility] ENDOSCOPY    UPPER GASTROINTESTINAL ENDOSCOPY N/A 08/23/2022    EGD WITH POSSIBLE  DILATION BALLOON performed by Bo Hall MD at [facility] ENDOSCOPY    UPPER GASTROINTESTINAL ENDOSCOPY N/A 08/23/2022    EGD BIOPSY performed by Bo Hall MD at [facility] ENDOSCOPY       Medications Prior to Admission:      Prior to Admission medications    Medication Sig Start Date End Date Taking? Authorizing Provider   oxyCODONE (ROXICODONE) 5 MG immediate release tablet Take 1 tablet by mouth every 8 hours as needed for Pain for up to 3 days. Intended supply: 3 days. Take lowest dose possible to manage pain Max Daily Amount: 15 mg 1/8/25 1/11/25 Yes Christina Perez APRN - CNP   predniSONE (DELTASONE) 10 MG tablet Take 40mg po qd x 5 days QS for 5 days 1/8/25 1/18/25 Yes Christina Perez APRN - CNP   meclizine (ANTIVERT) 12.5 MG tablet TAKE 1 TABLET BY MOUTH THREE TIMES DAILY AS NEEDED FOR DIZZINESS OR NAUSEA 12/18/24  Yes Carlita Castellano MD   hydrALAZINE (APRESOLINE) 50 MG  perspective  Echocardiogram from 9/23/2024 shows EF 55 to 60%  Continue Lopressor, lisinopril, Eliquis and continue rest of home medications as appropriate  Continue to follow labs replete as indicated      Diet: ADULT DIET; Regular  Code Status: Full Code  Surrogate decision maker confirmed with patient:   Extended Emergency Contact Information  Primary Emergency Contact: alisha méndez Phone: [phone]  Relation: Friend    DVT Prophylaxis: [x]Lovenox []Heparin []PCD [] Warfarin/NOAC []Encouraged ambulation  Disposition: []Med/Surg [x] Intermediate [] ICU/CCU  Admit status: [] Observation [x] Inpatient     +++++++++++++++++++++++++++++++++++++++++++++++++  JACQUES Cornejo  [facility]  [place], [place]    I can be reached via [employer] or at [phone] with any questions or concerns through 0700. After 0700 one of my colleagues with the [facility] team will assume patient's care.     +++++++++++++++++++++++++++++++++++++++++++++++++  NOTE: This report was transcribed using voice recognition software. Every effort was made to ensure accuracy; however, inadvertent computerized transcription errors may be present.

## 2025-01-09 NOTE — PLAN OF CARE
Patient seen and examined  Imaging noted  Complaining of knee pain  Inable to ambulate because of knee pain.  Will consult orthopedics  PT/OT evaluation.

## 2025-01-09 NOTE — PROGRESS NOTES
Physical Therapy  Initial Assessment       Name: Hemal Banks  : 1950  MRN: [id number]      Date of Service: 2025    Evaluating PT:  Tarun Pereira PT, DPT  [id number]    Room #:  8415/8415-B  Diagnosis:  Arthritis of knee, right [M17.11]  Ambulatory dysfunction [R26.2]  Acute pain of right knee [M25.561]  Acute gout of right knee, unspecified cause [M10.9]  PMHx/PSHx:   has a past medical history of Arthritis, Benign hypertensive kidney disease with chronic kidney disease, Cervical vertebra fusion, CHF (congestive heart failure) ([facility]), Chronic bilateral low back pain with bilateral sciatica, Chronic kidney disease (CKD), stage III (moderate) ([facility]), Depression, Hypertension, Stroke ([facility]), and Type 2 diabetes mellitus with diabetic polyneuropathy, with long-term current use of insulin ([facility]).  Procedure/Surgery:    Precautions:  Falls, Alarm  Equipment Needs:  TBD    SUBJECTIVE:    Pt lives alone in an apartment with 0 stairs to enter. Elevator access to his floor.  Pt ambulated with no AD independently PTA.   Equipment Owned:     OBJECTIVE:   Initial Evaluation  Date: 25 Treatment Short Term/ Long Term   Goals   AM-PAC 6 Clicks      Was pt agreeable to Eval/treatment? yes     Does pt have pain? Moderate to severe pain in R knee     Bed Mobility  Rolling: Sandra  Supine to sit: Sandra  Sit to supine: NT  Scooting: Sandra  Rolling: Independent  Supine to sit: Independent  Sit to supine: Independent  Scooting: Independent     Transfers Sit to stand: ModA  Stand to sit: ModA  Stand pivot: ModA JEREMÍAS  Sit to stand: supervision  Stand to sit: supervision  Stand pivot: supervision   Ambulation    15, 10 feet with ModA WW   300 feet with supervision AAD   Stair negotiation: ascended and descended  NT  4 steps with single rail supervision   ROM BUE:  Defer to OT  BLE:  WFL     Strength BUE:  Defer to OT  RLE: 3-/5 hip and knee  3/5   LLE: 4/5  Improve 1 MMT   Balance Sitting EOB:  SBA  Dynamic Standing:  ModA JEREMÍAS

## 2025-01-09 NOTE — DISCHARGE INSTRUCTIONS
Follow-up with your primary care doctor regarding your flareup of gout.  Take meds for pain relief.  Call in the morning for an appointment to be seen for close follow-up.

## 2025-01-10 LAB
ANION GAP SERPL CALCULATED.3IONS-SCNC: 12 MMOL/L (ref 7–16)
BASOPHILS # BLD: 0.02 K/UL (ref 0–0.2)
BASOPHILS NFR BLD: 0 % (ref 0–2)
BUN SERPL-MCNC: 27 MG/DL (ref 6–23)
CALCIUM SERPL-MCNC: 9 MG/DL (ref 8.6–10.2)
CHLORIDE SERPL-SCNC: 106 MMOL/L (ref 98–107)
CO2 SERPL-SCNC: 23 MMOL/L (ref 22–29)
CREAT SERPL-MCNC: 1.9 MG/DL (ref 0.7–1.2)
CRYSTALS FLD MICRO: NORMAL
EOSINOPHIL # BLD: 0.01 K/UL (ref 0.05–0.5)
EOSINOPHILS RELATIVE PERCENT: 0 % (ref 0–6)
ERYTHROCYTE [DISTWIDTH] IN BLOOD BY AUTOMATED COUNT: 16.1 % (ref 11.5–15)
GFR, ESTIMATED: 38 ML/MIN/1.73M2
GLUCOSE BLD-MCNC: 153 MG/DL (ref 74–99)
GLUCOSE SERPL-MCNC: 132 MG/DL (ref 74–99)
HCT VFR BLD AUTO: 41.3 % (ref 37–54)
HGB BLD-MCNC: 12.7 G/DL (ref 12.5–16.5)
IMM GRANULOCYTES # BLD AUTO: <0.03 K/UL (ref 0–0.58)
IMM GRANULOCYTES NFR BLD: 0 % (ref 0–5)
LYMPHOCYTES NFR BLD: 1.66 K/UL (ref 1.5–4)
LYMPHOCYTES RELATIVE PERCENT: 27 % (ref 20–42)
MCH RBC QN AUTO: 24.5 PG (ref 26–35)
MCHC RBC AUTO-ENTMCNC: 30.8 G/DL (ref 32–34.5)
MCV RBC AUTO: 79.7 FL (ref 80–99.9)
MONOCYTES NFR BLD: 0.67 K/UL (ref 0.1–0.95)
MONOCYTES NFR BLD: 11 % (ref 2–12)
NEUTROPHILS NFR BLD: 62 % (ref 43–80)
NEUTS SEG NFR BLD: 3.83 K/UL (ref 1.8–7.3)
PATH INTERP FLD-IMP: NORMAL
PLATELET # BLD AUTO: 215 K/UL (ref 130–450)
PMV BLD AUTO: 11.9 FL (ref 7–12)
POTASSIUM SERPL-SCNC: 4.1 MMOL/L (ref 3.5–5)
RBC # BLD AUTO: 5.18 M/UL (ref 3.8–5.8)
SODIUM SERPL-SCNC: 141 MMOL/L (ref 132–146)
SPECIMEN TYPE: NORMAL
WBC OTHER # BLD: 6.2 K/UL (ref 4.5–11.5)

## 2025-01-10 PROCEDURE — 36415 COLL VENOUS BLD VENIPUNCTURE: CPT

## 2025-01-10 PROCEDURE — 1200000000 HC SEMI PRIVATE

## 2025-01-10 PROCEDURE — 2500000003 HC RX 250 WO HCPCS

## 2025-01-10 PROCEDURE — 6360000002 HC RX W HCPCS

## 2025-01-10 PROCEDURE — 6370000000 HC RX 637 (ALT 250 FOR IP)

## 2025-01-10 PROCEDURE — 82962 GLUCOSE BLOOD TEST: CPT

## 2025-01-10 PROCEDURE — 85025 COMPLETE CBC W/AUTO DIFF WBC: CPT

## 2025-01-10 PROCEDURE — 99232 SBSQ HOSP IP/OBS MODERATE 35: CPT | Performed by: INTERNAL MEDICINE

## 2025-01-10 PROCEDURE — 80048 BASIC METABOLIC PNL TOTAL CA: CPT

## 2025-01-10 PROCEDURE — 94640 AIRWAY INHALATION TREATMENT: CPT

## 2025-01-10 RX ADMIN — SODIUM CHLORIDE, PRESERVATIVE FREE 10 ML: 5 INJECTION INTRAVENOUS at 10:32

## 2025-01-10 RX ADMIN — OXYCODONE HYDROCHLORIDE AND ACETAMINOPHEN 1 TABLET: 5; 325 TABLET ORAL at 10:31

## 2025-01-10 RX ADMIN — APIXABAN 5 MG: 5 TABLET, FILM COATED ORAL at 21:11

## 2025-01-10 RX ADMIN — GABAPENTIN 100 MG: 100 CAPSULE ORAL at 21:11

## 2025-01-10 RX ADMIN — TAMSULOSIN HYDROCHLORIDE 0.4 MG: 0.4 CAPSULE ORAL at 21:11

## 2025-01-10 RX ADMIN — OXYCODONE HYDROCHLORIDE AND ACETAMINOPHEN 1 TABLET: 5; 325 TABLET ORAL at 04:25

## 2025-01-10 RX ADMIN — BUPROPION HYDROCHLORIDE 150 MG: 150 TABLET, FILM COATED, EXTENDED RELEASE ORAL at 10:35

## 2025-01-10 RX ADMIN — PANTOPRAZOLE SODIUM 40 MG: 40 TABLET, DELAYED RELEASE ORAL at 10:31

## 2025-01-10 RX ADMIN — SODIUM CHLORIDE, PRESERVATIVE FREE 10 ML: 5 INJECTION INTRAVENOUS at 21:11

## 2025-01-10 RX ADMIN — GABAPENTIN 100 MG: 100 CAPSULE ORAL at 10:31

## 2025-01-10 RX ADMIN — EMPAGLIFLOZIN 10 MG: 10 TABLET, FILM COATED ORAL at 10:35

## 2025-01-10 RX ADMIN — APIXABAN 5 MG: 5 TABLET, FILM COATED ORAL at 10:31

## 2025-01-10 RX ADMIN — FERROUS SULFATE TAB 325 MG (65 MG ELEMENTAL FE) 325 MG: 325 (65 FE) TAB at 10:35

## 2025-01-10 RX ADMIN — METOPROLOL SUCCINATE 50 MG: 50 TABLET, EXTENDED RELEASE ORAL at 10:31

## 2025-01-10 RX ADMIN — HYDRALAZINE HYDROCHLORIDE 75 MG: 25 TABLET ORAL at 10:31

## 2025-01-10 RX ADMIN — HYDRALAZINE HYDROCHLORIDE 75 MG: 25 TABLET ORAL at 14:14

## 2025-01-10 RX ADMIN — OXYCODONE HYDROCHLORIDE AND ACETAMINOPHEN 1 TABLET: 5; 325 TABLET ORAL at 16:59

## 2025-01-10 RX ADMIN — METOPROLOL SUCCINATE 50 MG: 50 TABLET, EXTENDED RELEASE ORAL at 21:11

## 2025-01-10 RX ADMIN — ALBUTEROL SULFATE 2.5 MG: 2.5 SOLUTION RESPIRATORY (INHALATION) at 09:12

## 2025-01-10 RX ADMIN — ASPIRIN 81 MG: 81 TABLET, COATED ORAL at 10:31

## 2025-01-10 RX ADMIN — HYDRALAZINE HYDROCHLORIDE 75 MG: 25 TABLET ORAL at 21:11

## 2025-01-10 RX ADMIN — LISINOPRIL 40 MG: 20 TABLET ORAL at 10:31

## 2025-01-10 RX ADMIN — FERROUS SULFATE TAB 325 MG (65 MG ELEMENTAL FE) 325 MG: 325 (65 FE) TAB at 16:59

## 2025-01-10 ASSESSMENT — PAIN DESCRIPTION - LOCATION
LOCATION: LEG;KNEE
LOCATION: LEG
LOCATION: LEG;KNEE

## 2025-01-10 ASSESSMENT — PAIN DESCRIPTION - ORIENTATION
ORIENTATION: RIGHT
ORIENTATION: RIGHT;LEFT
ORIENTATION: RIGHT

## 2025-01-10 ASSESSMENT — PAIN DESCRIPTION - DESCRIPTORS
DESCRIPTORS: ACHING;DISCOMFORT;DULL
DESCRIPTORS: ACHING;CRUSHING
DESCRIPTORS: ACHING;DISCOMFORT;DULL

## 2025-01-10 ASSESSMENT — PAIN SCALES - GENERAL
PAINLEVEL_OUTOF10: 10
PAINLEVEL_OUTOF10: 7
PAINLEVEL_OUTOF10: 10

## 2025-01-10 NOTE — CONSULTS
1/10/2025 2:41 PM  Hemal Banks  [id number]     Chief Complaint:    Left hydronephrosis and left UVJ stone, chronic      History of Present Illness:      The patient is a 74 y.o. male patient who presented to the hospital with complaints of right leg pain.     He is known to have chronic left renal atrophy and moderate left hydroureteronephrosis and a left UVJ stone. [facility]. He reports he was to have stone surgery a few years ago but was unable to do so. He has known about his chronic left hydronephrosis and kidney stone for quite some time.  He currently denies any left flank pain.  No fevers.  Denies any history of prostate cancer.      Past Medical History:   Diagnosis Date    Arthritis     Benign hypertensive kidney disease with chronic kidney disease 10/06/2021    Cervical vertebra fusion 01/01/2000    CHF (congestive heart failure) ([facility])     Chronic bilateral low back pain with bilateral sciatica 09/09/2022    Chronic kidney disease (CKD), stage III (moderate) ([facility]) 11/25/2013    Depression     Hypertension     Stroke ([facility])     patient had a stroke in the late 90's    Type 2 diabetes mellitus with diabetic polyneuropathy, with long-term current use of insulin (HCC)          Past Surgical History:   Procedure Laterality Date    COLONOSCOPY      COLONOSCOPY N/A 09/13/2021    COLONOSCOPY POLYPECTOMY SNARE/COLD BIOPSY performed by Bo Hall MD at [facility] ENDOSCOPY    FRACTURE SURGERY      KIDNEY STONE SURGERY  01/23/2010    2019    PAIN MANAGEMENT PROCEDURE Bilateral 9/27/2022    BILATERAL L4 TRANSFORAMINAL EPIDURAL [facility]EROID [facility]    PAIN MANAGEMENT PROCEDURE Bilateral 12/13/2022    BILATERAL L4 TRANSFORAMINAL EPIDURAL STEROID INJECTION performed by DO dante Parker OR    ROTATOR CUFF REPAIR Left 1999    SPINE SURGERY      UPPER GASTROINTESTINAL ENDOSCOPY      UPPER GASTROINTESTINAL ENDOSCOPY N/A 09/13/2021    EGD  BIOPSY performed by Bo Hall MD at [facility] ENDOSCOPY    UPPER GASTROINTESTINAL ENDOSCOPY N/A 09/13/2021    EGD POLYP SNARE performed by Bo Hall MD at [facility] ENDOSCOPY    UPPER GASTROINTESTINAL ENDOSCOPY N/A 08/23/2022    EGD WITH POSSIBLE  DILATION BALLOON performed by Bo Hall MD at [facility] ENDOSCOPY    UPPER GASTROINTESTINAL ENDOSCOPY N/A 08/23/2022    EGD BIOPSY performed by Bo Hall MD at [facility] ENDOSCOPY       Medications Prior to Admission:    Medications Prior to Admission: meclizine (ANTIVERT) 12.5 MG tablet, TAKE 1 TABLET BY MOUTH THREE TIMES DAILY AS NEEDED FOR DIZZINESS OR NAUSEA  hydrALAZINE (APRESOLINE) 50 MG tablet, Take 1.5 tablets by mouth in the morning, at noon, and at bedtime (Patient taking differently: Take 1.5 tablets by mouth in the morning, at noon, and at bedtime Patient takes BID)  dapagliflozin (FARXIGA) 10 MG tablet, Take 1 tablet by mouth every morning  Respiratory Therapy Supplies (FULL KIT NEBULIZER SET) MISC, Use as directed with nebulized medication.  gabapentin (NEURONTIN) 100 MG capsule, Take 1 capsule by mouth in the morning and at bedtime for 30 days. (Patient taking differently: Take 3 capsules by mouth in the morning and at bedtime. 300 mg AM/200 PM)  ferrous sulfate (IRON 325) 325 (65 Fe) MG tablet, Take 1 tablet by mouth 2 times daily (with meals)  buPROPion (WELLBUTRIN SR) 150 MG extended release tablet, Take 1 tablet by mouth daily  lisinopril (PRINIVIL;ZESTRIL) 40 MG tablet, Take 1 tablet by mouth daily  metoprolol succinate (TOPROL XL) 100 MG extended release tablet, Take 0.5 tablets by mouth 2 times daily  pantoprazole (PROTONIX) 40 MG tablet, Take 1 tablet by mouth daily  tamsulosin (FLOMAX) 0.4 MG capsule, Take 1 capsule by mouth nightly  apixaban (ELIQUIS) 5 MG TABS tablet, Take 1 tablet by mouth 2 times daily  vitamin D (CHOLECALCIFEROL) 25 MCG (1000 UT) TABS tablet, Take 1 tablet by mouth daily  vitamin B-12

## 2025-01-10 NOTE — PROGRESS NOTES
Spiritual Health History and Assessment/Progress Note  [facility]    (P) Initial Encounter, Spiritual/Emotional Needs,  ,  ,      Name: Hemal Banks MRN: [id number]    Age: 74 y.o.     Sex: male   Language: English   Yazidism: Rastafari   Ambulatory dysfunction     Date: 1/10/2025                           Spiritual Assessment began in [facility] SUR[facility]        Referral/Consult From: (P) Rounding   Encounter Overview/Reason: (P) Initial Encounter, Spiritual/Emotional Needs  Service Provided For: (P) Patient    Dasia, Belief, Meaning:   Patient has beliefs or practices that help with coping during difficult times  Family/Friends No family/friends present      Importance and Influence:  Patient has spiritual/personal beliefs that influence decisions regarding their health  Family/Friends No family/friends present    Community:  Patient feels well-supported. Support system includes: Friends  Family/Friends No family/friends present    Assessment and Plan of Care:     Patient Interventions include: Facilitated expression of thoughts and feelings, Explored spiritual coping/struggle/distress, and Affirmed coping skills/support systems  Family/Friends Interventions include: No family/friends present    Patient Plan of Care: Spiritual Care available upon further referral  Family/Friends Plan of Care: No family/friends present    Electronically signed by Chaplain Bailey on 1/10/2025 at 6:30 PM

## 2025-01-10 NOTE — CARE COORDINATION
Social Work/Case Management Transition of Care Planning (Shandra DUNCAN [phone]):  Patient presented to the hospital due to concerns of worsening right knee pain.  X-rays of right knee and hip were negative for acute abnormalities.  Patient is not able to bear weight or walk.  PT/OT scores noted to be 13/16. Patient was able to ambulated 15', 10' with FWW mod assist.  Ortho surgery and neurosurgery were consulted.  Knee was aspirated for analysis.  Results are low for septic arthritis. CT of the lumbar spine was negative for acute fracture.  It did show a 7mm obstructing renal stone with severe left sided hydronephrosis.  Urology was consulted. No intervention at this time.  Met with patient at bedside.  He resides in a 4th floor apartment with elevator access.  There are 0 FER the building.  He lives alone and reports he was independent with all aspects of care.  No DME or oxygen need.  PCP is Dr. Lundy.  Pharmacy is [facility]. C history with [facility].  MANNY history at [facility].  Discussed therapy scores and possible need for discharge.  He feels once his pain is better controlled he will be able to return home.  If MANNY is needed, he has choiced Guardian.  CM/SW will follow.  DAKOTA Angelo  1/10/2025     Case Management Assessment  Initial Evaluation    Date/Time of Evaluation: 1/10/2025 3:15 PM  Assessment Completed by: DAKOTA Angelo    If patient is discharged prior to next notation, then this note serves as note for discharge by case management.    Patient Name: Hemal Banks                   YOB: 1950  Diagnosis: Arthritis of knee, right [M17.11]  Ambulatory dysfunction [R26.2]  Acute pain of right knee [M25.561]  Acute gout of right knee, unspecified cause [M10.9]                   Date / Time: 1/8/2025  5:09 PM    Patient Admission Status: Inpatient   Readmission Risk (Low < 19, Mod (19-27), High > 27): Readmission Risk Score: 15    Current PCP: Bill Lundy

## 2025-01-10 NOTE — PROGRESS NOTES
[facility] Hospitalist Progress Note    Admitting Date and Time: 1/8/2025  5:09 PM  Admit Dx: Arthritis of knee, right [M17.11]  Ambulatory dysfunction [R26.2]  Acute pain of right knee [M25.561]  Acute gout of right knee, unspecified cause [M10.9]    Synopsis: Patient is a 74-year-old gentleman with past medical history of arthritis, hypertension, CKD, back pain, diabetes mellitus.  He presented to ED with worsening right knee pain to the point that he is unable to ambulate around the house.  He denied any trauma or fall.  Since he was unable to walk while in ED he was admitted to the hospital.  Seen by orthopedics, s/p knee aspiration, low suspicion of infection as per orthopedics, no crystals in synovial fluid.  Pending synovial fluid culture.  Pending placement.    Subjective:  Patient is being followed for Arthritis of knee, right [M17.11]  Ambulatory dysfunction [R26.2]  Acute pain of right knee [M25.561]  Acute gout of right knee, unspecified cause [M10.9]       ROS: denies fever, chills, cp, sob, n/v, HA unless stated above.      albuterol  2.5 mg Nebulization Q6H    apixaban  5 mg Oral BID    aspirin  81 mg Oral Daily    buPROPion  150 mg Oral Daily    empagliflozin  10 mg Oral Daily    ferrous sulfate  325 mg Oral BID WC    gabapentin  100 mg Oral BID    hydrALAZINE  75 mg Oral TID    lisinopril  40 mg Oral Daily    metoprolol succinate  50 mg Oral BID    pantoprazole  40 mg Oral Daily    tamsulosin  0.4 mg Oral Nightly    sodium chloride flush  5-40 mL IntraVENous 2 times per day     oxyCODONE-acetaminophen, 1 tablet, Q6H PRN  hydrALAZINE, 10 mg, Q6H PRN  albuterol, 2.5 mg, 4x Daily PRN  sodium chloride flush, 5-40 mL, PRN  sodium chloride, , PRN  potassium chloride, 40 mEq, PRN   Or  potassium alternative oral replacement, 40 mEq, PRN   Or  potassium chloride, 10 mEq, PRN  magnesium sulfate, 2,000 mg, PRN  ondansetron, 4 mg, Q8H PRN   Or  ondansetron, 4 mg, Q6H PRN  polyethylene glycol, 17 g,  Daily PRN  acetaminophen, 650 mg, Q6H PRN   Or  acetaminophen, 650 mg, Q6H PRN  aluminum & magnesium hydroxide-simethicone, 30 mL, Q6H PRN         Objective:    BP (!) 184/87   Pulse 68   Temp 97.8 °F (36.6 °C) (Temporal)   Resp 16   Ht 1.88 m (6' 2\")   Wt 109.3 kg (241 lb)   SpO2 98%   BMI 30.94 kg/m²     General Appearance: alert and oriented to person, place and time and in no acute distress  Skin: warm and dry  Head: normocephalic and atraumatic  Eyes: pupils equal, round, and reactive to light, extraocular eye movements intact, conjunctivae normal  Neck: neck supple and non tender without mass   Pulmonary/Chest: clear to auscultation bilaterally- no wheezes, rales or rhonchi, normal air movement, no respiratory distress  Cardiovascular: normal rate, normal S1 and S2 and no carotid bruits  Abdomen: soft, non-tender, non-distended, normal bowel sounds, no masses or organomegaly  Extremities: Right knee tenderness, no obvious swelling.  Neurologic: no cranial nerve deficit and speech normal        Recent Labs     01/08/25 2003 01/09/25  0318 01/10/25  0648    139 141   K 4.0 4.2 4.1    106 106   CO2 24 20* 23   BUN 16 18 27*   CREATININE 1.9* 1.7* 1.9*   GLUCOSE 146* 215* 132*   CALCIUM 9.0 8.9 9.0       Recent Labs     01/08/25  1834 01/09/25  0318 01/09/25  1615 01/10/25  0648   WBC 4.3* 4.7 264* 6.2   RBC 5.31 5.36  --  5.18   HGB 13.2 13.2  --  12.7   HCT 43.7 43.6  --  41.3   MCV 82.3 81.3  --  79.7*   MCH 24.9* 24.6*  --  24.5*   MCHC 30.2* 30.3*  --  30.8*   RDW 16.6* 15.9*  --  16.1*   PLT  --  224  --  215   MPV 13.3* 11.9  --  11.9           Assessment:    Principal Problem:    Ambulatory dysfunction  Resolved Problems:    * No resolved hospital problems. *      Plan:    Right knee pain.  Images noticed.  Appreciate orthopedic input.  Follow synovial culture.  Pain control  Will need placed for ambulatory dysfunction.    Ambulatory dysfunction.  Severe spinal stenosis.  Will obtain

## 2025-01-10 NOTE — ACP (ADVANCE CARE PLANNING)
Advance Care Planning   The patient has the following advanced directives on file:  Advance Directives       Power of  Living Will ACP-Advance Directive ACP-Power of     Not on File Filed on 09/23/13 Filed Not on File            The patient has appointed the following active healthcare agents:  Primary:  Katie Hammond - ex wife  [phone]      DAKOTA Angelo  1/10/2025

## 2025-01-11 LAB
ANION GAP SERPL CALCULATED.3IONS-SCNC: 13 MMOL/L (ref 7–16)
BASOPHILS # BLD: 0.04 K/UL (ref 0–0.2)
BASOPHILS NFR BLD: 1 % (ref 0–2)
BUN SERPL-MCNC: 25 MG/DL (ref 6–23)
CALCIUM SERPL-MCNC: 8.7 MG/DL (ref 8.6–10.2)
CHLORIDE SERPL-SCNC: 110 MMOL/L (ref 98–107)
CO2 SERPL-SCNC: 21 MMOL/L (ref 22–29)
CREAT SERPL-MCNC: 1.8 MG/DL (ref 0.7–1.2)
EOSINOPHIL # BLD: 0.07 K/UL (ref 0.05–0.5)
EOSINOPHILS RELATIVE PERCENT: 1 % (ref 0–6)
ERYTHROCYTE [DISTWIDTH] IN BLOOD BY AUTOMATED COUNT: 16.5 % (ref 11.5–15)
GFR, ESTIMATED: 38 ML/MIN/1.73M2
GLUCOSE BLD-MCNC: 180 MG/DL (ref 74–99)
GLUCOSE SERPL-MCNC: 122 MG/DL (ref 74–99)
HCT VFR BLD AUTO: 42.8 % (ref 37–54)
HGB BLD-MCNC: 13.1 G/DL (ref 12.5–16.5)
IMM GRANULOCYTES # BLD AUTO: <0.03 K/UL (ref 0–0.58)
IMM GRANULOCYTES NFR BLD: 0 % (ref 0–5)
LYMPHOCYTES NFR BLD: 2.06 K/UL (ref 1.5–4)
LYMPHOCYTES RELATIVE PERCENT: 36 % (ref 20–42)
MCH RBC QN AUTO: 24.7 PG (ref 26–35)
MCHC RBC AUTO-ENTMCNC: 30.6 G/DL (ref 32–34.5)
MCV RBC AUTO: 80.6 FL (ref 80–99.9)
MICROORGANISM SPEC CULT: ABNORMAL
MICROORGANISM SPEC CULT: ABNORMAL
MICROORGANISM/AGENT SPEC: ABNORMAL
MONOCYTES NFR BLD: 0.64 K/UL (ref 0.1–0.95)
MONOCYTES NFR BLD: 11 % (ref 2–12)
NEUTROPHILS NFR BLD: 50 % (ref 43–80)
NEUTS SEG NFR BLD: 2.84 K/UL (ref 1.8–7.3)
PLATELET # BLD AUTO: 219 K/UL (ref 130–450)
PMV BLD AUTO: 12 FL (ref 7–12)
POTASSIUM SERPL-SCNC: 3.9 MMOL/L (ref 3.5–5)
RBC # BLD AUTO: 5.31 M/UL (ref 3.8–5.8)
SERVICE CMNT-IMP: ABNORMAL
SODIUM SERPL-SCNC: 144 MMOL/L (ref 132–146)
SPECIMEN DESCRIPTION: ABNORMAL
WBC OTHER # BLD: 5.7 K/UL (ref 4.5–11.5)

## 2025-01-11 PROCEDURE — 85025 COMPLETE CBC W/AUTO DIFF WBC: CPT

## 2025-01-11 PROCEDURE — 97530 THERAPEUTIC ACTIVITIES: CPT

## 2025-01-11 PROCEDURE — 97535 SELF CARE MNGMENT TRAINING: CPT

## 2025-01-11 PROCEDURE — 82962 GLUCOSE BLOOD TEST: CPT

## 2025-01-11 PROCEDURE — 1200000000 HC SEMI PRIVATE

## 2025-01-11 PROCEDURE — 2500000003 HC RX 250 WO HCPCS

## 2025-01-11 PROCEDURE — 6360000002 HC RX W HCPCS

## 2025-01-11 PROCEDURE — 99232 SBSQ HOSP IP/OBS MODERATE 35: CPT | Performed by: INTERNAL MEDICINE

## 2025-01-11 PROCEDURE — 36415 COLL VENOUS BLD VENIPUNCTURE: CPT

## 2025-01-11 PROCEDURE — 80048 BASIC METABOLIC PNL TOTAL CA: CPT

## 2025-01-11 PROCEDURE — 6370000000 HC RX 637 (ALT 250 FOR IP): Performed by: INTERNAL MEDICINE

## 2025-01-11 PROCEDURE — 6370000000 HC RX 637 (ALT 250 FOR IP)

## 2025-01-11 PROCEDURE — 94640 AIRWAY INHALATION TREATMENT: CPT

## 2025-01-11 RX ORDER — ALBUTEROL SULFATE 0.83 MG/ML
2.5 SOLUTION RESPIRATORY (INHALATION) EVERY 6 HOURS PRN
Status: DISCONTINUED | OUTPATIENT
Start: 2025-01-11 | End: 2025-01-13 | Stop reason: HOSPADM

## 2025-01-11 RX ORDER — FENTANYL CITRATE 50 UG/ML
25 INJECTION, SOLUTION INTRAMUSCULAR; INTRAVENOUS ONCE
Status: COMPLETED | OUTPATIENT
Start: 2025-01-11 | End: 2025-01-11

## 2025-01-11 RX ORDER — GABAPENTIN 100 MG/1
200 CAPSULE ORAL 2 TIMES DAILY
Status: DISCONTINUED | OUTPATIENT
Start: 2025-01-11 | End: 2025-01-13 | Stop reason: HOSPADM

## 2025-01-11 RX ADMIN — OXYCODONE HYDROCHLORIDE AND ACETAMINOPHEN 1 TABLET: 5; 325 TABLET ORAL at 00:03

## 2025-01-11 RX ADMIN — METOPROLOL SUCCINATE 50 MG: 50 TABLET, EXTENDED RELEASE ORAL at 08:38

## 2025-01-11 RX ADMIN — GABAPENTIN 100 MG: 100 CAPSULE ORAL at 08:38

## 2025-01-11 RX ADMIN — OXYCODONE HYDROCHLORIDE AND ACETAMINOPHEN 1 TABLET: 5; 325 TABLET ORAL at 12:54

## 2025-01-11 RX ADMIN — OXYCODONE HYDROCHLORIDE AND ACETAMINOPHEN 1 TABLET: 5; 325 TABLET ORAL at 05:40

## 2025-01-11 RX ADMIN — GABAPENTIN 200 MG: 100 CAPSULE ORAL at 20:39

## 2025-01-11 RX ADMIN — APIXABAN 5 MG: 5 TABLET, FILM COATED ORAL at 08:38

## 2025-01-11 RX ADMIN — FERROUS SULFATE TAB 325 MG (65 MG ELEMENTAL FE) 325 MG: 325 (65 FE) TAB at 16:02

## 2025-01-11 RX ADMIN — SODIUM CHLORIDE, PRESERVATIVE FREE 10 ML: 5 INJECTION INTRAVENOUS at 08:39

## 2025-01-11 RX ADMIN — LISINOPRIL 40 MG: 20 TABLET ORAL at 08:38

## 2025-01-11 RX ADMIN — PANTOPRAZOLE SODIUM 40 MG: 40 TABLET, DELAYED RELEASE ORAL at 08:38

## 2025-01-11 RX ADMIN — ASPIRIN 81 MG: 81 TABLET, COATED ORAL at 08:38

## 2025-01-11 RX ADMIN — BUPROPION HYDROCHLORIDE 150 MG: 150 TABLET, FILM COATED, EXTENDED RELEASE ORAL at 08:38

## 2025-01-11 RX ADMIN — EMPAGLIFLOZIN 10 MG: 10 TABLET, FILM COATED ORAL at 08:38

## 2025-01-11 RX ADMIN — FERROUS SULFATE TAB 325 MG (65 MG ELEMENTAL FE) 325 MG: 325 (65 FE) TAB at 08:38

## 2025-01-11 RX ADMIN — HYDRALAZINE HYDROCHLORIDE 75 MG: 25 TABLET ORAL at 08:38

## 2025-01-11 RX ADMIN — APIXABAN 5 MG: 5 TABLET, FILM COATED ORAL at 20:39

## 2025-01-11 RX ADMIN — OXYCODONE HYDROCHLORIDE AND ACETAMINOPHEN 1 TABLET: 5; 325 TABLET ORAL at 20:40

## 2025-01-11 RX ADMIN — HYDRALAZINE HYDROCHLORIDE 75 MG: 25 TABLET ORAL at 16:02

## 2025-01-11 RX ADMIN — METOPROLOL SUCCINATE 50 MG: 50 TABLET, EXTENDED RELEASE ORAL at 20:40

## 2025-01-11 RX ADMIN — SODIUM CHLORIDE, PRESERVATIVE FREE 10 ML: 5 INJECTION INTRAVENOUS at 20:40

## 2025-01-11 RX ADMIN — TAMSULOSIN HYDROCHLORIDE 0.4 MG: 0.4 CAPSULE ORAL at 20:39

## 2025-01-11 RX ADMIN — FENTANYL CITRATE 25 MCG: 50 INJECTION INTRAMUSCULAR; INTRAVENOUS at 02:32

## 2025-01-11 RX ADMIN — HYDRALAZINE HYDROCHLORIDE 75 MG: 25 TABLET ORAL at 20:39

## 2025-01-11 ASSESSMENT — PAIN DESCRIPTION - DESCRIPTORS
DESCRIPTORS: ACHING;SHARP;DISCOMFORT
DESCRIPTORS: ACHING;DISCOMFORT;SHARP
DESCRIPTORS: ACHING
DESCRIPTORS: ACHING;CRAMPING;BURNING

## 2025-01-11 ASSESSMENT — PAIN DESCRIPTION - LOCATION
LOCATION: LEG
LOCATION: LEG;KNEE
LOCATION: LEG;KNEE
LOCATION: KNEE
LOCATION: LEG

## 2025-01-11 ASSESSMENT — PAIN DESCRIPTION - ORIENTATION
ORIENTATION: RIGHT
ORIENTATION: LEFT
ORIENTATION: RIGHT

## 2025-01-11 ASSESSMENT — PAIN SCALES - GENERAL
PAINLEVEL_OUTOF10: 10
PAINLEVEL_OUTOF10: 7
PAINLEVEL_OUTOF10: 4
PAINLEVEL_OUTOF10: 8
PAINLEVEL_OUTOF10: 10
PAINLEVEL_OUTOF10: 10

## 2025-01-11 ASSESSMENT — PAIN DESCRIPTION - PAIN TYPE: TYPE: ACUTE PAIN

## 2025-01-11 ASSESSMENT — PAIN - FUNCTIONAL ASSESSMENT: PAIN_FUNCTIONAL_ASSESSMENT: PREVENTS OR INTERFERES SOME ACTIVE ACTIVITIES AND ADLS

## 2025-01-11 NOTE — PROGRESS NOTES
functional tasks         LUE WFL     grossly functional good  and wfl FMC/dexterity noted during ADL tasks    Improve overall LUE strength to 5/5 for participation in functional tasks            Fine Motor Coordination:  WFL  Hearing: WFL   Sensation:  neuropathy in B feet    Tone:  WFL    Edema: unremarkable      Comments:  Cleared by RN to see pt. Upon arrival patient supine in bed and agreeable to OT session. At end of session, patient supine in bed with (bed alarm not working - RN made aware) call light and phone within reach, all lines and tubes intact.  Overall patient demonstrated decreased independence, activity tolerance, standing balance, and safety during completion of ADL/functional transfer/mobility tasks. Therapist facilitated ADL tasks, functional transfers, functional mobility, bed mobility to address safety awareness, implementation of fall prevention strategies, & functional engagement throughout ADLs. Pt c/o increased RLE pain w/ OOB activity limiting activity tolerance. Pt would benefit from continued skilled OT to increase safety and independence with completion of ADL/IADL tasks for functional independence and quality of life.    Treatment: OT treatment provided this date includes:   ADL-  Instruction/training on safety and adapted techniques for completion of ADLs: Therapist facilitated & pt educated on activity modifications/adaptations to promote implementation of fall prevention strategies, EC/WS strategies, & safety awareness throughout ADLs. Pt educated on LB dressing techniques 2/2 limited RLE ROM & pain - pt demo'd fair understanding of education.   Mobility-  Instruction/training on safety and improved independence with bed mobility/functional transfers and functional mobility w/ use of ww. Pt reporting increased RLE pain during functional mobility limiting further participation in OOB activities. Pt required x3 standing rest breaks during functional mobility 2/2 RLE pain.    Sitting/Standing Balance/Tolerance: to increase balance and activity tolerance during ADLs and facilitate proper posture and positioning. Pt seated EOB ~8 mins.   Activity tolerance- Instruction/training on energy conservation/work simplification & pain management strategies for completion of ADLs.   Skilled positioning/alignment-  Therapist facilitated proper positioning/alignment throughout session to maintain skin/joint integrity & proper body mechanics.    Pt has made fair progress towards set goals.   Continue with current plan of care      Treatment Time In:9:20a            Treatment Time Out: 9:43a                Treatment Charges: Mins Units   Ther Ex  82210     Manual Therapy [id number]     Thera Activities [id number] 15 1   ADL/Home Mgt [id number] 8 1   Neuro Re-ed [id number]     Group Therapy      Orthotic manage/training  82415     Non-Billable Time     Total Timed Treatment 23 2         Stella Baeza OTR/L; [id number]

## 2025-01-11 NOTE — PROGRESS NOTES
Synopsis: Patient admitted on 1/8/2025       Mr. Hemal Banks, a 74 y.o. year old male  who  has a past medical history of Arthritis, Benign hypertensive kidney disease with chronic kidney disease, Cervical vertebra fusion, CHF (congestive heart failure) ([facility]), Chronic bilateral low back pain with bilateral sciatica, Chronic kidney disease (CKD), stage III (moderate) (HCC), Depression, Hypertension, Stroke (HCC), and Type 2 diabetes mellitus with diabetic polyneuropathy, with long-term current use of insulin ([facility]).  Patient presented to ED for worsening right knee pain, denies any trauma or fall.  States that he has history of gout and the right knee hurts all the way up to right thigh area.  Plan was to discharge patient out of ED however he is not able to walk or bear any weight.  Also states he is unable to get to pharmacy to get any of his medications, specifically his gout medications.  Hospital course thus far significant for creatinine 1.9 this appears close to patient's baseline, uric acid 7.4.  Decision was made to admit patient to medicine for further management of ambulatory dysfunction.    Past Medical History:   has a past medical history of Arthritis, Benign hypertensive kidney disease with chronic kidney disease, Cervical vertebra fusion, CHF (congestive heart failure) (HCC), Chronic bilateral low back pain with bilateral sciatica, Chronic kidney disease (CKD), stage III (moderate) (HCC), Depression, Hypertension, Stroke (HCC), and Type 2 diabetes mellitus with diabetic polyneuropathy, with long-term current use of insulin ([facility]).    Past Surgical History:   Past Surgical History:   Procedure Laterality Date    COLONOSCOPY      COLONOSCOPY N/A 09/13/2021    COLONOSCOPY POLYPECTOMY SNARE/COLD BIOPSY performed by Bo Hall MD at [facility]    FRACTURE SURGERY      KIDNEY STONE SURGERY  01/23/2010    2019    PAIN MANAGEMENT PROCEDURE Bilateral 9/27/2022    BILATERAL L4

## 2025-01-11 NOTE — PROGRESS NOTES
ID wanted the body fluid culture for the gout and pseudo gout but I spoke to [facility] and they said that body fluids are only good for 12 hours and would need redrawn but they said that crystals were already ran and none were seen.

## 2025-01-11 NOTE — PLAN OF CARE
Problem: Chronic Conditions and Co-morbidities  Goal: Patient's chronic conditions and co-morbidity symptoms are monitored and maintained or improved  1/11/2025 1036 by Fallon Sarkar RN  Outcome: Progressing  1/11/2025 0058 by Isabel Rendon RN  Outcome: Progressing     Problem: Safety - Adult  Goal: Free from fall injury  1/11/2025 1036 by Fallon Sarkar RN  Outcome: Progressing  1/11/2025 0058 by Isabel Rendon RN  Outcome: Progressing     Problem: Pain  Goal: Verbalizes/displays adequate comfort level or baseline comfort level  1/11/2025 1036 by Fallon Sarkar RN  Outcome: Progressing  1/11/2025 0058 by Isabel Rendon RN  Outcome: Progressing     Problem: ABCDS Injury Assessment  Goal: Absence of physical injury  Outcome: Progressing

## 2025-01-11 NOTE — CONSULTS
[facility]  [facility]    Consultation Note     Admit Date: 1/8/2025  5:09 PM    Reason for Consult:   Positive sample fluid culture    Attending Physician:  Theresa Tang MD     Chief Complaint: Right knee pain    HISTORY OF PRESENT ILLNESS:   44-year-old male with past medical history of arthritis, CKD, CHF, chronic back pain, depression, HTN, history of stroke, history of gout, DM presented with worsening right knee pain.  Creatinine was found to be elevated to 1.9.  Status post arthrocentesis of the right knee.  Synovial fluid analysis is consistent with bloody synovial fluid.  Synovial fluid culture grew Staph epidermidis.  CRP is less than 3 ESR 7.  ID consulted for positive synovial fluid culture.    Past Medical History:        Diagnosis Date    Arthritis     Benign hypertensive kidney disease with chronic kidney disease 10/06/2021    Cervical vertebra fusion 01/01/2000    CHF (congestive heart failure) (HCC)     Chronic bilateral low back pain with bilateral sciatica 09/09/2022    Chronic kidney disease (CKD), stage III (moderate) (HCC) 11/25/2013    Depression     Hypertension     Stroke ([facility])     patient had a stroke in the late 90's    Type 2 diabetes mellitus with diabetic polyneuropathy, with long-term current use of insulin (HCC)      Past Surgical History:        Procedure Laterality Date    COLONOSCOPY      COLONOSCOPY N/A 09/13/2021    COLONOSCOPY POLYPECTOMY SNARE/COLD BIOPSY performed by Bo Hall MD at [facility]    FRACTURE SURGERY      KIDNEY STONE SURGERY  01/23/2010    2019    PAIN MANAGEMENT PROCEDURE Bilateral 9/27/2022    BILATERAL L4 TRANSFORAMINAL EPIDURAL STEROID INJECTION performed by DO dante Parker OR    PAIN MANAGEMENT PROCEDURE Bilateral 12/13/2022    BILATERAL L4 TRANSFORAMINAL EPIDURAL STEROID INJECTION performed by DO dante Parker OR    ROTATOR CUFF REPAIR Left 1999    SPINE SURGERY      UPPER  No rashes were noted. No jaundice.  HEENT: Eyes show round, and reactive pupils. Moist mucous membranes, no ulcerations, no thrush.   Neck: Supple to movements. No lymphadenopathy.   Chest: No use of accessory muscles to breathe. Symmetrical expansion. Auscultation reveals no wheezing, crackles, or rhonchi.   Cardiovascular: S1 and S2 are rhythmic and regular. No murmurs appreciated.   Abdomen: Positive bowel sounds to auscultation. Benign to palpation. No masses felt. No hepatosplenomegaly.  Genitourinary: No pain in the lower abdomen  Extremities: No clubbing, no cyanosis, no edema.  Musculoskeletal: Right knee pain and restriction of movement noted but not warm  Neurological: Following commands, no focal neurodeficit  Lines: peripheral      CBC+dif:  Recent Labs     01/09/25  1615 01/10/25  0648 01/11/25  0752   * 6.2 5.7   HGB  --  12.7 13.1   HCT  --  41.3 42.8   MCV  --  79.7* 80.6   PLT  --  215 219   NEUTROABS  --  3.83 2.84     Lab Results   Component Value Date    CRP <3.0 01/09/2025    CRP 4.0 11/14/2024    CRP 3.5 (H) 12/06/2021     No results found for: \"CRPHS\"  Lab Results   Component Value Date    SEDRATE 7 01/09/2025    SEDRATE 4 11/14/2024    SEDRATE 21 (H) 12/06/2021     Lab Results   Component Value Date    ALT 19 01/09/2025    AST 25 01/09/2025    ALKPHOS 121 01/09/2025    BILITOT 0.3 01/09/2025     Lab Results   Component Value Date/Time     01/11/2025 07:52 AM    K 3.9 01/11/2025 07:52 AM    K 3.4 06/26/2022 04:40 PM     01/11/2025 07:52 AM    CO2 21 01/11/2025 07:52 AM    BUN 25 01/11/2025 07:52 AM    CREATININE 1.8 01/11/2025 07:52 AM    GFRAA 40 08/26/2022 02:42 PM    LABGLOM 38 01/11/2025 07:52 AM    LABGLOM 47 03/19/2024 08:48 AM    GLUCOSE 122 01/11/2025 07:52 AM    GLUCOSE 132 02/19/2012 06:25 AM    CALCIUM 8.7 01/11/2025 07:52 AM    BILITOT 0.3 01/09/2025 03:18 AM    ALKPHOS 121 01/09/2025 03:18 AM    AST 25 01/09/2025 03:18 AM    ALT 19 01/09/2025 03:18 AM       Lab

## 2025-01-11 NOTE — PROGRESS NOTES
Pt was screaming and yelling at every nurse, that he was in pain and no one would help this nurse was explain to the pt that we would do everything we can to help him, I gave him ice packs for his knee and perfect served Lorne Marrow for medications, pt calmed down and apologized for being angry, order was placed for fentanyl. Pt was given medication and fell asleep

## 2025-01-11 NOTE — CONSULTS
Neurosurgery Consult Note      CHIEF COMPLAINT: Reason for neurosurgical consultation severe spinal stenosis at L4-5 seen on CT of the lumbar spine  HPI: Patient presented with severe right knee pain and was diagnosed with acute gout arthritis of the right knee he has intermittent low back pain and occasionally leg pain in the past denied severe sciatic distribution pain  Counseled him extensively over his lumbar CT results, greater than 50% of the encounter time    Past Medical History:   Diagnosis Date    Arthritis     Benign hypertensive kidney disease with chronic kidney disease 10/06/2021    Cervical vertebra fusion 01/01/2000    CHF (congestive heart failure) ([facility])     Chronic bilateral low back pain with bilateral sciatica 09/09/2022    Chronic kidney disease (CKD), stage III (moderate) ([facility]) 11/25/2013    Depression     Hypertension     Stroke ([facility])     patient had a stroke in the late 90's    Type 2 diabetes mellitus with diabetic polyneuropathy, with long-term current use of insulin ([facility])      Past Surgical History:   Procedure Laterality Date    COLONOSCOPY      COLONOSCOPY N/A 09/13/2021    COLONOSCOPY POLYPECTOMY SNARE/COLD BIOPSY performed by Bo Hall MD at [facility]    FRACTURE SURGERY      KIDNEY STONE SURGERY  01/23/2010    2019    PAIN MANAGEMENT PROCEDURE Bilateral 9/27/2022    BILATERAL L4 TRANSFORAMINAL EPIDURAL STEROID INJECTION performed by Maria Teresa Romano DO at [facility] OR    PAIN MANAGEMENT PROCEDURE Bilateral 12/13/2022    BILATERAL L4 TRANSFORAMINAL EPIDURAL STEROID INJECTION performed by Maria Teresa Romano DO at [facility] OR    ROTATOR CUFF REPAIR Left 1999    SPINE SURGERY      UPPER GASTROINTESTINAL ENDOSCOPY      UPPER GASTROINTESTINAL ENDOSCOPY N/A 09/13/2021    EGD BIOPSY performed by Bo Hall MD at [facility]    UPPER GASTROINTESTINAL ENDOSCOPY N/A 09/13/2021    EGD POLYP SNARE performed by Bo Hall MD at [facility]    UPPER  GASTROINTESTINAL ENDOSCOPY N/A 08/23/2022    EGD WITH POSSIBLE  DILATION BALLOON performed [place] [facility]    UPPER GASTROINTESTINAL ENDOSCOPY N/A 08/23/2022    EGD BIOPSY [place] [facility] [place] [facility]     Patient has no known allergies.  Prior to Admission medications    Medication Sig Start Date End Date Taking? Authorizing Provider   oxyCODONE (ROXICODONE) 5 MG immediate release tablet Take 1 tablet by mouth every 8 hours as needed for Pain for up to 3 days. Intended supply: 3 days. Take lowest dose possible to manage pain Max Daily Amount: 15 mg 1/8/25 1/11/25 Yes Christina Perez APRN - CNP   predniSONE (DELTASONE) 10 MG tablet Take 40mg po qd x 5 days QS for 5 days 1/8/25 1/18/25 Yes Christina Perez APRN - CNP   meclizine (ANTIVERT) 12.5 MG tablet TAKE 1 TABLET BY MOUTH THREE TIMES DAILY AS NEEDED FOR DIZZINESS OR NAUSEA 12/18/24  Yes Carlita Castellano MD   hydrALAZINE (APRESOLINE) 50 MG tablet Take 1.5 tablets by mouth in the morning, at noon, and at bedtime  Patient taking differently: Take 1.5 tablets by mouth in the morning, at noon, and at bedtime Patient takes BID 11/19/24  Yes Tori Quispe APRN - CNP   dapagliflozin (FARXIGA) 10 MG tablet Take 1 tablet by mouth every morning   Yes ProviderRosio MD   Respiratory Therapy Supplies (FULL KIT NEBULIZER SET) MISC Use as directed with nebulized medication. 9/27/24  Yes Renuka Mejia MD   gabapentin (NEURONTIN) 100 MG capsule Take 1 capsule by mouth in the morning and at bedtime for 30 days.  Patient taking differently: Take 3 capsules by mouth in the morning and at bedtime. 300 mg AM/200 PM 9/26/24 1/9/25 Yes Renuka Mejia MD   ferrous sulfate (IRON 325) 325 (65 Fe) MG tablet Take 1 tablet by mouth 2 times daily (with meals) 9/25/24  Yes Renuka Mejia MD   buPROPion (WELLBUTRIN SR) 150 MG extended release tablet Take 1 tablet by mouth daily   Yes Rosio Gillette

## 2025-01-11 NOTE — PROGRESS NOTES
Interval progress note    Based on synovial fluid analysis there is low suspicion for acute pathology of the right knee, however, the culture produced staph epidermidis. We believe that the flash of blood within the collection tube during knee aspiration was a sign of contamination in the tube and this culture result is more likely due to native skin kalli. Patient was seen and evaluated and states that his pain and range of motion have improved since the initial knee aspiration without any administration of antibiotics.    We will continue to watch closely for any worsening knee pain, erythema, and any other signs of septic arthritis during his hospital stay. Will make NPO at midnight and follow up in the morning to determine if need for irrigation and debridement of right knee is necessary.    Electronically signed by Freddy Bermudez DO on 1/11/2025 at 9:36 AM

## 2025-01-11 NOTE — PROGRESS NOTES
Patient seen and examined, H&P reviewed, films reviewed    On exam patient is awake alert oriented friendly and cooperative no focal motor deficits is an inflammatory process affecting the right knee    Impression severe spinal stenosis L4-5    Treatment options carefully discussed with the patient, for now patient prefers to avoid surgical decompression and endorses nonoperative management to include physical therapy, may advance activity as tolerated from a neurosurgery standpoint okay to have physical therapy see and evaluate, will follow with you

## 2025-01-11 NOTE — PROGRESS NOTES
Physical Therapy  rx      Name: Hemal Banks  : 1950  MRN: [id number]      Date of Service: 2025    Evaluating PT:  Tarun Pereira PT, DPT  [id number]    Room #:  8415/8415-B  Diagnosis:  Arthritis of knee, right [M17.11]  Ambulatory dysfunction [R26.2]  Acute pain of right knee [M25.561]  Acute gout of right knee, unspecified cause [M10.9]  PMHx/PSHx:   has a past medical history of Arthritis, Benign hypertensive kidney disease with chronic kidney disease, Cervical vertebra fusion, CHF (congestive heart failure) ([facility]), Chronic bilateral low back pain with bilateral sciatica, Chronic kidney disease (CKD), stage III (moderate) ([facility]), Depression, Hypertension, Stroke ([facility]), and Type 2 diabetes mellitus with diabetic polyneuropathy, with long-term current use of insulin ([facility]).  Procedure/Surgery:    Precautions:  Falls, Alarm  Equipment Needs: FWW    SUBJECTIVE:    Pt lives alone in an apartment with 0 stairs to enter. Elevator access to his floor.  Pt ambulated with no AD independently PTA.   Equipment Owned: none    OBJECTIVE:   Initial Evaluation  Date: 25 Treatment  25 Short Term/ Long Term   Goals   AM-PAC 6 Clicks      Was pt agreeable to Eval/treatment? yes yes    Does pt have pain? Moderate to severe pain in R knee Pain escalated to severe following gait activity. R knee.     Bed Mobility  Rolling: Sandra  Supine to sit: Sandra  Sit to supine: NT  Scooting: Sandra Rolling: SBA  Supine to sit:   SBA   Sit to supine: Min RLE  Scooting: SBA   Rolling: Independent  Supine to sit: Independent  Sit to supine: Independent  Scooting: Independent     Transfers Sit to stand: ModA  Stand to sit: ModA  Stand pivot: ModA WW Sit to stand: Min to Wheeled Walker  Stand to sit: Min  Stand pivot: Min with Wheeled Walker  Sit to stand: supervision  Stand to sit: supervision  Stand pivot: supervision   Ambulation    15, 10 feet with ModA WW   30 feet with Wheeled Walker and Minregressing to Mod A due to pain RLE

## 2025-01-12 LAB
ANION GAP SERPL CALCULATED.3IONS-SCNC: 11 MMOL/L (ref 7–16)
BASOPHILS # BLD: 0.04 K/UL (ref 0–0.2)
BASOPHILS NFR BLD: 1 % (ref 0–2)
BUN SERPL-MCNC: 25 MG/DL (ref 6–23)
CALCIUM SERPL-MCNC: 8.6 MG/DL (ref 8.6–10.2)
CHLORIDE SERPL-SCNC: 108 MMOL/L (ref 98–107)
CO2 SERPL-SCNC: 23 MMOL/L (ref 22–29)
CREAT SERPL-MCNC: 1.8 MG/DL (ref 0.7–1.2)
EOSINOPHIL # BLD: 0.08 K/UL (ref 0.05–0.5)
EOSINOPHILS RELATIVE PERCENT: 1 % (ref 0–6)
ERYTHROCYTE [DISTWIDTH] IN BLOOD BY AUTOMATED COUNT: 16.2 % (ref 11.5–15)
GFR, ESTIMATED: 39 ML/MIN/1.73M2
GLUCOSE SERPL-MCNC: 122 MG/DL (ref 74–99)
HCT VFR BLD AUTO: 42.7 % (ref 37–54)
HGB BLD-MCNC: 13 G/DL (ref 12.5–16.5)
IMM GRANULOCYTES # BLD AUTO: <0.03 K/UL (ref 0–0.58)
IMM GRANULOCYTES NFR BLD: 0 % (ref 0–5)
LYMPHOCYTES NFR BLD: 1.9 K/UL (ref 1.5–4)
LYMPHOCYTES RELATIVE PERCENT: 34 % (ref 20–42)
MCH RBC QN AUTO: 24.3 PG (ref 26–35)
MCHC RBC AUTO-ENTMCNC: 30.4 G/DL (ref 32–34.5)
MCV RBC AUTO: 80 FL (ref 80–99.9)
MONOCYTES NFR BLD: 0.71 K/UL (ref 0.1–0.95)
MONOCYTES NFR BLD: 13 % (ref 2–12)
NEUTROPHILS NFR BLD: 51 % (ref 43–80)
NEUTS SEG NFR BLD: 2.82 K/UL (ref 1.8–7.3)
PLATELET # BLD AUTO: 215 K/UL (ref 130–450)
PMV BLD AUTO: 11.3 FL (ref 7–12)
POTASSIUM SERPL-SCNC: 3.8 MMOL/L (ref 3.5–5)
RBC # BLD AUTO: 5.34 M/UL (ref 3.8–5.8)
SODIUM SERPL-SCNC: 142 MMOL/L (ref 132–146)
WBC OTHER # BLD: 5.6 K/UL (ref 4.5–11.5)

## 2025-01-12 PROCEDURE — 85025 COMPLETE CBC W/AUTO DIFF WBC: CPT

## 2025-01-12 PROCEDURE — 2500000003 HC RX 250 WO HCPCS

## 2025-01-12 PROCEDURE — 6370000000 HC RX 637 (ALT 250 FOR IP): Performed by: INTERNAL MEDICINE

## 2025-01-12 PROCEDURE — 99232 SBSQ HOSP IP/OBS MODERATE 35: CPT | Performed by: INTERNAL MEDICINE

## 2025-01-12 PROCEDURE — 6370000000 HC RX 637 (ALT 250 FOR IP)

## 2025-01-12 PROCEDURE — 36415 COLL VENOUS BLD VENIPUNCTURE: CPT

## 2025-01-12 PROCEDURE — 1200000000 HC SEMI PRIVATE

## 2025-01-12 PROCEDURE — 80048 BASIC METABOLIC PNL TOTAL CA: CPT

## 2025-01-12 RX ORDER — LIDOCAINE 4 G/G
1 PATCH TOPICAL DAILY
Status: DISCONTINUED | OUTPATIENT
Start: 2025-01-12 | End: 2025-01-13 | Stop reason: HOSPADM

## 2025-01-12 RX ORDER — TRAMADOL HYDROCHLORIDE 50 MG/1
50 TABLET ORAL EVERY 4 HOURS PRN
Status: DISCONTINUED | OUTPATIENT
Start: 2025-01-12 | End: 2025-01-13 | Stop reason: HOSPADM

## 2025-01-12 RX ADMIN — OXYCODONE HYDROCHLORIDE AND ACETAMINOPHEN 1 TABLET: 5; 325 TABLET ORAL at 09:10

## 2025-01-12 RX ADMIN — TRAMADOL HYDROCHLORIDE 50 MG: 50 TABLET, COATED ORAL at 11:49

## 2025-01-12 RX ADMIN — SODIUM CHLORIDE, PRESERVATIVE FREE 10 ML: 5 INJECTION INTRAVENOUS at 08:27

## 2025-01-12 RX ADMIN — HYDRALAZINE HYDROCHLORIDE 75 MG: 25 TABLET ORAL at 21:30

## 2025-01-12 RX ADMIN — FERROUS SULFATE TAB 325 MG (65 MG ELEMENTAL FE) 325 MG: 325 (65 FE) TAB at 08:27

## 2025-01-12 RX ADMIN — METOPROLOL SUCCINATE 50 MG: 50 TABLET, EXTENDED RELEASE ORAL at 08:27

## 2025-01-12 RX ADMIN — ASPIRIN 81 MG: 81 TABLET, COATED ORAL at 08:27

## 2025-01-12 RX ADMIN — APIXABAN 5 MG: 5 TABLET, FILM COATED ORAL at 08:27

## 2025-01-12 RX ADMIN — SODIUM CHLORIDE, PRESERVATIVE FREE 10 ML: 5 INJECTION INTRAVENOUS at 21:32

## 2025-01-12 RX ADMIN — HYDRALAZINE HYDROCHLORIDE 75 MG: 25 TABLET ORAL at 08:27

## 2025-01-12 RX ADMIN — FERROUS SULFATE TAB 325 MG (65 MG ELEMENTAL FE) 325 MG: 325 (65 FE) TAB at 16:18

## 2025-01-12 RX ADMIN — EMPAGLIFLOZIN 10 MG: 10 TABLET, FILM COATED ORAL at 08:27

## 2025-01-12 RX ADMIN — GABAPENTIN 200 MG: 100 CAPSULE ORAL at 21:30

## 2025-01-12 RX ADMIN — TRAMADOL HYDROCHLORIDE 50 MG: 50 TABLET, COATED ORAL at 16:18

## 2025-01-12 RX ADMIN — LISINOPRIL 40 MG: 20 TABLET ORAL at 08:27

## 2025-01-12 RX ADMIN — TAMSULOSIN HYDROCHLORIDE 0.4 MG: 0.4 CAPSULE ORAL at 21:30

## 2025-01-12 RX ADMIN — METOPROLOL SUCCINATE 50 MG: 50 TABLET, EXTENDED RELEASE ORAL at 21:30

## 2025-01-12 RX ADMIN — HYDRALAZINE HYDROCHLORIDE 75 MG: 25 TABLET ORAL at 16:17

## 2025-01-12 RX ADMIN — BUPROPION HYDROCHLORIDE 150 MG: 150 TABLET, FILM COATED, EXTENDED RELEASE ORAL at 08:27

## 2025-01-12 RX ADMIN — PANTOPRAZOLE SODIUM 40 MG: 40 TABLET, DELAYED RELEASE ORAL at 08:27

## 2025-01-12 RX ADMIN — OXYCODONE HYDROCHLORIDE AND ACETAMINOPHEN 1 TABLET: 5; 325 TABLET ORAL at 03:03

## 2025-01-12 RX ADMIN — TRAMADOL HYDROCHLORIDE 50 MG: 50 TABLET, COATED ORAL at 21:30

## 2025-01-12 RX ADMIN — GABAPENTIN 200 MG: 100 CAPSULE ORAL at 08:27

## 2025-01-12 RX ADMIN — APIXABAN 5 MG: 5 TABLET, FILM COATED ORAL at 21:30

## 2025-01-12 ASSESSMENT — PAIN SCALES - GENERAL
PAINLEVEL_OUTOF10: 10
PAINLEVEL_OUTOF10: 9
PAINLEVEL_OUTOF10: 9

## 2025-01-12 ASSESSMENT — PAIN DESCRIPTION - DESCRIPTORS
DESCRIPTORS: ACHING;DISCOMFORT;SORE
DESCRIPTORS: ACHING;SHARP;DISCOMFORT;THROBBING
DESCRIPTORS: ACHING;DISCOMFORT;DULL
DESCRIPTORS: ACHING;SHARP;DISCOMFORT;THROBBING

## 2025-01-12 ASSESSMENT — PAIN DESCRIPTION - LOCATION
LOCATION: BACK;KNEE
LOCATION: BACK
LOCATION: BACK;KNEE
LOCATION: BACK

## 2025-01-12 ASSESSMENT — PAIN DESCRIPTION - ONSET: ONSET: ON-GOING

## 2025-01-12 ASSESSMENT — PAIN DESCRIPTION - ORIENTATION
ORIENTATION: RIGHT;LOWER
ORIENTATION: RIGHT;LOWER;MID
ORIENTATION: RIGHT;LOWER;MID
ORIENTATION: RIGHT;LOWER

## 2025-01-12 ASSESSMENT — PAIN - FUNCTIONAL ASSESSMENT: PAIN_FUNCTIONAL_ASSESSMENT: ACTIVITIES ARE NOT PREVENTED

## 2025-01-12 ASSESSMENT — PAIN DESCRIPTION - FREQUENCY: FREQUENCY: CONTINUOUS

## 2025-01-12 ASSESSMENT — PAIN DESCRIPTION - PAIN TYPE: TYPE: ACUTE PAIN

## 2025-01-12 NOTE — PROGRESS NOTES
Neurosurg progress note  VITALS:  BP (!) 177/89   Pulse 75   Temp 96.9 °F (36.1 °C) (Temporal)   Resp 18   Ht 1.88 m (6' 2\")   Wt 109.3 kg (241 lb)   SpO2 100%   BMI 30.94 kg/m²   24HR INTAKE/OUTPUT:    Intake/Output Summary (Last 24 hours) at 1/12/2025 1258  Last data filed at 1/12/2025 0535  Gross per 24 hour   Intake 240 ml   Output 800 ml   Net -560 ml     CT LUMBAR SPINE WO CONTRAST    Result Date: 1/9/2025  EXAMINATION: CT OF THE LUMBAR SPINE WITHOUT CONTRAST  1/9/2025 TECHNIQUE: CT of the lumbar spine was performed without the administration of intravenous contrast. Multiplanar reformatted images are provided for review. Adjustment of mA and/or kV according to patient size was utilized.  Automated exposure control, iterative reconstruction, and/or weight based adjustment of the mA/kV was utilized to reduce the radiation dose to as low as reasonably achievable. COMPARISON: None HISTORY: ORDERING SYSTEM PROVIDED HISTORY: Intractable back/right leg pain TECHNOLOGIST PROVIDED HISTORY: Reason for exam:->Intractable back/right leg pain What reading provider will be dictating this exam?->CRC FINDINGS: BONES/ALIGNMENT: There is normal alignment of the spine. The vertebral body heights are maintained. No osseous destructive lesion is seen. DEGENERATIVE CHANGES: Multilevel degenerative changes of the lumbar spine with grade 1 degenerative anterolisthesis of L4 on L5 with moderate to severe spinal canal and moderate bilateral foraminal stenosis in the setting of a posterior disc bulge, and ligamentum flavum hypertrophy, and moderate to severe facet hypertrophy. SOFT TISSUES/RETROPERITONEUM: No paraspinal mass is seen.  Obstructing 7 mm renal stone at the left UVJ with severe left-sided hydronephrosis.  Right lower pole nonobstructing 1.3 cm nephrolith.     1. No acute fracture or misalignment of the lumbar spine. 2. Multilevel degenerative changes of lumbar spine most significant at L4-L5 where there is moderate  47 03/19/2024 08:48 AM    GLUCOSE 122 01/12/2025 06:23 AM    GLUCOSE 132 02/19/2012 06:25 AM      lidocaine  1 patch TransDERmal Daily    gabapentin  200 mg Oral BID    apixaban  5 mg Oral BID    aspirin  81 mg Oral Daily    buPROPion  150 mg Oral Daily    empagliflozin  10 mg Oral Daily    ferrous sulfate  325 mg Oral BID WC    hydrALAZINE  75 mg Oral TID    lisinopril  40 mg Oral Daily    metoprolol succinate  50 mg Oral BID    pantoprazole  40 mg Oral Daily    tamsulosin  0.4 mg Oral Nightly    sodium chloride flush  5-40 mL IntraVENous 2 times per day     Awake alert continues to suffer from back and leg pain  Assessment:  Patient Active Problem List   Diagnosis    Type 2 diabetes mellitus with chronic kidney disease (HCC)    HTN (hypertension)    A-fib (HCC)    Avulsion of right patellar tendon    Benign hypertensive kidney disease with chronic kidney disease    Stage 3b chronic kidney disease (HCC)    Class 1 obesity with serious comorbidity and body mass index (BMI) of 32.0 to 32.9 in adult    Chronic pain syndrome [G89.4 (ICD-10-CM)]    Spinal stenosis of lumbar region with neurogenic claudication    Chronic bilateral low back pain with bilateral sciatica    Lumbar radiculopathy    Cataract    Diabetic peripheral neuropathy (HCC)    Macular degeneration    Mixed hyperlipidemia    Onychomycosis    Tuberculosis    Latent tuberculosis    Suicidal behavior with attempted self-injury (HCC)    Abdominal pain    N&V (nausea and vomiting)    Orthostatic dizziness    Ataxia    Acute cystitis without hematuria    Ambulatory dysfunction    Exertional dyspnea     Plan: PT OT will follow with you continue current care  Nabor Langford MD M.D.

## 2025-01-12 NOTE — PROGRESS NOTES
[facility] Hospitalist Progress Note    Admitting Date and Time: 1/8/2025  5:09 PM  Admit Dx: Arthritis of knee, right [M17.11]  Ambulatory dysfunction [R26.2]  Acute pain of right knee [M25.561]  Acute gout of right knee, unspecified cause [M10.9]    Synopsis: Patient is a 74-year-old gentleman with past medical history of arthritis, hypertension, CKD, back pain, diabetes mellitus.  He presented to ED with worsening right knee pain to the point that he is unable to ambulate around the house.  He denied any trauma or fall.  Since he was unable to walk while in ED he was admitted to the hospital.  Seen by orthopedics, s/p knee aspiration, low suspicion of infection as per orthopedics, no crystals in synovial fluid.  Pending synovial fluid culture.  Pending placement.    Subjective:    He is complaining of knee pain.  He states he is also having pain in the back.  Percocet is not helping.  TeleSitter in place, patient reported to have almost fall while getting to the bathroom, he had to be carried back to the bed, educated to call for help.          ROS: denies fever, chills, cp, sob, n/v, HA unless stated above.      lidocaine  1 patch TransDERmal Daily    gabapentin  200 mg Oral BID    apixaban  5 mg Oral BID    aspirin  81 mg Oral Daily    buPROPion  150 mg Oral Daily    empagliflozin  10 mg Oral Daily    ferrous sulfate  325 mg Oral BID WC    hydrALAZINE  75 mg Oral TID    lisinopril  40 mg Oral Daily    metoprolol succinate  50 mg Oral BID    pantoprazole  40 mg Oral Daily    tamsulosin  0.4 mg Oral Nightly    sodium chloride flush  5-40 mL IntraVENous 2 times per day     traMADol, 50 mg, Q4H PRN  albuterol, 2.5 mg, Q6H PRN  hydrALAZINE, 10 mg, Q6H PRN  sodium chloride flush, 5-40 mL, PRN  sodium chloride, , PRN  potassium chloride, 40 mEq, PRN   Or  potassium alternative oral replacement, 40 mEq, PRN   Or  potassium chloride, 10 mEq, PRN  magnesium sulfate, 2,000 mg, PRN  ondansetron, 4 mg, Q8H PRN    helping, stop Percocet, tried tramadol, lidocaine patches both to knee and back ordered.  Will need placed for ambulatory dysfunction.    Ambulatory dysfunction.  Severe spinal stenosis.  neurosurgery input appreciated.  No procedures planned at this time.  Complaining of worsening back pain today, pain meds adjusted, try lidocaine patch.  Educated to call for help to get around, explained to him he is on Eliquis.    CKD  Kidney function is around baseline.  Imaging shows hydronephrosis with left UVJ renal stone and hydronephrosis-appreciate urology input, hydronephrosis is chronic.  No procedures planned, follow-up with urology as outpatient.    Paroxysmal atrial fibrillation.  Sees Dr. Ahmadi  Continue Eliquis.  Continue Toprol-XL.      DVT prophylaxis.  Home Eliquis resumed.      Dispo: Pending placement.  NOTE: This report was transcribed using voice recognition software. Every effort was made to ensure accuracy; however, inadvertent computerized transcription errors may be present.  Electronically signed by Maria A Raymundo MD on 1/12/2025 at 11:23 AM

## 2025-01-12 NOTE — PROGRESS NOTES
Department of Orthopedic Surgery   Progress Note    Patient seen and examined.Pain to the right knee remain unchanged.  Patient is however reporting difficulty with ambulation.  Said that the pain goes around the knee up to the lateral thigh and to the low back region. Denies chest pain, shortness of breath, calf pain, dizziness/lightheadedness, fevers or chills.     VITALS:  BP (!) 142/69   Pulse 79   Temp 97.5 °F (36.4 °C) (Oral)   Resp 17   Ht 1.88 m (6' 2\")   Wt 109.3 kg (241 lb)   SpO2 97%   BMI 30.94 kg/m²     GENERAL: In bed, oriented x 3  MUSCULOSKELETAL:   right lower extremity:  No erythema, no swelling, no warmness suggestive for infection  Patient is able to gently range his knee past 50 degrees of flexion with minimal discomfort  Straight leg raise causes knee pain as well as low back pain  Compartments soft and compressible, calf non-tender  Palpable dorsalis pedis and posterior tibialis pulse, brisk cap refill to toes, foot warm and perfused  Sensation intact to light touch in sural/deep peroneal/superficial peroneal/saphenous/posterior tibial nerve distributions to foot/ankle.  Demonstrates active ankle plantar/dorsiflexion/great toe extension    CBC:   Lab Results   Component Value Date/Time    WBC 5.6 01/12/2025 06:23 AM     01/09/2025 04:15 PM    HGB 13.0 01/12/2025 06:23 AM    HCT 42.7 01/12/2025 06:23 AM     01/12/2025 06:23 AM       ASSESSMENT  Right knee pain    PLAN    Continue weightbearing as tolerated right lower extremity  Right knee joint aspiration showed Staph epidermis growth.  However cell count and all inflammatory markers were within normal limits.  Suspicion for simple contamination is high.  On clinical exam, patient also does not display signs of a infected right knee joint.   Recommend lumbar spine workup in light of the radicular symptom is displaying  Continue medical optimization per admitting team  PT/OT as amenable  No other orthopedic intervention

## 2025-01-12 NOTE — PROGRESS NOTES
[facility]  [facility]  Progress Note    SUBJECTIVE:  Chief Complaint   Patient presents with    Leg Pain     Right leg pain. No injury.        Patient sitting on side of bed. Denies fever or chills. No nausea, vomiting, rash or diarrhea. C/o having significant pain from right knee to hip       Review of systems:  As stated above in the chief complaint, otherwise negative.    Medications:  Scheduled Meds:   lidocaine  1 patch TransDERmal Daily    gabapentin  200 mg Oral BID    apixaban  5 mg Oral BID    aspirin  81 mg Oral Daily    buPROPion  150 mg Oral Daily    empagliflozin  10 mg Oral Daily    ferrous sulfate  325 mg Oral BID WC    hydrALAZINE  75 mg Oral TID    lisinopril  40 mg Oral Daily    metoprolol succinate  50 mg Oral BID    pantoprazole  40 mg Oral Daily    tamsulosin  0.4 mg Oral Nightly    sodium chloride flush  5-40 mL IntraVENous 2 times per day     Continuous Infusions:   sodium chloride       PRN Meds:traMADol, albuterol, hydrALAZINE, sodium chloride flush, sodium chloride, potassium chloride **OR** potassium alternative oral replacement **OR** potassium chloride, magnesium sulfate, ondansetron **OR** ondansetron, polyethylene glycol, acetaminophen **OR** acetaminophen, aluminum & magnesium hydroxide-simethicone    OBJECTIVE:  BP (!) 177/89   Pulse 75   Temp 96.9 °F (36.1 °C) (Temporal)   Resp 18   Ht 1.88 m (6' 2\")   Wt 109.3 kg (241 lb)   SpO2 100%   BMI 30.94 kg/m²   Temp  Av.2 °F (36.2 °C)  Min: 96.9 °F (36.1 °C)  Max: 97.5 °F (36.4 °C)    Constitutional: appears uncomfortable.   Skin: Warm and dry. No rashes were noted.   Neuro: Alert and oriented  HEENT: Round and reactive pupils.  Moist mucous membranes.  No ulcerations or thrush.  Chest: .Respirations unlabored. Breath sounds clear.   Cardiovascular:  RRR  Abdomen: Soft. Bowel sounds present.   Extremities: No LE Edema. Knee wrapped    Lines: PIV      Laboratory and Tests:  Lab Results    Component Value Date    WBC 5.6 01/12/2025    WBC 5.7 01/11/2025    WBC 6.2 01/10/2025    HGB 13.0 01/12/2025    HCT 42.7 01/12/2025    MCV 80.0 01/12/2025     01/12/2025     Lab Results   Component Value Date    CRP <3.0 01/09/2025    CRP 4.0 11/14/2024    CRP 3.5 (H) 12/06/2021     Lab Results   Component Value Date    SEDRATE 7 01/09/2025    SEDRATE 4 11/14/2024    SEDRATE 21 (H) 12/06/2021         Assessment:  Right knee pain  Status post arthrocentesis with cultures positive for Staph epidermidis rare growth which is likely contaminant  CRP less than 3, ESR 7  Synovial fluid analysis consistent with bloody synovial fluid  CKD     Plan:    Monitor off antibiotics  ID will continue to follow  Orthopedics follow-up     Discussed patient's complain of pain with nursing.     JACQUES Dewitt - CNP  12:22 PM  1/12/2025

## 2025-01-12 NOTE — PROGRESS NOTES
Once again, patient attempted to get up. He began getting angry with me stating he is in pain and he will not sit down unless he gets treated for pain. He states he will fall on purpose to make someone order him more pain meds. I did reach out to the physician via perfect serve with no response at 8:53 am this morning in an attempt to get the patient something more ordered for pain, as he states the percocet does not work.

## 2025-01-12 NOTE — PLAN OF CARE
Problem: Chronic Conditions and Co-morbidities  Goal: Patient's chronic conditions and co-morbidity symptoms are monitored and maintained or improved  1/12/2025 1210 by Fallon Sarkar RN  Outcome: Progressing  1/12/2025 0039 by Teto Oseguera RN  Outcome: Progressing     Problem: Pain  Goal: Verbalizes/displays adequate comfort level or baseline comfort level  1/12/2025 1210 by Fallon Sarkar RN  Outcome: Progressing  1/12/2025 0039 by Teto Oseguera RN  Outcome: Progressing     Problem: Safety - Adult  Goal: Free from fall injury  1/12/2025 1210 by Fallon Sarkar RN  Outcome: Progressing  1/12/2025 0039 by Teto Oseguera RN  Outcome: Progressing     Problem: ABCDS Injury Assessment  Goal: Absence of physical injury  Outcome: Progressing     Problem: Skin/Tissue Integrity  Goal: Absence of new skin breakdown  Description: 1.  Monitor for areas of redness and/or skin breakdown  2.  Assess vascular access sites hourly  3.  Every 4-6 hours minimum:  Change oxygen saturation probe site  4.  Every 4-6 hours:  If on nasal continuous positive airway pressure, respiratory therapy assess nares and determine need for appliance change or resting period.  Outcome: Progressing

## 2025-01-12 NOTE — PROGRESS NOTES
Patient demanding to get up. I explained to him that this is not appropriate or safe at this time considering that he almost fell yesterday while getting to the bathroom and had to be carried back to bed. I educated him on the risks of what could happen such as head injury and trauma. The patient is A&O x 4. Telesitter and bed alarm in place.

## 2025-01-13 VITALS
OXYGEN SATURATION: 97 % | HEIGHT: 74 IN | TEMPERATURE: 97.1 F | WEIGHT: 241 LBS | SYSTOLIC BLOOD PRESSURE: 135 MMHG | DIASTOLIC BLOOD PRESSURE: 62 MMHG | BODY MASS INDEX: 30.93 KG/M2 | RESPIRATION RATE: 16 BRPM | HEART RATE: 77 BPM

## 2025-01-13 LAB
ANION GAP SERPL CALCULATED.3IONS-SCNC: 12 MMOL/L (ref 7–16)
BASOPHILS # BLD: 0.03 K/UL (ref 0–0.2)
BASOPHILS NFR BLD: 1 % (ref 0–2)
BUN SERPL-MCNC: 25 MG/DL (ref 6–23)
CALCIUM SERPL-MCNC: 8.7 MG/DL (ref 8.6–10.2)
CHLORIDE SERPL-SCNC: 107 MMOL/L (ref 98–107)
CO2 SERPL-SCNC: 22 MMOL/L (ref 22–29)
CREAT SERPL-MCNC: 1.8 MG/DL (ref 0.7–1.2)
EOSINOPHIL # BLD: 0.05 K/UL (ref 0.05–0.5)
EOSINOPHILS RELATIVE PERCENT: 1 % (ref 0–6)
ERYTHROCYTE [DISTWIDTH] IN BLOOD BY AUTOMATED COUNT: 16.2 % (ref 11.5–15)
GFR, ESTIMATED: 38 ML/MIN/1.73M2
GLUCOSE SERPL-MCNC: 138 MG/DL (ref 74–99)
HCT VFR BLD AUTO: 40.3 % (ref 37–54)
HGB BLD-MCNC: 12.5 G/DL (ref 12.5–16.5)
IMM GRANULOCYTES # BLD AUTO: <0.03 K/UL (ref 0–0.58)
IMM GRANULOCYTES NFR BLD: 0 % (ref 0–5)
LYMPHOCYTES NFR BLD: 1.69 K/UL (ref 1.5–4)
LYMPHOCYTES RELATIVE PERCENT: 32 % (ref 20–42)
MCH RBC QN AUTO: 24.8 PG (ref 26–35)
MCHC RBC AUTO-ENTMCNC: 31 G/DL (ref 32–34.5)
MCV RBC AUTO: 80 FL (ref 80–99.9)
MONOCYTES NFR BLD: 0.73 K/UL (ref 0.1–0.95)
MONOCYTES NFR BLD: 14 % (ref 2–12)
NEUTROPHILS NFR BLD: 53 % (ref 43–80)
NEUTS SEG NFR BLD: 2.84 K/UL (ref 1.8–7.3)
PLATELET # BLD AUTO: 211 K/UL (ref 130–450)
PMV BLD AUTO: 11.6 FL (ref 7–12)
POTASSIUM SERPL-SCNC: 3.9 MMOL/L (ref 3.5–5)
RBC # BLD AUTO: 5.04 M/UL (ref 3.8–5.8)
SODIUM SERPL-SCNC: 141 MMOL/L (ref 132–146)
WBC OTHER # BLD: 5.4 K/UL (ref 4.5–11.5)

## 2025-01-13 PROCEDURE — 97530 THERAPEUTIC ACTIVITIES: CPT

## 2025-01-13 PROCEDURE — 2500000003 HC RX 250 WO HCPCS

## 2025-01-13 PROCEDURE — 6370000000 HC RX 637 (ALT 250 FOR IP): Performed by: INTERNAL MEDICINE

## 2025-01-13 PROCEDURE — 85025 COMPLETE CBC W/AUTO DIFF WBC: CPT

## 2025-01-13 PROCEDURE — 80048 BASIC METABOLIC PNL TOTAL CA: CPT

## 2025-01-13 PROCEDURE — 36415 COLL VENOUS BLD VENIPUNCTURE: CPT

## 2025-01-13 PROCEDURE — 99239 HOSP IP/OBS DSCHRG MGMT >30: CPT | Performed by: INTERNAL MEDICINE

## 2025-01-13 PROCEDURE — 6370000000 HC RX 637 (ALT 250 FOR IP)

## 2025-01-13 PROCEDURE — 97535 SELF CARE MNGMENT TRAINING: CPT

## 2025-01-13 RX ORDER — GABAPENTIN 100 MG/1
200 CAPSULE ORAL 2 TIMES DAILY
Qty: 120 CAPSULE | Refills: 0 | Status: SHIPPED | OUTPATIENT
Start: 2025-01-13 | End: 2025-02-12

## 2025-01-13 RX ORDER — LIDOCAINE 4 G/G
1 PATCH TOPICAL DAILY
Qty: 10 EACH | Refills: 0 | Status: SHIPPED | OUTPATIENT
Start: 2025-01-14 | End: 2025-01-24

## 2025-01-13 RX ORDER — TRAMADOL HYDROCHLORIDE 50 MG/1
50 TABLET ORAL EVERY 8 HOURS PRN
Qty: 15 TABLET | Refills: 0 | Status: SHIPPED | OUTPATIENT
Start: 2025-01-13 | End: 2025-01-18

## 2025-01-13 RX ADMIN — TRAMADOL HYDROCHLORIDE 50 MG: 50 TABLET, COATED ORAL at 10:41

## 2025-01-13 RX ADMIN — BUPROPION HYDROCHLORIDE 150 MG: 150 TABLET, FILM COATED, EXTENDED RELEASE ORAL at 08:41

## 2025-01-13 RX ADMIN — TRAMADOL HYDROCHLORIDE 50 MG: 50 TABLET, COATED ORAL at 17:02

## 2025-01-13 RX ADMIN — TRAMADOL HYDROCHLORIDE 50 MG: 50 TABLET, COATED ORAL at 02:14

## 2025-01-13 RX ADMIN — METOPROLOL SUCCINATE 50 MG: 50 TABLET, EXTENDED RELEASE ORAL at 08:41

## 2025-01-13 RX ADMIN — TRAMADOL HYDROCHLORIDE 50 MG: 50 TABLET, COATED ORAL at 06:37

## 2025-01-13 RX ADMIN — GABAPENTIN 200 MG: 100 CAPSULE ORAL at 08:40

## 2025-01-13 RX ADMIN — APIXABAN 5 MG: 5 TABLET, FILM COATED ORAL at 08:41

## 2025-01-13 RX ADMIN — ASPIRIN 81 MG: 81 TABLET, COATED ORAL at 08:41

## 2025-01-13 RX ADMIN — LISINOPRIL 40 MG: 20 TABLET ORAL at 08:41

## 2025-01-13 RX ADMIN — SODIUM CHLORIDE, PRESERVATIVE FREE 10 ML: 5 INJECTION INTRAVENOUS at 08:42

## 2025-01-13 RX ADMIN — FERROUS SULFATE TAB 325 MG (65 MG ELEMENTAL FE) 325 MG: 325 (65 FE) TAB at 08:41

## 2025-01-13 RX ADMIN — HYDRALAZINE HYDROCHLORIDE 75 MG: 25 TABLET ORAL at 08:40

## 2025-01-13 RX ADMIN — EMPAGLIFLOZIN 10 MG: 10 TABLET, FILM COATED ORAL at 08:41

## 2025-01-13 RX ADMIN — HYDRALAZINE HYDROCHLORIDE 75 MG: 25 TABLET ORAL at 14:19

## 2025-01-13 RX ADMIN — PANTOPRAZOLE SODIUM 40 MG: 40 TABLET, DELAYED RELEASE ORAL at 08:41

## 2025-01-13 ASSESSMENT — PAIN SCALES - GENERAL
PAINLEVEL_OUTOF10: 8
PAINLEVEL_OUTOF10: 10
PAINLEVEL_OUTOF10: 7
PAINLEVEL_OUTOF10: 6
PAINLEVEL_OUTOF10: 6

## 2025-01-13 ASSESSMENT — PAIN DESCRIPTION - ORIENTATION
ORIENTATION: RIGHT;LOWER
ORIENTATION: LOWER;MID

## 2025-01-13 ASSESSMENT — PAIN DESCRIPTION - LOCATION
LOCATION: BACK
LOCATION: BACK;KNEE

## 2025-01-13 ASSESSMENT — PAIN DESCRIPTION - DESCRIPTORS
DESCRIPTORS: ACHING;DISCOMFORT;THROBBING
DESCRIPTORS: ACHING;THROBBING;DISCOMFORT

## 2025-01-13 NOTE — PROGRESS NOTES
[facility]  [facility]  Progress Note    SUBJECTIVE:  Chief Complaint   Patient presents with    Leg Pain     Right leg pain. No injury.        Patient sitting on side of bed. Denies fever or chills. No nausea, vomiting, rash or diarrhea. C/o having right knee pain      Review of systems:  As stated above in the chief complaint, otherwise negative.    Medications:  Scheduled Meds:   lidocaine  1 patch TransDERmal Daily    gabapentin  200 mg Oral BID    apixaban  5 mg Oral BID    aspirin  81 mg Oral Daily    buPROPion  150 mg Oral Daily    empagliflozin  10 mg Oral Daily    ferrous sulfate  325 mg Oral BID WC    hydrALAZINE  75 mg Oral TID    lisinopril  40 mg Oral Daily    metoprolol succinate  50 mg Oral BID    pantoprazole  40 mg Oral Daily    tamsulosin  0.4 mg Oral Nightly    sodium chloride flush  5-40 mL IntraVENous 2 times per day     Continuous Infusions:   sodium chloride       PRN Meds:traMADol, albuterol, hydrALAZINE, sodium chloride flush, sodium chloride, potassium chloride **OR** potassium alternative oral replacement **OR** potassium chloride, magnesium sulfate, ondansetron **OR** ondansetron, polyethylene glycol, acetaminophen **OR** acetaminophen, aluminum & magnesium hydroxide-simethicone    OBJECTIVE:  /62   Pulse 77   Temp 97.1 °F (36.2 °C) (Temporal)   Resp 16   Ht 1.88 m (6' 2\")   Wt 109.3 kg (241 lb)   SpO2 97%   BMI 30.94 kg/m²   Temp  Av.5 °F (36.4 °C)  Min: 97.1 °F (36.2 °C)  Max: 97.9 °F (36.6 °C)    Constitutional: appears uncomfortable.   Skin: Warm and dry. No rashes were noted.   Neuro: Alert and oriented  HEENT: .  Moist mucous membranes.  No ulcerations or thrush.  Chest: .Respirations unlabored. Breath sounds clear.   Cardiovascular:  RRR  Abdomen: Soft. Bowel sounds present.   Extremities: No LE Edema. Knee right, swelling, good rom    Lines: PIV      Laboratory and Tests:  Lab Results   Component Value Date    WBC 5.4 2025

## 2025-01-13 NOTE — PROGRESS NOTES
Physical Therapy  Treatment      Name: Hemal Banks  : 1950  MRN: [id number]      Date of Service: 2025    Evaluating PT:  Tarun Pereira PT, DPT  [id number]    Room #:  8415/8415-B  Diagnosis:  Arthritis of knee, right [M17.11]  Ambulatory dysfunction [R26.2]  Acute pain of right knee [M25.561]  Acute gout of right knee, unspecified cause [M10.9]  PMHx/PSHx:   has a past medical history of Arthritis, Benign hypertensive kidney disease with chronic kidney disease, Cervical vertebra fusion, CHF (congestive heart failure) ([facility]), Chronic bilateral low back pain with bilateral sciatica, Chronic kidney disease (CKD), stage III (moderate) ([facility]), Depression, Hypertension, Stroke ([facility]), and Type 2 diabetes mellitus with diabetic polyneuropathy, with long-term current use of insulin ([facility]).   has a past surgical history that includes fracture surgery; Rotator cuff repair (Left, ); Spine surgery; Kidney stone surgery (2010); Colonoscopy; Upper gastrointestinal endoscopy; Upper gastrointestinal endoscopy (N/A, 2021); Colonoscopy (N/A, 2021); Upper gastrointestinal endoscopy (N/A, 2021); Upper gastrointestinal endoscopy (N/A, 2022); Upper gastrointestinal endoscopy (N/A, 2022); Pain management procedure (Bilateral, 2022); and Pain management procedure (Bilateral, 2022).  Procedure/Surgery:  None  Precautions:  Falls, WBAT RLE, Alarm, TSM  Equipment Needs: FWW; TBD    SUBJECTIVE:    Pt lives alone in an apartment with 0 stairs to enter. Elevator access to his floor.  Pt ambulated with no AD independently PTA.  Equipment Owned: none    OBJECTIVE:   Initial Evaluation  Date: 25 Treatment  2025 Short Term/ Long Term   Goals   AM-PAC 6 Clicks     Was pt agreeable to Eval/treatment? yes Yes    Does pt have pain? Moderate to severe pain in R knee 910 R knee    Bed Mobility  Rolling: Sandra  Supine to sit: Sandra  Sit to supine: NT  Scooting: Sandra Rolling:  activity  Functional transfers - verbal cues to facilitate proper positioning and sequencing, particularly related to hand/foot placement; physical assistance provided as needed during activity  Ambulation - education and verbal cues to facilitate proper positioning and sequencing; physical assistance provided as needed during activity  Skilled monitoring of vitals  Skilled positioning - patient positioned optimally to promote comfort    PLAN:    Patient is making good progress towards established goals.  Will continue with current POC.      Time in  1115  Time out  1140    Total Treatment Time  25 minutes     CPT codes:  [] Gait training 53234 0 minutes  [] Manual therapy 09710 0 minutes  [x] Therapeutic activities 64663 25 minutes  [] Therapeutic exercises 70553 0 minutes  [] Neuromuscular reeducation 13073 0 minutes    Roberto Albrecht, PT, DPT  [id number]

## 2025-01-13 NOTE — DISCHARGE SUMMARY
40.3   MCV 80.6 80.0 80.0   MCH 24.7* 24.3* 24.8*   MCHC 30.6* 30.4* 31.0*   RDW 16.5* 16.2* 16.2*    215 211   MPV 12.0 11.3 11.6       Recent Labs     01/10/25  2119 01/11/25  0541   POCGLU 153* 180*       Imaging:  CT LUMBAR SPINE WO CONTRAST    Result Date: 1/9/2025  EXAMINATION: CT OF THE LUMBAR SPINE WITHOUT CONTRAST  1/9/2025 TECHNIQUE: CT of the lumbar spine was performed without the administration of intravenous contrast. Multiplanar reformatted images are provided for review. Adjustment of mA and/or kV according to patient size was utilized.  Automated exposure control, iterative reconstruction, and/or weight based adjustment of the mA/kV was utilized to reduce the radiation dose to as low as reasonably achievable. COMPARISON: None HISTORY: ORDERING SYSTEM PROVIDED HISTORY: Intractable back/right leg pain TECHNOLOGIST PROVIDED HISTORY: Reason for exam:->Intractable back/right leg pain What reading provider will be dictating this exam?->CRC FINDINGS: BONES/ALIGNMENT: There is normal alignment of the spine. The vertebral body heights are maintained. No osseous destructive lesion is seen. DEGENERATIVE CHANGES: Multilevel degenerative changes of the lumbar spine with grade 1 degenerative anterolisthesis of L4 on L5 with moderate to severe spinal canal and moderate bilateral foraminal stenosis in the setting of a posterior disc bulge, and ligamentum flavum hypertrophy, and moderate to severe facet hypertrophy. SOFT TISSUES/RETROPERITONEUM: No paraspinal mass is seen.  Obstructing 7 mm renal stone at the left UVJ with severe left-sided hydronephrosis.  Right lower pole nonobstructing 1.3 cm nephrolith.     1. No acute fracture or misalignment of the lumbar spine. 2. Multilevel degenerative changes of lumbar spine most significant at L4-L5 where there is moderate to severe spinal canal and moderate bilateral foraminal stenosis 6. 3. Obstructing 7 mm renal stone at the left UVJ with severe left-sided  discharge papers, discussing discharge with patient, medication review, etc.    Signed:  Electronically signed by Maria A Raymundo MD on 1/13/2025 at 11:58 AM

## 2025-01-13 NOTE — PLAN OF CARE
Problem: Chronic Conditions and Co-morbidities  Goal: Patient's chronic conditions and co-morbidity symptoms are monitored and maintained or improved  1/13/2025 0956 by Fallon Sarkar RN  Outcome: Adequate for Discharge  1/13/2025 0156 by Puja Duval RN  Outcome: Progressing     Problem: Pain  Goal: Verbalizes/displays adequate comfort level or baseline comfort level  1/13/2025 0956 by Fallon Sarkar RN  Outcome: Adequate for Discharge  1/13/2025 0156 by Puja Duval RN  Outcome: Progressing     Problem: Safety - Adult  Goal: Free from fall injury  1/13/2025 0956 by Fallon Sarkar RN  Outcome: Adequate for Discharge  1/13/2025 0156 by Puja Duval RN  Outcome: Progressing     Problem: ABCDS Injury Assessment  Goal: Absence of physical injury  1/13/2025 0956 by Fallon Sarkar RN  Outcome: Adequate for Discharge  1/13/2025 0156 by Puja Duval RN  Outcome: Progressing     Problem: Skin/Tissue Integrity  Goal: Absence of new skin breakdown  Description: 1.  Monitor for areas of redness and/or skin breakdown  2.  Assess vascular access sites hourly  3.  Every 4-6 hours minimum:  Change oxygen saturation probe site  4.  Every 4-6 hours:  If on nasal continuous positive airway pressure, respiratory therapy assess nares and determine need for appliance change or resting period.  1/13/2025 0956 by Fallon Sarkar RN  Outcome: Adequate for Discharge  1/13/2025 0156 by Puja Duval RN  Outcome: Progressing

## 2025-01-13 NOTE — CARE COORDINATION
Chart reviewed and case reviewed in IDR.  Therapy notes reviewed and patient would benefit from rehab prior to returning home.  Per ID, monitor off antibiotics.  Per Orthopedics:  Right knee joint aspiration showed Staph Epidermis, however Suspicion for simple contamination is high; On clinical exam, patient also does not display signs of a infected right knee joint.  No orthopedic intervention is planned at this time.  Patient prefers non-operative approach for back pain.  Met with the patient at the bedside to discuss transition of care planning.  Patient in agreement for rehab prior to returning home and confirms [facility] for transition of care.  Call placed to PT/OT for updates.  Call placed to Becca liaison with [facility] with referral information.  She will review and initiate authorization for transition of care if appropriate.  PASRR completed.  Envelope and ambulette form started and in the soft chart for transition of care planning.  Will continue to follow for further transition of care planning needs.         Mimi Gordon RN.  P:  [phone]          Return call received from Becca, liaison with [facility] and patient is accepted for transition of care.  They will submit for authorization for transition of care to rehab today.  Await pre-cert for discharge.  Charge nurse notified of above.         Mimi Gordon RN.

## 2025-01-13 NOTE — PROGRESS NOTES
OCCUPATIONAL THERAPY TREATMENT NOTE    [facility]  OT BEDSIDE TREATMENT NOTE      Date:2025  Patient Name: Hemal Banks  MRN: [id number]  : 1950  Room: [address] [place] [address]     Evaluating OT: MIKE Baugh, OTR/L; [id number]       Occupational therapy physician order:   Start     Ordering Provider     25   OT eval and treat  Start:  25,   End:  25,   ONE TIME,   Standing Count:  1 Occurrences,   R         Tyler, Wolf TAMAYO, APRN - CNP             Pt presents to ED with R knee pain       Diagnosis:    1. Acute gout of right knee, unspecified cause    2. Acute pain of right knee    3. Arthritis of knee, right       Problem List       Patient Active Problem List   Diagnosis    Type 2 diabetes mellitus with chronic kidney disease ([facility])    HTN (hypertension)    A-fib (HCC)    Avulsion of right patellar tendon    Benign hypertensive kidney disease with chronic kidney disease    Stage 3b chronic kidney disease ([facility])    Class 1 obesity with serious comorbidity and body mass index (BMI) of 32.0 to 32.9 in adult    Chronic pain syndrome [G89.4 (ICD-10-CM)]    Spinal stenosis of lumbar region with neurogenic claudication    Chronic bilateral low back pain with bilateral sciatica    Lumbar radiculopathy    Cataract    Diabetic peripheral neuropathy ([facility])    Macular degeneration    Mixed hyperlipidemia    Onychomycosis    Tuberculosis    Latent tuberculosis    Suicidal behavior with attempted self-injury ([facility])    Abdominal pain    N&V (nausea and vomiting)    Orthostatic dizziness    Ataxia    Acute cystitis without hematuria    Ambulatory dysfunction    Exertional dyspnea              Pertinent Medical History:   Past Medical History        Past Medical History:   Diagnosis Date    Arthritis      Benign hypertensive kidney disease with chronic kidney disease 10/06/2021    Cervical vertebra fusion 2000    CHF (congestive heart failure) ([facility])      Chronic  89

## 2025-01-13 NOTE — DISCHARGE INSTR - COC
Continuity of Care Form    Patient Name: Hemal Banks   :  1950  MRN:  [id number]    Admit date:  2025  Discharge date:  25    Code Status Order: Full Code   Advance Directives:   Advance Care Flowsheet Documentation             Admitting Physician:  Lars Galvez MD  PCP: Carlita Castellano MD    Discharging Nurse: hd  Discharging Hospital Unit/Room#: 8415/8415-B  Discharging Unit Phone Number: [phone]    Emergency Contact:   Extended Emergency Contact Information  Primary Emergency Contact: alisha méndez  Home Phone: [phone]  Relation: Friend    Past Surgical History:  Past Surgical History:   Procedure Laterality Date    COLONOSCOPY      COLONOSCOPY N/A 2021    COLONOSCOPY POLYPECTOMY SNARE/COLD BIOPSY performed by Bo Hall MD at [facility] ENDOSCOPY    FRACTURE SURGERY      KIDNEY STONE SURGERY  2010    PAIN MANAGEMENT PROCEDURE Bilateral 2022    BILATERAL L4 TRANSFORAMINAL EPIDURAL STEROID INJECTION performed by Maria Teresa Romano DO at [facility] OR    PAIN MANAGEMENT PROCEDURE Bilateral 2022    BILATERAL L4 TRANSFORAMINAL EPIDURAL STEROID INJECTION performed by Maria Teresa Romano DO at [facility] OR    ROTATOR CUFF REPAIR Left     SPINE SURGERY      UPPER GASTROINTESTINAL ENDOSCOPY      UPPER GASTROINTESTINAL ENDOSCOPY N/A 2021    EGD BIOPSY performed by Bo Hall MD at [facility] ENDOSCOPY    UPPER GASTROINTESTINAL ENDOSCOPY N/A 2021    EGD POLYP SNARE performed by Bo Hall MD at [facility] ENDOSCOPY    UPPER GASTROINTESTINAL ENDOSCOPY N/A 2022    EGD WITH POSSIBLE  DILATION BALLOON performed by Bo Hall MD at [facility] ENDOSCOPY    UPPER GASTROINTESTINAL ENDOSCOPY N/A 2022    EGD BIOPSY performed by Bo Hall MD at [facility] ENDOSCOPY       Immunization History:   Immunization History   Administered Date(s) Administered    COVID-19, MODERNA Bivalent, (age 12y+), IM, 50 mcg/0.5 mL 10/13/2022     Encounters:   01/11/25 109.3 kg (241 lb)     Mental Status:  oriented and alert    IV Access:  - None    Nursing Mobility/ADLs:  Walking   Assisted  Transfer  Independent  Bathing  Assisted  Dressing  Independent  Toileting  Independent  Feeding  Independent  Med Admin  Assisted  Med Delivery   whole    Wound Care Documentation and Therapy:  Puncture 09/27/22 Back (Active)   Number of days: 839       Puncture 09/27/22 Back (Active)   Number of days: 839       Puncture 12/13/22 Back (Active)   Number of days: 761        Elimination:  Continence:   Bowel: No  Bladder: No  Urinary Catheter: None   Colostomy/Ileostomy/Ileal Conduit: No       Date of Last BM: 1/12/25    Intake/Output Summary (Last 24 hours) at 1/13/2025 1151  Last data filed at 1/13/2025 0800  Gross per 24 hour   Intake 200 ml   Output 800 ml   Net -600 ml     I/O last 3 completed shifts:  In: 240 [P.O.:240]  Out: 1600 [Urine:1600]    Safety Concerns:     At Risk for Falls    Impairments/Disabilities:      Gout in r knee, making him weak and buckle    Nutrition Therapy:  Current Nutrition Therapy:   - Oral Diet:  General    Routes of Feeding: Oral  Liquids: No Restrictions  Daily Fluid Restriction: no  Last Modified Barium Swallow with Video (Video Swallowing Test): not done    Treatments at the Time of Hospital Discharge:   Respiratory Treatments: na  Oxygen Therapy:  is not on home oxygen therapy.  Ventilator:    - No ventilator support    Rehab Therapies: Physical Therapy, Occupational Therapy   Weight Bearing Status/Restrictions: No weight bearing restrictions  Other Medical Equipment (for information only, NOT a DME order):  walker  Other Treatments: ***    Patient's personal belongings (please select all that are sent with patient):  None    RN SIGNATURE:  Electronically signed by Fallon Sarkar RN on 1/13/25 at 5:01 PM EST    CASE MANAGEMENT/SOCIAL WORK SECTION    Inpatient Status Date: ***    Readmission Risk Assessment Score:  [facility] RISK OF

## 2025-01-13 NOTE — PLAN OF CARE
Problem: Chronic Conditions and Co-morbidities  Goal: Patient's chronic conditions and co-morbidity symptoms are monitored and maintained or improved  1/13/2025 0156 by Puja Duval RN  Outcome: Progressing     Problem: Pain  Goal: Verbalizes/displays adequate comfort level or baseline comfort level  1/13/2025 0156 by Puja Duval RN  Outcome: Progressing     Problem: Safety - Adult  Goal: Free from fall injury  1/13/2025 0156 by Puja Duval RN  Outcome: Progressing     Problem: ABCDS Injury Assessment  Goal: Absence of physical injury  1/13/2025 0156 by Puja Duval RN  Outcome: Progressing     Problem: Skin/Tissue Integrity  Goal: Absence of new skin breakdown  Description: 1.  Monitor for areas of redness and/or skin breakdown  2.  Assess vascular access sites hourly  3.  Every 4-6 hours minimum:  Change oxygen saturation probe site  4.  Every 4-6 hours:  If on nasal continuous positive airway pressure, respiratory therapy assess nares and determine need for appliance change or resting period.  1/13/2025 0156 by Puja Duval, RN  Outcome: Progressing

## 2025-01-13 NOTE — PROGRESS NOTES
Neurosurg progress note  VITALS:  /62   Pulse 77   Temp 97.1 °F (36.2 °C) (Temporal)   Resp 16   Ht 1.88 m (6' 2\")   Wt 109.3 kg (241 lb)   SpO2 97%   BMI 30.94 kg/m²   24HR INTAKE/OUTPUT:    Intake/Output Summary (Last 24 hours) at 1/13/2025 1152  Last data filed at 1/13/2025 0800  Gross per 24 hour   Intake 200 ml   Output 800 ml   Net -600 ml     CT LUMBAR SPINE WO CONTRAST    Result Date: 1/9/2025  EXAMINATION: CT OF THE LUMBAR SPINE WITHOUT CONTRAST  1/9/2025 TECHNIQUE: CT of the lumbar spine was performed without the administration of intravenous contrast. Multiplanar reformatted images are provided for review. Adjustment of mA and/or kV according to patient size was utilized.  Automated exposure control, iterative reconstruction, and/or weight based adjustment of the mA/kV was utilized to reduce the radiation dose to as low as reasonably achievable. COMPARISON: None HISTORY: ORDERING SYSTEM PROVIDED HISTORY: Intractable back/right leg pain TECHNOLOGIST PROVIDED HISTORY: Reason for exam:->Intractable back/right leg pain What reading provider will be dictating this exam?->CRC FINDINGS: BONES/ALIGNMENT: There is normal alignment of the spine. The vertebral body heights are maintained. No osseous destructive lesion is seen. DEGENERATIVE CHANGES: Multilevel degenerative changes of the lumbar spine with grade 1 degenerative anterolisthesis of L4 on L5 with moderate to severe spinal canal and moderate bilateral foraminal stenosis in the setting of a posterior disc bulge, and ligamentum flavum hypertrophy, and moderate to severe facet hypertrophy. SOFT TISSUES/RETROPERITONEUM: No paraspinal mass is seen.  Obstructing 7 mm renal stone at the left UVJ with severe left-sided hydronephrosis.  Right lower pole nonobstructing 1.3 cm nephrolith.     1. No acute fracture or misalignment of the lumbar spine. 2. Multilevel degenerative changes of lumbar spine most significant at L4-L5 where there is moderate to  severe spinal canal and moderate bilateral foraminal stenosis 6. 3. Obstructing 7 mm renal stone at the left UVJ with severe left-sided hydronephrosis.  Right sided nephrolithiasis is also seen.     XR HIP RIGHT (2-3 VIEWS)    Result Date: 1/8/2025  EXAMINATION: TWO XRAY VIEWS OF THE RIGHT HIP 1/8/2025 7:02 pm COMPARISON: None. HISTORY: ORDERING SYSTEM PROVIDED HISTORY: pain TECHNOLOGIST PROVIDED HISTORY: Reason for exam:->pain FINDINGS: The hip demonstrates normal alignment. No evidence of acute fracture.  No focal osseus lesion.   Pelvis is intact.     No acute abnormality of the hip.     XR KNEE RIGHT (3 VIEWS)    Result Date: 1/8/2025  EXAMINATION: THREE XRAY VIEWS OF THE RIGHT KNEE 1/8/2025 7:01 pm COMPARISON: None. HISTORY: ORDERING SYSTEM PROVIDED HISTORY: PAIN, GOUT TECHNOLOGIST PROVIDED HISTORY: Reason for exam:->PAIN, GOUT FINDINGS: No evidence of acute fracture or dislocation.  No focal osseous lesion. There is trace joint effusion.  Patella enthesophyte.  Small corticated density superior to the tibial tuberosity may be due to old injury.     No acute abnormality of the knee. Mild tricompartmental osteoarthritis. Trace joint effusion.     CBC:   Lab Results   Component Value Date/Time    WBC 5.4 01/13/2025 07:00 AM     01/09/2025 04:15 PM    RBC 5.04 01/13/2025 07:00 AM    HGB 12.5 01/13/2025 07:00 AM    HCT 40.3 01/13/2025 07:00 AM    MCV 80.0 01/13/2025 07:00 AM    MCH 24.8 01/13/2025 07:00 AM    MCHC 31.0 01/13/2025 07:00 AM    RDW 16.2 01/13/2025 07:00 AM     01/13/2025 07:00 AM    MPV 11.6 01/13/2025 07:00 AM     BMP:    Lab Results   Component Value Date/Time     01/13/2025 07:00 AM    K 3.9 01/13/2025 07:00 AM    K 3.4 06/26/2022 04:40 PM     01/13/2025 07:00 AM    CO2 22 01/13/2025 07:00 AM    BUN 25 01/13/2025 07:00 AM    CREATININE 1.8 01/13/2025 07:00 AM    CALCIUM 8.7 01/13/2025 07:00 AM    GFRAA 40 08/26/2022 02:42 PM    LABGLOM 38 01/13/2025 07:00 AM    LABGLOM 47

## 2025-01-21 NOTE — PROGRESS NOTES
Physician Progress Note      PATIENT:               UBALDO YODER  CSN #:                  [id number]  :                       1950  ADMIT DATE:       2025 5:09 PM  DISCH DATE:        2025 5:17 PM  RESPONDING  PROVIDER #:        Maria A Raymundo MD          QUERY TEXT:    Pt admitted with ambulatory dysfunction and has right knee pain. Pt noted to   have spinal stenosis lumbar region with neurogenic claudication. Patient also   has history of arthritis and gout. If possible, please document in progress   notes and discharge summary the relationship, if any, between right knee pain   with ambulatory dysfunction and spinal stenosis and/or gout.    The medical record reflects the following:  Risk Factors: Arthritis and history of gout. Lumbar spinal stenosis with   neurogenic claudication.  Clinical Indicators: Beginning on admission, aspiration from right knee,   bloody synovial fluid and no crystals. WBC 4.3 4.7. X-ray right knee , no   acute abnormality. CT Lumbar spine , \"1.No acute fracture or misalignment   of the lumbar spine. 2.Multilevel degenerative changes of lumbar spine most   significant at L4-L5 where there is moderate to severe spinal canal and   moderate bilateral foraminal stenosis 6.\"  Treatment: Ortho consult, NS consult, PT OT. Xray and CT scan  Options provided:  -- Right knee pain with ambulatory dysfunction due to lumbar spinal stenosis   with neurogenic claudication.  -- Right knee pain with ambulatory dysfunction due to arthritis.  -- Right knee pain with ambulatory dysfunction due to, please add cause of   right knee pain with ambulatory dysfunction.  -- Other - I will add my own diagnosis  -- Disagree - Not applicable / Not valid  -- Disagree - Clinically unable to determine / Unknown  -- Refer to Clinical Documentation Reviewer    PROVIDER RESPONSE TEXT:    This patient has right knee pain with ambulatory dysfunction due to lumbar   spinal stenosis with neurogenic

## 2025-01-24 ENCOUNTER — OFFICE VISIT (OUTPATIENT)
Dept: CARDIOLOGY CLINIC | Age: 75
End: 2025-01-24
Payer: MEDICAID

## 2025-01-24 VITALS
OXYGEN SATURATION: 98 % | BODY MASS INDEX: 30.8 KG/M2 | DIASTOLIC BLOOD PRESSURE: 62 MMHG | TEMPERATURE: 98 F | HEART RATE: 76 BPM | HEIGHT: 74 IN | SYSTOLIC BLOOD PRESSURE: 152 MMHG | WEIGHT: 240 LBS | RESPIRATION RATE: 18 BRPM

## 2025-01-24 DIAGNOSIS — I48.0 PAROXYSMAL ATRIAL FIBRILLATION (HCC): Primary | ICD-10-CM

## 2025-01-24 DIAGNOSIS — N18.9 CHRONIC KIDNEY DISEASE, UNSPECIFIED CKD STAGE: ICD-10-CM

## 2025-01-24 DIAGNOSIS — R52 PAIN: Primary | ICD-10-CM

## 2025-01-24 DIAGNOSIS — I10 PRIMARY HYPERTENSION: ICD-10-CM

## 2025-01-24 DIAGNOSIS — R79.89 ELEVATED TROPONIN: ICD-10-CM

## 2025-01-24 PROCEDURE — G8417 CALC BMI ABV UP PARAM F/U: HCPCS | Performed by: INTERNAL MEDICINE

## 2025-01-24 PROCEDURE — 1124F ACP DISCUSS-NO DSCNMKR DOCD: CPT | Performed by: INTERNAL MEDICINE

## 2025-01-24 PROCEDURE — 1111F DSCHRG MED/CURRENT MED MERGE: CPT | Performed by: INTERNAL MEDICINE

## 2025-01-24 PROCEDURE — 1036F TOBACCO NON-USER: CPT | Performed by: INTERNAL MEDICINE

## 2025-01-24 PROCEDURE — 3078F DIAST BP <80 MM HG: CPT | Performed by: INTERNAL MEDICINE

## 2025-01-24 PROCEDURE — G8427 DOCREV CUR MEDS BY ELIG CLIN: HCPCS | Performed by: INTERNAL MEDICINE

## 2025-01-24 PROCEDURE — 99214 OFFICE O/P EST MOD 30 MIN: CPT | Performed by: INTERNAL MEDICINE

## 2025-01-24 PROCEDURE — 3017F COLORECTAL CA SCREEN DOC REV: CPT | Performed by: INTERNAL MEDICINE

## 2025-01-24 PROCEDURE — 3077F SYST BP >= 140 MM HG: CPT | Performed by: INTERNAL MEDICINE

## 2025-01-24 PROCEDURE — 93000 ELECTROCARDIOGRAM COMPLETE: CPT | Performed by: INTERNAL MEDICINE

## 2025-01-24 RX ORDER — TRAMADOL HYDROCHLORIDE 50 MG/1
50 TABLET ORAL EVERY 6 HOURS PRN
COMMUNITY
Start: 2025-01-14

## 2025-01-30 ENCOUNTER — TELEPHONE (OUTPATIENT)
Age: 75
End: 2025-01-30

## 2025-02-16 DIAGNOSIS — M54.16 LUMBAR RADICULOPATHY: ICD-10-CM

## 2025-02-17 NOTE — TELEPHONE ENCOUNTER
Name of Medication(s) Requested:  Requested Prescriptions     Pending Prescriptions Disp Refills    gabapentin (NEURONTIN) 300 MG capsule [Pharmacy Med Name: GABAPENTIN 300MG CAPSULES] 180 capsule 1     Sig: TAKE 1 CAPSULE BY MOUTH IN THE MORNING AND AT BEDTIME AS NEEDED       Medication is on current medication list Yes    Dosage and directions were verified? Yes    Quantity verified: 90 day supply     Pharmacy Verified?  Yes    Last Appointment:  10/8/2024    Future appts:  Future Appointments   Date Time Provider Department Center   3/4/2025  9:15 AM [facility] ROOM 1 [facility]   3/20/2025 11:00 AM Nabor Langford MD [facility]        (If no appt send self scheduling link. .REFILLAPPT)  Scheduling request sent?     [] Yes  [] No    Does patient need updated?  [] Yes  [] No

## 2025-02-18 RX ORDER — GABAPENTIN 300 MG/1
CAPSULE ORAL
Qty: 180 CAPSULE | Refills: 1 | Status: SHIPPED | OUTPATIENT
Start: 2025-02-18 | End: 2025-05-19

## 2025-03-04 ENCOUNTER — TELEPHONE (OUTPATIENT)
Dept: OTHER | Age: 75
End: 2025-03-04

## 2025-03-04 NOTE — TELEPHONE ENCOUNTER
Tried to call Hemal his phone service stated that he cannot accept calls at this time. His second line was busy.

## 2025-03-05 ENCOUNTER — TELEPHONE (OUTPATIENT)
Dept: ORTHOPEDIC SURGERY | Age: 75
End: 2025-03-05

## 2025-03-25 ENCOUNTER — TELEPHONE (OUTPATIENT)
Age: 75
End: 2025-03-25

## 2025-03-26 ENCOUNTER — OFFICE VISIT (OUTPATIENT)
Age: 75
End: 2025-03-26
Payer: MEDICAID

## 2025-03-26 VITALS
BODY MASS INDEX: 30.66 KG/M2 | SYSTOLIC BLOOD PRESSURE: 141 MMHG | HEART RATE: 74 BPM | WEIGHT: 238.9 LBS | OXYGEN SATURATION: 98 % | HEIGHT: 74 IN | TEMPERATURE: 97.9 F | RESPIRATION RATE: 18 BRPM | DIASTOLIC BLOOD PRESSURE: 89 MMHG

## 2025-03-26 DIAGNOSIS — R29.898 RIGHT LEG WEAKNESS: ICD-10-CM

## 2025-03-26 DIAGNOSIS — N40.0 BENIGN PROSTATIC HYPERPLASIA WITHOUT LOWER URINARY TRACT SYMPTOMS: ICD-10-CM

## 2025-03-26 DIAGNOSIS — R26.2 AMBULATORY DYSFUNCTION: Primary | ICD-10-CM

## 2025-03-26 DIAGNOSIS — M54.16 LUMBAR RADICULOPATHY: ICD-10-CM

## 2025-03-26 DIAGNOSIS — N18.32 TYPE 2 DIABETES MELLITUS WITH STAGE 3B CHRONIC KIDNEY DISEASE, WITH LONG-TERM CURRENT USE OF INSULIN (HCC): ICD-10-CM

## 2025-03-26 DIAGNOSIS — K21.9 GASTROESOPHAGEAL REFLUX DISEASE, UNSPECIFIED WHETHER ESOPHAGITIS PRESENT: ICD-10-CM

## 2025-03-26 DIAGNOSIS — E78.2 MIXED HYPERLIPIDEMIA: ICD-10-CM

## 2025-03-26 DIAGNOSIS — G89.29 CHRONIC BILATERAL LOW BACK PAIN WITH BILATERAL SCIATICA: ICD-10-CM

## 2025-03-26 DIAGNOSIS — I10 ESSENTIAL HYPERTENSION: ICD-10-CM

## 2025-03-26 DIAGNOSIS — M54.42 CHRONIC BILATERAL LOW BACK PAIN WITH BILATERAL SCIATICA: ICD-10-CM

## 2025-03-26 DIAGNOSIS — E11.22 TYPE 2 DIABETES MELLITUS WITH STAGE 3B CHRONIC KIDNEY DISEASE, WITH LONG-TERM CURRENT USE OF INSULIN (HCC): ICD-10-CM

## 2025-03-26 DIAGNOSIS — M54.41 CHRONIC BILATERAL LOW BACK PAIN WITH BILATERAL SCIATICA: ICD-10-CM

## 2025-03-26 DIAGNOSIS — Z91.81 AT HIGH RISK FOR FALLS: ICD-10-CM

## 2025-03-26 DIAGNOSIS — Z79.4 TYPE 2 DIABETES MELLITUS WITH STAGE 3B CHRONIC KIDNEY DISEASE, WITH LONG-TERM CURRENT USE OF INSULIN (HCC): ICD-10-CM

## 2025-03-26 DIAGNOSIS — I50.32 CHRONIC HEART FAILURE WITH PRESERVED EJECTION FRACTION (HCC): ICD-10-CM

## 2025-03-26 DIAGNOSIS — M48.062 SPINAL STENOSIS OF LUMBAR REGION WITH NEUROGENIC CLAUDICATION: ICD-10-CM

## 2025-03-26 DIAGNOSIS — N18.32 STAGE 3B CHRONIC KIDNEY DISEASE (HCC): ICD-10-CM

## 2025-03-26 DIAGNOSIS — E55.9 VITAMIN D DEFICIENCY: ICD-10-CM

## 2025-03-26 DIAGNOSIS — I48.0 PAROXYSMAL ATRIAL FIBRILLATION (HCC): ICD-10-CM

## 2025-03-26 DIAGNOSIS — E11.42 DIABETIC PERIPHERAL NEUROPATHY (HCC): ICD-10-CM

## 2025-03-26 LAB
ALBUMIN: 3.6 G/DL (ref 3.5–5.2)
ALP BLD-CCNC: 94 U/L (ref 40–129)
ALT SERPL-CCNC: 16 U/L (ref 0–40)
ANION GAP SERPL CALCULATED.3IONS-SCNC: 16 MMOL/L (ref 7–16)
AST SERPL-CCNC: 29 U/L (ref 0–39)
BASOPHILS ABSOLUTE: 0.02 K/UL (ref 0–0.2)
BASOPHILS RELATIVE PERCENT: 0 % (ref 0–2)
BILIRUB SERPL-MCNC: 0.3 MG/DL (ref 0–1.2)
BUN BLDV-MCNC: 11 MG/DL (ref 6–23)
CALCIUM SERPL-MCNC: 8.7 MG/DL (ref 8.6–10.2)
CHLORIDE BLD-SCNC: 105 MMOL/L (ref 98–107)
CHOLESTEROL, TOTAL: 136 MG/DL
CO2: 21 MMOL/L (ref 22–29)
CREAT SERPL-MCNC: 1.7 MG/DL (ref 0.7–1.2)
EOSINOPHILS ABSOLUTE: 0.06 K/UL (ref 0.05–0.5)
EOSINOPHILS RELATIVE PERCENT: 1 % (ref 0–6)
GFR, ESTIMATED: 43 ML/MIN/1.73M2
GLUCOSE BLD-MCNC: 98 MG/DL (ref 74–99)
HBA1C MFR BLD: 7 % (ref 4–5.6)
HCT VFR BLD CALC: 44.1 % (ref 37–54)
HDLC SERPL-MCNC: 39 MG/DL
HEMOGLOBIN: 13.4 G/DL (ref 12.5–16.5)
IMMATURE GRANULOCYTES %: 0 % (ref 0–5)
IMMATURE GRANULOCYTES ABSOLUTE: <0.03 K/UL (ref 0–0.58)
LDL CHOLESTEROL: 69 MG/DL
LYMPHOCYTES ABSOLUTE: 1.83 K/UL (ref 1.5–4)
LYMPHOCYTES RELATIVE PERCENT: 37 % (ref 20–42)
MCH RBC QN AUTO: 25.6 PG (ref 26–35)
MCHC RBC AUTO-ENTMCNC: 30.4 G/DL (ref 32–34.5)
MCV RBC AUTO: 84.2 FL (ref 80–99.9)
MONOCYTES ABSOLUTE: 0.6 K/UL (ref 0.1–0.95)
MONOCYTES RELATIVE PERCENT: 12 % (ref 2–12)
NEUTROPHILS ABSOLUTE: 2.49 K/UL (ref 1.8–7.3)
NEUTROPHILS RELATIVE PERCENT: 50 % (ref 43–80)
PDW BLD-RTO: 16.7 % (ref 11.5–15)
PLATELET # BLD: 254 K/UL (ref 130–450)
PMV BLD AUTO: 11.9 FL (ref 7–12)
POTASSIUM SERPL-SCNC: 4 MMOL/L (ref 3.5–5)
PROSTATE SPECIFIC ANTIGEN: 16.01 NG/ML (ref 0–4)
RBC # BLD: 5.24 M/UL (ref 3.8–5.8)
SODIUM BLD-SCNC: 142 MMOL/L (ref 132–146)
TOTAL PROTEIN: 8.5 G/DL (ref 6.4–8.3)
TRIGL SERPL-MCNC: 141 MG/DL
VITAMIN D 25-HYDROXY: 40.1 NG/ML (ref 30–100)
VLDLC SERPL CALC-MCNC: 28 MG/DL
WBC # BLD: 5 K/UL (ref 4.5–11.5)

## 2025-03-26 PROCEDURE — 3079F DIAST BP 80-89 MM HG: CPT | Performed by: FAMILY MEDICINE

## 2025-03-26 PROCEDURE — G2211 COMPLEX E/M VISIT ADD ON: HCPCS | Performed by: FAMILY MEDICINE

## 2025-03-26 PROCEDURE — G8427 DOCREV CUR MEDS BY ELIG CLIN: HCPCS | Performed by: FAMILY MEDICINE

## 2025-03-26 PROCEDURE — 3046F HEMOGLOBIN A1C LEVEL >9.0%: CPT | Performed by: FAMILY MEDICINE

## 2025-03-26 PROCEDURE — 3017F COLORECTAL CA SCREEN DOC REV: CPT | Performed by: FAMILY MEDICINE

## 2025-03-26 PROCEDURE — 2022F DILAT RTA XM EVC RTNOPTHY: CPT | Performed by: FAMILY MEDICINE

## 2025-03-26 PROCEDURE — 3077F SYST BP >= 140 MM HG: CPT | Performed by: FAMILY MEDICINE

## 2025-03-26 PROCEDURE — 1036F TOBACCO NON-USER: CPT | Performed by: FAMILY MEDICINE

## 2025-03-26 PROCEDURE — 99214 OFFICE O/P EST MOD 30 MIN: CPT | Performed by: FAMILY MEDICINE

## 2025-03-26 PROCEDURE — 1124F ACP DISCUSS-NO DSCNMKR DOCD: CPT | Performed by: FAMILY MEDICINE

## 2025-03-26 PROCEDURE — G8417 CALC BMI ABV UP PARAM F/U: HCPCS | Performed by: FAMILY MEDICINE

## 2025-03-26 RX ORDER — PREDNISOLONE ACETATE 10 MG/ML
SUSPENSION/ DROPS OPHTHALMIC
COMMUNITY
Start: 2024-05-31

## 2025-03-26 RX ORDER — OFLOXACIN 3 MG/ML
SOLUTION/ DROPS OPHTHALMIC
COMMUNITY
Start: 2024-05-31

## 2025-03-26 RX ORDER — EMPAGLIFLOZIN 10 MG/1
10 TABLET, FILM COATED ORAL DAILY
COMMUNITY
Start: 2025-02-17

## 2025-03-26 RX ORDER — LISINOPRIL 40 MG/1
40 TABLET ORAL DAILY
Qty: 90 TABLET | Refills: 1 | Status: SHIPPED | OUTPATIENT
Start: 2025-03-26

## 2025-03-26 RX ORDER — PANTOPRAZOLE SODIUM 40 MG/1
40 TABLET, DELAYED RELEASE ORAL DAILY
Qty: 90 TABLET | Refills: 1 | Status: SHIPPED | OUTPATIENT
Start: 2025-03-26

## 2025-03-26 RX ORDER — HYDROCHLOROTHIAZIDE 12.5 MG/1
CAPSULE ORAL
Qty: 2 EACH | Refills: 5 | Status: SHIPPED | OUTPATIENT
Start: 2025-03-26

## 2025-03-26 RX ORDER — KETOROLAC TROMETHAMINE 30 MG/ML
INJECTION, SOLUTION INTRAMUSCULAR; INTRAVENOUS
Qty: 1 EACH | Refills: 3 | Status: SHIPPED | OUTPATIENT
Start: 2025-03-26

## 2025-03-26 RX ORDER — CALCIUM CARBONATE 160(400)MG
TABLET,CHEWABLE ORAL
Qty: 1 EACH | Refills: 0 | Status: SHIPPED | OUTPATIENT
Start: 2025-03-26

## 2025-03-26 ASSESSMENT — ENCOUNTER SYMPTOMS
NAUSEA: 0
CONSTIPATION: 0
COUGH: 0
WHEEZING: 0
DIARRHEA: 0
VOMITING: 0
SHORTNESS OF BREATH: 0
ABDOMINAL PAIN: 0

## 2025-03-26 NOTE — PROGRESS NOTES
[facility]  DATE OF VISIT : 3/26/2025    Patient : Hemal Banks   Age : 75 y.o.    : 1950   MRN : [id number]   ______________________________________________________________________    Chief Complaint :   Chief Complaint   Patient presents with    Follow-up     Patient is sick.       HPI : Hemal Banks is 75 y.o. male who presented to the clinic today for OV.     IDDM: Following with endocrinology.  Last A1c 6.6 24. Now off insulin, on Farxiga and jardiance?. He states he has been [facility] orr, Dr. Martin endocrinologist not on epic      HTN: Hyralazine 75mg TID(only doing BID), Lisinipril 40mg daily, metoprolol 100mg BID.  Has been off his lisinopril for about 7-10 days     A. fib: On metoprolol 100mg BIDand Eliquis.    History of CVA on aspirin.     GERD: On protonix, has been doubling up due to the esophageal discomfort. Has had esophageal dilation multiple times, feels like he needs it again. Trouble swallowing and discomfort.      Nephrolithiasis and BPH: Following with urology.  He is due for a prostate biopsy, has had it rescheduled multiple times due to transportation.     Recent stays in nursing home following hospital encounters. Continues to have some ambulatory dysfunction.     I reviewed the patient's past medications, allergies and past medical history during this visit.    Past Medical History :    Past Medical History:   Diagnosis Date    Arthritis     Benign hypertensive kidney disease with chronic kidney disease 10/06/2021    Cervical vertebra fusion 2000    CHF (congestive heart failure) ([facility])     Chronic bilateral low back pain with bilateral sciatica 2022    Chronic kidney disease (CKD), stage III (moderate) ([facility]) 2013    Depression     Hypertension     Kidney stones     Lumbar spinal stenosis     L3-4, L4-5 severe    Motion sickness     Stroke ([facility])     patient had a stroke in the late     TB (pulmonary tuberculosis)     Type 2

## 2025-03-28 ENCOUNTER — RESULTS FOLLOW-UP (OUTPATIENT)
Age: 75
End: 2025-03-28

## 2025-04-06 ENCOUNTER — APPOINTMENT (OUTPATIENT)
Dept: GENERAL RADIOLOGY | Age: 75
DRG: 351 | End: 2025-04-06
Payer: MEDICAID

## 2025-04-06 ENCOUNTER — APPOINTMENT (OUTPATIENT)
Dept: ULTRASOUND IMAGING | Age: 75
DRG: 351 | End: 2025-04-06
Payer: MEDICAID

## 2025-04-06 ENCOUNTER — APPOINTMENT (OUTPATIENT)
Dept: CT IMAGING | Age: 75
DRG: 351 | End: 2025-04-06
Attending: EMERGENCY MEDICINE
Payer: MEDICAID

## 2025-04-06 ENCOUNTER — HOSPITAL ENCOUNTER (INPATIENT)
Age: 75
LOS: 2 days | Discharge: HOME | DRG: 351 | End: 2025-04-08
Attending: EMERGENCY MEDICINE | Admitting: FAMILY MEDICINE
Payer: MEDICAID

## 2025-04-06 DIAGNOSIS — M10.9 ACUTE GOUT OF RIGHT KNEE, UNSPECIFIED CAUSE: ICD-10-CM

## 2025-04-06 DIAGNOSIS — S09.90XA CLOSED HEAD INJURY, INITIAL ENCOUNTER: ICD-10-CM

## 2025-04-06 DIAGNOSIS — M54.41 CHRONIC BILATERAL LOW BACK PAIN WITH BILATERAL SCIATICA: ICD-10-CM

## 2025-04-06 DIAGNOSIS — G89.29 CHRONIC BILATERAL LOW BACK PAIN WITH BILATERAL SCIATICA: ICD-10-CM
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
## 2025-04-06 DIAGNOSIS — M54.42 CHRONIC BILATERAL LOW BACK PAIN WITH BILATERAL SCIATICA: ICD-10-CM

## 2025-04-06 DIAGNOSIS — R26.2 INABILITY TO AMBULATE DUE TO KNEE: Primary | ICD-10-CM

## 2025-04-06 LAB
ALBUMIN SERPL-MCNC: 3.5 G/DL (ref 3.5–5.2)
ALP SERPL-CCNC: 106 U/L (ref 40–129)
ALT SERPL-CCNC: 14 U/L (ref 0–40)
ANION GAP SERPL CALCULATED.3IONS-SCNC: 14 MMOL/L (ref 7–16)
AST SERPL-CCNC: 16 U/L (ref 0–39)
BASOPHILS # BLD: 0.03 K/UL (ref 0–0.2)
BASOPHILS NFR BLD: 0 % (ref 0–2)
BILIRUB SERPL-MCNC: 0.8 MG/DL (ref 0–1.2)
BUN SERPL-MCNC: 19 MG/DL (ref 6–23)
CALCIUM SERPL-MCNC: 8.4 MG/DL (ref 8.6–10.2)
CHLORIDE SERPL-SCNC: 108 MMOL/L (ref 98–107)
CO2 SERPL-SCNC: 18 MMOL/L (ref 22–29)
CREAT SERPL-MCNC: 2 MG/DL (ref 0.7–1.2)
EOSINOPHIL # BLD: 0.04 K/UL (ref 0.05–0.5)
EOSINOPHILS RELATIVE PERCENT: 1 % (ref 0–6)
ERYTHROCYTE [DISTWIDTH] IN BLOOD BY AUTOMATED COUNT: 17.2 % (ref 11.5–15)
GFR, ESTIMATED: 34 ML/MIN/1.73M2
GLUCOSE SERPL-MCNC: 138 MG/DL (ref 74–99)
HCT VFR BLD AUTO: 41.9 % (ref 37–54)
HGB BLD-MCNC: 13.4 G/DL (ref 12.5–16.5)
IMM GRANULOCYTES # BLD AUTO: <0.03 K/UL (ref 0–0.58)
IMM GRANULOCYTES NFR BLD: 0 % (ref 0–5)
LYMPHOCYTES NFR BLD: 1.78 K/UL (ref 1.5–4)
LYMPHOCYTES RELATIVE PERCENT: 26 % (ref 20–42)
MCH RBC QN AUTO: 26.6 PG (ref 26–35)
MCHC RBC AUTO-ENTMCNC: 32 G/DL (ref 32–34.5)
MCV RBC AUTO: 83.3 FL (ref 80–99.9)
MONOCYTES NFR BLD: 0.78 K/UL (ref 0.1–0.95)
MONOCYTES NFR BLD: 11 % (ref 2–12)
NEUTROPHILS NFR BLD: 61 % (ref 43–80)
NEUTS SEG NFR BLD: 4.17 K/UL (ref 1.8–7.3)
PLATELET # BLD AUTO: 205 K/UL (ref 130–450)
PMV BLD AUTO: 11.1 FL (ref 7–12)
POTASSIUM SERPL-SCNC: 3.4 MMOL/L (ref 3.5–5)
PROT SERPL-MCNC: 7.8 G/DL (ref 6.4–8.3)
RBC # BLD AUTO: 5.03 M/UL (ref 3.8–5.8)
SODIUM SERPL-SCNC: 140 MMOL/L (ref 132–146)
WBC OTHER # BLD: 6.8 K/UL (ref 4.5–11.5)

## 2025-04-06 PROCEDURE — 80053 COMPREHEN METABOLIC PANEL: CPT

## 2025-04-06 PROCEDURE — 72125 CT NECK SPINE W/O DYE: CPT

## 2025-04-06 PROCEDURE — 96374 THER/PROPH/DIAG INJ IV PUSH: CPT

## 2025-04-06 PROCEDURE — 2500000003 HC RX 250 WO HCPCS: Performed by: FAMILY MEDICINE

## 2025-04-06 PROCEDURE — 93971 EXTREMITY STUDY: CPT

## 2025-04-06 PROCEDURE — 6370000000 HC RX 637 (ALT 250 FOR IP): Performed by: EMERGENCY MEDICINE

## 2025-04-06 PROCEDURE — 6370000000 HC RX 637 (ALT 250 FOR IP): Performed by: NURSE PRACTITIONER

## 2025-04-06 PROCEDURE — 70450 CT HEAD/BRAIN W/O DYE: CPT

## 2025-04-06 PROCEDURE — 73562 X-RAY EXAM OF KNEE 3: CPT

## 2025-04-06 PROCEDURE — 84550 ASSAY OF BLOOD/URIC ACID: CPT

## 2025-04-06 PROCEDURE — 96372 THER/PROPH/DIAG INJ SC/IM: CPT

## 2025-04-06 PROCEDURE — 1200000000 HC SEMI PRIVATE

## 2025-04-06 PROCEDURE — 85025 COMPLETE CBC W/AUTO DIFF WBC: CPT

## 2025-04-06 PROCEDURE — 99223 1ST HOSP IP/OBS HIGH 75: CPT | Performed by: FAMILY MEDICINE

## 2025-04-06 PROCEDURE — 6360000002 HC RX W HCPCS: Performed by: EMERGENCY MEDICINE

## 2025-04-06 PROCEDURE — 99285 EMERGENCY DEPT VISIT HI MDM: CPT

## 2025-04-06 RX ORDER — ACETAMINOPHEN 650 MG/1
650 SUPPOSITORY RECTAL EVERY 6 HOURS PRN
Status: DISCONTINUED | OUTPATIENT
Start: 2025-04-06 | End: 2025-04-08 | Stop reason: HOSPADM

## 2025-04-06 RX ORDER — POLYETHYLENE GLYCOL 3350 17 G/17G
17 POWDER, FOR SOLUTION ORAL DAILY PRN
Status: DISCONTINUED | OUTPATIENT
Start: 2025-04-06 | End: 2025-04-08 | Stop reason: HOSPADM

## 2025-04-06 RX ORDER — PANTOPRAZOLE SODIUM 40 MG/1
40 TABLET, DELAYED RELEASE ORAL DAILY
Status: DISCONTINUED | OUTPATIENT
Start: 2025-04-07 | End: 2025-04-08 | Stop reason: HOSPADM

## 2025-04-06 RX ORDER — BUPROPION HYDROCHLORIDE 150 MG/1
150 TABLET, EXTENDED RELEASE ORAL DAILY
Status: DISCONTINUED | OUTPATIENT
Start: 2025-04-07 | End: 2025-04-08 | Stop reason: HOSPADM

## 2025-04-06 RX ORDER — ONDANSETRON 4 MG/1
4 TABLET, ORALLY DISINTEGRATING ORAL EVERY 8 HOURS PRN
Status: DISCONTINUED | OUTPATIENT
Start: 2025-04-06 | End: 2025-04-08 | Stop reason: HOSPADM

## 2025-04-06 RX ORDER — LISINOPRIL 20 MG/1
40 TABLET ORAL DAILY
Status: DISCONTINUED | OUTPATIENT
Start: 2025-04-07 | End: 2025-04-08 | Stop reason: HOSPADM

## 2025-04-06 RX ORDER — TRIAMCINOLONE ACETONIDE 40 MG/ML
40 INJECTION, SUSPENSION INTRA-ARTICULAR; INTRAMUSCULAR ONCE
Status: COMPLETED | OUTPATIENT
Start: 2025-04-06 | End: 2025-04-06

## 2025-04-06 RX ORDER — MAGNESIUM SULFATE IN WATER 40 MG/ML
2000 INJECTION, SOLUTION INTRAVENOUS PRN
Status: DISCONTINUED | OUTPATIENT
Start: 2025-04-06 | End: 2025-04-08 | Stop reason: HOSPADM

## 2025-04-06 RX ORDER — TAMSULOSIN HYDROCHLORIDE 0.4 MG/1
0.4 CAPSULE ORAL NIGHTLY
Status: DISCONTINUED | OUTPATIENT
Start: 2025-04-06 | End: 2025-04-08 | Stop reason: HOSPADM

## 2025-04-06 RX ORDER — POTASSIUM CHLORIDE 7.45 MG/ML
10 INJECTION INTRAVENOUS PRN
Status: DISCONTINUED | OUTPATIENT
Start: 2025-04-06 | End: 2025-04-08 | Stop reason: HOSPADM

## 2025-04-06 RX ORDER — ACETAMINOPHEN 325 MG/1
650 TABLET ORAL EVERY 6 HOURS PRN
Status: DISCONTINUED | OUTPATIENT
Start: 2025-04-06 | End: 2025-04-08 | Stop reason: HOSPADM

## 2025-04-06 RX ORDER — SODIUM CHLORIDE 9 MG/ML
INJECTION, SOLUTION INTRAVENOUS PRN
Status: DISCONTINUED | OUTPATIENT
Start: 2025-04-06 | End: 2025-04-08 | Stop reason: HOSPADM

## 2025-04-06 RX ORDER — SODIUM CHLORIDE 0.9 % (FLUSH) 0.9 %
5-40 SYRINGE (ML) INJECTION PRN
Status: DISCONTINUED | OUTPATIENT
Start: 2025-04-06 | End: 2025-04-08 | Stop reason: HOSPADM

## 2025-04-06 RX ORDER — ONDANSETRON 2 MG/ML
4 INJECTION INTRAMUSCULAR; INTRAVENOUS EVERY 6 HOURS PRN
Status: DISCONTINUED | OUTPATIENT
Start: 2025-04-06 | End: 2025-04-08 | Stop reason: HOSPADM

## 2025-04-06 RX ORDER — HYDROCODONE BITARTRATE AND ACETAMINOPHEN 5; 325 MG/1; MG/1
1 TABLET ORAL ONCE
Status: COMPLETED | OUTPATIENT
Start: 2025-04-06 | End: 2025-04-06

## 2025-04-06 RX ORDER — HYDRALAZINE HYDROCHLORIDE 25 MG/1
75 TABLET, FILM COATED ORAL 3 TIMES DAILY
Status: DISCONTINUED | OUTPATIENT
Start: 2025-04-06 | End: 2025-04-08 | Stop reason: HOSPADM

## 2025-04-06 RX ORDER — METOPROLOL SUCCINATE 50 MG/1
100 TABLET, EXTENDED RELEASE ORAL DAILY
Status: DISCONTINUED | OUTPATIENT
Start: 2025-04-07 | End: 2025-04-08 | Stop reason: HOSPADM

## 2025-04-06 RX ORDER — SODIUM CHLORIDE 0.9 % (FLUSH) 0.9 %
5-40 SYRINGE (ML) INJECTION EVERY 12 HOURS SCHEDULED
Status: DISCONTINUED | OUTPATIENT
Start: 2025-04-06 | End: 2025-04-08 | Stop reason: HOSPADM

## 2025-04-06 RX ORDER — KETOROLAC TROMETHAMINE 30 MG/ML
15 INJECTION, SOLUTION INTRAMUSCULAR; INTRAVENOUS ONCE
Status: COMPLETED | OUTPATIENT
Start: 2025-04-06 | End: 2025-04-06

## 2025-04-06 RX ORDER — POTASSIUM CHLORIDE 1500 MG/1
40 TABLET, EXTENDED RELEASE ORAL PRN
Status: DISCONTINUED | OUTPATIENT
Start: 2025-04-06 | End: 2025-04-08 | Stop reason: HOSPADM

## 2025-04-06 RX ADMIN — TAMSULOSIN HYDROCHLORIDE 0.4 MG: 0.4 CAPSULE ORAL at 23:20

## 2025-04-06 RX ADMIN — HYDROCODONE BITARTRATE AND ACETAMINOPHEN 1 TABLET: 5; 325 TABLET ORAL at 14:22

## 2025-04-06 RX ADMIN — SODIUM CHLORIDE, PRESERVATIVE FREE 10 ML: 5 INJECTION INTRAVENOUS at 22:06

## 2025-04-06 RX ADMIN — TRIAMCINOLONE ACETONIDE 40 MG: 40 INJECTION, SUSPENSION INTRA-ARTICULAR; INTRAMUSCULAR at 14:22

## 2025-04-06 RX ADMIN — KETOROLAC TROMETHAMINE 15 MG: 30 INJECTION, SOLUTION INTRAMUSCULAR at 14:22

## 2025-04-06 RX ADMIN — APIXABAN 5 MG: 5 TABLET, FILM COATED ORAL at 23:20

## 2025-04-06 RX ADMIN — HYDRALAZINE HYDROCHLORIDE 75 MG: 25 TABLET ORAL at 23:20

## 2025-04-06 ASSESSMENT — PAIN SCALES - GENERAL
PAINLEVEL_OUTOF10: 6
PAINLEVEL_OUTOF10: 0

## 2025-04-06 ASSESSMENT — PAIN DESCRIPTION - ONSET: ONSET: ON-GOING

## 2025-04-06 ASSESSMENT — PAIN DESCRIPTION - FREQUENCY: FREQUENCY: CONTINUOUS

## 2025-04-06 ASSESSMENT — PAIN DESCRIPTION - PAIN TYPE: TYPE: CHRONIC PAIN

## 2025-04-06 ASSESSMENT — PAIN DESCRIPTION - ORIENTATION: ORIENTATION: RIGHT;LEFT

## 2025-04-06 ASSESSMENT — PAIN DESCRIPTION - DESCRIPTORS: DESCRIPTORS: ACHING;BURNING;CRAMPING

## 2025-04-06 ASSESSMENT — PAIN DESCRIPTION - LOCATION: LOCATION: FOOT

## 2025-04-06 ASSESSMENT — PAIN - FUNCTIONAL ASSESSMENT
PAIN_FUNCTIONAL_ASSESSMENT: ACTIVITIES ARE NOT PREVENTED
PAIN_FUNCTIONAL_ASSESSMENT: 0-10

## 2025-04-06 NOTE — ED PROVIDER NOTES
HPI:  4/6/25, Time: 3:29 PM EDT         Hemal Banks is a 75 y.o. male presenting to the ED for knee pain.  Patient states has had right knee pain for the past month.  Nothing makes it better, worse when he ambulates.  Aching sensation, he has had this multiple times in the past secondary to gout.  He also has history of neuropathy on that side, he states he keeps falling as his knee hurts and he cannot feel his feet.  Did hit his head today, there is no loss of conscious, he does take Eliquis.  No fevers or chills.  No cough or sputum.  No paresthesias.  No other symptoms or complaints.    Review of Systems:   A complete review of systems was performed and pertinent positives and negatives are stated within HPI, all other systems reviewed and are negative.          --------------------------------------------- PAST HISTORY ---------------------------------------------  Past Medical History:  has a past medical history of Arthritis, Benign hypertensive kidney disease with chronic kidney disease, Cervical vertebra fusion, CHF (congestive heart failure) ([facility]), Chronic bilateral low back pain with bilateral sciatica, Chronic kidney disease (CKD), stage III (moderate) ([facility]), Depression, Hypertension, Kidney stones, Lumbar spinal stenosis, Motion sickness, Stroke ([facility]), TB (pulmonary tuberculosis), and Type 2 diabetes mellitus with diabetic polyneuropathy, with long-term current use of insulin ([facility]).    Past Surgical History:  has a past surgical history that includes Rotator cuff repair (Left, 1999); Spine surgery; Kidney stone surgery (01/23/2010); Colonoscopy; Upper gastrointestinal endoscopy; Upper gastrointestinal endoscopy (N/A, 09/13/2021); Colonoscopy (N/A, 09/13/2021); Upper gastrointestinal endoscopy (N/A, 09/13/2021); Upper gastrointestinal endoscopy (N/A, 08/23/2022); Upper gastrointestinal endoscopy (N/A, 08/23/2022); Pain management procedure (Bilateral, 09/27/2022); Pain management procedure

## 2025-04-06 NOTE — H&P
MD   JARDIANCE 10 MG tablet Take 1 tablet by mouth daily 2/17/25   Rosio Gillette MD   ofloxacin (OCUFLOX) 0.3 % solution  5/31/24   Rosio Gillette MD   prednisoLONE acetate (PRED FORTE) 1 % ophthalmic suspension INSTILL 1 DROP INTO LEFT EYE FOUR TIMES DAILY FOR 1 WEEK THEN TWICE DAILY FOR 1 WEEK 5/31/24   Rosio Gillette MD   lisinopril (PRINIVIL;ZESTRIL) 40 MG tablet Take 1 tablet by mouth daily 3/26/25   Carlita Castellano MD   pantoprazole (PROTONIX) 40 MG tablet Take 1 tablet by mouth daily 3/26/25   Carlita Castellano MD   Continuous Glucose Sensor (FREESTYLE AGNES 3 PLUS SENSOR) MISC Check blood glucose 4 times daily 3/26/25   Carlita Castellano MD   Continuous Glucose  (FREESTYLE AGNES 3 READER) OLEGARIO Check blood glucose as needed 3/26/25   Carlita Castellano MD   gabapentin (NEURONTIN) 300 MG capsule TAKE 1 CAPSULE BY MOUTH IN THE MORNING AND AT BEDTIME AS NEEDED 2/18/25 5/19/25  Carlita Castellano MD   lidocaine (HM LIDOCAINE PATCH) 4 % external patch Place 1 patch onto the skin daily    Rosio Gillette MD   polyethylene glycol (MIRALAX) 17 g PACK packet Take 17 g by mouth daily    Rosio Gillette MD   traMADol (ULTRAM) 50 MG tablet Take 1 tablet by mouth every 6 hours as needed. 1/14/25   Rosio Gillette MD   meclizine (ANTIVERT) 12.5 MG tablet TAKE 1 TABLET BY MOUTH THREE TIMES DAILY AS NEEDED FOR DIZZINESS OR NAUSEA 12/18/24   Carlita Castellano MD   hydrALAZINE (APRESOLINE) 50 MG tablet Take 1.5 tablets by mouth in the morning, at noon, and at bedtime 11/19/24   Tori Quispe APRN - CNP   dapagliflozin (FARXIGA) 10 MG tablet Take 1 tablet by mouth every morning    Rosio Gillette MD   albuterol sulfate HFA (VENTOLIN HFA) 108 (90 Base) MCG/ACT inhaler Inhale 2 puffs into the lungs 4 times daily as needed for Wheezing 10/8/24   Carlita Castellano MD   albuterol (PROVENTIL) (2.5 MG/3ML) 0.083% nebulizer solution Take 3 mLs

## 2025-04-07 LAB — URATE SERPL-MCNC: 7.8 MG/DL (ref 3.4–7)

## 2025-04-07 PROCEDURE — 99232 SBSQ HOSP IP/OBS MODERATE 35: CPT | Performed by: INTERNAL MEDICINE

## 2025-04-07 PROCEDURE — 97161 PT EVAL LOW COMPLEX 20 MIN: CPT

## 2025-04-07 PROCEDURE — 2500000003 HC RX 250 WO HCPCS: Performed by: FAMILY MEDICINE

## 2025-04-07 PROCEDURE — 97165 OT EVAL LOW COMPLEX 30 MIN: CPT

## 2025-04-07 PROCEDURE — 97535 SELF CARE MNGMENT TRAINING: CPT

## 2025-04-07 PROCEDURE — 6370000000 HC RX 637 (ALT 250 FOR IP): Performed by: NURSE PRACTITIONER

## 2025-04-07 PROCEDURE — 2580000003 HC RX 258: Performed by: INTERNAL MEDICINE

## 2025-04-07 PROCEDURE — 6370000000 HC RX 637 (ALT 250 FOR IP): Performed by: INTERNAL MEDICINE

## 2025-04-07 PROCEDURE — 97530 THERAPEUTIC ACTIVITIES: CPT

## 2025-04-07 PROCEDURE — 1200000000 HC SEMI PRIVATE

## 2025-04-07 PROCEDURE — 6370000000 HC RX 637 (ALT 250 FOR IP): Performed by: FAMILY MEDICINE

## 2025-04-07 RX ORDER — HYDRALAZINE HYDROCHLORIDE 20 MG/ML
10 INJECTION INTRAMUSCULAR; INTRAVENOUS EVERY 6 HOURS PRN
Status: DISCONTINUED | OUTPATIENT
Start: 2025-04-07 | End: 2025-04-08 | Stop reason: HOSPADM

## 2025-04-07 RX ORDER — ALLOPURINOL 100 MG/1
100 TABLET ORAL DAILY
Status: DISCONTINUED | OUTPATIENT
Start: 2025-04-07 | End: 2025-04-08 | Stop reason: HOSPADM

## 2025-04-07 RX ORDER — COLCHICINE 0.6 MG/1
1.2 TABLET ORAL ONCE
Status: COMPLETED | OUTPATIENT
Start: 2025-04-07 | End: 2025-04-07

## 2025-04-07 RX ORDER — OXYCODONE HYDROCHLORIDE 5 MG/1
5 TABLET ORAL EVERY 4 HOURS PRN
Refills: 0 | Status: DISCONTINUED | OUTPATIENT
Start: 2025-04-07 | End: 2025-04-08 | Stop reason: HOSPADM

## 2025-04-07 RX ORDER — SODIUM CHLORIDE 9 MG/ML
INJECTION, SOLUTION INTRAVENOUS CONTINUOUS
Status: ACTIVE | OUTPATIENT
Start: 2025-04-07 | End: 2025-04-07

## 2025-04-07 RX ADMIN — PANTOPRAZOLE SODIUM 40 MG: 40 TABLET, DELAYED RELEASE ORAL at 09:01

## 2025-04-07 RX ADMIN — OXYCODONE HYDROCHLORIDE 5 MG: 5 TABLET ORAL at 21:06

## 2025-04-07 RX ADMIN — APIXABAN 5 MG: 5 TABLET, FILM COATED ORAL at 09:00

## 2025-04-07 RX ADMIN — SODIUM CHLORIDE: 0.9 INJECTION, SOLUTION INTRAVENOUS at 12:22

## 2025-04-07 RX ADMIN — COLCHICINE 1.2 MG: 0.6 TABLET, FILM COATED ORAL at 06:10

## 2025-04-07 RX ADMIN — ALLOPURINOL 100 MG: 100 TABLET ORAL at 10:33

## 2025-04-07 RX ADMIN — BUPROPION HYDROCHLORIDE 150 MG: 150 TABLET, FILM COATED, EXTENDED RELEASE ORAL at 09:01

## 2025-04-07 RX ADMIN — HYDRALAZINE HYDROCHLORIDE 75 MG: 25 TABLET ORAL at 21:06

## 2025-04-07 RX ADMIN — TAMSULOSIN HYDROCHLORIDE 0.4 MG: 0.4 CAPSULE ORAL at 21:06

## 2025-04-07 RX ADMIN — APIXABAN 5 MG: 5 TABLET, FILM COATED ORAL at 21:06

## 2025-04-07 RX ADMIN — METOPROLOL SUCCINATE 100 MG: 50 TABLET, EXTENDED RELEASE ORAL at 09:00

## 2025-04-07 RX ADMIN — SODIUM CHLORIDE, PRESERVATIVE FREE 10 ML: 5 INJECTION INTRAVENOUS at 09:01

## 2025-04-07 RX ADMIN — EMPAGLIFLOZIN 10 MG: 10 TABLET, FILM COATED ORAL at 09:00

## 2025-04-07 RX ADMIN — HYDRALAZINE HYDROCHLORIDE 75 MG: 25 TABLET ORAL at 15:52

## 2025-04-07 RX ADMIN — HYDRALAZINE HYDROCHLORIDE 75 MG: 25 TABLET ORAL at 08:59

## 2025-04-07 RX ADMIN — ACETAMINOPHEN 650 MG: 325 TABLET ORAL at 21:06

## 2025-04-07 ASSESSMENT — PAIN DESCRIPTION - PAIN TYPE
TYPE: CHRONIC PAIN
TYPE: CHRONIC PAIN

## 2025-04-07 ASSESSMENT — PAIN DESCRIPTION - LOCATION
LOCATION: LEG
LOCATION: KNEE

## 2025-04-07 ASSESSMENT — PAIN DESCRIPTION - FREQUENCY
FREQUENCY: CONTINUOUS
FREQUENCY: CONTINUOUS

## 2025-04-07 ASSESSMENT — PAIN SCALES - GENERAL
PAINLEVEL_OUTOF10: 7
PAINLEVEL_OUTOF10: 5
PAINLEVEL_OUTOF10: 5

## 2025-04-07 ASSESSMENT — PAIN DESCRIPTION - ORIENTATION
ORIENTATION: RIGHT
ORIENTATION: RIGHT

## 2025-04-07 ASSESSMENT — PAIN DESCRIPTION - ONSET
ONSET: ON-GOING
ONSET: ON-GOING

## 2025-04-07 ASSESSMENT — PAIN DESCRIPTION - DESCRIPTORS
DESCRIPTORS: JABBING;NAGGING;STABBING
DESCRIPTORS: ACHING;DISCOMFORT;SORE

## 2025-04-07 ASSESSMENT — PAIN - FUNCTIONAL ASSESSMENT: PAIN_FUNCTIONAL_ASSESSMENT: ACTIVITIES ARE NOT PREVENTED

## 2025-04-07 NOTE — CARE COORDINATION
04/07/25 Transition of care: Patient is ready for discharge home today. He is alert. He states he lives alone in a fourth floor apartment. He has elevator access. He has been using the neighbors rollator as he is not eligible for one thru insurance. He follows with Dr Arteaga and uses the [facility] on [address] for his meds. Patient has a history of rehab at [facility]. He also has a history with [facility]. He states he is not home bound and has not other needs.Electronically signed by Jane Thomason RN CM on 4/7/2025 at 2:53 PM    Case Management Assessment  Initial Evaluation    Date/Time of Evaluation: 4/7/2025 2:55 PM  Assessment Completed by: Jane Thomason RN    If patient is discharged prior to next notation, then this note serves as note for discharge by case management.    Patient Name: Hemal Banks                   YOB: 1950  Diagnosis: Inability to ambulate due to knee [R26.2]  Closed head injury, initial encounter [S09.90XA]  Fall, initial encounter [W19.XXXA]  Ambulatory dysfunction [R26.2]  Acute gout of right knee, unspecified cause [M10.9]                   Date / Time: 4/6/2025  3:31 PM    Patient Admission Status: Inpatient   Readmission Risk (Low < 19, Mod (19-27), High > 27): Readmission Risk Score: 20.3    Current PCP: Carlita Castellano MD  PCP verified by ? Yes    Chart Reviewed: Yes      History Provided by: Patient  Patient Orientation: Alert and Oriented    Patient Cognition: Alert    Hospitalization in the last 30 days (Readmission):  No    If yes, Readmission Assessment in  Navigator will be completed.    Advance Directives:      Code Status: Full Code   Patient's Primary Decision Maker is: Patient Declined (Legal Next of Kin Remains as Decision Maker)      Discharge Planning:    Patient lives with: Alone Type of Home: Apartment  Primary Care Giver: Self  Patient Support Systems include: Friends/Neighbors   Current Financial resources:

## 2025-04-07 NOTE — ED NOTES
ED TO INPATIENT SBAR HANDOFF    Patient Name: Hemal Banks   Preferred Name: Hemal  : 1950  75 y.o.   Code Status Order: Full Code  Telemetry Order:   C-SSRS: Risk of Suicide: No Risk  Sitter no     Restraints:       Situation  Chief Complaint   Patient presents with    Gout Flare-up     Hx gout, has been bothering him for about a month. States \"Today it got to the point where I couldn't take care of myself\" endorses a fall and hitting head, does take eliquis, denies N/V     Brief Description of Patient's Condition: pt calm and cooperative, stands to urinate, wheelchair in ED. 22g L forearm  Mental Status: oriented and alert    Background  Allergies: No Known Allergies    Assessment  Vitals/MEWS: MEWS Score: 1  Level of Consciousness: Alert (0)   Vitals:    25 1332 25 1630   BP: (!) 175/82 122/70   Pulse: 78 69   Resp: 16 16   Temp: 98.3 °F (36.8 °C)    TempSrc: Oral    SpO2: 97% 96%   Weight: 111.1 kg (245 lb)    Height: 1.88 m (6' 2\")      Cardiac Rhythm:    Deterioration Index (DI): Deterioration Index: 17.94  Deterioration Index (DI) Interventions Performed:    O2 Flow Rate:    O2 Device: O2 Device: None (Room air)    Active Central Lines:                          Active Wounds:    Active Gill's:      Recommendation  Patient Belongings:    Additional Comments: N/A  If any further questions, please call Sending RN at [phone]  Opportunity for questions and clarification were provided to (name of person notified and time): Nurse, RN       Electronically signed by: Electronically signed by Miranda Kirby RN on 2025 at 9:01 PM

## 2025-04-07 NOTE — CARE COORDINATION
04/07/25 Update CM Note: offered patient a rollator with a cost of 135$ and patient refuses. Offered a wheeled walker at 100$ and he is calling his [facility]  for advice.Electronically signed by Jane Thomason RN CM on 4/7/2025 at 3:32 PM

## 2025-04-07 NOTE — CARE COORDINATION
04/07/25 Update CM Note: Per Rosa Maria dme patient is not eligible for a rollator due to receiving a wheeled walker under insurance in 2021. Electronically signed by Jane Thomason RN Cm on 4/7/2025 at 2:32 PM

## 2025-04-08 VITALS
WEIGHT: 245 LBS | BODY MASS INDEX: 31.44 KG/M2 | SYSTOLIC BLOOD PRESSURE: 156 MMHG | HEART RATE: 75 BPM | OXYGEN SATURATION: 98 % | TEMPERATURE: 97.9 F | RESPIRATION RATE: 18 BRPM | HEIGHT: 74 IN | DIASTOLIC BLOOD PRESSURE: 67 MMHG

## 2025-04-08 LAB
ANION GAP SERPL CALCULATED.3IONS-SCNC: 14 MMOL/L (ref 7–16)
BUN SERPL-MCNC: 20 MG/DL (ref 6–23)
CALCIUM SERPL-MCNC: 8.6 MG/DL (ref 8.6–10.2)
CHLORIDE SERPL-SCNC: 109 MMOL/L (ref 98–107)
CO2 SERPL-SCNC: 19 MMOL/L (ref 22–29)
CREAT SERPL-MCNC: 1.8 MG/DL (ref 0.7–1.2)
GFR, ESTIMATED: 38 ML/MIN/1.73M2
GLUCOSE SERPL-MCNC: 170 MG/DL (ref 74–99)
MAGNESIUM SERPL-MCNC: 2.1 MG/DL (ref 1.6–2.6)
POTASSIUM SERPL-SCNC: 3.5 MMOL/L (ref 3.5–5)
SODIUM SERPL-SCNC: 142 MMOL/L (ref 132–146)

## 2025-04-08 PROCEDURE — 6370000000 HC RX 637 (ALT 250 FOR IP): Performed by: NURSE PRACTITIONER

## 2025-04-08 PROCEDURE — 36415 COLL VENOUS BLD VENIPUNCTURE: CPT

## 2025-04-08 PROCEDURE — 99239 HOSP IP/OBS DSCHRG MGMT >30: CPT | Performed by: INTERNAL MEDICINE

## 2025-04-08 PROCEDURE — 80048 BASIC METABOLIC PNL TOTAL CA: CPT

## 2025-04-08 PROCEDURE — 83735 ASSAY OF MAGNESIUM: CPT

## 2025-04-08 PROCEDURE — 6370000000 HC RX 637 (ALT 250 FOR IP): Performed by: INTERNAL MEDICINE

## 2025-04-08 PROCEDURE — 2500000003 HC RX 250 WO HCPCS: Performed by: FAMILY MEDICINE

## 2025-04-08 RX ORDER — OXYCODONE HYDROCHLORIDE 5 MG/1
5 TABLET ORAL EVERY 8 HOURS PRN
Qty: 15 TABLET | Refills: 0 | Status: SHIPPED | OUTPATIENT
Start: 2025-04-08 | End: 2025-04-13

## 2025-04-08 RX ORDER — GABAPENTIN 300 MG/1
300 CAPSULE ORAL 2 TIMES DAILY
Status: DISCONTINUED | OUTPATIENT
Start: 2025-04-08 | End: 2025-04-08 | Stop reason: HOSPADM

## 2025-04-08 RX ORDER — ALLOPURINOL 100 MG/1
100 TABLET ORAL DAILY
Qty: 30 TABLET | Refills: 0 | Status: SHIPPED | OUTPATIENT
Start: 2025-04-09

## 2025-04-08 RX ADMIN — EMPAGLIFLOZIN 10 MG: 10 TABLET, FILM COATED ORAL at 09:09

## 2025-04-08 RX ADMIN — OXYCODONE HYDROCHLORIDE 5 MG: 5 TABLET ORAL at 09:09

## 2025-04-08 RX ADMIN — GABAPENTIN 300 MG: 300 CAPSULE ORAL at 09:45

## 2025-04-08 RX ADMIN — HYDRALAZINE HYDROCHLORIDE 75 MG: 25 TABLET ORAL at 09:09

## 2025-04-08 RX ADMIN — BUPROPION HYDROCHLORIDE 150 MG: 150 TABLET, FILM COATED, EXTENDED RELEASE ORAL at 09:09

## 2025-04-08 RX ADMIN — METOPROLOL SUCCINATE 100 MG: 50 TABLET, EXTENDED RELEASE ORAL at 09:09

## 2025-04-08 RX ADMIN — SODIUM CHLORIDE, PRESERVATIVE FREE 10 ML: 5 INJECTION INTRAVENOUS at 09:16

## 2025-04-08 RX ADMIN — ALLOPURINOL 100 MG: 100 TABLET ORAL at 09:09

## 2025-04-08 RX ADMIN — APIXABAN 5 MG: 5 TABLET, FILM COATED ORAL at 09:09

## 2025-04-08 RX ADMIN — PANTOPRAZOLE SODIUM 40 MG: 40 TABLET, DELAYED RELEASE ORAL at 09:09

## 2025-04-08 RX ADMIN — OXYCODONE HYDROCHLORIDE 5 MG: 5 TABLET ORAL at 13:22

## 2025-04-08 ASSESSMENT — PAIN SCALES - GENERAL
PAINLEVEL_OUTOF10: 6
PAINLEVEL_OUTOF10: 7

## 2025-04-08 ASSESSMENT — PAIN DESCRIPTION - LOCATION
LOCATION: LEG
LOCATION: LEG

## 2025-04-08 ASSESSMENT — PAIN - FUNCTIONAL ASSESSMENT: PAIN_FUNCTIONAL_ASSESSMENT: PREVENTS OR INTERFERES SOME ACTIVE ACTIVITIES AND ADLS

## 2025-04-08 ASSESSMENT — PAIN DESCRIPTION - ORIENTATION: ORIENTATION: LEFT;RIGHT

## 2025-04-08 ASSESSMENT — PAIN DESCRIPTION - DESCRIPTORS: DESCRIPTORS: ACHING;DISCOMFORT;SORE

## 2025-04-08 NOTE — CARE COORDINATION
04/08/25 Update CM Note: Spoke with Sophie, [facility]. She states per her Supervisor that the [facility] community plan records indicate the patient has not had a wheeled walker or rollator issued to him nor paid for by the insurance. She states the patient has been homeless up until a few months ago when she got him an apartment in the [place]. Per [facility] she is stating to issue the rollator and to bill the [facility] community plan. [facility] can be reached at [phone]. Nae howell [facility] dme notified of the above. Electronically signed by Jane Thomason RN CM on 4/8/2025 at 11:44 AM

## 2025-04-08 NOTE — CARE COORDINATION
04/08/25 Update CM Note: patient requesting CM call the [facility] Sophie BOOKER, at [phone] regarding obtaining a rollator for discharge. Call placed and message left requesting a return call back. Patient notified he will likely discharge today. He states he needs a rollator. It was explained again that it could be supplied to him but it would be private pay and he declines. Electronically signed by Jane Thomason RN CM on 4/8/2025 at 10:38 AM

## 2025-04-08 NOTE — CARE COORDINATION
04/08/25 Update CM note: patient set up with [employer] thru insurance for transport home. Per phone conversation with [place]on yancey will be on [address] between 1:10-3:10pm today for pickup. Reference number [id number]. Electronically signed by Jane Thomason RN CM on 4/8/2025 at 1:08 PM

## 2025-04-08 NOTE — PLAN OF CARE
Problem: Chronic Conditions and Co-morbidities  Goal: Patient's chronic conditions and co-morbidity symptoms are monitored and maintained or improved  4/8/2025 1301 by Kaylen Segal RN  Outcome: Completed     Problem: Discharge Planning  Goal: Discharge to home or other facility with appropriate resources  4/8/2025 1301 by Kaylen Segal RN  Outcome: Completed     Problem: Pain  Goal: Verbalizes/displays adequate comfort level or baseline comfort level  4/8/2025 1301 by Kaylen Segal RN  Outcome: Completed     Problem: Skin/Tissue Integrity  Goal: Skin integrity remains intact  Description: 1.  Monitor for areas of redness and/or skin breakdown  2.  Assess vascular access sites hourly  3.  Every 4-6 hours minimum:  Change oxygen saturation probe site  4.  Every 4-6 hours:  If on nasal continuous positive airway pressure, respiratory therapy assess nares and determine need for appliance change or resting period  4/8/2025 1301 by Kaylen Segal RN  Outcome: Completed     Problem: ABCDS Injury Assessment  Goal: Absence of physical injury  4/8/2025 1301 by Kaylen Segal RN  Outcome: Completed     Problem: Safety - Adult  Goal: Free from fall injury  4/8/2025 1301 by Kaylen Segal RN  Outcome: Completed

## 2025-04-08 NOTE — DISCHARGE SUMMARY
tablet  Commonly known as: PROTONIX  Take 1 tablet by mouth daily     Rollator Ultra-Light Misc  Use for ambulation     tamsulosin 0.4 MG capsule  Commonly known as: FLOMAX  Take 1 capsule by mouth nightly     vitamin B-12 1000 MCG tablet  Commonly known as: CYANOCOBALAMIN            STOP taking these medications      HM Lidocaine Patch 4 % external patch  Generic drug: lidocaine     Jardiance 10 MG tablet  Generic drug: empagliflozin     ofloxacin 0.3 % solution  Commonly known as: OCUFLOX     polyethylene glycol 17 g Pack packet  Commonly known as: MIRALAX     prednisoLONE acetate 1 % ophthalmic suspension  Commonly known as: PRED FORTE     traMADol 50 MG tablet  Commonly known as: ULTRAM               Where to Get Your Medications        These medications were sent to [facility] [place] [address] [address] - P [phone] - F [phone]  [facility] [address] [address]      Phone: [phone]   allopurinol 100 MG tablet  oxyCODONE 5 MG immediate release tablet           Note that more than 30 minutes was spent in preparing discharge papers, discussing discharge with patient, medication review, etc.    Signed:  Electronically signed by IRINEO MAHARAJ MD on 4/8/2025 at 1:33 PM

## 2025-04-08 NOTE — CARE COORDINATION
04/08/25 Update CM Note: [facility] and [facility] to [facility] on referral. Electronically signed by Jane Thomason RN CM on 4/8/2025 at 12:07 PM

## 2025-04-17 ENCOUNTER — TELEPHONE (OUTPATIENT)
Age: 75
End: 2025-04-17

## 2025-04-17 DIAGNOSIS — I48.0 PAROXYSMAL ATRIAL FIBRILLATION (HCC): ICD-10-CM

## 2025-04-17 NOTE — TELEPHONE ENCOUNTER
[facility] called to let Dr know that they going to see patient next week and they going to see him 2 time for 3 weeks.

## 2025-04-18 NOTE — TELEPHONE ENCOUNTER
Name of Medication(s) Requested:  Requested Prescriptions     Pending Prescriptions Disp Refills    ELIQUIS 5 MG TABS tablet [Pharmacy Med Name: ELIQUIS 5MG TABLETS] 180 tablet 1     Sig: TAKE 1 TABLET BY MOUTH TWICE DAILY       Medication is on current medication list Yes    Dosage and directions were verified? Yes    Quantity verified: 90 day supply     Pharmacy Verified?  Yes    Last Appointment:  3/26/2025    Future appts:  Future Appointments   Date Time Provider Department Center   6/26/2025  9:30 AM Carlita Castellano MD [facility] DE[facility]        (If no appt send self scheduling link. .REFILLAPPT)  Scheduling request sent?     [] Yes  [x] No    Does patient need updated?  [] Yes  [x] No

## 2025-04-21 DIAGNOSIS — I48.0 PAROXYSMAL ATRIAL FIBRILLATION (HCC): ICD-10-CM

## 2025-04-21 NOTE — TELEPHONE ENCOUNTER
Name of Medication(s) Requested:  Requested Prescriptions      No prescriptions requested or ordered in this encounter       Medication is on current medication list Yes    Dosage and directions were verified? Yes    Quantity verified: 90 day supply     Pharmacy Verified?  Yes    Last Appointment:  3/26/2025    Future appts:  Future Appointments   Date Time Provider Department Center   6/26/2025  9:30 AM Carlita Castellano MD [facility] DE[facility]        (If no appt send self scheduling link. .REFILLAPPT)  Scheduling request sent?     [] Yes  [] No    Does patient need updated?  [] Yes  [] No

## 2025-04-22 RX ORDER — APIXABAN 5 MG/1
5 TABLET, FILM COATED ORAL 2 TIMES DAILY
Qty: 180 TABLET | Refills: 1 | OUTPATIENT
Start: 2025-04-22

## 2025-04-30 PROCEDURE — 99285 EMERGENCY DEPT VISIT HI MDM: CPT

## 2025-04-30 RX ORDER — KETOROLAC TROMETHAMINE 30 MG/ML
15 INJECTION, SOLUTION INTRAMUSCULAR; INTRAVENOUS ONCE
Status: COMPLETED | OUTPATIENT
Start: 2025-05-01 | End: 2025-05-01

## 2025-04-30 ASSESSMENT — PAIN - FUNCTIONAL ASSESSMENT: PAIN_FUNCTIONAL_ASSESSMENT: 0-10

## 2025-04-30 ASSESSMENT — LIFESTYLE VARIABLES: HOW OFTEN DO YOU HAVE A DRINK CONTAINING ALCOHOL: NEVER

## 2025-04-30 ASSESSMENT — PAIN DESCRIPTION - DESCRIPTORS: DESCRIPTORS: ACHING;DISCOMFORT;DULL

## 2025-04-30 ASSESSMENT — PAIN DESCRIPTION - LOCATION: LOCATION: LEG

## 2025-04-30 ASSESSMENT — PAIN DESCRIPTION - ORIENTATION: ORIENTATION: LEFT

## 2025-04-30 ASSESSMENT — PAIN SCALES - GENERAL: PAINLEVEL_OUTOF10: 6

## 2025-05-01 ENCOUNTER — APPOINTMENT (OUTPATIENT)
Dept: GENERAL RADIOLOGY | Age: 75
End: 2025-05-01
Payer: MEDICAID

## 2025-05-01 ENCOUNTER — HOSPITAL ENCOUNTER (OUTPATIENT)
Age: 75
Setting detail: OBSERVATION
Discharge: HOME HEALTH | End: 2025-05-02
Attending: STUDENT IN AN ORGANIZED HEALTH CARE EDUCATION/TRAINING PROGRAM | Admitting: FAMILY MEDICINE
Payer: MEDICAID

## 2025-05-01 DIAGNOSIS — G89.4 CHRONIC PAIN SYNDROME: ICD-10-CM

## 2025-05-01 DIAGNOSIS — R26.2 AMBULATORY DYSFUNCTION: Primary | ICD-10-CM

## 2025-05-01 DIAGNOSIS — R53.1 GENERALIZED WEAKNESS: ICD-10-CM

## 2025-05-01 LAB
ALBUMIN SERPL-MCNC: 3.5 G/DL (ref 3.5–5.2)
ALP SERPL-CCNC: 130 U/L (ref 40–129)
ALT SERPL-CCNC: 14 U/L (ref 0–50)
ANION GAP SERPL CALCULATED.3IONS-SCNC: 11 MMOL/L (ref 7–16)
AST SERPL-CCNC: 30 U/L (ref 0–50)
BASOPHILS # BLD: 0.03 K/UL (ref 0–0.2)
BASOPHILS NFR BLD: 1 % (ref 0–2)
BILIRUB SERPL-MCNC: 0.5 MG/DL (ref 0–1.2)
BNP SERPL-MCNC: 1826 PG/ML (ref 0–450)
BUN SERPL-MCNC: 15 MG/DL (ref 8–23)
CALCIUM SERPL-MCNC: 9.1 MG/DL (ref 8.8–10.2)
CHLORIDE SERPL-SCNC: 107 MMOL/L (ref 98–107)
CO2 SERPL-SCNC: 24 MMOL/L (ref 22–29)
CREAT SERPL-MCNC: 1.7 MG/DL (ref 0.7–1.2)
EOSINOPHIL # BLD: 0.05 K/UL (ref 0.05–0.5)
EOSINOPHILS RELATIVE PERCENT: 1 % (ref 0–6)
ERYTHROCYTE [DISTWIDTH] IN BLOOD BY AUTOMATED COUNT: 15.4 % (ref 11.5–15)
GFR, ESTIMATED: 42 ML/MIN/1.73M2
GLUCOSE BLD-MCNC: 103 MG/DL (ref 74–99)
GLUCOSE BLD-MCNC: 77 MG/DL (ref 74–99)
GLUCOSE BLD-MCNC: 84 MG/DL (ref 74–99)
GLUCOSE BLD-MCNC: 90 MG/DL (ref 74–99)
GLUCOSE SERPL-MCNC: 92 MG/DL (ref 74–99)
HCT VFR BLD AUTO: 42.9 % (ref 37–54)
HGB BLD-MCNC: 13.5 G/DL (ref 12.5–16.5)
IMM GRANULOCYTES # BLD AUTO: <0.03 K/UL (ref 0–0.58)
IMM GRANULOCYTES NFR BLD: 0 % (ref 0–5)
LYMPHOCYTES NFR BLD: 1.65 K/UL (ref 1.5–4)
LYMPHOCYTES RELATIVE PERCENT: 29 % (ref 20–42)
MCH RBC QN AUTO: 26.5 PG (ref 26–35)
MCHC RBC AUTO-ENTMCNC: 31.5 G/DL (ref 32–34.5)
MCV RBC AUTO: 84.3 FL (ref 80–99.9)
MONOCYTES NFR BLD: 0.49 K/UL (ref 0.1–0.95)
MONOCYTES NFR BLD: 9 % (ref 2–12)
NEUTROPHILS NFR BLD: 60 % (ref 43–80)
NEUTS SEG NFR BLD: 3.4 K/UL (ref 1.8–7.3)
PLATELET # BLD AUTO: 206 K/UL (ref 130–450)
PMV BLD AUTO: 10.3 FL (ref 7–12)
POTASSIUM SERPL-SCNC: 3.6 MMOL/L (ref 3.5–5.1)
PROT SERPL-MCNC: 8.5 G/DL (ref 6.4–8.3)
RBC # BLD AUTO: 5.09 M/UL (ref 3.8–5.8)
SODIUM SERPL-SCNC: 142 MMOL/L (ref 136–145)
URATE SERPL-MCNC: 6.2 MG/DL (ref 3.4–7)
WBC OTHER # BLD: 5.6 K/UL (ref 4.5–11.5)

## 2025-05-01 PROCEDURE — 6360000002 HC RX W HCPCS: Performed by: NURSE PRACTITIONER

## 2025-05-01 PROCEDURE — 6370000000 HC RX 637 (ALT 250 FOR IP): Performed by: NURSE PRACTITIONER

## 2025-05-01 PROCEDURE — 97165 OT EVAL LOW COMPLEX 30 MIN: CPT

## 2025-05-01 PROCEDURE — 36415 COLL VENOUS BLD VENIPUNCTURE: CPT

## 2025-05-01 PROCEDURE — 97530 THERAPEUTIC ACTIVITIES: CPT

## 2025-05-01 PROCEDURE — G0378 HOSPITAL OBSERVATION PER HR: HCPCS

## 2025-05-01 PROCEDURE — 73630 X-RAY EXAM OF FOOT: CPT

## 2025-05-01 PROCEDURE — 73562 X-RAY EXAM OF KNEE 3: CPT

## 2025-05-01 PROCEDURE — 82962 GLUCOSE BLOOD TEST: CPT

## 2025-05-01 PROCEDURE — 85025 COMPLETE CBC W/AUTO DIFF WBC: CPT

## 2025-05-01 PROCEDURE — 96374 THER/PROPH/DIAG INJ IV PUSH: CPT

## 2025-05-01 PROCEDURE — 83880 ASSAY OF NATRIURETIC PEPTIDE: CPT

## 2025-05-01 PROCEDURE — 99222 1ST HOSP IP/OBS MODERATE 55: CPT | Performed by: NURSE PRACTITIONER

## 2025-05-01 PROCEDURE — 6370000000 HC RX 637 (ALT 250 FOR IP)

## 2025-05-01 PROCEDURE — 80053 COMPREHEN METABOLIC PANEL: CPT

## 2025-05-01 PROCEDURE — 6360000002 HC RX W HCPCS

## 2025-05-01 PROCEDURE — 97161 PT EVAL LOW COMPLEX 20 MIN: CPT

## 2025-05-01 PROCEDURE — 2500000003 HC RX 250 WO HCPCS: Performed by: NURSE PRACTITIONER

## 2025-05-01 PROCEDURE — 84550 ASSAY OF BLOOD/URIC ACID: CPT

## 2025-05-01 PROCEDURE — 6370000000 HC RX 637 (ALT 250 FOR IP): Performed by: INTERNAL MEDICINE

## 2025-05-01 PROCEDURE — 96375 TX/PRO/DX INJ NEW DRUG ADDON: CPT

## 2025-05-01 RX ORDER — HYDROCODONE BITARTRATE AND ACETAMINOPHEN 5; 325 MG/1; MG/1
1 TABLET ORAL ONCE
Status: COMPLETED | OUTPATIENT
Start: 2025-05-01 | End: 2025-05-01

## 2025-05-01 RX ORDER — ASPIRIN 81 MG/1
81 TABLET ORAL DAILY
Status: DISCONTINUED | OUTPATIENT
Start: 2025-05-01 | End: 2025-05-02 | Stop reason: HOSPADM

## 2025-05-01 RX ORDER — INSULIN LISPRO 100 [IU]/ML
0-4 INJECTION, SOLUTION INTRAVENOUS; SUBCUTANEOUS
Status: DISCONTINUED | OUTPATIENT
Start: 2025-05-01 | End: 2025-05-02 | Stop reason: HOSPADM

## 2025-05-01 RX ORDER — HYDRALAZINE HYDROCHLORIDE 25 MG/1
75 TABLET, FILM COATED ORAL 3 TIMES DAILY
Status: DISCONTINUED | OUTPATIENT
Start: 2025-05-01 | End: 2025-05-01

## 2025-05-01 RX ORDER — LANOLIN ALCOHOL/MO/W.PET/CERES
1000 CREAM (GRAM) TOPICAL DAILY
Status: DISCONTINUED | OUTPATIENT
Start: 2025-05-01 | End: 2025-05-02 | Stop reason: HOSPADM

## 2025-05-01 RX ORDER — ACETAMINOPHEN 325 MG/1
650 TABLET ORAL EVERY 6 HOURS PRN
Status: DISCONTINUED | OUTPATIENT
Start: 2025-05-01 | End: 2025-05-02 | Stop reason: HOSPADM

## 2025-05-01 RX ORDER — TAMSULOSIN HYDROCHLORIDE 0.4 MG/1
0.4 CAPSULE ORAL NIGHTLY
Status: DISCONTINUED | OUTPATIENT
Start: 2025-05-01 | End: 2025-05-02 | Stop reason: HOSPADM

## 2025-05-01 RX ORDER — ONDANSETRON 2 MG/ML
4 INJECTION INTRAMUSCULAR; INTRAVENOUS EVERY 6 HOURS PRN
Status: DISCONTINUED | OUTPATIENT
Start: 2025-05-01 | End: 2025-05-02 | Stop reason: HOSPADM

## 2025-05-01 RX ORDER — ALLOPURINOL 100 MG/1
100 TABLET ORAL DAILY
Status: DISCONTINUED | OUTPATIENT
Start: 2025-05-01 | End: 2025-05-02 | Stop reason: HOSPADM

## 2025-05-01 RX ORDER — ENOXAPARIN SODIUM 100 MG/ML
30 INJECTION SUBCUTANEOUS 2 TIMES DAILY
Status: DISCONTINUED | OUTPATIENT
Start: 2025-05-01 | End: 2025-05-01

## 2025-05-01 RX ORDER — POLYETHYLENE GLYCOL 3350 17 G/17G
17 POWDER, FOR SOLUTION ORAL DAILY PRN
Status: DISCONTINUED | OUTPATIENT
Start: 2025-05-01 | End: 2025-05-02 | Stop reason: HOSPADM

## 2025-05-01 RX ORDER — MAGNESIUM SULFATE IN WATER 40 MG/ML
2000 INJECTION, SOLUTION INTRAVENOUS PRN
Status: DISCONTINUED | OUTPATIENT
Start: 2025-05-01 | End: 2025-05-02 | Stop reason: HOSPADM

## 2025-05-01 RX ORDER — DEXTROSE MONOHYDRATE 100 MG/ML
INJECTION, SOLUTION INTRAVENOUS CONTINUOUS PRN
Status: DISCONTINUED | OUTPATIENT
Start: 2025-05-01 | End: 2025-05-02 | Stop reason: HOSPADM

## 2025-05-01 RX ORDER — BUPROPION HYDROCHLORIDE 150 MG/1
150 TABLET, EXTENDED RELEASE ORAL DAILY
Status: DISCONTINUED | OUTPATIENT
Start: 2025-05-01 | End: 2025-05-02 | Stop reason: HOSPADM

## 2025-05-01 RX ORDER — POTASSIUM CHLORIDE 7.45 MG/ML
10 INJECTION INTRAVENOUS PRN
Status: DISCONTINUED | OUTPATIENT
Start: 2025-05-01 | End: 2025-05-02 | Stop reason: HOSPADM

## 2025-05-01 RX ORDER — SODIUM CHLORIDE 0.9 % (FLUSH) 0.9 %
5-40 SYRINGE (ML) INJECTION EVERY 12 HOURS SCHEDULED
Status: DISCONTINUED | OUTPATIENT
Start: 2025-05-01 | End: 2025-05-02 | Stop reason: HOSPADM

## 2025-05-01 RX ORDER — PANTOPRAZOLE SODIUM 40 MG/1
40 TABLET, DELAYED RELEASE ORAL DAILY
Status: DISCONTINUED | OUTPATIENT
Start: 2025-05-01 | End: 2025-05-02 | Stop reason: HOSPADM

## 2025-05-01 RX ORDER — HYDRALAZINE HYDROCHLORIDE 20 MG/ML
10 INJECTION INTRAMUSCULAR; INTRAVENOUS ONCE
Status: COMPLETED | OUTPATIENT
Start: 2025-05-01 | End: 2025-05-01

## 2025-05-01 RX ORDER — POTASSIUM CHLORIDE 1500 MG/1
40 TABLET, EXTENDED RELEASE ORAL PRN
Status: DISCONTINUED | OUTPATIENT
Start: 2025-05-01 | End: 2025-05-02 | Stop reason: HOSPADM

## 2025-05-01 RX ORDER — GLUCAGON 1 MG/ML
1 KIT INJECTION PRN
Status: DISCONTINUED | OUTPATIENT
Start: 2025-05-01 | End: 2025-05-02 | Stop reason: HOSPADM

## 2025-05-01 RX ORDER — METOPROLOL SUCCINATE 50 MG/1
50 TABLET, EXTENDED RELEASE ORAL 2 TIMES DAILY
Status: DISCONTINUED | OUTPATIENT
Start: 2025-05-01 | End: 2025-05-02 | Stop reason: HOSPADM

## 2025-05-01 RX ORDER — HYDRALAZINE HYDROCHLORIDE 25 MG/1
75 TABLET, FILM COATED ORAL 2 TIMES DAILY
Status: DISCONTINUED | OUTPATIENT
Start: 2025-05-01 | End: 2025-05-02 | Stop reason: HOSPADM

## 2025-05-01 RX ORDER — FERROUS SULFATE 325(65) MG
325 TABLET ORAL 2 TIMES DAILY WITH MEALS
Status: DISCONTINUED | OUTPATIENT
Start: 2025-05-01 | End: 2025-05-02 | Stop reason: HOSPADM

## 2025-05-01 RX ORDER — ONDANSETRON 4 MG/1
4 TABLET, ORALLY DISINTEGRATING ORAL EVERY 8 HOURS PRN
Status: DISCONTINUED | OUTPATIENT
Start: 2025-05-01 | End: 2025-05-02 | Stop reason: HOSPADM

## 2025-05-01 RX ORDER — GABAPENTIN 300 MG/1
300 CAPSULE ORAL 2 TIMES DAILY
Status: DISCONTINUED | OUTPATIENT
Start: 2025-05-01 | End: 2025-05-02 | Stop reason: HOSPADM

## 2025-05-01 RX ORDER — ACETAMINOPHEN 650 MG/1
650 SUPPOSITORY RECTAL EVERY 6 HOURS PRN
Status: DISCONTINUED | OUTPATIENT
Start: 2025-05-01 | End: 2025-05-02 | Stop reason: HOSPADM

## 2025-05-01 RX ORDER — LISINOPRIL 10 MG/1
40 TABLET ORAL DAILY
Status: DISCONTINUED | OUTPATIENT
Start: 2025-05-01 | End: 2025-05-01

## 2025-05-01 RX ORDER — SODIUM CHLORIDE 9 MG/ML
INJECTION, SOLUTION INTRAVENOUS PRN
Status: DISCONTINUED | OUTPATIENT
Start: 2025-05-01 | End: 2025-05-02 | Stop reason: HOSPADM

## 2025-05-01 RX ORDER — HYDROCODONE BITARTRATE AND ACETAMINOPHEN 5; 325 MG/1; MG/1
1 TABLET ORAL EVERY 6 HOURS PRN
Status: DISCONTINUED | OUTPATIENT
Start: 2025-05-01 | End: 2025-05-02 | Stop reason: HOSPADM

## 2025-05-01 RX ORDER — SODIUM CHLORIDE 0.9 % (FLUSH) 0.9 %
5-40 SYRINGE (ML) INJECTION PRN
Status: DISCONTINUED | OUTPATIENT
Start: 2025-05-01 | End: 2025-05-02 | Stop reason: HOSPADM

## 2025-05-01 RX ADMIN — TAMSULOSIN HYDROCHLORIDE 0.4 MG: 0.4 CAPSULE ORAL at 20:59

## 2025-05-01 RX ADMIN — FERROUS SULFATE TAB 325 MG (65 MG ELEMENTAL FE) 325 MG: 325 (65 FE) TAB at 18:01

## 2025-05-01 RX ADMIN — SODIUM CHLORIDE, PRESERVATIVE FREE 10 ML: 5 INJECTION INTRAVENOUS at 20:59

## 2025-05-01 RX ADMIN — APIXABAN 5 MG: 5 TABLET, FILM COATED ORAL at 08:30

## 2025-05-01 RX ADMIN — HYDRALAZINE HYDROCHLORIDE 10 MG: 20 INJECTION INTRAMUSCULAR; INTRAVENOUS at 05:52

## 2025-05-01 RX ADMIN — FERROUS SULFATE TAB 325 MG (65 MG ELEMENTAL FE) 325 MG: 325 (65 FE) TAB at 09:57

## 2025-05-01 RX ADMIN — GABAPENTIN 300 MG: 300 CAPSULE ORAL at 20:59

## 2025-05-01 RX ADMIN — HYDROCODONE BITARTRATE AND ACETAMINOPHEN 1 TABLET: 5; 325 TABLET ORAL at 20:59

## 2025-05-01 RX ADMIN — ASPIRIN 81 MG: 81 TABLET, COATED ORAL at 08:29

## 2025-05-01 RX ADMIN — BUPROPION HYDROCHLORIDE 150 MG: 150 TABLET, FILM COATED, EXTENDED RELEASE ORAL at 08:30

## 2025-05-01 RX ADMIN — HYDRALAZINE HYDROCHLORIDE 75 MG: 25 TABLET ORAL at 20:59

## 2025-05-01 RX ADMIN — GABAPENTIN 300 MG: 300 CAPSULE ORAL at 08:30

## 2025-05-01 RX ADMIN — HYDROCODONE BITARTRATE AND ACETAMINOPHEN 1 TABLET: 5; 325 TABLET ORAL at 01:34

## 2025-05-01 RX ADMIN — APIXABAN 5 MG: 5 TABLET, FILM COATED ORAL at 20:59

## 2025-05-01 RX ADMIN — SODIUM CHLORIDE, PRESERVATIVE FREE 10 ML: 5 INJECTION INTRAVENOUS at 08:30

## 2025-05-01 RX ADMIN — PANTOPRAZOLE SODIUM 40 MG: 40 TABLET, DELAYED RELEASE ORAL at 09:57

## 2025-05-01 RX ADMIN — KETOROLAC TROMETHAMINE 15 MG: 30 INJECTION, SOLUTION INTRAMUSCULAR at 00:22

## 2025-05-01 RX ADMIN — CYANOCOBALAMIN TAB 1000 MCG 1000 MCG: 1000 TAB at 09:57

## 2025-05-01 RX ADMIN — HYDRALAZINE HYDROCHLORIDE 75 MG: 25 TABLET ORAL at 08:29

## 2025-05-01 RX ADMIN — ALLOPURINOL 100 MG: 100 TABLET ORAL at 09:57

## 2025-05-01 RX ADMIN — HYDROCODONE BITARTRATE AND ACETAMINOPHEN 1 TABLET: 5; 325 TABLET ORAL at 16:04

## 2025-05-01 RX ADMIN — EMPAGLIFLOZIN 10 MG: 10 TABLET, FILM COATED ORAL at 08:30

## 2025-05-01 RX ADMIN — METOPROLOL SUCCINATE 50 MG: 50 TABLET, EXTENDED RELEASE ORAL at 08:30

## 2025-05-01 RX ADMIN — METOPROLOL SUCCINATE 50 MG: 50 TABLET, EXTENDED RELEASE ORAL at 20:59

## 2025-05-01 ASSESSMENT — PAIN SCALES - GENERAL
PAINLEVEL_OUTOF10: 7
PAINLEVEL_OUTOF10: 8
PAINLEVEL_OUTOF10: 5
PAINLEVEL_OUTOF10: 3
PAINLEVEL_OUTOF10: 7
PAINLEVEL_OUTOF10: 5

## 2025-05-01 ASSESSMENT — PAIN DESCRIPTION - ORIENTATION
ORIENTATION: RIGHT;LEFT
ORIENTATION: LEFT;RIGHT
ORIENTATION: LEFT

## 2025-05-01 ASSESSMENT — PAIN - FUNCTIONAL ASSESSMENT: PAIN_FUNCTIONAL_ASSESSMENT: PREVENTS OR INTERFERES SOME ACTIVE ACTIVITIES AND ADLS

## 2025-05-01 ASSESSMENT — PAIN DESCRIPTION - LOCATION
LOCATION: KNEE
LOCATION: KNEE;FOOT
LOCATION: LEG

## 2025-05-01 ASSESSMENT — PAIN DESCRIPTION - DESCRIPTORS
DESCRIPTORS: ACHING;DISCOMFORT;DULL
DESCRIPTORS: JABBING;NAGGING;ACHING
DESCRIPTORS: STABBING;ACHING;DISCOMFORT

## 2025-05-01 NOTE — PLAN OF CARE
Problem: Chronic Conditions and Co-morbidities  Goal: Patient's chronic conditions and co-morbidity symptoms are monitored and maintained or improved  Outcome: Progressing     Problem: Discharge Planning  Goal: Discharge to home or other facility with appropriate resources  Outcome: Progressing  Flowsheets (Taken 5/1/2025 9567)  Discharge to home or other facility with appropriate resources: Identify barriers to discharge with patient and caregiver     Problem: Pain  Goal: Verbalizes/displays adequate comfort level or baseline comfort level  Outcome: Progressing     Problem: Safety - Adult  Goal: Free from fall injury  Outcome: Progressing     Problem: ABCDS Injury Assessment  Goal: Absence of physical injury  Outcome: Progressing

## 2025-05-01 NOTE — PROGRESS NOTES
[facility] Hospitalist Progress Note    Admitting Date and Time: 5/1/2025  1:23 AM  Admit Dx: Generalized weakness [R53.1]  Ambulatory dysfunction [R26.2]    Synopsis:    Patient was recently admitted here on 4/6/25-4/8/25 due to ambulatory dysfunction. Patient at that time was seen by PT/OT and scored a 17/24 and was set up with a rollator upon discharge by social work and determined he was able to discharge home. He states that nurses, and therapists have been working with him but today when the nurse came out he pointed out that his knee was starting to swell and cause pain, and later in the evening he was unable to use his rollator to get around his apartment.  Patient comes back to the ED with complaints of right knee pain and possible gout flair. He was found to have a uric acid level of 6.2. Patient was given norco and toradol but due to the pain he is unable to ambulate. Patient being admitted to have therapy reevaluate patient.     Subjective:  Patient is being followed for Generalized weakness [R53.1]  Ambulatory dysfunction [R26.2]     Patient seen and examined at bedside this morning.  States his left knee pain and foot pain have improved.    ROS: denies fever, chills, cp, sob, n/v, HA unless stated above.      sodium chloride flush  5-40 mL IntraVENous 2 times per day    apixaban  5 mg Oral BID    aspirin  81 mg Oral Daily    buPROPion  150 mg Oral Daily    empagliflozin  10 mg Oral Daily    gabapentin  300 mg Oral BID    metoprolol succinate  50 mg Oral BID    tamsulosin  0.4 mg Oral Nightly    insulin lispro  0-4 Units SubCUTAneous 4x Daily AC & HS    hydrALAZINE  75 mg Oral BID    allopurinol  100 mg Oral Daily    ferrous sulfate  325 mg Oral BID WC    pantoprazole  40 mg Oral Daily    vitamin B-12  1,000 mcg Oral Daily     sodium chloride flush, 5-40 mL, PRN  sodium chloride, , PRN  potassium chloride, 40 mEq, PRN   Or  potassium alternative oral replacement, 40 mEq, PRN   Or  potassium  including allopurinol, Eliquis, aspirin, Wellbutrin, Jardiance, iron, Neurontin, hydralazine, Toprol-XL, PPI, Flomax, vitamin  Per case management, patient refused placement during last admission      DC planning: Home versus placement    NOTE: This report was transcribed using voice recognition software. Every effort was made to ensure accuracy; however, inadvertent computerized transcription errors may be present.    Electronically signed by Vince Vivas MD on 5/1/2025 at 10:50 AM

## 2025-05-01 NOTE — PROGRESS NOTES
Message to JOSE A Pulido: Patient has a history of CHF would youlike him to be on a fluid restriction. He also has grossly edematous bilateral lower extremities with 4+ pitting edema. Awaiting response    0500: BMP ordered. Diet adjusted.

## 2025-05-01 NOTE — PROGRESS NOTES
Spiritual Health History and Assessment/Progress Note  [facility]    Initial Encounter, Spiritual/Emotional Needs,  ,  ,      Name: Hemal Banks MRN: [id number]    Age: 75 y.o.     Sex: male   Language: English   Uatsdin: Confucianist   Ambulatory dysfunction     Date: 5/1/2025                           Spiritual Assessment began in [facility]        Referral/Consult From: Rounding   Encounter Overview/Reason: Initial Encounter, Spiritual/Emotional Needs  Service Provided For: Patient    Dasia, Belief, Meaning:   Patient identifies as spiritual  Family/Friends identify as spiritual      Importance and Influence:  Patient has spiritual/personal beliefs that influence decisions regarding their health  Family/Friends No family/friends present    Community:  Patient is connected with a spiritual community  Family/Friends No family/friends present    Assessment and Plan of Care:     Patient Interventions include: Provided sacramental/Yazidi ritual  Family/Friends Interventions include: Provided sacramental/Yazidi ritual    Patient Plan of Care: No spiritual needs identified for follow-up and Spiritual Care available upon further referral  Family/Friends Plan of Care: Spiritual Care available upon further referral    Electronically signed by Chaplain Lobo on 5/1/2025 at 1:01 PM

## 2025-05-01 NOTE — PROGRESS NOTES
4 Eyes Skin Assessment     NAME:  Hemal Banks  YOB: 1950  MEDICAL RECORD NUMBER:  [id number]    The patient is being assessed for  Admission    I agree that at least one RN has performed a thorough Head to Toe Skin Assessment on the patient. ALL assessment sites listed below have been assessed.      Areas assessed by both nurses:    Head, Face, Ears, Shoulders, Back, Chest, Arms, Elbows, Hands, Sacrum. Buttock, Coccyx, Ischium, Legs. Feet and Heels, and Under Medical Devices         Does the Patient have a Wound? No noted wound(s)       Jacques Prevention initiated by RN: Yes  Wound Care Orders initiated by RN: No    Pressure Injury (Stage 3,4, Unstageable, DTI, NWPT, and Complex wounds) if present, place Wound referral order by RN under : No    New Ostomies, if present place, Ostomy referral order under : No     Nurse 1 eSignature: Electronically signed by Michael Hernandez RN on 5/1/25 at 6:53 AM EDT    **SHARE this note so that the co-signing nurse can place an eSignature**    Nurse 2 eSignature: Electronically signed by Dasia Lebron RN on 5/1/25 at 7:42 AM EDT

## 2025-05-01 NOTE — CARE COORDINATION
5/1/25 TRANSITION OF CARE:  Pt is observation status.  Pt c/o L knee and R foot pain-pt feels like it could be his gout or arthritis.  States he lives alone in a high rise apartment.  Has [facility] at home.  Has a rollator and shower chair at home.  States uses public busing for all transportation needs.  [facility] score 16/24.  Pt states he is agreeable to skilled rehab and would like to go to [facility].  States he has been to [facility] before and did like it there.  Referral given to Becca at [facility].  Will await acceptance.  Electronically signed by Monica Serrano RN CM on 5/1/2025 at 12:39 PM

## 2025-05-01 NOTE — PROGRESS NOTES
Message tp JOSE A Norton: BP is 186/92. Could i have a stat dose of antihypertansives. His 9am BP medication are locked in the onmicell so i am unable to give him his 9am medication early. Message read.    0600: Hydrallazine IV ordered

## 2025-05-01 NOTE — PROGRESS NOTES
directions              Memory:  G              Sequencing:  G              Problem solving:  G              Judgement/safety:  F+                Functional Assessment:  AM-PAC Daily Activity Raw Score: 16/24    Initial Eval Status  Date: 5-1-25 Treatment Status  Date: STGs = LTGs  Time frame: 10-14 days   Feeding Set up   Mod I/ Ind   Grooming SBA seated   Modified [place]    UB Dressing SBA seated   Modified [place]    LB Dressing Min A figure 4 technique   Modified [place]    Bathing Mod A with sim. task   Modified [place]    Toileting NT   Modified [place]    Bed Mobility  Logroll: SBA  Supine to sit: SBA   Sit to supine: NT   Supine to sit: Modified [place]   Sit to supine: Modified [place]    Functional Transfers Min A  Sit <> stand, SPT with rollator ww   Modified [place]    Functional Mobility Min A with rollator ww short room distances   Modified [place]    Balance Sitting:     Static:  SBA    Dynamic:CGA  Standing: Min A       Activity Tolerance F-   F+/G   Visual/  Perceptual Glasses: yes          Vitals spO2 on RA & HR WFL   WFL      Hand Dominance R    AROM (PROM) Strength Additional Info:    RUE  WFL 4/5 good  and wfl FMC/dexterity noted during ADL tasks      LUE WFL 4/5 good  and wfl FMC/dexterity noted during ADL tasks         Hearing: WFL   Sensation:  No c/o numbness or tingling B UE  Tone: WFL   Edema: none noted B UE     Comments: Upon arrival patient supine in bed, agreeable to OT, cleared by Nursing.  Therapist facilitated bed mobility/ADLs/functional transfers/mobility training with focus on safety, technique & precautions.  Pt. Instructed RE: safe transfers/mobility, ADLs, role of OT, treatment plan, recs., prec.   At end of session, patient seated in bedside chair, all needs met, RN notified, with call light and phone within reach, all lines and tubes intact.  Overall patient demonstrated decreased strength, balance, independence & safety

## 2025-05-01 NOTE — ED NOTES
Pt medicated for his pain, aware of need to ambulate next. Pt stated he would like to wait for the pain medication to kick in prior to trying to ambulate at this time.

## 2025-05-01 NOTE — H&P
Hospitalist History & Physical      PCP: Carlita Castellano MD    Date of Service: Pt seen/examined on 5/1/2025 and is  Placed in Observation.    Chief Complaint:  had concerns including Knee Pain (Left knee and right foot pain started today as a sharp pain. Pt reports they are now swollen, diff walking and reports its his gout flaring up.) and Foot Pain.    History of Present Illness:    Mr. Hemal Banks, a 75 y.o. year old male  who  has a past medical history of Arthritis, Benign hypertensive kidney disease with chronic kidney disease, Cervical vertebra fusion, CHF (congestive heart failure) ([facility]), Chronic bilateral low back pain with bilateral sciatica, Chronic kidney disease (CKD), stage III (moderate) ([facility]), Depression, Hypertension, Kidney stones, Lumbar spinal stenosis, Motion sickness, Stroke ([facility]), TB (pulmonary tuberculosis), and Type 2 diabetes mellitus with diabetic polyneuropathy, with long-term current use of insulin ([facility]).     ER COURSE:    Patient was recently admitted here on 4/6/25-4/8/25 due to ambulatory dysfunction. Patient at that time was seen by PT/OT and scored a 17/24 and was set up with a rollator upon discharge by social work and determined he was able to discharge home. He states that nurses, and therapists have been working with him but today when the nurse came out he pointed out that his knee was starting to swell and cause pain, and later in the evening he was unable to use his rollator to get around his apartment.  Patient comes back to the ED with complaints of right knee pain and possible gout flair. He was found to have a uric acid level of 6.2. Patient was given norco and toradol but due to the pain he is unable to ambulate. Patient being admitted to have therapy reevaluate patient.     Past Medical History:        Diagnosis Date    Arthritis     Benign hypertensive kidney disease with chronic kidney disease 10/06/2021    Cervical vertebra fusion 01/01/2000    CHF

## 2025-05-01 NOTE — PLAN OF CARE
Problem: Chronic Conditions and Co-morbidities  Goal: Patient's chronic conditions and co-morbidity symptoms are monitored and maintained or improved  5/1/2025 1856 by Julito Crain RN  Outcome: Progressing  5/1/2025 0652 by Michael Washington RN  Outcome: Progressing     Problem: Discharge Planning  Goal: Discharge to home or other facility with appropriate resources  5/1/2025 1856 by Julito Crain RN  Outcome: Progressing  5/1/2025 0652 by Michael Washington RN  Outcome: Progressing  Flowsheets (Taken 5/1/2025 0412)  Discharge to home or other facility with appropriate resources: Identify barriers to discharge with patient and caregiver     Problem: Pain  Goal: Verbalizes/displays adequate comfort level or baseline comfort level  5/1/2025 1856 by Julito Crain RN  Outcome: Progressing  5/1/2025 0652 by Michael Washington RN  Outcome: Progressing     Problem: Safety - Adult  Goal: Free from fall injury  5/1/2025 1856 by Julito Crain RN  Outcome: Progressing  5/1/2025 0652 by Michael Washington RN  Outcome: Progressing     Problem: ABCDS Injury Assessment  Goal: Absence of physical injury  5/1/2025 1856 by Julito Crain RN  Outcome: Progressing  5/1/2025 0652 by Michael Washington RN  Outcome: Progressing

## 2025-05-01 NOTE — CARE COORDINATION
05/01/25 Update CM note: Patient now refusing a snf. He is asking to return home with homecare. [facility] home[facility] is his homecare of choice. Referral sent to [facility] to follow. Electronically signed by Jane Thomason RN CM on 5/1/2025 at 3:17 PM

## 2025-05-01 NOTE — PROGRESS NOTES
Physical Therapy  Physical Therapy Initial Assessment     Name: Hemal Banks  : 1950  MRN: [id number]      Date of Service: 2025    Evaluating PT:  Becki Yanez PT, DPT [id number]    Room #:  5209/5209-A  Diagnosis:  Generalized weakness [R53.1]  Ambulatory dysfunction [R26.2]  PMHx/PSHx:    Past Medical History:   Diagnosis Date    Arthritis     Benign hypertensive kidney disease with chronic kidney disease 10/06/2021    Cervical vertebra fusion 2000    CHF (congestive heart failure) ([facility])     Chronic bilateral low back pain with bilateral sciatica 2022    Chronic kidney disease (CKD), stage III (moderate) ([facility]) 2013    Depression     Hypertension     Kidney stones     Lumbar spinal stenosis     L3-4, L4-5 severe    Motion sickness     Stroke ([facility])     patient had a stroke in the late     TB (pulmonary tuberculosis)     Type 2 diabetes mellitus with diabetic polyneuropathy, with long-term current use of insulin ([facility])       Procedure/Surgery:  none this admission  Precautions:  Falls  Equipment Needs:  TBD, pt has rollator    SUBJECTIVE:    Pt lives alone in a 4th floor apartment with level entry and elevator access.   Pt ambulated with rollator PTA.    OBJECTIVE:   Initial Evaluation  Date: 25 Treatment Short Term/ Long Term   Goals   AM-PAC 6 Clicks      Was pt agreeable to Eval/treatment? yes     Does pt have pain? LLE pain with mobility     Bed Mobility  Rolling: SBA  Supine to sit: SBA  Sit to supine: NT  Scooting: SBA  Rolling: Independent   Supine to sit: Independent   Sit to supine: Independent   Scooting: Independent    Transfers Sit to stand: Sandra  Stand to sit: Sandra  Stand pivot: Sandra with rollator  Sit to stand: Independent   Stand to sit: Independent   Stand pivot: Mod I with rollator   Ambulation    30 feet with rollator Sandra  >150 feet with rollator Mod i   Stair negotiation: ascended and descended  NT  TBD   ROM BUE:  Defer to OT note  BLE:  WFL

## 2025-05-02 VITALS
RESPIRATION RATE: 16 BRPM | OXYGEN SATURATION: 100 % | WEIGHT: 238 LBS | HEIGHT: 74 IN | HEART RATE: 65 BPM | TEMPERATURE: 98.1 F | DIASTOLIC BLOOD PRESSURE: 68 MMHG | BODY MASS INDEX: 30.54 KG/M2 | SYSTOLIC BLOOD PRESSURE: 161 MMHG

## 2025-05-02 LAB
ANION GAP SERPL CALCULATED.3IONS-SCNC: 9 MMOL/L (ref 7–16)
BASOPHILS # BLD: 0.07 K/UL (ref 0–0.2)
BASOPHILS NFR BLD: 2 % (ref 0–2)
BUN SERPL-MCNC: 20 MG/DL (ref 8–23)
CALCIUM SERPL-MCNC: 8.4 MG/DL (ref 8.8–10.2)
CHLORIDE SERPL-SCNC: 107 MMOL/L (ref 98–107)
CO2 SERPL-SCNC: 23 MMOL/L (ref 22–29)
CREAT SERPL-MCNC: 1.7 MG/DL (ref 0.7–1.2)
EOSINOPHIL # BLD: 0.03 K/UL (ref 0.05–0.5)
EOSINOPHILS RELATIVE PERCENT: 1 % (ref 0–6)
ERYTHROCYTE [DISTWIDTH] IN BLOOD BY AUTOMATED COUNT: 15.4 % (ref 11.5–15)
GFR, ESTIMATED: 42 ML/MIN/1.73M2
GLUCOSE BLD-MCNC: 78 MG/DL (ref 74–99)
GLUCOSE BLD-MCNC: 79 MG/DL (ref 74–99)
GLUCOSE SERPL-MCNC: 94 MG/DL (ref 74–99)
HCT VFR BLD AUTO: 39 % (ref 37–54)
HGB BLD-MCNC: 12.4 G/DL (ref 12.5–16.5)
LYMPHOCYTES NFR BLD: 1.53 K/UL (ref 1.5–4)
LYMPHOCYTES RELATIVE PERCENT: 38 % (ref 20–42)
MAGNESIUM SERPL-MCNC: 2 MG/DL (ref 1.6–2.4)
MCH RBC QN AUTO: 26.6 PG (ref 26–35)
MCHC RBC AUTO-ENTMCNC: 31.8 G/DL (ref 32–34.5)
MCV RBC AUTO: 83.5 FL (ref 80–99.9)
MONOCYTES NFR BLD: 0.35 K/UL (ref 0.1–0.95)
MONOCYTES NFR BLD: 9 % (ref 2–12)
NEUTROPHILS NFR BLD: 50 % (ref 43–80)
NEUTS SEG NFR BLD: 2.02 K/UL (ref 1.8–7.3)
PLATELET # BLD AUTO: 212 K/UL (ref 130–450)
PMV BLD AUTO: 10.9 FL (ref 7–12)
POTASSIUM SERPL-SCNC: 3.5 MMOL/L (ref 3.5–5.1)
RBC # BLD AUTO: 4.67 M/UL (ref 3.8–5.8)
RBC # BLD: ABNORMAL 10*6/UL
SODIUM SERPL-SCNC: 138 MMOL/L (ref 136–145)
WBC OTHER # BLD: 4 K/UL (ref 4.5–11.5)

## 2025-05-02 PROCEDURE — 36415 COLL VENOUS BLD VENIPUNCTURE: CPT

## 2025-05-02 PROCEDURE — G0378 HOSPITAL OBSERVATION PER HR: HCPCS

## 2025-05-02 PROCEDURE — 83735 ASSAY OF MAGNESIUM: CPT

## 2025-05-02 PROCEDURE — 6370000000 HC RX 637 (ALT 250 FOR IP): Performed by: NURSE PRACTITIONER

## 2025-05-02 PROCEDURE — 99239 HOSP IP/OBS DSCHRG MGMT >30: CPT | Performed by: INTERNAL MEDICINE

## 2025-05-02 PROCEDURE — 82962 GLUCOSE BLOOD TEST: CPT

## 2025-05-02 PROCEDURE — 6370000000 HC RX 637 (ALT 250 FOR IP): Performed by: INTERNAL MEDICINE

## 2025-05-02 PROCEDURE — 2500000003 HC RX 250 WO HCPCS: Performed by: NURSE PRACTITIONER

## 2025-05-02 PROCEDURE — 85025 COMPLETE CBC W/AUTO DIFF WBC: CPT

## 2025-05-02 PROCEDURE — 80048 BASIC METABOLIC PNL TOTAL CA: CPT

## 2025-05-02 RX ORDER — HYDROCODONE BITARTRATE AND ACETAMINOPHEN 5; 325 MG/1; MG/1
1 TABLET ORAL EVERY 6 HOURS PRN
Qty: 12 TABLET | Refills: 0 | Status: SHIPPED | OUTPATIENT
Start: 2025-05-02 | End: 2025-05-05

## 2025-05-02 RX ADMIN — SODIUM CHLORIDE, PRESERVATIVE FREE 10 ML: 5 INJECTION INTRAVENOUS at 08:40

## 2025-05-02 RX ADMIN — BUPROPION HYDROCHLORIDE 150 MG: 150 TABLET, FILM COATED, EXTENDED RELEASE ORAL at 08:39

## 2025-05-02 RX ADMIN — HYDROCODONE BITARTRATE AND ACETAMINOPHEN 1 TABLET: 5; 325 TABLET ORAL at 05:38

## 2025-05-02 RX ADMIN — EMPAGLIFLOZIN 10 MG: 10 TABLET, FILM COATED ORAL at 08:40

## 2025-05-02 RX ADMIN — GABAPENTIN 300 MG: 300 CAPSULE ORAL at 08:40

## 2025-05-02 RX ADMIN — PANTOPRAZOLE SODIUM 40 MG: 40 TABLET, DELAYED RELEASE ORAL at 08:39

## 2025-05-02 RX ADMIN — APIXABAN 5 MG: 5 TABLET, FILM COATED ORAL at 08:40

## 2025-05-02 RX ADMIN — DICLOFENAC SODIUM 2 G: 10 GEL TOPICAL at 08:47

## 2025-05-02 RX ADMIN — ASPIRIN 81 MG: 81 TABLET, COATED ORAL at 08:39

## 2025-05-02 RX ADMIN — FERROUS SULFATE TAB 325 MG (65 MG ELEMENTAL FE) 325 MG: 325 (65 FE) TAB at 08:40

## 2025-05-02 RX ADMIN — HYDRALAZINE HYDROCHLORIDE 75 MG: 25 TABLET ORAL at 08:39

## 2025-05-02 RX ADMIN — CYANOCOBALAMIN TAB 1000 MCG 1000 MCG: 1000 TAB at 08:39

## 2025-05-02 RX ADMIN — HYDROCODONE BITARTRATE AND ACETAMINOPHEN 1 TABLET: 5; 325 TABLET ORAL at 11:54

## 2025-05-02 RX ADMIN — ALLOPURINOL 100 MG: 100 TABLET ORAL at 08:39

## 2025-05-02 RX ADMIN — METOPROLOL SUCCINATE 50 MG: 50 TABLET, EXTENDED RELEASE ORAL at 08:39

## 2025-05-02 ASSESSMENT — PAIN DESCRIPTION - ORIENTATION
ORIENTATION: RIGHT;LEFT
ORIENTATION: LEFT
ORIENTATION: LEFT

## 2025-05-02 ASSESSMENT — PAIN SCALES - GENERAL
PAINLEVEL_OUTOF10: 4
PAINLEVEL_OUTOF10: 7
PAINLEVEL_OUTOF10: 4
PAINLEVEL_OUTOF10: 5
PAINLEVEL_OUTOF10: 7
PAINLEVEL_OUTOF10: 5

## 2025-05-02 ASSESSMENT — PAIN DESCRIPTION - FREQUENCY: FREQUENCY: CONTINUOUS

## 2025-05-02 ASSESSMENT — PAIN DESCRIPTION - PAIN TYPE: TYPE: CHRONIC PAIN

## 2025-05-02 ASSESSMENT — PAIN DESCRIPTION - DESCRIPTORS
DESCRIPTORS: ACHING;DISCOMFORT;THROBBING;SORE
DESCRIPTORS: ACHING;DISCOMFORT;SHARP;SORE
DESCRIPTORS: ACHING;DISCOMFORT;DULL

## 2025-05-02 ASSESSMENT — PAIN DESCRIPTION - LOCATION
LOCATION: LEG;KNEE
LOCATION: KNEE
LOCATION: KNEE

## 2025-05-02 ASSESSMENT — PAIN - FUNCTIONAL ASSESSMENT
PAIN_FUNCTIONAL_ASSESSMENT: PREVENTS OR INTERFERES SOME ACTIVE ACTIVITIES AND ADLS

## 2025-05-02 ASSESSMENT — PAIN DESCRIPTION - ONSET: ONSET: ON-GOING

## 2025-05-02 NOTE — PROGRESS NOTES
Spiritual Health History and Assessment/Progress Note  [facility]    Spiritual/Emotional Needs, Follow-up,  ,  ,      Name: Hemal Banks MRN: [id number]    Age: 75 y.o.     Sex: male   Language: English   Cheondoism: Rastafari   Ambulatory dysfunction     Date: 5/2/2025                           Spiritual Assessment continued in [facility]        Referral/Consult From: Rounding   Encounter Overview/Reason: Spiritual/Emotional Needs, Follow-up  Service Provided For: Patient    Dasia, Belief, Meaning:   Patient identifies as spiritual  Family/Friends identify as spiritual      Importance and Influence:  Patient has spiritual/personal beliefs that influence decisions regarding their health  Family/Friends have spiritual/personal beliefs that influence decisions regarding the patient's health    Community:  Patient is connected with a spiritual community  Family/Friends No family/friends present    Assessment and Plan of Care:     Patient Interventions include: Provided sacramental/Taoism ritual  Family/Friends Interventions include: Provided sacramental/Taoism ritual    Patient Plan of Care: Spiritual Care available upon further referral  Family/Friends Plan of Care: Spiritual Care available upon further referral    Electronically signed by Chaplain Lobo on 5/2/2025 at 2:01 PM

## 2025-05-02 NOTE — PROGRESS NOTES
Patient is asking for pain meds to go home . If you can please send to outpatient pharmacy here because patient has no way to get his prescriptions . He is currently on Norco 5-325 every 6 hours as needed. Message sent to Dr. Vince Vivas

## 2025-05-02 NOTE — PLAN OF CARE
Problem: Chronic Conditions and Co-morbidities  Goal: Patient's chronic conditions and co-morbidity symptoms are monitored and maintained or improved  5/2/2025 0423 by Michael Washington RN  Outcome: Progressing  5/1/2025 1856 by Julito Crain RN  Outcome: Progressing     Problem: Discharge Planning  Goal: Discharge to home or other facility with appropriate resources  5/2/2025 0423 by Michael Washington RN  Outcome: Progressing  5/1/2025 1856 by Julito Crain RN  Outcome: Progressing     Problem: Pain  Goal: Verbalizes/displays adequate comfort level or baseline comfort level  5/2/2025 0423 by Michael Washington RN  Outcome: Progressing  5/1/2025 1856 by Julito Crain RN  Outcome: Progressing     Problem: Safety - Adult  Goal: Free from fall injury  5/2/2025 0423 by Michael Washington RN  Outcome: Progressing  5/1/2025 1856 by Julito Crain RN  Outcome: Progressing     Problem: ABCDS Injury Assessment  Goal: Absence of physical injury  5/2/2025 0423 by Michael Washington RN  Outcome: Progressing  5/1/2025 1856 by Julito Crain RN  Outcome: Progressing

## 2025-05-02 NOTE — PROGRESS NOTES
CLINICAL PHARMACY NOTE: MEDS TO BEDS    Total # of Prescriptions Filled: 1   The following medications were delivered to the patient:  Norco 5-325 mg    Additional Documentation:   Delivered to [facility]

## 2025-05-02 NOTE — PLAN OF CARE
Problem: Chronic Conditions and Co-morbidities  Goal: Patient's chronic conditions and co-morbidity symptoms are monitored and maintained or improved  5/2/2025 1008 by Ethel Mendoza RN  Outcome: Adequate for Discharge     Problem: Discharge Planning  Goal: Discharge to home or other facility with appropriate resources  5/2/2025 1008 by Ethel Mendoza RN  Outcome: Adequate for Discharge     Problem: Pain  Goal: Verbalizes/displays adequate comfort level or baseline comfort level  5/2/2025 1008 by Ethel Mendoza RN  Outcome: Adequate for Discharge     Problem: Safety - Adult  Goal: Free from fall injury  5/2/2025 1008 by Ethel Mendoza RN  Outcome: Adequate for Discharge     Problem: ABCDS Injury Assessment  Goal: Absence of physical injury  5/2/2025 1008 by Ethel Mendoza RN  Outcome: Adequate for Discharge

## 2025-05-02 NOTE — DISCHARGE SUMMARY
[facility] Hospitalist Physician Discharge Summary     Patient ID:  Hemal Banks  [id number]  75 y.o.  1950    Admit date: 5/1/2025    Discharge date and time:  5/2/2025  12:15 PM    Hospital Course:   Patient Hemal Banks is a 75 y.o. presented with Generalized weakness [R53.1]  Ambulatory dysfunction [R26.2]    Patient was recently admitted here on 4/6/25-4/8/25 due to ambulatory dysfunction. Patient at that time was seen by PT/OT and scored a 17/24 and was set up with a rollator upon discharge by social work and determined he was able to discharge home. He states that nurses, and therapists have been working with him but today when the nurse came out he pointed out that his knee was starting to swell and cause pain, and later in the evening he was unable to use his rollator to get around his apartment. Patient comes back to the ED with complaints of right knee pain and possible gout flair. He was found to have a uric acid level of 6.2. Patient was given norco and toradol but due to the pain he is unable to ambulate. Patient being admitted to have therapy reevaluate patient.     Patient seen by PT/OT and scored 16/16 respectively.  He initially agreed to facility and referrals were made.  He then changed his mind and stated he wanted to go home.    Spoke with patient today regarding repeat admission as he is recommended to have rehab during this admission and the last but keeps refusing.  Patient states he is more concerned about losing his apartment and would rather go home despite repeat admissions. Verbalized understanding of risks of going at home on his own when rehab is recommended.  Discharge home today.      Follow Up:    Carlita Castellano MD  [address] [facility] [address] [phone]    Call  Hospital follow up        Disposition:    Activity level: As tolerated     Dispo: Home      Condition on discharge: Stable       Discharge Exam:    General Appearance: alert and  There is normal alignment of the tarsometatarsal joints.  No acute joint abnormality.  Plantar calcaneal spur. Dorsal soft tissue edema.     Dorsal soft tissue edema. Plantar calcaneal spur.     XR KNEE LEFT (3 VIEWS)  Result Date: 5/1/2025  EXAMINATION: THREE XRAY VIEWS OF THE LEFT KNEE 5/1/2025 1:04 am COMPARISON: 04/06/2025 HISTORY: ORDERING SYSTEM PROVIDED HISTORY: left knee swelling TECHNOLOGIST PROVIDED HISTORY: Reason for exam:->left knee swelling FINDINGS: No evidence of acute fracture or dislocation.  No focal osseous lesion.  No evidence of joint effusion. No focal soft tissue abnormality.     No acute abnormality of the knee.       Patient Instructions:      Medication List        START taking these medications      diclofenac sodium 1 % Gel  Commonly known as: VOLTAREN  Apply 2 g topically 4 times daily as needed for Pain     HYDROcodone-acetaminophen 5-325 MG per tablet  Commonly known as: NORCO  Take 1 tablet by mouth every 6 hours as needed for Pain for up to 3 days. Max Daily Amount: 4 tablets            CONTINUE taking these medications      * albuterol (2.5 MG/3ML) 0.083% nebulizer solution  Commonly known as: PROVENTIL  Take 3 mLs by nebulization every 6 hours     * albuterol sulfate  (90 Base) MCG/ACT inhaler  Commonly known as: Ventolin HFA  Inhale 2 puffs into the lungs 4 times daily as needed for Wheezing     allopurinol 100 MG tablet  Commonly known as: ZYLOPRIM  Take 1 tablet by mouth daily     apixaban 5 MG Tabs tablet  Commonly known as: Eliquis  Take 1 tablet by mouth 2 times daily     aspirin 81 MG EC tablet  Commonly known as: Aspirin Low Dose  Take 1 tablet by mouth daily     buPROPion 150 MG extended release tablet  Commonly known as: WELLBUTRIN SR     Farxiga 10 MG tablet  Generic drug: dapagliflozin     ferrous sulfate 325 (65 Fe) MG tablet  Commonly known as: IRON 325  Take 1 tablet by mouth 2 times daily (with meals)     FreeStyle Chacho 3 Plus Sensor Misc  Check blood

## 2025-05-02 NOTE — CARE COORDINATION
Care Coordination: Following for discharge plan.  Chart reviewed, discussed in IDR, met with patient times 2 and discussed case with attending.  Patient  continued to refuse placement and will discharge home.  [facility] care following.  He is a resumption of care so they will be able to see him hopefully before Monday.  Christine is following.  Patient to get pain meds for [facility] pharmacy.  He will need transportation home thru [facility]  [phone].  Sw will set up ride after meds received from pharmacy.   1353:  Provide a Ride confirmed.  Will call when on the way to patient cell.  Ride auth # [id number]  RN and patient aware.

## 2025-05-05 ENCOUNTER — TELEPHONE (OUTPATIENT)
Age: 75
End: 2025-05-05

## 2025-05-05 NOTE — TELEPHONE ENCOUNTER
Care Transitions Initial Follow Up Call    Outreach made within 2 business days of discharge: Yes    Patient: Hemal Banks Patient : 1950   MRN: [id number]  Reason for Admission: Ambulatory Dysfunction  Discharge Date: 25       Spoke with: Hemal Sims department/facility: [facility] Interactive Patient Contact:  Was patient able to fill all prescriptions: Yes  Was patient instructed to bring all medications to the follow-up visit: Yes  Is patient taking all medications as directed in the discharge summary? Yes  Does patient understand their discharge instructions: Yes  Does patient have questions or concerns that need addressed prior to 7-14 day follow up office visit: yes - 25    Additional needs identified to be addressed with provider  No needs identified             Scheduled appointment with PCP within 7-14 days    Follow Up  Future Appointments   Date Time Provider Department Center   2025  9:30 AM Carlita Castellano MD [facility] DEP   2025  9:30 AM Carlita Castellano MD [facility] DEP       Anuj Bronson MA

## 2025-05-13 ENCOUNTER — TELEPHONE (OUTPATIENT)
Age: 75
End: 2025-05-13

## 2025-05-14 ENCOUNTER — OFFICE VISIT (OUTPATIENT)
Age: 75
End: 2025-05-14
Payer: MEDICAID

## 2025-05-14 VITALS
RESPIRATION RATE: 18 BRPM | SYSTOLIC BLOOD PRESSURE: 138 MMHG | DIASTOLIC BLOOD PRESSURE: 70 MMHG | BODY MASS INDEX: 29.5 KG/M2 | WEIGHT: 229.9 LBS | HEIGHT: 74 IN | HEART RATE: 77 BPM | OXYGEN SATURATION: 96 % | TEMPERATURE: 97.3 F

## 2025-05-14 DIAGNOSIS — R42 DIZZINESS: ICD-10-CM

## 2025-05-14 DIAGNOSIS — Z09 HOSPITAL DISCHARGE FOLLOW-UP: ICD-10-CM

## 2025-05-14 DIAGNOSIS — R13.10 DYSPHAGIA, UNSPECIFIED TYPE: Primary | ICD-10-CM

## 2025-05-14 DIAGNOSIS — R09.82 PND (POST-NASAL DRIP): ICD-10-CM

## 2025-05-14 PROCEDURE — 1111F DSCHRG MED/CURRENT MED MERGE: CPT | Performed by: FAMILY MEDICINE

## 2025-05-14 PROCEDURE — 99214 OFFICE O/P EST MOD 30 MIN: CPT | Performed by: FAMILY MEDICINE

## 2025-05-14 RX ORDER — MECLIZINE HCL 12.5 MG 12.5 MG/1
12.5 TABLET ORAL 3 TIMES DAILY PRN
Qty: 90 TABLET | Refills: 1 | Status: SHIPPED | OUTPATIENT
Start: 2025-05-14

## 2025-05-14 RX ORDER — AMOXICILLIN 250 MG
1 CAPSULE ORAL 2 TIMES DAILY
COMMUNITY

## 2025-05-14 RX ORDER — FLUTICASONE PROPIONATE 50 MCG
1 SPRAY, SUSPENSION (ML) NASAL DAILY
Qty: 32 G | Refills: 5 | Status: SHIPPED | OUTPATIENT
Start: 2025-05-14

## 2025-05-14 RX ORDER — EMPAGLIFLOZIN 10 MG/1
10 TABLET, FILM COATED ORAL DAILY
COMMUNITY
Start: 2025-05-05

## 2025-05-14 NOTE — PROGRESS NOTES
Post-Discharge Transitional Care  Follow Up      Hemal Banks   YOB: 1950    Date of Office Visit:  5/14/2025  Date of Hospital Admission: 5/1/25  Date of Hospital Discharge: 5/2/25  Risk of hospital readmission (high >=14%. Medium >=10%) :Readmission Risk Score: 19.8      Care management risk score Rising risk (score 2-5) and Complex Care (Scores >=6): No Risk Score On File     Non face to face  following discharge, date last encounter closed (first attempt may have been earlier): 05/05/2025    Call initiated 2 business days of discharge: Yes    ASSESSMENT/PLAN:   Dysphagia, unspecified type  -     Bo Mahmood MD, General Surgery, [place]  -     fluticasone (FLONASE) 50 MCG/ACT nasal spray; 1 spray by Each Nostril route daily, Disp-32 g, R-5Normal  Dizziness  -     meclizine (ANTIVERT) 12.5 MG tablet; Take 1 tablet by mouth 3 times daily as needed for Dizziness, Disp-90 tablet, R-1Normal  -     [facility]  -     fluticasone (FLONASE) 50 MCG/ACT nasal spray; 1 spray by Each Nostril route daily, Disp-32 g, R-5Normal  PND (post-nasal drip)  -     fluticasone (FLONASE) 50 MCG/ACT nasal spray; 1 spray by Each Nostril route daily, Disp-32 g, R-5Normal  Hospital discharge follow-up  -     OK DISCHARGE MEDS RECONCILED W/ CURRENT OUTPATIENT MED LIST      Medical Decision Making: low complexity  Return in 3 months (on 8/14/2025).           Subjective:   HPI:  Follow up of Hospital problems/diagnosis(es):     Patient presents to the ED on 5/1 and was discharged on 5/2, prior to that had been admitted on 4/6 to 4/8.  Both times there was ambulatory dysfunction noted.  During most recent admission patient was evaluated by PT/OT with a score of 16/16 respectively.  But during admission he was noted to have worsening swelling and pain of his knee.  Determined to be a gout flareup and started on colchicine.  During recent admission he was also seen and evaluated by

## 2025-05-22 RX ORDER — ALLOPURINOL 100 MG/1
100 TABLET ORAL DAILY
Qty: 90 TABLET | Refills: 3 | Status: SHIPPED | OUTPATIENT
Start: 2025-05-22

## 2025-05-22 NOTE — TELEPHONE ENCOUNTER
Name of Medication(s) Requested:  Requested Prescriptions     Pending Prescriptions Disp Refills    allopurinol (ZYLOPRIM) 100 MG tablet 30 tablet 0     Sig: Take 1 tablet by mouth daily       Medication is on current medication list Yes    Dosage and directions were verified? Yes    Quantity verified: 90 day supply     Pharmacy Verified?  Yes    Last Appointment:  5/14/2025    Future appts:  Future Appointments   Date Time Provider Department Center   6/18/2025 10:00 AM Bo Hall MD [facility]   6/26/2025  9:30 AM Carlita Castellano MD [facility] DEP   7/11/2025  8:20 AM Mana Harvey APRN - CNP [facility] NEURO Neurology -   8/14/2025 10:00 AM Nano Wade MD [facility] DEP        (If no appt send self scheduling link. .REFILLAPPT)  Scheduling request sent?     [] Yes  [] No    Does patient need updated?  [] Yes  [] No

## 2025-06-18 ENCOUNTER — PREP FOR PROCEDURE (OUTPATIENT)
Age: 75
End: 2025-06-18

## 2025-06-18 ENCOUNTER — OFFICE VISIT (OUTPATIENT)
Age: 75
End: 2025-06-18
Payer: MEDICAID

## 2025-06-18 VITALS
RESPIRATION RATE: 20 BRPM | HEART RATE: 64 BPM | HEIGHT: 74 IN | WEIGHT: 235 LBS | DIASTOLIC BLOOD PRESSURE: 88 MMHG | SYSTOLIC BLOOD PRESSURE: 174 MMHG | OXYGEN SATURATION: 96 % | TEMPERATURE: 98.7 F | BODY MASS INDEX: 30.16 KG/M2

## 2025-06-18 DIAGNOSIS — R13.10 DYSPHAGIA, UNSPECIFIED TYPE: Primary | ICD-10-CM

## 2025-06-18 PROCEDURE — 1036F TOBACCO NON-USER: CPT | Performed by: SURGERY

## 2025-06-18 PROCEDURE — G8428 CUR MEDS NOT DOCUMENT: HCPCS | Performed by: SURGERY

## 2025-06-18 PROCEDURE — 3077F SYST BP >= 140 MM HG: CPT | Performed by: SURGERY

## 2025-06-18 PROCEDURE — 3079F DIAST BP 80-89 MM HG: CPT | Performed by: SURGERY

## 2025-06-18 PROCEDURE — G8417 CALC BMI ABV UP PARAM F/U: HCPCS | Performed by: SURGERY

## 2025-06-18 PROCEDURE — 3017F COLORECTAL CA SCREEN DOC REV: CPT | Performed by: SURGERY

## 2025-06-18 PROCEDURE — 99213 OFFICE O/P EST LOW 20 MIN: CPT | Performed by: SURGERY

## 2025-06-18 PROCEDURE — 99214 OFFICE O/P EST MOD 30 MIN: CPT | Performed by: SURGERY

## 2025-06-18 PROCEDURE — 1124F ACP DISCUSS-NO DSCNMKR DOCD: CPT | Performed by: SURGERY

## 2025-06-18 NOTE — H&P
Hemal Banks  6/18/2025  [facility]              History and Physical      Chief Complaint   Patient presents with    Dysphagia     Pt c/o dysphagia with swallowing solid food, no difficulty with liquids         HISTORY OF PRESENT ILLNESS: Hemal Banks is a  75 y.o.  male,  here for a consult for dysphagia. I preformed and EGD ~ 3 years ago for dysphagia . I did not find any lesion but the patient had very large tonsils ENT evaluated him and states they do not believe this is the cause of his dyphagia and possible his cervical osteophytes. He did very well with swallowing for a few years after the EGD and now having difficulty again with solids , no issue with liquids       Past Medical History:   Diagnosis Date    Arthritis     Benign hypertensive kidney disease with chronic kidney disease 10/06/2021    Cervical vertebra fusion 01/01/2000    CHF (congestive heart failure) ([facility])     Chronic bilateral low back pain with bilateral sciatica 09/09/2022    Chronic kidney disease (CKD), stage III (moderate) ([facility]) 11/25/2013    Depression     Hypertension     Kidney stones     Lumbar spinal stenosis     L3-4, L4-5 severe    Motion sickness     Stroke ([facility])     patient had a stroke in the late 90's    TB (pulmonary tuberculosis)     Type 2 diabetes mellitus with diabetic polyneuropathy, with long-term current use of insulin ([facility])      Past Surgical History:   Procedure Laterality Date    CERVICAL FUSION  2000    COLONOSCOPY      COLONOSCOPY N/A 09/13/2021    COLONOSCOPY POLYPECTOMY SNARE/COLD BIOPSY performed by Bo Hall MD at [facility] ENDOSCOPY    FOOT SURGERY Left     KIDNEY STONE SURGERY  01/23/2010    2019    PAIN MANAGEMENT PROCEDURE Bilateral 09/27/2022    BILATERAL L4 TRANSFORAMINAL EPIDURAL STEROID INJECTION performed by Maria Teresa Romano DO at [facility] OR    PAIN MANAGEMENT PROCEDURE Bilateral 12/13/2022    BILATERAL L4 TRANSFORAMINAL EPIDURAL STEROID INJECTION performed

## 2025-06-26 ENCOUNTER — OFFICE VISIT (OUTPATIENT)
Age: 75
End: 2025-06-26
Payer: MEDICAID

## 2025-06-26 VITALS
RESPIRATION RATE: 18 BRPM | DIASTOLIC BLOOD PRESSURE: 64 MMHG | OXYGEN SATURATION: 99 % | SYSTOLIC BLOOD PRESSURE: 104 MMHG | BODY MASS INDEX: 29.76 KG/M2 | WEIGHT: 231.9 LBS | HEIGHT: 74 IN | HEART RATE: 65 BPM | TEMPERATURE: 97.7 F

## 2025-06-26 DIAGNOSIS — N20.0 NEPHROLITHIASIS: ICD-10-CM

## 2025-06-26 DIAGNOSIS — N18.32 TYPE 2 DIABETES MELLITUS WITH STAGE 3B CHRONIC KIDNEY DISEASE, WITH LONG-TERM CURRENT USE OF INSULIN (HCC): Primary | ICD-10-CM

## 2025-06-26 DIAGNOSIS — E11.42 DIABETIC PERIPHERAL NEUROPATHY (HCC): ICD-10-CM

## 2025-06-26 DIAGNOSIS — I10 ESSENTIAL HYPERTENSION: ICD-10-CM

## 2025-06-26 DIAGNOSIS — Z79.4 TYPE 2 DIABETES MELLITUS WITH STAGE 3B CHRONIC KIDNEY DISEASE, WITH LONG-TERM CURRENT USE OF INSULIN (HCC): Primary | ICD-10-CM

## 2025-06-26 DIAGNOSIS — N18.32 STAGE 3B CHRONIC KIDNEY DISEASE (HCC): ICD-10-CM

## 2025-06-26 DIAGNOSIS — K21.9 GASTROESOPHAGEAL REFLUX DISEASE, UNSPECIFIED WHETHER ESOPHAGITIS PRESENT: ICD-10-CM

## 2025-06-26 DIAGNOSIS — E11.22 TYPE 2 DIABETES MELLITUS WITH STAGE 3B CHRONIC KIDNEY DISEASE, WITH LONG-TERM CURRENT USE OF INSULIN (HCC): Primary | ICD-10-CM

## 2025-06-26 DIAGNOSIS — R10.9 LEFT FLANK PAIN: ICD-10-CM

## 2025-06-26 DIAGNOSIS — M54.16 LUMBAR RADICULOPATHY: ICD-10-CM

## 2025-06-26 DIAGNOSIS — I48.0 PAROXYSMAL ATRIAL FIBRILLATION (HCC): ICD-10-CM

## 2025-06-26 DIAGNOSIS — R97.20 ELEVATED PSA, BETWEEN 10 AND LESS THAN 20 NG/ML: ICD-10-CM

## 2025-06-26 PROBLEM — T14.91XA SUICIDAL BEHAVIOR WITH ATTEMPTED SELF-INJURY (HCC): Status: RESOLVED | Noted: 2023-11-07 | Resolved: 2025-06-26

## 2025-06-26 LAB — HBA1C MFR BLD: 5.8 %

## 2025-06-26 PROCEDURE — 1124F ACP DISCUSS-NO DSCNMKR DOCD: CPT | Performed by: FAMILY MEDICINE

## 2025-06-26 PROCEDURE — 83036 HEMOGLOBIN GLYCOSYLATED A1C: CPT | Performed by: FAMILY MEDICINE

## 2025-06-26 PROCEDURE — G8417 CALC BMI ABV UP PARAM F/U: HCPCS | Performed by: FAMILY MEDICINE

## 2025-06-26 PROCEDURE — 3074F SYST BP LT 130 MM HG: CPT | Performed by: FAMILY MEDICINE

## 2025-06-26 PROCEDURE — G8427 DOCREV CUR MEDS BY ELIG CLIN: HCPCS | Performed by: FAMILY MEDICINE

## 2025-06-26 PROCEDURE — 3017F COLORECTAL CA SCREEN DOC REV: CPT | Performed by: FAMILY MEDICINE

## 2025-06-26 PROCEDURE — 99214 OFFICE O/P EST MOD 30 MIN: CPT | Performed by: FAMILY MEDICINE

## 2025-06-26 PROCEDURE — 2022F DILAT RTA XM EVC RTNOPTHY: CPT | Performed by: FAMILY MEDICINE

## 2025-06-26 PROCEDURE — 3078F DIAST BP <80 MM HG: CPT | Performed by: FAMILY MEDICINE

## 2025-06-26 PROCEDURE — G2211 COMPLEX E/M VISIT ADD ON: HCPCS | Performed by: FAMILY MEDICINE

## 2025-06-26 PROCEDURE — 3044F HG A1C LEVEL LT 7.0%: CPT | Performed by: FAMILY MEDICINE

## 2025-06-26 PROCEDURE — 1036F TOBACCO NON-USER: CPT | Performed by: FAMILY MEDICINE

## 2025-06-26 RX ORDER — PANTOPRAZOLE SODIUM 40 MG/1
40 TABLET, DELAYED RELEASE ORAL DAILY
Qty: 90 TABLET | Refills: 3 | Status: SHIPPED | OUTPATIENT
Start: 2025-06-26

## 2025-06-26 RX ORDER — GABAPENTIN 300 MG/1
300 CAPSULE ORAL 2 TIMES DAILY
Qty: 180 CAPSULE | Refills: 3 | Status: SHIPPED | OUTPATIENT
Start: 2025-06-26 | End: 2026-06-21

## 2025-06-26 RX ORDER — HYDROCHLOROTHIAZIDE 12.5 MG/1
CAPSULE ORAL
Qty: 2 EACH | Refills: 5 | Status: SHIPPED | OUTPATIENT
Start: 2025-06-26

## 2025-06-26 RX ORDER — METOPROLOL SUCCINATE 100 MG/1
50 TABLET, EXTENDED RELEASE ORAL DAILY
Qty: 90 TABLET | Refills: 3 | Status: SHIPPED | OUTPATIENT
Start: 2025-06-26

## 2025-06-26 RX ORDER — TAMSULOSIN HYDROCHLORIDE 0.4 MG/1
0.4 CAPSULE ORAL NIGHTLY
Qty: 90 CAPSULE | Refills: 3 | Status: SHIPPED | OUTPATIENT
Start: 2025-06-26

## 2025-06-26 RX ORDER — LISINOPRIL 40 MG/1
40 TABLET ORAL DAILY
Qty: 90 TABLET | Refills: 3 | Status: SHIPPED | OUTPATIENT
Start: 2025-06-26

## 2025-06-26 ASSESSMENT — ENCOUNTER SYMPTOMS
SHORTNESS OF BREATH: 0
DIARRHEA: 0
ABDOMINAL PAIN: 0
CONSTIPATION: 0
WHEEZING: 0
NAUSEA: 0
COUGH: 0
VOMITING: 0

## 2025-06-26 NOTE — PROGRESS NOTES
[facility]  DATE OF VISIT : 2025    Patient : Hemal Banks   Age : 75 y.o.    : 1950   MRN : [id number]   ______________________________________________________________________    Chief Complaint :   Chief Complaint   Patient presents with    3 Month Follow-Up     Needs refill for Flomax. Neuro appt in July. Has EGD appt for 25. Has not yet rescheduled his Cataract surgery.    Gout     Both feet and both knees. Taking Allopurinol at the hospital and has been taking it once a day and has helped a lot. Knees at times do get sore but he has applying steroid gel for pain.     Diabetes     Taking his medications as prescribed. Sugars have not been elevated over the 200's. On the contrary his meter has been alerting him when his are too low. Does not have much of an appetite and has to force himself to eat.     Rash     Abrasion on both inner arms and forearm with small red bumps that seem to be spreading. States  where he lives broke down for the entire building and has been sleeping in the heat.        HPI : Hemal Banks is 75 y.o. male who presented to the clinic today for OV.     IDDM: Following with endocrinology.  Last A1c 7.0 (3/26/25). Now off insulin, on Jardiance. Dr. Martin endocrinologist.      HTN: Hyralazine 75mg TID(only doing BID), Lisinipril 40mg daily, metoprolol 100mg BID.  Has been off his lisinopril for about 7-10 days     A.fib: On metoprolol 100mg BIDand Eliquis.    History of CVA on aspirin.     GERD: On protonix, has been doubling up due to the esophageal discomfort. Has had esophageal dilation multiple times, feels like he needs it again. Trouble swallowing and discomfort.      Nephrolithiasis and BPH: Following with urology.  He is due for a prostate biopsy, has had it rescheduled multiple times due to transportation.  Has been having more difficulty urinating and flank pain.       I reviewed the patient's past medications, allergies and past

## 2025-06-26 NOTE — PATIENT INSTRUCTIONS
[facility] Summer Crisis Program (Free AC if applicable)    [phone] [address] [place] [id number]

## 2025-08-15 ENCOUNTER — OFFICE VISIT (OUTPATIENT)
Age: 75
End: 2025-08-15
Payer: MEDICAID

## 2025-08-15 VITALS
RESPIRATION RATE: 17 BRPM | HEART RATE: 65 BPM | DIASTOLIC BLOOD PRESSURE: 93 MMHG | HEIGHT: 74 IN | SYSTOLIC BLOOD PRESSURE: 198 MMHG | BODY MASS INDEX: 29.77 KG/M2 | WEIGHT: 232 LBS | OXYGEN SATURATION: 98 % | TEMPERATURE: 98.1 F

## 2025-08-15 DIAGNOSIS — R42 VERTIGO: Primary | ICD-10-CM

## 2025-08-15 DIAGNOSIS — M54.2 CERVICALGIA: ICD-10-CM

## 2025-08-15 DIAGNOSIS — R42 DIZZINESS: ICD-10-CM

## 2025-08-15 PROCEDURE — 99204 OFFICE O/P NEW MOD 45 MIN: CPT

## 2025-08-15 PROCEDURE — 3017F COLORECTAL CA SCREEN DOC REV: CPT

## 2025-08-15 PROCEDURE — G8417 CALC BMI ABV UP PARAM F/U: HCPCS

## 2025-08-15 PROCEDURE — G8427 DOCREV CUR MEDS BY ELIG CLIN: HCPCS

## 2025-08-15 PROCEDURE — 1036F TOBACCO NON-USER: CPT

## 2025-08-15 PROCEDURE — 3077F SYST BP >= 140 MM HG: CPT

## 2025-08-15 PROCEDURE — 3080F DIAST BP >= 90 MM HG: CPT

## 2025-08-15 PROCEDURE — 1124F ACP DISCUSS-NO DSCNMKR DOCD: CPT

## 2025-08-15 PROCEDURE — 99203 OFFICE O/P NEW LOW 30 MIN: CPT

## 2025-08-25 ENCOUNTER — TELEPHONE (OUTPATIENT)
Age: 75
End: 2025-08-25

## 2025-09-03 DIAGNOSIS — I50.32 CHRONIC HEART FAILURE WITH PRESERVED EJECTION FRACTION (HCC): Primary | ICD-10-CM

## (undated) DEVICE — Z DISCONTINUED NO SUB IDED TUBING ETCO2 AD L6.5FT NSL ORAL CVD PRNG NONFLARED TIP OVR

## (undated) DEVICE — BANDAGE ADH W1XL3IN NAT FAB WVN FLX DURABLE N ADH PD SEAL

## (undated) DEVICE — CONTAINER SPEC 60ML PH 7NEUTRAL BUFF FRMLN RDY TO USE

## (undated) DEVICE — SPONGE GZ W4XL4IN RAYON POLY FILL CVR W/ NONWOVEN FAB

## (undated) DEVICE — SYRINGE, LUER LOCK, 5ML: Brand: MEDLINE

## (undated) DEVICE — GAUZE,SPONGE,4"X4",12PLY,STERILE,LF,2'S: Brand: MEDLINE

## (undated) DEVICE — 6 ML SYRINGE LUER-LOCK TIP: Brand: MONOJECT

## (undated) DEVICE — GLOVE ORANGE PI 7 1/2   MSG9075

## (undated) DEVICE — 12 ML SYRINGE,LUER-LOCK TIP: Brand: MONOJECT

## (undated) DEVICE — CONNECTOR IRRIGATION AUXILIARY H2O JET W/ PRT MTL THRD HYDR

## (undated) DEVICE — 3M™ RED DOT™ MONITORING ELECTRODE WITH FOAM TAPE AND STICKY GEL 2560, 50/BAG, 20/CASE, 72/PLT: Brand: RED DOT™

## (undated) DEVICE — NEEDLE HYPO 25GA L1.5IN BLU POLYPR HUB S STL REG BVL STR

## (undated) DEVICE — Device: Brand: PORTEX

## (undated) DEVICE — FORCEPS BX L160CM JAW DIA2.4MM YEL L CAP W/ NDL DISP RAD

## (undated) DEVICE — BITEBLOCK 54FR W/ DENT RIM BLOX

## (undated) DEVICE — ELECTRODE PT RET AD L9FT HI MOIST COND ADH HYDRGEL CORDED

## (undated) DEVICE — TRAP POLYP ETRAP

## (undated) DEVICE — NEEDLE HYPO 18GA L1.5IN PNK POLYPR HUB S STL THN WALL FILL

## (undated) DEVICE — NON-DEHP CATHETER EXTENSION SET, MALE LUER LOCK ADAPTER

## (undated) DEVICE — Z DISCONTINUED APPLICATOR SURG PREP 0.35OZ 2% CHG 70% ISO ALC W/ HI LT

## (undated) DEVICE — DEFENDO AIR WATER SUCTION AND BIOPSY VALVE KIT FOR  OLYMPUS: Brand: DEFENDO AIR/WATER/SUCTION AND BIOPSY VALVE

## (undated) DEVICE — FORCEPS BX L240CM JAW DIA2.4MM WRK CHN 2.8MM ORNG L CAP W/

## (undated) DEVICE — SYRINGE MED 50ML LUERSLIP TIP

## (undated) DEVICE — SNARE MICRO 195CM 2.4MM POLYPECT OVAL

## (undated) DEVICE — GAUZE,SPONGE,POST-OP,4X3,STRL,LF: Brand: MEDLINE

## (undated) DEVICE — AIR SHEET,LAT,COMFORT GLIDE, BLEND 40X80: Brand: MEDLINE